# Patient Record
Sex: MALE | Race: WHITE | Employment: OTHER | URBAN - METROPOLITAN AREA
[De-identification: names, ages, dates, MRNs, and addresses within clinical notes are randomized per-mention and may not be internally consistent; named-entity substitution may affect disease eponyms.]

---

## 2018-03-27 ENCOUNTER — TELEPHONE (OUTPATIENT)
Dept: RHEUMATOLOGY | Age: 68
End: 2018-03-27

## 2018-04-18 ENCOUNTER — APPOINTMENT (OUTPATIENT)
Dept: GENERAL RADIOLOGY | Age: 68
DRG: 189 | End: 2018-04-18
Payer: MEDICARE

## 2018-04-18 ENCOUNTER — HOSPITAL ENCOUNTER (INPATIENT)
Age: 68
LOS: 4 days | Discharge: HOME | DRG: 189 | End: 2018-04-22
Attending: EMERGENCY MEDICINE | Admitting: INTERNAL MEDICINE
Payer: MEDICARE

## 2018-04-18 DIAGNOSIS — R09.02 HYPOXIA: Primary | ICD-10-CM

## 2018-04-18 DIAGNOSIS — J44.1 COPD EXACERBATION (HCC): ICD-10-CM

## 2018-04-18 LAB
ALBUMIN SERPL-MCNC: 4.3 G/DL (ref 3.5–5.2)
ALP BLD-CCNC: 166 U/L (ref 40–129)
ALT SERPL-CCNC: 32 U/L (ref 0–40)
ANION GAP SERPL CALCULATED.3IONS-SCNC: 12 MMOL/L (ref 7–16)
APTT: 31.4 SEC (ref 24.5–35.1)
AST SERPL-CCNC: 25 U/L (ref 0–39)
BASOPHILS ABSOLUTE: 0.08 E9/L (ref 0–0.2)
BASOPHILS RELATIVE PERCENT: 0.9 % (ref 0–2)
BILIRUB SERPL-MCNC: 1.1 MG/DL (ref 0–1.2)
BUN BLDV-MCNC: 9 MG/DL (ref 8–23)
CALCIUM SERPL-MCNC: 9.3 MG/DL (ref 8.6–10.2)
CHLORIDE BLD-SCNC: 98 MMOL/L (ref 98–107)
CO2: 28 MMOL/L (ref 22–29)
CREAT SERPL-MCNC: 1.3 MG/DL (ref 0.7–1.2)
EKG ATRIAL RATE: 64 BPM
EKG P AXIS: 59 DEGREES
EKG P-R INTERVAL: 136 MS
EKG Q-T INTERVAL: 426 MS
EKG QRS DURATION: 90 MS
EKG QTC CALCULATION (BAZETT): 439 MS
EKG R AXIS: -56 DEGREES
EKG T AXIS: 49 DEGREES
EKG VENTRICULAR RATE: 64 BPM
EOSINOPHILS ABSOLUTE: 0.66 E9/L (ref 0.05–0.5)
EOSINOPHILS RELATIVE PERCENT: 7.8 % (ref 0–6)
FILM ARRAY ADENOVIRUS: ABNORMAL
FILM ARRAY BORDETELLA PERTUSSIS: ABNORMAL
FILM ARRAY CHLAMYDOPHILIA PNEUMONIAE: ABNORMAL
FILM ARRAY CORONAVIRUS 229E: ABNORMAL
FILM ARRAY CORONAVIRUS HKU1: ABNORMAL
FILM ARRAY CORONAVIRUS NL63: ABNORMAL
FILM ARRAY CORONAVIRUS OC43: ABNORMAL
FILM ARRAY INFLUENZA A VIRUS 09H1: ABNORMAL
FILM ARRAY INFLUENZA A VIRUS H1: ABNORMAL
FILM ARRAY INFLUENZA A VIRUS H3: ABNORMAL
FILM ARRAY INFLUENZA A VIRUS: ABNORMAL
FILM ARRAY INFLUENZA B: ABNORMAL
FILM ARRAY METAPNEUMOVIRUS: ABNORMAL
FILM ARRAY MYCOPLASMA PNEUMONIAE: ABNORMAL
FILM ARRAY PARAINFLUENZA VIRUS 1: ABNORMAL
FILM ARRAY PARAINFLUENZA VIRUS 2: ABNORMAL
FILM ARRAY PARAINFLUENZA VIRUS 3: ABNORMAL
FILM ARRAY PARAINFLUENZA VIRUS 4: ABNORMAL
FILM ARRAY RESPIRATORY SYNCITIAL VIRUS: ABNORMAL
GFR AFRICAN AMERICAN: >60
GFR NON-AFRICAN AMERICAN: 55 ML/MIN/1.73
GLUCOSE BLD-MCNC: 133 MG/DL (ref 74–109)
HCT VFR BLD CALC: 49.2 % (ref 37–54)
HEMOGLOBIN: 16.1 G/DL (ref 12.5–16.5)
IMMATURE GRANULOCYTES #: 0.03 E9/L
IMMATURE GRANULOCYTES %: 0.4 % (ref 0–5)
INR BLD: 1.1
LACTIC ACID: 1.4 MMOL/L (ref 0.5–2.2)
LYMPHOCYTES ABSOLUTE: 0.68 E9/L (ref 1.5–4)
LYMPHOCYTES RELATIVE PERCENT: 8 % (ref 20–42)
MCH RBC QN AUTO: 30.2 PG (ref 26–35)
MCHC RBC AUTO-ENTMCNC: 32.7 % (ref 32–34.5)
MCV RBC AUTO: 92.3 FL (ref 80–99.9)
MONOCYTES ABSOLUTE: 0.59 E9/L (ref 0.1–0.95)
MONOCYTES RELATIVE PERCENT: 7 % (ref 2–12)
NEUTROPHILS ABSOLUTE: 6.43 E9/L (ref 1.8–7.3)
NEUTROPHILS RELATIVE PERCENT: 75.9 % (ref 43–80)
ORGANISM: ABNORMAL
PDW BLD-RTO: 12.7 FL (ref 11.5–15)
PLATELET # BLD: 122 E9/L (ref 130–450)
PMV BLD AUTO: 10.4 FL (ref 7–12)
POTASSIUM SERPL-SCNC: 3.9 MMOL/L (ref 3.5–5)
PROCALCITONIN: 0.07 NG/ML (ref 0–0.08)
PROTHROMBIN TIME: 12.6 SEC (ref 9.3–12.4)
RBC # BLD: 5.33 E12/L (ref 3.8–5.8)
SODIUM BLD-SCNC: 138 MMOL/L (ref 132–146)
TOTAL PROTEIN: 7.9 G/DL (ref 6.4–8.3)
TROPONIN: <0.01 NG/ML (ref 0–0.03)
WBC # BLD: 8.5 E9/L (ref 4.5–11.5)

## 2018-04-18 PROCEDURE — 87503 INFLUENZA DNA AMP PROB ADDL: CPT

## 2018-04-18 PROCEDURE — 36415 COLL VENOUS BLD VENIPUNCTURE: CPT

## 2018-04-18 PROCEDURE — 6370000000 HC RX 637 (ALT 250 FOR IP): Performed by: EMERGENCY MEDICINE

## 2018-04-18 PROCEDURE — 80053 COMPREHEN METABOLIC PANEL: CPT

## 2018-04-18 PROCEDURE — 96374 THER/PROPH/DIAG INJ IV PUSH: CPT

## 2018-04-18 PROCEDURE — 87501 INFLUENZA DNA AMP PROB 1+: CPT

## 2018-04-18 PROCEDURE — 2580000003 HC RX 258: Performed by: EMERGENCY MEDICINE

## 2018-04-18 PROCEDURE — 6360000002 HC RX W HCPCS: Performed by: EMERGENCY MEDICINE

## 2018-04-18 PROCEDURE — 85025 COMPLETE CBC W/AUTO DIFF WBC: CPT

## 2018-04-18 PROCEDURE — 87040 BLOOD CULTURE FOR BACTERIA: CPT

## 2018-04-18 PROCEDURE — 87486 CHLMYD PNEUM DNA AMP PROBE: CPT

## 2018-04-18 PROCEDURE — 85610 PROTHROMBIN TIME: CPT

## 2018-04-18 PROCEDURE — 71045 X-RAY EXAM CHEST 1 VIEW: CPT

## 2018-04-18 PROCEDURE — 87450 HC DIRECT STREP B ANTIGEN: CPT

## 2018-04-18 PROCEDURE — 2060000000 HC ICU INTERMEDIATE R&B

## 2018-04-18 PROCEDURE — 83605 ASSAY OF LACTIC ACID: CPT

## 2018-04-18 PROCEDURE — 99291 CRITICAL CARE FIRST HOUR: CPT

## 2018-04-18 PROCEDURE — 93005 ELECTROCARDIOGRAM TRACING: CPT | Performed by: EMERGENCY MEDICINE

## 2018-04-18 PROCEDURE — 84145 PROCALCITONIN (PCT): CPT

## 2018-04-18 PROCEDURE — 87798 DETECT AGENT NOS DNA AMP: CPT

## 2018-04-18 PROCEDURE — 87581 M.PNEUMON DNA AMP PROBE: CPT

## 2018-04-18 PROCEDURE — 84484 ASSAY OF TROPONIN QUANT: CPT

## 2018-04-18 PROCEDURE — 85730 THROMBOPLASTIN TIME PARTIAL: CPT

## 2018-04-18 RX ORDER — HYDRALAZINE HYDROCHLORIDE 25 MG/1
25 TABLET, FILM COATED ORAL 2 TIMES DAILY
Status: DISCONTINUED | OUTPATIENT
Start: 2018-04-18 | End: 2018-04-22 | Stop reason: HOSPADM

## 2018-04-18 RX ORDER — LORAZEPAM 1 MG/1
3 TABLET ORAL
Status: DISCONTINUED | OUTPATIENT
Start: 2018-04-18 | End: 2018-04-22 | Stop reason: HOSPADM

## 2018-04-18 RX ORDER — LORAZEPAM 2 MG/ML
2 INJECTION INTRAMUSCULAR
Status: DISCONTINUED | OUTPATIENT
Start: 2018-04-18 | End: 2018-04-22 | Stop reason: HOSPADM

## 2018-04-18 RX ORDER — BENZONATATE 100 MG/1
100 CAPSULE ORAL EVERY 8 HOURS
Status: DISCONTINUED | OUTPATIENT
Start: 2018-04-18 | End: 2018-04-22 | Stop reason: HOSPADM

## 2018-04-18 RX ORDER — ONDANSETRON 2 MG/ML
4 INJECTION INTRAMUSCULAR; INTRAVENOUS EVERY 6 HOURS PRN
Status: DISCONTINUED | OUTPATIENT
Start: 2018-04-18 | End: 2018-04-22 | Stop reason: HOSPADM

## 2018-04-18 RX ORDER — GABAPENTIN 600 MG/1
600 TABLET ORAL EVERY 8 HOURS
Status: DISCONTINUED | OUTPATIENT
Start: 2018-04-18 | End: 2018-04-22 | Stop reason: HOSPADM

## 2018-04-18 RX ORDER — LORAZEPAM 2 MG/ML
3 INJECTION INTRAMUSCULAR
Status: DISCONTINUED | OUTPATIENT
Start: 2018-04-18 | End: 2018-04-22 | Stop reason: HOSPADM

## 2018-04-18 RX ORDER — ACETAMINOPHEN 325 MG/1
650 TABLET ORAL EVERY 4 HOURS PRN
Status: DISCONTINUED | OUTPATIENT
Start: 2018-04-18 | End: 2018-04-18 | Stop reason: SDUPTHER

## 2018-04-18 RX ORDER — LORAZEPAM 1 MG/1
4 TABLET ORAL
Status: DISCONTINUED | OUTPATIENT
Start: 2018-04-18 | End: 2018-04-22 | Stop reason: HOSPADM

## 2018-04-18 RX ORDER — THIAMINE MONONITRATE (VIT B1) 100 MG
100 TABLET ORAL DAILY
Status: DISCONTINUED | OUTPATIENT
Start: 2018-04-18 | End: 2018-04-22 | Stop reason: HOSPADM

## 2018-04-18 RX ORDER — LORAZEPAM 1 MG/1
1 TABLET ORAL
Status: DISCONTINUED | OUTPATIENT
Start: 2018-04-18 | End: 2018-04-22 | Stop reason: HOSPADM

## 2018-04-18 RX ORDER — METHYLPREDNISOLONE SODIUM SUCCINATE 40 MG/ML
40 INJECTION, POWDER, LYOPHILIZED, FOR SOLUTION INTRAMUSCULAR; INTRAVENOUS EVERY 8 HOURS
Status: DISCONTINUED | OUTPATIENT
Start: 2018-04-18 | End: 2018-04-19

## 2018-04-18 RX ORDER — AMLODIPINE BESYLATE 10 MG/1
10 TABLET ORAL EVERY MORNING
Status: DISCONTINUED | OUTPATIENT
Start: 2018-04-19 | End: 2018-04-22 | Stop reason: HOSPADM

## 2018-04-18 RX ORDER — ATENOLOL 50 MG/1
50 TABLET ORAL 2 TIMES DAILY
Status: DISCONTINUED | OUTPATIENT
Start: 2018-04-18 | End: 2018-04-22 | Stop reason: HOSPADM

## 2018-04-18 RX ORDER — GUAIFENESIN/DEXTROMETHORPHAN 100-10MG/5
5 SYRUP ORAL EVERY 4 HOURS PRN
Status: DISCONTINUED | OUTPATIENT
Start: 2018-04-18 | End: 2018-04-22 | Stop reason: HOSPADM

## 2018-04-18 RX ORDER — ALBUTEROL SULFATE 2.5 MG/3ML
2.5 SOLUTION RESPIRATORY (INHALATION) EVERY 6 HOURS PRN
Status: DISCONTINUED | OUTPATIENT
Start: 2018-04-18 | End: 2018-04-22 | Stop reason: HOSPADM

## 2018-04-18 RX ORDER — ASPIRIN 81 MG/1
81 TABLET, CHEWABLE ORAL EVERY MORNING
Status: DISCONTINUED | OUTPATIENT
Start: 2018-04-19 | End: 2018-04-22 | Stop reason: HOSPADM

## 2018-04-18 RX ORDER — LORAZEPAM 2 MG/ML
1 INJECTION INTRAMUSCULAR
Status: DISCONTINUED | OUTPATIENT
Start: 2018-04-18 | End: 2018-04-22 | Stop reason: HOSPADM

## 2018-04-18 RX ORDER — ACETAMINOPHEN 325 MG/1
650 TABLET ORAL EVERY 4 HOURS PRN
Status: DISCONTINUED | OUTPATIENT
Start: 2018-04-18 | End: 2018-04-22 | Stop reason: HOSPADM

## 2018-04-18 RX ORDER — LORAZEPAM 1 MG/1
2 TABLET ORAL
Status: DISCONTINUED | OUTPATIENT
Start: 2018-04-18 | End: 2018-04-22 | Stop reason: HOSPADM

## 2018-04-18 RX ORDER — LEVOFLOXACIN 5 MG/ML
500 INJECTION, SOLUTION INTRAVENOUS ONCE
Status: COMPLETED | OUTPATIENT
Start: 2018-04-18 | End: 2018-04-18

## 2018-04-18 RX ORDER — GUAIFENESIN 400 MG/1
400 TABLET ORAL 4 TIMES DAILY PRN
COMMUNITY
End: 2018-09-06 | Stop reason: ALTCHOICE

## 2018-04-18 RX ORDER — METHYLPREDNISOLONE SODIUM SUCCINATE 125 MG/2ML
125 INJECTION, POWDER, LYOPHILIZED, FOR SOLUTION INTRAMUSCULAR; INTRAVENOUS ONCE
Status: COMPLETED | OUTPATIENT
Start: 2018-04-18 | End: 2018-04-18

## 2018-04-18 RX ORDER — IPRATROPIUM BROMIDE AND ALBUTEROL SULFATE 2.5; .5 MG/3ML; MG/3ML
1 SOLUTION RESPIRATORY (INHALATION)
Status: COMPLETED | OUTPATIENT
Start: 2018-04-18 | End: 2018-04-18

## 2018-04-18 RX ORDER — LORAZEPAM 2 MG/ML
4 INJECTION INTRAMUSCULAR
Status: DISCONTINUED | OUTPATIENT
Start: 2018-04-18 | End: 2018-04-22 | Stop reason: HOSPADM

## 2018-04-18 RX ORDER — SODIUM CHLORIDE 9 MG/ML
INJECTION, SOLUTION INTRAVENOUS CONTINUOUS
Status: DISCONTINUED | OUTPATIENT
Start: 2018-04-18 | End: 2018-04-19

## 2018-04-18 RX ORDER — SODIUM CHLORIDE 0.9 % (FLUSH) 0.9 %
10 SYRINGE (ML) INJECTION EVERY 12 HOURS SCHEDULED
Status: DISCONTINUED | OUTPATIENT
Start: 2018-04-18 | End: 2018-04-22 | Stop reason: HOSPADM

## 2018-04-18 RX ORDER — SODIUM CHLORIDE 0.9 % (FLUSH) 0.9 %
10 SYRINGE (ML) INJECTION PRN
Status: DISCONTINUED | OUTPATIENT
Start: 2018-04-18 | End: 2018-04-22 | Stop reason: HOSPADM

## 2018-04-18 RX ADMIN — IPRATROPIUM BROMIDE AND ALBUTEROL SULFATE 1 AMPULE: .5; 3 SOLUTION RESPIRATORY (INHALATION) at 13:10

## 2018-04-18 RX ADMIN — IPRATROPIUM BROMIDE AND ALBUTEROL SULFATE 1 AMPULE: .5; 3 SOLUTION RESPIRATORY (INHALATION) at 13:07

## 2018-04-18 RX ADMIN — GABAPENTIN 600 MG: 600 TABLET ORAL at 17:08

## 2018-04-18 RX ADMIN — LORAZEPAM 1 MG: 2 INJECTION INTRAMUSCULAR; INTRAVENOUS at 17:10

## 2018-04-18 RX ADMIN — IPRATROPIUM BROMIDE AND ALBUTEROL SULFATE 1 AMPULE: .5; 3 SOLUTION RESPIRATORY (INHALATION) at 13:08

## 2018-04-18 RX ADMIN — METHYLPREDNISOLONE SODIUM SUCCINATE 125 MG: 125 INJECTION, POWDER, FOR SOLUTION INTRAMUSCULAR; INTRAVENOUS at 13:12

## 2018-04-18 RX ADMIN — METHYLPREDNISOLONE SODIUM SUCCINATE 40 MG: 40 INJECTION, POWDER, FOR SOLUTION INTRAMUSCULAR; INTRAVENOUS at 20:37

## 2018-04-18 RX ADMIN — LEVOFLOXACIN 500 MG: 5 INJECTION, SOLUTION INTRAVENOUS at 13:58

## 2018-04-18 RX ADMIN — SODIUM CHLORIDE: 9 INJECTION, SOLUTION INTRAVENOUS at 17:08

## 2018-04-18 RX ADMIN — HYDRALAZINE HYDROCHLORIDE 25 MG: 25 TABLET, FILM COATED ORAL at 20:37

## 2018-04-18 RX ADMIN — ATENOLOL 50 MG: 50 TABLET ORAL at 20:36

## 2018-04-18 ASSESSMENT — PAIN SCALES - GENERAL
PAINLEVEL_OUTOF10: 3
PAINLEVEL_OUTOF10: 0
PAINLEVEL_OUTOF10: 0

## 2018-04-19 LAB
ANION GAP SERPL CALCULATED.3IONS-SCNC: 13 MMOL/L (ref 7–16)
BUN BLDV-MCNC: 15 MG/DL (ref 8–23)
CALCIUM SERPL-MCNC: 8.7 MG/DL (ref 8.6–10.2)
CHLORIDE BLD-SCNC: 96 MMOL/L (ref 98–107)
CO2: 24 MMOL/L (ref 22–29)
CREAT SERPL-MCNC: 1.4 MG/DL (ref 0.7–1.2)
GFR AFRICAN AMERICAN: >60
GFR NON-AFRICAN AMERICAN: 50 ML/MIN/1.73
GLUCOSE BLD-MCNC: 199 MG/DL (ref 74–109)
HCT VFR BLD CALC: 41.6 % (ref 37–54)
HEMOGLOBIN: 13.9 G/DL (ref 12.5–16.5)
L. PNEUMOPHILA SEROGP 1 UR AG: NORMAL
MCH RBC QN AUTO: 30.3 PG (ref 26–35)
MCHC RBC AUTO-ENTMCNC: 33.4 % (ref 32–34.5)
MCV RBC AUTO: 90.8 FL (ref 80–99.9)
PDW BLD-RTO: 12.6 FL (ref 11.5–15)
PLATELET # BLD: 102 E9/L (ref 130–450)
PMV BLD AUTO: 10.9 FL (ref 7–12)
POTASSIUM SERPL-SCNC: 3.9 MMOL/L (ref 3.5–5)
RBC # BLD: 4.58 E12/L (ref 3.8–5.8)
SODIUM BLD-SCNC: 133 MMOL/L (ref 132–146)
WBC # BLD: 6 E9/L (ref 4.5–11.5)

## 2018-04-19 PROCEDURE — 2700000000 HC OXYGEN THERAPY PER DAY

## 2018-04-19 PROCEDURE — 94640 AIRWAY INHALATION TREATMENT: CPT

## 2018-04-19 PROCEDURE — 6370000000 HC RX 637 (ALT 250 FOR IP): Performed by: EMERGENCY MEDICINE

## 2018-04-19 PROCEDURE — 85027 COMPLETE CBC AUTOMATED: CPT

## 2018-04-19 PROCEDURE — 6360000002 HC RX W HCPCS: Performed by: EMERGENCY MEDICINE

## 2018-04-19 PROCEDURE — 6370000000 HC RX 637 (ALT 250 FOR IP): Performed by: INTERNAL MEDICINE

## 2018-04-19 PROCEDURE — 2060000000 HC ICU INTERMEDIATE R&B

## 2018-04-19 PROCEDURE — 80048 BASIC METABOLIC PNL TOTAL CA: CPT

## 2018-04-19 PROCEDURE — 2580000003 HC RX 258: Performed by: EMERGENCY MEDICINE

## 2018-04-19 PROCEDURE — 6360000002 HC RX W HCPCS: Performed by: INTERNAL MEDICINE

## 2018-04-19 PROCEDURE — 36415 COLL VENOUS BLD VENIPUNCTURE: CPT

## 2018-04-19 RX ORDER — BUDESONIDE 0.5 MG/2ML
1000 INHALANT ORAL 2 TIMES DAILY
Status: DISCONTINUED | OUTPATIENT
Start: 2018-04-19 | End: 2018-04-22

## 2018-04-19 RX ORDER — IPRATROPIUM BROMIDE AND ALBUTEROL SULFATE 2.5; .5 MG/3ML; MG/3ML
1 SOLUTION RESPIRATORY (INHALATION) EVERY 4 HOURS
Status: DISCONTINUED | OUTPATIENT
Start: 2018-04-19 | End: 2018-04-22

## 2018-04-19 RX ORDER — METHYLPREDNISOLONE SODIUM SUCCINATE 40 MG/ML
40 INJECTION, POWDER, LYOPHILIZED, FOR SOLUTION INTRAMUSCULAR; INTRAVENOUS DAILY
Status: DISCONTINUED | OUTPATIENT
Start: 2018-04-20 | End: 2018-04-20

## 2018-04-19 RX ORDER — FORMOTEROL FUMARATE 20 UG/2ML
20 SOLUTION RESPIRATORY (INHALATION) 2 TIMES DAILY
Status: DISCONTINUED | OUTPATIENT
Start: 2018-04-19 | End: 2018-04-22

## 2018-04-19 RX ADMIN — Medication 10 ML: at 20:53

## 2018-04-19 RX ADMIN — IPRATROPIUM BROMIDE 0.5 MG: 0.5 SOLUTION RESPIRATORY (INHALATION) at 11:31

## 2018-04-19 RX ADMIN — ENOXAPARIN SODIUM 40 MG: 40 INJECTION, SOLUTION INTRAVENOUS; SUBCUTANEOUS at 17:50

## 2018-04-19 RX ADMIN — HYDRALAZINE HYDROCHLORIDE 25 MG: 25 TABLET, FILM COATED ORAL at 08:40

## 2018-04-19 RX ADMIN — HYDRALAZINE HYDROCHLORIDE 25 MG: 25 TABLET, FILM COATED ORAL at 20:52

## 2018-04-19 RX ADMIN — AMLODIPINE BESYLATE 10 MG: 10 TABLET ORAL at 10:26

## 2018-04-19 RX ADMIN — IPRATROPIUM BROMIDE AND ALBUTEROL SULFATE 1 AMPULE: .5; 3 SOLUTION RESPIRATORY (INHALATION) at 16:44

## 2018-04-19 RX ADMIN — FORMOTEROL FUMARATE DIHYDRATE 20 MCG: 20 SOLUTION RESPIRATORY (INHALATION) at 20:21

## 2018-04-19 RX ADMIN — IPRATROPIUM BROMIDE 0.5 MG: 0.5 SOLUTION RESPIRATORY (INHALATION) at 09:00

## 2018-04-19 RX ADMIN — BUDESONIDE 1000 MCG: 0.5 SUSPENSION RESPIRATORY (INHALATION) at 20:21

## 2018-04-19 RX ADMIN — ATENOLOL 50 MG: 50 TABLET ORAL at 08:39

## 2018-04-19 RX ADMIN — METHYLPREDNISOLONE SODIUM SUCCINATE 40 MG: 40 INJECTION, POWDER, FOR SOLUTION INTRAMUSCULAR; INTRAVENOUS at 05:14

## 2018-04-19 RX ADMIN — PAROXETINE HYDROCHLORIDE 30 MG: 10 TABLET, FILM COATED ORAL at 08:39

## 2018-04-19 RX ADMIN — LORAZEPAM 1 MG: 2 INJECTION INTRAMUSCULAR; INTRAVENOUS at 19:19

## 2018-04-19 RX ADMIN — ATENOLOL 50 MG: 50 TABLET ORAL at 20:52

## 2018-04-19 RX ADMIN — LORAZEPAM 1 MG: 1 TABLET ORAL at 10:26

## 2018-04-19 RX ADMIN — GABAPENTIN 600 MG: 600 TABLET ORAL at 08:40

## 2018-04-19 RX ADMIN — GABAPENTIN 600 MG: 600 TABLET ORAL at 17:50

## 2018-04-19 RX ADMIN — ASPIRIN 81 MG 81 MG: 81 TABLET ORAL at 08:39

## 2018-04-19 RX ADMIN — Medication 100 MG: at 08:40

## 2018-04-19 RX ADMIN — BENZONATATE 100 MG: 100 CAPSULE ORAL at 08:39

## 2018-04-19 RX ADMIN — GABAPENTIN 600 MG: 600 TABLET ORAL at 00:26

## 2018-04-19 RX ADMIN — BENZONATATE 100 MG: 100 CAPSULE ORAL at 00:26

## 2018-04-19 RX ADMIN — LORAZEPAM 3 MG: 2 INJECTION INTRAMUSCULAR; INTRAVENOUS at 20:52

## 2018-04-19 RX ADMIN — Medication 10 ML: at 08:41

## 2018-04-19 RX ADMIN — BENZONATATE 100 MG: 100 CAPSULE ORAL at 17:50

## 2018-04-19 ASSESSMENT — PAIN SCALES - GENERAL
PAINLEVEL_OUTOF10: 0

## 2018-04-20 LAB
ANION GAP SERPL CALCULATED.3IONS-SCNC: 11 MMOL/L (ref 7–16)
BUN BLDV-MCNC: 22 MG/DL (ref 8–23)
CALCIUM SERPL-MCNC: 8.8 MG/DL (ref 8.6–10.2)
CHLORIDE BLD-SCNC: 103 MMOL/L (ref 98–107)
CO2: 24 MMOL/L (ref 22–29)
CREAT SERPL-MCNC: 1.4 MG/DL (ref 0.7–1.2)
GFR AFRICAN AMERICAN: >60
GFR NON-AFRICAN AMERICAN: 50 ML/MIN/1.73
GLUCOSE BLD-MCNC: 165 MG/DL (ref 74–109)
HCT VFR BLD CALC: 42.5 % (ref 37–54)
HEMOGLOBIN: 13.9 G/DL (ref 12.5–16.5)
MCH RBC QN AUTO: 30.3 PG (ref 26–35)
MCHC RBC AUTO-ENTMCNC: 32.7 % (ref 32–34.5)
MCV RBC AUTO: 92.6 FL (ref 80–99.9)
PDW BLD-RTO: 12.9 FL (ref 11.5–15)
PLATELET # BLD: 119 E9/L (ref 130–450)
PMV BLD AUTO: 11.3 FL (ref 7–12)
POTASSIUM SERPL-SCNC: 4.1 MMOL/L (ref 3.5–5)
RBC # BLD: 4.59 E12/L (ref 3.8–5.8)
SODIUM BLD-SCNC: 138 MMOL/L (ref 132–146)
WBC # BLD: 10.5 E9/L (ref 4.5–11.5)

## 2018-04-20 PROCEDURE — 6370000000 HC RX 637 (ALT 250 FOR IP): Performed by: EMERGENCY MEDICINE

## 2018-04-20 PROCEDURE — 94640 AIRWAY INHALATION TREATMENT: CPT

## 2018-04-20 PROCEDURE — 2580000003 HC RX 258: Performed by: EMERGENCY MEDICINE

## 2018-04-20 PROCEDURE — 36415 COLL VENOUS BLD VENIPUNCTURE: CPT

## 2018-04-20 PROCEDURE — 85027 COMPLETE CBC AUTOMATED: CPT

## 2018-04-20 PROCEDURE — 80048 BASIC METABOLIC PNL TOTAL CA: CPT

## 2018-04-20 PROCEDURE — 2700000000 HC OXYGEN THERAPY PER DAY

## 2018-04-20 PROCEDURE — 2060000000 HC ICU INTERMEDIATE R&B

## 2018-04-20 PROCEDURE — 6360000002 HC RX W HCPCS: Performed by: INTERNAL MEDICINE

## 2018-04-20 PROCEDURE — 6360000002 HC RX W HCPCS: Performed by: EMERGENCY MEDICINE

## 2018-04-20 RX ORDER — METHYLPREDNISOLONE SODIUM SUCCINATE 40 MG/ML
40 INJECTION, POWDER, LYOPHILIZED, FOR SOLUTION INTRAMUSCULAR; INTRAVENOUS EVERY 6 HOURS
Status: DISCONTINUED | OUTPATIENT
Start: 2018-04-20 | End: 2018-04-21

## 2018-04-20 RX ADMIN — GABAPENTIN 600 MG: 600 TABLET ORAL at 09:35

## 2018-04-20 RX ADMIN — GABAPENTIN 600 MG: 600 TABLET ORAL at 15:35

## 2018-04-20 RX ADMIN — METHYLPREDNISOLONE SODIUM SUCCINATE 40 MG: 40 INJECTION, POWDER, FOR SOLUTION INTRAMUSCULAR; INTRAVENOUS at 09:35

## 2018-04-20 RX ADMIN — Medication 100 MG: at 09:35

## 2018-04-20 RX ADMIN — METHYLPREDNISOLONE SODIUM SUCCINATE 40 MG: 40 INJECTION, POWDER, LYOPHILIZED, FOR SOLUTION INTRAMUSCULAR; INTRAVENOUS at 15:35

## 2018-04-20 RX ADMIN — LORAZEPAM 3 MG: 1 TABLET ORAL at 09:40

## 2018-04-20 RX ADMIN — ASPIRIN 81 MG 81 MG: 81 TABLET ORAL at 09:35

## 2018-04-20 RX ADMIN — LORAZEPAM 4 MG: 1 TABLET ORAL at 14:16

## 2018-04-20 RX ADMIN — LORAZEPAM 4 MG: 1 TABLET ORAL at 11:30

## 2018-04-20 RX ADMIN — Medication 10 ML: at 01:41

## 2018-04-20 RX ADMIN — LORAZEPAM 2 MG: 2 INJECTION INTRAMUSCULAR; INTRAVENOUS at 01:41

## 2018-04-20 RX ADMIN — PAROXETINE HYDROCHLORIDE 30 MG: 10 TABLET, FILM COATED ORAL at 09:35

## 2018-04-20 RX ADMIN — LORAZEPAM 2 MG: 2 INJECTION INTRAMUSCULAR; INTRAVENOUS at 04:02

## 2018-04-20 RX ADMIN — BENZONATATE 100 MG: 100 CAPSULE ORAL at 17:03

## 2018-04-20 RX ADMIN — BENZONATATE 100 MG: 100 CAPSULE ORAL at 00:28

## 2018-04-20 RX ADMIN — BUDESONIDE 1000 MCG: 0.5 SUSPENSION RESPIRATORY (INHALATION) at 08:44

## 2018-04-20 RX ADMIN — BENZONATATE 100 MG: 100 CAPSULE ORAL at 09:36

## 2018-04-20 RX ADMIN — FORMOTEROL FUMARATE DIHYDRATE 20 MCG: 20 SOLUTION RESPIRATORY (INHALATION) at 08:44

## 2018-04-20 RX ADMIN — GABAPENTIN 600 MG: 600 TABLET ORAL at 00:28

## 2018-04-20 RX ADMIN — Medication 10 ML: at 09:35

## 2018-04-20 RX ADMIN — LORAZEPAM 3 MG: 2 INJECTION INTRAMUSCULAR; INTRAVENOUS at 19:25

## 2018-04-20 RX ADMIN — AMLODIPINE BESYLATE 10 MG: 10 TABLET ORAL at 09:36

## 2018-04-20 RX ADMIN — LORAZEPAM 4 MG: 1 TABLET ORAL at 17:04

## 2018-04-20 RX ADMIN — ENOXAPARIN SODIUM 40 MG: 40 INJECTION, SOLUTION INTRAVENOUS; SUBCUTANEOUS at 17:03

## 2018-04-20 RX ADMIN — HYDRALAZINE HYDROCHLORIDE 25 MG: 25 TABLET, FILM COATED ORAL at 09:35

## 2018-04-20 RX ADMIN — IPRATROPIUM BROMIDE AND ALBUTEROL SULFATE 1 AMPULE: .5; 3 SOLUTION RESPIRATORY (INHALATION) at 12:05

## 2018-04-20 RX ADMIN — Medication 10 ML: at 15:35

## 2018-04-20 RX ADMIN — ATENOLOL 50 MG: 50 TABLET ORAL at 09:36

## 2018-04-20 RX ADMIN — IPRATROPIUM BROMIDE AND ALBUTEROL SULFATE 1 AMPULE: .5; 3 SOLUTION RESPIRATORY (INHALATION) at 16:24

## 2018-04-20 RX ADMIN — Medication 10 ML: at 19:25

## 2018-04-20 ASSESSMENT — PAIN SCALES - GENERAL
PAINLEVEL_OUTOF10: 0

## 2018-04-21 LAB
ANION GAP SERPL CALCULATED.3IONS-SCNC: 11 MMOL/L (ref 7–16)
BUN BLDV-MCNC: 19 MG/DL (ref 8–23)
CALCIUM SERPL-MCNC: 8.9 MG/DL (ref 8.6–10.2)
CHLORIDE BLD-SCNC: 101 MMOL/L (ref 98–107)
CO2: 26 MMOL/L (ref 22–29)
CREAT SERPL-MCNC: 1.1 MG/DL (ref 0.7–1.2)
GFR AFRICAN AMERICAN: >60
GFR NON-AFRICAN AMERICAN: >60 ML/MIN/1.73
GLUCOSE BLD-MCNC: 178 MG/DL (ref 74–109)
POTASSIUM SERPL-SCNC: 4 MMOL/L (ref 3.5–5)
SODIUM BLD-SCNC: 138 MMOL/L (ref 132–146)

## 2018-04-21 PROCEDURE — 6360000002 HC RX W HCPCS: Performed by: EMERGENCY MEDICINE

## 2018-04-21 PROCEDURE — 2580000003 HC RX 258: Performed by: EMERGENCY MEDICINE

## 2018-04-21 PROCEDURE — 6360000002 HC RX W HCPCS: Performed by: INTERNAL MEDICINE

## 2018-04-21 PROCEDURE — 80048 BASIC METABOLIC PNL TOTAL CA: CPT

## 2018-04-21 PROCEDURE — 36415 COLL VENOUS BLD VENIPUNCTURE: CPT

## 2018-04-21 PROCEDURE — 2700000000 HC OXYGEN THERAPY PER DAY

## 2018-04-21 PROCEDURE — 6370000000 HC RX 637 (ALT 250 FOR IP): Performed by: EMERGENCY MEDICINE

## 2018-04-21 PROCEDURE — 94640 AIRWAY INHALATION TREATMENT: CPT

## 2018-04-21 PROCEDURE — 2060000000 HC ICU INTERMEDIATE R&B

## 2018-04-21 RX ORDER — METHYLPREDNISOLONE SODIUM SUCCINATE 40 MG/ML
40 INJECTION, POWDER, LYOPHILIZED, FOR SOLUTION INTRAMUSCULAR; INTRAVENOUS EVERY 8 HOURS
Status: DISCONTINUED | OUTPATIENT
Start: 2018-04-21 | End: 2018-04-22

## 2018-04-21 RX ADMIN — LORAZEPAM 2 MG: 1 TABLET ORAL at 09:29

## 2018-04-21 RX ADMIN — Medication 10 ML: at 00:29

## 2018-04-21 RX ADMIN — Medication 100 MG: at 09:29

## 2018-04-21 RX ADMIN — BUDESONIDE 1000 MCG: 0.5 SUSPENSION RESPIRATORY (INHALATION) at 20:35

## 2018-04-21 RX ADMIN — METHYLPREDNISOLONE SODIUM SUCCINATE 40 MG: 40 INJECTION, POWDER, LYOPHILIZED, FOR SOLUTION INTRAMUSCULAR; INTRAVENOUS at 00:26

## 2018-04-21 RX ADMIN — METHYLPREDNISOLONE SODIUM SUCCINATE 40 MG: 40 INJECTION, POWDER, LYOPHILIZED, FOR SOLUTION INTRAMUSCULAR; INTRAVENOUS at 11:44

## 2018-04-21 RX ADMIN — AMLODIPINE BESYLATE 10 MG: 10 TABLET ORAL at 09:29

## 2018-04-21 RX ADMIN — IPRATROPIUM BROMIDE AND ALBUTEROL SULFATE 1 AMPULE: .5; 3 SOLUTION RESPIRATORY (INHALATION) at 15:57

## 2018-04-21 RX ADMIN — LORAZEPAM 1 MG: 1 TABLET ORAL at 06:08

## 2018-04-21 RX ADMIN — Medication 4 MG: at 19:43

## 2018-04-21 RX ADMIN — BUDESONIDE 1000 MCG: 0.5 SUSPENSION RESPIRATORY (INHALATION) at 07:51

## 2018-04-21 RX ADMIN — IPRATROPIUM BROMIDE AND ALBUTEROL SULFATE 1 AMPULE: .5; 3 SOLUTION RESPIRATORY (INHALATION) at 20:35

## 2018-04-21 RX ADMIN — HYDRALAZINE HYDROCHLORIDE 25 MG: 25 TABLET, FILM COATED ORAL at 19:42

## 2018-04-21 RX ADMIN — Medication 40 MG: at 19:43

## 2018-04-21 RX ADMIN — BENZONATATE 100 MG: 100 CAPSULE ORAL at 17:11

## 2018-04-21 RX ADMIN — ATENOLOL 50 MG: 50 TABLET ORAL at 19:42

## 2018-04-21 RX ADMIN — FORMOTEROL FUMARATE DIHYDRATE 20 MCG: 20 SOLUTION RESPIRATORY (INHALATION) at 20:35

## 2018-04-21 RX ADMIN — Medication 10 ML: at 09:30

## 2018-04-21 RX ADMIN — LORAZEPAM 2 MG: 1 TABLET ORAL at 11:13

## 2018-04-21 RX ADMIN — Medication 10 ML: at 19:44

## 2018-04-21 RX ADMIN — IPRATROPIUM BROMIDE AND ALBUTEROL SULFATE 1 AMPULE: .5; 3 SOLUTION RESPIRATORY (INHALATION) at 11:22

## 2018-04-21 RX ADMIN — GABAPENTIN 600 MG: 600 TABLET ORAL at 09:29

## 2018-04-21 RX ADMIN — HYDRALAZINE HYDROCHLORIDE 25 MG: 25 TABLET, FILM COATED ORAL at 09:29

## 2018-04-21 RX ADMIN — Medication 4 MG: at 18:06

## 2018-04-21 RX ADMIN — ASPIRIN 81 MG 81 MG: 81 TABLET ORAL at 09:29

## 2018-04-21 RX ADMIN — GABAPENTIN 600 MG: 600 TABLET ORAL at 17:11

## 2018-04-21 RX ADMIN — ATENOLOL 50 MG: 50 TABLET ORAL at 09:29

## 2018-04-21 RX ADMIN — Medication 4 MG: at 17:00

## 2018-04-21 RX ADMIN — METHYLPREDNISOLONE SODIUM SUCCINATE 40 MG: 40 INJECTION, POWDER, LYOPHILIZED, FOR SOLUTION INTRAMUSCULAR; INTRAVENOUS at 06:07

## 2018-04-21 RX ADMIN — FORMOTEROL FUMARATE DIHYDRATE 20 MCG: 20 SOLUTION RESPIRATORY (INHALATION) at 07:51

## 2018-04-21 RX ADMIN — PAROXETINE HYDROCHLORIDE 30 MG: 10 TABLET, FILM COATED ORAL at 09:30

## 2018-04-21 RX ADMIN — BENZONATATE 100 MG: 100 CAPSULE ORAL at 09:29

## 2018-04-21 ASSESSMENT — PAIN SCALES - GENERAL
PAINLEVEL_OUTOF10: 0
PAINLEVEL_OUTOF10: 0

## 2018-04-22 VITALS
WEIGHT: 165.7 LBS | RESPIRATION RATE: 18 BRPM | OXYGEN SATURATION: 94 % | HEIGHT: 65 IN | SYSTOLIC BLOOD PRESSURE: 147 MMHG | TEMPERATURE: 96.1 F | HEART RATE: 71 BPM | BODY MASS INDEX: 27.61 KG/M2 | DIASTOLIC BLOOD PRESSURE: 79 MMHG

## 2018-04-22 LAB
ANION GAP SERPL CALCULATED.3IONS-SCNC: 10 MMOL/L (ref 7–16)
BUN BLDV-MCNC: 25 MG/DL (ref 8–23)
CALCIUM SERPL-MCNC: 8.8 MG/DL (ref 8.6–10.2)
CHLORIDE BLD-SCNC: 101 MMOL/L (ref 98–107)
CO2: 27 MMOL/L (ref 22–29)
CREAT SERPL-MCNC: 1.1 MG/DL (ref 0.7–1.2)
GFR AFRICAN AMERICAN: >60
GFR NON-AFRICAN AMERICAN: >60 ML/MIN/1.73
GLUCOSE BLD-MCNC: 199 MG/DL (ref 74–109)
POTASSIUM SERPL-SCNC: 4 MMOL/L (ref 3.5–5)
SODIUM BLD-SCNC: 138 MMOL/L (ref 132–146)

## 2018-04-22 PROCEDURE — 6360000002 HC RX W HCPCS: Performed by: EMERGENCY MEDICINE

## 2018-04-22 PROCEDURE — 6370000000 HC RX 637 (ALT 250 FOR IP): Performed by: EMERGENCY MEDICINE

## 2018-04-22 PROCEDURE — 80048 BASIC METABOLIC PNL TOTAL CA: CPT

## 2018-04-22 PROCEDURE — 2580000003 HC RX 258: Performed by: EMERGENCY MEDICINE

## 2018-04-22 PROCEDURE — 2700000000 HC OXYGEN THERAPY PER DAY

## 2018-04-22 PROCEDURE — 94640 AIRWAY INHALATION TREATMENT: CPT

## 2018-04-22 PROCEDURE — 6360000002 HC RX W HCPCS: Performed by: INTERNAL MEDICINE

## 2018-04-22 PROCEDURE — 36415 COLL VENOUS BLD VENIPUNCTURE: CPT

## 2018-04-22 RX ORDER — PREDNISONE 10 MG/1
40 TABLET ORAL DAILY
Qty: 20 TABLET | Refills: 0 | Status: SHIPPED | OUTPATIENT
Start: 2018-04-22 | End: 2018-04-27

## 2018-04-22 RX ORDER — PREDNISONE 10 MG/1
TABLET ORAL
Qty: 30 TABLET | Refills: 0 | Status: SHIPPED | OUTPATIENT
Start: 2018-04-23 | End: 2018-05-05

## 2018-04-22 RX ORDER — ALBUTEROL SULFATE 90 UG/1
2 AEROSOL, METERED RESPIRATORY (INHALATION) EVERY 6 HOURS PRN
Qty: 1 INHALER | Refills: 3 | Status: SHIPPED | OUTPATIENT
Start: 2018-04-22 | End: 2018-10-15 | Stop reason: SDUPTHER

## 2018-04-22 RX ADMIN — IPRATROPIUM BROMIDE AND ALBUTEROL SULFATE 1 AMPULE: .5; 3 SOLUTION RESPIRATORY (INHALATION) at 11:46

## 2018-04-22 RX ADMIN — Medication 4 MG: at 00:06

## 2018-04-22 RX ADMIN — PAROXETINE HYDROCHLORIDE 30 MG: 10 TABLET, FILM COATED ORAL at 08:26

## 2018-04-22 RX ADMIN — GABAPENTIN 600 MG: 600 TABLET ORAL at 00:07

## 2018-04-22 RX ADMIN — Medication 40 MG: at 11:19

## 2018-04-22 RX ADMIN — AMLODIPINE BESYLATE 10 MG: 10 TABLET ORAL at 08:26

## 2018-04-22 RX ADMIN — BENZONATATE 100 MG: 100 CAPSULE ORAL at 00:07

## 2018-04-22 RX ADMIN — Medication 100 MG: at 08:26

## 2018-04-22 RX ADMIN — BUDESONIDE 1000 MCG: 0.5 SUSPENSION RESPIRATORY (INHALATION) at 07:49

## 2018-04-22 RX ADMIN — ASPIRIN 81 MG 81 MG: 81 TABLET ORAL at 08:26

## 2018-04-22 RX ADMIN — ATENOLOL 50 MG: 50 TABLET ORAL at 08:26

## 2018-04-22 RX ADMIN — FORMOTEROL FUMARATE DIHYDRATE 20 MCG: 20 SOLUTION RESPIRATORY (INHALATION) at 07:49

## 2018-04-22 RX ADMIN — IPRATROPIUM BROMIDE AND ALBUTEROL SULFATE 1 AMPULE: .5; 3 SOLUTION RESPIRATORY (INHALATION) at 00:24

## 2018-04-22 RX ADMIN — Medication 10 ML: at 11:19

## 2018-04-22 RX ADMIN — Medication 40 MG: at 03:40

## 2018-04-22 RX ADMIN — BENZONATATE 100 MG: 100 CAPSULE ORAL at 08:26

## 2018-04-22 RX ADMIN — GABAPENTIN 600 MG: 600 TABLET ORAL at 08:26

## 2018-04-22 RX ADMIN — HYDRALAZINE HYDROCHLORIDE 25 MG: 25 TABLET, FILM COATED ORAL at 08:26

## 2018-04-22 RX ADMIN — Medication 10 ML: at 08:26

## 2018-04-22 RX ADMIN — LORAZEPAM 2 MG: 1 TABLET ORAL at 08:31

## 2018-04-22 ASSESSMENT — PAIN SCALES - GENERAL: PAINLEVEL_OUTOF10: 0

## 2018-04-23 ENCOUNTER — CARE COORDINATION (OUTPATIENT)
Dept: CASE MANAGEMENT | Age: 68
End: 2018-04-23

## 2018-04-23 DIAGNOSIS — R09.02 HYPOXIA: Primary | ICD-10-CM

## 2018-04-23 LAB
BLOOD CULTURE, ROUTINE: NORMAL
CULTURE, BLOOD 2: NORMAL

## 2018-04-23 PROCEDURE — 1111F DSCHRG MED/CURRENT MED MERGE: CPT | Performed by: STUDENT IN AN ORGANIZED HEALTH CARE EDUCATION/TRAINING PROGRAM

## 2018-04-27 ENCOUNTER — CARE COORDINATION (OUTPATIENT)
Dept: CARE COORDINATION | Age: 68
End: 2018-04-27

## 2018-04-30 ENCOUNTER — OFFICE VISIT (OUTPATIENT)
Dept: INTERNAL MEDICINE | Age: 68
End: 2018-04-30
Payer: MEDICARE

## 2018-04-30 VITALS
DIASTOLIC BLOOD PRESSURE: 72 MMHG | RESPIRATION RATE: 16 BRPM | OXYGEN SATURATION: 96 % | HEART RATE: 61 BPM | SYSTOLIC BLOOD PRESSURE: 119 MMHG | TEMPERATURE: 98.4 F | WEIGHT: 161.1 LBS | HEIGHT: 65 IN | BODY MASS INDEX: 26.84 KG/M2

## 2018-04-30 DIAGNOSIS — I10 ESSENTIAL HYPERTENSION: Primary | ICD-10-CM

## 2018-04-30 DIAGNOSIS — G62.9 NEUROPATHY: ICD-10-CM

## 2018-04-30 DIAGNOSIS — K21.9 GASTROESOPHAGEAL REFLUX DISEASE WITHOUT ESOPHAGITIS: ICD-10-CM

## 2018-04-30 DIAGNOSIS — E78.2 MIXED HYPERLIPIDEMIA: ICD-10-CM

## 2018-04-30 PROCEDURE — 99213 OFFICE O/P EST LOW 20 MIN: CPT | Performed by: STUDENT IN AN ORGANIZED HEALTH CARE EDUCATION/TRAINING PROGRAM

## 2018-04-30 PROCEDURE — 99212 OFFICE O/P EST SF 10 MIN: CPT | Performed by: STUDENT IN AN ORGANIZED HEALTH CARE EDUCATION/TRAINING PROGRAM

## 2018-04-30 ASSESSMENT — PATIENT HEALTH QUESTIONNAIRE - PHQ9
SUM OF ALL RESPONSES TO PHQ9 QUESTIONS 1 & 2: 0
1. LITTLE INTEREST OR PLEASURE IN DOING THINGS: 0
2. FEELING DOWN, DEPRESSED OR HOPELESS: 0
SUM OF ALL RESPONSES TO PHQ QUESTIONS 1-9: 0

## 2018-05-02 ENCOUNTER — CARE COORDINATION (OUTPATIENT)
Dept: CASE MANAGEMENT | Age: 68
End: 2018-05-02

## 2018-06-13 ENCOUNTER — HOSPITAL ENCOUNTER (OUTPATIENT)
Age: 68
Discharge: HOME | End: 2018-06-13
Payer: MEDICARE

## 2018-06-13 DIAGNOSIS — E78.2 MIXED HYPERLIPIDEMIA: ICD-10-CM

## 2018-06-13 LAB
CHOLESTEROL, TOTAL: 174 MG/DL (ref 0–199)
HDLC SERPL-MCNC: 87 MG/DL
LDL CHOLESTEROL CALCULATED: 62 MG/DL (ref 0–99)
TRIGL SERPL-MCNC: 125 MG/DL (ref 0–149)
VLDLC SERPL CALC-MCNC: 25 MG/DL

## 2018-06-13 PROCEDURE — 80061 LIPID PANEL: CPT

## 2018-06-13 PROCEDURE — 36415 COLL VENOUS BLD VENIPUNCTURE: CPT

## 2018-06-20 ENCOUNTER — OFFICE VISIT (OUTPATIENT)
Dept: INTERNAL MEDICINE | Age: 68
End: 2018-06-20
Payer: MEDICARE

## 2018-06-20 VITALS
TEMPERATURE: 98.3 F | DIASTOLIC BLOOD PRESSURE: 59 MMHG | WEIGHT: 159 LBS | SYSTOLIC BLOOD PRESSURE: 125 MMHG | BODY MASS INDEX: 26.49 KG/M2 | RESPIRATION RATE: 16 BRPM | HEIGHT: 65 IN | HEART RATE: 58 BPM

## 2018-06-20 DIAGNOSIS — M47.22 OSTEOARTHRITIS OF SPINE WITH RADICULOPATHY, CERVICAL REGION: ICD-10-CM

## 2018-06-20 DIAGNOSIS — F41.0 PANIC DISORDER: ICD-10-CM

## 2018-06-20 DIAGNOSIS — Z12.11 COLON CANCER SCREENING: ICD-10-CM

## 2018-06-20 DIAGNOSIS — I10 ESSENTIAL HYPERTENSION: Primary | ICD-10-CM

## 2018-06-20 DIAGNOSIS — R73.03 PRE-DIABETES: ICD-10-CM

## 2018-06-20 DIAGNOSIS — E78.2 MIXED HYPERLIPIDEMIA: ICD-10-CM

## 2018-06-20 DIAGNOSIS — M48.02 SPINAL STENOSIS IN CERVICAL REGION: ICD-10-CM

## 2018-06-20 PROCEDURE — 99213 OFFICE O/P EST LOW 20 MIN: CPT | Performed by: STUDENT IN AN ORGANIZED HEALTH CARE EDUCATION/TRAINING PROGRAM

## 2018-06-20 PROCEDURE — 1123F ACP DISCUSS/DSCN MKR DOCD: CPT | Performed by: STUDENT IN AN ORGANIZED HEALTH CARE EDUCATION/TRAINING PROGRAM

## 2018-06-20 PROCEDURE — 1036F TOBACCO NON-USER: CPT | Performed by: STUDENT IN AN ORGANIZED HEALTH CARE EDUCATION/TRAINING PROGRAM

## 2018-06-20 PROCEDURE — G8417 CALC BMI ABV UP PARAM F/U: HCPCS | Performed by: STUDENT IN AN ORGANIZED HEALTH CARE EDUCATION/TRAINING PROGRAM

## 2018-06-20 PROCEDURE — 4040F PNEUMOC VAC/ADMIN/RCVD: CPT | Performed by: STUDENT IN AN ORGANIZED HEALTH CARE EDUCATION/TRAINING PROGRAM

## 2018-06-20 PROCEDURE — G8598 ASA/ANTIPLAT THER USED: HCPCS | Performed by: STUDENT IN AN ORGANIZED HEALTH CARE EDUCATION/TRAINING PROGRAM

## 2018-06-20 PROCEDURE — 1101F PT FALLS ASSESS-DOCD LE1/YR: CPT | Performed by: STUDENT IN AN ORGANIZED HEALTH CARE EDUCATION/TRAINING PROGRAM

## 2018-06-20 PROCEDURE — G8427 DOCREV CUR MEDS BY ELIG CLIN: HCPCS | Performed by: STUDENT IN AN ORGANIZED HEALTH CARE EDUCATION/TRAINING PROGRAM

## 2018-06-20 PROCEDURE — 3017F COLORECTAL CA SCREEN DOC REV: CPT | Performed by: STUDENT IN AN ORGANIZED HEALTH CARE EDUCATION/TRAINING PROGRAM

## 2018-06-20 RX ORDER — FOLIC ACID 1 MG/1
1 TABLET ORAL DAILY
Qty: 30 TABLET | Refills: 3 | Status: SHIPPED | OUTPATIENT
Start: 2018-06-20 | End: 2018-09-06 | Stop reason: ALTCHOICE

## 2018-06-20 RX ORDER — THIAMINE MONONITRATE (VIT B1) 100 MG
100 TABLET ORAL DAILY
Qty: 30 TABLET | Refills: 3 | Status: SHIPPED | OUTPATIENT
Start: 2018-06-20 | End: 2018-09-06 | Stop reason: ALTCHOICE

## 2018-06-20 NOTE — PATIENT INSTRUCTIONS
Follow up with Dr. Zheng Buckner as planned. Follow up in clinic in 6 months.      Electronically signed by Emily Miranda MD on 6/20/2018 at 9:44 AM

## 2018-06-20 NOTE — PROGRESS NOTES
Pt was discharged per Dr. Davis Albarado. Pt referral to Neurosurgery. Pt instructed to  for AVS and to schedule next appt.

## 2018-07-16 RX ORDER — ATORVASTATIN CALCIUM 40 MG/1
40 TABLET, FILM COATED ORAL DAILY
Qty: 60 TABLET | Refills: 3 | Status: CANCELLED | OUTPATIENT
Start: 2018-07-16

## 2018-07-20 RX ORDER — ATORVASTATIN CALCIUM 40 MG/1
40 TABLET, FILM COATED ORAL DAILY
Qty: 90 TABLET | Refills: 1 | Status: SHIPPED | OUTPATIENT
Start: 2018-07-20 | End: 2018-12-20 | Stop reason: SDUPTHER

## 2018-07-27 ENCOUNTER — TELEPHONE (OUTPATIENT)
Dept: INTERNAL MEDICINE | Age: 68
End: 2018-07-27

## 2018-07-27 DIAGNOSIS — M48.02 SPINAL STENOSIS IN CERVICAL REGION: Primary | ICD-10-CM

## 2018-08-09 ENCOUNTER — HOSPITAL ENCOUNTER (OUTPATIENT)
Dept: MRI IMAGING | Age: 68
Discharge: HOME | End: 2018-08-11
Payer: MEDICARE

## 2018-08-09 DIAGNOSIS — M48.02 SPINAL STENOSIS IN CERVICAL REGION: ICD-10-CM

## 2018-08-09 PROCEDURE — 72141 MRI NECK SPINE W/O DYE: CPT

## 2018-08-27 ENCOUNTER — APPOINTMENT (OUTPATIENT)
Dept: CT IMAGING | Age: 68
End: 2018-08-27
Payer: MEDICARE

## 2018-08-27 ENCOUNTER — HOSPITAL ENCOUNTER (EMERGENCY)
Age: 68
Discharge: HOME | End: 2018-08-27
Attending: EMERGENCY MEDICINE
Payer: MEDICARE

## 2018-08-27 VITALS
HEIGHT: 65 IN | DIASTOLIC BLOOD PRESSURE: 61 MMHG | TEMPERATURE: 99.7 F | SYSTOLIC BLOOD PRESSURE: 115 MMHG | WEIGHT: 160 LBS | BODY MASS INDEX: 26.66 KG/M2 | OXYGEN SATURATION: 95 % | HEART RATE: 69 BPM | RESPIRATION RATE: 16 BRPM

## 2018-08-27 DIAGNOSIS — R07.89 CHEST WALL PAIN: Primary | ICD-10-CM

## 2018-08-27 LAB
ALBUMIN SERPL-MCNC: 3.9 G/DL (ref 3.5–5.2)
ALP BLD-CCNC: 151 U/L (ref 40–129)
ALT SERPL-CCNC: 18 U/L (ref 0–40)
ANION GAP SERPL CALCULATED.3IONS-SCNC: 12 MMOL/L (ref 7–16)
ANISOCYTOSIS: ABNORMAL
AST SERPL-CCNC: 27 U/L (ref 0–39)
BASOPHILS ABSOLUTE: 0.06 E9/L (ref 0–0.2)
BASOPHILS RELATIVE PERCENT: 0.5 % (ref 0–2)
BILIRUB SERPL-MCNC: 0.5 MG/DL (ref 0–1.2)
BUN BLDV-MCNC: 17 MG/DL (ref 8–23)
CALCIUM SERPL-MCNC: 9.1 MG/DL (ref 8.6–10.2)
CHLORIDE BLD-SCNC: 95 MMOL/L (ref 98–107)
CK MB: <1 NG/ML (ref 0–7.7)
CO2: 27 MMOL/L (ref 22–29)
CREAT SERPL-MCNC: 1.4 MG/DL (ref 0.7–1.2)
EKG ATRIAL RATE: 72 BPM
EKG P AXIS: 34 DEGREES
EKG P-R INTERVAL: 126 MS
EKG Q-T INTERVAL: 394 MS
EKG QRS DURATION: 96 MS
EKG QTC CALCULATION (BAZETT): 431 MS
EKG R AXIS: -43 DEGREES
EKG T AXIS: 10 DEGREES
EKG VENTRICULAR RATE: 72 BPM
EOSINOPHILS ABSOLUTE: 0.22 E9/L (ref 0.05–0.5)
EOSINOPHILS RELATIVE PERCENT: 1.7 % (ref 0–6)
GFR AFRICAN AMERICAN: >60
GFR NON-AFRICAN AMERICAN: 50 ML/MIN/1.73
GLUCOSE BLD-MCNC: 182 MG/DL (ref 74–109)
HCT VFR BLD CALC: 43.3 % (ref 37–54)
HEMOGLOBIN: 14.4 G/DL (ref 12.5–16.5)
IMMATURE GRANULOCYTES #: 0.06 E9/L
IMMATURE GRANULOCYTES %: 0.5 % (ref 0–5)
INR BLD: 1.1
LYMPHOCYTES ABSOLUTE: 0.42 E9/L (ref 1.5–4)
LYMPHOCYTES RELATIVE PERCENT: 3.2 % (ref 20–42)
MCH RBC QN AUTO: 31.1 PG (ref 26–35)
MCHC RBC AUTO-ENTMCNC: 33.3 % (ref 32–34.5)
MCV RBC AUTO: 93.5 FL (ref 80–99.9)
MONOCYTES ABSOLUTE: 0.76 E9/L (ref 0.1–0.95)
MONOCYTES RELATIVE PERCENT: 5.8 % (ref 2–12)
NEUTROPHILS ABSOLUTE: 11.52 E9/L (ref 1.8–7.3)
NEUTROPHILS RELATIVE PERCENT: 88.3 % (ref 43–80)
PDW BLD-RTO: 12.6 FL (ref 11.5–15)
PLATELET # BLD: 179 E9/L (ref 130–450)
PMV BLD AUTO: 10.2 FL (ref 7–12)
POTASSIUM SERPL-SCNC: 4.4 MMOL/L (ref 3.5–5)
PROTHROMBIN TIME: 12.7 SEC (ref 9.3–12.4)
RBC # BLD: 4.63 E12/L (ref 3.8–5.8)
SODIUM BLD-SCNC: 134 MMOL/L (ref 132–146)
TOTAL CK: 34 U/L (ref 20–200)
TOTAL PROTEIN: 7.2 G/DL (ref 6.4–8.3)
TROPONIN: <0.01 NG/ML (ref 0–0.03)
WBC # BLD: 13 E9/L (ref 4.5–11.5)

## 2018-08-27 PROCEDURE — 84484 ASSAY OF TROPONIN QUANT: CPT

## 2018-08-27 PROCEDURE — 85025 COMPLETE CBC W/AUTO DIFF WBC: CPT

## 2018-08-27 PROCEDURE — 71260 CT THORAX DX C+: CPT

## 2018-08-27 PROCEDURE — 6360000002 HC RX W HCPCS: Performed by: EMERGENCY MEDICINE

## 2018-08-27 PROCEDURE — 6360000002 HC RX W HCPCS

## 2018-08-27 PROCEDURE — 96375 TX/PRO/DX INJ NEW DRUG ADDON: CPT

## 2018-08-27 PROCEDURE — 99285 EMERGENCY DEPT VISIT HI MDM: CPT

## 2018-08-27 PROCEDURE — 85610 PROTHROMBIN TIME: CPT

## 2018-08-27 PROCEDURE — 82553 CREATINE MB FRACTION: CPT

## 2018-08-27 PROCEDURE — 6360000004 HC RX CONTRAST MEDICATION: Performed by: RADIOLOGY

## 2018-08-27 PROCEDURE — 80053 COMPREHEN METABOLIC PANEL: CPT

## 2018-08-27 PROCEDURE — 2580000003 HC RX 258: Performed by: EMERGENCY MEDICINE

## 2018-08-27 PROCEDURE — 82550 ASSAY OF CK (CPK): CPT

## 2018-08-27 PROCEDURE — 96374 THER/PROPH/DIAG INJ IV PUSH: CPT

## 2018-08-27 RX ORDER — ONDANSETRON 2 MG/ML
4 INJECTION INTRAMUSCULAR; INTRAVENOUS ONCE
Status: COMPLETED | OUTPATIENT
Start: 2018-08-27 | End: 2018-08-27

## 2018-08-27 RX ORDER — MORPHINE SULFATE 2 MG/ML
INJECTION, SOLUTION INTRAMUSCULAR; INTRAVENOUS
Status: COMPLETED
Start: 2018-08-27 | End: 2018-08-27

## 2018-08-27 RX ORDER — HYDROCODONE BITARTRATE AND ACETAMINOPHEN 5; 325 MG/1; MG/1
1 TABLET ORAL EVERY 6 HOURS PRN
Qty: 20 TABLET | Refills: 0 | Status: SHIPPED | OUTPATIENT
Start: 2018-08-27 | End: 2018-09-01

## 2018-08-27 RX ORDER — 0.9 % SODIUM CHLORIDE 0.9 %
1000 INTRAVENOUS SOLUTION INTRAVENOUS ONCE
Status: COMPLETED | OUTPATIENT
Start: 2018-08-27 | End: 2018-08-27

## 2018-08-27 RX ORDER — MORPHINE SULFATE 8 MG/ML
6 INJECTION, SOLUTION INTRAMUSCULAR; INTRAVENOUS ONCE
Status: DISCONTINUED | OUTPATIENT
Start: 2018-08-27 | End: 2018-08-27 | Stop reason: HOSPADM

## 2018-08-27 RX ORDER — KETOROLAC TROMETHAMINE 30 MG/ML
15 INJECTION, SOLUTION INTRAMUSCULAR; INTRAVENOUS ONCE
Status: COMPLETED | OUTPATIENT
Start: 2018-08-27 | End: 2018-08-27

## 2018-08-27 RX ADMIN — IOPAMIDOL 70 ML: 755 INJECTION, SOLUTION INTRAVENOUS at 02:44

## 2018-08-27 RX ADMIN — MORPHINE SULFATE 6 MG: 2 INJECTION, SOLUTION INTRAMUSCULAR; INTRAVENOUS at 01:29

## 2018-08-27 RX ADMIN — KETOROLAC TROMETHAMINE 15 MG: 30 INJECTION, SOLUTION INTRAMUSCULAR at 01:28

## 2018-08-27 RX ADMIN — SODIUM CHLORIDE 1000 ML: 9 INJECTION, SOLUTION INTRAVENOUS at 01:26

## 2018-08-27 RX ADMIN — ONDANSETRON 4 MG: 2 INJECTION INTRAMUSCULAR; INTRAVENOUS at 01:28

## 2018-08-27 ASSESSMENT — PAIN SCALES - GENERAL
PAINLEVEL_OUTOF10: 3
PAINLEVEL_OUTOF10: 7
PAINLEVEL_OUTOF10: 10

## 2018-08-27 ASSESSMENT — PAIN DESCRIPTION - LOCATION
LOCATION: CHEST
LOCATION: CHEST

## 2018-08-27 ASSESSMENT — PAIN DESCRIPTION - PAIN TYPE
TYPE: ACUTE PAIN
TYPE: ACUTE PAIN

## 2018-08-27 ASSESSMENT — PAIN DESCRIPTION - ORIENTATION
ORIENTATION: RIGHT
ORIENTATION: RIGHT

## 2018-08-27 ASSESSMENT — PAIN DESCRIPTION - DESCRIPTORS: DESCRIPTORS: DISCOMFORT

## 2018-08-27 NOTE — ED PROVIDER NOTES
and well perfused  Skin: warm and dry without rash  Neurologic: GCS 15,  Psych: Normal Affect  Chest wall: Patient has reproducible chest wall tenderness right inferior and just medial to scapula region    ------------------------------ ED COURSE/MEDICAL DECISION MAKING----------------------  Medications   morphine sulfate (PF) injection 6 mg (6 mg Intravenous Not Given 8/27/18 0130)   0.9 % sodium chloride bolus (0 mLs Intravenous Stopped 8/27/18 0228)   ondansetron (ZOFRAN) injection 4 mg (4 mg Intravenous Given 8/27/18 0128)   ketorolac (TORADOL) injection 15 mg (15 mg Intravenous Given 8/27/18 0128)   morphine (PF) 2 MG/ML injection (6 mg  Given 8/27/18 0129)   iopamidol (ISOVUE-370) 76 % injection 70 mL (70 mLs Intravenous Given 8/27/18 0244)         Medical Decision Making:    Differential diagnosis includes intercostal strain and/or pulmonary embolism or thoracic dissection or acute coronary syndrome therefore CTA of chest to be obtained along with appropriate labs/Patient feeling much better after pain medication and CT scan unremarkable therefore patient be discharged     EKG shows normal sinus rhythm with rate of 72/left axis deviation/no acute ischemic change and no ectopy  Counseling: The emergency provider has spoken with the patient and discussed todays results, in addition to providing specific details for the plan of care and counseling regarding the diagnosis and prognosis. Questions are answered at this time and they are agreeable with the plan.      --------------------------------- IMPRESSION AND DISPOSITION ---------------------------------    IMPRESSION  1.  Chest wall pain        DISPOSITION  Disposition: Discharge to home  Patient condition is stable                  James Pardo MD  08/27/18 9471

## 2018-08-30 ENCOUNTER — OFFICE VISIT (OUTPATIENT)
Dept: NEUROSURGERY | Age: 68
End: 2018-08-30
Payer: MEDICARE

## 2018-08-30 VITALS
HEART RATE: 77 BPM | DIASTOLIC BLOOD PRESSURE: 60 MMHG | BODY MASS INDEX: 26.52 KG/M2 | HEIGHT: 66 IN | SYSTOLIC BLOOD PRESSURE: 133 MMHG | WEIGHT: 165 LBS

## 2018-08-30 DIAGNOSIS — M54.2 NECK PAIN: Primary | ICD-10-CM

## 2018-08-30 PROCEDURE — 1036F TOBACCO NON-USER: CPT | Performed by: NEUROLOGICAL SURGERY

## 2018-08-30 PROCEDURE — G8598 ASA/ANTIPLAT THER USED: HCPCS | Performed by: NEUROLOGICAL SURGERY

## 2018-08-30 PROCEDURE — 99204 OFFICE O/P NEW MOD 45 MIN: CPT | Performed by: NEUROLOGICAL SURGERY

## 2018-08-30 PROCEDURE — 1101F PT FALLS ASSESS-DOCD LE1/YR: CPT | Performed by: NEUROLOGICAL SURGERY

## 2018-08-30 PROCEDURE — G8427 DOCREV CUR MEDS BY ELIG CLIN: HCPCS | Performed by: NEUROLOGICAL SURGERY

## 2018-08-30 PROCEDURE — G8417 CALC BMI ABV UP PARAM F/U: HCPCS | Performed by: NEUROLOGICAL SURGERY

## 2018-08-30 PROCEDURE — 4040F PNEUMOC VAC/ADMIN/RCVD: CPT | Performed by: NEUROLOGICAL SURGERY

## 2018-08-30 PROCEDURE — 1123F ACP DISCUSS/DSCN MKR DOCD: CPT | Performed by: NEUROLOGICAL SURGERY

## 2018-08-30 PROCEDURE — 3017F COLORECTAL CA SCREEN DOC REV: CPT | Performed by: NEUROLOGICAL SURGERY

## 2018-08-30 ASSESSMENT — ENCOUNTER SYMPTOMS
EYES NEGATIVE: 1
GASTROINTESTINAL NEGATIVE: 1
VISUAL CHANGE: 0
PHOTOPHOBIA: 0
ALLERGIC/IMMUNOLOGIC NEGATIVE: 1
TROUBLE SWALLOWING: 0
RESPIRATORY NEGATIVE: 1

## 2018-08-30 NOTE — LETTER
[facility] Neurosurgery  [facility] [address] [id number]  Phone: [phone]  Fax: [phone]    Shaylee Adams MD      August 30, 2018     Errol Birmingham MD  [address]    Patient: Jacklyn Guaman  MR Number: [id number]  YOB: 1950  Date of Visit: 8/30/2018    Dear Dr. Errol Birmingham:    Thank you for the request for consultation for Lindsey Beckham to me for the evaluation of neck pain. Below are the relevant portions of my assessment and plan of care. Assessment:     The patient is a 70year old male who presents with worsening neck pain. He had an anterior C3-C7 corpectomy almost 2 years ago. His MRI does not show any significant stenosis. Plan:     I will get a CT scan to assess the fusion. If you have questions, please do not hesitate to call me. I look forward to following Ginna Roth along with you.     Sincerely,        Shaylee Adams MD

## 2018-09-06 ENCOUNTER — APPOINTMENT (OUTPATIENT)
Dept: INTERVENTIONAL RADIOLOGY/VASCULAR | Age: 68
DRG: 186 | End: 2018-09-06
Payer: MEDICARE

## 2018-09-06 ENCOUNTER — HOSPITAL ENCOUNTER (INPATIENT)
Age: 68
LOS: 7 days | Discharge: OTHER INSTITUTION | DRG: 186 | End: 2018-09-13
Attending: EMERGENCY MEDICINE | Admitting: INTERNAL MEDICINE
Payer: MEDICARE

## 2018-09-06 ENCOUNTER — APPOINTMENT (OUTPATIENT)
Dept: GENERAL RADIOLOGY | Age: 68
DRG: 186 | End: 2018-09-06
Payer: MEDICARE

## 2018-09-06 ENCOUNTER — HOSPITAL ENCOUNTER (OUTPATIENT)
Dept: CT IMAGING | Age: 68
Discharge: HOME | DRG: 186 | End: 2018-09-06
Payer: MEDICARE

## 2018-09-06 DIAGNOSIS — J18.9 PNEUMONIA DUE TO ORGANISM: ICD-10-CM

## 2018-09-06 DIAGNOSIS — J90 PLEURAL EFFUSION: ICD-10-CM

## 2018-09-06 DIAGNOSIS — M54.2 NECK PAIN: ICD-10-CM

## 2018-09-06 DIAGNOSIS — J96.01 ACUTE RESPIRATORY FAILURE WITH HYPOXIA (HCC): Primary | ICD-10-CM

## 2018-09-06 PROBLEM — J96.90 RESPIRATORY FAILURE (HCC): Status: ACTIVE | Noted: 2018-09-06

## 2018-09-06 LAB
ALBUMIN SERPL-MCNC: 2.8 G/DL (ref 3.5–5.2)
ALP BLD-CCNC: 359 U/L (ref 40–129)
ALT SERPL-CCNC: 13 U/L (ref 0–40)
ANION GAP SERPL CALCULATED.3IONS-SCNC: 13 MMOL/L (ref 7–16)
APPEARANCE FLUID: NORMAL
APTT: 38.6 SEC (ref 24.5–35.1)
AST SERPL-CCNC: 15 U/L (ref 0–39)
BASOPHILS ABSOLUTE: 0 E9/L (ref 0–0.2)
BASOPHILS RELATIVE PERCENT: 0.5 % (ref 0–2)
BILIRUB SERPL-MCNC: 0.4 MG/DL (ref 0–1.2)
BUN BLDV-MCNC: 15 MG/DL (ref 8–23)
CALCIUM SERPL-MCNC: 8.6 MG/DL (ref 8.6–10.2)
CELL COUNT FLUID TYPE: NORMAL
CHLORIDE BLD-SCNC: 104 MMOL/L (ref 98–107)
CO2: 25 MMOL/L (ref 22–29)
COLOR FLUID: YELLOW
CREAT SERPL-MCNC: 1.3 MG/DL (ref 0.7–1.2)
EOSINOPHILS ABSOLUTE: 1.49 E9/L (ref 0.05–0.5)
EOSINOPHILS RELATIVE PERCENT: 8.7 % (ref 0–6)
FLUID TYPE: NORMAL
GFR AFRICAN AMERICAN: >60
GFR NON-AFRICAN AMERICAN: 55 ML/MIN/1.73
GLUCOSE BLD-MCNC: 148 MG/DL (ref 74–109)
GLUCOSE, FLUID: 110 MG/DL
HCT VFR BLD CALC: 41.9 % (ref 37–54)
HEMOGLOBIN: 13.5 G/DL (ref 12.5–16.5)
INR BLD: 1.2
LACTIC ACID: 1.5 MMOL/L (ref 0.5–2.2)
LD, FLUID: 510 U/L
LYMPHOCYTES ABSOLUTE: 0.34 E9/L (ref 1.5–4)
LYMPHOCYTES RELATIVE PERCENT: 1.7 % (ref 20–42)
MCH RBC QN AUTO: 30.7 PG (ref 26–35)
MCHC RBC AUTO-ENTMCNC: 32.2 % (ref 32–34.5)
MCV RBC AUTO: 95.2 FL (ref 80–99.9)
MONOCYTE, FLUID: 78 %
MONOCYTES ABSOLUTE: 0.68 E9/L (ref 0.1–0.95)
MONOCYTES RELATIVE PERCENT: 3.5 % (ref 2–12)
NEUTROPHIL, FLUID: 22 %
NEUTROPHILS ABSOLUTE: 14.71 E9/L (ref 1.8–7.3)
NEUTROPHILS RELATIVE PERCENT: 86.1 % (ref 43–80)
NUCLEATED CELLS FLUID: 188 /UL
PDW BLD-RTO: 12.6 FL (ref 11.5–15)
PH, BODY FLUID: 8
PLATELET # BLD: 274 E9/L (ref 130–450)
PMV BLD AUTO: 9.6 FL (ref 7–12)
POTASSIUM REFLEX MAGNESIUM: 3.8 MMOL/L (ref 3.5–5)
PRO-BNP: 4568 PG/ML (ref 0–125)
PROTEIN FLUID: 3.5 G/DL
PROTHROMBIN TIME: 13.7 SEC (ref 9.3–12.4)
RBC # BLD: 4.4 E12/L (ref 3.8–5.8)
RBC FLUID: 3000 /UL
SODIUM BLD-SCNC: 142 MMOL/L (ref 132–146)
TOTAL PROTEIN: 6.7 G/DL (ref 6.4–8.3)
TROPONIN: <0.01 NG/ML (ref 0–0.03)
WBC # BLD: 17.1 E9/L (ref 4.5–11.5)

## 2018-09-06 PROCEDURE — 87015 SPECIMEN INFECT AGNT CONCNTJ: CPT

## 2018-09-06 PROCEDURE — 2060000000 HC ICU INTERMEDIATE R&B

## 2018-09-06 PROCEDURE — 83605 ASSAY OF LACTIC ACID: CPT

## 2018-09-06 PROCEDURE — 94640 AIRWAY INHALATION TREATMENT: CPT

## 2018-09-06 PROCEDURE — 6370000000 HC RX 637 (ALT 250 FOR IP): Performed by: INTERNAL MEDICINE

## 2018-09-06 PROCEDURE — 87205 SMEAR GRAM STAIN: CPT

## 2018-09-06 PROCEDURE — 99285 EMERGENCY DEPT VISIT HI MDM: CPT

## 2018-09-06 PROCEDURE — 83880 ASSAY OF NATRIURETIC PEPTIDE: CPT

## 2018-09-06 PROCEDURE — G8988 SELF CARE GOAL STATUS: HCPCS

## 2018-09-06 PROCEDURE — 0W993ZZ DRAINAGE OF RIGHT PLEURAL CAVITY, PERCUTANEOUS APPROACH: ICD-10-PCS | Performed by: RADIOLOGY

## 2018-09-06 PROCEDURE — 87040 BLOOD CULTURE FOR BACTERIA: CPT

## 2018-09-06 PROCEDURE — 83615 LACTATE (LD) (LDH) ENZYME: CPT

## 2018-09-06 PROCEDURE — 85730 THROMBOPLASTIN TIME PARTIAL: CPT

## 2018-09-06 PROCEDURE — 87206 SMEAR FLUORESCENT/ACID STAI: CPT

## 2018-09-06 PROCEDURE — 72125 CT NECK SPINE W/O DYE: CPT

## 2018-09-06 PROCEDURE — 88305 TISSUE EXAM BY PATHOLOGIST: CPT

## 2018-09-06 PROCEDURE — 36415 COLL VENOUS BLD VENIPUNCTURE: CPT

## 2018-09-06 PROCEDURE — 93005 ELECTROCARDIOGRAM TRACING: CPT | Performed by: EMERGENCY MEDICINE

## 2018-09-06 PROCEDURE — 85610 PROTHROMBIN TIME: CPT

## 2018-09-06 PROCEDURE — C1729 CATH, DRAINAGE: HCPCS

## 2018-09-06 PROCEDURE — 87116 MYCOBACTERIA CULTURE: CPT

## 2018-09-06 PROCEDURE — 85025 COMPLETE CBC W/AUTO DIFF WBC: CPT

## 2018-09-06 PROCEDURE — 71045 X-RAY EXAM CHEST 1 VIEW: CPT

## 2018-09-06 PROCEDURE — 32555 ASPIRATE PLEURA W/ IMAGING: CPT | Performed by: RADIOLOGY

## 2018-09-06 PROCEDURE — 84484 ASSAY OF TROPONIN QUANT: CPT

## 2018-09-06 PROCEDURE — 82947 ASSAY GLUCOSE BLOOD QUANT: CPT

## 2018-09-06 PROCEDURE — 6360000002 HC RX W HCPCS: Performed by: EMERGENCY MEDICINE

## 2018-09-06 PROCEDURE — 89051 BODY FLUID CELL COUNT: CPT

## 2018-09-06 PROCEDURE — 94660 CPAP INITIATION&MGMT: CPT

## 2018-09-06 PROCEDURE — 87070 CULTURE OTHR SPECIMN AEROBIC: CPT

## 2018-09-06 PROCEDURE — 94761 N-INVAS EAR/PLS OXIMETRY MLT: CPT

## 2018-09-06 PROCEDURE — 80053 COMPREHEN METABOLIC PANEL: CPT

## 2018-09-06 PROCEDURE — 2580000003 HC RX 258: Performed by: EMERGENCY MEDICINE

## 2018-09-06 PROCEDURE — 6370000000 HC RX 637 (ALT 250 FOR IP): Performed by: FAMILY MEDICINE

## 2018-09-06 PROCEDURE — 84157 ASSAY OF PROTEIN OTHER: CPT

## 2018-09-06 PROCEDURE — 87102 FUNGUS ISOLATION CULTURE: CPT

## 2018-09-06 PROCEDURE — 97165 OT EVAL LOW COMPLEX 30 MIN: CPT

## 2018-09-06 PROCEDURE — 97535 SELF CARE MNGMENT TRAINING: CPT

## 2018-09-06 PROCEDURE — G8987 SELF CARE CURRENT STATUS: HCPCS

## 2018-09-06 PROCEDURE — 88112 CYTOPATH CELL ENHANCE TECH: CPT

## 2018-09-06 PROCEDURE — 83986 ASSAY PH BODY FLUID NOS: CPT

## 2018-09-06 PROCEDURE — 6360000002 HC RX W HCPCS: Performed by: FAMILY MEDICINE

## 2018-09-06 RX ORDER — VITAMIN B COMPLEX
1 CAPSULE ORAL EVERY MORNING
Status: DISCONTINUED | OUTPATIENT
Start: 2018-09-07 | End: 2018-09-06 | Stop reason: CLARIF

## 2018-09-06 RX ORDER — IPRATROPIUM BROMIDE AND ALBUTEROL SULFATE 2.5; .5 MG/3ML; MG/3ML
1 SOLUTION RESPIRATORY (INHALATION)
Status: DISCONTINUED | OUTPATIENT
Start: 2018-09-06 | End: 2018-09-09

## 2018-09-06 RX ORDER — LANOLIN ALCOHOL/MO/W.PET/CERES
3 CREAM (GRAM) TOPICAL NIGHTLY PRN
Status: DISCONTINUED | OUTPATIENT
Start: 2018-09-06 | End: 2018-09-13 | Stop reason: HOSPADM

## 2018-09-06 RX ORDER — ATORVASTATIN CALCIUM 40 MG/1
40 TABLET, FILM COATED ORAL DAILY
Status: DISCONTINUED | OUTPATIENT
Start: 2018-09-07 | End: 2018-09-13 | Stop reason: HOSPADM

## 2018-09-06 RX ORDER — ASPIRIN 81 MG/1
81 TABLET ORAL EVERY MORNING
Status: DISCONTINUED | OUTPATIENT
Start: 2018-09-07 | End: 2018-09-13 | Stop reason: HOSPADM

## 2018-09-06 RX ORDER — GUAIFENESIN 400 MG/1
400 TABLET ORAL 4 TIMES DAILY PRN
Status: CANCELLED | OUTPATIENT
Start: 2018-09-06

## 2018-09-06 RX ORDER — LORAZEPAM 2 MG/ML
1 INJECTION INTRAMUSCULAR EVERY 6 HOURS PRN
Status: DISCONTINUED | OUTPATIENT
Start: 2018-09-06 | End: 2018-09-07

## 2018-09-06 RX ORDER — ACETAMINOPHEN 325 MG/1
650 TABLET ORAL EVERY 6 HOURS PRN
Status: DISCONTINUED | OUTPATIENT
Start: 2018-09-06 | End: 2018-09-06 | Stop reason: SDUPTHER

## 2018-09-06 RX ORDER — ATENOLOL 50 MG/1
50 TABLET ORAL 2 TIMES DAILY
Status: DISCONTINUED | OUTPATIENT
Start: 2018-09-06 | End: 2018-09-13 | Stop reason: HOSPADM

## 2018-09-06 RX ORDER — HYDRALAZINE HYDROCHLORIDE 25 MG/1
25 TABLET, FILM COATED ORAL 2 TIMES DAILY
Status: DISCONTINUED | OUTPATIENT
Start: 2018-09-06 | End: 2018-09-07

## 2018-09-06 RX ORDER — FOLIC ACID 1 MG/1
1 TABLET ORAL DAILY
Status: CANCELLED | OUTPATIENT
Start: 2018-09-06

## 2018-09-06 RX ORDER — ACETAMINOPHEN 325 MG/1
650 TABLET ORAL EVERY 4 HOURS PRN
Status: DISCONTINUED | OUTPATIENT
Start: 2018-09-06 | End: 2018-09-13 | Stop reason: HOSPADM

## 2018-09-06 RX ORDER — PANTOPRAZOLE SODIUM 40 MG/1
40 TABLET, DELAYED RELEASE ORAL
Status: DISCONTINUED | OUTPATIENT
Start: 2018-09-07 | End: 2018-09-13 | Stop reason: HOSPADM

## 2018-09-06 RX ORDER — GABAPENTIN 600 MG/1
600 TABLET ORAL 3 TIMES DAILY
Status: DISCONTINUED | OUTPATIENT
Start: 2018-09-06 | End: 2018-09-06 | Stop reason: CLARIF

## 2018-09-06 RX ORDER — THIAMINE MONONITRATE (VIT B1) 100 MG
100 TABLET ORAL DAILY
Status: CANCELLED | OUTPATIENT
Start: 2018-09-06

## 2018-09-06 RX ORDER — PAROXETINE HYDROCHLORIDE 20 MG/1
30 TABLET, FILM COATED ORAL EVERY MORNING
Status: DISCONTINUED | OUTPATIENT
Start: 2018-09-07 | End: 2018-09-13 | Stop reason: HOSPADM

## 2018-09-06 RX ORDER — AMLODIPINE BESYLATE 10 MG/1
10 TABLET ORAL DAILY
Status: DISCONTINUED | OUTPATIENT
Start: 2018-09-06 | End: 2018-09-09

## 2018-09-06 RX ORDER — GABAPENTIN 300 MG/1
600 CAPSULE ORAL 3 TIMES DAILY
Status: DISCONTINUED | OUTPATIENT
Start: 2018-09-06 | End: 2018-09-13 | Stop reason: HOSPADM

## 2018-09-06 RX ADMIN — HYDRALAZINE HYDROCHLORIDE 25 MG: 25 TABLET, FILM COATED ORAL at 21:09

## 2018-09-06 RX ADMIN — LORAZEPAM 1 MG: 2 INJECTION INTRAMUSCULAR; INTRAVENOUS at 18:02

## 2018-09-06 RX ADMIN — WATER 1 G: 1 INJECTION INTRAMUSCULAR; INTRAVENOUS; SUBCUTANEOUS at 11:05

## 2018-09-06 RX ADMIN — IPRATROPIUM BROMIDE AND ALBUTEROL SULFATE 1 AMPULE: .5; 3 SOLUTION RESPIRATORY (INHALATION) at 18:25

## 2018-09-06 RX ADMIN — IPRATROPIUM BROMIDE AND ALBUTEROL SULFATE 1 AMPULE: .5; 3 SOLUTION RESPIRATORY (INHALATION) at 14:34

## 2018-09-06 RX ADMIN — ATENOLOL 50 MG: 50 TABLET ORAL at 21:09

## 2018-09-06 RX ADMIN — GABAPENTIN 600 MG: 300 CAPSULE ORAL at 21:09

## 2018-09-06 RX ADMIN — ACETAMINOPHEN 650 MG: 325 TABLET, FILM COATED ORAL at 21:09

## 2018-09-06 RX ADMIN — ENOXAPARIN SODIUM 40 MG: 40 INJECTION SUBCUTANEOUS at 21:09

## 2018-09-06 RX ADMIN — AZITHROMYCIN MONOHYDRATE 500 MG: 500 INJECTION, POWDER, LYOPHILIZED, FOR SOLUTION INTRAVENOUS at 11:10

## 2018-09-06 ASSESSMENT — PAIN SCALES - GENERAL
PAINLEVEL_OUTOF10: 0
PAINLEVEL_OUTOF10: 4
PAINLEVEL_OUTOF10: 2
PAINLEVEL_OUTOF10: 0
PAINLEVEL_OUTOF10: 0

## 2018-09-06 ASSESSMENT — PAIN DESCRIPTION - DESCRIPTORS
DESCRIPTORS: TIGHTNESS
DESCRIPTORS: TIGHTNESS

## 2018-09-06 ASSESSMENT — ENCOUNTER SYMPTOMS
EYE REDNESS: 0
VOMITING: 0
NAUSEA: 0
ABDOMINAL PAIN: 0
SHORTNESS OF BREATH: 1

## 2018-09-06 ASSESSMENT — PAIN DESCRIPTION - LOCATION
LOCATION: CHEST
LOCATION: CHEST

## 2018-09-06 ASSESSMENT — PAIN DESCRIPTION - PAIN TYPE: TYPE: ACUTE PAIN

## 2018-09-06 ASSESSMENT — PAIN DESCRIPTION - FREQUENCY: FREQUENCY: CONTINUOUS

## 2018-09-06 NOTE — Clinical Note
Patient Class: Inpatient [101]   REQUIRED: Diagnosis: Pleural effusion [220986]   Estimated Length of Stay: Estimated stay of more than 2 midnights   Future Attending Provider: Nanci Crocker [[id number]]   Telemetry Bed Required?: Yes

## 2018-09-06 NOTE — H&P
laminectomy    CAROTID ENDARTERECTOMY Right 2014    Delatore - bovine patch     CERVICAL FUSION  2016    C4-C5, C5-C6, C6-C7 ACF, C5-C6 Corpectomy    ENDOSCOPY, COLON, DIAGNOSTIC      EYE SURGERY      lasex    FRACTURE SURGERY Left     plate in wrist    HAND SURGERY      PARATHYROIDECTOMY  2005    TONSILLECTOMY      UPPER GASTROINTESTINAL ENDOSCOPY  2014       Social History:  Social History     Social History    Marital status: Single     Spouse name: N/A    Number of children: N/A    Years of education: N/A     Social History Main Topics    Smoking status: Former Smoker     Packs/day: 3.00     Years: 10.00     Types: Cigarettes     Quit date: 1983    Smokeless tobacco: Never Used    Alcohol use 6.0 oz/week     5 Cans of beer, 5 Standard drinks or equivalent per week      Comment: 5 mixed drinks daily    Drug use: No    Sexual activity: Not Asked     Other Topics Concern    None     Social History Narrative    None       Family History:   Family History   Problem Relation Age of Onset    Thyroid Cancer Sister     Cancer Sister     Stroke Mother     Diabetes Mother     Stroke Father     Other Brother          in surgery cause unknown    Heart Disease Maternal Grandmother     Arthritis Maternal Grandmother     Heart Disease Maternal Grandfather     Cancer Paternal Grandmother     Other Paternal Grandfather         unknown cause of death       Allergies: Allergies   Allergen Reactions    Flexeril [Cyclobenzaprine] Other (See Comments)     HALLUCINATIONS    Lisinopril Swelling     Facial and lip    Influenza Virus Vaccine     Metformin And Related      Lactic acidosis    Zocor [Simvastatin] Other (See Comments)     Elevated LFT's       Medications Prior to Admission:   Prior to Admission medications    Medication Sig Start Date End Date Taking?  Authorizing Provider   atorvastatin (LIPITOR) 40 MG tablet Take 1 tablet by mouth daily 18   Lucinda Guaman changes from prior anterior fusion and corpectomy. No osseous encroachment of the neuroforamina. C6-C7: Postsurgical changes from prior anterior fusion. No osseous encroachment of the neuroforamina. C7-T1: Postsurgical changes from prior anterior fusion. Mild bilateral facet hypertrophy. No osseous encroachment of the neuroforamen. T1-T2: Minimal disc osteophyte complex. No osseous encroachment of the central canal or neuroforamina. Visualized portion of the lung demonstrates a large right pleural effusion, increased in size compared to the previous CT from 2018. There is moderate left sphenoid sinus mucosal thickening. Mastoid air cells are well aerated. Visualized portion of the brain demonstrates no significant mass effect. 1. Postsurgical changes from prior anterior fusion with an anterior compression plate and screws traversing from C3 to T1 and a fibular strut graft traversing from C4 to T1. There is solid osseous incorporation of the proximal strut graft at C3-C4 and suggestion of partial osseous incorporation of the distal strut graft at the level of T1. 2. Moderate right and mild left foraminal narrowing at C3-C4. 3. Mild right foraminal narrowing at C4-C5. 4. Large right pleural effusion, increased in size compared to the previous CT from 2018. The patient was short of breath and instructed to go to the emergency department after the completion of this exam.     Mri Cervical Spine Wo Contrast    Result Date: 2018  Patient MRN:  [id number] : 1950 Age: 70 years Gender: Male Order Date:  2018 12:32 PM EXAM: MRI CERVICAL SPINE WO CONTRAST COMPARISON: Correlation made with CT the cervical spine from 2016 and MRI of the cervical spine from 2016 INDICATION: M48.02 Spinal stenosis in cervical region pain FINDINGS: There is metallic artifact from anterior fusion plate and screws at level of C3-T1 as well as evidence of corpectomy at these levels with bone grafting. superior extent of the spinal fixation hardware not included on this study. There is opacification of the inferior two thirds of the right hemithorax, new since the previous exam, with soft tissue density adjacent to the lateral aspect of the right upper lung. Left lung is clear, without pleural effusion or focal infiltrate. No pneumothorax is identified bilaterally. The cardiac silhouette is partially obscured by the right-sided density, but does not appear enlarged, as visualized. No midline shift of the trachea is seen. Opacification of inferior two thirds of right hemithorax which is most likely on the basis of pleural effusion and/or lung consolidation. Soft tissue density adjacent to the lateral aspect of the right upper lung suggests loculation of the effusion. Underlying mass cannot be excluded. Ct Chest Pulmonary Embolism W Contrast    Result Date: 8/27/2018  Initial report created on 8/27/2018 3:11:33 AM EDT EXAM:   CT Angiography Chest With Intravenous Contrast CLINICAL HISTORY:   70years old, male; Pain; Other: Sharp pain in upper back; Additional info: Chest pain, acute, pulmonary embolism suspected TECHNIQUE:   Axial computed tomographic angiography images of the chest with intravenous contrast using pulmonary embolism protocol. All CT scans at this facility use at least one of these dose optimization techniques: automated exposure control; mA and/or kV adjustment per patient size (includes targeted exams where dose is matched to clinical indication); or iterative reconstruction. MIP reconstructed images were created and reviewed. CONTRAST:   70 mL of yrf112 administered intravenously. COMPARISON:   No relevant prior studies available. FINDINGS:   Pulmonary arteries:  Unremarkable. No pulmonary embolism. Aorta:  No acute findings. No thoracic aortic aneurysm. Lungs:   There are strandy and patchy opacities superimposing over the right pleural effusion compatible with right basilar

## 2018-09-06 NOTE — ED PROVIDER NOTES
Chief complaint: Shortness of breath    HPI    The patient is a 59-year-old male presenting to the emergency department with a chief complaint shortness of breath. The patient does report increasing shortness of breath over the last 3 weeks. The patient was evaluated on a [facility] emergency department 2 weeks ago and diagnosed with a small right pleural effusion after a negative CTA. Patient discharged home. He has noted increasing shortness of breath. The patient is having an outpatient CT cervical spine performed and was noted to be very short of breath during the procedure and hypoxic at 81% on room air. The patient is not on any oxygen at home. The patient has a history of a pleural effusion he states in childhood but is not able to offer any further history. The patient's shortness of breath is worse with exertion. There are no alleviating factors. He has not tried any treatments prior to arrival. He does complain of a chest tightness. The patient has no history DVT or PE, is not on any hormone replacement therapy, denies any active malignancy, recent surgeries or long parasternal sitting in a car plane. The patient does have a history of hypertension, hyperlipidemia. He has no history of diabetes, coronary artery disease. He denies any fevers, chills, lightheadedness, nausea and vomiting, abdominal pain. Review of Systems   Constitutional: Negative for chills and fever. HENT: Negative for congestion. Eyes: Negative for redness. Respiratory: Positive for shortness of breath. Cardiovascular: Positive for chest pain. Gastrointestinal: Negative for abdominal pain, nausea and vomiting. Genitourinary: Negative for dysuria. Musculoskeletal: Negative for arthralgias. Skin: Negative for rash. Neurological: Negative for light-headedness. Psychiatric/Behavioral: Negative for confusion.        Physical Exam  Constitutional/General: Alert and oriented x3, well appearing, non toxic in NAD  Head: nursing notes within the ED encounter and vital signs as below have been reviewed. Patient Vitals for the past 24 hrs:   BP Temp Temp src Pulse Resp SpO2 Height Weight   09/06/18 1415 139/64 98.8 °F (37.1 °C) Oral 63 22 92 % - -   09/06/18 1345 121/61 98.9 °F (37.2 °C) Oral 65 24 94 % - -   09/06/18 1312 126/72 98.9 °F (37.2 °C) Oral 64 22 91 % - -   09/06/18 1044 128/63 98.4 °F (36.9 °C) Oral 62 24 96 % - -   09/06/18 0910 123/64 98 °F (36.7 °C) Oral 75 (!) 32 (!) 81 % 5' 5.5\" (1.664 m) 165 lb (74.8 kg)       Oxygen Saturation Interpretation: Normal    ------------------------------------------ PROGRESS NOTES ------------------------------------------  Re-evaluation(s):  See ED course    Counseling:  I have spoken with the patient and discussed todays results, in addition to providing specific details for the plan of care and counseling regarding the diagnosis and prognosis. Their questions are answered at this time and they are agreeable with the plan of admission.    --------------------------------- ADDITIONAL PROVIDER NOTES ---------------------------------  Consultations:   Spoke with Dr. Medellin Failing  Discussed case. They will admit the patient. This patient's ED course included: a personal history and physicial examination, re-evaluation prior to disposition, multiple bedside re-evaluations, IV medications, cardiac monitoring and continuous pulse oximetry    This patient has remained hemodynamically stable during their ED course. Diagnosis:  1. Acute respiratory failure with hypoxia ([address]    2. Pleural effusion    3. Pneumonia due to organism        Disposition:  Patient's disposition: Admit to [facility]  Patient's condition is stable.            Regine Lindquist DO  09/06/18 1607

## 2018-09-06 NOTE — PROGRESS NOTES
Occupational Therapy  Occupational Therapy Initial Assessment    Date:2018  Patient Name: Elver Medina  MRN: [id number]  : 1950  ROOM #: [id number]    Placement recommendation: HHOT vs subacute depending on family ability to  provide  supervision /assist    Equipment prescriptions needed:  TBD   AM-PAC Daily Activity Inpatient   How much help for putting on and taking off regular lower body clothing?: A Lot  How much help for Bathing?: A Lot  How much help for Toileting?: A Little  How much help for putting on and taking off regular upper body clothing?: A Little  How much help for taking care of personal grooming?: A Little  How much help for eating meals?: None  AM-PAC Inpatient Daily Activity Raw Score: 17  AM-PAC Inpatient ADL T-Scale Score : 37.26  ADL Inpatient CMS 0-100% Score: 50.11  ADL Inpatient CMS G-Code Modifier : CK      Diagnosis / Problem List: No diagnosis found. Patient Active Problem List   Diagnosis    HTN (hypertension)    Panic disorder    OA (osteoarthritis of spine)    Hyperlipidemia    Chronic kidney disease (CKD)    Mass of kidney    GERD (gastroesophageal reflux disease)    Neuropathy    Stenosis of right internal carotid artery with cerebral infarction ([address]    Osteophyte of vertebrae    Spinal stenosis in cervical region    Cervical spine instability    Hypoxia    Pleural effusion    Respiratory failure ([address]      Past Medical History:   Past Medical History:   Diagnosis Date    Anxiety     Depression     GERD (gastroesophageal reflux disease)     Heart palpitations     HTN (hypertension) 10/21/2010    Hyperlipidemia     Lightheadedness     OA (osteoarthritis)     Parathyroid adenoma     s/p surgery     Stenosis of right internal carotid artery with cerebral infarction [facility])          The admitting diagnosis and active problem list as listed above have been reviewed prior to the initiation of this evaluation.  Nursing cleared the patient for evaluation. Patient is agreeable to evaluation. Precautions: falls , h/o neck surgery 2016  Pain Scale: numeric  Rate: 8/10 Location: neck  Type: aching Nurse notified: Yes     Social history: with family ( wife ) and small dog  Named randy guerra      Drive: yes    Occupation:       Interest & Hobby: TV , used to like hunting and fishing    Home architecture: split level home 8 steps up and 8 steps down with rails , no step entry , for laundry    PLOF: independent with BADL and IADL including yardwork   Equipment owned: tub seat   Cognition: alert, oriented x 4 and follows 3 step directions    good  Problem solving skills    good  Memory     good  Sequencing    Communication: intact   Visual perceptual skills: impaired  Blurry from cataracts    Glasses: yes  Edema: no  Sensation: impaired in hands and feet  Sometimes in face   Hand Dominance:  Left X Right     Left Right Comment   Passive range of motion [facility]     Active range of motion [facility]     Muscle Grade 4/5 4/5    /pinch Strength Good  Good         Function Assessment    Status  Goal  Comment    Feeding:  Independent  Independent     Grooming:  Independent  Independent    UE dressing:  Independent  Independent    LE dressing: Moderate Assist  Minimal Assist    Bathing:   Moderate Assist  Minimal Assist    Bed Mobility: Minimal Assist progressing to supervision, supine to sit,   Independent for scooting,  Independent for sit to supine    Independent    Functional Transfers:    Independent for sit to stand transfers Independent Safety: good        Functional Mobility: 3 steps towards the [facility] and pt became SOB with 02 dropping to 86% Independent with good breathing technique     Balance:   Sitting: good    Standing: good    Endurance: poor   Patients Goal: home    Treatment:pt st EOB independently, bathed and dressed from seated position for safety energy conservation,  Discussed importance of footwear with neuropathy, pt states  He has decided not to have the procedure , nursing aware and pt anxious to discuss steroids vs thoracentesis , discussed importance of safety equipment such as simple grab bars, sitting in chair wife in to visit , answered wife's questions , understands to call for assist    Assessment Summary:   Performance Deficiencies include:  Decreased functional mobility:yes   Decreased endurance:  O2 drops with exertion  Decreased ADL status:  requires assistance dressing SOB with exertion   Decreased safety awareness:  requires verbal cues for safe performance  Decreased vision/visual deficit,poor visual perceptual motor ability:yes     Rehab Potential: good   Patient and/or family understands diagnosis, prognosis and plan of care: yes   Plan of care: Patient will be seen by OT 1-3 times per week  for therapeutic activity, bed mobility, functional transfers, functional mobility, safety, fall prevention, ADL re-training, balance and endurance activities,instruction and training in energy conservation principles, family/patient education until discharged. Time In: 1415   Time Code treatment minutes: 30  · Low Complexity  · History: Brief history including review of medical records relating to the problem  · Exam: 1-3 performance Deficits  · Assistance/Modification: No assistance or modifications required to perform tasks.  No comorbities affecting occupational performance    Electronically signed by Colby Pacheco OT on 9/6/2018 at 2:56 PM    Colby Pacheco, OTR/L#[id number]

## 2018-09-07 ENCOUNTER — APPOINTMENT (OUTPATIENT)
Dept: GENERAL RADIOLOGY | Age: 68
DRG: 186 | End: 2018-09-07
Payer: MEDICARE

## 2018-09-07 LAB
ALBUMIN SERPL-MCNC: 2.4 G/DL (ref 3.5–5.2)
ALP BLD-CCNC: 277 U/L (ref 40–129)
ALT SERPL-CCNC: 10 U/L (ref 0–40)
ANION GAP SERPL CALCULATED.3IONS-SCNC: 12 MMOL/L (ref 7–16)
AST SERPL-CCNC: 13 U/L (ref 0–39)
B.E.: 2.9 MMOL/L (ref -3–3)
BASOPHILS ABSOLUTE: 0 E9/L (ref 0–0.2)
BASOPHILS RELATIVE PERCENT: 0.3 % (ref 0–2)
BILIRUB SERPL-MCNC: 0.4 MG/DL (ref 0–1.2)
BUN BLDV-MCNC: 14 MG/DL (ref 8–23)
BURR CELLS: ABNORMAL
CALCIUM SERPL-MCNC: 8.1 MG/DL (ref 8.6–10.2)
CARDIOPULMONARY BYPASS: NO
CHLORIDE BLD-SCNC: 100 MMOL/L (ref 98–107)
CHOLESTEROL, TOTAL: 73 MG/DL (ref 0–199)
CO2: 25 MMOL/L (ref 22–29)
CREAT SERPL-MCNC: 1.3 MG/DL (ref 0.7–1.2)
DELIVERY SYSTEMS: ABNORMAL
DEVICE: ABNORMAL
EOSINOPHILS ABSOLUTE: 0.42 E9/L (ref 0.05–0.5)
EOSINOPHILS RELATIVE PERCENT: 3.5 % (ref 0–6)
FIO2 ARTERIAL: 12
GFR AFRICAN AMERICAN: >60
GFR NON-AFRICAN AMERICAN: 55 ML/MIN/1.73
GLUCOSE BLD-MCNC: 112 MG/DL (ref 74–109)
GRAM STAIN ORDERABLE: NORMAL
HBA1C MFR BLD: 5.8 % (ref 4–5.6)
HCO3 ARTERIAL: 26.2 MMOL/L (ref 22–26)
HCT VFR BLD CALC: 36.6 % (ref 37–54)
HDLC SERPL-MCNC: 26 MG/DL
HEMOGLOBIN: 12 G/DL (ref 12.5–16.5)
LDL CHOLESTEROL CALCULATED: 29 MG/DL (ref 0–99)
LV EF: 60 %
LVEF MODALITY: NORMAL
LYMPHOCYTES ABSOLUTE: 0.83 E9/L (ref 1.5–4)
LYMPHOCYTES RELATIVE PERCENT: 7 % (ref 20–42)
MCH RBC QN AUTO: 31.1 PG (ref 26–35)
MCHC RBC AUTO-ENTMCNC: 32.8 % (ref 32–34.5)
MCV RBC AUTO: 94.8 FL (ref 80–99.9)
MONOCYTES ABSOLUTE: 0.71 E9/L (ref 0.1–0.95)
MONOCYTES RELATIVE PERCENT: 6.1 % (ref 2–12)
NEUTROPHILS ABSOLUTE: 10 E9/L (ref 1.8–7.3)
NEUTROPHILS RELATIVE PERCENT: 83.5 % (ref 43–80)
O2 SATURATION: 96.3 % (ref 92–98.5)
OPERATOR ID: 9
PCO2 ARTERIAL: 35.8 MMHG (ref 35–45)
PDW BLD-RTO: 13 FL (ref 11.5–15)
PH BLOOD GAS: 7.47 (ref 7.35–7.45)
PLATELET # BLD: 266 E9/L (ref 130–450)
PMV BLD AUTO: 10.5 FL (ref 7–12)
PO2 ARTERIAL: 78.4 MMHG (ref 60–80)
POIKILOCYTES: ABNORMAL
POLYCHROMASIA: ABNORMAL
POTASSIUM SERPL-SCNC: 3.7 MMOL/L (ref 3.5–5)
PRO-BNP: 2545 PG/ML (ref 0–125)
PROCALCITONIN: 0.46 NG/ML (ref 0–0.08)
RBC # BLD: 3.86 E12/L (ref 3.8–5.8)
SODIUM BLD-SCNC: 137 MMOL/L (ref 132–146)
SOURCE, BLOOD GAS: ABNORMAL
TOTAL PROTEIN: 5.9 G/DL (ref 6.4–8.3)
TRIGL SERPL-MCNC: 91 MG/DL (ref 0–149)
TROPONIN: <0.01 NG/ML (ref 0–0.03)
VLDLC SERPL CALC-MCNC: 18 MG/DL
WBC # BLD: 11.9 E9/L (ref 4.5–11.5)

## 2018-09-07 PROCEDURE — 6370000000 HC RX 637 (ALT 250 FOR IP): Performed by: INTERNAL MEDICINE

## 2018-09-07 PROCEDURE — 85025 COMPLETE CBC W/AUTO DIFF WBC: CPT

## 2018-09-07 PROCEDURE — 6360000002 HC RX W HCPCS: Performed by: INTERNAL MEDICINE

## 2018-09-07 PROCEDURE — 94660 CPAP INITIATION&MGMT: CPT

## 2018-09-07 PROCEDURE — 80061 LIPID PANEL: CPT

## 2018-09-07 PROCEDURE — G8978 MOBILITY CURRENT STATUS: HCPCS | Performed by: PHYSICAL THERAPIST

## 2018-09-07 PROCEDURE — 93306 TTE W/DOPPLER COMPLETE: CPT

## 2018-09-07 PROCEDURE — 84145 PROCALCITONIN (PCT): CPT

## 2018-09-07 PROCEDURE — 83036 HEMOGLOBIN GLYCOSYLATED A1C: CPT

## 2018-09-07 PROCEDURE — 84484 ASSAY OF TROPONIN QUANT: CPT

## 2018-09-07 PROCEDURE — 6370000000 HC RX 637 (ALT 250 FOR IP): Performed by: FAMILY MEDICINE

## 2018-09-07 PROCEDURE — 83880 ASSAY OF NATRIURETIC PEPTIDE: CPT

## 2018-09-07 PROCEDURE — 2700000000 HC OXYGEN THERAPY PER DAY

## 2018-09-07 PROCEDURE — 87070 CULTURE OTHR SPECIMN AEROBIC: CPT

## 2018-09-07 PROCEDURE — 2580000003 HC RX 258: Performed by: INTERNAL MEDICINE

## 2018-09-07 PROCEDURE — 2060000000 HC ICU INTERMEDIATE R&B

## 2018-09-07 PROCEDURE — 6360000002 HC RX W HCPCS: Performed by: FAMILY MEDICINE

## 2018-09-07 PROCEDURE — 94640 AIRWAY INHALATION TREATMENT: CPT

## 2018-09-07 PROCEDURE — 82803 BLOOD GASES ANY COMBINATION: CPT

## 2018-09-07 PROCEDURE — 97530 THERAPEUTIC ACTIVITIES: CPT | Performed by: PHYSICAL THERAPIST

## 2018-09-07 PROCEDURE — 80053 COMPREHEN METABOLIC PANEL: CPT

## 2018-09-07 PROCEDURE — 93005 ELECTROCARDIOGRAM TRACING: CPT | Performed by: FAMILY MEDICINE

## 2018-09-07 PROCEDURE — 36415 COLL VENOUS BLD VENIPUNCTURE: CPT

## 2018-09-07 PROCEDURE — 87205 SMEAR GRAM STAIN: CPT

## 2018-09-07 PROCEDURE — G8979 MOBILITY GOAL STATUS: HCPCS | Performed by: PHYSICAL THERAPIST

## 2018-09-07 PROCEDURE — 71045 X-RAY EXAM CHEST 1 VIEW: CPT

## 2018-09-07 PROCEDURE — 97161 PT EVAL LOW COMPLEX 20 MIN: CPT | Performed by: PHYSICAL THERAPIST

## 2018-09-07 RX ORDER — FUROSEMIDE 10 MG/ML
40 INJECTION INTRAMUSCULAR; INTRAVENOUS 2 TIMES DAILY
Status: DISCONTINUED | OUTPATIENT
Start: 2018-09-07 | End: 2018-09-08

## 2018-09-07 RX ORDER — LORAZEPAM 2 MG/ML
0.5 INJECTION INTRAMUSCULAR EVERY 4 HOURS PRN
Status: DISCONTINUED | OUTPATIENT
Start: 2018-09-07 | End: 2018-09-07

## 2018-09-07 RX ORDER — THIAMINE MONONITRATE (VIT B1) 100 MG
100 TABLET ORAL DAILY
Status: DISCONTINUED | OUTPATIENT
Start: 2018-09-07 | End: 2018-09-13 | Stop reason: HOSPADM

## 2018-09-07 RX ORDER — HYDRALAZINE HYDROCHLORIDE 10 MG/1
10 TABLET, FILM COATED ORAL 2 TIMES DAILY
Status: DISCONTINUED | OUTPATIENT
Start: 2018-09-07 | End: 2018-09-09

## 2018-09-07 RX ORDER — LORAZEPAM 2 MG/ML
0.5 INJECTION INTRAMUSCULAR ONCE
Status: COMPLETED | OUTPATIENT
Start: 2018-09-07 | End: 2018-09-07

## 2018-09-07 RX ORDER — LORAZEPAM 2 MG/ML
1 INJECTION INTRAMUSCULAR ONCE
Status: COMPLETED | OUTPATIENT
Start: 2018-09-07 | End: 2018-09-07

## 2018-09-07 RX ORDER — LORAZEPAM 2 MG/ML
1 INJECTION INTRAMUSCULAR EVERY 4 HOURS PRN
Status: DISCONTINUED | OUTPATIENT
Start: 2018-09-07 | End: 2018-09-07

## 2018-09-07 RX ORDER — BUDESONIDE 0.5 MG/2ML
500 INHALANT ORAL 2 TIMES DAILY
Status: DISCONTINUED | OUTPATIENT
Start: 2018-09-07 | End: 2018-09-13 | Stop reason: HOSPADM

## 2018-09-07 RX ORDER — LORAZEPAM 2 MG/ML
0.5 INJECTION INTRAMUSCULAR
Status: DISCONTINUED | OUTPATIENT
Start: 2018-09-07 | End: 2018-09-12

## 2018-09-07 RX ADMIN — ATORVASTATIN CALCIUM 40 MG: 40 TABLET, FILM COATED ORAL at 09:43

## 2018-09-07 RX ADMIN — ENOXAPARIN SODIUM 40 MG: 40 INJECTION SUBCUTANEOUS at 09:42

## 2018-09-07 RX ADMIN — LORAZEPAM 1 MG: 2 INJECTION INTRAMUSCULAR; INTRAVENOUS at 01:33

## 2018-09-07 RX ADMIN — LORAZEPAM 1 MG: 2 INJECTION INTRAMUSCULAR; INTRAVENOUS at 21:23

## 2018-09-07 RX ADMIN — PAROXETINE HYDROCHLORIDE 30 MG: 20 TABLET, FILM COATED ORAL at 09:43

## 2018-09-07 RX ADMIN — Medication 1 G: at 12:26

## 2018-09-07 RX ADMIN — LORAZEPAM 0.5 MG: 2 INJECTION INTRAMUSCULAR; INTRAVENOUS at 17:56

## 2018-09-07 RX ADMIN — HYDRALAZINE HYDROCHLORIDE 25 MG: 25 TABLET, FILM COATED ORAL at 09:42

## 2018-09-07 RX ADMIN — ATENOLOL 50 MG: 50 TABLET ORAL at 09:43

## 2018-09-07 RX ADMIN — PANTOPRAZOLE SODIUM 40 MG: 40 TABLET, DELAYED RELEASE ORAL at 06:26

## 2018-09-07 RX ADMIN — GABAPENTIN 600 MG: 300 CAPSULE ORAL at 09:43

## 2018-09-07 RX ADMIN — Medication 100 MG: at 12:25

## 2018-09-07 RX ADMIN — GABAPENTIN 600 MG: 300 CAPSULE ORAL at 14:45

## 2018-09-07 RX ADMIN — LORAZEPAM 0.5 MG: 2 INJECTION INTRAMUSCULAR; INTRAVENOUS at 19:46

## 2018-09-07 RX ADMIN — FUROSEMIDE 40 MG: 10 INJECTION, SOLUTION INTRAMUSCULAR; INTRAVENOUS at 09:54

## 2018-09-07 RX ADMIN — IPRATROPIUM BROMIDE AND ALBUTEROL SULFATE 1 AMPULE: .5; 3 SOLUTION RESPIRATORY (INHALATION) at 09:30

## 2018-09-07 RX ADMIN — IPRATROPIUM BROMIDE AND ALBUTEROL SULFATE 1 AMPULE: .5; 3 SOLUTION RESPIRATORY (INHALATION) at 06:27

## 2018-09-07 RX ADMIN — FUROSEMIDE 40 MG: 10 INJECTION, SOLUTION INTRAMUSCULAR; INTRAVENOUS at 19:45

## 2018-09-07 RX ADMIN — AMLODIPINE BESYLATE 10 MG: 10 TABLET ORAL at 09:43

## 2018-09-07 RX ADMIN — ASPIRIN 81 MG: 81 TABLET, COATED ORAL at 09:42

## 2018-09-07 RX ADMIN — VANCOMYCIN HYDROCHLORIDE 1000 MG: 1 INJECTION, POWDER, LYOPHILIZED, FOR SOLUTION INTRAVENOUS at 12:29

## 2018-09-07 NOTE — CARE COORDINATION
[address] Transitions Interview     2018    Patient: Patricia Ortega Patient : 1950   MRN: [id number]  Reason for Admission: There are no discharge diagnoses documented for the most recent discharge. RARS: Readmission Risk Score: 19 CMRS: 12       Spoke with: Catarino Rodgers (patient)      Readmission Risk  Patient Active Problem List   Diagnosis    HTN (hypertension)    Panic disorder    OA (osteoarthritis of spine)    Hyperlipidemia    Chronic kidney disease (CKD)    Mass of kidney    GERD (gastroesophageal reflux disease)    Neuropathy    Stenosis of right internal carotid artery with cerebral infarction ([facility]    Osteophyte of vertebrae    Spinal stenosis in cervical region    Cervical spine instability    Hypoxia    Pleural effusion    Respiratory failure [facility])       Inpatient Assessment  Care Transitions Summary    Care Transitions Inpatient Review  Medication Review  Do you have all of your prescriptions and are they filled?:  No   Are you able to afford your medications?:  Yes  How often do you have difficulty taking your medications?:  I always take them as prescribed. Housing Review  Who do you live with?:  Partner/Spouse/SO  Are you an active caregiver in your home?:  No  Social Support  Do you have a ?:  No  Do you have a [address] [place] [address]?:  No  Durable Medical Equipment  Functional Review  Ability to seek help/take action for Emergent/Urgent situations i.e. fire, crime, inclement weather or health crisis. :  Independent  Ability handle personal hygiene needs (bathing/dressing/grooming): Independent  Ability to manage medications: Independent  Ability to prepare food:  Independent  Ability to maintain home (clean home, laundry): Independent  Ability to drive and/or has transportation:  Independent  Ability to do shopping:  Independent  Ability to manage finances:   Independent  Is patient able to live independently?:  Yes  Hearing and Vision  Visual Impairment:  Reading glasses  Hearing Impairment:  Hard of hearing  Care Transitions Interventions  No Identified Needs       Met with patient today 9/7/18 at bedside with no family present. Explained role and reason for visit. Patient is receptive for CTC to follow for Care Transition post hospital discharge. Confirmed with patient he is followed by Dr. Victor M Addison for primary care at [address] [place] [address], Dr. Chandler Li for cardiology, Dr. Andry Damico for vascular surgery, Dr. Augustus Crockett for neurosurgery, and has followed with nephrology and urology in past but unable to remember name of doctors. PMHx includes: Hypertension, Hyperlipidemia, Carotid Stenosis, GERD, Depression, Anxiety, Osteoarthritis, and Parathyroid Adenoma. States he has been having increased shortness of breath over the past month. CXr on admission noted to show the following below:     Opacification of inferior two thirds of right hemithorax   which is most likely on the basis of pleural effusion and/or lung   consolidation. Soft tissue density adjacent to the lateral aspect of   the right upper lung suggests loculation of the effusion. Underlying   mass cannot be excluded. Patient subsequently underwent a thoracentesis yesterday while in hospital.  Denies any chest pain, chest discomfort, abdominal pain, nausea, vomiting, chills or fever. Denies being a current smoker as he admits quitting in 1983. States he smoked for two years. Denies any history of Asthma, Bronchitis, COPD or being on home oxygen. States he lives with wife Abbi De Souza) in multi level home. States having 7 inside steps up to main floor and 7 steps down to basement. Denies any DME. States he has family support from wife and his sister if needed. States he was independent in ADL's and IADL's PTA including driving. Denies any HHC or SNF in past. States plan is to return home after discharge. Denies any needs or concerns at this time.  Provided business card with CTC contact information and advised to call for any questions or concerns which he verbalizes understanding. CTC will remain available for any further assistance if needed. Follow Up  Future Appointments  Date Time Provider Neto Belkis   12/20/2018 9:00 AM Sarah Chaudhary MD [facility]   3/19/2019 1:00 PM [facility] VAS US RM 1 SEYZ CARDIO None   3/19/2019 1:00 PM SCHEDULE, MHYX VASC MED/SURG ASC [facility]   3/19/2019 1:30 PM Alejandra Tavarez MD [facility]       Health Maintenance  There are no preventive care reminders to display for this patient.     Elliott Block, APRN

## 2018-09-07 NOTE — CARE COORDINATION
Shows patient as a , called and left  @ [facility] asking about service connection and travel benefits, await response.

## 2018-09-07 NOTE — PROGRESS NOTES
functional mobility. GOALS to be met in 3 days. Bed mobility-  Independent        Transfers-Sit to stand-Independent     Gait:  Patient to ambulate 2 x 50 feet using no device with Independent     Steps: Patient to go up and down 10  step(s) using 1 rail(s) with  Supervision      Increase strength in affected mm groups by 1/3 grade  Increase balance to allow for improvement towards functional goals. Increase endurance to allow for improvement towards functional goals.         Jodi Sanchez, PT

## 2018-09-08 LAB
ALBUMIN SERPL-MCNC: 2.9 G/DL (ref 3.5–5.2)
ALP BLD-CCNC: 291 U/L (ref 40–129)
ALT SERPL-CCNC: 11 U/L (ref 0–40)
AMMONIA: 25 UMOL/L (ref 16–60)
ANION GAP SERPL CALCULATED.3IONS-SCNC: 12 MMOL/L (ref 7–16)
AST SERPL-CCNC: 13 U/L (ref 0–39)
BASOPHILS ABSOLUTE: 0.04 E9/L (ref 0–0.2)
BASOPHILS RELATIVE PERCENT: 0.3 % (ref 0–2)
BILIRUB SERPL-MCNC: 0.4 MG/DL (ref 0–1.2)
BUN BLDV-MCNC: 14 MG/DL (ref 8–23)
CALCIUM SERPL-MCNC: 8.3 MG/DL (ref 8.6–10.2)
CHLORIDE BLD-SCNC: 99 MMOL/L (ref 98–107)
CO2: 27 MMOL/L (ref 22–29)
CREAT SERPL-MCNC: 1.4 MG/DL (ref 0.7–1.2)
EKG ATRIAL RATE: 65 BPM
EKG P AXIS: 53 DEGREES
EKG P-R INTERVAL: 106 MS
EKG Q-T INTERVAL: 444 MS
EKG QRS DURATION: 92 MS
EKG QTC CALCULATION (BAZETT): 461 MS
EKG R AXIS: -27 DEGREES
EKG T AXIS: -22 DEGREES
EKG VENTRICULAR RATE: 65 BPM
EOSINOPHILS ABSOLUTE: 0.32 E9/L (ref 0.05–0.5)
EOSINOPHILS RELATIVE PERCENT: 2.6 % (ref 0–6)
GFR AFRICAN AMERICAN: >60
GFR NON-AFRICAN AMERICAN: 50 ML/MIN/1.73
GLUCOSE BLD-MCNC: 116 MG/DL (ref 74–109)
HBA1C MFR BLD: 6.1 % (ref 4–5.6)
HCT VFR BLD CALC: 39.3 % (ref 37–54)
HEMOGLOBIN: 12.5 G/DL (ref 12.5–16.5)
IMMATURE GRANULOCYTES #: 0.05 E9/L
IMMATURE GRANULOCYTES %: 0.4 % (ref 0–5)
LYMPHOCYTES ABSOLUTE: 0.86 E9/L (ref 1.5–4)
LYMPHOCYTES RELATIVE PERCENT: 6.9 % (ref 20–42)
MAGNESIUM: 1.9 MG/DL (ref 1.6–2.6)
MCH RBC QN AUTO: 30.1 PG (ref 26–35)
MCHC RBC AUTO-ENTMCNC: 31.8 % (ref 32–34.5)
MCV RBC AUTO: 94.7 FL (ref 80–99.9)
MONOCYTES ABSOLUTE: 0.92 E9/L (ref 0.1–0.95)
MONOCYTES RELATIVE PERCENT: 7.4 % (ref 2–12)
NEUTROPHILS ABSOLUTE: 10.2 E9/L (ref 1.8–7.3)
NEUTROPHILS RELATIVE PERCENT: 82.4 % (ref 43–80)
PDW BLD-RTO: 12.8 FL (ref 11.5–15)
PHOSPHORUS: 5.2 MG/DL (ref 2.5–4.5)
PLATELET # BLD: 322 E9/L (ref 130–450)
PMV BLD AUTO: 9.8 FL (ref 7–12)
POTASSIUM SERPL-SCNC: 3.4 MMOL/L (ref 3.5–5)
PROCALCITONIN: 0.51 NG/ML (ref 0–0.08)
RBC # BLD: 4.15 E12/L (ref 3.8–5.8)
SODIUM BLD-SCNC: 138 MMOL/L (ref 132–146)
T4 FREE: 1.25 NG/DL (ref 0.93–1.7)
TOTAL PROTEIN: 6.8 G/DL (ref 6.4–8.3)
TSH SERPL DL<=0.05 MIU/L-ACNC: 2.3 UIU/ML (ref 0.27–4.2)
WBC # BLD: 12.4 E9/L (ref 4.5–11.5)

## 2018-09-08 PROCEDURE — 94761 N-INVAS EAR/PLS OXIMETRY MLT: CPT

## 2018-09-08 PROCEDURE — 85025 COMPLETE CBC W/AUTO DIFF WBC: CPT

## 2018-09-08 PROCEDURE — 84100 ASSAY OF PHOSPHORUS: CPT

## 2018-09-08 PROCEDURE — 80053 COMPREHEN METABOLIC PANEL: CPT

## 2018-09-08 PROCEDURE — 2060000000 HC ICU INTERMEDIATE R&B

## 2018-09-08 PROCEDURE — 6360000002 HC RX W HCPCS: Performed by: INTERNAL MEDICINE

## 2018-09-08 PROCEDURE — 6370000000 HC RX 637 (ALT 250 FOR IP): Performed by: INTERNAL MEDICINE

## 2018-09-08 PROCEDURE — 2580000003 HC RX 258: Performed by: FAMILY MEDICINE

## 2018-09-08 PROCEDURE — 93005 ELECTROCARDIOGRAM TRACING: CPT | Performed by: INTERNAL MEDICINE

## 2018-09-08 PROCEDURE — 82140 ASSAY OF AMMONIA: CPT

## 2018-09-08 PROCEDURE — 83036 HEMOGLOBIN GLYCOSYLATED A1C: CPT

## 2018-09-08 PROCEDURE — 36415 COLL VENOUS BLD VENIPUNCTURE: CPT

## 2018-09-08 PROCEDURE — 84443 ASSAY THYROID STIM HORMONE: CPT

## 2018-09-08 PROCEDURE — 94660 CPAP INITIATION&MGMT: CPT

## 2018-09-08 PROCEDURE — 84439 ASSAY OF FREE THYROXINE: CPT

## 2018-09-08 PROCEDURE — 2580000003 HC RX 258: Performed by: INTERNAL MEDICINE

## 2018-09-08 PROCEDURE — 6360000002 HC RX W HCPCS: Performed by: FAMILY MEDICINE

## 2018-09-08 PROCEDURE — 84145 PROCALCITONIN (PCT): CPT

## 2018-09-08 PROCEDURE — 94640 AIRWAY INHALATION TREATMENT: CPT

## 2018-09-08 PROCEDURE — 6370000000 HC RX 637 (ALT 250 FOR IP): Performed by: FAMILY MEDICINE

## 2018-09-08 PROCEDURE — 2700000000 HC OXYGEN THERAPY PER DAY

## 2018-09-08 PROCEDURE — 83735 ASSAY OF MAGNESIUM: CPT

## 2018-09-08 RX ORDER — POTASSIUM CHLORIDE 20 MEQ/1
20 TABLET, EXTENDED RELEASE ORAL
Status: COMPLETED | OUTPATIENT
Start: 2018-09-08 | End: 2018-09-09

## 2018-09-08 RX ADMIN — Medication 1 G: at 00:16

## 2018-09-08 RX ADMIN — IPRATROPIUM BROMIDE AND ALBUTEROL SULFATE 1 AMPULE: .5; 3 SOLUTION RESPIRATORY (INHALATION) at 18:56

## 2018-09-08 RX ADMIN — MELATONIN TAB 3 MG 3 MG: 3 TAB at 21:08

## 2018-09-08 RX ADMIN — Medication 100 MG: at 11:45

## 2018-09-08 RX ADMIN — GABAPENTIN 600 MG: 300 CAPSULE ORAL at 00:19

## 2018-09-08 RX ADMIN — IPRATROPIUM BROMIDE AND ALBUTEROL SULFATE 1 AMPULE: .5; 3 SOLUTION RESPIRATORY (INHALATION) at 09:54

## 2018-09-08 RX ADMIN — BUDESONIDE 500 MCG: 0.5 INHALANT RESPIRATORY (INHALATION) at 06:13

## 2018-09-08 RX ADMIN — HYDRALAZINE HYDROCHLORIDE 10 MG: 10 TABLET, FILM COATED ORAL at 00:16

## 2018-09-08 RX ADMIN — ASPIRIN 81 MG: 81 TABLET, COATED ORAL at 09:35

## 2018-09-08 RX ADMIN — LORAZEPAM 0.5 MG: 2 INJECTION INTRAMUSCULAR; INTRAVENOUS at 06:21

## 2018-09-08 RX ADMIN — PAROXETINE HYDROCHLORIDE 30 MG: 20 TABLET, FILM COATED ORAL at 09:35

## 2018-09-08 RX ADMIN — IPRATROPIUM BROMIDE AND ALBUTEROL SULFATE 1 AMPULE: .5; 3 SOLUTION RESPIRATORY (INHALATION) at 06:13

## 2018-09-08 RX ADMIN — HYDRALAZINE HYDROCHLORIDE 10 MG: 10 TABLET, FILM COATED ORAL at 21:08

## 2018-09-08 RX ADMIN — POTASSIUM CHLORIDE 20 MEQ: 1500 TABLET, EXTENDED RELEASE ORAL at 14:10

## 2018-09-08 RX ADMIN — ATENOLOL 50 MG: 50 TABLET ORAL at 09:34

## 2018-09-08 RX ADMIN — BUDESONIDE 500 MCG: 0.5 INHALANT RESPIRATORY (INHALATION) at 18:55

## 2018-09-08 RX ADMIN — FUROSEMIDE 40 MG: 10 INJECTION, SOLUTION INTRAMUSCULAR; INTRAVENOUS at 09:39

## 2018-09-08 RX ADMIN — Medication 1 G: at 13:05

## 2018-09-08 RX ADMIN — GABAPENTIN 600 MG: 300 CAPSULE ORAL at 14:10

## 2018-09-08 RX ADMIN — ATENOLOL 50 MG: 50 TABLET ORAL at 00:16

## 2018-09-08 RX ADMIN — ENOXAPARIN SODIUM 40 MG: 40 INJECTION SUBCUTANEOUS at 09:34

## 2018-09-08 RX ADMIN — LORAZEPAM 0.5 MG: 2 INJECTION INTRAMUSCULAR; INTRAVENOUS at 00:16

## 2018-09-08 RX ADMIN — AMLODIPINE BESYLATE 10 MG: 10 TABLET ORAL at 09:35

## 2018-09-08 RX ADMIN — IPRATROPIUM BROMIDE AND ALBUTEROL SULFATE 1 AMPULE: .5; 3 SOLUTION RESPIRATORY (INHALATION) at 03:27

## 2018-09-08 RX ADMIN — GABAPENTIN 600 MG: 300 CAPSULE ORAL at 09:37

## 2018-09-08 RX ADMIN — VANCOMYCIN HYDROCHLORIDE 1000 MG: 1 INJECTION, POWDER, LYOPHILIZED, FOR SOLUTION INTRAVENOUS at 11:52

## 2018-09-08 RX ADMIN — ATORVASTATIN CALCIUM 40 MG: 40 TABLET, FILM COATED ORAL at 09:34

## 2018-09-08 RX ADMIN — ACETAMINOPHEN 650 MG: 325 TABLET, FILM COATED ORAL at 09:38

## 2018-09-08 RX ADMIN — IPRATROPIUM BROMIDE AND ALBUTEROL SULFATE 1 AMPULE: .5; 3 SOLUTION RESPIRATORY (INHALATION) at 13:55

## 2018-09-08 RX ADMIN — PANTOPRAZOLE SODIUM 40 MG: 40 TABLET, DELAYED RELEASE ORAL at 06:21

## 2018-09-08 RX ADMIN — HYDRALAZINE HYDROCHLORIDE 10 MG: 10 TABLET, FILM COATED ORAL at 09:37

## 2018-09-08 RX ADMIN — ATENOLOL 50 MG: 50 TABLET ORAL at 21:07

## 2018-09-08 RX ADMIN — GABAPENTIN 600 MG: 300 CAPSULE ORAL at 21:08

## 2018-09-08 ASSESSMENT — PAIN SCALES - GENERAL
PAINLEVEL_OUTOF10: 0
PAINLEVEL_OUTOF10: 4

## 2018-09-08 ASSESSMENT — PAIN DESCRIPTION - LOCATION: LOCATION: GENERALIZED

## 2018-09-08 ASSESSMENT — PAIN DESCRIPTION - DESCRIPTORS: DESCRIPTORS: SORE

## 2018-09-08 ASSESSMENT — PAIN DESCRIPTION - ONSET: ONSET: GRADUAL

## 2018-09-08 ASSESSMENT — PAIN DESCRIPTION - FREQUENCY: FREQUENCY: CONTINUOUS

## 2018-09-08 ASSESSMENT — PAIN DESCRIPTION - PAIN TYPE: TYPE: ACUTE PAIN

## 2018-09-08 NOTE — PROGRESS NOTES
ABGs drawn from left radial artery. No complications. Positive Allens test performed. Palpated for pulses proximal and distal to puncture site after procedure. Pressure held until bleeding stopped. Bandage applied to puncture site.

## 2018-09-08 NOTE — PROGRESS NOTES
0944   INR  1.2       Assessment/Plan:   68yo male with acute change in peural effusion and acute on chronic resp failure  Right lung pleural effusion-exudative and probable parapneumonic  EtOH abuse with acute alcohol withdrawals  Elevated BNP  Leukocytosis  HLD  HTN  Depression, Anxiety  Moderate mitral regurge  Hx parathyroidectomy  Hx carotid endarterectomy      Underwent thoracentesis 9/6/18  Tolerated bipap over night  Ativan IV push when necessary  Keep O2 sat>90%  See orders        Angel Galaviz D.O.  1:30 PM  9/8/2018

## 2018-09-09 LAB
ANION GAP SERPL CALCULATED.3IONS-SCNC: 9 MMOL/L (ref 7–16)
BODY FLUID CULTURE, STERILE: NORMAL
BUN BLDV-MCNC: 15 MG/DL (ref 8–23)
CALCIUM SERPL-MCNC: 8.4 MG/DL (ref 8.6–10.2)
CHLORIDE BLD-SCNC: 100 MMOL/L (ref 98–107)
CO2: 28 MMOL/L (ref 22–29)
CREAT SERPL-MCNC: 1.6 MG/DL (ref 0.7–1.2)
CULTURE, RESPIRATORY: NORMAL
GFR AFRICAN AMERICAN: 52
GFR NON-AFRICAN AMERICAN: 43 ML/MIN/1.73
GLUCOSE BLD-MCNC: 124 MG/DL (ref 74–109)
GRAM STAIN RESULT: NORMAL
POTASSIUM SERPL-SCNC: 4 MMOL/L (ref 3.5–5)
SMEAR, RESPIRATORY: NORMAL
SODIUM BLD-SCNC: 137 MMOL/L (ref 132–146)
VANCOMYCIN TROUGH: 8.6 MCG/ML (ref 5–16)

## 2018-09-09 PROCEDURE — 2580000003 HC RX 258: Performed by: FAMILY MEDICINE

## 2018-09-09 PROCEDURE — 2700000000 HC OXYGEN THERAPY PER DAY

## 2018-09-09 PROCEDURE — 6370000000 HC RX 637 (ALT 250 FOR IP): Performed by: INTERNAL MEDICINE

## 2018-09-09 PROCEDURE — 80048 BASIC METABOLIC PNL TOTAL CA: CPT

## 2018-09-09 PROCEDURE — 2580000003 HC RX 258: Performed by: INTERNAL MEDICINE

## 2018-09-09 PROCEDURE — 80202 ASSAY OF VANCOMYCIN: CPT

## 2018-09-09 PROCEDURE — 6360000002 HC RX W HCPCS: Performed by: FAMILY MEDICINE

## 2018-09-09 PROCEDURE — 36415 COLL VENOUS BLD VENIPUNCTURE: CPT

## 2018-09-09 PROCEDURE — 6370000000 HC RX 637 (ALT 250 FOR IP): Performed by: FAMILY MEDICINE

## 2018-09-09 PROCEDURE — 2060000000 HC ICU INTERMEDIATE R&B

## 2018-09-09 PROCEDURE — 6360000002 HC RX W HCPCS: Performed by: INTERNAL MEDICINE

## 2018-09-09 PROCEDURE — 94660 CPAP INITIATION&MGMT: CPT

## 2018-09-09 PROCEDURE — 94640 AIRWAY INHALATION TREATMENT: CPT

## 2018-09-09 RX ORDER — SODIUM CHLORIDE 9 MG/ML
INJECTION, SOLUTION INTRAVENOUS CONTINUOUS
Status: DISCONTINUED | OUTPATIENT
Start: 2018-09-09 | End: 2018-09-12

## 2018-09-09 RX ORDER — IPRATROPIUM BROMIDE AND ALBUTEROL SULFATE 2.5; .5 MG/3ML; MG/3ML
1 SOLUTION RESPIRATORY (INHALATION) EVERY 4 HOURS
Status: DISCONTINUED | OUTPATIENT
Start: 2018-09-09 | End: 2018-09-12

## 2018-09-09 RX ORDER — 0.9 % SODIUM CHLORIDE 0.9 %
500 INTRAVENOUS SOLUTION INTRAVENOUS ONCE
Status: COMPLETED | OUTPATIENT
Start: 2018-09-09 | End: 2018-09-09

## 2018-09-09 RX ADMIN — Medication 100 MG: at 09:17

## 2018-09-09 RX ADMIN — IPRATROPIUM BROMIDE AND ALBUTEROL SULFATE 1 AMPULE: .5; 3 SOLUTION RESPIRATORY (INHALATION) at 06:05

## 2018-09-09 RX ADMIN — VANCOMYCIN HYDROCHLORIDE 1000 MG: 1 INJECTION, POWDER, LYOPHILIZED, FOR SOLUTION INTRAVENOUS at 11:32

## 2018-09-09 RX ADMIN — SODIUM CHLORIDE 500 ML: 9 INJECTION, SOLUTION INTRAVENOUS at 06:32

## 2018-09-09 RX ADMIN — IPRATROPIUM BROMIDE AND ALBUTEROL SULFATE 1 AMPULE: .5; 3 SOLUTION RESPIRATORY (INHALATION) at 14:11

## 2018-09-09 RX ADMIN — GABAPENTIN 600 MG: 300 CAPSULE ORAL at 20:56

## 2018-09-09 RX ADMIN — BUDESONIDE 500 MCG: 0.5 INHALANT RESPIRATORY (INHALATION) at 17:56

## 2018-09-09 RX ADMIN — GABAPENTIN 600 MG: 300 CAPSULE ORAL at 09:17

## 2018-09-09 RX ADMIN — ACETAMINOPHEN 650 MG: 325 TABLET, FILM COATED ORAL at 15:32

## 2018-09-09 RX ADMIN — ENOXAPARIN SODIUM 40 MG: 40 INJECTION SUBCUTANEOUS at 09:17

## 2018-09-09 RX ADMIN — ATORVASTATIN CALCIUM 40 MG: 40 TABLET, FILM COATED ORAL at 09:18

## 2018-09-09 RX ADMIN — GABAPENTIN 600 MG: 300 CAPSULE ORAL at 15:28

## 2018-09-09 RX ADMIN — ACETAMINOPHEN 650 MG: 325 TABLET, FILM COATED ORAL at 00:08

## 2018-09-09 RX ADMIN — IPRATROPIUM BROMIDE AND ALBUTEROL SULFATE 1 AMPULE: .5; 3 SOLUTION RESPIRATORY (INHALATION) at 10:10

## 2018-09-09 RX ADMIN — ASPIRIN 81 MG: 81 TABLET, COATED ORAL at 09:17

## 2018-09-09 RX ADMIN — POTASSIUM CHLORIDE 20 MEQ: 1500 TABLET, EXTENDED RELEASE ORAL at 09:17

## 2018-09-09 RX ADMIN — Medication 1 G: at 00:11

## 2018-09-09 RX ADMIN — POTASSIUM CHLORIDE 20 MEQ: 1500 TABLET, EXTENDED RELEASE ORAL at 11:45

## 2018-09-09 RX ADMIN — Medication 1 G: at 22:54

## 2018-09-09 RX ADMIN — PAROXETINE HYDROCHLORIDE 30 MG: 20 TABLET, FILM COATED ORAL at 09:18

## 2018-09-09 RX ADMIN — IPRATROPIUM BROMIDE AND ALBUTEROL SULFATE 1 AMPULE: .5; 3 SOLUTION RESPIRATORY (INHALATION) at 21:43

## 2018-09-09 RX ADMIN — Medication 1 G: at 11:32

## 2018-09-09 RX ADMIN — SODIUM CHLORIDE: 9 INJECTION, SOLUTION INTRAVENOUS at 06:49

## 2018-09-09 RX ADMIN — IPRATROPIUM BROMIDE AND ALBUTEROL SULFATE 1 AMPULE: .5; 3 SOLUTION RESPIRATORY (INHALATION) at 17:56

## 2018-09-09 RX ADMIN — PANTOPRAZOLE SODIUM 40 MG: 40 TABLET, DELAYED RELEASE ORAL at 06:32

## 2018-09-09 RX ADMIN — ATENOLOL 50 MG: 50 TABLET ORAL at 20:56

## 2018-09-09 RX ADMIN — ACETAMINOPHEN 650 MG: 325 TABLET, FILM COATED ORAL at 20:56

## 2018-09-09 RX ADMIN — BUDESONIDE 500 MCG: 0.5 INHALANT RESPIRATORY (INHALATION) at 06:05

## 2018-09-09 ASSESSMENT — PAIN DESCRIPTION - LOCATION
LOCATION: JAW
LOCATION: JAW

## 2018-09-09 ASSESSMENT — PAIN DESCRIPTION - DESCRIPTORS
DESCRIPTORS: ACHING;DISCOMFORT
DESCRIPTORS: NUMBNESS

## 2018-09-09 ASSESSMENT — PAIN DESCRIPTION - ORIENTATION
ORIENTATION: RIGHT
ORIENTATION: RIGHT

## 2018-09-09 ASSESSMENT — PAIN SCALES - GENERAL
PAINLEVEL_OUTOF10: 6
PAINLEVEL_OUTOF10: 0
PAINLEVEL_OUTOF10: 8
PAINLEVEL_OUTOF10: 0

## 2018-09-09 ASSESSMENT — PAIN DESCRIPTION - FREQUENCY: FREQUENCY: CONTINUOUS

## 2018-09-09 ASSESSMENT — PAIN DESCRIPTION - ONSET: ONSET: ON-GOING

## 2018-09-09 ASSESSMENT — PAIN DESCRIPTION - PAIN TYPE
TYPE: ACUTE PAIN
TYPE: ACUTE PAIN

## 2018-09-09 NOTE — PROGRESS NOTES
Internal Medicine Progress Note  9/9/2018     HPI:   He tolerated the BiPAP overnight. He states that he does feel better after the thoracentesis. Patient went into new onset atrial fib yesterday. The patient's heart rate is controlled at this time. He has had 2 episodes of 2 2nd positives Patient has been more alert and oriented the last 24 hours. The patient is back up to 11 liters high flow nasal cannula but he denies unusual shortness of breath. Case was discussed with the patient's wife at the bedside again today. ROS: pertinent positives discussed in HPI.   Negative cough, + SOB  Negative chest pain, palpitations  Negative n/v, abd pain  Negative fever, chills, HA  Negative dysuria, hematuria  Negative rash, lesions  Negative confusion, agitation  Negative back pain, gait problem  Negative eye pain, redness  Negative congestion, rhinorrhea    Physical Exam:  BP (!) 99/54   Pulse 70   Temp 98.1 °F (36.7 °C) (Axillary)   Resp 14   Ht 5' 5.5\" (1.664 m)   Wt 165 lb (74.8 kg)   SpO2 97%   BMI 27.04 kg/m²   General appearance: alert and oriented, no acute distress  Head: PERRL, nonicteric, atraumatic  Chest: no chest wall tenderness  Lungs: non-labored, equal chest rise, bipap, CTAB, diminished R>L - but better  Heart: RRR  Abdomen: S NT ND no guarding  Extremities: no swelling or edema, no calf tenderness  Neuro: CNs grossly intact, no slurred speech, A&O X 1-2,+ mildly confused  Skin: no lesions, bleeding  Musculo: normal ROM, no deformities        Intake/Output Summary (Last 24 hours) at 09/09/18 1130  Last data filed at 09/09/18 0610   Gross per 24 hour   Intake                0 ml   Output             1300 ml   Net            -1300 ml       CBC:   Recent Labs      09/07/18   0518  09/08/18   0624   WBC  11.9*  12.4*   HGB  12.0*  12.5   PLT  266  322     BMP:   Recent Labs      09/07/18   0518  09/08/18   0625  09/09/18   0535   NA  137  138  137   K  3.7  3.4*  4.0   CL  100  99  100   CO2  25

## 2018-09-10 ENCOUNTER — APPOINTMENT (OUTPATIENT)
Dept: GENERAL RADIOLOGY | Age: 68
DRG: 186 | End: 2018-09-10
Payer: MEDICARE

## 2018-09-10 ENCOUNTER — APPOINTMENT (OUTPATIENT)
Dept: CT IMAGING | Age: 68
DRG: 186 | End: 2018-09-10
Payer: MEDICARE

## 2018-09-10 LAB
ALBUMIN SERPL-MCNC: 2.4 G/DL (ref 3.5–5.2)
ALP BLD-CCNC: 244 U/L (ref 40–129)
ALT SERPL-CCNC: 14 U/L (ref 0–40)
ANION GAP SERPL CALCULATED.3IONS-SCNC: 10 MMOL/L (ref 7–16)
AST SERPL-CCNC: 16 U/L (ref 0–39)
BASOPHILS ABSOLUTE: 0 E9/L (ref 0–0.2)
BASOPHILS RELATIVE PERCENT: 0.5 % (ref 0–2)
BILIRUB SERPL-MCNC: 0.5 MG/DL (ref 0–1.2)
BUN BLDV-MCNC: 11 MG/DL (ref 8–23)
CALCIUM SERPL-MCNC: 8.1 MG/DL (ref 8.6–10.2)
CHLORIDE BLD-SCNC: 104 MMOL/L (ref 98–107)
CO2: 23 MMOL/L (ref 22–29)
CREAT SERPL-MCNC: 1.3 MG/DL (ref 0.7–1.2)
EKG ATRIAL RATE: 357 BPM
EKG ATRIAL RATE: 70 BPM
EKG P AXIS: 16 DEGREES
EKG P-R INTERVAL: 142 MS
EKG Q-T INTERVAL: 420 MS
EKG Q-T INTERVAL: 450 MS
EKG QRS DURATION: 92 MS
EKG QRS DURATION: 98 MS
EKG QTC CALCULATION (BAZETT): 469 MS
EKG QTC CALCULATION (BAZETT): 486 MS
EKG R AXIS: -34 DEGREES
EKG R AXIS: -40 DEGREES
EKG T AXIS: -11 DEGREES
EKG T AXIS: -13 DEGREES
EKG VENTRICULAR RATE: 70 BPM
EKG VENTRICULAR RATE: 75 BPM
EOSINOPHILS ABSOLUTE: 0.57 E9/L (ref 0.05–0.5)
EOSINOPHILS RELATIVE PERCENT: 6.1 % (ref 0–6)
GFR AFRICAN AMERICAN: >60
GFR NON-AFRICAN AMERICAN: 55 ML/MIN/1.73
GLUCOSE BLD-MCNC: 99 MG/DL (ref 74–109)
HCT VFR BLD CALC: 35.4 % (ref 37–54)
HEMOGLOBIN: 11.5 G/DL (ref 12.5–16.5)
LYMPHOCYTES ABSOLUTE: 0.37 E9/L (ref 1.5–4)
LYMPHOCYTES RELATIVE PERCENT: 3.5 % (ref 20–42)
MCH RBC QN AUTO: 30.5 PG (ref 26–35)
MCHC RBC AUTO-ENTMCNC: 32.5 % (ref 32–34.5)
MCV RBC AUTO: 93.9 FL (ref 80–99.9)
MONOCYTES ABSOLUTE: 0.37 E9/L (ref 0.1–0.95)
MONOCYTES RELATIVE PERCENT: 3.5 % (ref 2–12)
NEUTROPHILS ABSOLUTE: 8.09 E9/L (ref 1.8–7.3)
NEUTROPHILS RELATIVE PERCENT: 87 % (ref 43–80)
PDW BLD-RTO: 13.1 FL (ref 11.5–15)
PLATELET # BLD: 326 E9/L (ref 130–450)
PMV BLD AUTO: 10.3 FL (ref 7–12)
POLYCHROMASIA: ABNORMAL
POTASSIUM SERPL-SCNC: 4 MMOL/L (ref 3.5–5)
RBC # BLD: 3.77 E12/L (ref 3.8–5.8)
SODIUM BLD-SCNC: 137 MMOL/L (ref 132–146)
TOTAL PROTEIN: 6.3 G/DL (ref 6.4–8.3)
WBC # BLD: 9.3 E9/L (ref 4.5–11.5)

## 2018-09-10 PROCEDURE — 6370000000 HC RX 637 (ALT 250 FOR IP): Performed by: FAMILY MEDICINE

## 2018-09-10 PROCEDURE — 2700000000 HC OXYGEN THERAPY PER DAY

## 2018-09-10 PROCEDURE — 6360000002 HC RX W HCPCS: Performed by: INTERNAL MEDICINE

## 2018-09-10 PROCEDURE — 97110 THERAPEUTIC EXERCISES: CPT

## 2018-09-10 PROCEDURE — 97530 THERAPEUTIC ACTIVITIES: CPT

## 2018-09-10 PROCEDURE — 6360000002 HC RX W HCPCS: Performed by: FAMILY MEDICINE

## 2018-09-10 PROCEDURE — P9046 ALBUMIN (HUMAN), 25%, 20 ML: HCPCS | Performed by: FAMILY MEDICINE

## 2018-09-10 PROCEDURE — 2580000003 HC RX 258: Performed by: INTERNAL MEDICINE

## 2018-09-10 PROCEDURE — 6370000000 HC RX 637 (ALT 250 FOR IP): Performed by: INTERNAL MEDICINE

## 2018-09-10 PROCEDURE — 2060000000 HC ICU INTERMEDIATE R&B

## 2018-09-10 PROCEDURE — 94640 AIRWAY INHALATION TREATMENT: CPT

## 2018-09-10 PROCEDURE — 36415 COLL VENOUS BLD VENIPUNCTURE: CPT

## 2018-09-10 PROCEDURE — 97535 SELF CARE MNGMENT TRAINING: CPT

## 2018-09-10 PROCEDURE — 71250 CT THORAX DX C-: CPT

## 2018-09-10 PROCEDURE — 85025 COMPLETE CBC W/AUTO DIFF WBC: CPT

## 2018-09-10 PROCEDURE — 71045 X-RAY EXAM CHEST 1 VIEW: CPT

## 2018-09-10 PROCEDURE — 80053 COMPREHEN METABOLIC PANEL: CPT

## 2018-09-10 RX ORDER — ALBUMIN (HUMAN) 12.5 G/50ML
50 SOLUTION INTRAVENOUS ONCE
Status: COMPLETED | OUTPATIENT
Start: 2018-09-10 | End: 2018-09-10

## 2018-09-10 RX ADMIN — BUDESONIDE 500 MCG: 0.5 INHALANT RESPIRATORY (INHALATION) at 06:36

## 2018-09-10 RX ADMIN — ACETAMINOPHEN 650 MG: 325 TABLET, FILM COATED ORAL at 21:09

## 2018-09-10 RX ADMIN — IPRATROPIUM BROMIDE AND ALBUTEROL SULFATE 1 AMPULE: .5; 3 SOLUTION RESPIRATORY (INHALATION) at 10:15

## 2018-09-10 RX ADMIN — PANTOPRAZOLE SODIUM 40 MG: 40 TABLET, DELAYED RELEASE ORAL at 06:18

## 2018-09-10 RX ADMIN — ALBUMIN (HUMAN) 50 G: 0.25 INJECTION, SOLUTION INTRAVENOUS at 10:53

## 2018-09-10 RX ADMIN — IPRATROPIUM BROMIDE AND ALBUTEROL SULFATE 1 AMPULE: .5; 3 SOLUTION RESPIRATORY (INHALATION) at 23:01

## 2018-09-10 RX ADMIN — GABAPENTIN 600 MG: 300 CAPSULE ORAL at 08:07

## 2018-09-10 RX ADMIN — ATENOLOL 50 MG: 50 TABLET ORAL at 21:09

## 2018-09-10 RX ADMIN — GABAPENTIN 600 MG: 300 CAPSULE ORAL at 21:09

## 2018-09-10 RX ADMIN — IPRATROPIUM BROMIDE AND ALBUTEROL SULFATE 1 AMPULE: .5; 3 SOLUTION RESPIRATORY (INHALATION) at 13:17

## 2018-09-10 RX ADMIN — ASPIRIN 81 MG: 81 TABLET, COATED ORAL at 08:08

## 2018-09-10 RX ADMIN — IPRATROPIUM BROMIDE AND ALBUTEROL SULFATE 1 AMPULE: .5; 3 SOLUTION RESPIRATORY (INHALATION) at 01:53

## 2018-09-10 RX ADMIN — ATENOLOL 50 MG: 50 TABLET ORAL at 08:07

## 2018-09-10 RX ADMIN — ATORVASTATIN CALCIUM 40 MG: 40 TABLET, FILM COATED ORAL at 08:07

## 2018-09-10 RX ADMIN — GABAPENTIN 600 MG: 300 CAPSULE ORAL at 13:49

## 2018-09-10 RX ADMIN — SODIUM CHLORIDE: 9 INJECTION, SOLUTION INTRAVENOUS at 04:21

## 2018-09-10 RX ADMIN — BUDESONIDE 500 MCG: 0.5 INHALANT RESPIRATORY (INHALATION) at 16:34

## 2018-09-10 RX ADMIN — ENOXAPARIN SODIUM 40 MG: 40 INJECTION SUBCUTANEOUS at 08:07

## 2018-09-10 RX ADMIN — IPRATROPIUM BROMIDE AND ALBUTEROL SULFATE 1 AMPULE: .5; 3 SOLUTION RESPIRATORY (INHALATION) at 06:36

## 2018-09-10 RX ADMIN — SODIUM CHLORIDE: 9 INJECTION, SOLUTION INTRAVENOUS at 17:58

## 2018-09-10 RX ADMIN — IPRATROPIUM BROMIDE AND ALBUTEROL SULFATE 1 AMPULE: .5; 3 SOLUTION RESPIRATORY (INHALATION) at 16:34

## 2018-09-10 RX ADMIN — Medication 1 G: at 12:12

## 2018-09-10 RX ADMIN — Medication 100 MG: at 08:07

## 2018-09-10 RX ADMIN — VANCOMYCIN HYDROCHLORIDE 1500 MG: 10 INJECTION, POWDER, LYOPHILIZED, FOR SOLUTION INTRAVENOUS at 12:16

## 2018-09-10 RX ADMIN — PAROXETINE HYDROCHLORIDE 30 MG: 20 TABLET, FILM COATED ORAL at 08:07

## 2018-09-10 ASSESSMENT — PAIN DESCRIPTION - PAIN TYPE: TYPE: ACUTE PAIN

## 2018-09-10 ASSESSMENT — PAIN SCALES - GENERAL
PAINLEVEL_OUTOF10: 3
PAINLEVEL_OUTOF10: 0

## 2018-09-10 ASSESSMENT — PAIN DESCRIPTION - DESCRIPTORS: DESCRIPTORS: ACHING;DISCOMFORT;HEADACHE

## 2018-09-10 ASSESSMENT — PAIN DESCRIPTION - FREQUENCY: FREQUENCY: INTERMITTENT

## 2018-09-10 ASSESSMENT — PAIN DESCRIPTION - LOCATION: LOCATION: HEAD

## 2018-09-10 ASSESSMENT — PAIN DESCRIPTION - ONSET: ONSET: GRADUAL

## 2018-09-10 NOTE — PLAN OF CARE
Problem: Falls - Risk of:  Goal: Will remain free from falls  Will remain free from falls   Outcome: Met This Shift      Problem: Breathing Pattern - Ineffective:  Goal: Ability to achieve and maintain a regular respiratory rate will improve  Ability to achieve and maintain a regular respiratory rate will improve   Outcome: Met This Shift      Problem: Pain:  Goal: Pain level will decrease  Pain level will decrease   Outcome: Met This Shift

## 2018-09-10 NOTE — PROGRESS NOTES
lung pleural effusion-exudative and probable parapneumonic  EtOH abuse with acute alcohol withdrawals  New onset atrial fibrillation with rapid ventricular response with patient having occasional positives  Acute kidney injury with creatinine 1.6  Elevated BNP  Leukocytosis  HLD  HTN  Depression, Anxiety  Moderate mitral regurge  Hx parathyroidectomy  Hx carotid endarterectomy      Pulm consulted  Recheck CXR, EKG  Warm compress to jaw  Refuses bipap due to TMJ sore  Encourage ambulation, check O2 sat  Vanco, Cefepime  Renal function improving  See orders  Will discuss with attending    Shiv Waite  7:43 AM     The chart was reviewed and the patient was seen and examined with the resident. The case was discussed in detail with the resident and agree with current impressions and plan.     William Webb D.O.  11:37 AM  9/10/2018

## 2018-09-10 NOTE — PROGRESS NOTES
Occupational Therapy  O.T. Bedside Treatment Note    Date:9/10/2018  Patient Name: Elfreda Felty  MRN: [id number]  : 1950  ROOM #: [id number]    Problem list / Diagnosis:   Patient Active Problem List   Diagnosis    HTN (hypertension)    Panic disorder    OA (osteoarthritis of spine)    Hyperlipidemia    Chronic kidney disease (CKD)    Mass of kidney    GERD (gastroesophageal reflux disease)    Neuropathy    Stenosis of right internal carotid artery with cerebral infarction ([facility] [address]    Osteophyte of vertebrae    Spinal stenosis in cervical region    Cervical spine instability    Hypoxia    Pleural effusion    Respiratory failure ([address]     Past Medical History:   Past Medical History:   Diagnosis Date    Anxiety     Depression     GERD (gastroesophageal reflux disease)     Heart palpitations     HTN (hypertension) 10/21/2010    Hyperlipidemia     Lightheadedness     OA (osteoarthritis)     Parathyroid adenoma     s/p surgery     Stenosis of right internal carotid artery with cerebral infarction [facility])      Onset/Medical history: medical history reviewed  Past medical history: reviewed    The admitting diagnosis and active problem list as listed above have been reviewed prior to treatment. Nursing cleared the patient for treatment. Patient is agreeable to treatment.     Placement recommendation: HHOT vs subacute depending on family ability to  provide 24/ supervision /assist    Equipment prescriptions needed:  TBD   AM-PAC Daily Activity Inpatient   How much help for putting on and taking off regular lower body clothing?: A Lot  How much help for Bathing?: A Lot  How much help for Toileting?: A Little  How much help for putting on and taking off regular upper body clothing?: A Little  How much help for taking care of personal grooming?: A Little  How much help for eating meals?: None  AM-PAC Inpatient Daily Activity Raw Score: 17  AM-PAC Inpatient ADL T-Scale Score : 37.26  ADL Inpatient CMS 0-100% Score: 50.11  ADL Inpatient CMS G-Code Modifier : CK        Diagnosis / Problem List: No diagnosis found. Patient Active Problem List   Diagnosis    HTN (hypertension)    Panic disorder    OA (osteoarthritis of spine)    Hyperlipidemia    Chronic kidney disease (CKD)    Mass of kidney    GERD (gastroesophageal reflux disease)    Neuropathy    Stenosis of right internal carotid artery with cerebral infarction ([facility] [address]    Osteophyte of vertebrae    Spinal stenosis in cervical region    Cervical spine instability    Hypoxia    Pleural effusion    Respiratory failure ([facility] [address]         Precautions: Fall risk, h/o neck surgery 2016    S:  - Pt cleared for treatment through nursing.  - Pain: pt has c/o pain in R side jaw and neck no quantified   - No family present. O:    Function Assessment     Status  Goal  Comment    Feeding:  Independent  Independent      Grooming:  Independent  Independent     UE dressing:  Independent  Independent     LE dressing:  SBA Minimal Assist Pt to wil/doff socks long sitting in bed    Bathing:   Moderate Assist  Minimal Assist  n/t educated pt with regards to bathroom safety, ECT's, and AE    Bed Mobility: Minimal Assist progressing to supervision, supine to sit,   Independent for scooting,  Independent for sit to supine     Independent  n/t    Functional Transfers:    Independent for sit to stand transfers Independent N/t    Functional Mobility: 3 steps towards the [facility] and pt became SOB with 02 dropping to 86% Independent with good breathing technique  n/t         A:  -Balance: Sitting: n/t       Standing: n/t  -Endurance: fair- (2* to decreased endurance, strength  pain)  -Edema: none noted   -Safety:fair  (VC's for pt educated with regards to bathroom safety)  - Pt/family education/training:  LE dressing,  bathroom safety, DME, bathing AE   -Pt would benefit from additional ADLs, safety education, balance activities, transfer training, strength

## 2018-09-10 NOTE — CONSULTS
hypertension, diabetes mellitus, and  daily alcohol abuse or use. Also reviewed notes from his attending  physician with an internal medicine resident at the clinic and this date  06/20/2018 with diagnosis of hypertension, COPD, diabetes mellitus, HIV,  panic attacks, depression, GERD, cervical stenosis with instability and  radiculopathy, neuropathy, EtOH abuse, MVP and moderate MR, had seen  Cardiology in the past.  History of smoking, remote. I also have reviewed  notes from his pulmonary physician, Dr. Cari Luu with his diagnoses of acute  exacerbation of COPD, acute alcohol withdrawal.  He had a CT scan of the  chest done as of 08/27/2018. There was no evidence of pulmonary emboli. There was evidence of patchy opacities superimposed, right pleural  effusion, atelectasis, infiltrate at that time. Chest x-ray, noted to have  multiple septations and compartment, therefore prohibiting complete  aspiration of right pleural effusion. He has not had more recent chest CT  since the last one. MEDICATIONS:  His medications have been reviewed to include vancomycin,  DuoNeb, Maxipime, Pulmicort, thiamine, Paxil, Lipitor, Protonix, Tenormin,  Neurontin, and Lovenox. He has an echocardiogram with an EF of 60%, RVSP of 34 and this was from  09/07/2018. He has a hemoglobin of 11, hematocrit of 35, WBCs 9300, platelets 414,731. BUN of 11, creatinine 1.3. His oximetries currently at 93% on 2 L. Blood  pressure 117/59, heart rate of 72, respiratory rate of 20, temperature of  99. Also, regarding his smoking habits, he did smoke, he says only for  about 10-15 years and quit in 1983. He was a heavy smoker at that time. He says he quit because he was coughing too much. Also in his work  history, he is retired. 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  There is no smokers at home. He says he is not  but _____  that he lives with common law wife for 35 years, no children.     REVIEW OF SYSTEMS:  Negative otherwise. PHYSICAL EXAMINATION:  GENERAL:  We have a gentleman that appears to be somewhat younger than his  stated age. Long white hair, mustache and beard. HEENT:  Negative otherwise. HEART:  Irregular and regular. LUNGS:  Diminished. ABDOMEN:  Soft. EXTREMITIES:  No clubbing. There is no cyanosis, no edema. He also had new onset of atrial fibrillation. He had been on heparin for  this. CLINICAL IMPRESSION:  1. COPD. 2.  Hypoxemia, acute on chronic ? 3. Pleural effusion that may be complicated with septations, this might be  empyema ? or at least enough inflammatories changes to produce septations  and loculated fluid. I will order a chest CT noncontrast in view of the  renal insufficiency and with intentions to further clarify the right lung  pleural effusion. This patient may need a VATS and chest tube, etc.  I  will follow closely and the patient will probably need oxygen continuously  at least for the time being. He prior had underlying COPD, not exacerbated  with the restrictive process produced by loculated fluids, etc.    Thank you kindly for opportunity of consulting your patient.         Jarrett Lundberg MD    D: 09/10/2018 14:52:01       T: 09/10/2018 14:55:31     JUAN [email or web address]  Job#: [id number]     Doc#: [id number]    CC:

## 2018-09-11 LAB
ALBUMIN SERPL-MCNC: 2.6 G/DL (ref 3.5–5.2)
ALP BLD-CCNC: 192 U/L (ref 40–129)
ALT SERPL-CCNC: 9 U/L (ref 0–40)
ANION GAP SERPL CALCULATED.3IONS-SCNC: 9 MMOL/L (ref 7–16)
AST SERPL-CCNC: 10 U/L (ref 0–39)
BILIRUB SERPL-MCNC: 0.4 MG/DL (ref 0–1.2)
BLOOD CULTURE, ROUTINE: NORMAL
BUN BLDV-MCNC: 9 MG/DL (ref 8–23)
CALCIUM SERPL-MCNC: 8.2 MG/DL (ref 8.6–10.2)
CHLORIDE BLD-SCNC: 107 MMOL/L (ref 98–107)
CO2: 24 MMOL/L (ref 22–29)
CREAT SERPL-MCNC: 1.3 MG/DL (ref 0.7–1.2)
CULTURE, BLOOD 2: NORMAL
GFR AFRICAN AMERICAN: >60
GFR NON-AFRICAN AMERICAN: 55 ML/MIN/1.73
GLUCOSE BLD-MCNC: 97 MG/DL (ref 74–109)
POTASSIUM SERPL-SCNC: 3.9 MMOL/L (ref 3.5–5)
SODIUM BLD-SCNC: 140 MMOL/L (ref 132–146)
TOTAL PROTEIN: 6.1 G/DL (ref 6.4–8.3)

## 2018-09-11 PROCEDURE — 36415 COLL VENOUS BLD VENIPUNCTURE: CPT

## 2018-09-11 PROCEDURE — 80053 COMPREHEN METABOLIC PANEL: CPT

## 2018-09-11 PROCEDURE — 2700000000 HC OXYGEN THERAPY PER DAY

## 2018-09-11 PROCEDURE — 6370000000 HC RX 637 (ALT 250 FOR IP): Performed by: INTERNAL MEDICINE

## 2018-09-11 PROCEDURE — 6370000000 HC RX 637 (ALT 250 FOR IP): Performed by: FAMILY MEDICINE

## 2018-09-11 PROCEDURE — 97110 THERAPEUTIC EXERCISES: CPT

## 2018-09-11 PROCEDURE — 6360000002 HC RX W HCPCS: Performed by: INTERNAL MEDICINE

## 2018-09-11 PROCEDURE — P9046 ALBUMIN (HUMAN), 25%, 20 ML: HCPCS | Performed by: FAMILY MEDICINE

## 2018-09-11 PROCEDURE — 2060000000 HC ICU INTERMEDIATE R&B

## 2018-09-11 PROCEDURE — 97535 SELF CARE MNGMENT TRAINING: CPT

## 2018-09-11 PROCEDURE — 97116 GAIT TRAINING THERAPY: CPT

## 2018-09-11 PROCEDURE — 97530 THERAPEUTIC ACTIVITIES: CPT

## 2018-09-11 PROCEDURE — 2580000003 HC RX 258: Performed by: INTERNAL MEDICINE

## 2018-09-11 PROCEDURE — 6360000002 HC RX W HCPCS: Performed by: FAMILY MEDICINE

## 2018-09-11 PROCEDURE — 94762 N-INVAS EAR/PLS OXIMTRY CONT: CPT

## 2018-09-11 PROCEDURE — 94660 CPAP INITIATION&MGMT: CPT

## 2018-09-11 PROCEDURE — 94640 AIRWAY INHALATION TREATMENT: CPT

## 2018-09-11 RX ORDER — ALBUMIN (HUMAN) 12.5 G/50ML
50 SOLUTION INTRAVENOUS EVERY 8 HOURS
Status: COMPLETED | OUTPATIENT
Start: 2018-09-11 | End: 2018-09-11

## 2018-09-11 RX ADMIN — ATORVASTATIN CALCIUM 40 MG: 40 TABLET, FILM COATED ORAL at 08:48

## 2018-09-11 RX ADMIN — GABAPENTIN 600 MG: 300 CAPSULE ORAL at 14:42

## 2018-09-11 RX ADMIN — ATENOLOL 50 MG: 50 TABLET ORAL at 21:29

## 2018-09-11 RX ADMIN — ASPIRIN 81 MG: 81 TABLET, COATED ORAL at 08:47

## 2018-09-11 RX ADMIN — ALBUMIN (HUMAN) 50 G: 0.25 INJECTION, SOLUTION INTRAVENOUS at 08:53

## 2018-09-11 RX ADMIN — PAROXETINE HYDROCHLORIDE 30 MG: 20 TABLET, FILM COATED ORAL at 08:48

## 2018-09-11 RX ADMIN — GABAPENTIN 600 MG: 300 CAPSULE ORAL at 08:49

## 2018-09-11 RX ADMIN — Medication 1 G: at 23:56

## 2018-09-11 RX ADMIN — IPRATROPIUM BROMIDE AND ALBUTEROL SULFATE 1 AMPULE: .5; 3 SOLUTION RESPIRATORY (INHALATION) at 14:08

## 2018-09-11 RX ADMIN — ALBUMIN (HUMAN) 50 G: 0.25 INJECTION, SOLUTION INTRAVENOUS at 17:28

## 2018-09-11 RX ADMIN — IPRATROPIUM BROMIDE AND ALBUTEROL SULFATE 1 AMPULE: .5; 3 SOLUTION RESPIRATORY (INHALATION) at 09:52

## 2018-09-11 RX ADMIN — SODIUM CHLORIDE: 9 INJECTION, SOLUTION INTRAVENOUS at 06:00

## 2018-09-11 RX ADMIN — Medication 1 G: at 00:34

## 2018-09-11 RX ADMIN — IPRATROPIUM BROMIDE AND ALBUTEROL SULFATE 1 AMPULE: .5; 3 SOLUTION RESPIRATORY (INHALATION) at 02:27

## 2018-09-11 RX ADMIN — BUDESONIDE 500 MCG: 0.5 INHALANT RESPIRATORY (INHALATION) at 18:00

## 2018-09-11 RX ADMIN — VANCOMYCIN HYDROCHLORIDE 1500 MG: 10 INJECTION, POWDER, LYOPHILIZED, FOR SOLUTION INTRAVENOUS at 12:46

## 2018-09-11 RX ADMIN — IPRATROPIUM BROMIDE AND ALBUTEROL SULFATE 1 AMPULE: .5; 3 SOLUTION RESPIRATORY (INHALATION) at 22:12

## 2018-09-11 RX ADMIN — ATENOLOL 50 MG: 50 TABLET ORAL at 08:48

## 2018-09-11 RX ADMIN — ENOXAPARIN SODIUM 40 MG: 40 INJECTION SUBCUTANEOUS at 08:50

## 2018-09-11 RX ADMIN — ALBUMIN (HUMAN) 50 G: 0.25 INJECTION, SOLUTION INTRAVENOUS at 23:57

## 2018-09-11 RX ADMIN — MELATONIN TAB 3 MG 3 MG: 3 TAB at 21:36

## 2018-09-11 RX ADMIN — Medication 1 G: at 11:53

## 2018-09-11 RX ADMIN — IPRATROPIUM BROMIDE AND ALBUTEROL SULFATE 1 AMPULE: .5; 3 SOLUTION RESPIRATORY (INHALATION) at 18:00

## 2018-09-11 RX ADMIN — GABAPENTIN 600 MG: 300 CAPSULE ORAL at 21:29

## 2018-09-11 RX ADMIN — IPRATROPIUM BROMIDE AND ALBUTEROL SULFATE 1 AMPULE: .5; 3 SOLUTION RESPIRATORY (INHALATION) at 05:56

## 2018-09-11 RX ADMIN — PANTOPRAZOLE SODIUM 40 MG: 40 TABLET, DELAYED RELEASE ORAL at 06:00

## 2018-09-11 RX ADMIN — Medication 100 MG: at 08:49

## 2018-09-11 RX ADMIN — BUDESONIDE 500 MCG: 0.5 INHALANT RESPIRATORY (INHALATION) at 05:56

## 2018-09-11 RX ADMIN — ACETAMINOPHEN 650 MG: 325 TABLET, FILM COATED ORAL at 21:36

## 2018-09-11 ASSESSMENT — PAIN DESCRIPTION - FREQUENCY: FREQUENCY: INTERMITTENT

## 2018-09-11 ASSESSMENT — PAIN DESCRIPTION - PAIN TYPE: TYPE: ACUTE PAIN

## 2018-09-11 ASSESSMENT — PAIN DESCRIPTION - LOCATION: LOCATION: HEAD

## 2018-09-11 ASSESSMENT — PAIN DESCRIPTION - ONSET: ONSET: GRADUAL

## 2018-09-11 ASSESSMENT — PAIN DESCRIPTION - ORIENTATION: ORIENTATION: MID

## 2018-09-11 ASSESSMENT — PAIN SCALES - GENERAL
PAINLEVEL_OUTOF10: 0
PAINLEVEL_OUTOF10: 3

## 2018-09-11 ASSESSMENT — PAIN DESCRIPTION - DESCRIPTORS: DESCRIPTORS: ACHING;DISCOMFORT

## 2018-09-11 NOTE — CONSULTS
Nahun Davis is a 70 y.o. male with the following history:    Past Medical History:   Diagnosis Date    Anxiety     Depression     GERD (gastroesophageal reflux disease)     Heart palpitations     HTN (hypertension) 10/21/2010    Hyperlipidemia     Lightheadedness     OA (osteoarthritis)     Parathyroid adenoma     s/p surgery     Stenosis of right internal carotid artery with cerebral infarction [facility])    consult requested for rt pleural effusion    Past Surgical History:   Procedure Laterality Date    BACK SURGERY  2016    revision cervical laminectomy    CAROTID ENDARTERECTOMY Right 2014    Delatore - bovine patch     CERVICAL FUSION  2016    C4-C5, C5-C6, C6-C7 ACF, C5-C6 Corpectomy    ENDOSCOPY, COLON, DIAGNOSTIC      EYE SURGERY      lasex    FRACTURE SURGERY Left     plate in wrist    HAND SURGERY      PARATHYROIDECTOMY  2005    TONSILLECTOMY      UPPER GASTROINTESTINAL ENDOSCOPY  2014       Family History   Problem Relation Age of Onset    Thyroid Cancer Sister     Cancer Sister     Stroke Mother     Diabetes Mother     Stroke Father     Other Brother          in surgery cause unknown    Heart Disease Maternal Grandmother     Arthritis Maternal Grandmother     Heart Disease Maternal Grandfather     Cancer Paternal Grandmother     Other Paternal Grandfather         unknown cause of death       Prior to Admission medications    Medication Sig Start Date End Date Taking?  Authorizing Provider   atorvastatin (LIPITOR) 40 MG tablet Take 1 tablet by mouth daily 18  Yes Jean Stoner DO   umeclidinium-vilanterol (ANORO ELLIPTA) 62.5-25 MCG/INH AEPB inhaler Inhale 1 puff into the lungs daily 18  Yes Florencio Matos MD   albuterol sulfate HFA (VENTOLIN HFA) 108 (90 Base) MCG/ACT inhaler Inhale 2 puffs into the lungs every 6 hours as needed for Wheezing 18  Yes Florencio Matos MD   b complex vitamins capsule Take 1 capsule by mouth every morning    Yes Historical Provider, MD   PARoxetine (PAXIL) 30 MG tablet TAKE ONE TABLET BY MOUTH EVERY DAY IN THE MORNING  Patient taking differently: Take 30 mg by mouth every morning TAKE ONE TABLET BY MOUTH EVERY DAY IN THE MORNING 10/25/17  Yes Esther Wesley MD   gabapentin (NEURONTIN) 600 MG tablet Take 1 tablet by mouth 3 times daily 10/25/17  Yes Yeni Lopez MD   atenolol (TENORMIN) 50 MG tablet Take 1 tablet by mouth 2 times daily 10/25/17 10/25/18 Yes Yeni Lopez MD   amLODIPine (NORVASC) 10 MG tablet Take 1 tablet by mouth daily 10/25/17  Yes Yeni Lopez MD   hydrALAZINE (APRESOLINE) 25 MG tablet Take 1 tablet by mouth 2 times daily 10/25/17  Yes Yeni Lopez MD   aspirin 81 MG tablet Take 81 mg by mouth every morning    Yes Historical Provider, MD   acetaminophen (TYLENOL) 325 MG tablet Take 650 mg by mouth every 6 hours as needed for Pain   Yes Historical Provider, MD         Flexeril [cyclobenzaprine]; Lisinopril; Influenza virus vaccine; Metformin and related; and Zocor [simvastatin]    Social History   Substance Use Topics    Smoking status: Former Smoker     Packs/day: 3.00     Years: 10.00     Types: Cigarettes     Quit date: 9/17/1983    Smokeless tobacco: Never Used    Alcohol use 6.0 oz/week     5 Cans of beer, 5 Standard drinks or equivalent per week      Comment: 5 mixed drinks daily            Physical Exam:  Vitals:    09/11/18 1408   BP:    Pulse:    Resp: 20   Temp:    SpO2: 90%      Skin:  Warm and dry. No rash or bruises  HEENT:  PERRLA, EOMI  Neck:  No JVD, No thyromegaly, No carotid bruit  Cardiac:  RRR, No gallop or murmur  Lungs: decreased rt bs  Abdomen: Normal bowel sounds, no HSM, non-tender.   No pulsatile masses  Extremities:  No clubbing, edema or cyanosis,  Pulses 1+ bilaterally  Neurological:  Moves all extremities, normal DTR    Reading location:  200       CLINICAL STATEMENT: Shortness of breath.       TECHNIQUE:       Axial CT images through the thorax were obtained without the use of   contrast. Coronal and sagittal reconstructed images were generated   from the dataset acquired in axial imaging. Automated dose exposure   control was utilized for this examination.       COMPARISON: August 27, 2018 CTA.       FINDINGS:       On today's examination, there has been a significant increase in the   right pleural effusion. The pleural effusion is relatively large at   this time. There is no significant left pleural effusion.       There is underlying compression atelectasis of the right lower lobe   probably caused by the large volume of pleural fluid. Air bronchograms   are seen in the right lower lobe.       There is no pericardial effusion.       In the mediastinum, no contrast was given making evaluation of the   mediastinum somewhat difficult but no new adenopathy is appreciated   when compared with the study of August 27, 2018.       The central airways appear patent.       Lung windows reveal there is minimal atelectatic change in the left   base and bands of infiltrate or atelectasis in the right upper lobe. Additionally, high-grade collapse of the right lower lobe is seen.           Impression   1. Since the prior CT study of August 27, 2018, there has been a   marked interval increase in the right pleural effusion. 2. There is now near total collapse of the right lower lobe. 3. A dense infiltrate is seen in the right upper lobe. 4. Only minimal atelectatic changes are seen in the left lung.    5. I see no abnormality in the central airways.           Assessment/Plan:  Rt pleural effusion s/p thoracentesis  Awaiting cytology  Will follow

## 2018-09-11 NOTE — PROGRESS NOTES
Pulmonary (Dr. Siri Winston)    9/11/2018 8:24 AM  Subjective:   Admit Date: 9/6/2018  PCP: Jacob Farr MD  Interval History: data reviewed . No new complaints. Medications and notes reviewed. Did not use BiPAP last night, caused pain in neck, jaw, etc.       Data:   Scheduled Meds:   albumin human  50 g Intravenous Q8H    vancomycin  1,500 mg Intravenous Q24H    ipratropium-albuterol  1 ampule Inhalation Q4H    cefepime  1 g Intravenous Q12H    budesonide  500 mcg Nebulization BID    vitamin B-1  100 mg Oral Daily    aspirin  81 mg Oral QAM    PARoxetine  30 mg Oral QAM    atenolol  50 mg Oral BID    atorvastatin  40 mg Oral Daily    enoxaparin  40 mg Subcutaneous Daily    pantoprazole  40 mg Oral QAM AC    gabapentin  600 mg Oral TID     Continuous Infusions:   sodium chloride 75 mL/hr at 09/11/18 0600     PRN Meds:LORazepam, acetaminophen, prochlorperazine, melatonin    I/O this shift:  In: 360 [P.O.:360]  Out: 200 [Urine:200]    Intake/Output Summary (Last 24 hours) at 09/11/18 0824  Last data filed at 09/11/18 0753   Gross per 24 hour   Intake           3042.5 ml   Output             3550 ml   Net           -507.5 ml     -----------------------------------------------------------------  RAD: Ct Cervical Spine Wo Contrast    Result Date: 9/6/2018  Location: 200 Indication: Neck pain, evaluate for pseudoarthrosis. Comparison: MRI cervical spine from 8/27/2018; CT cervical spine from 12/20/2016; CTA chest from 8/27/2018 Technique: Multidetector CT imaging of the cervical spine was performed without administration of intravenous contrast. Coronal and sagittal reformatted images were obtained. Automated dose exposure control was used for this exam. FINDINGS: Redemonstrated are postsurgical changes from prior anterior fusion from C3 to T1 with an anterior compression plate and screws within the C3 and T1 vertebral bodies.  There is a fibular strut graft traversing from C4 to T1 with corpectomies at C5 and C6. The hardware is intact. There is solid osseous incorporation of the proximal strut graft at C3-C4. There is suggestion of partial osseous incorporation of the distal strut graft at the level of T1. A portion of the C7-T1 disc space remains evident. There is no evidence of acute fracture. There is no prevertebral soft tissue swelling. C2-C3: Mild disc osteophyte complex. Bilateral facet hypertrophy, right greater than left. No osseous encroachment of the central canal or neuroforamina. C3-C4: Postsurgical changes from prior anterior fusion. Bilateral uncovertebral and facet hypertrophy. Moderate right and mild left foraminal narrowing. C4-C5: Postsurgical changes from prior anterior fusion. Bilateral uncovertebral and facet hypertrophy. Mild right foraminal narrowing. No left foraminal narrowing. C5-C6: Postsurgical changes from prior anterior fusion and corpectomy. No osseous encroachment of the neuroforamina. C6-C7: Postsurgical changes from prior anterior fusion. No osseous encroachment of the neuroforamina. C7-T1: Postsurgical changes from prior anterior fusion. Mild bilateral facet hypertrophy. No osseous encroachment of the neuroforamen. T1-T2: Minimal disc osteophyte complex. No osseous encroachment of the central canal or neuroforamina. Visualized portion of the lung demonstrates a large right pleural effusion, increased in size compared to the previous CT from 8/27/2018. There is moderate left sphenoid sinus mucosal thickening. Mastoid air cells are well aerated. Visualized portion of the brain demonstrates no significant mass effect. 1. Postsurgical changes from prior anterior fusion with an anterior compression plate and screws traversing from C3 to T1 and a fibular strut graft traversing from C4 to T1.  There is solid osseous incorporation of the proximal strut graft at C3-C4 and suggestion of partial osseous incorporation of the distal strut graft at the level of T1. 2. Moderate right and mild left foraminal narrowing at C3-C4. 3. Mild right foraminal narrowing at C4-C5. 4. Large right pleural effusion, increased in size compared to the previous CT from 8/27/2018. The patient was short of breath and instructed to go to the emergency department after the completion of this exam.     Xr Chest Portable    Result Date: 9/7/2018  Reading location: 200 Indication: Shortness of breath, dyspnea. Comparison: Chest radiograph from 9/6/2018 at 1706 hours. Technique: Portable AP upright chest radiograph was obtained. Findings: The cardiomediastinal silhouette is stable in size and contours. There is a persistent moderate-sized right pleural effusion with right basilar airspace opacity. The left lung and costophrenic angle are clear. There is no evidence of pneumothorax. Persistent moderate size right pleural effusion with right basilar airspace disease. Xr Chest Portable    Result Date: 9/6/2018  Location:100 Exam: XR CHEST PORTABLE Indications: Shortness of breath, former smoker. No history of lung or heart surgery Findings: A portable AP erect view of the chest was obtained and compared to the previous exam of 4/18/2018. There is again evidence of previous surgery of the lower cervical spine and the upper thoracic spine, with the superior extent of the spinal fixation hardware not included on this study. There is opacification of the inferior two thirds of the right hemithorax, new since the previous exam, with soft tissue density adjacent to the lateral aspect of the right upper lung. Left lung is clear, without pleural effusion or focal infiltrate. No pneumothorax is identified bilaterally. The cardiac silhouette is partially obscured by the right-sided density, but does not appear enlarged, as visualized. No midline shift of the trachea is seen.      Opacification of inferior two thirds of right hemithorax which is most likely on the basis of pleural effusion and/or lung masses,  No organomegaly  Extremities: extremities normal, atraumatic, no cyanosis or edema      Assessment:   1. COPD. 2.  Hypoxemia, acute on chronic ? 3. Pleural effusion that may be complicated with septations, this might be empyema ? Lung cancer? 4. S/p thoracentesis of right pl effusion, exudate, 600 mL, cytology pending. Plan:   1.  Effusion has worsened significantly since last CT of the chest, will need to have CT insertion, he is at great risk for lung cancer      Barbie Abdullahi MD

## 2018-09-11 NOTE — PROGRESS NOTES
Occupational Therapy  O.T. Bedside Treatment Note    Date:2018  Patient Name: Guero Lackey  MRN: [id number]  : 1950  ROOM #: [id number]    Problem list / Diagnosis:   Patient Active Problem List   Diagnosis    HTN (hypertension)    Panic disorder    OA (osteoarthritis of spine)    Hyperlipidemia    Chronic kidney disease (CKD)    Mass of kidney    GERD (gastroesophageal reflux disease)    Neuropathy    Stenosis of right internal carotid artery with cerebral infarction ([facility] [address]    Osteophyte of vertebrae    Spinal stenosis in cervical region    Cervical spine instability    Hypoxia    Pleural effusion    Respiratory failure ([address]     Past Medical History:   Past Medical History:   Diagnosis Date    Anxiety     Depression     GERD (gastroesophageal reflux disease)     Heart palpitations     HTN (hypertension) 10/21/2010    Hyperlipidemia     Lightheadedness     OA (osteoarthritis)     Parathyroid adenoma     s/p surgery     Stenosis of right internal carotid artery with cerebral infarction [facility])      Onset/Medical history: medical history reviewed  Past medical history: reviewed    The admitting diagnosis and active problem list as listed above have been reviewed prior to treatment. Nursing cleared the patient for treatment. Patient is agreeable to treatment.     Placement recommendation: HHOT vs subacute depending on family ability to  provide  supervision /assist    Equipment prescriptions needed:  TBD   AM-PAC Daily Activity Inpatient   How much help for putting on and taking off regular lower body clothing?: A Lot  How much help for Bathing?: A Lot  How much help for Toileting?: A Little  How much help for putting on and taking off regular upper body clothing?: A Little  How much help for taking care of personal grooming?: A Little  How much help for eating meals?: None  AM-PAC Inpatient Daily Activity Raw Score:   AM-PAC Inpatient ADL T-Scale Score : 37.26  ADL

## 2018-09-11 NOTE — PROGRESS NOTES
Patient refused BiPap tonight due to pain jaw pain he had sustained. He stated his jaw was feeling better, but he did not want to wear it and reagrivate it.

## 2018-09-11 NOTE — PROGRESS NOTES
.   Steps:  na  Treatment: Pt. amb in garsia as above. Pt performed exercises seated at eob: ankle pumps, long arc quad, calf raises x 10 reps. Comment: Cont. with with OT. Comment: Call light left by patient. A: Pt motivated to participate, increased amb distance. P: Continue with physical therapy   Kaden Walsh PTA      GOALS to be met in 3 days. Bed mobility-  Independent                                         Transfers-Sit to stand-Independent                Gait:  Patient to ambulate 2 x 50 feet using no device with Independent                Steps: Patient to go up and down 10  step(s) using 1 rail(s) with  Supervision       Increase strength in affected mm groups by 1/3 grade  Increase balance to allow for improvement towards functional goals. Increase endurance to allow for improvement towards functional goals.

## 2018-09-11 NOTE — PLAN OF CARE
Problem: Falls - Risk of:  Goal: Will remain free from falls  Will remain free from falls   Outcome: Ongoing    Goal: Absence of physical injury  Absence of physical injury   Outcome: Ongoing      Problem: Breathing Pattern - Ineffective:  Goal: Ability to achieve and maintain a regular respiratory rate will improve  Ability to achieve and maintain a regular respiratory rate will improve   Outcome: Ongoing      Problem: Pain:  Goal: Pain level will decrease  Pain level will decrease   Outcome: Ongoing    Goal: Control of acute pain  Control of acute pain   Outcome: Ongoing    Goal: Control of chronic pain  Control of chronic pain   Outcome: Ongoing

## 2018-09-12 ENCOUNTER — APPOINTMENT (OUTPATIENT)
Dept: GENERAL RADIOLOGY | Age: 68
DRG: 186 | End: 2018-09-12
Payer: MEDICARE

## 2018-09-12 LAB
ANION GAP SERPL CALCULATED.3IONS-SCNC: 9 MMOL/L (ref 7–16)
B.E.: 0.4 MMOL/L (ref -3–3)
B.E.: 0.4 MMOL/L (ref -3–3)
BASOPHILS ABSOLUTE: 0.06 E9/L (ref 0–0.2)
BASOPHILS ABSOLUTE: 0.06 E9/L (ref 0–0.2)
BASOPHILS RELATIVE PERCENT: 0.5 % (ref 0–2)
BASOPHILS RELATIVE PERCENT: 0.8 % (ref 0–2)
BUN BLDV-MCNC: 6 MG/DL (ref 8–23)
CALCIUM SERPL-MCNC: 8.6 MG/DL (ref 8.6–10.2)
CHLORIDE BLD-SCNC: 102 MMOL/L (ref 98–107)
CO2: 28 MMOL/L (ref 22–29)
COHB: 0.6 % (ref 0–1.5)
COHB: 0.6 % (ref 0–1.5)
COMMENT: ABNORMAL
COMMENT: ABNORMAL
CREAT SERPL-MCNC: 1.2 MG/DL (ref 0.7–1.2)
CRITICAL: ABNORMAL
CRITICAL: ABNORMAL
DATE ANALYZED: ABNORMAL
DATE ANALYZED: ABNORMAL
DATE OF COLLECTION: ABNORMAL
DATE OF COLLECTION: ABNORMAL
EOSINOPHILS ABSOLUTE: 0.49 E9/L (ref 0.05–0.5)
EOSINOPHILS ABSOLUTE: 0.5 E9/L (ref 0.05–0.5)
EOSINOPHILS RELATIVE PERCENT: 4.4 % (ref 0–6)
EOSINOPHILS RELATIVE PERCENT: 6.4 % (ref 0–6)
GFR AFRICAN AMERICAN: >60
GFR NON-AFRICAN AMERICAN: 60 ML/MIN/1.73
GLUCOSE BLD-MCNC: 167 MG/DL (ref 74–109)
HCO3: 24.7 MMOL/L (ref 22–26)
HCO3: 24.7 MMOL/L (ref 22–26)
HCT VFR BLD CALC: 30.4 % (ref 37–54)
HCT VFR BLD CALC: 33.3 % (ref 37–54)
HEMOGLOBIN: 10.8 G/DL (ref 12.5–16.5)
HEMOGLOBIN: 9.7 G/DL (ref 12.5–16.5)
HHB: 7.7 % (ref 0–5)
HHB: 7.7 % (ref 0–5)
IMMATURE GRANULOCYTES #: 0.02 E9/L
IMMATURE GRANULOCYTES #: 0.06 E9/L
IMMATURE GRANULOCYTES %: 0.3 % (ref 0–5)
IMMATURE GRANULOCYTES %: 0.5 % (ref 0–5)
LAB: ABNORMAL
LAB: ABNORMAL
LYMPHOCYTES ABSOLUTE: 0.61 E9/L (ref 1.5–4)
LYMPHOCYTES ABSOLUTE: 0.84 E9/L (ref 1.5–4)
LYMPHOCYTES RELATIVE PERCENT: 7.4 % (ref 20–42)
LYMPHOCYTES RELATIVE PERCENT: 8 % (ref 20–42)
Lab: ABNORMAL
Lab: ABNORMAL
MAGNESIUM: 2 MG/DL (ref 1.6–2.6)
MCH RBC QN AUTO: 30.5 PG (ref 26–35)
MCH RBC QN AUTO: 30.5 PG (ref 26–35)
MCHC RBC AUTO-ENTMCNC: 31.9 % (ref 32–34.5)
MCHC RBC AUTO-ENTMCNC: 32.4 % (ref 32–34.5)
MCV RBC AUTO: 94.1 FL (ref 80–99.9)
MCV RBC AUTO: 95.6 FL (ref 80–99.9)
METHB: 0.1 % (ref 0–1.5)
METHB: 0.1 % (ref 0–1.5)
MODE: ABNORMAL
MONOCYTES ABSOLUTE: 0.63 E9/L (ref 0.1–0.95)
MONOCYTES ABSOLUTE: 0.83 E9/L (ref 0.1–0.95)
MONOCYTES RELATIVE PERCENT: 7.3 % (ref 2–12)
MONOCYTES RELATIVE PERCENT: 8.2 % (ref 2–12)
NEUTROPHILS ABSOLUTE: 5.83 E9/L (ref 1.8–7.3)
NEUTROPHILS ABSOLUTE: 9.02 E9/L (ref 1.8–7.3)
NEUTROPHILS RELATIVE PERCENT: 76.3 % (ref 43–80)
NEUTROPHILS RELATIVE PERCENT: 79.9 % (ref 43–80)
O2 CONTENT: 15 ML/DL
O2 CONTENT: 15 ML/DL
O2 SATURATION: 92.2 % (ref 92–98.5)
O2 SATURATION: 92.2 % (ref 92–98.5)
O2HB: 91.6 % (ref 94–97)
O2HB: 91.6 % (ref 94–97)
OPERATOR ID: 377
OPERATOR ID: 377
PATIENT TEMP: 37 C
PATIENT TEMP: 37 C
PCO2: 38.9 MMHG (ref 35–45)
PCO2: 38.9 MMHG (ref 35–45)
PDW BLD-RTO: 12.9 FL (ref 11.5–15)
PDW BLD-RTO: 12.9 FL (ref 11.5–15)
PH BLOOD GAS: 7.42 (ref 7.35–7.45)
PH BLOOD GAS: 7.42 (ref 7.35–7.45)
PHOSPHORUS: 2.4 MG/DL (ref 2.5–4.5)
PLATELET # BLD: 266 E9/L (ref 130–450)
PLATELET # BLD: 310 E9/L (ref 130–450)
PMV BLD AUTO: 10.4 FL (ref 7–12)
PMV BLD AUTO: 9.9 FL (ref 7–12)
PO2: 64.5 MMHG (ref 60–100)
PO2: 64.5 MMHG (ref 60–100)
POTASSIUM SERPL-SCNC: 3.7 MMOL/L (ref 3.5–5)
RBC # BLD: 3.18 E12/L (ref 3.8–5.8)
RBC # BLD: 3.54 E12/L (ref 3.8–5.8)
SODIUM BLD-SCNC: 139 MMOL/L (ref 132–146)
SOURCE, BLOOD GAS: ABNORMAL
SOURCE, BLOOD GAS: ABNORMAL
THB: 11.6 G/DL (ref 11.5–16.5)
THB: 11.6 G/DL (ref 11.5–16.5)
TIME ANALYZED: 2057
TIME ANALYZED: 2057
VANCOMYCIN TROUGH: 14.2 MCG/ML (ref 5–16)
WBC # BLD: 11.3 E9/L (ref 4.5–11.5)
WBC # BLD: 7.6 E9/L (ref 4.5–11.5)

## 2018-09-12 PROCEDURE — 2580000003 HC RX 258: Performed by: INTERNAL MEDICINE

## 2018-09-12 PROCEDURE — 2060000000 HC ICU INTERMEDIATE R&B

## 2018-09-12 PROCEDURE — 82805 BLOOD GASES W/O2 SATURATION: CPT

## 2018-09-12 PROCEDURE — 6360000002 HC RX W HCPCS: Performed by: INTERNAL MEDICINE

## 2018-09-12 PROCEDURE — 71045 X-RAY EXAM CHEST 1 VIEW: CPT

## 2018-09-12 PROCEDURE — 6370000000 HC RX 637 (ALT 250 FOR IP): Performed by: FAMILY MEDICINE

## 2018-09-12 PROCEDURE — 6370000000 HC RX 637 (ALT 250 FOR IP): Performed by: INTERNAL MEDICINE

## 2018-09-12 PROCEDURE — 94640 AIRWAY INHALATION TREATMENT: CPT

## 2018-09-12 PROCEDURE — 80048 BASIC METABOLIC PNL TOTAL CA: CPT

## 2018-09-12 PROCEDURE — 36415 COLL VENOUS BLD VENIPUNCTURE: CPT

## 2018-09-12 PROCEDURE — 80202 ASSAY OF VANCOMYCIN: CPT

## 2018-09-12 PROCEDURE — 94660 CPAP INITIATION&MGMT: CPT

## 2018-09-12 PROCEDURE — 85025 COMPLETE CBC W/AUTO DIFF WBC: CPT

## 2018-09-12 PROCEDURE — 84100 ASSAY OF PHOSPHORUS: CPT

## 2018-09-12 PROCEDURE — 6360000002 HC RX W HCPCS: Performed by: FAMILY MEDICINE

## 2018-09-12 PROCEDURE — 83735 ASSAY OF MAGNESIUM: CPT

## 2018-09-12 PROCEDURE — 2700000000 HC OXYGEN THERAPY PER DAY

## 2018-09-12 PROCEDURE — 36600 WITHDRAWAL OF ARTERIAL BLOOD: CPT

## 2018-09-12 RX ORDER — GUAIFENESIN 600 MG/1
600 TABLET, EXTENDED RELEASE ORAL 2 TIMES DAILY
Status: DISCONTINUED | OUTPATIENT
Start: 2018-09-12 | End: 2018-09-13 | Stop reason: HOSPADM

## 2018-09-12 RX ORDER — LORAZEPAM 2 MG/ML
0.5 INJECTION INTRAMUSCULAR
Status: DISCONTINUED | OUTPATIENT
Start: 2018-09-12 | End: 2018-09-13 | Stop reason: HOSPADM

## 2018-09-12 RX ORDER — IPRATROPIUM BROMIDE AND ALBUTEROL SULFATE 2.5; .5 MG/3ML; MG/3ML
1 SOLUTION RESPIRATORY (INHALATION) EVERY 4 HOURS
Status: DISCONTINUED | OUTPATIENT
Start: 2018-09-13 | End: 2018-09-13 | Stop reason: HOSPADM

## 2018-09-12 RX ORDER — IPRATROPIUM BROMIDE AND ALBUTEROL SULFATE 2.5; .5 MG/3ML; MG/3ML
1 SOLUTION RESPIRATORY (INHALATION)
Status: DISCONTINUED | OUTPATIENT
Start: 2018-09-12 | End: 2018-09-12

## 2018-09-12 RX ADMIN — IPRATROPIUM BROMIDE AND ALBUTEROL SULFATE 1 AMPULE: .5; 3 SOLUTION RESPIRATORY (INHALATION) at 13:26

## 2018-09-12 RX ADMIN — GABAPENTIN 600 MG: 300 CAPSULE ORAL at 21:27

## 2018-09-12 RX ADMIN — MELATONIN TAB 3 MG 3 MG: 3 TAB at 21:27

## 2018-09-12 RX ADMIN — IPRATROPIUM BROMIDE AND ALBUTEROL SULFATE 1 AMPULE: .5; 3 SOLUTION RESPIRATORY (INHALATION) at 17:04

## 2018-09-12 RX ADMIN — VANCOMYCIN HYDROCHLORIDE 1500 MG: 10 INJECTION, POWDER, LYOPHILIZED, FOR SOLUTION INTRAVENOUS at 15:57

## 2018-09-12 RX ADMIN — ENOXAPARIN SODIUM 40 MG: 40 INJECTION SUBCUTANEOUS at 12:04

## 2018-09-12 RX ADMIN — GUAIFENESIN 600 MG: 600 TABLET, EXTENDED RELEASE ORAL at 21:27

## 2018-09-12 RX ADMIN — GABAPENTIN 600 MG: 300 CAPSULE ORAL at 14:10

## 2018-09-12 RX ADMIN — IPRATROPIUM BROMIDE AND ALBUTEROL SULFATE 1 AMPULE: .5; 3 SOLUTION RESPIRATORY (INHALATION) at 09:27

## 2018-09-12 RX ADMIN — LORAZEPAM 0.5 MG: 2 INJECTION INTRAMUSCULAR; INTRAVENOUS at 12:04

## 2018-09-12 RX ADMIN — IPRATROPIUM BROMIDE AND ALBUTEROL SULFATE 1 AMPULE: .5; 3 SOLUTION RESPIRATORY (INHALATION) at 05:05

## 2018-09-12 RX ADMIN — PANTOPRAZOLE SODIUM 40 MG: 40 TABLET, DELAYED RELEASE ORAL at 06:31

## 2018-09-12 RX ADMIN — BUDESONIDE 500 MCG: 0.5 INHALANT RESPIRATORY (INHALATION) at 05:05

## 2018-09-12 RX ADMIN — ACETAMINOPHEN 650 MG: 325 TABLET, FILM COATED ORAL at 21:27

## 2018-09-12 RX ADMIN — GUAIFENESIN 600 MG: 600 TABLET, EXTENDED RELEASE ORAL at 14:10

## 2018-09-12 RX ADMIN — BUDESONIDE 500 MCG: 0.5 INHALANT RESPIRATORY (INHALATION) at 17:04

## 2018-09-12 RX ADMIN — IPRATROPIUM BROMIDE AND ALBUTEROL SULFATE 1 AMPULE: .5; 3 SOLUTION RESPIRATORY (INHALATION) at 02:13

## 2018-09-12 RX ADMIN — ACETAMINOPHEN 650 MG: 325 TABLET, FILM COATED ORAL at 14:20

## 2018-09-12 RX ADMIN — ATENOLOL 50 MG: 50 TABLET ORAL at 21:27

## 2018-09-12 RX ADMIN — ACETAMINOPHEN 650 MG: 325 TABLET, FILM COATED ORAL at 02:27

## 2018-09-12 RX ADMIN — Medication 1 G: at 13:05

## 2018-09-12 RX ADMIN — IPRATROPIUM BROMIDE AND ALBUTEROL SULFATE 1 AMPULE: .5; 3 SOLUTION RESPIRATORY (INHALATION) at 20:51

## 2018-09-12 ASSESSMENT — PAIN SCALES - GENERAL
PAINLEVEL_OUTOF10: 0
PAINLEVEL_OUTOF10: 0
PAINLEVEL_OUTOF10: 3
PAINLEVEL_OUTOF10: 0
PAINLEVEL_OUTOF10: 0

## 2018-09-12 ASSESSMENT — PAIN DESCRIPTION - ONSET: ONSET: GRADUAL

## 2018-09-12 ASSESSMENT — PAIN DESCRIPTION - DESCRIPTORS
DESCRIPTORS: ACHING;DISCOMFORT
DESCRIPTORS: ACHING;DISCOMFORT

## 2018-09-12 ASSESSMENT — PAIN DESCRIPTION - PAIN TYPE
TYPE: ACUTE PAIN
TYPE: ACUTE PAIN

## 2018-09-12 ASSESSMENT — PAIN DESCRIPTION - FREQUENCY: FREQUENCY: INTERMITTENT

## 2018-09-12 ASSESSMENT — PAIN DESCRIPTION - ORIENTATION: ORIENTATION: MID

## 2018-09-12 ASSESSMENT — PAIN DESCRIPTION - LOCATION: LOCATION: HEAD

## 2018-09-12 NOTE — PROGRESS NOTES
Pulmonary (Dr. Idalmis Ingram)    9/12/2018 8:43 AM  Subjective:   Admit Date: 9/6/2018  PCP: Jakob Joshi MD  Interval History: data reviewed . No new complaints. Medications and notes reviewed. Did use BiPAP last night. Discussed with Dr, Jami Pallas, resident for Dr. Yogesh Pratt. Data:   Scheduled Meds:   vancomycin  1,500 mg Intravenous Q24H    ipratropium-albuterol  1 ampule Inhalation Q4H    cefepime  1 g Intravenous Q12H    budesonide  500 mcg Nebulization BID    vitamin B-1  100 mg Oral Daily    aspirin  81 mg Oral QAM    PARoxetine  30 mg Oral QAM    atenolol  50 mg Oral BID    atorvastatin  40 mg Oral Daily    enoxaparin  40 mg Subcutaneous Daily    pantoprazole  40 mg Oral QAM AC    gabapentin  600 mg Oral TID     Continuous Infusions:    PRN Meds:LORazepam, acetaminophen, prochlorperazine, melatonin    No intake/output data recorded. Intake/Output Summary (Last 24 hours) at 09/12/18 0843  Last data filed at 09/12/18 0648   Gross per 24 hour   Intake          1866.25 ml   Output             2375 ml   Net          -508.75 ml     -----------------------------------------------------------------  RAD: Ct Cervical Spine Wo Contrast    Result Date: 9/6/2018  Location: 200 Indication: Neck pain, evaluate for pseudoarthrosis. Comparison: MRI cervical spine from 8/27/2018; CT cervical spine from 12/20/2016; CTA chest from 8/27/2018 Technique: Multidetector CT imaging of the cervical spine was performed without administration of intravenous contrast. Coronal and sagittal reformatted images were obtained. Automated dose exposure control was used for this exam. FINDINGS: Redemonstrated are postsurgical changes from prior anterior fusion from C3 to T1 with an anterior compression plate and screws within the C3 and T1 vertebral bodies. There is a fibular strut graft traversing from C4 to T1 with corpectomies at C5 and C6. The hardware is intact.  There is solid osseous incorporation of the proximal strut graft at C3-C4. There is suggestion of partial osseous incorporation of the distal strut graft at the level of T1. A portion of the C7-T1 disc space remains evident. There is no evidence of acute fracture. There is no prevertebral soft tissue swelling. C2-C3: Mild disc osteophyte complex. Bilateral facet hypertrophy, right greater than left. No osseous encroachment of the central canal or neuroforamina. C3-C4: Postsurgical changes from prior anterior fusion. Bilateral uncovertebral and facet hypertrophy. Moderate right and mild left foraminal narrowing. C4-C5: Postsurgical changes from prior anterior fusion. Bilateral uncovertebral and facet hypertrophy. Mild right foraminal narrowing. No left foraminal narrowing. C5-C6: Postsurgical changes from prior anterior fusion and corpectomy. No osseous encroachment of the neuroforamina. C6-C7: Postsurgical changes from prior anterior fusion. No osseous encroachment of the neuroforamina. C7-T1: Postsurgical changes from prior anterior fusion. Mild bilateral facet hypertrophy. No osseous encroachment of the neuroforamen. T1-T2: Minimal disc osteophyte complex. No osseous encroachment of the central canal or neuroforamina. Visualized portion of the lung demonstrates a large right pleural effusion, increased in size compared to the previous CT from 8/27/2018. There is moderate left sphenoid sinus mucosal thickening. Mastoid air cells are well aerated. Visualized portion of the brain demonstrates no significant mass effect. 1. Postsurgical changes from prior anterior fusion with an anterior compression plate and screws traversing from C3 to T1 and a fibular strut graft traversing from C4 to T1. There is solid osseous incorporation of the proximal strut graft at C3-C4 and suggestion of partial osseous incorporation of the distal strut graft at the level of T1. 2. Moderate right and mild left foraminal narrowing at C3-C4. 3. Mild right foraminal narrowing at C4-C5.  4. COPD.  2.  Hypoxemia, acute on chronic ? 3. Pleural effusion that may be complicated with septations, this might be empyema ? Lung cancer? 4. S/p thoracentesis of right pl effusion, exudate, 600 mL, cytology pending. Plan:   1. Thoracic surgery eval on going  2.  Pleural fluid is neg for malignancy, although limited by scant cellularity      Aditi Llamas MD

## 2018-09-12 NOTE — PROGRESS NOTES
Less sob  Wife at bedside  Ct chest reviewed again  loculated rt pleural effusion noted on thoracentesis by IR  Therefore chest tube will not drain the effusion completely  Needs rt VATS but pt is high risk  Recommend transfer to [facility]

## 2018-09-12 NOTE — PROGRESS NOTES
Spoke with patient and RT.  Patient will allow staff to attempt to place nasal cannula and remove bipap after breathing treatment

## 2018-09-12 NOTE — PROGRESS NOTES
Physical Therapy    0622/0622-02    Patient unavailable for physical therapy treatment due to no treatment recommended at this time due to breathing difficulty.

## 2018-09-13 ENCOUNTER — HOSPITAL ENCOUNTER (OUTPATIENT)
Age: 68
Discharge: HOME | End: 2018-09-13
Payer: MEDICARE

## 2018-09-13 ENCOUNTER — HOSPITAL ENCOUNTER (INPATIENT)
Age: 68
LOS: 6 days | Discharge: HOME HEALTH | DRG: 164 | End: 2018-09-19
Attending: INTERNAL MEDICINE | Admitting: INTERNAL MEDICINE
Payer: MEDICARE

## 2018-09-13 ENCOUNTER — APPOINTMENT (OUTPATIENT)
Dept: GENERAL RADIOLOGY | Age: 68
DRG: 186 | End: 2018-09-13
Payer: MEDICARE

## 2018-09-13 VITALS
HEART RATE: 66 BPM | RESPIRATION RATE: 20 BRPM | HEIGHT: 66 IN | OXYGEN SATURATION: 98 % | DIASTOLIC BLOOD PRESSURE: 64 MMHG | WEIGHT: 169 LBS | BODY MASS INDEX: 27.16 KG/M2 | SYSTOLIC BLOOD PRESSURE: 138 MMHG | TEMPERATURE: 98.4 F

## 2018-09-13 PROBLEM — J90 RECURRENT LEFT PLEURAL EFFUSION: Status: ACTIVE | Noted: 2018-09-13

## 2018-09-13 LAB
AADO2: 271.7 MMHG
ALBUMIN SERPL-MCNC: 3.9 G/DL (ref 3.5–5.2)
ALP BLD-CCNC: 186 U/L (ref 40–129)
ALT SERPL-CCNC: 8 U/L (ref 0–40)
ANION GAP SERPL CALCULATED.3IONS-SCNC: 9 MMOL/L (ref 7–16)
AST SERPL-CCNC: 10 U/L (ref 0–39)
B.E.: 0.3 MMOL/L (ref -3–3)
BILIRUB SERPL-MCNC: 0.6 MG/DL (ref 0–1.2)
BUN BLDV-MCNC: 5 MG/DL (ref 8–23)
CALCIUM SERPL-MCNC: 8.7 MG/DL (ref 8.6–10.2)
CHLORIDE BLD-SCNC: 104 MMOL/L (ref 98–107)
CO2: 29 MMOL/L (ref 22–29)
COHB: 0.6 % (ref 0–1.5)
CREAT SERPL-MCNC: 1.2 MG/DL (ref 0.7–1.2)
CRITICAL: ABNORMAL
DATE ANALYZED: ABNORMAL
DATE OF COLLECTION: ABNORMAL
EKG ATRIAL RATE: 63 BPM
EKG P AXIS: 51 DEGREES
EKG P-R INTERVAL: 154 MS
EKG Q-T INTERVAL: 460 MS
EKG QRS DURATION: 96 MS
EKG QTC CALCULATION (BAZETT): 470 MS
EKG R AXIS: -15 DEGREES
EKG T AXIS: 28 DEGREES
EKG VENTRICULAR RATE: 63 BPM
FIO2: 60 %
GFR AFRICAN AMERICAN: >60
GFR NON-AFRICAN AMERICAN: 60 ML/MIN/1.73
GLUCOSE BLD-MCNC: 99 MG/DL (ref 74–109)
HCO3: 27.2 MMOL/L (ref 22–26)
HCT VFR BLD CALC: 33.9 % (ref 37–54)
HEMOGLOBIN: 10.8 G/DL (ref 12.5–16.5)
HHB: 5 % (ref 0–5)
LAB: ABNORMAL
Lab: ABNORMAL
METER GLUCOSE: 101 MG/DL (ref 70–110)
METHB: 0.1 % (ref 0–1.5)
MODE: ABNORMAL
O2 CONTENT: 16.4 ML/DL
O2 SATURATION: 95 % (ref 92–98.5)
O2HB: 94.3 % (ref 94–97)
OPERATOR ID: 557
PATIENT TEMP: 37 C
PCO2: 54.1 MMHG (ref 35–45)
PEEP/CPAP: 6 CMH2O
PFO2: 1.36 MMHG/%
PH BLOOD GAS: 7.32 (ref 7.35–7.45)
PHOSPHORUS: 3.2 MG/DL (ref 2.5–4.5)
PO2: 81.6 MMHG (ref 60–100)
POTASSIUM SERPL-SCNC: 3.9 MMOL/L (ref 3.5–5)
PS: 12 CMH20
RI(T): 3.33
SODIUM BLD-SCNC: 142 MMOL/L (ref 132–146)
SOURCE, BLOOD GAS: ABNORMAL
THB: 12.3 G/DL (ref 11.5–16.5)
TIME ANALYZED: 624
TOTAL PROTEIN: 6.8 G/DL (ref 6.4–8.3)

## 2018-09-13 PROCEDURE — 6360000002 HC RX W HCPCS: Performed by: INTERNAL MEDICINE

## 2018-09-13 PROCEDURE — 97530 THERAPEUTIC ACTIVITIES: CPT

## 2018-09-13 PROCEDURE — 94640 AIRWAY INHALATION TREATMENT: CPT

## 2018-09-13 PROCEDURE — 2000000000 HC ICU R&B

## 2018-09-13 PROCEDURE — 80053 COMPREHEN METABOLIC PANEL: CPT

## 2018-09-13 PROCEDURE — 87205 SMEAR GRAM STAIN: CPT

## 2018-09-13 PROCEDURE — 87798 DETECT AGENT NOS DNA AMP: CPT

## 2018-09-13 PROCEDURE — 84100 ASSAY OF PHOSPHORUS: CPT

## 2018-09-13 PROCEDURE — 82805 BLOOD GASES W/O2 SATURATION: CPT

## 2018-09-13 PROCEDURE — 6370000000 HC RX 637 (ALT 250 FOR IP): Performed by: INTERNAL MEDICINE

## 2018-09-13 PROCEDURE — 85014 HEMATOCRIT: CPT

## 2018-09-13 PROCEDURE — 87503 INFLUENZA DNA AMP PROB ADDL: CPT

## 2018-09-13 PROCEDURE — 36415 COLL VENOUS BLD VENIPUNCTURE: CPT

## 2018-09-13 PROCEDURE — 87633 RESP VIRUS 12-25 TARGETS: CPT

## 2018-09-13 PROCEDURE — 2580000003 HC RX 258: Performed by: INTERNAL MEDICINE

## 2018-09-13 PROCEDURE — 71045 X-RAY EXAM CHEST 1 VIEW: CPT

## 2018-09-13 PROCEDURE — 94660 CPAP INITIATION&MGMT: CPT

## 2018-09-13 PROCEDURE — 82962 GLUCOSE BLOOD TEST: CPT

## 2018-09-13 PROCEDURE — A0426 ALS 1: HCPCS

## 2018-09-13 PROCEDURE — 93005 ELECTROCARDIOGRAM TRACING: CPT | Performed by: INTERNAL MEDICINE

## 2018-09-13 PROCEDURE — 94664 DEMO&/EVAL PT USE INHALER: CPT

## 2018-09-13 PROCEDURE — 85018 HEMOGLOBIN: CPT

## 2018-09-13 PROCEDURE — 6370000000 HC RX 637 (ALT 250 FOR IP): Performed by: FAMILY MEDICINE

## 2018-09-13 PROCEDURE — 97535 SELF CARE MNGMENT TRAINING: CPT

## 2018-09-13 PROCEDURE — 6360000002 HC RX W HCPCS: Performed by: FAMILY MEDICINE

## 2018-09-13 PROCEDURE — 2700000000 HC OXYGEN THERAPY PER DAY

## 2018-09-13 PROCEDURE — A0425 GROUND MILEAGE: HCPCS

## 2018-09-13 PROCEDURE — 99223 1ST HOSP IP/OBS HIGH 75: CPT | Performed by: INTERNAL MEDICINE

## 2018-09-13 PROCEDURE — 87486 CHLMYD PNEUM DNA AMP PROBE: CPT

## 2018-09-13 PROCEDURE — 87040 BLOOD CULTURE FOR BACTERIA: CPT

## 2018-09-13 PROCEDURE — 87581 M.PNEUMON DNA AMP PROBE: CPT

## 2018-09-13 PROCEDURE — 87070 CULTURE OTHR SPECIMN AEROBIC: CPT

## 2018-09-13 PROCEDURE — 87502 INFLUENZA DNA AMP PROBE: CPT

## 2018-09-13 PROCEDURE — 93010 ELECTROCARDIOGRAM REPORT: CPT | Performed by: INTERNAL MEDICINE

## 2018-09-13 RX ORDER — MAGNESIUM SULFATE 1 G/100ML
1 INJECTION INTRAVENOUS ONCE
Status: COMPLETED | OUTPATIENT
Start: 2018-09-13 | End: 2018-09-13

## 2018-09-13 RX ORDER — METHYLPREDNISOLONE SODIUM SUCCINATE 40 MG/ML
40 INJECTION, POWDER, LYOPHILIZED, FOR SOLUTION INTRAMUSCULAR; INTRAVENOUS EVERY 12 HOURS
Status: DISCONTINUED | OUTPATIENT
Start: 2018-09-13 | End: 2018-09-15

## 2018-09-13 RX ORDER — IPRATROPIUM BROMIDE AND ALBUTEROL SULFATE 2.5; .5 MG/3ML; MG/3ML
1 SOLUTION RESPIRATORY (INHALATION)
Status: COMPLETED | OUTPATIENT
Start: 2018-09-13 | End: 2018-09-13

## 2018-09-13 RX ORDER — GABAPENTIN 600 MG/1
600 TABLET ORAL 3 TIMES DAILY
Status: DISCONTINUED | OUTPATIENT
Start: 2018-09-13 | End: 2018-09-19 | Stop reason: HOSPADM

## 2018-09-13 RX ORDER — ASPIRIN 81 MG/1
81 TABLET ORAL EVERY MORNING
Status: DISCONTINUED | OUTPATIENT
Start: 2018-09-14 | End: 2018-09-19 | Stop reason: HOSPADM

## 2018-09-13 RX ORDER — HYDRALAZINE HYDROCHLORIDE 25 MG/1
25 TABLET, FILM COATED ORAL 2 TIMES DAILY
Status: DISCONTINUED | OUTPATIENT
Start: 2018-09-13 | End: 2018-09-19 | Stop reason: HOSPADM

## 2018-09-13 RX ORDER — FENTANYL CITRATE 50 UG/ML
100 INJECTION, SOLUTION INTRAMUSCULAR; INTRAVENOUS ONCE
Status: DISCONTINUED | OUTPATIENT
Start: 2018-09-13 | End: 2018-09-13

## 2018-09-13 RX ORDER — ATORVASTATIN CALCIUM 40 MG/1
40 TABLET, FILM COATED ORAL DAILY
Status: DISCONTINUED | OUTPATIENT
Start: 2018-09-13 | End: 2018-09-19 | Stop reason: HOSPADM

## 2018-09-13 RX ORDER — SODIUM CHLORIDE 0.9 % (FLUSH) 0.9 %
10 SYRINGE (ML) INJECTION EVERY 12 HOURS SCHEDULED
Status: DISCONTINUED | OUTPATIENT
Start: 2018-09-13 | End: 2018-09-19 | Stop reason: HOSPADM

## 2018-09-13 RX ORDER — AMLODIPINE BESYLATE 10 MG/1
10 TABLET ORAL DAILY
Status: DISCONTINUED | OUTPATIENT
Start: 2018-09-13 | End: 2018-09-19 | Stop reason: HOSPADM

## 2018-09-13 RX ORDER — ATENOLOL 50 MG/1
50 TABLET ORAL 2 TIMES DAILY
Status: DISCONTINUED | OUTPATIENT
Start: 2018-09-13 | End: 2018-09-17

## 2018-09-13 RX ORDER — MIDAZOLAM HYDROCHLORIDE 1 MG/ML
5 INJECTION INTRAMUSCULAR; INTRAVENOUS ONCE
Status: DISCONTINUED | OUTPATIENT
Start: 2018-09-13 | End: 2018-09-13

## 2018-09-13 RX ORDER — LORAZEPAM 2 MG/ML
0.5 INJECTION INTRAMUSCULAR EVERY 6 HOURS PRN
Status: DISCONTINUED | OUTPATIENT
Start: 2018-09-13 | End: 2018-09-19 | Stop reason: HOSPADM

## 2018-09-13 RX ORDER — SODIUM CHLORIDE 0.9 % (FLUSH) 0.9 %
10 SYRINGE (ML) INJECTION PRN
Status: DISCONTINUED | OUTPATIENT
Start: 2018-09-13 | End: 2018-09-19 | Stop reason: HOSPADM

## 2018-09-13 RX ORDER — ONDANSETRON 2 MG/ML
4 INJECTION INTRAMUSCULAR; INTRAVENOUS EVERY 6 HOURS PRN
Status: DISCONTINUED | OUTPATIENT
Start: 2018-09-13 | End: 2018-09-19 | Stop reason: HOSPADM

## 2018-09-13 RX ORDER — IPRATROPIUM BROMIDE AND ALBUTEROL SULFATE 2.5; .5 MG/3ML; MG/3ML
1 SOLUTION RESPIRATORY (INHALATION) EVERY 4 HOURS
Status: DISCONTINUED | OUTPATIENT
Start: 2018-09-13 | End: 2018-09-13

## 2018-09-13 RX ORDER — BUDESONIDE 0.5 MG/2ML
1 INHALANT ORAL 2 TIMES DAILY
Status: DISCONTINUED | OUTPATIENT
Start: 2018-09-13 | End: 2018-09-13

## 2018-09-13 RX ORDER — FORMOTEROL FUMARATE 20 UG/2ML
20 SOLUTION RESPIRATORY (INHALATION) EVERY 12 HOURS
Status: DISCONTINUED | OUTPATIENT
Start: 2018-09-13 | End: 2018-09-13

## 2018-09-13 RX ORDER — ACETAMINOPHEN 325 MG/1
650 TABLET ORAL EVERY 6 HOURS PRN
Status: DISCONTINUED | OUTPATIENT
Start: 2018-09-13 | End: 2018-09-19 | Stop reason: HOSPADM

## 2018-09-13 RX ORDER — FORMOTEROL FUMARATE 20 UG/2ML
20 SOLUTION RESPIRATORY (INHALATION) EVERY 12 HOURS
Status: DISCONTINUED | OUTPATIENT
Start: 2018-09-13 | End: 2018-09-15

## 2018-09-13 RX ORDER — METHYLPREDNISOLONE SODIUM SUCCINATE 40 MG/ML
40 INJECTION, POWDER, LYOPHILIZED, FOR SOLUTION INTRAMUSCULAR; INTRAVENOUS EVERY 12 HOURS
Status: DISCONTINUED | OUTPATIENT
Start: 2018-09-13 | End: 2018-09-13

## 2018-09-13 RX ORDER — IPRATROPIUM BROMIDE AND ALBUTEROL SULFATE 2.5; .5 MG/3ML; MG/3ML
1 SOLUTION RESPIRATORY (INHALATION) EVERY 4 HOURS
Status: DISCONTINUED | OUTPATIENT
Start: 2018-09-13 | End: 2018-09-15

## 2018-09-13 RX ORDER — BUDESONIDE 0.5 MG/2ML
1 INHALANT ORAL 2 TIMES DAILY
Status: DISCONTINUED | OUTPATIENT
Start: 2018-09-13 | End: 2018-09-15

## 2018-09-13 RX ORDER — PAROXETINE 10 MG/1
30 TABLET, FILM COATED ORAL EVERY MORNING
Status: DISCONTINUED | OUTPATIENT
Start: 2018-09-14 | End: 2018-09-19 | Stop reason: HOSPADM

## 2018-09-13 RX ADMIN — PAROXETINE HYDROCHLORIDE 30 MG: 20 TABLET, FILM COATED ORAL at 08:56

## 2018-09-13 RX ADMIN — METHYLPREDNISOLONE SODIUM SUCCINATE 40 MG: 40 INJECTION, POWDER, FOR SOLUTION INTRAMUSCULAR; INTRAVENOUS at 20:48

## 2018-09-13 RX ADMIN — ENOXAPARIN SODIUM 40 MG: 40 INJECTION SUBCUTANEOUS at 18:46

## 2018-09-13 RX ADMIN — LORAZEPAM 0.5 MG: 2 INJECTION INTRAMUSCULAR; INTRAVENOUS at 18:47

## 2018-09-13 RX ADMIN — PANTOPRAZOLE SODIUM 40 MG: 40 TABLET, DELAYED RELEASE ORAL at 09:04

## 2018-09-13 RX ADMIN — GABAPENTIN 600 MG: 600 TABLET, FILM COATED ORAL at 20:47

## 2018-09-13 RX ADMIN — ATENOLOL 50 MG: 50 TABLET ORAL at 08:56

## 2018-09-13 RX ADMIN — AZITHROMYCIN MONOHYDRATE 500 MG: 500 INJECTION, POWDER, LYOPHILIZED, FOR SOLUTION INTRAVENOUS at 22:04

## 2018-09-13 RX ADMIN — ATORVASTATIN CALCIUM 40 MG: 40 TABLET, FILM COATED ORAL at 08:56

## 2018-09-13 RX ADMIN — FORMOTEROL FUMARATE DIHYDRATE 20 MCG: 20 SOLUTION RESPIRATORY (INHALATION) at 18:10

## 2018-09-13 RX ADMIN — BUDESONIDE 500 MCG: 0.5 INHALANT RESPIRATORY (INHALATION) at 06:20

## 2018-09-13 RX ADMIN — HYDRALAZINE HYDROCHLORIDE 25 MG: 25 TABLET, FILM COATED ORAL at 20:47

## 2018-09-13 RX ADMIN — WATER 1 G: 1 INJECTION INTRAMUSCULAR; INTRAVENOUS; SUBCUTANEOUS at 18:46

## 2018-09-13 RX ADMIN — Medication 100 MG: at 08:56

## 2018-09-13 RX ADMIN — IPRATROPIUM BROMIDE AND ALBUTEROL SULFATE 1 AMPULE: .5; 3 SOLUTION RESPIRATORY (INHALATION) at 01:47

## 2018-09-13 RX ADMIN — MAGNESIUM SULFATE IN DEXTROSE 1 G: 10 INJECTION, SOLUTION INTRAVENOUS at 18:46

## 2018-09-13 RX ADMIN — IPRATROPIUM BROMIDE AND ALBUTEROL SULFATE 1 AMPULE: .5; 3 SOLUTION RESPIRATORY (INHALATION) at 13:45

## 2018-09-13 RX ADMIN — GUAIFENESIN 600 MG: 600 TABLET, EXTENDED RELEASE ORAL at 08:56

## 2018-09-13 RX ADMIN — IPRATROPIUM BROMIDE AND ALBUTEROL SULFATE 1 AMPULE: .5; 3 SOLUTION RESPIRATORY (INHALATION) at 18:10

## 2018-09-13 RX ADMIN — IPRATROPIUM BROMIDE AND ALBUTEROL SULFATE 1 AMPULE: .5; 3 SOLUTION RESPIRATORY (INHALATION) at 19:10

## 2018-09-13 RX ADMIN — ASPIRIN 81 MG: 81 TABLET, COATED ORAL at 08:56

## 2018-09-13 RX ADMIN — Medication 1 G: at 10:17

## 2018-09-13 RX ADMIN — IPRATROPIUM BROMIDE AND ALBUTEROL SULFATE 1 AMPULE: .5; 3 SOLUTION RESPIRATORY (INHALATION) at 19:14

## 2018-09-13 RX ADMIN — ATORVASTATIN CALCIUM 40 MG: 40 TABLET, FILM COATED ORAL at 20:47

## 2018-09-13 RX ADMIN — Medication 1 G: at 00:08

## 2018-09-13 RX ADMIN — ATENOLOL 50 MG: 50 TABLET ORAL at 20:47

## 2018-09-13 RX ADMIN — VANCOMYCIN HYDROCHLORIDE 1500 MG: 10 INJECTION, POWDER, LYOPHILIZED, FOR SOLUTION INTRAVENOUS at 11:42

## 2018-09-13 RX ADMIN — GABAPENTIN 600 MG: 300 CAPSULE ORAL at 08:56

## 2018-09-13 RX ADMIN — IPRATROPIUM BROMIDE AND ALBUTEROL SULFATE 1 AMPULE: .5; 3 SOLUTION RESPIRATORY (INHALATION) at 10:41

## 2018-09-13 RX ADMIN — IPRATROPIUM BROMIDE AND ALBUTEROL SULFATE 1 AMPULE: .5; 3 SOLUTION RESPIRATORY (INHALATION) at 06:15

## 2018-09-13 RX ADMIN — LORAZEPAM 0.5 MG: 2 INJECTION INTRAMUSCULAR; INTRAVENOUS at 01:58

## 2018-09-13 RX ADMIN — IPRATROPIUM BROMIDE AND ALBUTEROL SULFATE 1 AMPULE: 2.5; .5 SOLUTION RESPIRATORY (INHALATION) at 23:46

## 2018-09-13 RX ADMIN — METHYLPREDNISOLONE SODIUM SUCCINATE 40 MG: 40 INJECTION, POWDER, FOR SOLUTION INTRAMUSCULAR; INTRAVENOUS at 18:47

## 2018-09-13 RX ADMIN — ENOXAPARIN SODIUM 40 MG: 40 INJECTION SUBCUTANEOUS at 08:56

## 2018-09-13 RX ADMIN — AMLODIPINE BESYLATE 10 MG: 10 TABLET ORAL at 18:47

## 2018-09-13 RX ADMIN — BUDESONIDE 1000 MCG: 0.5 SUSPENSION RESPIRATORY (INHALATION) at 18:10

## 2018-09-13 RX ADMIN — IPRATROPIUM BROMIDE AND ALBUTEROL SULFATE 1 AMPULE: .5; 3 SOLUTION RESPIRATORY (INHALATION) at 19:16

## 2018-09-13 RX ADMIN — Medication 10 ML: at 20:47

## 2018-09-13 ASSESSMENT — PAIN SCALES - GENERAL
PAINLEVEL_OUTOF10: 0

## 2018-09-13 NOTE — PROGRESS NOTES
1. 2   GLUCOSE  97  167*  99     INR: No results for input(s): INR in the last 72 hours. CXR 9/13/18   No interval change. Assessment/Plan:   68yo male with acute change in peural effusion and acute on chronic resp failure  Right lung pleural effusion-exudative and probable parapneumonic  EtOH abuse with acute alcohol withdrawals  New onset atrial fibrillation 9/8 that lasted ~ 2 hrs- SR since  Acute kidney injury with creatinine 1.6  Elevated BNP  Leukocytosis  HLD  HTN  Depression, Anxiety  Moderate mitral regurge  Hx parathyroidectomy  Hx carotid endarterectomy      Continue with bipap  Pt would like to be transferred per Vascular recommendations  See orders  Will discuss with attending    Naveed Valdovinos  8:40 AM     The chart was reviewed and the patient was seen and examined with the resident. The case was discussed in detail with the resident and agree with current impressions and plan. Will start transfer to [facility] today.     Idalia Hobson D.O.  11:54 AM  9/13/2018

## 2018-09-13 NOTE — SIGNIFICANT EVENT
IMM Hospitalist RRT/Code Blue Note    Subjective: Pt has begun to have severe respiratory distress. Admission for in creasing pleural effusion and according to nursing is being transferred to [facility] for VATS for collapse of right lung. Nursing reports he has been refusing to wear his bipap but they convinced him tonight to wear starting 0000. He was doing okay if the breathing treatments were consistent but he took of the bipap to use the urinal and decompensated. Called to bedside 0615       guaiFENesin  600 mg Oral BID    ipratropium-albuterol  1 ampule Inhalation Q4H    vancomycin  1,500 mg Intravenous Q24H    cefepime  1 g Intravenous Q12H    budesonide  500 mcg Nebulization BID    vitamin B-1  100 mg Oral Daily    aspirin  81 mg Oral QAM    PARoxetine  30 mg Oral QAM    atenolol  50 mg Oral BID    atorvastatin  40 mg Oral Daily    enoxaparin  40 mg Subcutaneous Daily    pantoprazole  40 mg Oral QAM AC    gabapentin  600 mg Oral TID       LORazepam 0.5 mg Q2H PRN   acetaminophen 650 mg Q4H PRN   prochlorperazine 10 mg Q6H PRN   melatonin 3 mg Nightly PRN        Objective:    BP (!) 151/79   Pulse 64   Temp 98.1 °F (36.7 °C) (Axillary)   Resp 14   Ht 5' 5.5\" (1.664 m)   Wt 169 lb (76.7 kg)   SpO2 100%   BMI 27.70 kg/m²   General Appearance: alert and oriented to person, place and time, On bipap well-developed and well nourished and in severe distress  Skin: warm and dry, no rash or erythema  Head: normocephalic and atraumatic  Eyes: pupils equal, round, and reactive to light, extraocular eye movements intact, conjunctivae normal  Pulmonary/Chest: wheezing present- diffuse, no breaths sounds right lateral lower. TACHYPNEA, ABDOMINAL BREATHING, SUPRACLAVICULAR RETRACTIONS.  OXYGEN ON BIPAP 98%  Cardiovascular: normal S1 and S2, no gallops, intact distal pulses, no carotid bruits and normal rate   Extremities: no cyanosis, no clubbing and no edema  Neurologic: gait and coordination normal and speech normal      Recent Labs      09/11/18   0515  09/12/18 2045 09/13/18   0537   NA  140  139  142   K  3.9  3.7  3.9   CL  107  102  104   CO2  24  28  29   BUN  9  6*  5*   CREATININE  1.3*  1.2  1.2   GLUCOSE  97  167*  99   CALCIUM  8.2*  8.6  8.7       Recent Labs      09/12/18   0523  09/12/18 2045  09/13/18   0537   WBC  7.6  11.3   --    RBC  3.18*  3.54*   --    HGB  9.7*  10.8*  10.8*   HCT  30.4*  33.3*  33.9*   MCV  95.6  94.1   --    MCH  30.5  30.5   --    MCHC  31.9*  32.4   --    RDW  12.9  12.9   --    PLT  266  310   --    MPV  10.4  9.9   --        ABG:    Lab Results   Component Value Date    PH 7.319 09/13/2018    PCO2 54.1 09/13/2018    PO2 81.6 09/13/2018    HCO3 27.2 09/13/2018    BE 0.3 09/13/2018    O2SAT 95.0 09/13/2018        Lab Results   Component Value Date    PH 7.319 09/13/2018    PCO2 54.1 09/13/2018    PO2 81.6 09/13/2018    HCO3 27.2 09/13/2018    BE 0.3 09/13/2018    O2SAT 95.0 09/13/2018        No results for input(s): LACTA in the last 72 hours. I/O last 3 completed shifts: In: 5 [P.O.:420]  Out: 1200 [Urine:1200]  I/O this shift:  In: -   Out: 1100 [Urine:1100]        Assessment:    Active Problems:    Pleural effusion    Respiratory failure ([address]  Resolved Problems:    * No resolved hospital problems. *      Plan:  1. Acute respiratory decompensation- pt was return to bipap after a break to use bathroom. He is stablizing on bipap but still may need intubation if he can not get rest from the bipap. Diffuse wheezing, respiratory is giving breathing treatment now. Pt continued to improve after breathing treatment and bipap. He is resting quietly now, wheezing nearly gone. Critical care time 30 minutes not including procedures.     Electronically signed by Swati Woodward PA-C on 9/13/2018 at 6:51 AM

## 2018-09-13 NOTE — DISCHARGE SUMMARY
Name:  Daryl Rossi  :  1950  MRN:  [id number]  Room:  [place] [id number]  DOS:  2018    [facility]  Internal Medicine  -Resident Discharge Summary-    PCP:  Melissa Fisher MD  Admitting Physician:  Melissa Costa DO  Consultant(s):   IP CONSULT TO PULMONOLOGY  IP CONSULT TO VASCULAR SURGERY      Admission Date:  2018   Discharge Date:  2018      Admission Diagnoses     Pleural effusion [J90]  Respiratory failure ([address] [J96.90]     Discharge Diagnoses  Acute change in peural effusion and acute on chronic resp failure  Right lung pleural effusion-exudative and probable parapneumonic  EtOH abuse with acute alcohol withdrawals  New onset atrial fibrillation  that lasted ~ 2 hrs- SR since  Acute kidney injury with creatinine 1.6  Elevated BNP  Leukocytosis  HLD  HTN  Depression, Anxiety  Moderate mitral regurge  Hx parathyroidectomy  Hx carotid endarterectomy       Brief History of Present Illness and Hospital Course  Daryl Rossi is a 70 y.o. male patient of Melissa Fisher MD who  has a past medical history of Anxiety; Depression; GERD (gastroesophageal reflux disease); Heart palpitations; HTN (hypertension); Hyperlipidemia; Lightheadedness; OA (osteoarthritis); Parathyroid adenoma; and Stenosis of right internal carotid artery with cerebral infarction ([address] who originally had concerns including Shortness of Breath (sent from radiology after a CCT and they are concerned about a pleural effusion. the pt complains of some chest tightness). at presentation on 2018, and was found to have Pleural effusion [J90]  Respiratory failure ([address] [J96.90] after workup. Patient presented with increased shortness of breath that has been present over the last month. He was seen in the emergency department on  and CTA of the chest showed a small right pleural effusion. He was discharged home after receiving appropriate pain medication.  Return to the emergency department on September 6 or shortness of breath and was admitted to our service. Successfully done in emergency department showed opacification of inferior two thirds of the right hemithorax is most likely a pleural effusion or consolidation with loculation. He was placed on 15 L nonrebreather mask in the emergency department. The patient does admit to drinking 5-16 and tonics daily. A low dose Ativan was placed for any withdrawal symptoms. Radiology was consulted for thoracentesis of his right lung. 600 mL was drained and the patient tolerated the procedure well. During his admission he was on and off his BiPAP. He was placed on continuous pulse ox and would rapidly decompensate with minimal movement, including using the urinal. He was able to spend time off the mask to be able to eat without difficulty. High flow nasal cannula was used and ranged from 4 L to 7 L in use. OptiFlow was considered, but the hospital only has one device and it was being used by another patient at the time. Repeat imaging showed increase in his right pleural effusion. Pulmonology and vascular surgery were consulted to consider further procedural options, including bronchoscopy versus chest tube versus VATs. Vascular surgery recommended the patient be transferred downtown for a VATs procedure. This was discussed with the patient and his wife. They both agreed that this is what they wanted to pursue. The transfer line was contacted and Dr. Jd Rogers accepted the patient for transfer to [address] [place] [address]. The patient did have an acute event during admission, and RRT was on the early morning prior to transfer 9/13/18. It appears that his O2 monitor slipped off his finger while he was using the urinal and nursing called an RRT. The patient was placed back in bed and back on his BiPAP without difficulty. Respiratory therapy was asked to give him a breathing treatment.      In regards to his alcohol withdrawal, he did have some 09/13/2018    CO2 29 09/13/2018    GLUCOSE 99 09/13/2018    ALT 8 09/13/2018    AST 10 09/13/2018    INR 1.2 09/06/2018     Lab Results   Component Value Date    INR 1.2 09/06/2018    INR 1.1 08/27/2018    INR 1.1 04/18/2018    PROTIME 13.7 (H) 09/06/2018    PROTIME 12.7 (H) 08/27/2018    PROTIME 12.6 (H) 04/18/2018      Lab Results   Component Value Date    TSH 2.300 09/08/2018     Lab Results   Component Value Date    TRIG 91 09/07/2018    TRIG 125 06/13/2018    TRIG 233 (H) 10/23/2017     Lab Results   Component Value Date    HDL 26 09/07/2018    HDL 87 06/13/2018    HDL 73 10/23/2017     Lab Results   Component Value Date    LDLCALC 29 09/07/2018    LDLCALC 62 06/13/2018    LDLCALC 68 10/23/2017     Lab Results   Component Value Date    LABA1C 6.1 (H) 09/08/2018       Pertinent Imaging  Ct Chest Wo Contrast    Result Date: 9/10/2018  Reading location:  [facility] [place] CLINICAL STATEMENT: Shortness of breath. TECHNIQUE: Axial CT images through the thorax were obtained without the use of contrast. Coronal and sagittal reconstructed images were generated from the dataset acquired in axial imaging. Automated dose exposure control was utilized for this examination. COMPARISON: August 27, 2018 CTA. FINDINGS: On today's examination, there has been a significant increase in the right pleural effusion. The pleural effusion is relatively large at this time. There is no significant left pleural effusion. There is underlying compression atelectasis of the right lower lobe probably caused by the large volume of pleural fluid. Air bronchograms are seen in the right lower lobe. There is no pericardial effusion. In the mediastinum, no contrast was given making evaluation of the mediastinum somewhat difficult but no new adenopathy is appreciated when compared with the study of August 27, 2018. The central airways appear patent.  Lung windows reveal there is minimal atelectatic change in the left base and bands of infiltrate or atelectasis in chest compared 9/10/2018. Extensive pleural-parenchymal opacity in the right hemithorax is similar to prior. Left lung remains relatively clear. Stable heart and mediastinum. No change right pleural effusion/thickening with associated lung consolidation    Xr Chest Portable    Result Date: 9/10/2018  Reading location: 200 INDICATION: Dyspnea FINDINGS: Portable AP upright view the chest compared with 9/7/2018. Pleural-parenchymal opacities in the right hemithorax are unchanged. Left lung remains clear. Stable heart and mediastinum. No interval change. Xr Chest Portable    Result Date: 9/7/2018  Reading location: 200 Indication: Shortness of breath, dyspnea. Comparison: Chest radiograph from 9/6/2018 at 1706 hours. Technique: Portable AP upright chest radiograph was obtained. Findings: The cardiomediastinal silhouette is stable in size and contours. There is a persistent moderate-sized right pleural effusion with right basilar airspace opacity. The left lung and costophrenic angle are clear. There is no evidence of pneumothorax. Persistent moderate size right pleural effusion with right basilar airspace disease. Xr Chest Portable    Result Date: 9/6/2018  Location:100 Exam: XR CHEST PORTABLE Indications: Shortness of breath, former smoker. No history of lung or heart surgery Findings: A portable AP erect view of the chest was obtained and compared to the previous exam of 4/18/2018. There is again evidence of previous surgery of the lower cervical spine and the upper thoracic spine, with the superior extent of the spinal fixation hardware not included on this study. There is opacification of the inferior two thirds of the right hemithorax, new since the previous exam, with soft tissue density adjacent to the lateral aspect of the right upper lung. Left lung is clear, without pleural effusion or focal infiltrate. No pneumothorax is identified bilaterally.  The cardiac silhouette is partially obscured by the right-sided density, but does not appear enlarged, as visualized. No midline shift of the trachea is seen. Opacification of inferior two thirds of right hemithorax which is most likely on the basis of pleural effusion and/or lung consolidation. Soft tissue density adjacent to the lateral aspect of the right upper lung suggests loculation of the effusion. Underlying mass cannot be excluded. Xr Chest 1 Vw    Result Date: 9/6/2018  Reading location: 200 INDICATION: Right thoracentesis FINDINGS: AP upright view the chest compared earlier exam same date. Current study 1709 hours. Right pleural fluid is decreased. No evidence of postprocedure pneumothorax. No evidence of postprocedure pneumothorax. Ct Chest Pulmonary Embolism W Contrast    Result Date: 8/27/2018  Initial report created on 8/27/2018 3:11:33 AM EDT EXAM:   CT Angiography Chest With Intravenous Contrast CLINICAL HISTORY:   70years old, male; Pain; Other: Sharp pain in upper back; Additional info: Chest pain, acute, pulmonary embolism suspected TECHNIQUE:   Axial computed tomographic angiography images of the chest with intravenous contrast using pulmonary embolism protocol. All CT scans at this facility use at least one of these dose optimization techniques: automated exposure control; mA and/or kV adjustment per patient size (includes targeted exams where dose is matched to clinical indication); or iterative reconstruction. MIP reconstructed images were created and reviewed. CONTRAST:   70 mL of [id number] administered intravenously. COMPARISON:   No relevant prior studies available. FINDINGS:   Pulmonary arteries:  Unremarkable. No pulmonary embolism. Aorta:  No acute findings. No thoracic aortic aneurysm. Lungs: There are strandy and patchy opacities superimposing over the right pleural effusion compatible with right basilar atelectasis and or infiltrate. Pleural space:  A there is a small right pleural effusion.   No discharge with outpatient follow-up. I have spoken with the patient and discussed the results of the current hospitalization, in addition to providing specific details for the plan of care and counseling regarding the diagnosis and prognosis. The plan has been discussed in detail and they are aware of the specific conditions for emergent return, as well as the importance of follow-up. Their questions are answered at this time and they are agreeable with the plan for discharge to [address] [place] [address].     Discharge Medications     Medication List      CHANGE how you take these medications    PARoxetine 30 MG tablet  Commonly known as:  PAXIL  TAKE ONE TABLET BY MOUTH EVERY DAY IN THE MORNING  What changed:  · how much to take  · how to take this  · when to take this  · additional instructions        CONTINUE taking these medications    acetaminophen 325 MG tablet  Commonly known as:  TYLENOL     albuterol sulfate  (90 Base) MCG/ACT inhaler  Commonly known as:  VENTOLIN HFA  Inhale 2 puffs into the lungs every 6 hours as needed for Wheezing     amLODIPine 10 MG tablet  Commonly known as:  NORVASC  Take 1 tablet by mouth daily     aspirin 81 MG tablet     atenolol 50 MG tablet  Commonly known as:  TENORMIN  Take 1 tablet by mouth 2 times daily     atorvastatin 40 MG tablet  Commonly known as:  LIPITOR  Take 1 tablet by mouth daily     b complex vitamins capsule     gabapentin 600 MG tablet  Commonly known as:  NEURONTIN  Take 1 tablet by mouth 3 times daily     hydrALAZINE 25 MG tablet  Commonly known as:  APRESOLINE  Take 1 tablet by mouth 2 times daily     umeclidinium-vilanterol 62.5-25 MCG/INH Aepb inhaler  Commonly known as:  ANORO ELLIPTA  Inhale 1 puff into the lungs daily        STOP taking these medications    folic acid 1 MG tablet  Commonly known as:  FOLVITE     guaiFENesin 400 MG tablet     vitamin B-1 100 MG tablet  Commonly known as:  THIAMINE     vitamin D3 1000 units Tabs            Follow-up /

## 2018-09-13 NOTE — PLAN OF CARE
Problem: Falls - Risk of:  Goal: Absence of physical injury  Absence of physical injury   Outcome: Met This Shift      Problem: Breathing Pattern - Ineffective:  Goal: Ability to achieve and maintain a regular respiratory rate will improve  Ability to achieve and maintain a regular respiratory rate will improve   Outcome: Ongoing      Problem: Pain:  Goal: Pain level will decrease  Pain level will decrease   Outcome: Met This Shift

## 2018-09-13 NOTE — H&P
[facility]  Internal Medicine Residency Program  History and Physical    Patient:  Robles Pablo 70 y.o. male MRN: [id number]     Date of Service: 9/13/2018    Hospital Day: 1      Chief complaint: SOB  History of Present Illness     The patient is a 70 y.o. male with PMH of HTN, HLD, hx of TIA ,right carotid endarterectomy, parathyriod adenoma, cervical fusion(C3-C7) transfer from [facility] for further pulmonary and vascular surgery evaluation and management. Patiet initially presented to [facility] on aug 27, ED for chest pain and neck pain. But was readmitted on 9/6/2018 for increased SOB and CT chest showed right pleural effusion/consolidation/loculation. Patient c/o SOB 2-3 month, gradual onset which progressively worse with exerction, associated with dry cough, denies fever, weight loss,. During his hospital admission, patient undergone thoracocentesis of right lungs with removed 600 ml, associated with slight improvement in SOB and  high flow oxygen, initial cytology neg for malignancy. Patient was also managed for alcohol withdrawal and had an episode of Afib with spontaneous resolve to NSR. Further patient repeat imaging showed worsening effusion so patient transfer for possible chest tube and ? VATS  Patient is smoker, with 20 pack year history , alcoholic 5-6 drinks  Daily for over 20 years.     Past Medical History:      Diagnosis Date    Anxiety     Depression     GERD (gastroesophageal reflux disease)     Heart palpitations     HTN (hypertension) 10/21/2010    Hyperlipidemia     Lightheadedness     OA (osteoarthritis)     Parathyroid adenoma     s/p surgery 2005    Stenosis of right internal carotid artery with cerebral infarction [facility])        Past Surgical History:        Procedure Laterality Date    BACK SURGERY  12/20/2016    revision cervical laminectomy    CAROTID ENDARTERECTOMY Right 8/25/2014    Delatore - bovine patch     CERVICAL FUSION  12/12/2016 C4-C5, C5-C6, C6-C7 ACF, C5-C6 Corpectomy    ENDOSCOPY, COLON, DIAGNOSTIC      EYE SURGERY  2001    lasex    FRACTURE SURGERY Left     plate in wrist    HAND SURGERY      PARATHYROIDECTOMY  11/7/2005    TONSILLECTOMY      UPPER GASTROINTESTINAL ENDOSCOPY  07/07/2014       Medications Prior to Admission:    Prior to Admission medications    Medication Sig Start Date End Date Taking? Authorizing Provider   atorvastatin (LIPITOR) 40 MG tablet Take 1 tablet by mouth daily 7/20/18  Yes Jean Bella,    b complex vitamins capsule Take 1 capsule by mouth every morning    Yes Historical Provider, MD   PARoxetine (PAXIL) 30 MG tablet TAKE ONE TABLET BY MOUTH EVERY DAY IN THE MORNING  Patient taking differently: Take 30 mg by mouth every morning TAKE ONE TABLET BY MOUTH EVERY DAY IN THE MORNING 10/25/17  Yes Esther Guerrero MD   gabapentin (NEURONTIN) 600 MG tablet Take 1 tablet by mouth 3 times daily 10/25/17  Yes Boston Sanchez MD   atenolol (TENORMIN) 50 MG tablet Take 1 tablet by mouth 2 times daily 10/25/17 10/25/18 Yes Boston Sanchez MD   amLODIPine (NORVASC) 10 MG tablet Take 1 tablet by mouth daily 10/25/17  Yes Boston Sanchez MD   hydrALAZINE (APRESOLINE) 25 MG tablet Take 1 tablet by mouth 2 times daily 10/25/17  Yes Boston Sanchez MD   acetaminophen (TYLENOL) 325 MG tablet Take 650 mg by mouth every 6 hours as needed for Pain   Yes Historical Provider, MD   umeclidinium-vilanterol (ANORO ELLIPTA) 62.5-25 MCG/INH AEPB inhaler Inhale 1 puff into the lungs daily 4/22/18   Waleska Martinez MD   albuterol sulfate HFA (VENTOLIN HFA) 108 (90 Base) MCG/ACT inhaler Inhale 2 puffs into the lungs every 6 hours as needed for Wheezing 4/22/18   Waleska Martinez MD   aspirin 81 MG tablet Take 81 mg by mouth every morning     Historical Provider, MD       Allergies:  Flexeril [cyclobenzaprine]; Lisinopril;  Influenza virus vaccine; Metformin and related; and Zocor [simvastatin]    Social History:   TOBACCO:   reports that he quit smoking about 35 years ago. His smoking use included Cigarettes. He has a 30.00 pack-year smoking history. He has never used smokeless tobacco.  ETOH:   reports that he drinks about 6.0 oz of alcohol per week . Family History:   Family History   Problem Relation Age of Onset    Thyroid Cancer Sister     Cancer Sister     Stroke Mother     Diabetes Mother     Stroke Father     Other Brother          in surgery cause unknown    Heart Disease Maternal Grandmother     Arthritis Maternal Grandmother     Heart Disease Maternal Grandfather     Cancer Paternal Grandmother     Other Paternal Grandfather         unknown cause of death       REVIEW OF SYSTEMS:    · Constitutional: No fever, no chills, no change in weight; good appetite  · HEENT: No blurred vision, no ear problems, no sore throat, no rhinorrhea. · Respiratory: No cough, no sputum production, no pleuritic chest pain, no shortness of breath  · Cardiology: No angina, no dyspnea on exertion, no paroxysmal nocturnal dyspnea, no orthopnea, no palpitation, no leg swelling. · Gastroenterology: No dysphagia, no reflux; no abdominal pain, no nausea or vomiting; no constipation or diarrhea.  No hematochezia   · Genitourinary: No dysuria, no frequency, hesitancy; no hematuria  · Musculoskeletal: no joint pain, no myalgia, no change in range of movement  · Neurology: no focal weakness in extremities, no slurred speech, no double vision, no tingling or numbness sensation  · Endocrinology: no temperature intolerance, no polyphagia, polydipsia or polyuria  · Hematology: no increased bleeding, no bruising, no lymphadenopathy  · Skin: no skin changes noticed by patient  · Psychology: no depressed mood, no suicidal ideation    Physical Exam   · Vitals: BP (!) 159/70   Pulse 61   Temp 97 °F (36.1 °C) (Temporal)   Resp 27   Ht 5' 5\" (1.651 m)   Wt 161 lb (73 kg)   SpO2 97% BMI 26.79 kg/m²     · General Appearance: alert and oriented to person, place and time  · Head: normocephalic and atraumatic  · Eyes: pupils equal, round, and reactive to light  · ENT: tympanic membrane, external ear and ear canal normal bilaterally, oropharynx clear and moist with normal mucous membranes  · Neck: neck supple and non tender without mass   · Pulmonary/Chest: b/l decrease air entry, right >> left with wheeze  · Cardiovascular: normal rate, normal S1 and S2, no gallops and intact distal pulses  · Abdomen: soft, non-tender, non-distended, normal bowel sounds, no masses or organomegaly  · Extremities: no cyanosis and no clubbing  · Musculoskeletal: normal range of motion, no joint swelling, deformity or tenderness  · Neurologic: reflexes normal and symmetric, no cranial nerve deficit, gait and coordination normal and speech normal   Labs and Imaging Studies   Basic Labs  Recent Labs      09/11/18   0515  09/12/18 2045 09/13/18   0537   NA  140  139  142   K  3.9  3.7  3.9   CL  107  102  104   CO2  24  28  29   BUN  9  6*  5*   CREATININE  1.3*  1.2  1.2   GLUCOSE  97  167*  99   CALCIUM  8.2*  8.6  8.7       Recent Labs      09/12/18   0523  09/12/18 2045 09/13/18   0537   WBC  7.6  11.3   --    RBC  3.18*  3.54*   --    HGB  9.7*  10.8*  10.8*   HCT  30.4*  33.3*  33.9*   MCV  95.6  94.1   --    MCH  30.5  30.5   --    MCHC  31.9*  32.4   --    RDW  12.9  12.9   --    PLT  266  310   --    MPV  10.4  9.9   --        CBC:   Lab Results   Component Value Date    WBC 11.3 09/12/2018    RBC 3.54 09/12/2018    HGB 10.8 09/13/2018    HCT 33.9 09/13/2018    MCV 94.1 09/12/2018    RDW 12.9 09/12/2018     09/12/2018     BMP:    Lab Results   Component Value Date     09/13/2018    K 3.9 09/13/2018    K 3.8 09/06/2018     09/13/2018    CO2 29 09/13/2018    BUN 5 09/13/2018     U/A:  No components found for: Beauty Fend, USPGRDEBBY, UPH, UPROTEIN, UGYONI, MILLIE, ARTURO, UBSEDRICK, CT imaging of the cervical spine was performed without administration of intravenous contrast. Coronal and sagittal reformatted images were obtained. Automated dose exposure control was used for this exam. FINDINGS: Redemonstrated are postsurgical changes from prior anterior fusion from C3 to T1 with an anterior compression plate and screws within the C3 and T1 vertebral bodies. There is a fibular strut graft traversing from C4 to T1 with corpectomies at C5 and C6. The hardware is intact. There is solid osseous incorporation of the proximal strut graft at C3-C4. There is suggestion of partial osseous incorporation of the distal strut graft at the level of T1. A portion of the C7-T1 disc space remains evident. There is no evidence of acute fracture. There is no prevertebral soft tissue swelling. C2-C3: Mild disc osteophyte complex. Bilateral facet hypertrophy, right greater than left. No osseous encroachment of the central canal or neuroforamina. C3-C4: Postsurgical changes from prior anterior fusion. Bilateral uncovertebral and facet hypertrophy. Moderate right and mild left foraminal narrowing. C4-C5: Postsurgical changes from prior anterior fusion. Bilateral uncovertebral and facet hypertrophy. Mild right foraminal narrowing. No left foraminal narrowing. C5-C6: Postsurgical changes from prior anterior fusion and corpectomy. No osseous encroachment of the neuroforamina. C6-C7: Postsurgical changes from prior anterior fusion. No osseous encroachment of the neuroforamina. C7-T1: Postsurgical changes from prior anterior fusion. Mild bilateral facet hypertrophy. No osseous encroachment of the neuroforamen. T1-T2: Minimal disc osteophyte complex. No osseous encroachment of the central canal or neuroforamina. Visualized portion of the lung demonstrates a large right pleural effusion, increased in size compared to the previous CT from 8/27/2018. There is moderate left sphenoid sinus mucosal thickening.  Mastoid air cells are well aerated. Visualized portion of the brain demonstrates no significant mass effect. 1. Postsurgical changes from prior anterior fusion with an anterior compression plate and screws traversing from C3 to T1 and a fibular strut graft traversing from C4 to T1. There is solid osseous incorporation of the proximal strut graft at C3-C4 and suggestion of partial osseous incorporation of the distal strut graft at the level of T1. 2. Moderate right and mild left foraminal narrowing at C3-C4. 3. Mild right foraminal narrowing at C4-C5. 4. Large right pleural effusion, increased in size compared to the previous CT from 8/27/2018. The patient was short of breath and instructed to go to the emergency department after the completion of this exam.     Xr Chest Portable    Result Date: 9/13/2018  Reading location: 200 INDICATION: Pleural effusion, follow-up FINDINGS: Portable AP upright view the chest compared 9/12/2018. Extensive pleural-parenchymal opacity in the right hemithorax is again noted. Left lung remains clear. Stable heart and mediastinum. Normal caliber pulmonary vessels. No interval change. Xr Chest Portable    Result Date: 9/12/2018  Reading location: 200 INDICATION: Dyspnea FINDINGS: Portable AP upright view the chest compared 9/10/2018. Extensive pleural-parenchymal opacity in the right hemithorax is similar to prior. Left lung remains relatively clear. Stable heart and mediastinum. No change right pleural effusion/thickening with associated lung consolidation    Xr Chest Portable    Result Date: 9/10/2018  Reading location: 200 INDICATION: Dyspnea FINDINGS: Portable AP upright view the chest compared with 9/7/2018. Pleural-parenchymal opacities in the right hemithorax are unchanged. Left lung remains clear. Stable heart and mediastinum. No interval change. Xr Chest Portable    Result Date: 9/7/2018  Reading location: 200 Indication: Shortness of breath, dyspnea.  Comparison: Chest plan and orders as documented by the intern. I have also discussed the plan with the attending on call, Dr. Diane Fails. Remainder of medical problems as per intern note above.     Anastacia Adames MD., PGY 2    Attending physician: Dr. Diane Fails

## 2018-09-13 NOTE — PROGRESS NOTES
(1520) patient left via mobil intensive care staff with no complications.       Electronically signed by Manolo Michael RN on 9/13/2018 at 3:21 PM

## 2018-09-13 NOTE — PLAN OF CARE
Problem: Breathing Pattern - Ineffective  Goal: Able to breathe comfortably  Able to breathe comfortably    Outcome: Ongoing

## 2018-09-13 NOTE — PROGRESS NOTES
screws within the C3 and T1 vertebral bodies. There is a fibular strut graft traversing from C4 to T1 with corpectomies at C5 and C6. The hardware is intact. There is solid osseous incorporation of the proximal strut graft at C3-C4. There is suggestion of partial osseous incorporation of the distal strut graft at the level of T1. A portion of the C7-T1 disc space remains evident. There is no evidence of acute fracture. There is no prevertebral soft tissue swelling. C2-C3: Mild disc osteophyte complex. Bilateral facet hypertrophy, right greater than left. No osseous encroachment of the central canal or neuroforamina. C3-C4: Postsurgical changes from prior anterior fusion. Bilateral uncovertebral and facet hypertrophy. Moderate right and mild left foraminal narrowing. C4-C5: Postsurgical changes from prior anterior fusion. Bilateral uncovertebral and facet hypertrophy. Mild right foraminal narrowing. No left foraminal narrowing. C5-C6: Postsurgical changes from prior anterior fusion and corpectomy. No osseous encroachment of the neuroforamina. C6-C7: Postsurgical changes from prior anterior fusion. No osseous encroachment of the neuroforamina. C7-T1: Postsurgical changes from prior anterior fusion. Mild bilateral facet hypertrophy. No osseous encroachment of the neuroforamen. T1-T2: Minimal disc osteophyte complex. No osseous encroachment of the central canal or neuroforamina. Visualized portion of the lung demonstrates a large right pleural effusion, increased in size compared to the previous CT from 8/27/2018. There is moderate left sphenoid sinus mucosal thickening. Mastoid air cells are well aerated. Visualized portion of the brain demonstrates no significant mass effect. 1. Postsurgical changes from prior anterior fusion with an anterior compression plate and screws traversing from C3 to T1 and a fibular strut graft traversing from C4 to T1.  There is solid osseous incorporation of the proximal strut graft at thirds of right hemithorax which is most likely on the basis of pleural effusion and/or lung consolidation. Soft tissue density adjacent to the lateral aspect of the right upper lung suggests loculation of the effusion. Underlying mass cannot be excluded. Xr Chest 1 Vw    Result Date: 9/6/2018  Reading location: 200 INDICATION: Right thoracentesis FINDINGS: AP upright view the chest compared earlier exam same date. Current study 1709 hours. Right pleural fluid is decreased. No evidence of postprocedure pneumothorax. No evidence of postprocedure pneumothorax. Ir Guided Thoracentesis Pleural    Result Date: 9/7/2018  Location:200 Exam: IR GUIDED THORACENTESIS PLEURAL HISTORY:  Right pleural effusion. PROCEDURE:  Ultrasound Guided Thoracentesis. Informed consent was obtained. The skin was prepped and draped in a sterile fashion and anesthetized with Lidocaine. Maximal sterile barrier technique was utilized. Under ultrasound guidance, a #5 [employer] needle and catheter were inserted into the fluid collection on the right. 600 cc of pleural fluid were drained. FINDINGS: Ultrasound images demonstrate a  large right pleural effusion. Following thoracentesis, there is residual pleural effusion. 1. Successful ultrasound guided therapeutic thoracentesis. 2. Please note, the pleural effusion on the right contains multiple septations and compartments, therefore prohibiting complete aspiration of the right pleural effusion. These findings will be discussed with Dr. Nena Troy. When the patient requires a follow-up thoracentesis, a large bore chest tube should be placed.       Objective:   Vitals: BP (!) 151/79   Pulse 64   Temp 98.1 °F (36.7 °C) (Axillary)   Resp 14   Ht 5' 5.5\" (1.664 m)   Wt 169 lb (76.7 kg)   SpO2 100%   BMI 27.70 kg/m²   O2 Flow Rate (L/min): 15 L/min  Neck: no adenopathy and no jugular venous distention  Lungs: diminished breath sounds right more than left  Heart: regular rate and

## 2018-09-13 NOTE — PROGRESS NOTES
Physical Therapy  Physical Therapy  Daily Treatment Note  9/13/2018  [id number]                      Kamari Morgan   [id number]                              1950    Patient Active Problem List   Diagnosis    HTN (hypertension)    Panic disorder    OA (osteoarthritis of spine)    Hyperlipidemia    Chronic kidney disease (CKD)    Mass of kidney    GERD (gastroesophageal reflux disease)    Neuropathy    Stenosis of right internal carotid artery with cerebral infarction ([facility] [address]    Osteophyte of vertebrae    Spinal stenosis in cervical region    Cervical spine instability    Hypoxia    Pleural effusion    Respiratory failure (HCC)       Recommendation for discharge: HHPT if needed  Equipment prescriptions needed:to be determined    AM-[facility] Mobility Inpatient   How much difficulty turning over in bed?: A Little  How much difficulty sitting down on / standing up from a chair with arms?: A Little  How much difficulty moving from lying on back to sitting on side of bed?: A Little  How much help from another person moving to and from a bed to a chair?: A Little  How much help from another person needed to walk in hospital room?: A Little  How much help from another person for climbing 3-5 steps with a railing?: A Lot  AM-PAC Inpatient Mobility Raw Score : 17  AM-PAC Inpatient T-Scale Score : 42.13  Mobility Inpatient CMS 0-100% Score: 50.57  Mobility Inpatient CMS G-Code Modifier : CK      Precautions: falls, alarm, bipap, hi flow O2    S: Patient cleared by nursing for treatment. Patient is agreeable to treatment. Pt reported difficulty breathing and was currently wearing bipap. Pt also reported he may be transported to [address] [place] [address] today d/t collapsed lung. Pain status: (measured on a visual analog scale with 0=no pain and 10=excruciating pain) 0/10.    O: Pt was instructed in and performed the following:   Bed Mobility- Supine to sit- Supervision     Scooting- Supervision     Sit to supine- Supervision   Transfers-sit to stand- Supervision     Gait: Patient ambulated 3 feet using no device with Supervision. Comments: V/C for safety, breathing technique, and obstacle negotiation. Steps: NA  Treatment: Pt was transferred to side of bed, stood, and took steps to chair. Pt declined further activity d/t SOB and fatigue. Comment: Call light left by patient. Pt was sitting in chair at end of treatment, OT Hunter Espinal present. A: Pt had limited activity as above but was motivated to participate as able. Pt became easily fatigued with drop in O2 levels with activity. P: Continue with physical therapy   Staci Moreira, PTA      GOALS to be met in 3 days. Bed mobility- Independent                                         Transfers-Sit to stand- Independent                Gait: Patient to ambulate 2 x 50 feet using no device with Independent                Steps: Patient to go up and down 10 step(s) using 1 rail(s) with Supervision       Increase strength in affected mm groups by 1/3 grade  Increase balance to allow for improvement towards functional goals. Increase endurance to allow for improvement towards functional goals.

## 2018-09-14 ENCOUNTER — APPOINTMENT (OUTPATIENT)
Dept: GENERAL RADIOLOGY | Age: 68
DRG: 164 | End: 2018-09-14
Attending: INTERNAL MEDICINE
Payer: MEDICARE

## 2018-09-14 ENCOUNTER — APPOINTMENT (OUTPATIENT)
Dept: ULTRASOUND IMAGING | Age: 68
DRG: 164 | End: 2018-09-14
Attending: INTERNAL MEDICINE
Payer: MEDICARE

## 2018-09-14 LAB
ANION GAP SERPL CALCULATED.3IONS-SCNC: 12 MMOL/L (ref 7–16)
APPEARANCE FLUID: NORMAL
BUN BLDV-MCNC: 8 MG/DL (ref 8–23)
CALCIUM SERPL-MCNC: 9.2 MG/DL (ref 8.6–10.2)
CELL COUNT FLUID TYPE: NORMAL
CHLORIDE BLD-SCNC: 98 MMOL/L (ref 98–107)
CO2: 28 MMOL/L (ref 22–29)
COLOR FLUID: YELLOW
CREAT SERPL-MCNC: 1 MG/DL (ref 0.7–1.2)
FILM ARRAY ADENOVIRUS: NORMAL
FILM ARRAY BORDETELLA PERTUSSIS: NORMAL
FILM ARRAY CHLAMYDOPHILIA PNEUMONIAE: NORMAL
FILM ARRAY CORONAVIRUS 229E: NORMAL
FILM ARRAY CORONAVIRUS HKU1: NORMAL
FILM ARRAY CORONAVIRUS NL63: NORMAL
FILM ARRAY CORONAVIRUS OC43: NORMAL
FILM ARRAY INFLUENZA A VIRUS 09H1: NORMAL
FILM ARRAY INFLUENZA A VIRUS H1: NORMAL
FILM ARRAY INFLUENZA A VIRUS H3: NORMAL
FILM ARRAY INFLUENZA A VIRUS: NORMAL
FILM ARRAY INFLUENZA B: NORMAL
FILM ARRAY METAPNEUMOVIRUS: NORMAL
FILM ARRAY MYCOPLASMA PNEUMONIAE: NORMAL
FILM ARRAY PARAINFLUENZA VIRUS 1: NORMAL
FILM ARRAY PARAINFLUENZA VIRUS 2: NORMAL
FILM ARRAY PARAINFLUENZA VIRUS 3: NORMAL
FILM ARRAY PARAINFLUENZA VIRUS 4: NORMAL
FILM ARRAY RESPIRATORY SYNCITIAL VIRUS: NORMAL
FILM ARRAY RHINOVIRUS/ENTEROVIRUS: NORMAL
FLUID TYPE: NORMAL
GFR AFRICAN AMERICAN: >60
GFR NON-AFRICAN AMERICAN: >60 ML/MIN/1.73
GLUCOSE BLD-MCNC: 180 MG/DL (ref 74–109)
GLUCOSE, FLUID: 126 MG/DL
HCT VFR BLD CALC: 37.3 % (ref 37–54)
HEMOGLOBIN: 12 G/DL (ref 12.5–16.5)
LD, FLUID: 195 U/L
MAGNESIUM: 2.1 MG/DL (ref 1.6–2.6)
MCH RBC QN AUTO: 30.2 PG (ref 26–35)
MCHC RBC AUTO-ENTMCNC: 32.2 % (ref 32–34.5)
MCV RBC AUTO: 93.7 FL (ref 80–99.9)
MONOCYTE, FLUID: 86 %
NEUTROPHIL, FLUID: 15 %
NUCLEATED CELLS FLUID: 62 /UL
PDW BLD-RTO: 12.7 FL (ref 11.5–15)
PHOSPHORUS: 3.3 MG/DL (ref 2.5–4.5)
PLATELET # BLD: 325 E9/L (ref 130–450)
PMV BLD AUTO: 10.5 FL (ref 7–12)
POTASSIUM REFLEX MAGNESIUM: 4 MMOL/L (ref 3.5–5)
PROCALCITONIN: 0.21 NG/ML (ref 0–0.08)
PROTEIN FLUID: 3.8 G/DL
RBC # BLD: 3.98 E12/L (ref 3.8–5.8)
RBC FLUID: <2000 /UL
SODIUM BLD-SCNC: 138 MMOL/L (ref 132–146)
WBC # BLD: 8 E9/L (ref 4.5–11.5)

## 2018-09-14 PROCEDURE — 84157 ASSAY OF PROTEIN OTHER: CPT

## 2018-09-14 PROCEDURE — 0W993ZZ DRAINAGE OF RIGHT PLEURAL CAVITY, PERCUTANEOUS APPROACH: ICD-10-PCS | Performed by: RADIOLOGY

## 2018-09-14 PROCEDURE — 2700000000 HC OXYGEN THERAPY PER DAY

## 2018-09-14 PROCEDURE — 87070 CULTURE OTHR SPECIMN AEROBIC: CPT

## 2018-09-14 PROCEDURE — 6370000000 HC RX 637 (ALT 250 FOR IP): Performed by: INTERNAL MEDICINE

## 2018-09-14 PROCEDURE — 87206 SMEAR FLUORESCENT/ACID STAI: CPT

## 2018-09-14 PROCEDURE — 99291 CRITICAL CARE FIRST HOUR: CPT | Performed by: INTERNAL MEDICINE

## 2018-09-14 PROCEDURE — 88305 TISSUE EXAM BY PATHOLOGIST: CPT

## 2018-09-14 PROCEDURE — 87015 SPECIMEN INFECT AGNT CONCNTJ: CPT

## 2018-09-14 PROCEDURE — 83615 LACTATE (LD) (LDH) ENZYME: CPT

## 2018-09-14 PROCEDURE — 99231 SBSQ HOSP IP/OBS SF/LOW 25: CPT | Performed by: INTERNAL MEDICINE

## 2018-09-14 PROCEDURE — 87116 MYCOBACTERIA CULTURE: CPT

## 2018-09-14 PROCEDURE — 94660 CPAP INITIATION&MGMT: CPT

## 2018-09-14 PROCEDURE — 6360000002 HC RX W HCPCS: Performed by: INTERNAL MEDICINE

## 2018-09-14 PROCEDURE — 87205 SMEAR GRAM STAIN: CPT

## 2018-09-14 PROCEDURE — 71045 X-RAY EXAM CHEST 1 VIEW: CPT

## 2018-09-14 PROCEDURE — 87102 FUNGUS ISOLATION CULTURE: CPT

## 2018-09-14 PROCEDURE — 84100 ASSAY OF PHOSPHORUS: CPT

## 2018-09-14 PROCEDURE — 32561 LYSE CHEST FIBRIN INIT DAY: CPT | Performed by: INTERNAL MEDICINE

## 2018-09-14 PROCEDURE — 2000000000 HC ICU R&B

## 2018-09-14 PROCEDURE — 94640 AIRWAY INHALATION TREATMENT: CPT

## 2018-09-14 PROCEDURE — 84145 PROCALCITONIN (PCT): CPT

## 2018-09-14 PROCEDURE — 36415 COLL VENOUS BLD VENIPUNCTURE: CPT

## 2018-09-14 PROCEDURE — 88112 CYTOPATH CELL ENHANCE TECH: CPT

## 2018-09-14 PROCEDURE — 2580000003 HC RX 258: Performed by: INTERNAL MEDICINE

## 2018-09-14 PROCEDURE — 80048 BASIC METABOLIC PNL TOTAL CA: CPT

## 2018-09-14 PROCEDURE — 85027 COMPLETE CBC AUTOMATED: CPT

## 2018-09-14 PROCEDURE — 32557 INSERT CATH PLEURA W/ IMAGE: CPT | Performed by: INTERNAL MEDICINE

## 2018-09-14 PROCEDURE — 89051 BODY FLUID CELL COUNT: CPT

## 2018-09-14 PROCEDURE — 83735 ASSAY OF MAGNESIUM: CPT

## 2018-09-14 PROCEDURE — 82947 ASSAY GLUCOSE BLOOD QUANT: CPT

## 2018-09-14 PROCEDURE — 32555 ASPIRATE PLEURA W/ IMAGING: CPT

## 2018-09-14 PROCEDURE — 0B9N30Z DRAINAGE OF RIGHT PLEURA WITH DRAINAGE DEVICE, PERCUTANEOUS APPROACH: ICD-10-PCS | Performed by: INTERNAL MEDICINE

## 2018-09-14 RX ORDER — PANTOPRAZOLE SODIUM 40 MG/1
40 TABLET, DELAYED RELEASE ORAL
Status: DISCONTINUED | OUTPATIENT
Start: 2018-09-15 | End: 2018-09-19 | Stop reason: HOSPADM

## 2018-09-14 RX ADMIN — AMLODIPINE BESYLATE 10 MG: 10 TABLET ORAL at 08:55

## 2018-09-14 RX ADMIN — GABAPENTIN 600 MG: 600 TABLET, FILM COATED ORAL at 14:54

## 2018-09-14 RX ADMIN — GABAPENTIN 600 MG: 600 TABLET, FILM COATED ORAL at 08:55

## 2018-09-14 RX ADMIN — WATER 1 G: 1 INJECTION INTRAMUSCULAR; INTRAVENOUS; SUBCUTANEOUS at 18:11

## 2018-09-14 RX ADMIN — Medication 10 ML: at 08:56

## 2018-09-14 RX ADMIN — BUDESONIDE 1000 MCG: 0.5 SUSPENSION RESPIRATORY (INHALATION) at 08:55

## 2018-09-14 RX ADMIN — METHYLPREDNISOLONE SODIUM SUCCINATE 40 MG: 40 INJECTION, POWDER, FOR SOLUTION INTRAMUSCULAR; INTRAVENOUS at 20:45

## 2018-09-14 RX ADMIN — ALTEPLASE 10 MG: 2.2 INJECTION, POWDER, LYOPHILIZED, FOR SOLUTION INTRAVENOUS at 17:30

## 2018-09-14 RX ADMIN — HYDRALAZINE HYDROCHLORIDE 25 MG: 25 TABLET, FILM COATED ORAL at 20:46

## 2018-09-14 RX ADMIN — ASPIRIN 81 MG: 81 TABLET ORAL at 08:55

## 2018-09-14 RX ADMIN — METHYLPREDNISOLONE SODIUM SUCCINATE 40 MG: 40 INJECTION, POWDER, FOR SOLUTION INTRAMUSCULAR; INTRAVENOUS at 08:55

## 2018-09-14 RX ADMIN — Medication 10 ML: at 18:11

## 2018-09-14 RX ADMIN — GABAPENTIN 600 MG: 600 TABLET, FILM COATED ORAL at 20:46

## 2018-09-14 RX ADMIN — IPRATROPIUM BROMIDE AND ALBUTEROL SULFATE 1 AMPULE: 2.5; .5 SOLUTION RESPIRATORY (INHALATION) at 08:55

## 2018-09-14 RX ADMIN — BUDESONIDE 1000 MCG: 0.5 SUSPENSION RESPIRATORY (INHALATION) at 20:10

## 2018-09-14 RX ADMIN — ENOXAPARIN SODIUM 40 MG: 40 INJECTION SUBCUTANEOUS at 08:56

## 2018-09-14 RX ADMIN — DORNASE ALFA 5 MG: 1 SOLUTION RESPIRATORY (INHALATION) at 17:30

## 2018-09-14 RX ADMIN — AZITHROMYCIN MONOHYDRATE 500 MG: 500 INJECTION, POWDER, LYOPHILIZED, FOR SOLUTION INTRAVENOUS at 20:45

## 2018-09-14 RX ADMIN — ATENOLOL 50 MG: 50 TABLET ORAL at 08:55

## 2018-09-14 RX ADMIN — ATORVASTATIN CALCIUM 40 MG: 40 TABLET, FILM COATED ORAL at 08:55

## 2018-09-14 RX ADMIN — Medication 10 ML: at 20:46

## 2018-09-14 RX ADMIN — ATENOLOL 50 MG: 50 TABLET ORAL at 20:46

## 2018-09-14 RX ADMIN — FORMOTEROL FUMARATE DIHYDRATE 20 MCG: 20 SOLUTION RESPIRATORY (INHALATION) at 08:54

## 2018-09-14 RX ADMIN — FORMOTEROL FUMARATE DIHYDRATE 20 MCG: 20 SOLUTION RESPIRATORY (INHALATION) at 20:10

## 2018-09-14 RX ADMIN — HYDRALAZINE HYDROCHLORIDE 25 MG: 25 TABLET, FILM COATED ORAL at 08:55

## 2018-09-14 RX ADMIN — IPRATROPIUM BROMIDE AND ALBUTEROL SULFATE 1 AMPULE: 2.5; .5 SOLUTION RESPIRATORY (INHALATION) at 12:07

## 2018-09-14 RX ADMIN — PAROXETINE HYDROCHLORIDE HEMIHYDRATE 30 MG: 10 TABLET, FILM COATED ORAL at 08:55

## 2018-09-14 RX ADMIN — IPRATROPIUM BROMIDE AND ALBUTEROL SULFATE 1 AMPULE: 2.5; .5 SOLUTION RESPIRATORY (INHALATION) at 04:25

## 2018-09-14 ASSESSMENT — PAIN SCALES - GENERAL
PAINLEVEL_OUTOF10: 0

## 2018-09-14 NOTE — PROGRESS NOTES
[facility]  Internal Medicine Residency Program  MICU progress note    Patient:  Mike Johnson 70 y.o. male MRN: [id number]     Date of Service: 9/14/2018    Hospital Day: 2      Chief complaint: SOB  subjective   Patient was seen and examined this morning at bedside, he was wearing BiPAP. He reported feeling well, no chest pain.    ·     Physical Exam   · Vitals: /61   Pulse 58   Temp 98.4 °F (36.9 °C) (Temporal)   Resp 17   Ht 5' 5\" (1.651 m)   Wt 161 lb (73 kg)   SpO2 95%   BMI 26.79 kg/m²     · General Appearance: alert and oriented to person, place and time  · Head: normocephalic and atraumatic  · Eyes: pupils equal, round, and reactive to light  · ENT: tympanic membrane, external ear and ear canal normal bilaterally, oropharynx clear and moist with normal mucous membranes  · Neck: neck supple and non tender without mass   · Pulmonary/Chest: b/l decrease air entry, right >> left with wheeze  · Cardiovascular: normal rate, normal S1 and S2, no gallops and intact distal pulses  · Abdomen: soft, non-tender, non-distended, normal bowel sounds, no masses or organomegaly  · Extremities: no cyanosis and no clubbing  · Musculoskeletal: normal range of motion, no joint swelling, deformity or tenderness  · Neurologic: reflexes normal and symmetric, no cranial nerve deficit, gait and coordination normal and speech normal   Labs and Imaging Studies   Basic Labs  Recent Labs      09/12/18 2045 09/13/18 0537  09/14/18   0435   NA  139  142  138   K  3.7  3.9  4.0   CL  102  104  98   CO2  28  29  28   BUN  6*  5*  8   CREATININE  1.2  1.2  1.0   GLUCOSE  167*  99  180*   CALCIUM  8.6  8.7  9.2       Recent Labs      09/12/18 0523 09/12/18 2045 09/13/18 0537  09/14/18   0435   WBC  7.6  11.3   --   8.0   RBC  3.18*  3.54*   --   3.98   HGB  9.7*  10.8*  10.8*  12.0*   HCT  30.4*  33.3*  33.9*  37.3   MCV  95.6  94.1   --   93.7   MCH  30.5  30.5   --   30.2   MCHC  31.9*  32.4 --   32.2   RDW  12.9  12.9   --   12.7   PLT  266  310   --   325   MPV  10.4  9.9   --   10.5       CBC:   Lab Results   Component Value Date    WBC 8.0 09/14/2018    RBC 3.98 09/14/2018    HGB 12.0 09/14/2018    HCT 37.3 09/14/2018    MCV 93.7 09/14/2018    RDW 12.7 09/14/2018     09/14/2018     BMP:    Lab Results   Component Value Date     09/14/2018    K 4.0 09/14/2018    CL 98 09/14/2018    CO2 28 09/14/2018    BUN 8 09/14/2018       Imaging Studies:     Ct Chest Wo Contrast    Result Date: 9/10/2018  Reading location:  200 CLINICAL STATEMENT: Shortness of breath. TECHNIQUE: Axial CT images through the thorax were obtained without the use of contrast. Coronal and sagittal reconstructed images were generated from the dataset acquired in axial imaging. Automated dose exposure control was utilized for this examination. COMPARISON: August 27, 2018 CTA. FINDINGS: On today's examination, there has been a significant increase in the right pleural effusion. The pleural effusion is relatively large at this time. There is no significant left pleural effusion. There is underlying compression atelectasis of the right lower lobe probably caused by the large volume of pleural fluid. Air bronchograms are seen in the right lower lobe. There is no pericardial effusion. In the mediastinum, no contrast was given making evaluation of the mediastinum somewhat difficult but no new adenopathy is appreciated when compared with the study of August 27, 2018. The central airways appear patent. Lung windows reveal there is minimal atelectatic change in the left base and bands of infiltrate or atelectasis in the right upper lobe. Additionally, high-grade collapse of the right lower lobe is seen. 1. Since the prior CT study of August 27, 2018, there has been a marked interval increase in the right pleural effusion. 2. There is now near total collapse of the right lower lobe.  3. A dense infiltrate is seen in the right upper lobe. 4. Only minimal atelectatic changes are seen in the left lung. 5. I see no abnormality in the central airways. Ct Cervical Spine Wo Contrast    Result Date: 9/6/2018  Location: 200 Indication: Neck pain, evaluate for pseudoarthrosis. Comparison: MRI cervical spine from 8/27/2018; CT cervical spine from 12/20/2016; CTA chest from 8/27/2018 Technique: Multidetector CT imaging of the cervical spine was performed without administration of intravenous contrast. Coronal and sagittal reformatted images were obtained. Automated dose exposure control was used for this exam. FINDINGS: Redemonstrated are postsurgical changes from prior anterior fusion from C3 to T1 with an anterior compression plate and screws within the C3 and T1 vertebral bodies. There is a fibular strut graft traversing from C4 to T1 with corpectomies at C5 and C6. The hardware is intact. There is solid osseous incorporation of the proximal strut graft at C3-C4. There is suggestion of partial osseous incorporation of the distal strut graft at the level of T1. A portion of the C7-T1 disc space remains evident. There is no evidence of acute fracture. There is no prevertebral soft tissue swelling. C2-C3: Mild disc osteophyte complex. Bilateral facet hypertrophy, right greater than left. No osseous encroachment of the central canal or neuroforamina. C3-C4: Postsurgical changes from prior anterior fusion. Bilateral uncovertebral and facet hypertrophy. Moderate right and mild left foraminal narrowing. C4-C5: Postsurgical changes from prior anterior fusion. Bilateral uncovertebral and facet hypertrophy. Mild right foraminal narrowing. No left foraminal narrowing. C5-C6: Postsurgical changes from prior anterior fusion and corpectomy. No osseous encroachment of the neuroforamina. C6-C7: Postsurgical changes from prior anterior fusion. No osseous encroachment of the neuroforamina. C7-T1: Postsurgical changes from prior anterior fusion.  Mild bilateral disease. 2.  Small right pleural effusion   3. Patchy opacities superimposed over the pleural effusion compatible with right basilar atelectasis versus infiltrate This report has been electronically signed by Nancy Hernandez MD.    Ir Guided Thoracentesis Pleural    Result Date: 9/7/2018  Location:200 Exam: IR GUIDED THORACENTESIS PLEURAL HISTORY:  Right pleural effusion. PROCEDURE:  Ultrasound Guided Thoracentesis. Informed consent was obtained. The skin was prepped and draped in a sterile fashion and anesthetized with Lidocaine. Maximal sterile barrier technique was utilized. Under ultrasound guidance, a #5 [place] Jocelyn Yueh needle and catheter were inserted into the fluid collection on the right. 600 cc of pleural fluid were drained. FINDINGS: Ultrasound images demonstrate a  large right pleural effusion. Following thoracentesis, there is residual pleural effusion. 1. Successful ultrasound guided therapeutic thoracentesis. 2. Please note, the pleural effusion on the right contains multiple septations and compartments, therefore prohibiting complete aspiration of the right pleural effusion. These findings will be discussed with Dr. Sathya Ortega. When the patient requires a follow-up thoracentesis, a large bore chest tube should be placed. EKG: normal sinus rhythm, unchanged from previous tracings. Resident's Assessment and Plan     Daryl Rossi is a 70 y.o. male    1. Right pleural effusion  - Parapneumonia vs transudate vs malignant  -CT chest : right pleural effusion  With mediastinal LN  - pleural fluid: protein 3.5, LD: 510, PH 8, transudative  -Hx of chronic smoker  - on bipap  - chest tube was placed today, follow up pleural fluid analysis  - continue breathing treatment and iv steroid  -on azithromycin and rocephin  -Monitor respiratory status     2. EtOH abuse  - on ativan 0.5mg q6 hr    3. HTN  -on amlodipine,atenolol and hydralazine    4. HLD  -on atorvastatin    5.  Hx TIA with s/p right carotid endartectomy  - on aspirin    6. Tobacco abuse  -patient  on cessation         DVT prophylaxis/ GI prophylaxis: Lovenox and protonix    Chest tube was placed in today, will follow pleural fluid analysis. Chely Harper MD, PGY-2  Internal Medicine resident  Attending physician: Dr. Merissa Buenrostro personally saw, examined and provided care for the patient. Radiographs, labs and medication list were reviewed by me independently. I spoke with bedside nursing, therapists and consultants. Critical care services and times documented are independent of procedures and multidisciplinary rounds with Residents. Additionally comprehensive, multidisciplinary rounds were conducted with the MICU team. The case was discussed in detail and plans for care were established. Review of Residents documentation was conducted and revisions were made as appropriate. I agree with the above documented exam, problem list and plan of care. Fluid seem parpneumonis more than malignant   Waiting repeat cytology   Fluid looks loculated,and he drain 200 and then stop   Trial of intrapleural tPA and Dornase per MISIT study  If no improvement or no drain ,then VATS next weeks   Discuss with family  Discuss with dr Dave Macdonald reviewed with nursing staff, medical and surgical specialty care, primary care and the patient's family as available. `    Chart review/lab review/X-ray viewing/documentation and had long Conversation with patient/family re: prognosis, care options and any end of life issues:      Critical care time spent reviewing labs/films, examining patient, collaborating with other physicians more than 32 Minutes  excluding procedures . Kaleigh Forrest M.D.   9/14/2018  9:24 PM

## 2018-09-14 NOTE — CONSULTS
C4-C5, C5-C6, C6-C7 ACF, C5-C6 Corpectomy    ENDOSCOPY, COLON, DIAGNOSTIC      EYE SURGERY  2001    [facility]    FRACTURE SURGERY Left     plate in wrist    HAND SURGERY      PARATHYROIDECTOMY  11/7/2005    TONSILLECTOMY      UPPER GASTROINTESTINAL ENDOSCOPY  07/07/2014       Medications Prior to Admission:    Prior to Admission medications    Medication Sig Start Date End Date Taking? Authorizing Provider   atorvastatin (LIPITOR) 40 MG tablet Take 1 tablet by mouth daily 7/20/18  Yes Jean Bella,    b complex vitamins capsule Take 1 capsule by mouth every morning    Yes Historical Provider, MD   PARoxetine (PAXIL) 30 MG tablet TAKE ONE TABLET BY MOUTH EVERY DAY IN THE MORNING  Patient taking differently: Take 30 mg by mouth every morning TAKE ONE TABLET BY MOUTH EVERY DAY IN THE MORNING 10/25/17  Yes Esther Smith MD   gabapentin (NEURONTIN) 600 MG tablet Take 1 tablet by mouth 3 times daily 10/25/17  Yes Michelle Troncoso MD   atenolol (TENORMIN) 50 MG tablet Take 1 tablet by mouth 2 times daily 10/25/17 10/25/18 Yes Michelle Troncoso MD   amLODIPine (NORVASC) 10 MG tablet Take 1 tablet by mouth daily 10/25/17  Yes Michelle Troncoso MD   hydrALAZINE (APRESOLINE) 25 MG tablet Take 1 tablet by mouth 2 times daily 10/25/17  Yes Michelle Troncoso MD   acetaminophen (TYLENOL) 325 MG tablet Take 650 mg by mouth every 6 hours as needed for Pain   Yes Historical Provider, MD   umeclidinium-vilanterol (ANORO ELLIPTA) 62.5-25 MCG/INH AEPB inhaler Inhale 1 puff into the lungs daily 4/22/18   Joseph Morales MD   albuterol sulfate HFA (VENTOLIN HFA) 108 (90 Base) MCG/ACT inhaler Inhale 2 puffs into the lungs every 6 hours as needed for Wheezing 4/22/18   Joseph Morales MD   aspirin 81 MG tablet Take 81 mg by mouth every morning     Historical Provider, MD       Allergies:  Flexeril [cyclobenzaprine]; Lisinopril;  Influenza virus vaccine; Metformin and related; BMI 26.79 kg/m²     · General Appearance: alert and oriented to person, place and time  · Head: normocephalic and atraumatic  · Eyes: pupils equal, round, and reactive to light  · ENT: tympanic membrane, external ear and ear canal normal bilaterally, oropharynx clear and moist with normal mucous membranes  · Neck: neck supple and non tender without mass   · Pulmonary/Chest: b/l decrease air entry, right >> left with wheeze  · Cardiovascular: normal rate, normal S1 and S2, no gallops and intact distal pulses  · Abdomen: soft, non-tender, non-distended, normal bowel sounds, no masses or organomegaly  · Extremities: no cyanosis and no clubbing  · Musculoskeletal: normal range of motion, no joint swelling, deformity or tenderness  · Neurologic: reflexes normal and symmetric, no cranial nerve deficit, gait and coordination normal and speech normal   Labs and Imaging Studies   Basic Labs  Recent Labs      09/11/18   0515  09/12/18 2045 09/13/18   0537   NA  140  139  142   K  3.9  3.7  3.9   CL  107  102  104   CO2  24  28  29   BUN  9  6*  5*   CREATININE  1.3*  1.2  1.2   GLUCOSE  97  167*  99   CALCIUM  8.2*  8.6  8.7       Recent Labs      09/12/18   0523  09/12/18 2045 09/13/18   0537   WBC  7.6  11.3   --    RBC  3.18*  3.54*   --    HGB  9.7*  10.8*  10.8*   HCT  30.4*  33.3*  33.9*   MCV  95.6  94.1   --    MCH  30.5  30.5   --    MCHC  31.9*  32.4   --    RDW  12.9  12.9   --    PLT  266  310   --    MPV  10.4  9.9   --        CBC:   Lab Results   Component Value Date    WBC 11.3 09/12/2018    RBC 3.54 09/12/2018    HGB 10.8 09/13/2018    HCT 33.9 09/13/2018    MCV 94.1 09/12/2018    RDW 12.9 09/12/2018     09/12/2018     BMP:    Lab Results   Component Value Date     09/13/2018    K 3.9 09/13/2018    K 3.8 09/06/2018     09/13/2018    CO2 29 09/13/2018    BUN 5 09/13/2018     U/A:  No components found for: Janae Bella, USPGRAV, UPH, UPROTEIN, UGLUCOSE, MILLIE, ARTURO, EPHRAIM, [place], UUROBIL, [place], USQEPI, [place], [facility] [place], [facility], [place]    Imaging Studies:     Ct Chest Wo Contrast    Result Date: 9/10/2018  Reading location:  200 CLINICAL STATEMENT: Shortness of breath. TECHNIQUE: Axial CT images through the thorax were obtained without the use of contrast. Coronal and sagittal reconstructed images were generated from the dataset acquired in axial imaging. Automated dose exposure control was utilized for this examination. COMPARISON: August 27, 2018 CTA. FINDINGS: On today's examination, there has been a significant increase in the right pleural effusion. The pleural effusion is relatively large at this time. There is no significant left pleural effusion. There is underlying compression atelectasis of the right lower lobe probably caused by the large volume of pleural fluid. Air bronchograms are seen in the right lower lobe. There is no pericardial effusion. In the mediastinum, no contrast was given making evaluation of the mediastinum somewhat difficult but no new adenopathy is appreciated when compared with the study of August 27, 2018. The central airways appear patent. Lung windows reveal there is minimal atelectatic change in the left base and bands of infiltrate or atelectasis in the right upper lobe. Additionally, high-grade collapse of the right lower lobe is seen. 1. Since the prior CT study of August 27, 2018, there has been a marked interval increase in the right pleural effusion. 2. There is now near total collapse of the right lower lobe. 3. A dense infiltrate is seen in the right upper lobe. 4. Only minimal atelectatic changes are seen in the left lung. 5. I see no abnormality in the central airways. Ct Cervical Spine Wo Contrast    Result Date: 9/6/2018  Location: 200 Indication: Neck pain, evaluate for pseudoarthrosis.  Comparison: MRI cervical spine from 8/27/2018; CT cervical spine from 12/20/2016; CTA chest from 8/27/2018 Technique: Multidetector are well aerated. Visualized portion of the brain demonstrates no significant mass effect. 1. Postsurgical changes from prior anterior fusion with an anterior compression plate and screws traversing from C3 to T1 and a fibular strut graft traversing from C4 to T1. There is solid osseous incorporation of the proximal strut graft at C3-C4 and suggestion of partial osseous incorporation of the distal strut graft at the level of T1. 2. Moderate right and mild left foraminal narrowing at C3-C4. 3. Mild right foraminal narrowing at C4-C5. 4. Large right pleural effusion, increased in size compared to the previous CT from 8/27/2018. The patient was short of breath and instructed to go to the emergency department after the completion of this exam.     Xr Chest Portable    Result Date: 9/13/2018  Reading location: 200 INDICATION: Pleural effusion, follow-up FINDINGS: Portable AP upright view the chest compared 9/12/2018. Extensive pleural-parenchymal opacity in the right hemithorax is again noted. Left lung remains clear. Stable heart and mediastinum. Normal caliber pulmonary vessels. No interval change. Xr Chest Portable    Result Date: 9/12/2018  Reading location: 200 INDICATION: Dyspnea FINDINGS: Portable AP upright view the chest compared 9/10/2018. Extensive pleural-parenchymal opacity in the right hemithorax is similar to prior. Left lung remains relatively clear. Stable heart and mediastinum. No change right pleural effusion/thickening with associated lung consolidation    Xr Chest Portable    Result Date: 9/10/2018  Reading location: 200 INDICATION: Dyspnea FINDINGS: Portable AP upright view the chest compared with 9/7/2018. Pleural-parenchymal opacities in the right hemithorax are unchanged. Left lung remains clear. Stable heart and mediastinum. No interval change. Xr Chest Portable    Result Date: 9/7/2018  Reading location: 200 Indication: Shortness of breath, dyspnea.  Comparison: Chest the patient. Radiographs, labs and medication list were reviewed by me independently. I spoke with bedside nursing, therapists and consultants. Critical care services and times documented are independent of procedures and multidisciplinary rounds with Residents. Additionally comprehensive, multidisciplinary rounds were conducted with the MICU team. The case was discussed in detail and plans for care were established. Review of Residents documentation was conducted and revisions were made as appropriate. I agree with the above documented exam, problem list and plan of care. I personally saw, examined and provided care for the patient. Radiographs, labs and medication list were reviewed by me independently. I spoke with bedside nursing, therapists and consultants. Critical care services and times documented are independent of procedures and multidisciplinary rounds with Residents. Additionally comprehensive, multidisciplinary rounds were conducted with the MICU team. The case was discussed in detail and plans for care were established. Review of Residents documentation was conducted and revisions were made as appropriate. I agree with the above documented exam, problem list and plan of care.   Patient with Pleural effusion ,likely malignant vs para pneumonic though first cytology negative  For pigtail cathter tomorrow  Cytology ,if negative EBUS and if negative then he will need VATS   BIPAP for respiratory failure   Very anxious start Precedex   May require intubation   Will follow  closely   Cover with abx   Edward Welsh

## 2018-09-14 NOTE — CARE COORDINATION
9/14  Transition of care notes  Transfer from [facility] for worsening effusion  Had presented there for sob  Transfer to main campus for chest tube vs vats  pmh   Depression etoh tia cerv fusion carotid surgery  Met with patient and his partner Ata is present  He stated that she along with his sister Chinyere are his poa and Ata will bring in paperwork  They live at [address]  The [place] po box is where he gets his mail  Home is one story, there are 7 steps to enter home  [facility] and would use again if needed  No history of terrell  He sees pcp here at our ambulatory clinic  Has mail service through [employer] for meds and also uses [facility]  We discussed his past medical history  Admits to drinking 5 drinks a day has done \" all my life\"  Has never sought help nor does he want help.  Offered referral to our peer recovery and he declined  He understands that the plan is to insert a pig tail cath today  Carlo Kilpatrick RN Case Manager

## 2018-09-14 NOTE — PROGRESS NOTES
[address] [facility]  Internal Medicine Residency / [address]    Attending Physician Statement  I have discussed the case, including pertinent history and exam findings with the resident and the team.  I have seen and examined the patient and the key elements of the encounter have been performed by me. I agree with the assessment, plan and orders as documented by the resident. Several months of progressive SOB and pleural effusion  Etiology of effusion is unknown  To have pleural  catheter or VATS  And Bronchoscopy may be planned  H&L decreased BS on right  VS stable  Plan:Proceed with Pulmonary           R/O CA , para pneumonic , other     Remainder of medical problems as per resident note.       [facility]  Internal Medicine Residency Faculty

## 2018-09-14 NOTE — PROGRESS NOTES
Per Dr. Benavides Peeling possible bronchoscopy. Will keep patient NPO until MICU teams rounds and plan of are is discussed.

## 2018-09-15 ENCOUNTER — APPOINTMENT (OUTPATIENT)
Dept: GENERAL RADIOLOGY | Age: 68
DRG: 164 | End: 2018-09-15
Attending: INTERNAL MEDICINE
Payer: MEDICARE

## 2018-09-15 LAB
ANION GAP SERPL CALCULATED.3IONS-SCNC: 18 MMOL/L (ref 7–16)
BUN BLDV-MCNC: 18 MG/DL (ref 8–23)
CALCIUM SERPL-MCNC: 8.6 MG/DL (ref 8.6–10.2)
CHLORIDE BLD-SCNC: 94 MMOL/L (ref 98–107)
CO2: 24 MMOL/L (ref 22–29)
CREAT SERPL-MCNC: 1 MG/DL (ref 0.7–1.2)
GFR AFRICAN AMERICAN: >60
GFR NON-AFRICAN AMERICAN: >60 ML/MIN/1.73
GLUCOSE BLD-MCNC: 167 MG/DL (ref 74–109)
GRAM STAIN ORDERABLE: NORMAL
HCT VFR BLD CALC: 35.1 % (ref 37–54)
HEMOGLOBIN: 11.7 G/DL (ref 12.5–16.5)
LACTIC ACID: 1.4 MMOL/L (ref 0.5–2.2)
MAGNESIUM: 2 MG/DL (ref 1.6–2.6)
MCH RBC QN AUTO: 30.7 PG (ref 26–35)
MCHC RBC AUTO-ENTMCNC: 33.3 % (ref 32–34.5)
MCV RBC AUTO: 92.1 FL (ref 80–99.9)
PDW BLD-RTO: 12.6 FL (ref 11.5–15)
PHOSPHORUS: 3.1 MG/DL (ref 2.5–4.5)
PLATELET # BLD: 315 E9/L (ref 130–450)
PMV BLD AUTO: 11.6 FL (ref 7–12)
POTASSIUM REFLEX MAGNESIUM: 3.8 MMOL/L (ref 3.5–5)
RBC # BLD: 3.81 E12/L (ref 3.8–5.8)
SODIUM BLD-SCNC: 136 MMOL/L (ref 132–146)
WBC # BLD: 10.2 E9/L (ref 4.5–11.5)

## 2018-09-15 PROCEDURE — 6370000000 HC RX 637 (ALT 250 FOR IP): Performed by: INTERNAL MEDICINE

## 2018-09-15 PROCEDURE — 2580000003 HC RX 258: Performed by: INTERNAL MEDICINE

## 2018-09-15 PROCEDURE — 99233 SBSQ HOSP IP/OBS HIGH 50: CPT | Performed by: INTERNAL MEDICINE

## 2018-09-15 PROCEDURE — 80048 BASIC METABOLIC PNL TOTAL CA: CPT

## 2018-09-15 PROCEDURE — 83605 ASSAY OF LACTIC ACID: CPT

## 2018-09-15 PROCEDURE — 36415 COLL VENOUS BLD VENIPUNCTURE: CPT

## 2018-09-15 PROCEDURE — 6360000002 HC RX W HCPCS: Performed by: INTERNAL MEDICINE

## 2018-09-15 PROCEDURE — 83735 ASSAY OF MAGNESIUM: CPT

## 2018-09-15 PROCEDURE — 71045 X-RAY EXAM CHEST 1 VIEW: CPT

## 2018-09-15 PROCEDURE — 2060000000 HC ICU INTERMEDIATE R&B

## 2018-09-15 PROCEDURE — 94640 AIRWAY INHALATION TREATMENT: CPT

## 2018-09-15 PROCEDURE — 99232 SBSQ HOSP IP/OBS MODERATE 35: CPT | Performed by: INTERNAL MEDICINE

## 2018-09-15 PROCEDURE — 85027 COMPLETE CBC AUTOMATED: CPT

## 2018-09-15 PROCEDURE — 84100 ASSAY OF PHOSPHORUS: CPT

## 2018-09-15 PROCEDURE — 94660 CPAP INITIATION&MGMT: CPT

## 2018-09-15 RX ORDER — IPRATROPIUM BROMIDE AND ALBUTEROL SULFATE 2.5; .5 MG/3ML; MG/3ML
1 SOLUTION RESPIRATORY (INHALATION)
Status: DISCONTINUED | OUTPATIENT
Start: 2018-09-15 | End: 2018-09-19 | Stop reason: HOSPADM

## 2018-09-15 RX ORDER — METHYLPREDNISOLONE SODIUM SUCCINATE 40 MG/ML
40 INJECTION, POWDER, LYOPHILIZED, FOR SOLUTION INTRAMUSCULAR; INTRAVENOUS DAILY
Status: DISCONTINUED | OUTPATIENT
Start: 2018-09-16 | End: 2018-09-18

## 2018-09-15 RX ORDER — BUDESONIDE 1 MG/2ML
1 INHALANT ORAL 2 TIMES DAILY
Status: DISCONTINUED | OUTPATIENT
Start: 2018-09-15 | End: 2018-09-16 | Stop reason: CLARIF

## 2018-09-15 RX ORDER — FORMOTEROL FUMARATE 20 UG/2ML
20 SOLUTION RESPIRATORY (INHALATION) EVERY 12 HOURS
Status: DISCONTINUED | OUTPATIENT
Start: 2018-09-15 | End: 2018-09-19 | Stop reason: HOSPADM

## 2018-09-15 RX ADMIN — GABAPENTIN 600 MG: 600 TABLET, FILM COATED ORAL at 20:13

## 2018-09-15 RX ADMIN — IPRATROPIUM BROMIDE AND ALBUTEROL SULFATE 1 AMPULE: 2.5; .5 SOLUTION RESPIRATORY (INHALATION) at 09:16

## 2018-09-15 RX ADMIN — PAROXETINE HYDROCHLORIDE HEMIHYDRATE 30 MG: 10 TABLET, FILM COATED ORAL at 08:52

## 2018-09-15 RX ADMIN — ALTEPLASE 10 MG: 2.2 INJECTION, POWDER, LYOPHILIZED, FOR SOLUTION INTRAVENOUS at 12:30

## 2018-09-15 RX ADMIN — BUDESONIDE 1 MG: 1 SUSPENSION RESPIRATORY (INHALATION) at 19:48

## 2018-09-15 RX ADMIN — HYDRALAZINE HYDROCHLORIDE 25 MG: 25 TABLET, FILM COATED ORAL at 20:13

## 2018-09-15 RX ADMIN — Medication 10 ML: at 20:08

## 2018-09-15 RX ADMIN — GABAPENTIN 600 MG: 600 TABLET, FILM COATED ORAL at 14:04

## 2018-09-15 RX ADMIN — IPRATROPIUM BROMIDE AND ALBUTEROL SULFATE 1 AMPULE: 2.5; .5 SOLUTION RESPIRATORY (INHALATION) at 01:15

## 2018-09-15 RX ADMIN — GABAPENTIN 600 MG: 600 TABLET, FILM COATED ORAL at 08:53

## 2018-09-15 RX ADMIN — FORMOTEROL FUMARATE DIHYDRATE 20 MCG: 20 SOLUTION RESPIRATORY (INHALATION) at 19:48

## 2018-09-15 RX ADMIN — ATORVASTATIN CALCIUM 40 MG: 40 TABLET, FILM COATED ORAL at 08:52

## 2018-09-15 RX ADMIN — BUDESONIDE 1000 MCG: 0.5 SUSPENSION RESPIRATORY (INHALATION) at 09:17

## 2018-09-15 RX ADMIN — METHYLPREDNISOLONE SODIUM SUCCINATE 40 MG: 40 INJECTION, POWDER, FOR SOLUTION INTRAMUSCULAR; INTRAVENOUS at 08:53

## 2018-09-15 RX ADMIN — DORNASE ALFA 5 MG: 1 SOLUTION RESPIRATORY (INHALATION) at 12:30

## 2018-09-15 RX ADMIN — IPRATROPIUM BROMIDE AND ALBUTEROL SULFATE 1 AMPULE: 2.5; .5 SOLUTION RESPIRATORY (INHALATION) at 04:30

## 2018-09-15 RX ADMIN — PANTOPRAZOLE SODIUM 40 MG: 40 TABLET, DELAYED RELEASE ORAL at 06:43

## 2018-09-15 RX ADMIN — ATENOLOL 50 MG: 50 TABLET ORAL at 20:13

## 2018-09-15 RX ADMIN — ATENOLOL 50 MG: 50 TABLET ORAL at 08:51

## 2018-09-15 RX ADMIN — ASPIRIN 81 MG: 81 TABLET ORAL at 08:52

## 2018-09-15 RX ADMIN — AMLODIPINE BESYLATE 10 MG: 10 TABLET ORAL at 08:51

## 2018-09-15 RX ADMIN — Medication 10 ML: at 08:52

## 2018-09-15 RX ADMIN — FORMOTEROL FUMARATE DIHYDRATE 20 MCG: 20 SOLUTION RESPIRATORY (INHALATION) at 09:17

## 2018-09-15 RX ADMIN — WATER 1 G: 1 INJECTION INTRAMUSCULAR; INTRAVENOUS; SUBCUTANEOUS at 20:08

## 2018-09-15 RX ADMIN — HYDRALAZINE HYDROCHLORIDE 25 MG: 25 TABLET, FILM COATED ORAL at 08:52

## 2018-09-15 RX ADMIN — AZITHROMYCIN MONOHYDRATE 500 MG: 500 INJECTION, POWDER, LYOPHILIZED, FOR SOLUTION INTRAVENOUS at 21:21

## 2018-09-15 RX ADMIN — ENOXAPARIN SODIUM 40 MG: 40 INJECTION SUBCUTANEOUS at 08:53

## 2018-09-15 RX ADMIN — IPRATROPIUM BROMIDE AND ALBUTEROL SULFATE 1 AMPULE: .5; 3 SOLUTION RESPIRATORY (INHALATION) at 19:48

## 2018-09-15 ASSESSMENT — PAIN SCALES - GENERAL
PAINLEVEL_OUTOF10: 0

## 2018-09-15 NOTE — PROGRESS NOTES
Nurse to nurse report called. Patient to go to [facility]. Transport requested. Attempted to call and notify family. Unable to get a hold of anyone.

## 2018-09-15 NOTE — PLAN OF CARE
Problem: Falls - Risk of:  Goal: Will remain free from falls  Will remain free from falls   Outcome: Met This Shift    Goal: Absence of physical injury  Absence of physical injury   Outcome: Met This Shift      Problem: Breathing Pattern - Ineffective  Goal: Able to breathe comfortably  Able to breathe comfortably     Outcome: Met This Shift      Problem: Risk for Impaired Skin Integrity  Goal: Tissue integrity - skin and mucous membranes  Structural intactness and normal physiological function of skin and  mucous membranes.    Outcome: Met This Shift

## 2018-09-15 NOTE — PROCEDURES
CHEST TUBE/PIGTAIL CATHTER  INSERTION      Today's Date:    9/14 /2018     Patient Name:          Liu Bethea      Patient's age:             Indications:  [x]   Drainage right  large pleural effusion   n with effusion     Pre-Op Diagnosis: effusion     Post-Op Diagnosis: Same    Performed by: Esmer Pisano MD      Asistant:      Consent:  Verbal consent obtained. Written consent obtained. After informed consent & appropriate time out protocol was noted & obtained. Risks/Benefits/Alternatives of the procedure were discussed including: infection, bleeding, pain, & damage to underlying organs.     Procedure Details:  Patient understanding: patient states understanding of the procedure being performed. Time out: Immediately prior to procedure a \"time out\" was called to verify the correct. Patient was made up in sitting position and ultrasound was done and site of maximum fluid collection was marked.      Preparation: Patient was prepped and draped in the usual sterile fashion.     Local anesthetic: Lidocaine without epinephrine, amount varied for local control.      Technique:  The patient was positioned appropriately for chest tube placement. The patients Right chest was prepped and draped in sterile fashion. Lidocaine was used to anesthetize the surrounding skin area. A small skin incision was made in the laterally in the midaxillary line. at the inframammarycrease. using cathter over needle adjacent to the superior rib. .6 .3 f R PIGTAIL cathter was inserted over needle   Then later we drain about 160 cc of yellow fluid   Complications:   None; patient tolerated the procedure well. .                                                 None     Estimated blood loss: Minimal     Condition: stable     Plan:   1. Check a post procedure film. 2. Review testing when available.          Monitor output and air leak    Later on patient stop drain   I have to give tPA and Dornzase intrapleural   Edward Welsh

## 2018-09-15 NOTE — PROGRESS NOTES
[facility] Quality Flow/Interdisciplinary Rounds Progress Note        Quality Flow Rounds held on September 15, 2018    Disciplines Attending:  Bedside Nurse, Nursing Unit Leadership and ICU team     Joel Travis was admitted on 9/13/2018  4:12 PM    Anticipated Discharge Date:  Expected Discharge Date: 09/20/18    Disposition:    Florian Score:  Florian Scale Score: 19    Readmission Risk              Risk of Unplanned Readmission:        19             Discussed patient goal for the day, patient clinical progression, and barriers to discharge. The following Goal(s) of the Day/Commitment(s) have been identified:  Transfer after TPA treatment instilled in chest tube.        Stew Orlando  September 15, 2018

## 2018-09-15 NOTE — PLAN OF CARE
Problem: Breathing Pattern - Ineffective  Goal: Able to breathe comfortably  Able to breathe comfortably     Outcome: Met This Shift      Problem: Risk for Impaired Skin Integrity  Goal: Tissue integrity - skin and mucous membranes  Structural intactness and normal physiological function of skin and  mucous membranes.    Outcome: Met This Shift

## 2018-09-15 NOTE — PROGRESS NOTES
Spoke with Dr. Missy Millan regarding cathflo for pt pigtail catheter. She stated it will be done on day shift tomorrow.

## 2018-09-15 NOTE — PROGRESS NOTES
[facility]  Internal Medicine Residency Program  MICU progress note    Patient:  Ayden Love 70 y.o. male MRN: [id number]     Date of Service: 9/15/2018    Hospital Day: 3      Chief complaint: SOB  subjective   Patient was seen and examined this morning at bedside. He was on high flow NC. Awake, alert and oriented.  Denies any SOB, chest pain, abd pain, n/v.     24 hr Interval Hx:  -Chest tube inserted yesterday (09/14) remain draining 120 cc  -Had 1 dose of alteplase +dnase given, monitor drainage  -Transfer out of ICU      Physical Exam   · Vitals: BP (!) 129/59   Pulse 81   Temp 97.5 °F (36.4 °C)   Resp 14   Ht 5' 5\" (1.651 m)   Wt 158 lb 8 oz (71.9 kg)   SpO2 92%   BMI 26.38 kg/m²     · General Appearance: alert and oriented to person, place and time  · Head: normocephalic and atraumatic  · Eyes: pupils equal, round, and reactive to light  · ENT: tympanic membrane, external ear and ear canal normal bilaterally, oropharynx clear and moist with normal mucous membranes  · Neck: neck supple and non tender without mass   · Pulmonary/Chest: b/l decrease air entry, right >> left with wheeze  · Cardiovascular: normal rate, normal S1 and S2, no gallops and intact distal pulses  · Abdomen: soft, non-tender, non-distended, normal bowel sounds, no masses or organomegaly  · Extremities: no cyanosis and no clubbing  · Musculoskeletal: normal range of motion, no joint swelling, deformity or tenderness  · Neurologic: reflexes normal and symmetric, no cranial nerve deficit, gait and coordination normal and speech normal   Labs and Imaging Studies   Basic Labs  Recent Labs      09/13/18   0537  09/14/18   0435  09/15/18   0440   NA  142  138  136   K  3.9  4.0  3.8   CL  104  98  94*   CO2  29  28  24   BUN  5*  8  18   CREATININE  1.2  1.0  1.0   GLUCOSE  99  180*  167*   CALCIUM  8.7  9.2  8.6       Recent Labs      09/12/18   2045  09/13/18   0537  09/14/18   0435  09/15/18   0440   WBC  11.3 --   8.0  10.2   RBC  3.54*   --   3.98  3.81   HGB  10.8*  10.8*  12.0*  11.7*   HCT  33.3*  33.9*  37.3  35.1*   MCV  94.1   --   93.7  92.1   MCH  30.5   --   30.2  30.7   MCHC  32.4   --   32.2  33.3   RDW  12.9   --   12.7  12.6   PLT  310   --   325  315   MPV  9.9   --   10.5  11.6       CBC:   Lab Results   Component Value Date    WBC 10.2 09/15/2018    RBC 3.81 09/15/2018    HGB 11.7 09/15/2018    HCT 35.1 09/15/2018    MCV 92.1 09/15/2018    RDW 12.6 09/15/2018     09/15/2018     BMP:    Lab Results   Component Value Date     09/15/2018    K 3.8 09/15/2018    CL 94 09/15/2018    CO2 24 09/15/2018    BUN 18 09/15/2018       Imaging Studies:     Ct Chest Wo Contrast    Result Date: 9/10/2018  Reading location:  [facility] CLINICAL STATEMENT: Shortness of breath. TECHNIQUE: Axial CT images through the thorax were obtained without the use of contrast. Coronal and sagittal reconstructed images were generated from the dataset acquired in axial imaging. Automated dose exposure control was utilized for this examination. COMPARISON: August 27, 2018 CTA. FINDINGS: On today's examination, there has been a significant increase in the right pleural effusion. The pleural effusion is relatively large at this time. There is no significant left pleural effusion. There is underlying compression atelectasis of the right lower lobe probably caused by the large volume of pleural fluid. Air bronchograms are seen in the right lower lobe. There is no pericardial effusion. In the mediastinum, no contrast was given making evaluation of the mediastinum somewhat difficult but no new adenopathy is appreciated when compared with the study of August 27, 2018. The central airways appear patent. Lung windows reveal there is minimal atelectatic change in the left base and bands of infiltrate or atelectasis in the right upper lobe. Additionally, high-grade collapse of the right lower lobe is seen.      1. Since the prior CT study of remains relatively clear. Stable heart and mediastinum. No change right pleural effusion/thickening with associated lung consolidation    Xr Chest Portable    Result Date: 9/10/2018  Reading location: 200 INDICATION: Dyspnea FINDINGS: Portable AP upright view the chest compared with 9/7/2018. Pleural-parenchymal opacities in the right hemithorax are unchanged. Left lung remains clear. Stable heart and mediastinum. No interval change. Xr Chest Portable    Result Date: 9/7/2018  Reading location: 200 Indication: Shortness of breath, dyspnea. Comparison: Chest radiograph from 9/6/2018 at 1706 hours. Technique: Portable AP upright chest radiograph was obtained. Findings: The cardiomediastinal silhouette is stable in size and contours. There is a persistent moderate-sized right pleural effusion with right basilar airspace opacity. The left lung and costophrenic angle are clear. There is no evidence of pneumothorax. Persistent moderate size right pleural effusion with right basilar airspace disease. Xr Chest Portable    Result Date: 9/6/2018  Location:100 Exam: XR CHEST PORTABLE Indications: Shortness of breath, former smoker. No history of lung or heart surgery Findings: A portable AP erect view of the chest was obtained and compared to the previous exam of 4/18/2018. There is again evidence of previous surgery of the lower cervical spine and the upper thoracic spine, with the superior extent of the spinal fixation hardware not included on this study. There is opacification of the inferior two thirds of the right hemithorax, new since the previous exam, with soft tissue density adjacent to the lateral aspect of the right upper lung. Left lung is clear, without pleural effusion or focal infiltrate. No pneumothorax is identified bilaterally. The cardiac silhouette is partially obscured by the right-sided density, but does not appear enlarged, as visualized. No midline shift of the trachea is seen. Opacification of inferior two thirds of right hemithorax which is most likely on the basis of pleural effusion and/or lung consolidation. Soft tissue density adjacent to the lateral aspect of the right upper lung suggests loculation of the effusion. Underlying mass cannot be excluded. Xr Chest 1 Vw    Result Date: 9/6/2018  Reading location: 200 INDICATION: Right thoracentesis FINDINGS: AP upright view the chest compared earlier exam same date. Current study 1709 hours. Right pleural fluid is decreased. No evidence of postprocedure pneumothorax. No evidence of postprocedure pneumothorax. Ct Chest Pulmonary Embolism W Contrast    Result Date: 8/27/2018  Initial report created on 8/27/2018 3:11:33 AM EDT EXAM:   CT Angiography Chest With Intravenous Contrast CLINICAL HISTORY:   70years old, male; Pain; Other: Sharp pain in upper back; Additional info: Chest pain, acute, pulmonary embolism suspected TECHNIQUE:   Axial computed tomographic angiography images of the chest with intravenous contrast using pulmonary embolism protocol. All CT scans at this facility use at least one of these dose optimization techniques: automated exposure control; mA and/or kV adjustment per patient size (includes targeted exams where dose is matched to clinical indication); or iterative reconstruction. MIP reconstructed images were created and reviewed. CONTRAST:   70 mL of [id number] administered intravenously. COMPARISON:   No relevant prior studies available. FINDINGS:   Pulmonary arteries:  Unremarkable. No pulmonary embolism. Aorta:  No acute findings. No thoracic aortic aneurysm. Lungs: There are strandy and patchy opacities superimposing over the right pleural effusion compatible with right basilar atelectasis and or infiltrate. Pleural space:  A there is a small right pleural effusion. No pneumothorax. Heart:  Unremarkable. No cardiomegaly. No significant pericardial effusion.   No evidence of RV dysfunction. Bones/joints:  No acute fracture. No dislocation. Soft tissues:  Unremarkable. Lymph nodes:  Unremarkable. No enlarged lymph nodes. 1.  There is no evidence for pulmonary embolic disease. 2.  Small right pleural effusion   3. Patchy opacities superimposed over the pleural effusion compatible with right basilar atelectasis versus infiltrate This report has been electronically signed by Elvis Patino MD.    Ir Guided Thoracentesis Pleural    Result Date: 9/7/2018  Location:200 Exam: IR GUIDED THORACENTESIS PLEURAL HISTORY:  Right pleural effusion. PROCEDURE:  Ultrasound Guided Thoracentesis. Informed consent was obtained. The skin was prepped and draped in a sterile fashion and anesthetized with Lidocaine. Maximal sterile barrier technique was utilized. Under ultrasound guidance, a #5 [employer] needle and catheter were inserted into the fluid collection on the right. 600 cc of pleural fluid were drained. FINDINGS: Ultrasound images demonstrate a  large right pleural effusion. Following thoracentesis, there is residual pleural effusion. 1. Successful ultrasound guided therapeutic thoracentesis. 2. Please note, the pleural effusion on the right contains multiple septations and compartments, therefore prohibiting complete aspiration of the right pleural effusion. These findings will be discussed with Dr. Mandy Diop. When the patient requires a follow-up thoracentesis, a large bore chest tube should be placed. EKG: normal sinus rhythm, unchanged from previous tracings. Resident's Assessment and Plan     Kamari Morgan is a 70 y.o. male    1. Right pleural effusion  - Parapneumonia vs transudate vs malignant  -CT chest : right pleural effusion  With mediastinal LN  - pleural fluid: protein 3.5, LD: 510, PH 8, transudative  -Hx of chronic smoker  - on bipap  - chest tube was placed today, follow up pleural fluid analysis  - continue breathing treatment and iv steroid  -on azithromycin and rocephin  -Monitor respiratory status     2. EtOH abuse  - on ativan 0.5mg q6 hr    3. HTN  -on amlodipine,atenolol and hydralazine    4. HLD  -on atorvastatin    5. Hx TIA with s/p right carotid endartectomy  - on aspirin    6. Tobacco abuse  -patient  on cessation       DVT prophylaxis/ GI prophylaxis: Lovenox and protonix    Chest tube was placed in today, will follow pleural fluid analysis.      Alpa Wright MD, PGY-2  Internal Medicine resident  Attending physician: Dr. Rodney Castaneda

## 2018-09-16 ENCOUNTER — APPOINTMENT (OUTPATIENT)
Dept: GENERAL RADIOLOGY | Age: 68
DRG: 164 | End: 2018-09-16
Attending: INTERNAL MEDICINE
Payer: MEDICARE

## 2018-09-16 ENCOUNTER — APPOINTMENT (OUTPATIENT)
Dept: CT IMAGING | Age: 68
DRG: 164 | End: 2018-09-16
Attending: INTERNAL MEDICINE
Payer: MEDICARE

## 2018-09-16 LAB
ABO/RH: NORMAL
ANION GAP SERPL CALCULATED.3IONS-SCNC: 14 MMOL/L (ref 7–16)
ANTIBODY SCREEN: NORMAL
APTT: 27.9 SEC (ref 24.5–35.1)
BUN BLDV-MCNC: 31 MG/DL (ref 8–23)
CALCIUM SERPL-MCNC: 8.7 MG/DL (ref 8.6–10.2)
CHLORIDE BLD-SCNC: 99 MMOL/L (ref 98–107)
CO2: 26 MMOL/L (ref 22–29)
CREAT SERPL-MCNC: 1.2 MG/DL (ref 0.7–1.2)
CULTURE, RESPIRATORY: NORMAL
GFR AFRICAN AMERICAN: >60
GFR NON-AFRICAN AMERICAN: 60 ML/MIN/1.73
GLUCOSE BLD-MCNC: 162 MG/DL (ref 74–109)
HCT VFR BLD CALC: 31.7 % (ref 37–54)
HEMOGLOBIN: 10.5 G/DL (ref 12.5–16.5)
INR BLD: 1.3
MAGNESIUM: 2.2 MG/DL (ref 1.6–2.6)
MCH RBC QN AUTO: 30.1 PG (ref 26–35)
MCHC RBC AUTO-ENTMCNC: 33.1 % (ref 32–34.5)
MCV RBC AUTO: 90.8 FL (ref 80–99.9)
PDW BLD-RTO: 12.6 FL (ref 11.5–15)
PHOSPHORUS: 3.5 MG/DL (ref 2.5–4.5)
PLATELET # BLD: 294 E9/L (ref 130–450)
PMV BLD AUTO: 10.1 FL (ref 7–12)
POTASSIUM REFLEX MAGNESIUM: 4.3 MMOL/L (ref 3.5–5)
PROTHROMBIN TIME: 14.5 SEC (ref 9.3–12.4)
RBC # BLD: 3.49 E12/L (ref 3.8–5.8)
SMEAR, RESPIRATORY: NORMAL
SODIUM BLD-SCNC: 139 MMOL/L (ref 132–146)
WBC # BLD: 11.1 E9/L (ref 4.5–11.5)

## 2018-09-16 PROCEDURE — 2060000000 HC ICU INTERMEDIATE R&B

## 2018-09-16 PROCEDURE — 2580000003 HC RX 258: Performed by: INTERNAL MEDICINE

## 2018-09-16 PROCEDURE — 83735 ASSAY OF MAGNESIUM: CPT

## 2018-09-16 PROCEDURE — 94640 AIRWAY INHALATION TREATMENT: CPT

## 2018-09-16 PROCEDURE — 85610 PROTHROMBIN TIME: CPT

## 2018-09-16 PROCEDURE — 36415 COLL VENOUS BLD VENIPUNCTURE: CPT

## 2018-09-16 PROCEDURE — 6360000002 HC RX W HCPCS: Performed by: INTERNAL MEDICINE

## 2018-09-16 PROCEDURE — 71045 X-RAY EXAM CHEST 1 VIEW: CPT

## 2018-09-16 PROCEDURE — 86901 BLOOD TYPING SEROLOGIC RH(D): CPT

## 2018-09-16 PROCEDURE — 80048 BASIC METABOLIC PNL TOTAL CA: CPT

## 2018-09-16 PROCEDURE — 85027 COMPLETE CBC AUTOMATED: CPT

## 2018-09-16 PROCEDURE — 85730 THROMBOPLASTIN TIME PARTIAL: CPT

## 2018-09-16 PROCEDURE — 2700000000 HC OXYGEN THERAPY PER DAY

## 2018-09-16 PROCEDURE — 6370000000 HC RX 637 (ALT 250 FOR IP): Performed by: INTERNAL MEDICINE

## 2018-09-16 PROCEDURE — 99222 1ST HOSP IP/OBS MODERATE 55: CPT | Performed by: THORACIC SURGERY (CARDIOTHORACIC VASCULAR SURGERY)

## 2018-09-16 PROCEDURE — 86900 BLOOD TYPING SEROLOGIC ABO: CPT

## 2018-09-16 PROCEDURE — 94660 CPAP INITIATION&MGMT: CPT

## 2018-09-16 PROCEDURE — 94760 N-INVAS EAR/PLS OXIMETRY 1: CPT

## 2018-09-16 PROCEDURE — 86850 RBC ANTIBODY SCREEN: CPT

## 2018-09-16 PROCEDURE — 71250 CT THORAX DX C-: CPT

## 2018-09-16 PROCEDURE — 99231 SBSQ HOSP IP/OBS SF/LOW 25: CPT | Performed by: INTERNAL MEDICINE

## 2018-09-16 PROCEDURE — 84100 ASSAY OF PHOSPHORUS: CPT

## 2018-09-16 RX ORDER — BUDESONIDE 0.5 MG/2ML
1 INHALANT ORAL 2 TIMES DAILY
Status: DISCONTINUED | OUTPATIENT
Start: 2018-09-16 | End: 2018-09-19 | Stop reason: HOSPADM

## 2018-09-16 RX ADMIN — BUDESONIDE 1000 MCG: 0.5 SUSPENSION RESPIRATORY (INHALATION) at 22:17

## 2018-09-16 RX ADMIN — ATORVASTATIN CALCIUM 40 MG: 40 TABLET, FILM COATED ORAL at 08:48

## 2018-09-16 RX ADMIN — ATENOLOL 50 MG: 50 TABLET ORAL at 21:21

## 2018-09-16 RX ADMIN — FORMOTEROL FUMARATE DIHYDRATE 20 MCG: 20 SOLUTION RESPIRATORY (INHALATION) at 22:18

## 2018-09-16 RX ADMIN — HYDRALAZINE HYDROCHLORIDE 25 MG: 25 TABLET, FILM COATED ORAL at 21:21

## 2018-09-16 RX ADMIN — BUDESONIDE 1000 MCG: 0.5 SUSPENSION RESPIRATORY (INHALATION) at 09:02

## 2018-09-16 RX ADMIN — PAROXETINE HYDROCHLORIDE HEMIHYDRATE 30 MG: 10 TABLET, FILM COATED ORAL at 08:48

## 2018-09-16 RX ADMIN — GABAPENTIN 600 MG: 600 TABLET, FILM COATED ORAL at 13:48

## 2018-09-16 RX ADMIN — Medication 10 ML: at 08:48

## 2018-09-16 RX ADMIN — ATENOLOL 50 MG: 50 TABLET ORAL at 08:48

## 2018-09-16 RX ADMIN — ASPIRIN 81 MG: 81 TABLET ORAL at 08:48

## 2018-09-16 RX ADMIN — PANTOPRAZOLE SODIUM 40 MG: 40 TABLET, DELAYED RELEASE ORAL at 06:23

## 2018-09-16 RX ADMIN — IPRATROPIUM BROMIDE AND ALBUTEROL SULFATE 1 AMPULE: .5; 3 SOLUTION RESPIRATORY (INHALATION) at 14:32

## 2018-09-16 RX ADMIN — AZITHROMYCIN MONOHYDRATE 500 MG: 500 INJECTION, POWDER, LYOPHILIZED, FOR SOLUTION INTRAVENOUS at 18:33

## 2018-09-16 RX ADMIN — Medication 10 ML: at 21:22

## 2018-09-16 RX ADMIN — WATER 1 G: 1 INJECTION INTRAMUSCULAR; INTRAVENOUS; SUBCUTANEOUS at 18:14

## 2018-09-16 RX ADMIN — GABAPENTIN 600 MG: 600 TABLET, FILM COATED ORAL at 08:48

## 2018-09-16 RX ADMIN — HYDRALAZINE HYDROCHLORIDE 25 MG: 25 TABLET, FILM COATED ORAL at 08:49

## 2018-09-16 RX ADMIN — FORMOTEROL FUMARATE DIHYDRATE 20 MCG: 20 SOLUTION RESPIRATORY (INHALATION) at 09:02

## 2018-09-16 RX ADMIN — ENOXAPARIN SODIUM 40 MG: 40 INJECTION SUBCUTANEOUS at 08:49

## 2018-09-16 RX ADMIN — AMLODIPINE BESYLATE 10 MG: 10 TABLET ORAL at 08:47

## 2018-09-16 RX ADMIN — Medication 10 ML: at 21:21

## 2018-09-16 RX ADMIN — GABAPENTIN 600 MG: 600 TABLET, FILM COATED ORAL at 21:21

## 2018-09-16 RX ADMIN — METHYLPREDNISOLONE SODIUM SUCCINATE 40 MG: 40 INJECTION, POWDER, FOR SOLUTION INTRAMUSCULAR; INTRAVENOUS at 08:47

## 2018-09-16 ASSESSMENT — PAIN SCALES - GENERAL
PAINLEVEL_OUTOF10: 0

## 2018-09-16 NOTE — PLAN OF CARE
Problem: Falls - Risk of:  Goal: Will remain free from falls  Will remain free from falls   Outcome: Met This Shift      Problem: Breathing Pattern - Ineffective  Goal: Able to breathe comfortably  Able to breathe comfortably     Outcome: Met This Shift

## 2018-09-16 NOTE — CONSULTS
hematuria. Psychiatric: Patient denies anxiety or depression. Neurologic: Patient denies weakness of the extremities, dizziness, or headaches. All other ROS checked and found to be negative. Objective:  Vitals /62   Pulse 54   Temp 97.9 °F (36.6 °C) (Oral)   Resp 15   Ht 5' 5\" (1.651 m)   Wt 158 lb 1.6 oz (71.7 kg)   SpO2 98%   BMI 26.31 kg/m²   General Appearance: Pleasant 70y.o. year old male who appears stated age. Communicates well, no acute distress. HEENT: Head is normocephalic, atraumatic. EOMs intact, PERRL. Trachea midline. Lungs: Normal respiratory rate and normal effort. He is not in respiratory distress. Breath sounds clear to auscultation. No wheezes. Decreased on right  Heart: Normal rate. Regular rhythm. S1 normal and S2 normal. Positive for murmur. Chest: Symmetric chest wall expansion. Extremities: Normal range of motion. Neurological: Patient is alert and oriented to person, place and time. Patient has normal reflexes. Skin: Warm and dry. Abdomen: Abdomen is soft and non-distended. Bowel sounds are normal. There is no abdominal tenderness tenderness. There is no guarding. There is no mass. Pulses: Distal pulses are intact. Skin: Warm and dry without lesions. Assessment: Right effusion        Plan: Right VATS. I think his lung is likely trapped and will need talc to the apex and a pleur-x at the base. He is already on 12L O2 so some vent post op might be possible. Check CT chest today with pigtail in. Will stop TPA.       Electronically signed by Bridgette Hui MD on 9/16/2018 at 8:34 AM

## 2018-09-16 NOTE — PLAN OF CARE
Problem: Falls - Risk of:  Goal: Will remain free from falls  Will remain free from falls   Outcome: Met This Shift      Problem: Breathing Pattern - Ineffective  Goal: Able to breathe comfortably  Able to breathe comfortably     Outcome: Met This Shift      Problem: Risk for Impaired Skin Integrity  Goal: Tissue integrity - skin and mucous membranes  Structural intactness and normal physiological function of skin and  mucous membranes.    Outcome: Met This Shift

## 2018-09-17 ENCOUNTER — APPOINTMENT (OUTPATIENT)
Dept: GENERAL RADIOLOGY | Age: 68
DRG: 164 | End: 2018-09-17
Attending: INTERNAL MEDICINE
Payer: MEDICARE

## 2018-09-17 ENCOUNTER — ANESTHESIA (OUTPATIENT)
Dept: OPERATING ROOM | Age: 68
DRG: 164 | End: 2018-09-17
Payer: MEDICARE

## 2018-09-17 ENCOUNTER — ANESTHESIA EVENT (OUTPATIENT)
Dept: OPERATING ROOM | Age: 68
DRG: 164 | End: 2018-09-17
Payer: MEDICARE

## 2018-09-17 VITALS — TEMPERATURE: 96.1 F | OXYGEN SATURATION: 93 %

## 2018-09-17 LAB
ANION GAP SERPL CALCULATED.3IONS-SCNC: 10 MMOL/L (ref 7–16)
ANION GAP SERPL CALCULATED.3IONS-SCNC: 13 MMOL/L (ref 7–16)
BODY FLUID CULTURE, STERILE: NORMAL
BUN BLDV-MCNC: 33 MG/DL (ref 8–23)
BUN BLDV-MCNC: 33 MG/DL (ref 8–23)
CALCIUM SERPL-MCNC: 8.4 MG/DL (ref 8.6–10.2)
CALCIUM SERPL-MCNC: 8.5 MG/DL (ref 8.6–10.2)
CHLORIDE BLD-SCNC: 101 MMOL/L (ref 98–107)
CHLORIDE BLD-SCNC: 99 MMOL/L (ref 98–107)
CO2: 27 MMOL/L (ref 22–29)
CO2: 27 MMOL/L (ref 22–29)
CREAT SERPL-MCNC: 1.2 MG/DL (ref 0.7–1.2)
CREAT SERPL-MCNC: 1.2 MG/DL (ref 0.7–1.2)
GFR AFRICAN AMERICAN: >60
GFR AFRICAN AMERICAN: >60
GFR NON-AFRICAN AMERICAN: 60 ML/MIN/1.73
GFR NON-AFRICAN AMERICAN: 60 ML/MIN/1.73
GLUCOSE BLD-MCNC: 156 MG/DL (ref 74–109)
GLUCOSE BLD-MCNC: 206 MG/DL (ref 74–109)
GRAM STAIN RESULT: NORMAL
HCT VFR BLD CALC: 27.5 % (ref 37–54)
HCT VFR BLD CALC: 30 % (ref 37–54)
HEMOGLOBIN: 9.1 G/DL (ref 12.5–16.5)
HEMOGLOBIN: 9.7 G/DL (ref 12.5–16.5)
MAGNESIUM: 2 MG/DL (ref 1.6–2.6)
MAGNESIUM: 2.1 MG/DL (ref 1.6–2.6)
MCH RBC QN AUTO: 29.8 PG (ref 26–35)
MCH RBC QN AUTO: 30.3 PG (ref 26–35)
MCHC RBC AUTO-ENTMCNC: 32.3 % (ref 32–34.5)
MCHC RBC AUTO-ENTMCNC: 33.1 % (ref 32–34.5)
MCV RBC AUTO: 91.7 FL (ref 80–99.9)
MCV RBC AUTO: 92 FL (ref 80–99.9)
PDW BLD-RTO: 12.6 FL (ref 11.5–15)
PDW BLD-RTO: 12.7 FL (ref 11.5–15)
PHOSPHORUS: 3.1 MG/DL (ref 2.5–4.5)
PLATELET # BLD: 252 E9/L (ref 130–450)
PLATELET # BLD: 311 E9/L (ref 130–450)
PMV BLD AUTO: 10.2 FL (ref 7–12)
PMV BLD AUTO: 10.4 FL (ref 7–12)
POTASSIUM REFLEX MAGNESIUM: 4.2 MMOL/L (ref 3.5–5)
POTASSIUM SERPL-SCNC: 4.5 MMOL/L (ref 3.5–5)
RBC # BLD: 3 E12/L (ref 3.8–5.8)
RBC # BLD: 3.26 E12/L (ref 3.8–5.8)
SODIUM BLD-SCNC: 138 MMOL/L (ref 132–146)
SODIUM BLD-SCNC: 139 MMOL/L (ref 132–146)
WBC # BLD: 10.2 E9/L (ref 4.5–11.5)
WBC # BLD: 8.5 E9/L (ref 4.5–11.5)

## 2018-09-17 PROCEDURE — 31622 DX BRONCHOSCOPE/WASH: CPT | Performed by: THORACIC SURGERY (CARDIOTHORACIC VASCULAR SURGERY)

## 2018-09-17 PROCEDURE — 0BNK4ZZ RELEASE RIGHT LUNG, PERCUTANEOUS ENDOSCOPIC APPROACH: ICD-10-PCS | Performed by: THORACIC SURGERY (CARDIOTHORACIC VASCULAR SURGERY)

## 2018-09-17 PROCEDURE — 0WP9X0Z REMOVAL OF DRAINAGE DEVICE FROM RIGHT PLEURAL CAVITY, EXTERNAL APPROACH: ICD-10-PCS | Performed by: THORACIC SURGERY (CARDIOTHORACIC VASCULAR SURGERY)

## 2018-09-17 PROCEDURE — 71045 X-RAY EXAM CHEST 1 VIEW: CPT

## 2018-09-17 PROCEDURE — 2700000000 HC OXYGEN THERAPY PER DAY

## 2018-09-17 PROCEDURE — 7100000000 HC PACU RECOVERY - FIRST 15 MIN: Performed by: THORACIC SURGERY (CARDIOTHORACIC VASCULAR SURGERY)

## 2018-09-17 PROCEDURE — 87075 CULTR BACTERIA EXCEPT BLOOD: CPT

## 2018-09-17 PROCEDURE — 6370000000 HC RX 637 (ALT 250 FOR IP): Performed by: INTERNAL MEDICINE

## 2018-09-17 PROCEDURE — 32652 THORACOSCOPY REM TOTL CORTEX: CPT | Performed by: THORACIC SURGERY (CARDIOTHORACIC VASCULAR SURGERY)

## 2018-09-17 PROCEDURE — 84100 ASSAY OF PHOSPHORUS: CPT

## 2018-09-17 PROCEDURE — 87102 FUNGUS ISOLATION CULTURE: CPT

## 2018-09-17 PROCEDURE — 6360000002 HC RX W HCPCS: Performed by: THORACIC SURGERY (CARDIOTHORACIC VASCULAR SURGERY)

## 2018-09-17 PROCEDURE — 0W9940Z DRAINAGE OF RIGHT PLEURAL CAVITY WITH DRAINAGE DEVICE, PERCUTANEOUS ENDOSCOPIC APPROACH: ICD-10-PCS | Performed by: THORACIC SURGERY (CARDIOTHORACIC VASCULAR SURGERY)

## 2018-09-17 PROCEDURE — 3700000001 HC ADD 15 MINUTES (ANESTHESIA): Performed by: THORACIC SURGERY (CARDIOTHORACIC VASCULAR SURGERY)

## 2018-09-17 PROCEDURE — 80048 BASIC METABOLIC PNL TOTAL CA: CPT

## 2018-09-17 PROCEDURE — 2580000003 HC RX 258: Performed by: THORACIC SURGERY (CARDIOTHORACIC VASCULAR SURGERY)

## 2018-09-17 PROCEDURE — 2140000000 HC CCU INTERMEDIATE R&B

## 2018-09-17 PROCEDURE — 6360000002 HC RX W HCPCS: Performed by: INTERNAL MEDICINE

## 2018-09-17 PROCEDURE — 2500000003 HC RX 250 WO HCPCS: Performed by: NURSE ANESTHETIST, CERTIFIED REGISTERED

## 2018-09-17 PROCEDURE — 87205 SMEAR GRAM STAIN: CPT

## 2018-09-17 PROCEDURE — 2580000003 HC RX 258: Performed by: INTERNAL MEDICINE

## 2018-09-17 PROCEDURE — 94640 AIRWAY INHALATION TREATMENT: CPT

## 2018-09-17 PROCEDURE — 88112 CYTOPATH CELL ENHANCE TECH: CPT

## 2018-09-17 PROCEDURE — 6360000002 HC RX W HCPCS: Performed by: NURSE ANESTHETIST, CERTIFIED REGISTERED

## 2018-09-17 PROCEDURE — 87070 CULTURE OTHR SPECIMN AEROBIC: CPT

## 2018-09-17 PROCEDURE — 3700000000 HC ANESTHESIA ATTENDED CARE: Performed by: THORACIC SURGERY (CARDIOTHORACIC VASCULAR SURGERY)

## 2018-09-17 PROCEDURE — 32650 THORACOSCOPY W/PLEURODESIS: CPT | Performed by: THORACIC SURGERY (CARDIOTHORACIC VASCULAR SURGERY)

## 2018-09-17 PROCEDURE — 99232 SBSQ HOSP IP/OBS MODERATE 35: CPT | Performed by: INTERNAL MEDICINE

## 2018-09-17 PROCEDURE — 6370000000 HC RX 637 (ALT 250 FOR IP): Performed by: THORACIC SURGERY (CARDIOTHORACIC VASCULAR SURGERY)

## 2018-09-17 PROCEDURE — 2580000003 HC RX 258: Performed by: NURSE ANESTHETIST, CERTIFIED REGISTERED

## 2018-09-17 PROCEDURE — 2500000003 HC RX 250 WO HCPCS: Performed by: THORACIC SURGERY (CARDIOTHORACIC VASCULAR SURGERY)

## 2018-09-17 PROCEDURE — 88305 TISSUE EXAM BY PATHOLOGIST: CPT

## 2018-09-17 PROCEDURE — 7100000001 HC PACU RECOVERY - ADDTL 15 MIN: Performed by: THORACIC SURGERY (CARDIOTHORACIC VASCULAR SURGERY)

## 2018-09-17 PROCEDURE — 85027 COMPLETE CBC AUTOMATED: CPT

## 2018-09-17 PROCEDURE — 6360000002 HC RX W HCPCS: Performed by: ANESTHESIOLOGY

## 2018-09-17 PROCEDURE — 32550 INSERT PLEURAL CATH: CPT | Performed by: THORACIC SURGERY (CARDIOTHORACIC VASCULAR SURGERY)

## 2018-09-17 PROCEDURE — 2709999900 HC NON-CHARGEABLE SUPPLY: Performed by: THORACIC SURGERY (CARDIOTHORACIC VASCULAR SURGERY)

## 2018-09-17 PROCEDURE — 94660 CPAP INITIATION&MGMT: CPT

## 2018-09-17 PROCEDURE — 3600000014 HC SURGERY LEVEL 4 ADDTL 15MIN: Performed by: THORACIC SURGERY (CARDIOTHORACIC VASCULAR SURGERY)

## 2018-09-17 PROCEDURE — 36415 COLL VENOUS BLD VENIPUNCTURE: CPT

## 2018-09-17 PROCEDURE — 3E0L4GC INTRODUCTION OF OTHER THERAPEUTIC SUBSTANCE INTO PLEURAL CAVITY, PERCUTANEOUS ENDOSCOPIC APPROACH: ICD-10-PCS | Performed by: THORACIC SURGERY (CARDIOTHORACIC VASCULAR SURGERY)

## 2018-09-17 PROCEDURE — 83735 ASSAY OF MAGNESIUM: CPT

## 2018-09-17 PROCEDURE — 3600000004 HC SURGERY LEVEL 4 BASE: Performed by: THORACIC SURGERY (CARDIOTHORACIC VASCULAR SURGERY)

## 2018-09-17 RX ORDER — ATENOLOL 50 MG/1
50 TABLET ORAL 2 TIMES DAILY
Status: DISCONTINUED | OUTPATIENT
Start: 2018-09-18 | End: 2018-09-19 | Stop reason: HOSPADM

## 2018-09-17 RX ORDER — SODIUM CHLORIDE 0.9 % (FLUSH) 0.9 %
10 SYRINGE (ML) INJECTION EVERY 12 HOURS SCHEDULED
Status: DISCONTINUED | OUTPATIENT
Start: 2018-09-17 | End: 2018-09-19 | Stop reason: HOSPADM

## 2018-09-17 RX ORDER — MORPHINE SULFATE 2 MG/ML
1 INJECTION, SOLUTION INTRAMUSCULAR; INTRAVENOUS EVERY 5 MIN PRN
Status: DISCONTINUED | OUTPATIENT
Start: 2018-09-17 | End: 2018-09-17 | Stop reason: HOSPADM

## 2018-09-17 RX ORDER — LIDOCAINE HYDROCHLORIDE 20 MG/ML
INJECTION, SOLUTION EPIDURAL; INFILTRATION; INTRACAUDAL; PERINEURAL PRN
Status: DISCONTINUED | OUTPATIENT
Start: 2018-09-17 | End: 2018-09-17 | Stop reason: SDUPTHER

## 2018-09-17 RX ORDER — BUPIVACAINE HYDROCHLORIDE AND EPINEPHRINE 5; 5 MG/ML; UG/ML
INJECTION, SOLUTION EPIDURAL; INTRACAUDAL; PERINEURAL PRN
Status: DISCONTINUED | OUTPATIENT
Start: 2018-09-17 | End: 2018-09-17 | Stop reason: HOSPADM

## 2018-09-17 RX ORDER — FENTANYL CITRATE 50 UG/ML
INJECTION, SOLUTION INTRAMUSCULAR; INTRAVENOUS PRN
Status: DISCONTINUED | OUTPATIENT
Start: 2018-09-17 | End: 2018-09-17 | Stop reason: SDUPTHER

## 2018-09-17 RX ORDER — GLYCOPYRROLATE 1 MG/5 ML
SYRINGE (ML) INTRAVENOUS PRN
Status: DISCONTINUED | OUTPATIENT
Start: 2018-09-17 | End: 2018-09-17 | Stop reason: SDUPTHER

## 2018-09-17 RX ORDER — MORPHINE SULFATE 2 MG/ML
2 INJECTION, SOLUTION INTRAMUSCULAR; INTRAVENOUS EVERY 5 MIN PRN
Status: DISCONTINUED | OUTPATIENT
Start: 2018-09-17 | End: 2018-09-17 | Stop reason: HOSPADM

## 2018-09-17 RX ORDER — ROCURONIUM BROMIDE 10 MG/ML
INJECTION, SOLUTION INTRAVENOUS PRN
Status: DISCONTINUED | OUTPATIENT
Start: 2018-09-17 | End: 2018-09-17 | Stop reason: SDUPTHER

## 2018-09-17 RX ORDER — OXYCODONE HYDROCHLORIDE 5 MG/1
5 TABLET ORAL EVERY 4 HOURS PRN
Status: DISCONTINUED | OUTPATIENT
Start: 2018-09-17 | End: 2018-09-19 | Stop reason: HOSPADM

## 2018-09-17 RX ORDER — DOCUSATE SODIUM 100 MG/1
100 CAPSULE, LIQUID FILLED ORAL 2 TIMES DAILY PRN
Status: DISCONTINUED | OUTPATIENT
Start: 2018-09-17 | End: 2018-09-19 | Stop reason: HOSPADM

## 2018-09-17 RX ORDER — OXYCODONE HYDROCHLORIDE AND ACETAMINOPHEN 5; 325 MG/1; MG/1
1 TABLET ORAL PRN
Status: DISCONTINUED | OUTPATIENT
Start: 2018-09-17 | End: 2018-09-17 | Stop reason: HOSPADM

## 2018-09-17 RX ORDER — ONDANSETRON 2 MG/ML
INJECTION INTRAMUSCULAR; INTRAVENOUS PRN
Status: DISCONTINUED | OUTPATIENT
Start: 2018-09-17 | End: 2018-09-17 | Stop reason: SDUPTHER

## 2018-09-17 RX ORDER — NEOSTIGMINE METHYLSULFATE 0.5 MG/ML
INJECTION, SOLUTION INTRAVENOUS PRN
Status: DISCONTINUED | OUTPATIENT
Start: 2018-09-17 | End: 2018-09-17 | Stop reason: SDUPTHER

## 2018-09-17 RX ORDER — MIDAZOLAM HYDROCHLORIDE 1 MG/ML
INJECTION INTRAMUSCULAR; INTRAVENOUS PRN
Status: DISCONTINUED | OUTPATIENT
Start: 2018-09-17 | End: 2018-09-17 | Stop reason: SDUPTHER

## 2018-09-17 RX ORDER — CEFAZOLIN SODIUM 1 G/3ML
INJECTION, POWDER, FOR SOLUTION INTRAMUSCULAR; INTRAVENOUS PRN
Status: DISCONTINUED | OUTPATIENT
Start: 2018-09-17 | End: 2018-09-17 | Stop reason: SDUPTHER

## 2018-09-17 RX ORDER — TALC 100 %
POWDER (GRAM) MISCELLANEOUS PRN
Status: DISCONTINUED | OUTPATIENT
Start: 2018-09-17 | End: 2018-09-17 | Stop reason: HOSPADM

## 2018-09-17 RX ORDER — OXYCODONE HYDROCHLORIDE AND ACETAMINOPHEN 5; 325 MG/1; MG/1
2 TABLET ORAL PRN
Status: DISCONTINUED | OUTPATIENT
Start: 2018-09-17 | End: 2018-09-17 | Stop reason: HOSPADM

## 2018-09-17 RX ORDER — MEPERIDINE HYDROCHLORIDE 50 MG/ML
12.5 INJECTION INTRAMUSCULAR; INTRAVENOUS; SUBCUTANEOUS
Status: DISCONTINUED | OUTPATIENT
Start: 2018-09-17 | End: 2018-09-17 | Stop reason: HOSPADM

## 2018-09-17 RX ORDER — SODIUM CHLORIDE 0.9 % (FLUSH) 0.9 %
10 SYRINGE (ML) INJECTION PRN
Status: DISCONTINUED | OUTPATIENT
Start: 2018-09-17 | End: 2018-09-19 | Stop reason: HOSPADM

## 2018-09-17 RX ORDER — DEXAMETHASONE SODIUM PHOSPHATE 10 MG/ML
INJECTION, SOLUTION INTRAMUSCULAR; INTRAVENOUS PRN
Status: DISCONTINUED | OUTPATIENT
Start: 2018-09-17 | End: 2018-09-17 | Stop reason: SDUPTHER

## 2018-09-17 RX ORDER — ACETAMINOPHEN 325 MG/1
650 TABLET ORAL EVERY 6 HOURS
Status: COMPLETED | OUTPATIENT
Start: 2018-09-17 | End: 2018-09-19

## 2018-09-17 RX ORDER — ONDANSETRON 2 MG/ML
4 INJECTION INTRAMUSCULAR; INTRAVENOUS
Status: DISCONTINUED | OUTPATIENT
Start: 2018-09-17 | End: 2018-09-17 | Stop reason: HOSPADM

## 2018-09-17 RX ORDER — SODIUM CHLORIDE 9 MG/ML
INJECTION, SOLUTION INTRAVENOUS CONTINUOUS PRN
Status: DISCONTINUED | OUTPATIENT
Start: 2018-09-17 | End: 2018-09-17 | Stop reason: SDUPTHER

## 2018-09-17 RX ORDER — PROPOFOL 10 MG/ML
INJECTION, EMULSION INTRAVENOUS PRN
Status: DISCONTINUED | OUTPATIENT
Start: 2018-09-17 | End: 2018-09-17 | Stop reason: SDUPTHER

## 2018-09-17 RX ADMIN — Medication 10 ML: at 08:43

## 2018-09-17 RX ADMIN — CEFAZOLIN 2000 MG: 1 INJECTION, POWDER, FOR SOLUTION INTRAVENOUS at 10:33

## 2018-09-17 RX ADMIN — LIDOCAINE HYDROCHLORIDE 100 MG: 20 INJECTION, SOLUTION EPIDURAL; INFILTRATION; INTRACAUDAL; PERINEURAL at 10:27

## 2018-09-17 RX ADMIN — BUDESONIDE 1000 MCG: 0.5 SUSPENSION RESPIRATORY (INHALATION) at 12:45

## 2018-09-17 RX ADMIN — FORMOTEROL FUMARATE DIHYDRATE 20 MCG: 20 SOLUTION RESPIRATORY (INHALATION) at 12:45

## 2018-09-17 RX ADMIN — HYDROMORPHONE HYDROCHLORIDE 0.5 MG: 1 INJECTION, SOLUTION INTRAMUSCULAR; INTRAVENOUS; SUBCUTANEOUS at 12:47

## 2018-09-17 RX ADMIN — MORPHINE SULFATE 1 MG: 2 INJECTION, SOLUTION INTRAMUSCULAR; INTRAVENOUS at 12:35

## 2018-09-17 RX ADMIN — FORMOTEROL FUMARATE DIHYDRATE 20 MCG: 20 SOLUTION RESPIRATORY (INHALATION) at 21:42

## 2018-09-17 RX ADMIN — ONDANSETRON HYDROCHLORIDE 4 MG: 2 INJECTION, SOLUTION INTRAMUSCULAR; INTRAVENOUS at 11:27

## 2018-09-17 RX ADMIN — FENTANYL CITRATE 50 MCG: 50 INJECTION, SOLUTION INTRAMUSCULAR; INTRAVENOUS at 10:27

## 2018-09-17 RX ADMIN — AZITHROMYCIN MONOHYDRATE 500 MG: 500 INJECTION, POWDER, LYOPHILIZED, FOR SOLUTION INTRAVENOUS at 18:37

## 2018-09-17 RX ADMIN — IPRATROPIUM BROMIDE AND ALBUTEROL SULFATE 1 AMPULE: .5; 3 SOLUTION RESPIRATORY (INHALATION) at 18:41

## 2018-09-17 RX ADMIN — GABAPENTIN 600 MG: 600 TABLET, FILM COATED ORAL at 20:39

## 2018-09-17 RX ADMIN — BUDESONIDE 1000 MCG: 0.5 SUSPENSION RESPIRATORY (INHALATION) at 21:41

## 2018-09-17 RX ADMIN — FENTANYL CITRATE 50 MCG: 50 INJECTION, SOLUTION INTRAMUSCULAR; INTRAVENOUS at 10:45

## 2018-09-17 RX ADMIN — WATER 1 G: 1 INJECTION INTRAMUSCULAR; INTRAVENOUS; SUBCUTANEOUS at 17:35

## 2018-09-17 RX ADMIN — NEOSTIGMINE METHYLSULFATE 3 MG: 0.5 INJECTION, SOLUTION INTRAVENOUS at 11:50

## 2018-09-17 RX ADMIN — MIDAZOLAM HYDROCHLORIDE 1 MG: 1 INJECTION, SOLUTION INTRAMUSCULAR; INTRAVENOUS at 10:27

## 2018-09-17 RX ADMIN — METHYLPREDNISOLONE SODIUM SUCCINATE 40 MG: 40 INJECTION, POWDER, FOR SOLUTION INTRAMUSCULAR; INTRAVENOUS at 08:42

## 2018-09-17 RX ADMIN — HYDRALAZINE HYDROCHLORIDE 25 MG: 25 TABLET, FILM COATED ORAL at 22:15

## 2018-09-17 RX ADMIN — SODIUM CHLORIDE: 9 INJECTION, SOLUTION INTRAVENOUS at 10:22

## 2018-09-17 RX ADMIN — ACETAMINOPHEN 650 MG: 325 TABLET, FILM COATED ORAL at 22:16

## 2018-09-17 RX ADMIN — Medication 0.6 MG: at 11:50

## 2018-09-17 RX ADMIN — DEXAMETHASONE SODIUM PHOSPHATE 10 MG: 10 INJECTION, SOLUTION INTRAMUSCULAR; INTRAVENOUS at 10:41

## 2018-09-17 RX ADMIN — MORPHINE SULFATE 1 MG: 2 INJECTION, SOLUTION INTRAMUSCULAR; INTRAVENOUS at 12:25

## 2018-09-17 RX ADMIN — PROPOFOL 100 MG: 10 INJECTION, EMULSION INTRAVENOUS at 10:27

## 2018-09-17 RX ADMIN — CEFAZOLIN SODIUM 2 G: 10 POWDER, FOR SOLUTION INTRAVENOUS at 17:35

## 2018-09-17 RX ADMIN — ACETAMINOPHEN 650 MG: 325 TABLET, FILM COATED ORAL at 17:35

## 2018-09-17 RX ADMIN — ROCURONIUM BROMIDE 30 MG: 10 INJECTION INTRAVENOUS at 10:27

## 2018-09-17 ASSESSMENT — PAIN SCALES - GENERAL
PAINLEVEL_OUTOF10: 0
PAINLEVEL_OUTOF10: 6
PAINLEVEL_OUTOF10: 3
PAINLEVEL_OUTOF10: 5
PAINLEVEL_OUTOF10: 0
PAINLEVEL_OUTOF10: 3
PAINLEVEL_OUTOF10: 8
PAINLEVEL_OUTOF10: 0
PAINLEVEL_OUTOF10: 2
PAINLEVEL_OUTOF10: 0

## 2018-09-17 ASSESSMENT — PAIN DESCRIPTION - PROGRESSION
CLINICAL_PROGRESSION: NOT CHANGED
CLINICAL_PROGRESSION: GRADUALLY WORSENING
CLINICAL_PROGRESSION: NOT CHANGED

## 2018-09-17 ASSESSMENT — PULMONARY FUNCTION TESTS
PIF_VALUE: 2
PIF_VALUE: 26
PIF_VALUE: 28
PIF_VALUE: 23
PIF_VALUE: 26
PIF_VALUE: 5
PIF_VALUE: 26
PIF_VALUE: 27
PIF_VALUE: 1
PIF_VALUE: 24
PIF_VALUE: 15
PIF_VALUE: 26
PIF_VALUE: 9
PIF_VALUE: 17
PIF_VALUE: 25
PIF_VALUE: 26
PIF_VALUE: 22
PIF_VALUE: 27
PIF_VALUE: 24
PIF_VALUE: 26
PIF_VALUE: 26
PIF_VALUE: 1
PIF_VALUE: 1
PIF_VALUE: 25
PIF_VALUE: 24
PIF_VALUE: 4
PIF_VALUE: 27
PIF_VALUE: 3
PIF_VALUE: 22
PIF_VALUE: 24
PIF_VALUE: 26
PIF_VALUE: 17
PIF_VALUE: 25
PIF_VALUE: 26
PIF_VALUE: 26
PIF_VALUE: 27
PIF_VALUE: 27
PIF_VALUE: 25
PIF_VALUE: 26
PIF_VALUE: 24
PIF_VALUE: 24
PIF_VALUE: 3
PIF_VALUE: 26
PIF_VALUE: 2
PIF_VALUE: 3
PIF_VALUE: 2
PIF_VALUE: 21
PIF_VALUE: 29
PIF_VALUE: 24
PIF_VALUE: 3
PIF_VALUE: 3
PIF_VALUE: 17
PIF_VALUE: 26
PIF_VALUE: 26
PIF_VALUE: 19
PIF_VALUE: 20
PIF_VALUE: 21
PIF_VALUE: 1
PIF_VALUE: 26
PIF_VALUE: 26
PIF_VALUE: 17
PIF_VALUE: 27
PIF_VALUE: 22
PIF_VALUE: 26
PIF_VALUE: 17
PIF_VALUE: 26
PIF_VALUE: 19
PIF_VALUE: 1
PIF_VALUE: 21
PIF_VALUE: 25
PIF_VALUE: 26
PIF_VALUE: 22
PIF_VALUE: 16
PIF_VALUE: 2
PIF_VALUE: 26
PIF_VALUE: 2
PIF_VALUE: 22
PIF_VALUE: 28
PIF_VALUE: 2
PIF_VALUE: 32
PIF_VALUE: 18
PIF_VALUE: 27
PIF_VALUE: 26
PIF_VALUE: 26
PIF_VALUE: 27
PIF_VALUE: 25
PIF_VALUE: 21
PIF_VALUE: 18
PIF_VALUE: 16
PIF_VALUE: 26
PIF_VALUE: 26
PIF_VALUE: 24
PIF_VALUE: 33
PIF_VALUE: 2
PIF_VALUE: 25
PIF_VALUE: 25

## 2018-09-17 ASSESSMENT — PAIN DESCRIPTION - LOCATION
LOCATION: RIB CAGE

## 2018-09-17 ASSESSMENT — PAIN DESCRIPTION - ORIENTATION
ORIENTATION: RIGHT

## 2018-09-17 ASSESSMENT — PAIN DESCRIPTION - FREQUENCY
FREQUENCY: CONTINUOUS
FREQUENCY: INTERMITTENT
FREQUENCY: CONTINUOUS

## 2018-09-17 ASSESSMENT — PAIN DESCRIPTION - PAIN TYPE
TYPE: ACUTE PAIN;SURGICAL PAIN

## 2018-09-17 ASSESSMENT — PAIN DESCRIPTION - ONSET: ONSET: ON-GOING

## 2018-09-17 ASSESSMENT — PAIN DESCRIPTION - DESCRIPTORS
DESCRIPTORS: CONSTANT;DISCOMFORT
DESCRIPTORS: ACHING;CRUSHING;DISCOMFORT
DESCRIPTORS: CONSTANT;DISCOMFORT

## 2018-09-17 NOTE — PROGRESS NOTES
[facility]  Internal Medicine Residency Program  MICU progress note    Patient:  Tyshawn Methodist 70 y.o. male MRN: [id number]     Date of Service: 9/17/2018    Hospital Day: 5      Chief complaint: SOB  subjective   She is doing with that, he had some respiratory issues morning but he is now down 8 L  Denies  any fever or chills, his chest tube is not draining at all, not sure if it's under skin or    Physical Exam   · Vitals: /62   Pulse 62   Temp 97.9 °F (36.6 °C) (Temporal)   Resp 16   Ht 5' 5\" (1.651 m)   Wt 158 lb 1.6 oz (71.7 kg)   SpO2 94%   BMI 26.31 kg/m²     · General Appearance: alert and oriented to person, place and time  · Head: normocephalic and atraumatic  · Eyes: pupils equal, round, and reactive to light  · ENT: tympanic membrane, external ear and ear canal normal bilaterally, oropharynx clear and moist with normal mucous membranes  · Neck: neck supple and non tender without mass   · Pulmonary/Chest: b/l decrease air entry, right >> left with wheeze  · Cardiovascular: normal rate, normal S1 and S2, no gallops and intact distal pulses  · Abdomen: soft, non-tender, non-distended, normal bowel sounds, no masses or organomegaly  · Extremities: no cyanosis and no clubbing  · Musculoskeletal: normal range of motion, no joint swelling, deformity or tenderness  · Neurologic: reflexes normal and symmetric, no cranial nerve deficit, gait and coordination normal and speech normal   Labs and Imaging Studies   Basic Labs  Recent Labs      09/14/18   0435  09/15/18   0440  09/16/18   0545   NA  138  136  139   K  4.0  3.8  4.3   CL  98  94*  99   CO2  28  24  26   BUN  8  18  31*   CREATININE  1.0  1.0  1.2   GLUCOSE  180*  167*  162*   CALCIUM  9.2  8.6  8.7       Recent Labs      09/14/18   0435  09/15/18   0440  09/16/18   0545   WBC  8.0  10.2  11.1   RBC  3.98  3.81  3.49*   HGB  12.0*  11.7*  10.5*   HCT  37.3  35.1*  31.7*   MCV  93.7  92.1  90.8   MCH  30.2  30.7  30.1 MCHC  32.2  33.3  33.1   RDW  12.7  12.6  12.6   PLT  325  315  294   MPV  10.5  11.6  10.1       CBC:   Lab Results   Component Value Date    WBC 11.1 09/16/2018    RBC 3.49 09/16/2018    HGB 10.5 09/16/2018    HCT 31.7 09/16/2018    MCV 90.8 09/16/2018    RDW 12.6 09/16/2018     09/16/2018     BMP:    Lab Results   Component Value Date     09/16/2018    K 4.3 09/16/2018    CL 99 09/16/2018    CO2 26 09/16/2018    BUN 31 09/16/2018       Imaging Studies:    EKG: normal sinus rhythm, unchanged from previous tracings. Assessment and Plan     Kamari Morgan is a 70 y.o. male    1. Right pleural effusion  - Parapneumonia vs transudate vs malignant  Not sure if the pigtail catheter inside the fluid or migrated outside, Dr. Cristel Hernandez order CAT scan of the chest we will follow    He is for VATS tomorrow with possible respiratory failure development after VATS    We will follow very carefully around surgery    2. EtOH abuse  - on ativan 0.5mg q6 hr    3. HTN  -on amlodipine,atenolol and hydralazine    4. HLD  -on atorvastatin    5. Hx TIA with s/p right carotid endartectomy  - on aspirin    6. Tobacco abuse  -patient  on cessation         DVT prophylaxis/ GI prophylaxis: Lovenox and protonix    Chest tube , will follow pleural fluid analysis.  And cytology     Otis Tenorio MD,

## 2018-09-17 NOTE — ANESTHESIA POSTPROCEDURE EVALUATION
Department of Anesthesiology  Postprocedure Note    Patient: Guero Lackey  MRN: [id number]  YOB: 1950  Date of evaluation: 9/17/2018  Time:  12:22 PM     Procedure Summary     Date:  09/17/18 [place]  [facility] [place] [facility]    Anesthesia Start:  8590 Anesthesia Stop:  1449    Procedure:  THORACOSCOPY (Right ) Diagnosis:  (pleural effusion)    Surgeon:  Alecia Warren MD Responsible Provider:  Ada Davis DO    Anesthesia Type:  general ASA Status:  3          Anesthesia Type: general    Magui Phase I:      Magui Phase II:      Last vitals: Reviewed and per EMR flowsheets.        Anesthesia Post Evaluation    Patient location during evaluation: PACU  Patient participation: complete - patient participated  Level of consciousness: awake and alert  Airway patency: patent  Nausea & Vomiting: no nausea and no vomiting  Complications: no  Cardiovascular status: blood pressure returned to baseline  Respiratory status: acceptable  Hydration status: euvolemic

## 2018-09-17 NOTE — PROGRESS NOTES
27 09/17/2018    BUN 33 09/17/2018    LABALBU 3.9 09/13/2018    LABALBU 4.8 08/08/2011    CREATININE 1.2 09/17/2018    CALCIUM 8.4 09/17/2018    GFRAA >60 09/17/2018    LABGLOM 60 09/17/2018    GLUCOSE 206 09/17/2018    GLUCOSE 141 03/22/2012       Resident's Assessment and Plan     Kamari Morgan is a 70 y.o. male PMH of HTN, HLD, hx of TIA ,right carotid endarterectomy, parathyriod adenoma, cervical fusion(C3-C7) transfer from [facility] for further pulmonary and vascular surgery evaluation and management    1. Right pleural effusion,s/p chest tube placement with Pigtail  - Parapneumonia  vs malignant  -CT chest : right pleural effusion  with mediastinal LN  -repeat pleural fluid: protein 3.8, LD:195 likely exudative (LDH>2/3 of upper limit)  -Hx of chronic smoker  -on high flow nasal cannula at 12L  - continue breathing treatment and iv steroid  -on azithromycin and rocephin  - CTS consulted: suspectsTrapped lung s/pright VATS/total lungs decortication/Vats talc pleurodesis with pleur-x catheter insertion and intercostal nerve block. 2.EtOH abuse  -completed folic acid and thiamin for 5days  - on ativan 0.5mg PRN     3. HTN  -on amlodipine,atenolol and hydralazine     4. HLD  -on atorvastatin     5. Hx TIA with s/p right carotid endartectomy  - on aspirin     6. Tobacco abuse  -patient  on cessation    PT/OT evaluation:  DVT prophylaxis/ GI prophylaxis:lovenox /protonix   Disposition: home +/- home health / Satnam Mace / [employer] [facility] / Ginna Bourne MD, PGY-1  Internal medicine resident    Attending Physician: Dr. Janneth Shelby

## 2018-09-17 NOTE — ANESTHESIA PRE PROCEDURE
Department of Anesthesiology  Preprocedure Note       Name:  Leonard Mares   Age:  70 y.o.  :  1950                                          MRN:  [id number]         Date:  2018      Surgeon: Nita Dave):  Burt Nova MD    Procedure: Procedure(s):  THORACOSCOPY    Medications prior to admission:   Prior to Admission medications    Medication Sig Start Date End Date Taking?  Authorizing Provider   atorvastatin (LIPITOR) 40 MG tablet Take 1 tablet by mouth daily 18  Yes Jean Bella DO   b complex vitamins capsule Take 1 capsule by mouth every morning    Yes Historical Provider, MD   PARoxetine (PAXIL) 30 MG tablet TAKE ONE TABLET BY MOUTH EVERY DAY IN THE MORNING  Patient taking differently: Take 30 mg by mouth every morning TAKE ONE TABLET BY MOUTH EVERY DAY IN THE MORNING 10/25/17  Yes Esther Mi MD   gabapentin (NEURONTIN) 600 MG tablet Take 1 tablet by mouth 3 times daily 10/25/17  Yes Lizeth Downing MD   atenolol (TENORMIN) 50 MG tablet Take 1 tablet by mouth 2 times daily 10/25/17 10/25/18 Yes Lizeth Downing MD   amLODIPine (NORVASC) 10 MG tablet Take 1 tablet by mouth daily 10/25/17  Yes Lizeth Downing MD   hydrALAZINE (APRESOLINE) 25 MG tablet Take 1 tablet by mouth 2 times daily 10/25/17  Yes Lizeth Downing MD   acetaminophen (TYLENOL) 325 MG tablet Take 650 mg by mouth every 6 hours as needed for Pain   Yes Historical Provider, MD   umeclidinium-vilanterol (ANORO ELLIPTA) 62.5-25 MCG/INH AEPB inhaler Inhale 1 puff into the lungs daily 18   Brittney Narayanan MD   albuterol sulfate HFA (VENTOLIN HFA) 108 (90 Base) MCG/ACT inhaler Inhale 2 puffs into the lungs every 6 hours as needed for Wheezing 18   Brittney Narayanan MD   aspirin 81 MG tablet Take 81 mg by mouth every morning     Historical Provider, MD       Current medications:    Current Facility-Administered Medications   Medication Dose Route Frequency at 09/17/18 0852    acetaminophen (TYLENOL) tablet 650 mg  650 mg Oral Q6H PRN Camilla Calderon MD        LORazepam (ATIVAN) injection 0.5 mg  0.5 mg Intravenous Q6H PRN Camilla Calderon MD   0.5 mg at 09/13/18 1847    cefTRIAXone (ROCEPHIN) 1 g in sterile water 10 mL IV syringe  1 g Intravenous Q24H Camilla Calderon MD   1 g at 09/16/18 1814    azithromycin (ZITHROMAX) 500 mg in D5W 250ml Vial Mate  500 mg Intravenous Q24H Camilla Calderon MD   Stopped at 09/16/18 2030       Allergies:     Allergies   Allergen Reactions    Flexeril [Cyclobenzaprine] Other (See Comments)     HALLUCINATIONS    Lisinopril Swelling     Facial and lip    Influenza Virus Vaccine Other (See Comments)     \"Got the Flu\"    Metformin And Related Other (See Comments)     Lactic acidosis    Zocor [Simvastatin] Other (See Comments)     Elevated LFT's       Problem List:    Patient Active Problem List   Diagnosis Code    HTN (hypertension) I10    Panic disorder F41.0    OA (osteoarthritis of spine) M47.9    Hyperlipidemia E78.5    Chronic kidney disease (CKD) N18.9    Mass of kidney N28.89    GERD (gastroesophageal reflux disease) K21.9    Neuropathy G62.9    Stenosis of right internal carotid artery with cerebral infarction ([address] I63.231    Osteophyte of vertebrae M25.78    Spinal stenosis in cervical region M48.02    Cervical spine instability M53.2X2    Hypoxia R09.02    Pleural effusion J90    Respiratory failure ([address] J96.90    Recurrent left pleural effusion J90       Past Medical History:        Diagnosis Date    Anxiety     Depression     GERD (gastroesophageal reflux disease)     Heart palpitations     HTN (hypertension) 10/21/2010    Hyperlipidemia     Lightheadedness     OA (osteoarthritis)     Parathyroid adenoma     s/p surgery 2005    Stenosis of right internal carotid artery with cerebral infarction [facility])        Past Surgical History:        Procedure Laterality Date    BACK SURGERY  12/20/2016    revision cervical laminectomy    CAROTID ENDARTERECTOMY Right 8/25/2014    Delatore - bovine patch     CERVICAL FUSION  12/12/2016    C4-C5, C5-C6, C6-C7 ACF, C5-C6 Corpectomy    ENDOSCOPY, COLON, DIAGNOSTIC      EYE SURGERY  2001    lasex    FRACTURE SURGERY Left     plate in wrist    HAND SURGERY      PARATHYROIDECTOMY  11/7/2005    TONSILLECTOMY      UPPER GASTROINTESTINAL ENDOSCOPY  07/07/2014       Social History:    Social History   Substance Use Topics    Smoking status: Former Smoker     Packs/day: 3.00     Years: 10.00     Types: Cigarettes     Quit date: 9/17/1983    Smokeless tobacco: Never Used    Alcohol use 6.0 oz/week     5 Cans of beer, 5 Standard drinks or equivalent per week      Comment: 5 mixed drinks daily                                Counseling given: Not Answered      Vital Signs (Current):   Vitals:    09/17/18 0526 09/17/18 0750 09/17/18 0945 09/17/18 1000   BP:  128/68 (!) 144/79 (!) 151/65   Pulse:  68 56 55   Resp:  18 16 16   Temp:  98 °F (36.7 °C)     TempSrc:       SpO2:  96% 96% 95%   Weight: 159 lb 14.4 oz (72.5 kg)      Height:                                                  BP Readings from Last 3 Encounters:   09/17/18 (!) 151/65   09/13/18 138/64   08/30/18 133/60       NPO Status:  > 8hrs                                                                               BMI:   Wt Readings from Last 3 Encounters:   09/17/18 159 lb 14.4 oz (72.5 kg)   09/12/18 169 lb (76.7 kg)   08/30/18 165 lb (74.8 kg)     Body mass index is 26.61 kg/m².     CBC:   Lab Results   Component Value Date    WBC 8.5 09/17/2018    RBC 3.00 09/17/2018    HGB 9.1 09/17/2018    HCT 27.5 09/17/2018    MCV 91.7 09/17/2018    RDW 12.6 09/17/2018     09/17/2018       CMP:   Lab Results   Component Value Date     09/17/2018    K 4.2 09/17/2018    CL 99 09/17/2018    CO2 27 09/17/2018    BUN 33 09/17/2018    CREATININE 1.2 09/17/2018    GFRAA >60 09/17/2018    LABGLOM 60 09/17/2018    GLUCOSE 156 09/17/2018 GLUCOSE 141 03/22/2012    PROT 6.8 09/13/2018    CALCIUM 8.5 09/17/2018    BILITOT 0.6 09/13/2018    ALKPHOS 186 09/13/2018    AST 10 09/13/2018    ALT 8 09/13/2018       POC Tests: No results for input(s): POCGLU, POCNA, POCK, POCCL, POCBUN, POCHEMO, POCHCT in the last 72 hours. Coags:   Lab Results   Component Value Date    PROTIME 14.5 09/16/2018    INR 1.3 09/16/2018    APTT 27.9 09/16/2018       HCG (If Applicable): No results found for: PREGTESTUR, PREGSERUM, HCG, HCGQUANT     ABGs:   Lab Results   Component Value Date    PO2ART 78.4 09/07/2018    JEZ9CAO 35.8 09/07/2018    XAM9SAG 26.2 09/07/2018        Type & Screen (If Applicable):  No results found for: LABABO, [address]    Anesthesia Evaluation  Patient summary reviewed and Nursing notes reviewed no history of anesthetic complications:   Airway: Mallampati: II  TM distance: >3 FB   Neck ROM: full  Mouth opening: > = 3 FB Dental:          Pulmonary:   (+) decreased breath sounds,                            ROS comment: 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 arterial line  MIPS: Postoperative opioids intended, Prophylactic antiemetics administered, Postoperative trial extubation and One-lung ventilation. Anesthetic plan and risks discussed with patient. Plan discussed with CRNA.                   Lasha Joiner, DO   9/17/2018

## 2018-09-18 ENCOUNTER — APPOINTMENT (OUTPATIENT)
Dept: GENERAL RADIOLOGY | Age: 68
DRG: 164 | End: 2018-09-18
Attending: INTERNAL MEDICINE
Payer: MEDICARE

## 2018-09-18 LAB
ANION GAP SERPL CALCULATED.3IONS-SCNC: 13 MMOL/L (ref 7–16)
BLOOD CULTURE, ROUTINE: NORMAL
BUN BLDV-MCNC: 28 MG/DL (ref 8–23)
CALCIUM SERPL-MCNC: 8.6 MG/DL (ref 8.6–10.2)
CHLORIDE BLD-SCNC: 95 MMOL/L (ref 98–107)
CO2: 28 MMOL/L (ref 22–29)
CREAT SERPL-MCNC: 1.1 MG/DL (ref 0.7–1.2)
CULTURE, BLOOD 2: NORMAL
GFR AFRICAN AMERICAN: >60
GFR NON-AFRICAN AMERICAN: >60 ML/MIN/1.73
GLUCOSE BLD-MCNC: 154 MG/DL (ref 74–109)
GRAM STAIN ORDERABLE: NORMAL
GRAM STAIN ORDERABLE: NORMAL
HCT VFR BLD CALC: 31.5 % (ref 37–54)
HEMOGLOBIN: 10 G/DL (ref 12.5–16.5)
MCH RBC QN AUTO: 29.6 PG (ref 26–35)
MCHC RBC AUTO-ENTMCNC: 31.7 % (ref 32–34.5)
MCV RBC AUTO: 93.2 FL (ref 80–99.9)
PDW BLD-RTO: 12.8 FL (ref 11.5–15)
PLATELET # BLD: 275 E9/L (ref 130–450)
PMV BLD AUTO: 10.2 FL (ref 7–12)
POTASSIUM SERPL-SCNC: 4 MMOL/L (ref 3.5–5)
RBC # BLD: 3.38 E12/L (ref 3.8–5.8)
SODIUM BLD-SCNC: 136 MMOL/L (ref 132–146)
WBC # BLD: 12.3 E9/L (ref 4.5–11.5)

## 2018-09-18 PROCEDURE — 6360000002 HC RX W HCPCS: Performed by: INTERNAL MEDICINE

## 2018-09-18 PROCEDURE — 2580000003 HC RX 258: Performed by: INTERNAL MEDICINE

## 2018-09-18 PROCEDURE — 97161 PT EVAL LOW COMPLEX 20 MIN: CPT

## 2018-09-18 PROCEDURE — 6370000000 HC RX 637 (ALT 250 FOR IP): Performed by: INTERNAL MEDICINE

## 2018-09-18 PROCEDURE — 94660 CPAP INITIATION&MGMT: CPT

## 2018-09-18 PROCEDURE — 2140000000 HC CCU INTERMEDIATE R&B

## 2018-09-18 PROCEDURE — 36415 COLL VENOUS BLD VENIPUNCTURE: CPT

## 2018-09-18 PROCEDURE — 99231 SBSQ HOSP IP/OBS SF/LOW 25: CPT | Performed by: INTERNAL MEDICINE

## 2018-09-18 PROCEDURE — 6370000000 HC RX 637 (ALT 250 FOR IP): Performed by: THORACIC SURGERY (CARDIOTHORACIC VASCULAR SURGERY)

## 2018-09-18 PROCEDURE — 97530 THERAPEUTIC ACTIVITIES: CPT

## 2018-09-18 PROCEDURE — 94640 AIRWAY INHALATION TREATMENT: CPT

## 2018-09-18 PROCEDURE — 80048 BASIC METABOLIC PNL TOTAL CA: CPT

## 2018-09-18 PROCEDURE — 71045 X-RAY EXAM CHEST 1 VIEW: CPT

## 2018-09-18 PROCEDURE — 6360000002 HC RX W HCPCS: Performed by: THORACIC SURGERY (CARDIOTHORACIC VASCULAR SURGERY)

## 2018-09-18 PROCEDURE — G8979 MOBILITY GOAL STATUS: HCPCS

## 2018-09-18 PROCEDURE — G8978 MOBILITY CURRENT STATUS: HCPCS

## 2018-09-18 PROCEDURE — 2700000000 HC OXYGEN THERAPY PER DAY

## 2018-09-18 PROCEDURE — 99232 SBSQ HOSP IP/OBS MODERATE 35: CPT | Performed by: INTERNAL MEDICINE

## 2018-09-18 PROCEDURE — 85027 COMPLETE CBC AUTOMATED: CPT

## 2018-09-18 PROCEDURE — 2580000003 HC RX 258: Performed by: THORACIC SURGERY (CARDIOTHORACIC VASCULAR SURGERY)

## 2018-09-18 PROCEDURE — 94761 N-INVAS EAR/PLS OXIMETRY MLT: CPT

## 2018-09-18 RX ORDER — PREDNISONE 20 MG/1
40 TABLET ORAL DAILY
Status: DISCONTINUED | OUTPATIENT
Start: 2018-09-18 | End: 2018-09-19 | Stop reason: HOSPADM

## 2018-09-18 RX ADMIN — FORMOTEROL FUMARATE DIHYDRATE 20 MCG: 20 SOLUTION RESPIRATORY (INHALATION) at 21:30

## 2018-09-18 RX ADMIN — ASPIRIN 81 MG: 81 TABLET ORAL at 08:56

## 2018-09-18 RX ADMIN — Medication 10 ML: at 19:47

## 2018-09-18 RX ADMIN — GABAPENTIN 600 MG: 600 TABLET, FILM COATED ORAL at 19:46

## 2018-09-18 RX ADMIN — AMLODIPINE BESYLATE 10 MG: 10 TABLET ORAL at 10:03

## 2018-09-18 RX ADMIN — ATENOLOL 50 MG: 50 TABLET ORAL at 19:46

## 2018-09-18 RX ADMIN — AZITHROMYCIN MONOHYDRATE 500 MG: 500 INJECTION, POWDER, LYOPHILIZED, FOR SOLUTION INTRAVENOUS at 18:42

## 2018-09-18 RX ADMIN — GABAPENTIN 600 MG: 600 TABLET, FILM COATED ORAL at 08:55

## 2018-09-18 RX ADMIN — PANTOPRAZOLE SODIUM 40 MG: 40 TABLET, DELAYED RELEASE ORAL at 06:00

## 2018-09-18 RX ADMIN — BUDESONIDE 1000 MCG: 0.5 SUSPENSION RESPIRATORY (INHALATION) at 08:57

## 2018-09-18 RX ADMIN — HYDRALAZINE HYDROCHLORIDE 25 MG: 25 TABLET, FILM COATED ORAL at 19:46

## 2018-09-18 RX ADMIN — BUDESONIDE 1000 MCG: 0.5 SUSPENSION RESPIRATORY (INHALATION) at 21:35

## 2018-09-18 RX ADMIN — ENOXAPARIN SODIUM 40 MG: 40 INJECTION SUBCUTANEOUS at 08:56

## 2018-09-18 RX ADMIN — OXYCODONE HYDROCHLORIDE 5 MG: 5 TABLET ORAL at 02:14

## 2018-09-18 RX ADMIN — CEFAZOLIN SODIUM 2 G: 10 POWDER, FOR SOLUTION INTRAVENOUS at 02:14

## 2018-09-18 RX ADMIN — WATER 1 G: 1 INJECTION INTRAMUSCULAR; INTRAVENOUS; SUBCUTANEOUS at 18:10

## 2018-09-18 RX ADMIN — PAROXETINE HYDROCHLORIDE HEMIHYDRATE 30 MG: 10 TABLET, FILM COATED ORAL at 08:55

## 2018-09-18 RX ADMIN — HYDRALAZINE HYDROCHLORIDE 25 MG: 25 TABLET, FILM COATED ORAL at 08:55

## 2018-09-18 RX ADMIN — ACETAMINOPHEN 650 MG: 325 TABLET, FILM COATED ORAL at 22:44

## 2018-09-18 RX ADMIN — ACETAMINOPHEN 650 MG: 325 TABLET, FILM COATED ORAL at 18:10

## 2018-09-18 RX ADMIN — GABAPENTIN 600 MG: 600 TABLET, FILM COATED ORAL at 13:32

## 2018-09-18 RX ADMIN — ACETAMINOPHEN 650 MG: 325 TABLET, FILM COATED ORAL at 06:01

## 2018-09-18 RX ADMIN — Medication 10 ML: at 08:57

## 2018-09-18 RX ADMIN — Medication 10 ML: at 18:11

## 2018-09-18 RX ADMIN — ATENOLOL 50 MG: 50 TABLET ORAL at 08:54

## 2018-09-18 RX ADMIN — PREDNISONE 40 MG: 20 TABLET ORAL at 19:47

## 2018-09-18 RX ADMIN — METHYLPREDNISOLONE SODIUM SUCCINATE 40 MG: 40 INJECTION, POWDER, FOR SOLUTION INTRAMUSCULAR; INTRAVENOUS at 08:57

## 2018-09-18 RX ADMIN — FORMOTEROL FUMARATE DIHYDRATE 20 MCG: 20 SOLUTION RESPIRATORY (INHALATION) at 08:57

## 2018-09-18 RX ADMIN — ACETAMINOPHEN 650 MG: 325 TABLET, FILM COATED ORAL at 11:49

## 2018-09-18 RX ADMIN — ATORVASTATIN CALCIUM 40 MG: 40 TABLET, FILM COATED ORAL at 08:56

## 2018-09-18 RX ADMIN — IPRATROPIUM BROMIDE AND ALBUTEROL SULFATE 1 AMPULE: .5; 3 SOLUTION RESPIRATORY (INHALATION) at 12:53

## 2018-09-18 ASSESSMENT — PAIN SCALES - GENERAL
PAINLEVEL_OUTOF10: 0
PAINLEVEL_OUTOF10: 3
PAINLEVEL_OUTOF10: 2
PAINLEVEL_OUTOF10: 0
PAINLEVEL_OUTOF10: 4
PAINLEVEL_OUTOF10: 0
PAINLEVEL_OUTOF10: 0
PAINLEVEL_OUTOF10: 4
PAINLEVEL_OUTOF10: 3

## 2018-09-18 NOTE — PROGRESS NOTES
[address]  Pulmonary Health and 61 [facility]  Pulmonary Progress Note     Patient:  Javid Bartholomew 70 y.o. male MRN: [id number]     Date of Service: 9/17/2018    Hospital Day: 5      Chief complaint: SOB,Pleural effusion       subjective   S/P VATS   Denies any SOB ,no fever or chills ,has some chest pain ,chest tube drainage and had Pleurax cathter     Physical Exam   · Vitals: BP (!) 153/67   Pulse 55   Temp 98.3 °F (36.8 °C) (Oral)   Resp 18   Ht 5' 5\" (1.651 m)   Wt 159 lb 14.4 oz (72.5 kg)   SpO2 97%   BMI 26.61 kg/m²     · General Appearance: alert and oriented to person, place and time  · Head: normocephalic and atraumatic  · Eyes: pupils equal, round, and reactive to light  · ENT: tympanic membrane, external ear and ear canal normal bilaterally, oropharynx clear and moist with normal mucous membranes  · Neck: neck supple and non tender without mass   · Pulmonary/Chest: b/l decrease air entry, right >> left with wheeze  · Cardiovascular: normal rate, normal S1 and S2, no gallops and intact distal pulses  · Abdomen: soft, non-tender, non-distended, normal bowel sounds, no masses or organomegaly  · Extremities: no cyanosis and no clubbing  · Musculoskeletal: normal range of motion, no joint swelling, deformity or tenderness  · Neurologic: reflexes normal and symmetric, no cranial nerve deficit, gait and coordination normal and speech normal   ·   Labs and Imaging Studies   Basic Labs  Recent Labs      09/16/18   0545  09/17/18   0601  09/17/18   1305   NA  139  139  138   K  4.3  4.2  4.5   CL  99  99  101   CO2  26  27  27   BUN  31*  33*  33*   CREATININE  1.2  1.2  1.2   GLUCOSE  162*  156*  206*   CALCIUM  8.7  8.5*  8.4*       Recent Labs      09/16/18   0545  09/17/18   0601  09/17/18   1305   WBC  11.1  8.5  10.2   RBC  3.49*  3.00*  3.26*   HGB  10.5*  9.1*  9.7*   HCT  31.7*  27.5*  30.0*   MCV  90.8  91.7  92.0   MCH  30.1  30.3  29.8   MCHC  33.1  33.1  32.3   RDW

## 2018-09-18 NOTE — OP NOTE
[address]                   [address] [facility], [address] [place] [address]                                 OPERATIVE REPORT    PATIENT NAME: Radha Park                     :        1950  MED REC NO:   [id number]                            ROOM:       6506  ACCOUNT NO:   [de-identified]                           ADMIT DATE: 2018  PROVIDER:     Tavares Li MD    DATE OF PROCEDURE:  2018    PREOPERATIVE DIAGNOSIS:  Pleural effusion throughout the lung. POSTOPERATIVE DIAGNOSIS:   Pleural effusion throughout the lung. INDICATIONS:   Pleural effusion throughout the lung. SURGEON:  Tavares Li MD    ASSISTANT:  SUJATHA Cardona.    COMPLICATIONS:  None, tolerated well. ESTIMATED BLOOD LOSS:  Approximately 50 mL. ANESTHESIA:  General endotracheal.    ANESTHESIA ATTENDING:  Eliazar Morelos DO    FINDINGS:  There was trapped right lower lobe with a chronic trapping with  adhesions within the chest as well as fibrinous exudate. OPERATIONS:  1. Bronchoscopy. 2.  Removal of right chest tube. 3.  Right VATS. 4. VATS total lung decortication. 5. VATS talc pleurodesis. 6.  Insertion of PleurX catheter. 7. Intercostal nerve block. HISTORY:  This is a 18-year-old man who was admitted through an outside  hospital with shortness of breath. He had two to three months' history of  increasing shortness of breath and dyspnea on exertion. He was not on  oxygen at home, but was on 12 liters when I met him. He had a chest tube  that was in, but was not draining. He did have a thoracentesis. CAT scan  showed likely trapping of the right lung and he was transferred into our  institution for VATS.   The patient was described the procedure in full  including the risks and complications including but not limited to  bleeding, infection, need for reoperation, hemothorax, pneumothorax,  stroke, myocardial infarction, and death, and the patient agreed to  proceed. DESCRIPTION OF PROCEDURE:  After adequate informed consent was obtained and  adequate preoperative antibiotics were given, the patient was brought to  the operating room in stable condition. He was laid in supine position and  induced general endotracheal anesthesia by Anesthesia staff. This was via  double-lumen tube. I performed bronchoscopy, which revealed the tube to be  in good position. The patient was turned to left lateral decubitus  position with the right side up. All pressure points were padded. The  right chest tube was removed. The right chest was prepped and draped in  the usual sterile fashion. A 1-inch transverse incision was made in the  inframammary crease and anterior axillary line. Dissection was carried  down through the intercostal muscle; however, I could not achieve a plane  in the chest.  Therefore, a second 1-inch incision was made in the fourth  interspace posterior mid axillary line. Dissection was carried down to the  chest and we immediately encountered pleural fluid. A second 5-mm port was  placed in ninth intercostal space mid axillary line. The camera was placed  through this port. Extensive dissection then ensued to free up the entire  lung. Total lung decortication was necessary and this was done in usual  fashion. Specimen was sent for culture as well as pathology. There were  adhesions especially anteriorly, these were taken down. We inflated the  lung and noted still the vast majority of the chest cavity where there was  still chronic trapping of the right lower lobe. Therefore, 3 gm of talc  were sprayed to the apex and a 20-Bangl[place]hi chest tube was placed at the apex  as well as the right angle one at the base. A PleurX catheter was tunneled  from an anterior incision to mid axillary incision and dumped to the chest  posteriorly. This was secured appropriately. Intercostal nerve block was  done around the incisions.   The multiple incisions were closed with  multiple layers of absorbable stitch. Dry sterile dressings were applied. The patient tolerated the procedure well. The patient was extubated on the  table and transferred to recovery room in stable condition. All sponge,  instrument, and needle counts were correct at the end of the case.         Elpidio Lopez MD    D: 09/17/2018 11:40:36       T: 09/17/2018 13:40:10     [id number]  Job#: [id number]     Doc#: [id number]    CC:  MD Preeti Remy DO Rossie Porta, MD Cassie Paula, MD Charlett Diones, MD

## 2018-09-18 NOTE — CARE COORDINATION
SOCIAL WORK AND DISCHARGE PLANNING: Met with pt in room today. Pt lives with wife in a one story home with 8 steps to enter. Pt and wife do not work. Pt reports total independence PTA. Pt has a shower chair. No HHC PTA. Pt is a , does not use the [facility], reports using the ambulatory clinic, pcp is there. HHC was discussed in discharge discussion. [facility] provided to pt. Pt chose [facility].  Radha Soriano 9/18/2018

## 2018-09-18 NOTE — PROGRESS NOTES
Physical Therapy    [facility]  [facility]    NAME: Elfreda Felty  : 1950  MRN: [id number]    Date of Service: 2018    Evaluating Therapist: Steve Hansen PT, DPT    Room #: 1373/6444-O  DIAGNOSIS: Recurrent R pleural effusion  PRECAUTIONS: Falls, 9L O2 on wall, Chest tube x 2  PROCEDURES:  R VATS    Social:  Pt lives with wife in a 1 floor plan with 8 step(s) and 1 rail(s) to enter. Prior to admission pt walked with no device and was Independent. Initial Evaluation  Date: 18 Treatment  Date:     Short Term/ Long Term   Goals   AM-PAC 6 Clicks 45     Was pt agreeable to Eval/treatment? Yes     Does pt have pain? 2/10 R chest tube site pain     Bed Mobility  Rolling: NT  Supine to sit: SBA with HOB elevated  Sit to supine: NT  Scooting: SBA  Mod Independent   Transfers Sit to stand: John  Stand to sit: John  Stand pivot: John no device  Independent   Ambulation   125 feet x 3 reps with John with no device  >400 feet Independently   Stair negotiation: ascended and descended 4 steps with 1 rail with SBA  >10 steps with 1 rail with Mod Independent   BLE ROM WNL     BLE strength Grossly 4/5  Increase by 1/3 MMT grade   Balance Sitting: Independent  Standing: John no device  Sitting: NA  Standing: Independent     Pt is alert and oriented x 3  Sensation: WNL  Edema: WNL    ASSESSMENT  Pt displays functional ability as noted in the objective portion of this evaluation. Comments/Treatment:  Pt supine in bed upon arrival, agreeable to initial evaluation. COLLIN Ospina cleared chest tubes to be disconnected from suction for session. Pt donned pants independently but required assistance for standing balance. Pt ambulated with mild unsteadiness with occasional LOBs that required assistance to correct. SpO2 monitored throughout session and remained >94% on 8L O2 on portable tank. Pt demonstrated inconsistent foot placement and step/stride length.   Pt completed

## 2018-09-18 NOTE — CARE COORDINATION
[address] Transitions Interview     2018    Patient: Vanessa Toribio Patient : 1950   MRN: [id number]  Reason for Admission: There are no discharge diagnoses documented for the most recent discharge. RARS: Readmission Risk Score: 21       Spoke with: Janna Nurse      Readmission Risk  Patient Active Problem List   Diagnosis    HTN (hypertension)    Panic disorder    OA (osteoarthritis of spine)    Hyperlipidemia    Chronic kidney disease (CKD)    Mass of kidney    GERD (gastroesophageal reflux disease)    Neuropathy    Stenosis of right internal carotid artery with cerebral infarction ([facility]    Osteophyte of vertebrae    Spinal stenosis in cervical region    Cervical spine instability    Hypoxia    Pleural effusion    Respiratory failure (HCC)    Recurrent left pleural effusion       Inpatient Assessment  Care Transitions Summary    Care Transitions Inpatient Review  Medication Review  Do you have all of your prescriptions and are they filled?:  No   Are you able to afford your medications?:  Yes  How often do you have difficulty taking your medications?:  I always take them as prescribed. Housing Review  Who do you live with?:  Partner/Spouse/SO  Are you an active caregiver in your home?:  No  Social Support  Durable Medical Equipment  Functional Review  Ability to seek help/take action for Emergent/Urgent situations i.e. fire, crime, inclement weather or health crisis. :  Independent  Ability handle personal hygiene needs (bathing/dressing/grooming): Independent  Ability to manage medications: Independent  Ability to prepare food:  Independent  Ability to maintain home (clean home, laundry): Independent  Ability to drive and/or has transportation:  Independent  Ability to do shopping:  Independent  Ability to manage finances:   Independent  Is patient able to live independently?:  Yes  Hearing and Vision  Visual Impairment:  Reading glasses  Hearing Impairment:  Hard of

## 2018-09-19 ENCOUNTER — APPOINTMENT (OUTPATIENT)
Dept: GENERAL RADIOLOGY | Age: 68
DRG: 164 | End: 2018-09-19
Attending: INTERNAL MEDICINE
Payer: MEDICARE

## 2018-09-19 VITALS
BODY MASS INDEX: 26.91 KG/M2 | SYSTOLIC BLOOD PRESSURE: 139 MMHG | WEIGHT: 161.5 LBS | HEART RATE: 69 BPM | HEIGHT: 65 IN | RESPIRATION RATE: 18 BRPM | OXYGEN SATURATION: 92 % | DIASTOLIC BLOOD PRESSURE: 61 MMHG | TEMPERATURE: 97.7 F

## 2018-09-19 LAB
CULTURE SURGICAL: NORMAL
GRAM STAIN RESULT: NORMAL

## 2018-09-19 PROCEDURE — 99231 SBSQ HOSP IP/OBS SF/LOW 25: CPT | Performed by: INTERNAL MEDICINE

## 2018-09-19 PROCEDURE — 94660 CPAP INITIATION&MGMT: CPT

## 2018-09-19 PROCEDURE — G8988 SELF CARE GOAL STATUS: HCPCS

## 2018-09-19 PROCEDURE — 6360000002 HC RX W HCPCS: Performed by: INTERNAL MEDICINE

## 2018-09-19 PROCEDURE — 2700000000 HC OXYGEN THERAPY PER DAY

## 2018-09-19 PROCEDURE — 94640 AIRWAY INHALATION TREATMENT: CPT

## 2018-09-19 PROCEDURE — 6370000000 HC RX 637 (ALT 250 FOR IP): Performed by: INTERNAL MEDICINE

## 2018-09-19 PROCEDURE — 2580000003 HC RX 258: Performed by: THORACIC SURGERY (CARDIOTHORACIC VASCULAR SURGERY)

## 2018-09-19 PROCEDURE — 97166 OT EVAL MOD COMPLEX 45 MIN: CPT

## 2018-09-19 PROCEDURE — 97535 SELF CARE MNGMENT TRAINING: CPT

## 2018-09-19 PROCEDURE — 6370000000 HC RX 637 (ALT 250 FOR IP): Performed by: THORACIC SURGERY (CARDIOTHORACIC VASCULAR SURGERY)

## 2018-09-19 PROCEDURE — 71045 X-RAY EXAM CHEST 1 VIEW: CPT

## 2018-09-19 PROCEDURE — G8987 SELF CARE CURRENT STATUS: HCPCS

## 2018-09-19 RX ORDER — LEVOFLOXACIN 500 MG/1
500 TABLET, FILM COATED ORAL DAILY
Qty: 7 TABLET | Refills: 0 | Status: SHIPPED | OUTPATIENT
Start: 2018-09-19 | End: 2018-09-19

## 2018-09-19 RX ORDER — PREDNISONE 10 MG/1
TABLET ORAL
Qty: 20 TABLET | Refills: 0 | Status: SHIPPED | OUTPATIENT
Start: 2018-09-19 | End: 2018-12-20 | Stop reason: ALTCHOICE

## 2018-09-19 RX ORDER — LEVOFLOXACIN 500 MG/1
500 TABLET, FILM COATED ORAL DAILY
Qty: 7 TABLET | Refills: 0 | Status: SHIPPED | OUTPATIENT
Start: 2018-09-19 | End: 2018-09-26

## 2018-09-19 RX ADMIN — Medication 10 ML: at 08:52

## 2018-09-19 RX ADMIN — OXYCODONE HYDROCHLORIDE 5 MG: 5 TABLET ORAL at 08:56

## 2018-09-19 RX ADMIN — PAROXETINE HYDROCHLORIDE HEMIHYDRATE 30 MG: 10 TABLET, FILM COATED ORAL at 08:52

## 2018-09-19 RX ADMIN — ASPIRIN 81 MG: 81 TABLET ORAL at 08:52

## 2018-09-19 RX ADMIN — ACETAMINOPHEN 650 MG: 325 TABLET, FILM COATED ORAL at 06:02

## 2018-09-19 RX ADMIN — ACETAMINOPHEN 650 MG: 325 TABLET, FILM COATED ORAL at 13:13

## 2018-09-19 RX ADMIN — GABAPENTIN 600 MG: 600 TABLET, FILM COATED ORAL at 13:13

## 2018-09-19 RX ADMIN — ATORVASTATIN CALCIUM 40 MG: 40 TABLET, FILM COATED ORAL at 08:52

## 2018-09-19 RX ADMIN — BUDESONIDE 1000 MCG: 0.5 SUSPENSION RESPIRATORY (INHALATION) at 10:32

## 2018-09-19 RX ADMIN — IPRATROPIUM BROMIDE AND ALBUTEROL SULFATE 1 AMPULE: .5; 3 SOLUTION RESPIRATORY (INHALATION) at 13:51

## 2018-09-19 RX ADMIN — PREDNISONE 40 MG: 20 TABLET ORAL at 08:52

## 2018-09-19 RX ADMIN — HYDRALAZINE HYDROCHLORIDE 25 MG: 25 TABLET, FILM COATED ORAL at 08:52

## 2018-09-19 RX ADMIN — IPRATROPIUM BROMIDE AND ALBUTEROL SULFATE 1 AMPULE: .5; 3 SOLUTION RESPIRATORY (INHALATION) at 10:33

## 2018-09-19 RX ADMIN — ENOXAPARIN SODIUM 40 MG: 40 INJECTION SUBCUTANEOUS at 08:53

## 2018-09-19 RX ADMIN — ATENOLOL 50 MG: 50 TABLET ORAL at 08:52

## 2018-09-19 RX ADMIN — AMLODIPINE BESYLATE 10 MG: 10 TABLET ORAL at 08:52

## 2018-09-19 RX ADMIN — PANTOPRAZOLE SODIUM 40 MG: 40 TABLET, DELAYED RELEASE ORAL at 06:02

## 2018-09-19 RX ADMIN — FORMOTEROL FUMARATE DIHYDRATE 20 MCG: 20 SOLUTION RESPIRATORY (INHALATION) at 10:32

## 2018-09-19 RX ADMIN — GABAPENTIN 600 MG: 600 TABLET, FILM COATED ORAL at 08:52

## 2018-09-19 ASSESSMENT — PAIN SCALES - GENERAL
PAINLEVEL_OUTOF10: 0
PAINLEVEL_OUTOF10: 2
PAINLEVEL_OUTOF10: 0
PAINLEVEL_OUTOF10: 2

## 2018-09-19 ASSESSMENT — PAIN DESCRIPTION - DESCRIPTORS
DESCRIPTORS: ACHING;DISCOMFORT;SORE
DESCRIPTORS: ACHING;DISCOMFORT;SORE

## 2018-09-19 ASSESSMENT — PAIN DESCRIPTION - ORIENTATION
ORIENTATION: RIGHT
ORIENTATION: RIGHT

## 2018-09-19 ASSESSMENT — PAIN DESCRIPTION - PAIN TYPE
TYPE: SURGICAL PAIN
TYPE: SURGICAL PAIN

## 2018-09-19 ASSESSMENT — PAIN DESCRIPTION - LOCATION
LOCATION: CHEST
LOCATION: RIB CAGE

## 2018-09-19 NOTE — PROGRESS NOTES
[address]  Pulmonary Health and 61 [facility]  Pulmonary Progress Note     Patient:  Daryl Rossi 70 y.o. male MRN: [id number]     Date of Service: 9/19/2018    Hospital Day: 7      Chief complaint: SOB,Pleural effusion       subjective   Doing much better  S/p VATSChest tube was removed he is to be discharge  Denies any fever or chills     Physical Exam   · Vitals: /61   Pulse 69   Temp 97.7 °F (36.5 °C) (Oral)   Resp 18   Ht 5' 5\" (1.651 m)   Wt 161 lb 8 oz (73.3 kg)   SpO2 92%   BMI 26.88 kg/m²     · General Appearance: alert and oriented to person, place and time  · Head: normocephalic and atraumatic  · Eyes: pupils equal, round, and reactive to light  · ENT: tympanic membrane, external ear and ear canal normal bilaterally, oropharynx clear and moist with normal mucous membranes  · Neck: neck supple and non tender without mass   · Pulmonary/Chest: b/l decrease air entry, right >> left with wheeze  · Cardiovascular: normal rate, normal S1 and S2, no gallops and intact distal pulses  · Abdomen: soft, non-tender, non-distended, normal bowel sounds, no masses or organomegaly  · Extremities: no cyanosis and no clubbing  · Musculoskeletal: normal range of motion, no joint swelling, deformity or tenderness  · Neurologic: reflexes normal and symmetric, no cranial nerve deficit, gait and coordination normal and speech normal   ·   Labs and Imaging Studies   Basic Labs  Recent Labs      09/17/18   0601  09/17/18   1305  09/18/18   0617   NA  139  138  136   K  4.2  4.5  4.0   CL  99  101  95*   CO2  27  27  28   BUN  33*  33*  28*   CREATININE  1.2  1.2  1.1   GLUCOSE  156*  206*  154*   CALCIUM  8.5*  8.4*  8.6       Recent Labs      09/17/18   0601  09/17/18   1305  09/18/18   0617   WBC  8.5  10.2  12.3*   RBC  3.00*  3.26*  3.38*   HGB  9.1*  9.7*  10.0*   HCT  27.5*  30.0*  31.5*   MCV  91.7  92.0  93.2   MCH  30.3  29.8  29.6   MCHC  33.1  32.3  31.7*   RDW  12.6  12.7  12.8 PLT  252  311  275   MPV  10.2  10.4  10.2       CBC:   Lab Results   Component Value Date    WBC 12.3 09/18/2018    RBC 3.38 09/18/2018    HGB 10.0 09/18/2018    HCT 31.5 09/18/2018    MCV 93.2 09/18/2018    RDW 12.8 09/18/2018     09/18/2018     BMP:    Lab Results   Component Value Date     09/18/2018    K 4.0 09/18/2018    K 4.2 09/17/2018    CL 95 09/18/2018    CO2 28 09/18/2018    BUN 28 09/18/2018       Imaging Studies:    EKG: normal sinus rhythm, unchanged from previous tracings. Assessment and Plan     Marcial Zuñiga is a 70 y.o. male    1. Right pleural effusion  Parapneumonic   S/P VATS   Repeat CT scan in 4-6 weeks ,if lymph nodes persist or get larger ,will biopsy   Can go from Pulm stand point on Levaquin and steroids  On 1-2 L o2 ,to go home   Cytology negative twice for malignancy   pleurax cathter as per CTS     2. EtOH abuse  - on ativan 0.5mg q6 hr      3- COPD  Will adjust inhalers in office   Provided with my contact      Jayden Pugh MD,

## 2018-09-19 NOTE — PROGRESS NOTES
Occupational Therapy  OCCUPATIONAL THERAPY INITIAL EVALUATION      Date:2018  Patient Name: Mike Johnson  MRN: [id number]  : 1950  Room: [address] [place], [place] [address]A     Evaluating OT:  TWIN Perkins, OTR/L  # [id number]    AM-PAC Raw Score:   on Eval = G Code CK    Recommended Adaptive Equipment: Reacher,        Reason for Admission:  Pt was admitted to [facility] on 18 w/ SOB, Hypoxia at 81% on room air. RRT 18 d/t collapsed lung, transferred to [facility] for VATS. Chest tube w/ Pigtail catheter inserted 18. Hx of Orthostatic hypotension      Diagnosis: No diagnosis found. Past Medical History:   Past Medical History:   Diagnosis Date    Anxiety     Depression     GERD (gastroesophageal reflux disease)     Heart palpitations     HTN (hypertension) 10/21/2010    Hyperlipidemia     Lightheadedness     OA (osteoarthritis)     Parathyroid adenoma     s/p surgery     Stenosis of right internal carotid artery with cerebral infarction [facility])        Past Surgical History:    Past Surgical History:   Procedure Laterality Date    BACK SURGERY  2016    revision cervical laminectomy    CAROTID ENDARTERECTOMY Right 2014    Delatore - bovine patch     CERVICAL FUSION  2016    C4-C5, C5-C6, C6-C7 ACF, C5-C6 Corpectomy    ENDOSCOPY, COLON, DIAGNOSTIC      EYE SURGERY      lasex    FRACTURE SURGERY Left     plate in wrist    HAND SURGERY      PARATHYROIDECTOMY  2005    UT THORSC DX LUNGS/PERICAR/MED/PLEURAL SPACE W/O BX Right 2018    THORACOSCOPY performed by Bridgette Hui MD at [facility]  2014       Precautions:  Fall Risk  NC O2 1 LPM - Check O2 Sats  Right Chest Tube  General Diet     Home Living: Pt lives with his wife in a 1-story house with 8 IMELDA and 1 HR/s.   Bed/bath on the main floor  Bathroom setup: Tub-Shower, standard commode  Equipment owned: Shower Chair    Available Family Assist:  Wife can provide 24 hour assist    Prior Level of Function:  Pt was IND with all Ax w/o the use of any DME/AD. Driving: Yes  Occupation/Interests: [place], Retired owner of a [employer]    Vitals/Lab Values:   O2 Sats at rest prior to ax w/ NC O2 1 LPM = 93%     O2 Sats dropped to 85% after transferring supine to sit - Room Air     O2 Sats remained ~ 88% seated EOB for > 10 mins - Room Air     O2 Sats dropped to ~ 86% w/ ~ 5 mins of standing ax and ambulating - Room Air     O2 Sats returned to 90% w/ NC O2 1 LPM after 30 second seated rest break, returned to 93% after 60 seconds    Pain Level: Denies pain this session   Additional Complaints:  Some dizziness w/ upright ax    Comments:  Upon arrival, pt was found semi-supine in bed. He was agreeable to participate in assessment ax. His wife was present during assessment. Received permission from RN prior to engaging pt in assessment ax.        Cognition:  A & O x 4  Ability to Follow Multi-Step Commands:  Good   Problem solving skills:  Good  Memory:  Good  Additional Comments:  Pt was pleasant, cooperative    Visual-Perceptual:   Hearing: WNL  Hearing Aids:  No  Vision:  WFL  Glasses:  Yes     UE Assessment:  Hand Dominance:  Right     Strength ROM Additional Info:    RUE   WNL - nt d/t chest tube WNL Good    Good FMC/dexterity noted during ADL tasks   LUE WNL WNL Good    Good FMC/dexterity noted during ADL tasks     Sensation:  Denies numbness and tingling Justin UEs  Tone: WNL Bilateral UEs    Edema:  Mild Edema Justin LEs     Functional Assessment:   Initial Status 9-18-18 Comments   Feeding  IND Seated in chair   Grooming  SUP/Set up  Standing at sink ~ 2 mins   Upper Body Dressing SUP/Set up  Seated EOB   Lower Body Dressing SUP/Set up Seated/standing EOB   Bathing NT Pt declined   Toileting  NT Pt declined   Bed Mobility  SUP Rolling/Repositioning   Functional Transfers Close SUP for safety  Supine to sit  Sit<>stand from EOB and chair x multiple trials Deficits identified  Clinical Decision Making- Moderate    Plan of Care:  ADL retraining X   Equipment needs: DME/Adaptive Equipment X   Neuromuscular re-education X Energy Conservation Techniques X  Functional Transfer training X  Patient and/or Family Education X  Functional Mobility training X  Environmental Modifications X  Cognitive re-training X   Compensatory techniques for ADLs X  Therapeutic Exercise X  Positioning to improve overall function X  Therapeutic Activity X   Other: X    Rehab Potential: Good (-)    Recommended Treatment Frequency: 3-5 days/week    Patient / Family Goal: Go Home w/ family assist    Pt/Family participated in the establishment of the following goals:      Short term goals  Time Frame: 1 week    GOAL (1)  Pt will complete Hygiene/Grooming with Remote SUP while standing at the sink. GOAL (2)  Pt will complete Dressing/Bathing/Toileting with Remote SUP/set up w/ use of DME/AD/Adaptive equip/techs PRN. GOAL (3)  Pt will complete Functional Transfers and Functional Mobility with Remote SUP w/ use of DME/AD PRN. GOAL (4)  Pt will tolerate standing > 10 minutes with Remote SUP for completion of Functional Ax. GOAL (5)  Pt will INDly Demo Good Safety Awareness/Energy Conservation/Work Simplification/Pursed-Lip Breathing w/ completion of all Functional Ax. Patient and/or family understands diagnosis, prognosis and plan of care: yes    Yes []  No [x]  Malnutrition indicators have been identified and nursing has been notified to ensure a dietitian consult is ordered. Time in: 1111  Time out: 1150  Eval Completed:   Moderate - 40- Minutes  Timed Treatment:  Bere [address] [place] [address], OTR/L  # [id number]

## 2018-09-19 NOTE — PROGRESS NOTES
[facility]  Internal Medicine Residency Program  Progress Note - House Team 1    Patient:  Shavonne Hartley 70 y.o. male MRN: [id number]     Date of Service: 9/19/2018     CC: follow up on pleural effusion      Subjective     Pt seen and examined at bedside. Patient lying on bed comfortably with NC at 2L of oxygen . No new c/o, breathing improved. Removal of second CT today morning and  Plan discharge with pleur-x catheter to home with [facility]    Objective     Physical Exam:  · Vitals: /63   Pulse 67   Temp 97.8 °F (36.6 °C) (Oral)   Resp 18   Ht 5' 5\" (1.651 m)   Wt 161 lb 8 oz (73.3 kg)   SpO2 93%   BMI 26.88 kg/m²     I & O - 24hr: I/O this shift:  In: 180 [P.O.:180]  · Out: 425 [Urine:425]   · General Appearance: alert, appears stated age and cooperative  · HEENT:  Head: Normal, normocephalic, atraumatic. · Neck: no adenopathy, no carotid bruit, no JVD and supple, symmetrical, trachea midline  · Lung: rales on right wi. 2 chest tube in place  · Heart: regular rate and rhythm, S1, S2 normal, no murmur, click, rub or gallop  · Abdomen: soft, non-tender; bowel sounds normal; no masses,  no organomegaly  · Extremities:  extremities normal, atraumatic, no cyanosis or edema  · Musculokeletal: No joint swelling, no muscle tenderness. ROM normal in all joints of extremities.    · Neurologic: Mental status: Alert, oriented, thought content appropriate  Subject  Pertinent Labs & Imaging Studies   larry  CBC:   Lab Results   Component Value Date    WBC 12.3 09/18/2018    RBC 3.38 09/18/2018    HGB 10.0 09/18/2018    HCT 31.5 09/18/2018    MCV 93.2 09/18/2018    MCH 29.6 09/18/2018    MCHC 31.7 09/18/2018    RDW 12.8 09/18/2018     09/18/2018    MPV 10.2 09/18/2018     BMP:    Lab Results   Component Value Date     09/18/2018    K 4.0 09/18/2018    K 4.2 09/17/2018    CL 95 09/18/2018    CO2 28 09/18/2018    BUN 28 09/18/2018    LABALBU 3.9 09/13/2018    LABALBU 4.8 08/08/2011 CREATININE 1.1 09/18/2018    CALCIUM 8.6 09/18/2018    GFRAA >60 09/18/2018    LABGLOM >60 09/18/2018    GLUCOSE 154 09/18/2018    GLUCOSE 141 03/22/2012       Resident's Assessment and Plan     Tyshawn Blair is a 70 y.o. male PMH of HTN, HLD, hx of TIA ,right carotid endarterectomy, parathyriod adenoma, cervical fusion(C3-C7) transfer from [facility] for further pulmonary and vascular surgery evaluation and management    1. Right pleural effusion,s/p chest tube placement with pleur-x catheter   - likely Parapneumonia    -CT chest : right pleural effusion  with mediastinal LN  -repeat pleural fluid: protein 3.8, LD:195 likely exudative (LDH>2/3 of upper limit)  -Hx of chronic smoker  -on nasal cannula at 2L  - continue breathing treatment and  taper steroid over 7days  - start levofloxacin 500 mg for 7days  - follow Pleural cytology report  - CTS consulted: suspectsTrapped lung s/pright VATS/total lungs decortication/Vats talc pleurodesis with pleur-x catheter   - continue incentive spirometry  - per pulmonary: Plan to repeat Ct scan in 4-6 week , to evaluate and biopsy if LN enlarge    2. EtOH abuse  -completed folic acid and thiamin for 5 days  - on ativan 0.5mg PRN     3. HTN  - continue amlodipine,atenolol and hydralazine     4. HLD  -on atorvastatin     5. Hx TIA with s/p right carotid endartectomy  - on aspirin     6. Tobacco abuse  -patient  on cessation    PT/OT evaluation  DVT prophylaxis/ GI prophylaxis:lovenox /protonix   Disposition: home with [facility]  Pt REQUIRES HOME O2 AT 1L DX: RESPIRATORY FAILURE, PLEURAL EFFUSION   Rc Moreno MD, PGY-1  Internal medicine resident    Attending Physician: Dr. Camilla Agosto

## 2018-09-19 NOTE — PROGRESS NOTES
POD#2 Awake, alert. No complaints. Denies CP, palpitations, SOB at rest, dizziness/lightheadedness. Vitals:    09/19/18 0400 09/19/18 0644 09/19/18 0759 09/19/18 1015   BP: (!) 152/70  (!) 170/72    Pulse: 64  61    Resp: 18  18    Temp: 97.9 °F (36.6 °C)  98.7 °F (37.1 °C)    TempSrc: Oral  Oral    SpO2: 98%  95% 95%   Weight:  161 lb 8 oz (73.3 kg)     Height:         O2: 3L/NC      Intake/Output Summary (Last 24 hours) at 09/19/18 1033  Last data filed at 09/19/18 0933   Gross per 24 hour   Intake             1200 ml   Output             2598 ml   Net            -1398 ml       UO: 1050mL/8hr   CT output: Pleurx: 0mL/8hr (43mL/24hrs)     Pleural: 20mL/8hr (130mL/24hrs)      Recent Labs      09/17/18   0601  09/17/18   1305  09/18/18   0617   WBC  8.5  10.2  12.3*   HGB  9.1*  9.7*  10.0*   HCT  27.5*  30.0*  31.5*   PLT  252  311  275      Recent Labs      09/17/18   0601  09/17/18   1305  09/18/18   0617   BUN  33*  33*  28*   CREATININE  1.2  1.2  1.1       CXR (9/19/18): Findings: The cardiomediastinal silhouette is unchanged. There is an   unchanged right-sided pleural effusion with associated right lower   lobe airspace opacity. There is no gross pneumothorax. The visualized   osseous structures demonstrate degenerative changes. Orthopedic   hardware is identified in the cervical spine.       Lines and Tubes: A right-sided chest tube is identified via an   intercostal chest wall approach, the distal aspect of which is located   in the right upper lung field and oriented cranially. An additional   right-sided chest tube is identified via an intercostal chest wall   approach, the distal tip which is located in and oriented towards the   right lung apex. Telemetry: NSR/SB      PE  Cardiac: RRR, intermittent bradycardia  Lungs: decreased bases  Chest incisions C/D/I, approximated, no erythema. Chest tube and pleurx catheter intact.   Abd: Soft, nontender, +BS  Ext: RAMIREZ      A/P: Stable s/p RVATS, decort, pleurodesis, insertion of pleurx, POD#2  Chest tube without airleak. Chest tube removed without difficulty. Patient tolerated well  Cap pleurx. Drain Pleur-X catheter daily and as needed depending on symptoms. Do not drain over 1000cc at a time. Nursing to teach patient how to self-drain Pleur-X catheter. [facility] for pleurx catheter draining, education and supplies  Wean oxygen to keep SpO2 greater than or equal to 92%--down to 3L O2 today  CTS to sign off.  Please call with any questions or concerns   Increase activity as tolerated   Encourage incentive spirometry  This patient's case and care plan was discussed with the attending surgeon

## 2018-09-20 ENCOUNTER — CARE COORDINATION (OUTPATIENT)
Dept: CASE MANAGEMENT | Age: 68
End: 2018-09-20

## 2018-09-20 LAB
BODY FLUID CULTURE, STERILE: NORMAL
GRAM STAIN RESULT: NORMAL

## 2018-09-20 NOTE — CARE COORDINATION
[facility] Transitions Initial Follow Up Call    Call within 2 business days of discharge: Yes    Patient: Melania Tan Patient : 1950   MRN: [id number]  Reason for Admission: There are no discharge diagnoses documented for the most recent discharge. Discharge Date: 18 RARS: Readmission Risk Score: 24 CMRS: 6.0     Attempted to contact patient today 18 for initial CTM/hospital discharge follow up for recurrent left sided pleural effusion and s/p thoracentesis of right lung and chest tube. Left message on home/mobile number listed on record requesting a return call back to [facility] and provided contact information. [facility] will continue with patient outreach for Care Transition.      Follow Up  Future Appointments  Date Time Provider Neto Tamayo   2018 3:00 PM Audrey Adler MD [facility]   10/2/2018 9:45 AM Medardo Chacon MD CARDIO SURG [facility]   10/15/2018 11:15 AM [facility] Estrada [facility]   2018 9:00 AM Boston Sanchez MD [facility]   3/19/2019 1:00 PM [facility] VAS US RM 1 SEYZ CARDIO None   3/19/2019 1:00 PM SCHEDULE, SWATHI VASC MED/SURG [facility]/MED [facility]   3/19/2019 1:30 PM Carloz Bellamy MD VASC/[facility]       Marium Hamm, APRN

## 2018-09-21 ENCOUNTER — TELEPHONE (OUTPATIENT)
Dept: NEUROSURGERY | Age: 68
End: 2018-09-21

## 2018-09-21 NOTE — DISCHARGE SUMMARY
Internal Medicine Department   Discharge summary    Patient ID:  Robles Pablo  70 y.o.  1950    Admission Date: 9/13/2018     Discharge Date: 9/19/2018        House team: 1     Admission Dx:  Right pleural effusion  EtOH abuse  Tobacco abuse  COPD  HTN  Hx TIA with s/p right carotid endartectomy    Discharge Dx:  Right pleural effusion, para pneumonic effusion  EtOH abuse  Tobacco abuse  COPD  HTN  Hx TIA with s/p right carotid endartectomy        Consults: 1. Pulmonary  2. Thoracic surgery    Procedures: 1. Chest tube/pigtail cathter insertion  2. Bronch/Removal of right chest tube/Right VATS/VATS total lung decortication/VATS talc pleurodesis/Insertion of pleur-x catheter/Intercostal nerve block.       Brief summary of the patient course: The patient is a 70 y.o. male with PMH of HTN, HLD, hx of TIA ,right carotid endarterectomy, parathyriod adenoma, cervical fusion(C3-C7) transfer from [facility] for further pulmonary and vascular surgery evaluation and management. Patiet initially presented to [facility] on aug 27, ED for chest pain and neck pain. But was readmitted on 9/6/2018 for increased SOB and CT chest showed right pleural effusion/consolidation/loculation. Patient c/o SOB 2-3 month, gradual onset which progressively worse with exerction, associated with dry cough, denies fever, weight loss,. During his hospital admission, patient undergone thoracocentesis of right lungs with removed 600 ml, associated with slight improvement in SOB and  high flow oxygen, initial cytology neg for malignancy. Patient was also managed for alcohol withdrawal and had an episode of Afib with spontaneous resolve to NSR. Further patient repeat imaging showed worsening effusion so patient transfer for possible chest tube and VATS. Patient is smoker, with 20 pack year history , alcoholic 5-6 drinks  Daily for over 20 years. Patient underwent chest tube with pigtail catheter placement and repeat pleural fluid analysis and cytology following pulmonary consultation which  negative cytology for malignancy. Patient had minimal drainage even with tPA and  subsequently consulted with thoracic surgery and underwent VATs,decortication,pleurodesis with 3 chest tube with pleurX catheter placement. Post op patient had adequate drainage with improvement in respiratory status with decrease oxygen requirement. On post op day 2, patient discharged with removal of  chest tube and with  Cap pleur-X inplace. Pleur-X catheter draining education provided prior to discharge along with home O2 ,levaquin, HHC and  to follow up with Pulmonary in OP clinic and repeat CT scan chest in 4-6 weeks to reassess,if lymph node enlargement persist plan to do biopsy per pulmonary.        Discharge medication:   Sondra Cee   Home Medication Instructions OQA:[id number]    Printed on:09/21/18 1372   Medication Information                      acetaminophen (TYLENOL) 325 MG tablet  Take 650 mg by mouth every 6 hours as needed for Pain             albuterol sulfate HFA (VENTOLIN HFA) 108 (90 Base) MCG/ACT inhaler  Inhale 2 puffs into the lungs every 6 hours as needed for Wheezing             amLODIPine (NORVASC) 10 MG tablet  Take 1 tablet by mouth daily             aspirin 81 MG tablet  Take 81 mg by mouth every morning              atenolol (TENORMIN) 50 MG tablet  Take 1 tablet by mouth 2 times daily             atorvastatin (LIPITOR) 40 MG tablet  Take 1 tablet by mouth daily             b complex vitamins capsule  Take 1 capsule by mouth every morning              gabapentin (NEURONTIN) 600 MG tablet  Take 1 tablet by mouth 3 times daily             hydrALAZINE (APRESOLINE) 25 MG tablet  Take 1 tablet by mouth 2 times daily             levofloxacin (LEVAQUIN) 500 MG tablet  Take 1 tablet by mouth daily for 7 days             PARoxetine (PAXIL) 30 MG tablet  TAKE ONE TABLET BY MOUTH EVERY DAY IN THE MORNING             predniSONE (DELTASONE) 10 MG

## 2018-09-21 NOTE — CARE COORDINATION
[facility] Transitions Initial Follow Up Call    Call within 2 business days of discharge: Yes    Patient: Vanessa Toribio Patient : 1950   MRN: [id number]  Reason for Admission: There are no discharge diagnoses documented for the most recent discharge. Discharge Date: 18 RARS: Readmission Risk Score: 24     Spoke with: wife Trey Costello gave his permission    [place] [facility]    Non-face-to-face services provided:  Obtained and reviewed discharge summary and/or continuity of care documents  Education of patient/family/caregiver/guardian to support self-management-review of discharge instructions  Assessment and support for treatment adherence and medication management-review of medications and cg support    Care Transitions 24 Hour Call    Do you have support at home?:  Partner/Spouse/SO  Are you an active caregiver in your home?:  No  Care Transitions Interventions         Follow Up:  Initial CTC phone visit as Janna Scherer is now @ home. I had spoken to Janna Scherer and his wife @ bedside and they are familiar with me and my role. Janna Scherer gives Josette Worthy permission to be his spokesperson, she is his main cg @ home. Josette Worthy states he is doing well @ home; they are monitoring BPs and he was 115/50 this morning. Slight SOB with ambulation, only. No swallowing difficulty thus far. I reiterated to chew food well and swallow with fluids, and they voice agreement. Clive Lerma is to follow @ home and will be out later, she states. Not using devices and ambulating safely. Josette Worthy and I did medication reconciliation and she manifests knowledge of all Catarino's meds and how and when to use. All are in home. Janna Scherer has his hospital follow up scheduled for , with his PCP. Janna Scherer and Josette Worthy are agreeable for CTC to continue follow.     Future Appointments  Date Time Provider Neto Tamayo   2018 3:00 PM Lucio Garnica MD [address] [facility]   10/2/2018 9:45 AM Alda Curling, MD CARDIO SURG [facility]   10/15/2018 11:15 AM Toño Oliva MD ACC Pulm [facility]   12/20/2018 9:00 AM Lizeth Downing MD ACC IM [facility]   3/19/2019 1:00 PM [facility] VAS US RM 1 SEYZ CARDIO None   3/19/2019 1:00 PM SCHEDULE, MHYX VASC MED/SURG [facility]   3/19/2019 1:30 PM Constantino Reece MD [facility]       Maria Elena Hairston RN

## 2018-09-22 LAB
ANAEROBIC CULTURE: NORMAL
ANAEROBIC CULTURE: NORMAL

## 2018-09-24 ENCOUNTER — OFFICE VISIT (OUTPATIENT)
Dept: INTERNAL MEDICINE | Age: 68
End: 2018-09-24
Payer: MEDICARE

## 2018-09-24 VITALS
BODY MASS INDEX: 23.95 KG/M2 | RESPIRATION RATE: 18 BRPM | HEART RATE: 62 BPM | HEIGHT: 66 IN | OXYGEN SATURATION: 90 % | DIASTOLIC BLOOD PRESSURE: 62 MMHG | SYSTOLIC BLOOD PRESSURE: 114 MMHG | WEIGHT: 149 LBS

## 2018-09-24 DIAGNOSIS — J90 RECURRENT RIGHT PLEURAL EFFUSION: Primary | ICD-10-CM

## 2018-09-24 PROCEDURE — 99212 OFFICE O/P EST SF 10 MIN: CPT | Performed by: INTERNAL MEDICINE

## 2018-09-24 PROCEDURE — 99496 TRANSJ CARE MGMT HIGH F2F 7D: CPT | Performed by: INTERNAL MEDICINE

## 2018-09-24 PROCEDURE — 1111F DSCHRG MED/CURRENT MED MERGE: CPT | Performed by: INTERNAL MEDICINE

## 2018-09-24 NOTE — PROGRESS NOTES
tablet  Take 1 tablet by mouth daily             aspirin 81 MG tablet  Take 81 mg by mouth every morning              atenolol (TENORMIN) 50 MG tablet  Take 1 tablet by mouth 2 times daily             atorvastatin (LIPITOR) 40 MG tablet  Take 1 tablet by mouth daily             b complex vitamins capsule  Take 1 capsule by mouth every morning              gabapentin (NEURONTIN) 600 MG tablet  Take 1 tablet by mouth 3 times daily             hydrALAZINE (APRESOLINE) 25 MG tablet  Take 1 tablet by mouth 2 times daily             levofloxacin (LEVAQUIN) 500 MG tablet  Take 1 tablet by mouth daily for 7 days             PARoxetine (PAXIL) 30 MG tablet  TAKE ONE TABLET BY MOUTH EVERY DAY IN THE MORNING             predniSONE (DELTASONE) 10 MG tablet  Please take prednisone 40 mg for two days, follow by 30 mg for 2 days and 20 mg for 2 day and 10 mg for 2 day and stop taking. umeclidinium-vilanterol (ANORO ELLIPTA) 62.5-25 MCG/INH AEPB inhaler  Inhale 1 puff into the lungs daily                   Medications marked \"taking\" at this time  No outpatient prescriptions have been marked as taking for the 9/24/18 encounter (Office Visit) with Dee Crane MD.      Medications patient taking as of now reconciled against medications ordered at time of hospital discharge: Yes    Chief Complaint   Patient presents with    Follow-Up from Hospital    Dizziness       History of Present illness - Follow up of Hospital diagnosis(es):   He says his SOB is better, Still feels dizzy which was the original complain he went to hospital for. He had a C3-C4 cervical fusion surgery few years ago and he has been dizzy since then. Inpatient course: The patient is a 70 y. o. male with PMH of HTN, HLD, hx of TIA ,right carotid endarterectomy, parathyriod adenoma, was admitted for management of recurrent pleural effusion. Pleural effusion was treated as likely parapneumonic.  He received CAP coverage

## 2018-09-27 ENCOUNTER — CARE COORDINATION (OUTPATIENT)
Dept: CASE MANAGEMENT | Age: 68
End: 2018-09-27

## 2018-10-02 ENCOUNTER — OFFICE VISIT (OUTPATIENT)
Dept: CARDIOTHORACIC SURGERY | Age: 68
End: 2018-10-02

## 2018-10-02 VITALS
DIASTOLIC BLOOD PRESSURE: 58 MMHG | HEIGHT: 65 IN | SYSTOLIC BLOOD PRESSURE: 123 MMHG | BODY MASS INDEX: 24.83 KG/M2 | OXYGEN SATURATION: 94 % | WEIGHT: 149 LBS | HEART RATE: 93 BPM

## 2018-10-02 DIAGNOSIS — J90 PLEURAL EFFUSION ON RIGHT: Primary | ICD-10-CM

## 2018-10-02 PROCEDURE — 99024 POSTOP FOLLOW-UP VISIT: CPT | Performed by: NURSE PRACTITIONER

## 2018-10-02 ASSESSMENT — ENCOUNTER SYMPTOMS
SHORTNESS OF BREATH: 0
COUGH: 0
CONSTIPATION: 0
ABDOMINAL PAIN: 0
CHOKING: 0

## 2018-10-02 NOTE — LETTER
Pleural effusion s/p VATS pleurodesis with PleurX insertion      Plan:      Cont to drain PleurX as needed. Advised to call to schedule removal once drainage 50cc or less x 3 consecutive drains over 1 week. If you have questions, please do not hesitate to call me. I look forward to following Amanda Ferrari along with you.     Sincerely,        Marybel Pfeiffer MD

## 2018-10-08 LAB
FUNGUS (MYCOLOGY) CULTURE: NORMAL
FUNGUS STAIN: NORMAL

## 2018-10-09 ENCOUNTER — PREP FOR PROCEDURE (OUTPATIENT)
Dept: CARDIOTHORACIC SURGERY | Age: 68
End: 2018-10-09

## 2018-10-09 ENCOUNTER — TELEPHONE (OUTPATIENT)
Dept: CARDIOTHORACIC SURGERY | Age: 68
End: 2018-10-09

## 2018-10-09 RX ORDER — SODIUM CHLORIDE 9 MG/ML
INJECTION, SOLUTION INTRAVENOUS CONTINUOUS
Status: CANCELLED | OUTPATIENT
Start: 2018-10-09 | End: 2019-10-09

## 2018-10-10 ENCOUNTER — ANESTHESIA (OUTPATIENT)
Dept: SURGERY | Age: 68
End: 2018-10-10

## 2018-10-10 ENCOUNTER — HOSPITAL ENCOUNTER (OUTPATIENT)
Dept: SURGERY | Age: 68
Setting detail: OUTPATIENT SURGERY
Discharge: HOME | End: 2018-10-10
Payer: MEDICARE

## 2018-10-10 ENCOUNTER — ANESTHESIA EVENT (OUTPATIENT)
Dept: SURGERY | Age: 68
End: 2018-10-10

## 2018-10-10 VITALS — OXYGEN SATURATION: 99 % | TEMPERATURE: 96.8 F | SYSTOLIC BLOOD PRESSURE: 126 MMHG | DIASTOLIC BLOOD PRESSURE: 58 MMHG

## 2018-10-10 VITALS
HEART RATE: 56 BPM | SYSTOLIC BLOOD PRESSURE: 143 MMHG | RESPIRATION RATE: 18 BRPM | TEMPERATURE: 97.5 F | DIASTOLIC BLOOD PRESSURE: 65 MMHG | OXYGEN SATURATION: 96 %

## 2018-10-10 PROCEDURE — 32552 REMOVE LUNG CATHETER: CPT

## 2018-10-10 PROCEDURE — 7100000010 HC PHASE II RECOVERY - FIRST 15 MIN

## 2018-10-10 PROCEDURE — 2580000003 HC RX 258: Performed by: PHYSICIAN ASSISTANT

## 2018-10-10 PROCEDURE — 3700000000 HC ANESTHESIA ATTENDED CARE

## 2018-10-10 PROCEDURE — 32552 REMOVE LUNG CATHETER: CPT | Performed by: THORACIC SURGERY (CARDIOTHORACIC VASCULAR SURGERY)

## 2018-10-10 PROCEDURE — 7100000011 HC PHASE II RECOVERY - ADDTL 15 MIN

## 2018-10-10 PROCEDURE — 6360000002 HC RX W HCPCS

## 2018-10-10 RX ORDER — SODIUM CHLORIDE 9 MG/ML
INJECTION, SOLUTION INTRAVENOUS CONTINUOUS
Status: DISCONTINUED | OUTPATIENT
Start: 2018-10-10 | End: 2018-10-11 | Stop reason: HOSPADM

## 2018-10-10 RX ORDER — PROPOFOL 10 MG/ML
INJECTION, EMULSION INTRAVENOUS PRN
Status: DISCONTINUED | OUTPATIENT
Start: 2018-10-10 | End: 2018-10-10 | Stop reason: SDUPTHER

## 2018-10-10 RX ADMIN — SODIUM CHLORIDE: 9 INJECTION, SOLUTION INTRAVENOUS at 08:56

## 2018-10-10 RX ADMIN — PROPOFOL 50 MG: 10 INJECTION, EMULSION INTRAVENOUS at 09:00

## 2018-10-10 ASSESSMENT — PAIN - FUNCTIONAL ASSESSMENT: PAIN_FUNCTIONAL_ASSESSMENT: 0-10

## 2018-10-10 ASSESSMENT — PAIN SCALES - GENERAL
PAINLEVEL_OUTOF10: 0
PAINLEVEL_OUTOF10: 0

## 2018-10-10 NOTE — OP NOTE
DATE OF PROCEDURE:  10/10/2018     PREOPERATIVE DIAGNOSIS:  History of PleurX catheter, no longer  draining.     POSTOPERATIVE DIAGNOSIS:  History of PleurX catheter, no longer  draining.     INDICATIONS:  History of PleurX catheter, no longer draining.     SURGEON:  Isabel Real M.D.     ASSISTANT:  None.     COMPLICATION:  None, tolerated well.     ESTIMATED BLOOD LOSS:  Less than 50 mL.     ANESTHESIA:  Local with MAC anesthesia.     ANESTHESIA ATTENDING:  Dr Chasity Espinoza     SPECIMENS OBTAINED:  None.     FINDINGS:  Catheter removed entirely.     HISTORY:  This is a 69-year-old man who underwent a VATS about 2  weeks ago for trapped lung. PleurX catheter was left behind. According to the patient, it is not draining at all. Therefore, he  presents for removal of PleurX catheter. The patient was described  the procedure in full including the risks and complications including,  but not limited to, bleeding, infection, need for reoperation,  hemothorax, pneumothorax, stroke, myocardial infarction, and death and  the patient agreed to proceed.     DESCRIPTION OF OPERATION:  After adequate informed consent was  obtained and adequate preoperative antibiotics were given, the patient  was brought to the line room in stable condition. He was laid in  supine position and induced under MAC anesthesia by Anesthesia staff. The left chest was prepped and draped in the usual sterile fashion. Local medication was injected. Blunt dissection at the exit site was  performed and the catheter was removed entirely. Dry sterile dressing  was applied. The patient tolerated the procedure well.   The patient  will be discharged today.  Judy Spaulding MD

## 2018-10-15 ENCOUNTER — OFFICE VISIT (OUTPATIENT)
Dept: PULMONOLOGY | Age: 68
End: 2018-10-15
Payer: MEDICARE

## 2018-10-15 VITALS
OXYGEN SATURATION: 97 % | SYSTOLIC BLOOD PRESSURE: 101 MMHG | HEIGHT: 66 IN | HEART RATE: 47 BPM | DIASTOLIC BLOOD PRESSURE: 60 MMHG | WEIGHT: 149.9 LBS | TEMPERATURE: 97.9 F | BODY MASS INDEX: 24.09 KG/M2 | RESPIRATION RATE: 16 BRPM

## 2018-10-15 DIAGNOSIS — J90 PLEURAL EFFUSION: ICD-10-CM

## 2018-10-15 DIAGNOSIS — R09.02 HYPOXIA: ICD-10-CM

## 2018-10-15 PROCEDURE — 1101F PT FALLS ASSESS-DOCD LE1/YR: CPT | Performed by: INTERNAL MEDICINE

## 2018-10-15 PROCEDURE — 4040F PNEUMOC VAC/ADMIN/RCVD: CPT | Performed by: INTERNAL MEDICINE

## 2018-10-15 PROCEDURE — 3017F COLORECTAL CA SCREEN DOC REV: CPT | Performed by: INTERNAL MEDICINE

## 2018-10-15 PROCEDURE — G8420 CALC BMI NORM PARAMETERS: HCPCS | Performed by: INTERNAL MEDICINE

## 2018-10-15 PROCEDURE — 99214 OFFICE O/P EST MOD 30 MIN: CPT | Performed by: INTERNAL MEDICINE

## 2018-10-15 PROCEDURE — 1036F TOBACCO NON-USER: CPT | Performed by: INTERNAL MEDICINE

## 2018-10-15 PROCEDURE — G8427 DOCREV CUR MEDS BY ELIG CLIN: HCPCS | Performed by: INTERNAL MEDICINE

## 2018-10-15 PROCEDURE — 99203 OFFICE O/P NEW LOW 30 MIN: CPT | Performed by: INTERNAL MEDICINE

## 2018-10-15 PROCEDURE — 1123F ACP DISCUSS/DSCN MKR DOCD: CPT | Performed by: INTERNAL MEDICINE

## 2018-10-15 PROCEDURE — G8598 ASA/ANTIPLAT THER USED: HCPCS | Performed by: INTERNAL MEDICINE

## 2018-10-15 PROCEDURE — G8484 FLU IMMUNIZE NO ADMIN: HCPCS | Performed by: INTERNAL MEDICINE

## 2018-10-15 PROCEDURE — 1111F DSCHRG MED/CURRENT MED MERGE: CPT | Performed by: INTERNAL MEDICINE

## 2018-10-15 NOTE — PROGRESS NOTES
Department of Internal Medicine  Division of Pulmonary, Critical Care & Sleep Medicine  Pulmonary [address] [facility]                                             Pulmonary Clinic Consult     I had the pleasure of seeing  Hazel Farris in the [address] [facility] regarding their Pleural effusion s/p VATS       Chief Complaint   Patient presents with    Follow-Up from [facility]:      The patient is a 70 y.o. male with PMH of HTN, HLD, hx of TIA ,right carotid endarterectomy, parathyriod adenoma, Who was admitted to our ICU in September 13 with recurrent pleural effusion that required multiple tap with negative cytology  Patiet initially presented to [facility] on aug 27, ED for chest pain and neck pain. But was readmitted on 9/6/2018 for increased SOB and CT chest showed right pleural effusion/consolidation/loculation. Patient c/o SOB 2-3 month, gradual onset which progressively worse with exerction, associated with dry cough, denies fever, weight loss,. During his hospital admission, patient undergone thoracocentesis of right lungs with removed 600 ml, associated with slight improvement in SOB and  high flow oxygen, initial cytology neg for malignancy. Patient was also managed for alcohol withdrawal and had an episode of Afib with spontaneous resolve to NSR. Further patient repeat imaging showed worsening effusion so patient transfer for possible and we place the chest tube for him that time the drain exudative fluid about 200 and stop draining so he went for VATS by CTS and since then he has been doing well  Patient is smoker, with 20 pack year history , alcoholic 5-6 drinks  Daily for over 20 years.       ALLERGIES:    Allergies   Allergen Reactions    Flexeril [Cyclobenzaprine] Other (See Comments)     HALLUCINATIONS    Lisinopril Swelling     Facial and lip    Influenza Virus Vaccine Other (See Comments)     \"Got the Flu\"    Metformin And

## 2018-10-22 ENCOUNTER — TELEPHONE (OUTPATIENT)
Dept: PULMONOLOGY | Age: 68
End: 2018-10-22

## 2018-10-22 DIAGNOSIS — J90 RECURRENT LEFT PLEURAL EFFUSION: Primary | ICD-10-CM

## 2018-10-22 LAB
FUNGUS (MYCOLOGY) CULTURE: NORMAL
FUNGUS STAIN: NORMAL

## 2018-10-23 LAB
AFB CULTURE (MYCOBACTERIA): NORMAL
AFB SMEAR: NORMAL

## 2018-11-06 PROBLEM — J90 PLEURAL EFFUSION: Status: RESOLVED | Noted: 2018-09-06 | Resolved: 2018-11-06

## 2018-11-06 PROBLEM — J96.90 RESPIRATORY FAILURE (HCC): Status: RESOLVED | Noted: 2018-09-06 | Resolved: 2018-11-06

## 2018-11-06 PROBLEM — R09.02 HYPOXIA: Status: RESOLVED | Noted: 2018-04-18 | Resolved: 2018-11-06

## 2018-11-06 LAB
AFB CULTURE (MYCOBACTERIA): NORMAL
AFB SMEAR: NORMAL

## 2018-11-28 ENCOUNTER — TELEPHONE (OUTPATIENT)
Dept: PULMONOLOGY | Age: 68
End: 2018-11-28

## 2018-12-20 ENCOUNTER — OFFICE VISIT (OUTPATIENT)
Dept: INTERNAL MEDICINE | Age: 68
End: 2018-12-20
Payer: MEDICARE

## 2018-12-20 ENCOUNTER — HOSPITAL ENCOUNTER (OUTPATIENT)
Age: 68
Discharge: HOME | End: 2018-12-20
Payer: MEDICARE

## 2018-12-20 VITALS
RESPIRATION RATE: 16 BRPM | BODY MASS INDEX: 24.59 KG/M2 | HEART RATE: 60 BPM | HEIGHT: 66 IN | WEIGHT: 153 LBS | SYSTOLIC BLOOD PRESSURE: 130 MMHG | TEMPERATURE: 98.2 F | DIASTOLIC BLOOD PRESSURE: 60 MMHG

## 2018-12-20 DIAGNOSIS — Z12.11 COLON CANCER SCREENING: ICD-10-CM

## 2018-12-20 DIAGNOSIS — R73.03 PREDIABETES: ICD-10-CM

## 2018-12-20 DIAGNOSIS — I10 ESSENTIAL HYPERTENSION: ICD-10-CM

## 2018-12-20 DIAGNOSIS — R79.89 ELEVATED LFTS: Primary | ICD-10-CM

## 2018-12-20 DIAGNOSIS — M47.22 OSTEOARTHRITIS OF SPINE WITH RADICULOPATHY, CERVICAL REGION: ICD-10-CM

## 2018-12-20 DIAGNOSIS — F10.10 ALCOHOL ABUSE: Primary | ICD-10-CM

## 2018-12-20 DIAGNOSIS — G45.9 TIA (TRANSIENT ISCHEMIC ATTACK): ICD-10-CM

## 2018-12-20 DIAGNOSIS — J90 PLEURAL EFFUSION: ICD-10-CM

## 2018-12-20 DIAGNOSIS — E78.2 MIXED HYPERLIPIDEMIA: ICD-10-CM

## 2018-12-20 DIAGNOSIS — F10.10 ALCOHOL ABUSE: ICD-10-CM

## 2018-12-20 LAB
ALBUMIN SERPL-MCNC: 4.3 G/DL (ref 3.5–5.2)
ALP BLD-CCNC: 362 U/L (ref 40–129)
ALT SERPL-CCNC: 137 U/L (ref 0–40)
ANION GAP SERPL CALCULATED.3IONS-SCNC: 15 MMOL/L (ref 7–16)
AST SERPL-CCNC: 116 U/L (ref 0–39)
BILIRUB SERPL-MCNC: 0.6 MG/DL (ref 0–1.2)
BUN BLDV-MCNC: 25 MG/DL (ref 8–23)
CALCIUM SERPL-MCNC: 9.3 MG/DL (ref 8.6–10.2)
CHLORIDE BLD-SCNC: 102 MMOL/L (ref 98–107)
CO2: 27 MMOL/L (ref 22–29)
CREAT SERPL-MCNC: 1.1 MG/DL (ref 0.7–1.2)
FOLATE: >20 NG/ML (ref 4.8–24.2)
GFR AFRICAN AMERICAN: >60
GFR NON-AFRICAN AMERICAN: >60 ML/MIN/1.73
GLUCOSE BLD-MCNC: 121 MG/DL (ref 74–99)
HBA1C MFR BLD: 5.4 % (ref 4–5.6)
POTASSIUM SERPL-SCNC: 4.5 MMOL/L (ref 3.5–5)
SODIUM BLD-SCNC: 144 MMOL/L (ref 132–146)
TOTAL PROTEIN: 6.9 G/DL (ref 6.4–8.3)
VITAMIN B-12: 808 PG/ML (ref 211–946)

## 2018-12-20 PROCEDURE — G8484 FLU IMMUNIZE NO ADMIN: HCPCS | Performed by: STUDENT IN AN ORGANIZED HEALTH CARE EDUCATION/TRAINING PROGRAM

## 2018-12-20 PROCEDURE — 99213 OFFICE O/P EST LOW 20 MIN: CPT | Performed by: STUDENT IN AN ORGANIZED HEALTH CARE EDUCATION/TRAINING PROGRAM

## 2018-12-20 PROCEDURE — 3017F COLORECTAL CA SCREEN DOC REV: CPT | Performed by: STUDENT IN AN ORGANIZED HEALTH CARE EDUCATION/TRAINING PROGRAM

## 2018-12-20 PROCEDURE — 1123F ACP DISCUSS/DSCN MKR DOCD: CPT | Performed by: STUDENT IN AN ORGANIZED HEALTH CARE EDUCATION/TRAINING PROGRAM

## 2018-12-20 PROCEDURE — 82607 VITAMIN B-12: CPT

## 2018-12-20 PROCEDURE — 84425 ASSAY OF VITAMIN B-1: CPT

## 2018-12-20 PROCEDURE — 80053 COMPREHEN METABOLIC PANEL: CPT

## 2018-12-20 PROCEDURE — G8598 ASA/ANTIPLAT THER USED: HCPCS | Performed by: STUDENT IN AN ORGANIZED HEALTH CARE EDUCATION/TRAINING PROGRAM

## 2018-12-20 PROCEDURE — 83036 HEMOGLOBIN GLYCOSYLATED A1C: CPT

## 2018-12-20 PROCEDURE — 4040F PNEUMOC VAC/ADMIN/RCVD: CPT | Performed by: STUDENT IN AN ORGANIZED HEALTH CARE EDUCATION/TRAINING PROGRAM

## 2018-12-20 PROCEDURE — 1101F PT FALLS ASSESS-DOCD LE1/YR: CPT | Performed by: STUDENT IN AN ORGANIZED HEALTH CARE EDUCATION/TRAINING PROGRAM

## 2018-12-20 PROCEDURE — 82746 ASSAY OF FOLIC ACID SERUM: CPT

## 2018-12-20 PROCEDURE — G8417 CALC BMI ABV UP PARAM F/U: HCPCS | Performed by: STUDENT IN AN ORGANIZED HEALTH CARE EDUCATION/TRAINING PROGRAM

## 2018-12-20 PROCEDURE — 1036F TOBACCO NON-USER: CPT | Performed by: STUDENT IN AN ORGANIZED HEALTH CARE EDUCATION/TRAINING PROGRAM

## 2018-12-20 PROCEDURE — 36415 COLL VENOUS BLD VENIPUNCTURE: CPT

## 2018-12-20 PROCEDURE — 99212 OFFICE O/P EST SF 10 MIN: CPT | Performed by: STUDENT IN AN ORGANIZED HEALTH CARE EDUCATION/TRAINING PROGRAM

## 2018-12-20 PROCEDURE — G8427 DOCREV CUR MEDS BY ELIG CLIN: HCPCS | Performed by: STUDENT IN AN ORGANIZED HEALTH CARE EDUCATION/TRAINING PROGRAM

## 2018-12-20 RX ORDER — HYDRALAZINE HYDROCHLORIDE 25 MG/1
25 TABLET, FILM COATED ORAL 2 TIMES DAILY
Qty: 180 TABLET | Refills: 5 | Status: SHIPPED | OUTPATIENT
Start: 2018-12-20 | End: 2020-02-04 | Stop reason: SDUPTHER

## 2018-12-20 RX ORDER — PAROXETINE 30 MG/1
TABLET, FILM COATED ORAL
Qty: 90 TABLET | Refills: 5 | Status: SHIPPED | OUTPATIENT
Start: 2018-12-20 | End: 2020-01-20 | Stop reason: SDUPTHER

## 2018-12-20 RX ORDER — ATORVASTATIN CALCIUM 40 MG/1
40 TABLET, FILM COATED ORAL DAILY
Qty: 90 TABLET | Refills: 5 | Status: SHIPPED | OUTPATIENT
Start: 2018-12-20 | End: 2019-12-31 | Stop reason: SDUPTHER

## 2018-12-20 RX ORDER — ATENOLOL 50 MG/1
50 TABLET ORAL 2 TIMES DAILY
Qty: 180 TABLET | Refills: 5 | Status: SHIPPED | OUTPATIENT
Start: 2018-12-20 | End: 2020-02-04 | Stop reason: SDUPTHER

## 2018-12-20 RX ORDER — AMLODIPINE BESYLATE 10 MG/1
10 TABLET ORAL DAILY
Qty: 90 TABLET | Refills: 5 | Status: SHIPPED | OUTPATIENT
Start: 2018-12-20 | End: 2019-07-10

## 2018-12-20 RX ORDER — GABAPENTIN 600 MG/1
600 TABLET ORAL 3 TIMES DAILY
Qty: 90 TABLET | Refills: 5 | Status: SHIPPED
Start: 2018-12-20 | End: 2020-05-11 | Stop reason: SDUPTHER

## 2018-12-20 NOTE — PATIENT INSTRUCTIONS
you can lose your balance and fall. · Talk to your doctor if you have numbness in your feet. Preventing falls at home  · Remove raised doorway thresholds, throw rugs, and clutter. Repair loose carpet or raised areas in the floor. · Move furniture and electrical cords to keep them out of walking paths. · Use nonskid floor wax, and wipe up spills right away, especially on ceramic tile floors. · If you use a walker or cane, put rubber tips on it. If you use crutches, clean the bottoms of them regularly with an abrasive pad, such as steel wool. · Keep your house well lit, especially Christelle Garry, and outside walkways. Use night-lights in areas such as hallways and bathrooms. Add extra light switches or use remote switches (such as switches that go on or off when you clap your hands) to make it easier to turn lights on if you have to get up during the night. · Install sturdy handrails on stairways. · Move items in your cabinets so that the things you use a lot are on the lower shelves (about waist level). · Keep a cordless phone and a flashlight with new batteries by your bed. If possible, put a phone in each of the main rooms of your house, or carry a cell phone in case you fall and cannot reach a phone. Or, you can wear a device around your neck or wrist. You push a button that sends a signal for help. · Wear low-heeled shoes that fit well and give your feet good support. Use footwear with nonskid soles. Check the heels and soles of your shoes for wear. Repair or replace worn heels or soles. · Do not wear socks without shoes on wood floors. · Walk on the grass when the sidewalks are slippery. If you live in an area that gets snow and ice in the winter, sprinkle salt on slippery steps and sidewalks. Preventing falls in the bath  · Install grab bars and nonskid mats inside and outside your shower or tub and near the toilet and sinks. · Use shower chairs and bath benches.   · Use a hand-held shower head

## 2018-12-25 LAB — VITAMIN B1 WHOLE BLOOD: 96 NMOL/L (ref 70–180)

## 2018-12-26 ENCOUNTER — HOSPITAL ENCOUNTER (OUTPATIENT)
Age: 68
Discharge: HOME | End: 2018-12-26
Payer: MEDICARE

## 2018-12-26 DIAGNOSIS — R79.89 ELEVATED LFTS: ICD-10-CM

## 2018-12-26 PROCEDURE — 86703 HIV-1/HIV-2 1 RESULT ANTBDY: CPT

## 2018-12-26 PROCEDURE — 80074 ACUTE HEPATITIS PANEL: CPT

## 2018-12-26 PROCEDURE — 36415 COLL VENOUS BLD VENIPUNCTURE: CPT

## 2018-12-27 LAB
HAV IGM SER IA-ACNC: NORMAL
HEPATITIS B CORE IGM ANTIBODY: NORMAL
HEPATITIS B SURFACE ANTIGEN INTERPRETATION: NORMAL
HEPATITIS C ANTIBODY INTERPRETATION: NORMAL
HIV-1 AND HIV-2 ANTIBODIES: NORMAL

## 2019-01-07 ENCOUNTER — TELEPHONE (OUTPATIENT)
Dept: INTERNAL MEDICINE | Age: 69
End: 2019-01-07

## 2019-01-07 ENCOUNTER — HOSPITAL ENCOUNTER (OUTPATIENT)
Age: 69
Setting detail: SPECIMEN
Discharge: HOME | End: 2019-01-07
Payer: MEDICARE

## 2019-01-07 DIAGNOSIS — Z12.11 COLON CANCER SCREENING: ICD-10-CM

## 2019-01-07 LAB
CONTROL: NORMAL
HEMOCCULT STL QL: NEGATIVE

## 2019-01-07 PROCEDURE — 82274 ASSAY TEST FOR BLOOD FECAL: CPT

## 2019-02-08 ENCOUNTER — HOSPITAL ENCOUNTER (OUTPATIENT)
Dept: CT IMAGING | Age: 69
Discharge: HOME | End: 2019-02-10
Payer: MEDICARE

## 2019-02-08 DIAGNOSIS — J90 PLEURAL EFFUSION: ICD-10-CM

## 2019-02-08 PROCEDURE — 71250 CT THORAX DX C-: CPT

## 2019-02-15 ENCOUNTER — OFFICE VISIT (OUTPATIENT)
Dept: PULMONOLOGY | Age: 69
End: 2019-02-15
Payer: MEDICARE

## 2019-02-15 ENCOUNTER — TELEPHONE (OUTPATIENT)
Dept: PULMONOLOGY | Age: 69
End: 2019-02-15

## 2019-02-15 VITALS
HEIGHT: 65 IN | OXYGEN SATURATION: 95 % | RESPIRATION RATE: 14 BRPM | BODY MASS INDEX: 26.16 KG/M2 | DIASTOLIC BLOOD PRESSURE: 63 MMHG | TEMPERATURE: 98.3 F | WEIGHT: 157 LBS | HEART RATE: 65 BPM | SYSTOLIC BLOOD PRESSURE: 138 MMHG

## 2019-02-15 DIAGNOSIS — J90 RECURRENT LEFT PLEURAL EFFUSION: Primary | ICD-10-CM

## 2019-02-15 PROCEDURE — 99214 OFFICE O/P EST MOD 30 MIN: CPT | Performed by: INTERNAL MEDICINE

## 2019-02-15 PROCEDURE — G8417 CALC BMI ABV UP PARAM F/U: HCPCS | Performed by: INTERNAL MEDICINE

## 2019-02-15 PROCEDURE — G8598 ASA/ANTIPLAT THER USED: HCPCS | Performed by: INTERNAL MEDICINE

## 2019-02-15 PROCEDURE — G8484 FLU IMMUNIZE NO ADMIN: HCPCS | Performed by: INTERNAL MEDICINE

## 2019-02-15 PROCEDURE — 3017F COLORECTAL CA SCREEN DOC REV: CPT | Performed by: INTERNAL MEDICINE

## 2019-02-15 PROCEDURE — 99213 OFFICE O/P EST LOW 20 MIN: CPT | Performed by: INTERNAL MEDICINE

## 2019-02-15 PROCEDURE — 4040F PNEUMOC VAC/ADMIN/RCVD: CPT | Performed by: INTERNAL MEDICINE

## 2019-02-15 PROCEDURE — 1101F PT FALLS ASSESS-DOCD LE1/YR: CPT | Performed by: INTERNAL MEDICINE

## 2019-02-15 PROCEDURE — G8427 DOCREV CUR MEDS BY ELIG CLIN: HCPCS | Performed by: INTERNAL MEDICINE

## 2019-02-15 PROCEDURE — 1123F ACP DISCUSS/DSCN MKR DOCD: CPT | Performed by: INTERNAL MEDICINE

## 2019-02-15 PROCEDURE — 1036F TOBACCO NON-USER: CPT | Performed by: INTERNAL MEDICINE

## 2019-02-15 RX ORDER — AZITHROMYCIN 250 MG/1
250 TABLET, FILM COATED ORAL DAILY
Qty: 7 TABLET | Refills: 0 | Status: SHIPPED | OUTPATIENT
Start: 2019-02-15 | End: 2019-02-22

## 2019-02-15 RX ORDER — PREDNISONE 10 MG/1
TABLET ORAL
Qty: 15 TABLET | Refills: 0 | Status: SHIPPED | OUTPATIENT
Start: 2019-02-15 | End: 2019-02-15 | Stop reason: SDUPTHER

## 2019-02-15 RX ORDER — PREDNISONE 10 MG/1
TABLET ORAL
Qty: 15 TABLET | Refills: 0 | Status: SHIPPED | OUTPATIENT
Start: 2019-02-15 | End: 2019-02-15 | Stop reason: CLARIF

## 2019-02-15 RX ORDER — PREDNISONE 10 MG/1
TABLET ORAL
Qty: 15 TABLET | Refills: 0 | Status: SHIPPED | OUTPATIENT
Start: 2019-02-15 | End: 2019-03-19 | Stop reason: ALTCHOICE

## 2019-02-15 RX ORDER — AZITHROMYCIN 250 MG/1
250 TABLET, FILM COATED ORAL DAILY
Qty: 7 TABLET | Refills: 0 | Status: SHIPPED | OUTPATIENT
Start: 2019-02-15 | End: 2019-02-15 | Stop reason: SDUPTHER

## 2019-02-15 RX ORDER — AZITHROMYCIN 250 MG/1
250 TABLET, FILM COATED ORAL DAILY
Qty: 7 TABLET | Refills: 0 | Status: SHIPPED | OUTPATIENT
Start: 2019-02-15 | End: 2019-02-15 | Stop reason: CLARIF

## 2019-03-19 ENCOUNTER — OFFICE VISIT (OUTPATIENT)
Dept: VASCULAR SURGERY | Age: 69
End: 2019-03-19
Payer: MEDICARE

## 2019-03-19 ENCOUNTER — HOSPITAL ENCOUNTER (OUTPATIENT)
Dept: CARDIOLOGY | Age: 69
Discharge: HOME | End: 2019-03-19
Admitting: SURGERY
Payer: MEDICARE

## 2019-03-19 DIAGNOSIS — I65.23 BILATERAL CAROTID ARTERY STENOSIS: ICD-10-CM

## 2019-03-19 DIAGNOSIS — I65.23 BILATERAL CAROTID ARTERY STENOSIS: Primary | ICD-10-CM

## 2019-03-19 PROCEDURE — 1123F ACP DISCUSS/DSCN MKR DOCD: CPT | Performed by: SURGERY

## 2019-03-19 PROCEDURE — 3017F COLORECTAL CA SCREEN DOC REV: CPT | Performed by: SURGERY

## 2019-03-19 PROCEDURE — G8598 ASA/ANTIPLAT THER USED: HCPCS | Performed by: SURGERY

## 2019-03-19 PROCEDURE — 99213 OFFICE O/P EST LOW 20 MIN: CPT | Performed by: SURGERY

## 2019-03-19 PROCEDURE — G8417 CALC BMI ABV UP PARAM F/U: HCPCS | Performed by: SURGERY

## 2019-03-19 PROCEDURE — 4040F PNEUMOC VAC/ADMIN/RCVD: CPT | Performed by: SURGERY

## 2019-03-19 PROCEDURE — 1101F PT FALLS ASSESS-DOCD LE1/YR: CPT | Performed by: SURGERY

## 2019-03-19 PROCEDURE — G8427 DOCREV CUR MEDS BY ELIG CLIN: HCPCS | Performed by: SURGERY

## 2019-03-19 PROCEDURE — 93880 EXTRACRANIAL BILAT STUDY: CPT

## 2019-03-19 PROCEDURE — G8484 FLU IMMUNIZE NO ADMIN: HCPCS | Performed by: SURGERY

## 2019-03-19 PROCEDURE — 1036F TOBACCO NON-USER: CPT | Performed by: SURGERY

## 2019-04-30 ENCOUNTER — TELEPHONE (OUTPATIENT)
Dept: ADMINISTRATIVE | Age: 69
End: 2019-04-30

## 2019-04-30 DIAGNOSIS — I34.0 NONRHEUMATIC MITRAL VALVE REGURGITATION: Primary | ICD-10-CM

## 2019-04-30 NOTE — TELEPHONE ENCOUNTER
Patient's wife notified of Dr. Majo Garcia recommendation. She will call tomorrow to schedule echo on 7/8/19.

## 2019-04-30 NOTE — TELEPHONE ENCOUNTER
Pt calling to schedule his yearly OV with .  Asking if he should have an echo this same day? Scheduled for [facility] on: 7/8/19 2:30.

## 2019-05-17 ENCOUNTER — OFFICE VISIT (OUTPATIENT)
Dept: INTERNAL MEDICINE | Age: 69
End: 2019-05-17
Payer: MEDICARE

## 2019-05-17 VITALS
HEART RATE: 57 BPM | TEMPERATURE: 98.3 F | HEIGHT: 66 IN | RESPIRATION RATE: 20 BRPM | SYSTOLIC BLOOD PRESSURE: 146 MMHG | DIASTOLIC BLOOD PRESSURE: 67 MMHG | WEIGHT: 158.3 LBS | BODY MASS INDEX: 25.44 KG/M2

## 2019-05-17 DIAGNOSIS — Z00.00 ROUTINE GENERAL MEDICAL EXAMINATION AT A HEALTH CARE FACILITY: ICD-10-CM

## 2019-05-17 DIAGNOSIS — L85.3 DRY SKIN DERMATITIS: ICD-10-CM

## 2019-05-17 DIAGNOSIS — Z00.00 HEALTH CARE MAINTENANCE: ICD-10-CM

## 2019-05-17 DIAGNOSIS — Z78.9 ALCOHOL USE: ICD-10-CM

## 2019-05-17 DIAGNOSIS — R74.01 TRANSAMINITIS: Primary | ICD-10-CM

## 2019-05-17 DIAGNOSIS — R74.8 ELEVATED ALKALINE PHOSPHATASE LEVEL: ICD-10-CM

## 2019-05-17 PROCEDURE — G8598 ASA/ANTIPLAT THER USED: HCPCS | Performed by: HOSPITALIST

## 2019-05-17 PROCEDURE — G0438 PPPS, INITIAL VISIT: HCPCS | Performed by: HOSPITALIST

## 2019-05-17 PROCEDURE — 4040F PNEUMOC VAC/ADMIN/RCVD: CPT | Performed by: HOSPITALIST

## 2019-05-17 PROCEDURE — 3017F COLORECTAL CA SCREEN DOC REV: CPT | Performed by: HOSPITALIST

## 2019-05-17 PROCEDURE — 1123F ACP DISCUSS/DSCN MKR DOCD: CPT | Performed by: HOSPITALIST

## 2019-05-17 PROCEDURE — 99212 OFFICE O/P EST SF 10 MIN: CPT | Performed by: HOSPITALIST

## 2019-05-17 RX ORDER — PETROLATUM 42 G/100G
OINTMENT TOPICAL
Qty: 228 G | Refills: 0 | Status: SHIPPED | OUTPATIENT
Start: 2019-05-17 | End: 2020-02-04

## 2019-05-17 ASSESSMENT — LIFESTYLE VARIABLES
HOW MANY STANDARD DRINKS CONTAINING ALCOHOL DO YOU HAVE ON A TYPICAL DAY: 2
HAVE YOU OR SOMEONE ELSE BEEN INJURED AS A RESULT OF YOUR DRINKING: 0
HOW OFTEN DURING THE LAST YEAR HAVE YOU HAD A FEELING OF GUILT OR REMORSE AFTER DRINKING: 0
HOW OFTEN DURING THE LAST YEAR HAVE YOU FOUND THAT YOU WERE NOT ABLE TO STOP DRINKING ONCE YOU HAD STARTED: 0
HOW OFTEN DURING THE LAST YEAR HAVE YOU FAILED TO DO WHAT WAS NORMALLY EXPECTED FROM YOU BECAUSE OF DRINKING: 0
HOW OFTEN DURING THE LAST YEAR HAVE YOU BEEN UNABLE TO REMEMBER WHAT HAPPENED THE NIGHT BEFORE BECAUSE YOU HAD BEEN DRINKING: 0
HOW OFTEN DURING THE LAST YEAR HAVE YOU NEEDED AN ALCOHOLIC DRINK FIRST THING IN THE MORNING TO GET YOURSELF GOING AFTER A NIGHT OF HEAVY DRINKING: 0
HOW OFTEN DO YOU HAVE A DRINK CONTAINING ALCOHOL: 4
HOW OFTEN DO YOU HAVE SIX OR MORE DRINKS ON ONE OCCASION: 3
HAS A RELATIVE, FRIEND, DOCTOR, OR ANOTHER HEALTH PROFESSIONAL EXPRESSED CONCERN ABOUT YOUR DRINKING OR SUGGESTED YOU CUT DOWN: 0
AUDIT TOTAL SCORE: 9
AUDIT-C TOTAL SCORE: 9

## 2019-05-17 ASSESSMENT — PATIENT HEALTH QUESTIONNAIRE - PHQ9
SUM OF ALL RESPONSES TO PHQ QUESTIONS 1-9: 0
SUM OF ALL RESPONSES TO PHQ QUESTIONS 1-9: 0

## 2019-05-17 ASSESSMENT — ANXIETY QUESTIONNAIRES: GAD7 TOTAL SCORE: 0

## 2019-05-17 NOTE — PATIENT INSTRUCTIONS
Please follow up in 1-2 months in the clinic with lab results. Apply moisturizing cream to the skin    Electronically signed by Kezia Jennings MD on 5/17/2019 at 11:05 AM      Personalized Preventive Plan for Ai [facility] - 5/17/2019  Medicare offers a range of preventive health benefits. Some of the tests and screenings are paid in full while other may be subject to a deductible, co-insurance, and/or copay. Some of these benefits include a comprehensive review of your medical history including lifestyle, illnesses that may run in your family, and various assessments and screenings as appropriate. After reviewing your medical record and screening and assessments performed today your provider may have ordered immunizations, labs, imaging, and/or referrals for you. A list of these orders (if applicable) as well as your Preventive Care list are included within your After Visit Summary for your review. Other Preventive Recommendations:    · A preventive eye exam performed by an eye specialist is recommended every 1-2 years to screen for glaucoma; cataracts, macular degeneration, and other eye disorders. · A preventive dental visit is recommended every 6 months. · Try to get at least 150 minutes of exercise per week or 10,000 steps per day on a pedometer . · Order or download the FREE \"Exercise & Physical Activity: Your Everyday Guide\" from [employer]. Call 5-137.135.6711 or search [employer] online. · You need 6864-0031 mg of calcium and 4694-6955 IU of vitamin D per day. It is possible to meet your calcium requirement with diet alone, but a vitamin D supplement is usually necessary to meet this goal.  · When exposed to the sun, use a sunscreen that protects against both UVA and UVB radiation with an SPF of 30 or greater. Reapply every 2 to 3 hours or after sweating, drying off with a towel, or swimming.   · Always wear a seat belt when traveling in a

## 2019-05-17 NOTE — PROGRESS NOTES
[facility]  Internal Medicine Clinic    Attending Physician Statement:  Zeina Hidalgo. Lani Ferraro M.D., F.A.C.P. I have discussed the case, including pertinent history and exam findings with the resident. I agree with the assessment, plan and orders as documented by the resident. Patient here for Annual Wellness Exam    Multiple co-morbidities noted and abnormal labs   No acute complaints today    Reviewed assessment with counseling completed due to continued significant alcohol abuse    Further work-up needed and counseling continued to reinforce the potential harm or worsening morbidity of continued alcohol use     Annual Well Visit    Alcohol use counseling continued     Transaminitis and elevated Alk Phos    Check GGT   Check Liver U/S    AFP AND INR to be completed if abnormal findings noted as currently not covered under medicare without abnormal findings. -   Alcohol abstinence counseling    Possible further work-up including GI assessment likely     Anemia normocytic in nature     Noted while inpatient with pleural effusion    Repeat Labs    Prior B12/folate stable    Consider Yolande smear pending results     Remainder of medical problems as per resident note.

## 2019-05-17 NOTE — PROGRESS NOTES
(TYLENOL) 325 MG tablet Take 650 mg by mouth every 6 hours as needed for Pain  Historical Provider, MD       Past Medical History:   Diagnosis Date    Anxiety     Depression     GERD (gastroesophageal reflux disease)     Heart palpitations     HTN (hypertension) 10/21/2010    Hyperlipidemia     Lightheadedness     OA (osteoarthritis)     Parathyroid adenoma     s/p surgery     Pre-diabetes     Stenosis of right internal carotid artery with cerebral infarction [facility])      Past Surgical History:   Procedure Laterality Date    BACK SURGERY  2016    revision cervical laminectomy    CAROTID ENDARTERECTOMY Right 2014    Delatore - bovine patch     CERVICAL FUSION  2016    C4-C5, C5-C6, C6-C7 ACF, C5-C6 Corpectomy    ENDOSCOPY, COLON, DIAGNOSTIC      EYE SURGERY      lasex    FRACTURE SURGERY Left     plate in wrist    HAND SURGERY      PARATHYROIDECTOMY  2005    CA THORSC DX LUNGS/PERICAR/MED/PLEURAL SPACE W/O BX Right 2018    THORACOSCOPY performed by Heri Mauricio MD at [address]  2014       Family History   Problem Relation Age of Onset    Thyroid Cancer Sister     Cancer Sister     Stroke Mother     Diabetes Mother     Stroke Father     Other Brother          in surgery cause unknown    Heart Disease Maternal Grandmother     Arthritis Maternal Grandmother     Heart Disease Maternal Grandfather     Cancer Paternal Grandmother     Other Paternal Grandfather         unknown cause of death       CareTeam (Including outside providers/suppliers regularly involved in providing care):   Patient Care Team:  Diego Shell DO as PCP - [facility] Attributed Provider  Scott Jolley MD as Consulting Physician (Neurosurgery)  Guy Zimmerman DO as Consulting Physician (Internal Medicine)  Andrew Mulligan MD as Surgeon (Vascular Surgery)  Mando García MD as Consulting Physician (Cardiology)    [employer] Readings from Last 3 Encounters:   05/17/19 158 lb 4.8 oz (71.8 kg)   02/15/19 157 lb (71.2 kg)   12/20/18 153 lb (69.4 kg)     Vitals:    05/17/19 0957   BP: (!) 146/67   Site: Left Upper Arm   Position: Sitting   Cuff Size: Medium Adult   Pulse: 57   Resp: 20   Temp: 98.3 °F (36.8 °C)   TempSrc: Oral   Weight: 158 lb 4.8 oz (71.8 kg)   Height: 5' 5.5\" (1.664 m)     Body mass index is 25.94 kg/m². Based upon direct observation of the patient, evaluation of cognition reveals recent and remote memory intact. General Appearance: alert and oriented to person, place and time, well-developed and well-nourished, in no acute distress  Skin: warm and dry and seborrheic dermatitis face  Pulmonary/Chest: clear to auscultation bilaterally- no wheezes, rales or rhonchi, normal air movement, no respiratory distress  Cardiovascular: normal rate, normal S1 and S2, no gallops, intact distal pulses and no carotid bruits  Abdomen: soft, non-tender, non-distended, normal bowel sounds, no masses or organomegaly  Extremities: no cyanosis and no clubbing  Musculoskeletal: normal range of motion, no joint swelling, deformity or tenderness  Neurologic: gait and coordination normal and speech normal    Patient's complete Health Risk Assessment and screening values have been reviewed and are found in Flowsheets. The following problems were reviewed today and where indicated follow up appointments were made and/or referrals ordered.     Positive Risk Factor Screenings with Interventions:     Substance Abuse:  Social History     Tobacco History     Smoking Status  Former Smoker Smoking Start Date  1/1/1969 Quit date  9/17/1983 Smoking Frequency  3 packs/day for 14 years (43 pk yrs)    Smoking Tobacco Type  Cigarettes    Smokeless Tobacco Use  Never Used          Alcohol History     Alcohol Use Status  Yes Drinks/Week  5 Standard drinks or equivalent, 0 Cans of beer per week Amount  3.0 oz alcohol/wk Comment  5 mixed drinks daily

## 2019-07-08 ENCOUNTER — HOSPITAL ENCOUNTER (OUTPATIENT)
Age: 69
Discharge: HOME | End: 2019-07-08
Payer: MEDICARE

## 2019-07-08 ENCOUNTER — TELEPHONE (OUTPATIENT)
Dept: PULMONOLOGY | Age: 69
End: 2019-07-08

## 2019-07-08 ENCOUNTER — HOSPITAL ENCOUNTER (OUTPATIENT)
Dept: CARDIOLOGY | Age: 69
Discharge: HOME | End: 2019-07-08
Payer: MEDICARE

## 2019-07-08 DIAGNOSIS — J90 RECURRENT LEFT PLEURAL EFFUSION: ICD-10-CM

## 2019-07-08 DIAGNOSIS — I34.0 NONRHEUMATIC MITRAL VALVE REGURGITATION: ICD-10-CM

## 2019-07-08 DIAGNOSIS — Z00.00 HEALTH CARE MAINTENANCE: ICD-10-CM

## 2019-07-08 LAB
ALBUMIN SERPL-MCNC: 4.4 G/DL (ref 3.5–5.2)
ALP BLD-CCNC: 121 U/L (ref 40–129)
ALT SERPL-CCNC: 22 U/L (ref 0–40)
ANION GAP SERPL CALCULATED.3IONS-SCNC: 17 MMOL/L (ref 7–16)
AST SERPL-CCNC: 22 U/L (ref 0–39)
BASOPHILS ABSOLUTE: 0.08 E9/L (ref 0–0.2)
BASOPHILS RELATIVE PERCENT: 1.1 % (ref 0–2)
BILIRUB SERPL-MCNC: 0.7 MG/DL (ref 0–1.2)
BUN BLDV-MCNC: 12 MG/DL (ref 8–23)
CALCIUM SERPL-MCNC: 10.3 MG/DL (ref 8.6–10.2)
CHLORIDE BLD-SCNC: 102 MMOL/L (ref 98–107)
CO2: 26 MMOL/L (ref 22–29)
CREAT SERPL-MCNC: 1.3 MG/DL (ref 0.7–1.2)
CREATININE URINE: 146 MG/DL (ref 40–278)
EOSINOPHILS ABSOLUTE: 0.91 E9/L (ref 0.05–0.5)
EOSINOPHILS RELATIVE PERCENT: 12.8 % (ref 0–6)
GFR AFRICAN AMERICAN: >60
GFR NON-AFRICAN AMERICAN: 55 ML/MIN/1.73
GLUCOSE BLD-MCNC: 144 MG/DL (ref 74–99)
HCT VFR BLD CALC: 44.7 % (ref 37–54)
HEMOGLOBIN: 14.5 G/DL (ref 12.5–16.5)
IMMATURE GRANULOCYTES #: 0.03 E9/L
IMMATURE GRANULOCYTES %: 0.4 % (ref 0–5)
LV EF: 65 %
LVEF MODALITY: NORMAL
LYMPHOCYTES ABSOLUTE: 0.88 E9/L (ref 1.5–4)
LYMPHOCYTES RELATIVE PERCENT: 12.4 % (ref 20–42)
MCH RBC QN AUTO: 32.3 PG (ref 26–35)
MCHC RBC AUTO-ENTMCNC: 32.4 % (ref 32–34.5)
MCV RBC AUTO: 99.6 FL (ref 80–99.9)
MICROALBUMIN UR-MCNC: 25.8 MG/L
MICROALBUMIN/CREAT UR-RTO: 17.7 (ref 0–30)
MONOCYTES ABSOLUTE: 0.64 E9/L (ref 0.1–0.95)
MONOCYTES RELATIVE PERCENT: 9 % (ref 2–12)
NEUTROPHILS ABSOLUTE: 4.55 E9/L (ref 1.8–7.3)
NEUTROPHILS RELATIVE PERCENT: 64.3 % (ref 43–80)
PDW BLD-RTO: 13.2 FL (ref 11.5–15)
PLATELET # BLD: 127 E9/L (ref 130–450)
PMV BLD AUTO: 11 FL (ref 7–12)
POTASSIUM SERPL-SCNC: 3.8 MMOL/L (ref 3.5–5)
RBC # BLD: 4.49 E12/L (ref 3.8–5.8)
SODIUM BLD-SCNC: 145 MMOL/L (ref 132–146)
TOTAL PROTEIN: 7.1 G/DL (ref 6.4–8.3)
WBC # BLD: 7.1 E9/L (ref 4.5–11.5)

## 2019-07-08 PROCEDURE — 85025 COMPLETE CBC W/AUTO DIFF WBC: CPT

## 2019-07-08 PROCEDURE — 82570 ASSAY OF URINE CREATININE: CPT

## 2019-07-08 PROCEDURE — 82044 UR ALBUMIN SEMIQUANTITATIVE: CPT

## 2019-07-08 PROCEDURE — 93306 TTE W/DOPPLER COMPLETE: CPT

## 2019-07-08 PROCEDURE — 80053 COMPREHEN METABOLIC PANEL: CPT

## 2019-07-08 PROCEDURE — 36415 COLL VENOUS BLD VENIPUNCTURE: CPT

## 2019-07-08 RX ORDER — PREDNISONE 10 MG/1
TABLET ORAL
Qty: 15 TABLET | Refills: 0 | Status: SHIPPED | OUTPATIENT
Start: 2019-07-08 | End: 2020-02-04

## 2019-07-10 ENCOUNTER — OFFICE VISIT (OUTPATIENT)
Dept: INTERNAL MEDICINE | Age: 69
End: 2019-07-10
Payer: MEDICARE

## 2019-07-10 ENCOUNTER — TELEPHONE (OUTPATIENT)
Dept: CARDIOLOGY CLINIC | Age: 69
End: 2019-07-10

## 2019-07-10 VITALS
HEART RATE: 53 BPM | TEMPERATURE: 98.1 F | HEIGHT: 66 IN | RESPIRATION RATE: 18 BRPM | BODY MASS INDEX: 24.93 KG/M2 | DIASTOLIC BLOOD PRESSURE: 65 MMHG | SYSTOLIC BLOOD PRESSURE: 117 MMHG | WEIGHT: 155.1 LBS

## 2019-07-10 DIAGNOSIS — R53.83 FATIGUE, UNSPECIFIED TYPE: ICD-10-CM

## 2019-07-10 DIAGNOSIS — E11.21 TYPE II DIABETES MELLITUS WITH NEPHROPATHY (HCC): ICD-10-CM

## 2019-07-10 DIAGNOSIS — I95.1 ORTHOSTATIC HYPOTENSION: ICD-10-CM

## 2019-07-10 DIAGNOSIS — R80.9 TYPE 2 DIABETES MELLITUS WITH MICROALBUMINURIA, UNSPECIFIED WHETHER LONG TERM INSULIN USE (HCC): Primary | ICD-10-CM

## 2019-07-10 DIAGNOSIS — I10 ESSENTIAL HYPERTENSION: ICD-10-CM

## 2019-07-10 DIAGNOSIS — E11.29 TYPE 2 DIABETES MELLITUS WITH MICROALBUMINURIA, UNSPECIFIED WHETHER LONG TERM INSULIN USE (HCC): Primary | ICD-10-CM

## 2019-07-10 DIAGNOSIS — G62.9 NEUROPATHY: ICD-10-CM

## 2019-07-10 PROCEDURE — 99214 OFFICE O/P EST MOD 30 MIN: CPT | Performed by: INTERNAL MEDICINE

## 2019-07-10 PROCEDURE — 99212 OFFICE O/P EST SF 10 MIN: CPT | Performed by: INTERNAL MEDICINE

## 2019-07-10 RX ORDER — AMLODIPINE BESYLATE 5 MG/1
5 TABLET ORAL DAILY
Qty: 30 TABLET | Refills: 2 | Status: SHIPPED | OUTPATIENT
Start: 2019-07-10 | End: 2020-02-04 | Stop reason: SDUPTHER

## 2019-07-10 SDOH — HEALTH STABILITY: MENTAL HEALTH: HOW MANY STANDARD DRINKS CONTAINING ALCOHOL DO YOU HAVE ON A TYPICAL DAY?: 5 OR 6

## 2019-07-10 SDOH — HEALTH STABILITY: MENTAL HEALTH: HOW OFTEN DO YOU HAVE A DRINK CONTAINING ALCOHOL?: 4 OR MORE TIMES A WEEK

## 2019-07-10 ASSESSMENT — ENCOUNTER SYMPTOMS
BACK PAIN: 1
GASTROINTESTINAL NEGATIVE: 1
SHORTNESS OF BREATH: 1
SINUS PRESSURE: 0
SORE THROAT: 1
WHEEZING: 1
COUGH: 1
SINUS PAIN: 0
EYES NEGATIVE: 1
EYE PAIN: 0

## 2019-07-10 NOTE — PROGRESS NOTES
Discharge instructions reviewed with patient. Patient verbalizes understanding. Patient given lab scripts. Script for compression stockings and AVS. Patient instructed to drink 2 liters of fluid of water daily.

## 2019-07-10 NOTE — PROGRESS NOTES
[facility]  InternalMedicine Residency Program  ACC Note      SUBJECTIVE:  CC: had concerns including Established New Doctor and Tinnitus (bilateral ears has been going on for years. ). HPI:Catarino Baez presented to the [address] [facility] for a routine visit. PMHx of HTN, stenosis of Rt Int. Carotid Artery w cerebral infarction. Recurrent left pleural effusion, GERD, Auditory neuropathy, OA spine, OA vertebrae, CKD, panic disorder, HLD, Kidney mass, Pre-diabetes    Pt presented for a regular check up. Reports:  -Ringing in his ears. Tried flvanoid OTC, no response.  -he uses OTC vitamins and so does not use prescription B-complex.  -had an echo test done and results unremarkable.  -Dr. Maurice Peterson gave a 5 day prednisone for persistent cold. Wants his lungs checkup  -results of blood work unremarkable Gl144, Cr 1.3 up from 1.1 12/2019    Review of Systems   Constitutional: Negative. HENT: Positive for sore throat. Negative for ear discharge, ear pain, sinus pressure and sinus pain. Since April. On prednisone day 2/5. Eyes: Negative. Negative for pain. Respiratory: Positive for cough, shortness of breath and wheezing. Hx of emphysema  BLAKE   Cardiovascular: Negative. Gastrointestinal: Negative. Genitourinary: Negative for dysuria, flank pain and hematuria. Musculoskeletal: Positive for arthralgias, back pain and myalgias. Neurological: Positive for dizziness and numbness. Neuropathy   Psychiatric/Behavioral: Negative. Current Outpatient Medications on File Prior to Visit   Medication Sig Dispense Refill    Multiple Vitamins-Minerals (COMPLETE MULTIVITAMIN/MINERAL PO) Take 1 tablet by mouth daily      predniSONE (DELTASONE) 10 MG tablet Take 30mg (3 tablets) by mouth daily for 5 days. 15 tablet 0    mineral oil-hydrophilic petrolatum (HYDROPHOR) ointment Apply topically as needed.  228 g 0    atorvastatin (LIPITOR) 40 MG tablet Take 1 tablet by mouth daily 90

## 2019-07-10 NOTE — TELEPHONE ENCOUNTER
----- Message from Nelly Martin MD sent at 7/9/2019  4:48 PM EDT -----  Please let patient know that echo showed similar findings to prior echo (mitral valve prolapse, moderate MR, normal LV function)

## 2019-07-10 NOTE — PATIENT INSTRUCTIONS
Patient Education     Decrease Norvasc to 5mg daily. Continue other medicines as ordered. Have blood work done over the next couple of weeks. You do not need to fast. Dr. [facility] compression stockings please take script to medical supply store to be fitted. Please go early in the a.m. Follow up as scheduled. Please call with any questions or concerns. Yadiel Tinoco RN    Preventing Falls: Care Instructions  Your Care Instructions    Getting around your home safely can be a challenge if you have injuries or health problems that make it easy for you to fall. Loose rugs and furniture in walkways are among the dangers for many older people who have problems walking or who have poor eyesight. People who have conditions such as arthritis, osteoporosis, or dementia also have to be careful not to fall. You can make your home safer with a few simple measures. Follow-up care is a key part of your treatment and safety. Be sure to make and go to all appointments, and call your doctor if you are having problems. It's also a good idea to know your test results and keep a list of the medicines you take. How can you care for yourself at home? Taking care of yourself  · You may get dizzy if you do not drink enough water. To prevent dehydration, drink plenty of fluids, enough so that your urine is light yellow or clear like water. Choose water and other caffeine-free clear liquids. If you have kidney, heart, or liver disease and have to limit fluids, talk with your doctor before you increase the amount of fluids you drink. · Exercise regularly to improve your strength, muscle tone, and balance. Walk if you can. Swimming may be a good choice if you cannot walk easily. · Have your vision and hearing checked each year or any time you notice a change. If you have trouble seeing and hearing, you might not be able to avoid objects and could lose your balance. · Know the side effects of the medicines you take.  Ask your doctor or

## 2019-07-18 ENCOUNTER — OFFICE VISIT (OUTPATIENT)
Dept: CARDIOLOGY CLINIC | Age: 69
End: 2019-07-18
Payer: MEDICARE

## 2019-07-18 VITALS
SYSTOLIC BLOOD PRESSURE: 138 MMHG | DIASTOLIC BLOOD PRESSURE: 60 MMHG | RESPIRATION RATE: 16 BRPM | HEART RATE: 63 BPM | HEIGHT: 65 IN | BODY MASS INDEX: 26.46 KG/M2 | WEIGHT: 158.8 LBS

## 2019-07-18 DIAGNOSIS — I34.0 NONRHEUMATIC MITRAL VALVE REGURGITATION: Primary | ICD-10-CM

## 2019-07-18 DIAGNOSIS — I34.1 MVP (MITRAL VALVE PROLAPSE): ICD-10-CM

## 2019-07-18 DIAGNOSIS — R55 SYNCOPE AND COLLAPSE: ICD-10-CM

## 2019-07-18 DIAGNOSIS — I10 ESSENTIAL HYPERTENSION: ICD-10-CM

## 2019-07-18 PROCEDURE — G8598 ASA/ANTIPLAT THER USED: HCPCS | Performed by: INTERNAL MEDICINE

## 2019-07-18 PROCEDURE — 93000 ELECTROCARDIOGRAM COMPLETE: CPT | Performed by: INTERNAL MEDICINE

## 2019-07-18 PROCEDURE — 3017F COLORECTAL CA SCREEN DOC REV: CPT | Performed by: INTERNAL MEDICINE

## 2019-07-18 PROCEDURE — 4040F PNEUMOC VAC/ADMIN/RCVD: CPT | Performed by: INTERNAL MEDICINE

## 2019-07-18 PROCEDURE — G8427 DOCREV CUR MEDS BY ELIG CLIN: HCPCS | Performed by: INTERNAL MEDICINE

## 2019-07-18 PROCEDURE — 99214 OFFICE O/P EST MOD 30 MIN: CPT | Performed by: INTERNAL MEDICINE

## 2019-07-18 PROCEDURE — G8417 CALC BMI ABV UP PARAM F/U: HCPCS | Performed by: INTERNAL MEDICINE

## 2019-07-18 PROCEDURE — 1036F TOBACCO NON-USER: CPT | Performed by: INTERNAL MEDICINE

## 2019-07-18 PROCEDURE — 1123F ACP DISCUSS/DSCN MKR DOCD: CPT | Performed by: INTERNAL MEDICINE

## 2019-07-18 RX ORDER — GABAPENTIN 600 MG/1
TABLET ORAL
COMMUNITY
Start: 2019-07-13 | End: 2020-02-04 | Stop reason: SDUPTHER

## 2019-07-18 NOTE — PROGRESS NOTES
Not on file     Forced sexual activity: Not on file   Other Topics Concern    Not on file   Social History Narrative    Not on file       Allergies: Allergies   Allergen Reactions    Flexeril [Cyclobenzaprine] Other (See Comments)     HALLUCINATIONS    Lisinopril Swelling     Facial and lip    Influenza Virus Vaccine Other (See Comments)     \"Got the Flu\"    Metformin And Related Other (See Comments)     Lactic acidosis    Zocor [Simvastatin] Other (See Comments)     Elevated LFT's       Current Medications:  Current Outpatient Medications   Medication Sig Dispense Refill    gabapentin (NEURONTIN) 600 MG tablet       Multiple Vitamins-Minerals (COMPLETE MULTIVITAMIN/MINERAL PO) Take 1 tablet by mouth daily      amLODIPine (NORVASC) 5 MG tablet Take 1 tablet by mouth daily 30 tablet 2    mineral oil-hydrophilic petrolatum (HYDROPHOR) ointment Apply topically as needed. 228 g 0    atorvastatin (LIPITOR) 40 MG tablet Take 1 tablet by mouth daily 90 tablet 5    PARoxetine (PAXIL) 30 MG tablet TAKE ONE TABLET BY MOUTH EVERY DAY IN THE MORNING 90 tablet 5    atenolol (TENORMIN) 50 MG tablet Take 1 tablet by mouth 2 times daily 180 tablet 5    hydrALAZINE (APRESOLINE) 25 MG tablet Take 1 tablet by mouth 2 times daily 180 tablet 5    b complex vitamins capsule Take 1 capsule by mouth every morning       aspirin 81 MG tablet Take 81 mg by mouth every morning       acetaminophen (TYLENOL) 325 MG tablet Take 650 mg by mouth every 6 hours as needed for Pain      predniSONE (DELTASONE) 10 MG tablet Take 30mg (3 tablets) by mouth daily for 5 days. (Patient not taking: Reported on 7/18/2019) 15 tablet 0    gabapentin (NEURONTIN) 600 MG tablet Take 1 tablet by mouth 3 times daily for 90 days. . 90 tablet 5     No current facility-administered medications for this visit.         Physical Exam:  /60   Pulse 63   Resp 16   Ht 5' 5\" (1.651 m)   Wt 158 lb 12.8 oz (72 kg)   BMI 26.43 kg/m²   Wt Readings from Last 3 Encounters:   07/18/19 158 lb 12.8 oz (72 kg)   07/10/19 155 lb 1.6 oz (70.4 kg)   05/17/19 158 lb 4.8 oz (71.8 kg)     Appearance: Awake, alert, no acute respiratory distress  Skin: Intact, no rash  Head: Normocephalic, atraumatic  Eyes: EOMI, no conjunctival erythema  ENMT: No pharyngeal erythema, MMM, no rhinorrhea  Neck: Supple, no elevated JVP, no carotid bruits  Lungs: Clear to auscultation bilaterally. No wheezes, rales, or rhonchi.   Cardiac: Regular rate and rhythm, +U3Q1, 1/6 systolic murmur  Abdomen: Soft, nontender, +bowel sounds  Extremities: Moves all extremities x 4, no lower extremity edema  Neurologic: No focal motor deficits apparent, normal mood and affect    Laboratory Tests:  Lab Results   Component Value Date    CREATININE 1.3 (H) 07/08/2019    BUN 12 07/08/2019     07/08/2019    K 3.8 07/08/2019     07/08/2019    CO2 26 07/08/2019     Lab Results   Component Value Date    WBC 7.1 07/08/2019    RBC 4.49 07/08/2019    HGB 14.5 07/08/2019    HCT 44.7 07/08/2019    MCV 99.6 07/08/2019    MCH 32.3 07/08/2019    MCHC 32.4 07/08/2019    RDW 13.2 07/08/2019     07/08/2019    MPV 11.0 07/08/2019     Lab Results   Component Value Date    MG 2.0 09/17/2018     Lab Results   Component Value Date    PROTIME 14.5 09/16/2018    INR 1.3 09/16/2018     Lab Results   Component Value Date    TSH 2.300 09/08/2018     Lab Results   Component Value Date    TRIG 91 09/07/2018    TRIG 125 06/13/2018    TRIG 233 (H) 10/23/2017     Lab Results   Component Value Date    HDL 26 09/07/2018    HDL 87 06/13/2018    HDL 73 10/23/2017     Lab Results   Component Value Date    LDLCALC 29 09/07/2018    [address] 62 06/13/2018    [address] 68 10/23/2017       Cardiac Tests:  ECG: SR, LAFB    7/10/14 Echocardiogram:  Normal LV size  Normal LV function (EF 60-65%)  Normal RV size and function  Mitral valve prolapse (posterior leaflet) with moderate mitral regurgitation (anterior directed eccentric

## 2019-07-30 ENCOUNTER — HOSPITAL ENCOUNTER (OUTPATIENT)
Age: 69
Discharge: HOME | End: 2019-07-30
Payer: MEDICARE

## 2019-07-30 DIAGNOSIS — R53.83 FATIGUE, UNSPECIFIED TYPE: ICD-10-CM

## 2019-07-30 DIAGNOSIS — G62.9 NEUROPATHY: ICD-10-CM

## 2019-07-30 DIAGNOSIS — R80.9 TYPE 2 DIABETES MELLITUS WITH MICROALBUMINURIA, UNSPECIFIED WHETHER LONG TERM INSULIN USE (HCC): ICD-10-CM

## 2019-07-30 DIAGNOSIS — E11.29 TYPE 2 DIABETES MELLITUS WITH MICROALBUMINURIA, UNSPECIFIED WHETHER LONG TERM INSULIN USE (HCC): ICD-10-CM

## 2019-07-30 LAB
HBA1C MFR BLD: 5.5 % (ref 4–5.6)
TSH SERPL DL<=0.05 MIU/L-ACNC: 0.68 UIU/ML (ref 0.27–4.2)
VITAMIN B-12: 394 PG/ML (ref 211–946)

## 2019-07-30 PROCEDURE — 84443 ASSAY THYROID STIM HORMONE: CPT

## 2019-07-30 PROCEDURE — 36415 COLL VENOUS BLD VENIPUNCTURE: CPT

## 2019-07-30 PROCEDURE — 82607 VITAMIN B-12: CPT

## 2019-07-30 PROCEDURE — 83036 HEMOGLOBIN GLYCOSYLATED A1C: CPT

## 2019-10-13 ENCOUNTER — APPOINTMENT (OUTPATIENT)
Dept: GENERAL RADIOLOGY | Age: 69
End: 2019-10-13
Payer: MEDICARE

## 2019-10-13 ENCOUNTER — HOSPITAL ENCOUNTER (EMERGENCY)
Age: 69
Discharge: HOME | End: 2019-10-13
Attending: EMERGENCY MEDICINE
Payer: MEDICARE

## 2019-10-13 VITALS
DIASTOLIC BLOOD PRESSURE: 68 MMHG | HEART RATE: 52 BPM | RESPIRATION RATE: 16 BRPM | TEMPERATURE: 97.5 F | SYSTOLIC BLOOD PRESSURE: 143 MMHG | OXYGEN SATURATION: 96 %

## 2019-10-13 DIAGNOSIS — R42 ORTHOSTATIC DIZZINESS: Primary | ICD-10-CM

## 2019-10-13 DIAGNOSIS — E87.6 HYPOKALEMIA: ICD-10-CM

## 2019-10-13 DIAGNOSIS — N18.9 ACUTE RENAL FAILURE SUPERIMPOSED ON CHRONIC KIDNEY DISEASE, UNSPECIFIED CKD STAGE, UNSPECIFIED ACUTE RENAL FAILURE TYPE (HCC): ICD-10-CM

## 2019-10-13 DIAGNOSIS — F10.10 ETOH ABUSE: ICD-10-CM

## 2019-10-13 DIAGNOSIS — N17.9 ACUTE RENAL FAILURE SUPERIMPOSED ON CHRONIC KIDNEY DISEASE, UNSPECIFIED CKD STAGE, UNSPECIFIED ACUTE RENAL FAILURE TYPE (HCC): ICD-10-CM

## 2019-10-13 LAB
ACETAMINOPHEN LEVEL: <5 MCG/ML (ref 10–30)
ALBUMIN SERPL-MCNC: 4.4 G/DL (ref 3.5–5.2)
ALP BLD-CCNC: 226 U/L (ref 40–129)
ALT SERPL-CCNC: 92 U/L (ref 0–40)
ANION GAP SERPL CALCULATED.3IONS-SCNC: 18 MMOL/L (ref 7–16)
AST SERPL-CCNC: 69 U/L (ref 0–39)
BASOPHILS ABSOLUTE: 0.11 E9/L (ref 0–0.2)
BASOPHILS RELATIVE PERCENT: 1.1 % (ref 0–2)
BILIRUB SERPL-MCNC: 0.7 MG/DL (ref 0–1.2)
BUN BLDV-MCNC: 15 MG/DL (ref 8–23)
CALCIUM SERPL-MCNC: 8.8 MG/DL (ref 8.6–10.2)
CHLORIDE BLD-SCNC: 97 MMOL/L (ref 98–107)
CO2: 22 MMOL/L (ref 22–29)
CREAT SERPL-MCNC: 2.2 MG/DL (ref 0.7–1.2)
EKG ATRIAL RATE: 50 BPM
EKG P AXIS: 52 DEGREES
EKG P-R INTERVAL: 158 MS
EKG Q-T INTERVAL: 506 MS
EKG QRS DURATION: 100 MS
EKG QTC CALCULATION (BAZETT): 461 MS
EKG R AXIS: -38 DEGREES
EKG T AXIS: 15 DEGREES
EKG VENTRICULAR RATE: 50 BPM
EOSINOPHILS ABSOLUTE: 0.53 E9/L (ref 0.05–0.5)
EOSINOPHILS RELATIVE PERCENT: 5.3 % (ref 0–6)
ETHANOL: 101 MG/DL (ref 0–0.08)
GFR AFRICAN AMERICAN: 36
GFR NON-AFRICAN AMERICAN: 30 ML/MIN/1.73
GLUCOSE BLD-MCNC: 99 MG/DL (ref 74–99)
HCT VFR BLD CALC: 42.7 % (ref 37–54)
HEMOGLOBIN: 13.9 G/DL (ref 12.5–16.5)
IMMATURE GRANULOCYTES #: 0.05 E9/L
IMMATURE GRANULOCYTES %: 0.5 % (ref 0–5)
LYMPHOCYTES ABSOLUTE: 1.19 E9/L (ref 1.5–4)
LYMPHOCYTES RELATIVE PERCENT: 11.9 % (ref 20–42)
MCH RBC QN AUTO: 32.3 PG (ref 26–35)
MCHC RBC AUTO-ENTMCNC: 32.6 % (ref 32–34.5)
MCV RBC AUTO: 99.3 FL (ref 80–99.9)
METER GLUCOSE: 106 MG/DL (ref 74–99)
MONOCYTES ABSOLUTE: 0.83 E9/L (ref 0.1–0.95)
MONOCYTES RELATIVE PERCENT: 8.3 % (ref 2–12)
NEUTROPHILS ABSOLUTE: 7.32 E9/L (ref 1.8–7.3)
NEUTROPHILS RELATIVE PERCENT: 72.9 % (ref 43–80)
PDW BLD-RTO: 12.8 FL (ref 11.5–15)
PLATELET # BLD: 122 E9/L (ref 130–450)
PMV BLD AUTO: 10.8 FL (ref 7–12)
POTASSIUM SERPL-SCNC: 3.1 MMOL/L (ref 3.5–5)
RBC # BLD: 4.3 E12/L (ref 3.8–5.8)
SALICYLATE, SERUM: <0.3 MG/DL (ref 0–30)
SODIUM BLD-SCNC: 137 MMOL/L (ref 132–146)
TOTAL PROTEIN: 7.2 G/DL (ref 6.4–8.3)
TRICYCLIC ANTIDEPRESSANTS SCREEN SERUM: NEGATIVE NG/ML
TROPONIN: <0.01 NG/ML (ref 0–0.03)
WBC # BLD: 10 E9/L (ref 4.5–11.5)

## 2019-10-13 PROCEDURE — 99284 EMERGENCY DEPT VISIT MOD MDM: CPT

## 2019-10-13 PROCEDURE — 96374 THER/PROPH/DIAG INJ IV PUSH: CPT

## 2019-10-13 PROCEDURE — 71045 X-RAY EXAM CHEST 1 VIEW: CPT

## 2019-10-13 PROCEDURE — 36415 COLL VENOUS BLD VENIPUNCTURE: CPT

## 2019-10-13 PROCEDURE — 6360000002 HC RX W HCPCS: Performed by: EMERGENCY MEDICINE

## 2019-10-13 PROCEDURE — 84484 ASSAY OF TROPONIN QUANT: CPT

## 2019-10-13 PROCEDURE — 6370000000 HC RX 637 (ALT 250 FOR IP): Performed by: EMERGENCY MEDICINE

## 2019-10-13 PROCEDURE — 80307 DRUG TEST PRSMV CHEM ANLYZR: CPT

## 2019-10-13 PROCEDURE — 2580000003 HC RX 258: Performed by: EMERGENCY MEDICINE

## 2019-10-13 PROCEDURE — 96361 HYDRATE IV INFUSION ADD-ON: CPT

## 2019-10-13 PROCEDURE — 93005 ELECTROCARDIOGRAM TRACING: CPT | Performed by: EMERGENCY MEDICINE

## 2019-10-13 PROCEDURE — 80053 COMPREHEN METABOLIC PANEL: CPT

## 2019-10-13 PROCEDURE — 85025 COMPLETE CBC W/AUTO DIFF WBC: CPT

## 2019-10-13 PROCEDURE — 93010 ELECTROCARDIOGRAM REPORT: CPT | Performed by: INTERNAL MEDICINE

## 2019-10-13 PROCEDURE — 82962 GLUCOSE BLOOD TEST: CPT

## 2019-10-13 PROCEDURE — G0480 DRUG TEST DEF 1-7 CLASSES: HCPCS

## 2019-10-13 PROCEDURE — 2500000003 HC RX 250 WO HCPCS: Performed by: EMERGENCY MEDICINE

## 2019-10-13 RX ORDER — FOLIC ACID 5 MG/ML
1 INJECTION, SOLUTION INTRAMUSCULAR; INTRAVENOUS; SUBCUTANEOUS ONCE
Status: COMPLETED | OUTPATIENT
Start: 2019-10-13 | End: 2019-10-13

## 2019-10-13 RX ORDER — 0.9 % SODIUM CHLORIDE 0.9 %
1000 INTRAVENOUS SOLUTION INTRAVENOUS ONCE
Status: COMPLETED | OUTPATIENT
Start: 2019-10-13 | End: 2019-10-13

## 2019-10-13 RX ORDER — POTASSIUM CHLORIDE 20 MEQ/1
40 TABLET, EXTENDED RELEASE ORAL ONCE
Status: COMPLETED | OUTPATIENT
Start: 2019-10-13 | End: 2019-10-13

## 2019-10-13 RX ORDER — THIAMINE HYDROCHLORIDE 100 MG/ML
100 INJECTION, SOLUTION INTRAMUSCULAR; INTRAVENOUS DAILY
Status: DISCONTINUED | OUTPATIENT
Start: 2019-10-13 | End: 2019-10-13 | Stop reason: HOSPADM

## 2019-10-13 RX ADMIN — FOLIC ACID 1 MG: 5 INJECTION, SOLUTION INTRAMUSCULAR; INTRAVENOUS; SUBCUTANEOUS at 18:57

## 2019-10-13 RX ADMIN — POTASSIUM CHLORIDE 40 MEQ: 20 TABLET, EXTENDED RELEASE ORAL at 19:08

## 2019-10-13 RX ADMIN — THIAMINE HYDROCHLORIDE 100 MG: 100 INJECTION, SOLUTION INTRAMUSCULAR; INTRAVENOUS at 18:57

## 2019-10-13 RX ADMIN — SODIUM CHLORIDE 1000 ML: 9 INJECTION, SOLUTION INTRAVENOUS at 18:28

## 2019-10-13 ASSESSMENT — ENCOUNTER SYMPTOMS
EYE REDNESS: 0
COUGH: 0
EYE PAIN: 0
DIARRHEA: 0
WHEEZING: 0
ABDOMINAL PAIN: 0
NAUSEA: 0
VOMITING: 0
SHORTNESS OF BREATH: 0
SINUS PRESSURE: 0
BACK PAIN: 0
EYE DISCHARGE: 0
SORE THROAT: 0

## 2019-10-15 ENCOUNTER — HOSPITAL ENCOUNTER (OUTPATIENT)
Age: 69
Discharge: HOME | End: 2019-10-15
Payer: MEDICARE

## 2019-10-15 ENCOUNTER — OFFICE VISIT (OUTPATIENT)
Dept: INTERNAL MEDICINE | Age: 69
End: 2019-10-15
Payer: MEDICARE

## 2019-10-15 VITALS
HEIGHT: 66 IN | HEART RATE: 61 BPM | BODY MASS INDEX: 24.99 KG/M2 | SYSTOLIC BLOOD PRESSURE: 159 MMHG | WEIGHT: 155.5 LBS | TEMPERATURE: 98.6 F | DIASTOLIC BLOOD PRESSURE: 61 MMHG | RESPIRATION RATE: 16 BRPM

## 2019-10-15 DIAGNOSIS — N17.9 AKI (ACUTE KIDNEY INJURY) (HCC): ICD-10-CM

## 2019-10-15 DIAGNOSIS — N17.9 AKI (ACUTE KIDNEY INJURY) (HCC): Primary | ICD-10-CM

## 2019-10-15 DIAGNOSIS — R74.01 TRANSAMINITIS: ICD-10-CM

## 2019-10-15 DIAGNOSIS — R74.8 ELEVATED ALKALINE PHOSPHATASE LEVEL: ICD-10-CM

## 2019-10-15 LAB
ALBUMIN SERPL-MCNC: 4 G/DL (ref 3.5–5.2)
ALP BLD-CCNC: 240 U/L (ref 40–129)
ALT SERPL-CCNC: 70 U/L (ref 0–40)
ANION GAP SERPL CALCULATED.3IONS-SCNC: 11 MMOL/L (ref 7–16)
AST SERPL-CCNC: 45 U/L (ref 0–39)
BILIRUB SERPL-MCNC: 0.7 MG/DL (ref 0–1.2)
BUN BLDV-MCNC: 11 MG/DL (ref 8–23)
CALCIUM SERPL-MCNC: 9.3 MG/DL (ref 8.6–10.2)
CHLORIDE BLD-SCNC: 103 MMOL/L (ref 98–107)
CHLORIDE URINE RANDOM: 78 MMOL/L
CO2: 27 MMOL/L (ref 22–29)
CREAT SERPL-MCNC: 1.2 MG/DL (ref 0.7–1.2)
CREATININE URINE: 440 MG/DL (ref 40–278)
GAMMA GLUTAMYL TRANSFERASE: 1513 U/L (ref 10–71)
GFR AFRICAN AMERICAN: >60
GFR NON-AFRICAN AMERICAN: 60 ML/MIN/1.73
GLUCOSE BLD-MCNC: 127 MG/DL (ref 74–99)
MAGNESIUM: 1.7 MG/DL (ref 1.6–2.6)
MICROALBUMIN UR-MCNC: 78.9 MG/L
MICROALBUMIN/CREAT UR-RTO: 17.9 (ref 0–30)
PHOSPHORUS: 2.8 MG/DL (ref 2.5–4.5)
POTASSIUM SERPL-SCNC: 4 MMOL/L (ref 3.5–5)
POTASSIUM, UR: 64.2 MMOL/L
PROTEIN PROTEIN: 52 MG/DL (ref 0–12)
PROTEIN/CREAT RATIO: 0.1
PROTEIN/CREAT RATIO: 0.1 (ref 0–0.2)
SODIUM BLD-SCNC: 141 MMOL/L (ref 132–146)
SODIUM URINE: 55 MMOL/L
TOTAL PROTEIN: 7.3 G/DL (ref 6.4–8.3)
UREA NITROGEN, UR: 631 MG/DL (ref 800–1666)

## 2019-10-15 PROCEDURE — 4040F PNEUMOC VAC/ADMIN/RCVD: CPT | Performed by: INTERNAL MEDICINE

## 2019-10-15 PROCEDURE — 82570 ASSAY OF URINE CREATININE: CPT

## 2019-10-15 PROCEDURE — 1036F TOBACCO NON-USER: CPT | Performed by: INTERNAL MEDICINE

## 2019-10-15 PROCEDURE — 82044 UR ALBUMIN SEMIQUANTITATIVE: CPT

## 2019-10-15 PROCEDURE — 84540 ASSAY OF URINE/UREA-N: CPT

## 2019-10-15 PROCEDURE — G8484 FLU IMMUNIZE NO ADMIN: HCPCS | Performed by: INTERNAL MEDICINE

## 2019-10-15 PROCEDURE — 80053 COMPREHEN METABOLIC PANEL: CPT

## 2019-10-15 PROCEDURE — 84133 ASSAY OF URINE POTASSIUM: CPT

## 2019-10-15 PROCEDURE — G8417 CALC BMI ABV UP PARAM F/U: HCPCS | Performed by: INTERNAL MEDICINE

## 2019-10-15 PROCEDURE — 99213 OFFICE O/P EST LOW 20 MIN: CPT | Performed by: INTERNAL MEDICINE

## 2019-10-15 PROCEDURE — 82436 ASSAY OF URINE CHLORIDE: CPT

## 2019-10-15 PROCEDURE — 1123F ACP DISCUSS/DSCN MKR DOCD: CPT | Performed by: INTERNAL MEDICINE

## 2019-10-15 PROCEDURE — 82977 ASSAY OF GGT: CPT

## 2019-10-15 PROCEDURE — 36415 COLL VENOUS BLD VENIPUNCTURE: CPT

## 2019-10-15 PROCEDURE — 3017F COLORECTAL CA SCREEN DOC REV: CPT | Performed by: INTERNAL MEDICINE

## 2019-10-15 PROCEDURE — 83735 ASSAY OF MAGNESIUM: CPT

## 2019-10-15 PROCEDURE — 84100 ASSAY OF PHOSPHORUS: CPT

## 2019-10-15 PROCEDURE — 84156 ASSAY OF PROTEIN URINE: CPT

## 2019-10-15 PROCEDURE — G8427 DOCREV CUR MEDS BY ELIG CLIN: HCPCS | Performed by: INTERNAL MEDICINE

## 2019-10-15 PROCEDURE — 84300 ASSAY OF URINE SODIUM: CPT

## 2019-10-15 PROCEDURE — G8598 ASA/ANTIPLAT THER USED: HCPCS | Performed by: INTERNAL MEDICINE

## 2019-10-15 ASSESSMENT — ENCOUNTER SYMPTOMS
CONSTIPATION: 0
NAUSEA: 0
COUGH: 0
DIARRHEA: 0
SHORTNESS OF BREATH: 0
WHEEZING: 0
ABDOMINAL PAIN: 0
VOMITING: 0
SINUS PAIN: 0

## 2019-10-17 ENCOUNTER — TELEPHONE (OUTPATIENT)
Dept: INTERNAL MEDICINE | Age: 69
End: 2019-10-17

## 2019-12-31 RX ORDER — ATORVASTATIN CALCIUM 40 MG/1
40 TABLET, FILM COATED ORAL DAILY
Qty: 30 TABLET | Refills: 0 | Status: SHIPPED | OUTPATIENT
Start: 2019-12-31 | End: 2020-02-04 | Stop reason: SDUPTHER

## 2020-01-03 ENCOUNTER — HOSPITAL ENCOUNTER (OUTPATIENT)
Age: 70
Discharge: HOME | End: 2020-01-03
Payer: MEDICARE

## 2020-01-03 ENCOUNTER — HOSPITAL ENCOUNTER (OUTPATIENT)
Dept: ULTRASOUND IMAGING | Age: 70
Discharge: HOME | End: 2020-01-05
Payer: MEDICARE

## 2020-01-03 LAB
ALBUMIN SERPL-MCNC: 4.8 G/DL (ref 3.5–5.2)
ALP BLD-CCNC: 141 U/L (ref 40–129)
ALT SERPL-CCNC: 58 U/L (ref 0–40)
ANION GAP SERPL CALCULATED.3IONS-SCNC: 13 MMOL/L (ref 7–16)
AST SERPL-CCNC: 43 U/L (ref 0–39)
BACTERIA: NORMAL /HPF
BILIRUB SERPL-MCNC: 1 MG/DL (ref 0–1.2)
BILIRUBIN URINE: NEGATIVE
BLOOD, URINE: NEGATIVE
BUN BLDV-MCNC: 14 MG/DL (ref 8–23)
CALCIUM SERPL-MCNC: 9.8 MG/DL (ref 8.6–10.2)
CHLORIDE BLD-SCNC: 95 MMOL/L (ref 98–107)
CHOLESTEROL, TOTAL: 186 MG/DL (ref 0–199)
CLARITY: CLEAR
CO2: 28 MMOL/L (ref 22–29)
COLOR: YELLOW
CREAT SERPL-MCNC: 1.4 MG/DL (ref 0.7–1.2)
CREATININE URINE: 50 MG/DL (ref 40–278)
GFR AFRICAN AMERICAN: >60
GFR NON-AFRICAN AMERICAN: 50 ML/MIN/1.73
GLUCOSE BLD-MCNC: 133 MG/DL (ref 74–99)
GLUCOSE URINE: NEGATIVE MG/DL
HBA1C MFR BLD: 5.5 % (ref 4–5.6)
HCT VFR BLD CALC: 47.8 % (ref 37–54)
HDLC SERPL-MCNC: 103 MG/DL
HEMOGLOBIN: 15.6 G/DL (ref 12.5–16.5)
KETONES, URINE: NEGATIVE MG/DL
LDL CHOLESTEROL CALCULATED: 48 MG/DL (ref 0–99)
LEUKOCYTE ESTERASE, URINE: ABNORMAL
MCH RBC QN AUTO: 32.4 PG (ref 26–35)
MCHC RBC AUTO-ENTMCNC: 32.6 % (ref 32–34.5)
MCV RBC AUTO: 99.2 FL (ref 80–99.9)
MICROALBUMIN UR-MCNC: 18.2 MG/L
MICROALBUMIN/CREAT UR-RTO: 36.4 (ref 0–30)
NITRITE, URINE: NEGATIVE
PARATHYROID HORMONE INTACT: 48 PG/ML (ref 15–65)
PDW BLD-RTO: 12.2 FL (ref 11.5–15)
PH UA: 6.5 (ref 5–9)
PHOSPHORUS: 2.8 MG/DL (ref 2.5–4.5)
PLATELET # BLD: 113 E9/L (ref 130–450)
PMV BLD AUTO: 11.2 FL (ref 7–12)
POTASSIUM SERPL-SCNC: 3.6 MMOL/L (ref 3.5–5)
PROSTATE SPECIFIC ANTIGEN: 5.52 NG/ML (ref 0–4)
PROTEIN UA: NEGATIVE MG/DL
RBC # BLD: 4.82 E12/L (ref 3.8–5.8)
RBC UA: NORMAL /HPF (ref 0–2)
SEDIMENTATION RATE, ERYTHROCYTE: 5 MM/HR (ref 0–15)
SODIUM BLD-SCNC: 136 MMOL/L (ref 132–146)
SPECIFIC GRAVITY UA: <=1.005 (ref 1–1.03)
TOTAL PROTEIN: 7.7 G/DL (ref 6.4–8.3)
TRIGL SERPL-MCNC: 177 MG/DL (ref 0–149)
TSH SERPL DL<=0.05 MIU/L-ACNC: 1.13 UIU/ML (ref 0.27–4.2)
URIC ACID, SERUM: 5.1 MG/DL (ref 3.4–7)
UROBILINOGEN, URINE: 0.2 E.U./DL
VITAMIN D 25-HYDROXY: 69 NG/ML (ref 30–100)
VLDLC SERPL CALC-MCNC: 35 MG/DL
WBC # BLD: 6.7 E9/L (ref 4.5–11.5)
WBC UA: NORMAL /HPF (ref 0–5)

## 2020-01-03 PROCEDURE — 85027 COMPLETE CBC AUTOMATED: CPT

## 2020-01-03 PROCEDURE — 84443 ASSAY THYROID STIM HORMONE: CPT

## 2020-01-03 PROCEDURE — 76705 ECHO EXAM OF ABDOMEN: CPT

## 2020-01-03 PROCEDURE — 36415 COLL VENOUS BLD VENIPUNCTURE: CPT

## 2020-01-03 PROCEDURE — 81001 URINALYSIS AUTO W/SCOPE: CPT

## 2020-01-03 PROCEDURE — 84100 ASSAY OF PHOSPHORUS: CPT

## 2020-01-03 PROCEDURE — 76770 US EXAM ABDO BACK WALL COMP: CPT

## 2020-01-03 PROCEDURE — 80053 COMPREHEN METABOLIC PANEL: CPT

## 2020-01-03 PROCEDURE — 82306 VITAMIN D 25 HYDROXY: CPT

## 2020-01-03 PROCEDURE — 83970 ASSAY OF PARATHORMONE: CPT

## 2020-01-03 PROCEDURE — 82044 UR ALBUMIN SEMIQUANTITATIVE: CPT

## 2020-01-03 PROCEDURE — 82570 ASSAY OF URINE CREATININE: CPT

## 2020-01-03 PROCEDURE — 83036 HEMOGLOBIN GLYCOSYLATED A1C: CPT

## 2020-01-03 PROCEDURE — 80061 LIPID PANEL: CPT

## 2020-01-03 PROCEDURE — G0103 PSA SCREENING: HCPCS

## 2020-01-03 PROCEDURE — 84550 ASSAY OF BLOOD/URIC ACID: CPT

## 2020-01-03 PROCEDURE — 76775 US EXAM ABDO BACK WALL LIM: CPT

## 2020-01-03 PROCEDURE — 85651 RBC SED RATE NONAUTOMATED: CPT

## 2020-01-07 ENCOUNTER — TELEPHONE (OUTPATIENT)
Dept: INTERNAL MEDICINE | Age: 70
End: 2020-01-07

## 2020-01-20 ENCOUNTER — HOSPITAL ENCOUNTER (OUTPATIENT)
Dept: GENERAL RADIOLOGY | Age: 70
Discharge: HOME | End: 2020-01-22
Payer: MEDICARE

## 2020-01-20 ENCOUNTER — HOSPITAL ENCOUNTER (OUTPATIENT)
Age: 70
Discharge: HOME | End: 2020-01-22
Payer: MEDICARE

## 2020-01-20 ENCOUNTER — OFFICE VISIT (OUTPATIENT)
Dept: PULMONOLOGY | Age: 70
End: 2020-01-20
Payer: MEDICARE

## 2020-01-20 VITALS — DIASTOLIC BLOOD PRESSURE: 69 MMHG | OXYGEN SATURATION: 95 % | SYSTOLIC BLOOD PRESSURE: 165 MMHG

## 2020-01-20 PROCEDURE — G8417 CALC BMI ABV UP PARAM F/U: HCPCS | Performed by: INTERNAL MEDICINE

## 2020-01-20 PROCEDURE — 4040F PNEUMOC VAC/ADMIN/RCVD: CPT | Performed by: INTERNAL MEDICINE

## 2020-01-20 PROCEDURE — G8484 FLU IMMUNIZE NO ADMIN: HCPCS | Performed by: INTERNAL MEDICINE

## 2020-01-20 PROCEDURE — 99213 OFFICE O/P EST LOW 20 MIN: CPT | Performed by: INTERNAL MEDICINE

## 2020-01-20 PROCEDURE — G8427 DOCREV CUR MEDS BY ELIG CLIN: HCPCS | Performed by: INTERNAL MEDICINE

## 2020-01-20 PROCEDURE — 1036F TOBACCO NON-USER: CPT | Performed by: INTERNAL MEDICINE

## 2020-01-20 PROCEDURE — 71046 X-RAY EXAM CHEST 2 VIEWS: CPT

## 2020-01-20 PROCEDURE — 3017F COLORECTAL CA SCREEN DOC REV: CPT | Performed by: INTERNAL MEDICINE

## 2020-01-20 PROCEDURE — 1123F ACP DISCUSS/DSCN MKR DOCD: CPT | Performed by: INTERNAL MEDICINE

## 2020-01-20 RX ORDER — PAROXETINE 30 MG/1
TABLET, FILM COATED ORAL
Qty: 15 TABLET | Refills: 0 | Status: SHIPPED | OUTPATIENT
Start: 2020-01-20 | End: 2020-02-04 | Stop reason: SDUPTHER

## 2020-01-20 NOTE — PROGRESS NOTES
Department of Internal Medicine  Division of Pulmonary, Critical Care & Sleep Medicine  Pulmonary [address]                                             Pulmonary Clinic Consult     I had the pleasure of seeing  Dennys Rosas in the [facility] regarding their Pleural effusion s/p VATS       Chief Complaint   Patient presents with    Follow-up     1 yr. no complaints       HISTORY OF PRESENT ILLNESS:      The patient is a 70 y.o. male with PMH of HTN, HLD, hx of TIA ,right carotid endarterectomy, parathyriod adenoma, Who was admitted to our ICU in September 13 with recurrent pleural effusion that required multiple tap with negative cytology  Patiet initially presented to [facility] on aug 27, ED for chest pain and neck pain. But was readmitted on 9/6/2018 for increased SOB and CT chest showed right pleural effusion/consolidation/loculation. Patient c/o SOB 2-3 month, gradual onset which progressively worse with exerction, associated with dry cough, denies fever, weight loss,. During his hospital admission, patient undergone thoracocentesis of right lungs with removed 600 ml, associated with slight improvement in SOB and  high flow oxygen, initial cytology neg for malignancy. Patient was also managed for alcohol withdrawal and had an episode of Afib with spontaneous resolve to NSR. Further patient repeat imaging showed worsening effusion so patient transfer for possible and we place the chest tube for him that time the drain exudative fluid about 200 and stop draining so he went for VATS by CTS and since then he has been doing well  Patient is smoker, with 20 pack year history quit [id number]  , alcoholic 5-6 drinks  Daily for over 20 years.       Today Visit     He is breathing much better  There is no fever or chills   Repeat CT shows significant improvement and resolution of his pleural effusion   He does not want CT chest and does not want anything done if it is mass He is ok with CXR    ALLERGIES:    Allergies   Allergen Reactions    Flexeril [Cyclobenzaprine] Other (See Comments)     HALLUCINATIONS    Lisinopril Swelling     Facial and lip    Influenza Virus Vaccine Other (See Comments)     \"Got the Flu\"    Metformin And Related Other (See Comments)     Lactic acidosis    Zocor [Simvastatin] Other (See Comments)     Elevated LFT's       PAST MEDICAL HISTORY:       Diagnosis Date    Anxiety     Depression     GERD (gastroesophageal reflux disease)     Heart palpitations     HTN (hypertension) 10/21/2010    Hyperlipidemia     Lightheadedness     OA (osteoarthritis)     Parathyroid adenoma     s/p surgery 2005    Pre-diabetes     Stenosis of right internal carotid artery with cerebral infarction [facility])        MEDICATIONS:   Current Outpatient Medications   Medication Sig Dispense Refill    atorvastatin (LIPITOR) 40 MG tablet Take 1 tablet by mouth daily 30 tablet 0    gabapentin (NEURONTIN) 600 MG tablet       Multiple Vitamins-Minerals (COMPLETE MULTIVITAMIN/MINERAL PO) Take 1 tablet by mouth daily      amLODIPine (NORVASC) 5 MG tablet Take 1 tablet by mouth daily 30 tablet 2    hydrALAZINE (APRESOLINE) 25 MG tablet Take 1 tablet by mouth 2 times daily 180 tablet 5    aspirin 81 MG tablet Take 81 mg by mouth every morning       acetaminophen (TYLENOL) 325 MG tablet Take 650 mg by mouth every 6 hours as needed for Pain      predniSONE (DELTASONE) 10 MG tablet Take 30mg (3 tablets) by mouth daily for 5 days. (Patient not taking: Reported on 10/15/2019) 15 tablet 0    mineral oil-hydrophilic petrolatum (HYDROPHOR) ointment Apply topically as needed. (Patient not taking: Reported on 1/20/2020) 228 g 0    PARoxetine (PAXIL) 30 MG tablet TAKE ONE TABLET BY MOUTH EVERY DAY IN THE MORNING (Patient not taking: Reported on 1/20/2020) 90 tablet 5    gabapentin (NEURONTIN) 600 MG tablet Take 1 tablet by mouth 3 times daily for 90 days. . 90 tablet 5    atenolol decline inhalers and PFT         Edward Byrnes MD  Pulmonary & Critical Care Medicine     NOTE: This report was transcribed using voice recognition software. Every effort was made to ensure accuracy; however, inadvertent computerized transcription errors may be present.

## 2020-01-20 NOTE — PROGRESS NOTES
1 year follow up in office today; no complaints. Pt is not taking his  antidepressant/antianxiety med(need refill/process of seeing new doctor). Pt to have CXR and Dr. Jojo Yeboah to review results. Declines CT Chest testing. Follow up in office in 1 year; appt given.

## 2020-01-27 ENCOUNTER — TELEPHONE (OUTPATIENT)
Dept: INTERNAL MEDICINE | Age: 70
End: 2020-01-27

## 2020-01-27 NOTE — TELEPHONE ENCOUNTER
Spoke with patient via phone. Pt notified that Dr. Luis Ronquillo called in a 2 week supply to his pharmacy. Further refills to be addressed at his appt on 2/5/2020 with Dr. Melodie Bernal. Pt ok with same.

## 2020-02-04 ENCOUNTER — OFFICE VISIT (OUTPATIENT)
Dept: INTERNAL MEDICINE | Age: 70
End: 2020-02-04
Payer: MEDICARE

## 2020-02-04 VITALS
OXYGEN SATURATION: 95 % | RESPIRATION RATE: 16 BRPM | BODY MASS INDEX: 26.82 KG/M2 | TEMPERATURE: 98.3 F | HEIGHT: 65 IN | SYSTOLIC BLOOD PRESSURE: 138 MMHG | DIASTOLIC BLOOD PRESSURE: 64 MMHG | HEART RATE: 60 BPM | WEIGHT: 161 LBS

## 2020-02-04 PROCEDURE — G8427 DOCREV CUR MEDS BY ELIG CLIN: HCPCS | Performed by: INTERNAL MEDICINE

## 2020-02-04 PROCEDURE — G8484 FLU IMMUNIZE NO ADMIN: HCPCS | Performed by: INTERNAL MEDICINE

## 2020-02-04 PROCEDURE — 99213 OFFICE O/P EST LOW 20 MIN: CPT | Performed by: INTERNAL MEDICINE

## 2020-02-04 PROCEDURE — G8417 CALC BMI ABV UP PARAM F/U: HCPCS | Performed by: INTERNAL MEDICINE

## 2020-02-04 PROCEDURE — 1123F ACP DISCUSS/DSCN MKR DOCD: CPT | Performed by: INTERNAL MEDICINE

## 2020-02-04 PROCEDURE — 1036F TOBACCO NON-USER: CPT | Performed by: INTERNAL MEDICINE

## 2020-02-04 PROCEDURE — 99212 OFFICE O/P EST SF 10 MIN: CPT | Performed by: INTERNAL MEDICINE

## 2020-02-04 PROCEDURE — 4040F PNEUMOC VAC/ADMIN/RCVD: CPT | Performed by: INTERNAL MEDICINE

## 2020-02-04 PROCEDURE — 3017F COLORECTAL CA SCREEN DOC REV: CPT | Performed by: INTERNAL MEDICINE

## 2020-02-04 RX ORDER — GABAPENTIN 600 MG/1
600 TABLET ORAL 3 TIMES DAILY
Qty: 270 TABLET | Refills: 0 | Status: SHIPPED
Start: 2020-02-04 | End: 2020-05-18

## 2020-02-04 RX ORDER — PAROXETINE 30 MG/1
TABLET, FILM COATED ORAL
Qty: 80 TABLET | Refills: 0 | Status: SHIPPED
Start: 2020-02-04 | End: 2020-04-06

## 2020-02-04 RX ORDER — ATENOLOL 50 MG/1
50 TABLET ORAL 2 TIMES DAILY
Qty: 180 TABLET | Refills: 0 | Status: SHIPPED
Start: 2020-02-04 | End: 2020-05-07 | Stop reason: SDUPTHER

## 2020-02-04 RX ORDER — AMLODIPINE BESYLATE 5 MG/1
5 TABLET ORAL DAILY
Qty: 90 TABLET | Refills: 0 | Status: SHIPPED
Start: 2020-02-04 | End: 2020-05-07 | Stop reason: SDUPTHER

## 2020-02-04 RX ORDER — PAROXETINE 30 MG/1
30 TABLET, FILM COATED ORAL DAILY
Qty: 10 TABLET | Refills: 0 | Status: SHIPPED | OUTPATIENT
Start: 2020-02-04 | End: 2020-03-12

## 2020-02-04 RX ORDER — HYDRALAZINE HYDROCHLORIDE 25 MG/1
25 TABLET, FILM COATED ORAL 2 TIMES DAILY
Qty: 180 TABLET | Refills: 0 | Status: SHIPPED
Start: 2020-02-04 | End: 2020-05-11 | Stop reason: SDUPTHER

## 2020-02-04 RX ORDER — ATORVASTATIN CALCIUM 40 MG/1
40 TABLET, FILM COATED ORAL DAILY
Qty: 90 TABLET | Refills: 0 | Status: SHIPPED
Start: 2020-02-04 | End: 2020-05-11 | Stop reason: SDUPTHER

## 2020-02-04 ASSESSMENT — ENCOUNTER SYMPTOMS
ABDOMINAL PAIN: 0
DIARRHEA: 0
NAUSEA: 0
SHORTNESS OF BREATH: 0
SORE THROAT: 0
VOMITING: 0
COUGH: 0

## 2020-02-04 ASSESSMENT — PATIENT HEALTH QUESTIONNAIRE - PHQ9
SUM OF ALL RESPONSES TO PHQ QUESTIONS 1-9: 13
6. FEELING BAD ABOUT YOURSELF - OR THAT YOU ARE A FAILURE OR HAVE LET YOURSELF OR YOUR FAMILY DOWN: 0
10. IF YOU CHECKED OFF ANY PROBLEMS, HOW DIFFICULT HAVE THESE PROBLEMS MADE IT FOR YOU TO DO YOUR WORK, TAKE CARE OF THINGS AT HOME, OR GET ALONG WITH OTHER PEOPLE: 0
3. TROUBLE FALLING OR STAYING ASLEEP: 3
1. LITTLE INTEREST OR PLEASURE IN DOING THINGS: 3
SUM OF ALL RESPONSES TO PHQ9 QUESTIONS 1 & 2: 6
2. FEELING DOWN, DEPRESSED OR HOPELESS: 3
5. POOR APPETITE OR OVEREATING: 0
SUM OF ALL RESPONSES TO PHQ QUESTIONS 1-9: 13
7. TROUBLE CONCENTRATING ON THINGS, SUCH AS READING THE NEWSPAPER OR WATCHING TELEVISION: 3
9. THOUGHTS THAT YOU WOULD BE BETTER OFF DEAD, OR OF HURTING YOURSELF: 0
8. MOVING OR SPEAKING SO SLOWLY THAT OTHER PEOPLE COULD HAVE NOTICED. OR THE OPPOSITE, BEING SO FIGETY OR RESTLESS THAT YOU HAVE BEEN MOVING AROUND A LOT MORE THAN USUAL: 0
4. FEELING TIRED OR HAVING LITTLE ENERGY: 1

## 2020-02-04 NOTE — PROGRESS NOTES
[facility]    Attending Physician Statement  I have discussed the case, including pertinent history and exam findings with the resident. I agree with the assessment, plan and orders as documented by the resident. I have reviewed all pertinent PMHx, PSHx, FamHx, SocialHx, medications, and allergies and updated history as appropriate. Patient here for routine follow up of medical problems. 1. VHD: symptomatically stable, follows with Cardiology (Dr. America Lazaro) and last 2D echo 8 months ago  2. Hypertension: controlled with amlodipine /HCTZ /atenolol  3. Pre-DM: A1c 5.5% -- continued lifestyle modifications  4. Panic/SANDRA: symptoms relatively controlled without recent episode, continue Paxil 30 mg  5. Bilateral foot neuropathy: stable on gabapentin   6. CKD: follows with nephrology (Dr. Delfina Virk)    Screening: refused vaccinations / agreeable to Cologuard    Remainder of medical problems as per resident note.   Roddy Purvis D.O.  2/4/2020 3:40 PM

## 2020-02-04 NOTE — PROGRESS NOTES
[facility]  Internal[facility] Residency Program  ACC Note      SUBJECTIVE:  CC: had concerns including Medication Refill. HPI:Catarino Raya  69 y.o. male with PMH of HTN, HLD, VHD (MVP and mod MR), TIA s/p right carotid endarterectomy 2014, parathyriod adenoma, CKD, presents to the [facility] for routine visit and meds refill. He has been doing well. No new complaints other than b/l LE neuropathy    Patient's past medical history and medication list reviewed. Lab results reviewed. All questions answered. Review of Systems   Constitutional: Negative for chills and fever. HENT: Negative for sore throat. Respiratory: Negative for cough and shortness of breath. Cardiovascular: Negative for chest pain and leg swelling. Gastrointestinal: Negative for abdominal pain, diarrhea, nausea and vomiting. Genitourinary: Negative for dysuria and frequency. Neurological: Positive for numbness (tinglinng  b/l foot). Negative for headaches. Current Outpatient Medications on File Prior to Visit   Medication Sig Dispense Refill    PARoxetine (PAXIL) 30 MG tablet TAKE ONE TABLET BY MOUTH EVERY DAY IN THE MORNING 15 tablet 0    atorvastatin (LIPITOR) 40 MG tablet Take 1 tablet by mouth daily 30 tablet 0    gabapentin (NEURONTIN) 600 MG tablet       Multiple Vitamins-Minerals (COMPLETE MULTIVITAMIN/MINERAL PO) Take 1 tablet by mouth daily      amLODIPine (NORVASC) 5 MG tablet Take 1 tablet by mouth daily 30 tablet 2    hydrALAZINE (APRESOLINE) 25 MG tablet Take 1 tablet by mouth 2 times daily 180 tablet 5    aspirin 81 MG tablet Take 81 mg by mouth every morning       acetaminophen (TYLENOL) 325 MG tablet Take 650 mg by mouth every 6 hours as needed for Pain      predniSONE (DELTASONE) 10 MG tablet Take 30mg (3 tablets) by mouth daily for 5 days. (Patient not taking: Reported on 10/15/2019) 15 tablet 0    mineral oil-hydrophilic petrolatum (HYDROPHOR) ointment Apply topically as needed. (Patient not taking: Reported on 1/20/2020) 228 g 0    gabapentin (NEURONTIN) 600 MG tablet Take 1 tablet by mouth 3 times daily for 90 days. . 90 tablet 5    atenolol (TENORMIN) 50 MG tablet Take 1 tablet by mouth 2 times daily 180 tablet 5    b complex vitamins capsule Take 1 capsule by mouth every morning        No current facility-administered medications on file prior to visit. OBJECTIVE:    VS: /64   Pulse 60   Temp 98.3 °F (36.8 °C) (Oral)   Resp 16   Ht 5' 5\" (1.651 m)   Wt 161 lb (73 kg)   SpO2 95%   BMI 26.79 kg/m²   Physical Exam  HENT:      Head: Normocephalic. Neck:      Musculoskeletal: Normal range of motion and neck supple. Cardiovascular:      Rate and Rhythm: Normal rate and regular rhythm. Heart sounds: Murmur (2/6) present. Pulmonary:      Breath sounds: Normal breath sounds. No wheezing or rales. Abdominal:      General: Bowel sounds are normal. There is no distension. Palpations: Abdomen is soft. Tenderness: There is no abdominal tenderness. Lymphadenopathy:      Cervical: No cervical adenopathy. Skin:     General: Skin is warm and dry. Neurological:      Mental Status: He is alert. ASSESSMENT/PLAN:  Early Kathy was seen today for medication refill. Diagnoses and all orders for this visit:    HTN- controlled  - cont amlodipine 5 mg, hydralazine 25 mg bID, atenolol 50 mg   - ASA 81 mg     HLD  - LDL 48,  1/3/2020  - Cont Lipitor 40 mg    [facility] and mod MR)  - Last echo 7/8/19  - Follows with Dr. Shell Smart  - HbA1c 5.5 1/3/2020    CKD-stable  - Cr 1.4  - US renal normal  - Follows with Dr. Andres Dejesus     B/l foot neuropathy  - On gabapentin 600 mg TID  - Vit B 12 nml    Panic disorder  - On paroxetine 30 mg     Health maintainance  - Pt refuses to have any vaccination   - Order cologuard     RTC:  in 3 months. Call in if having any questions.     I have reviewed my findings and recommendations with Susana Kumar and Dr Walker Zayas MD PGY-3  2/4/2020 2:59 PM

## 2020-02-24 ENCOUNTER — TELEPHONE (OUTPATIENT)
Dept: ADMINISTRATIVE | Age: 70
End: 2020-02-24

## 2020-02-25 ENCOUNTER — TELEPHONE (OUTPATIENT)
Dept: INTERNAL MEDICINE | Age: 70
End: 2020-02-25

## 2020-02-25 NOTE — TELEPHONE ENCOUNTER
Spoke with  Cristino Maribel to notify cologuard positive and reviewed need for colonoscopy. Pt agreeable, will refer to surgery for evaluation.

## 2020-02-26 ENCOUNTER — TELEPHONE (OUTPATIENT)
Dept: SURGERY | Age: 70
End: 2020-02-26

## 2020-02-26 NOTE — TELEPHONE ENCOUNTER
Patient was scheduled on 3/19/20 @ 2:00 pm with Dr. Scott Pearson at the [facility] office for colonoscopy consult. Patient instructed that appointment letter will be put in the mail today. Patient instructed that a co-pay is due at the time of his service, and if patient is not insured there will be a $40.00 fee. Patient instructed to bring a photo ID, insurance card (if applicable), and list of current medications to first appointment. Patient instructed to arrive 15 minutes prior for registration. Patient verbalized understanding of the above instructions.     Electronically signed by Sanju Montoya on 2/26/20 at 1:37 PM

## 2020-03-12 ENCOUNTER — OFFICE VISIT (OUTPATIENT)
Dept: FAMILY MEDICINE CLINIC | Age: 70
End: 2020-03-12
Payer: MEDICARE

## 2020-03-12 VITALS
OXYGEN SATURATION: 94 % | SYSTOLIC BLOOD PRESSURE: 133 MMHG | HEIGHT: 65 IN | HEART RATE: 62 BPM | WEIGHT: 161 LBS | BODY MASS INDEX: 26.82 KG/M2 | DIASTOLIC BLOOD PRESSURE: 62 MMHG | TEMPERATURE: 97 F

## 2020-03-12 PROCEDURE — 2022F DILAT RTA XM EVC RTNOPTHY: CPT | Performed by: FAMILY MEDICINE

## 2020-03-12 PROCEDURE — G8427 DOCREV CUR MEDS BY ELIG CLIN: HCPCS | Performed by: FAMILY MEDICINE

## 2020-03-12 PROCEDURE — 3044F HG A1C LEVEL LT 7.0%: CPT | Performed by: FAMILY MEDICINE

## 2020-03-12 PROCEDURE — 4040F PNEUMOC VAC/ADMIN/RCVD: CPT | Performed by: FAMILY MEDICINE

## 2020-03-12 PROCEDURE — 3017F COLORECTAL CA SCREEN DOC REV: CPT | Performed by: FAMILY MEDICINE

## 2020-03-12 PROCEDURE — G8484 FLU IMMUNIZE NO ADMIN: HCPCS | Performed by: FAMILY MEDICINE

## 2020-03-12 PROCEDURE — G8417 CALC BMI ABV UP PARAM F/U: HCPCS | Performed by: FAMILY MEDICINE

## 2020-03-12 PROCEDURE — 99214 OFFICE O/P EST MOD 30 MIN: CPT | Performed by: FAMILY MEDICINE

## 2020-03-12 PROCEDURE — 1123F ACP DISCUSS/DSCN MKR DOCD: CPT | Performed by: FAMILY MEDICINE

## 2020-03-12 PROCEDURE — 1036F TOBACCO NON-USER: CPT | Performed by: FAMILY MEDICINE

## 2020-03-12 NOTE — PATIENT INSTRUCTIONS
Patient Education        Pneumococcal Polysaccharide Vaccine: What You Need to Know  Why get vaccinated? Vaccination can protect older adults (and some children and younger adults) from pneumococcal disease. Pneumococcal disease is caused by bacteria that can spread from person to person through close contact. It can cause ear infections, and it can also lead to more serious infections of the:  · Lungs (pneumonia),  · Blood (bacteremia), and  · Covering of the brain and spinal cord (meningitis). Meningitis can cause deafness and brain damage, and it can be fatal.  Anyone can get pneumococcal disease, but children under 3years of age, people with certain medical conditions, adults over 72years of age, and cigarette smokers are at the highest risk. About 18,000 older adults die each year from pneumococcal disease in the [place]. Treatment of pneumococcal infections with penicillin and other drugs used to be more effective. But some strains of the disease have become resistant to these drugs. This makes prevention of the disease, through vaccination, even more important. Pneumococcal polysaccharide vaccine (PPSV23)  Pneumococcal polysaccharide vaccine (PPSV23) protects against 23 types of pneumococcal bacteria. It will not prevent all pneumococcal disease. PPSV23 is recommended for:  · All adults 72years of age and older,  · Anyone 2 through 59years of age with certain long-term health problems,  · Anyone 2 through 59years of age with a weakened immune system,  · Adults 23 through 59years of age who smoke cigarettes or have asthma. Most people need only one dose of PPSV. A second dose is recommended for certain high-risk groups. People 72 and older should get a dose even if they have gotten one or more doses of the vaccine before they turned 65. Your healthcare provider can give you more information about these recommendations.   Most healthy adults develop protection within 2 to 3 weeks of getting the shot. Some people should not get this vaccine  · Anyone who has had a life-threatening allergic reaction to PPSV should not get another dose. · Anyone who has a severe allergy to any component of PPSV should not receive it. Tell your provider if you have any severe allergies. · Anyone who is moderately or severely ill when the shot is scheduled may be asked to wait until they recover before getting the vaccine. Someone with a mild illness can usually be vaccinated. · Children less than 3years of age should not receive this vaccine. · There is no evidence that PPSV is harmful to either a pregnant woman or to her fetus. However, as a precaution, women who need the vaccine should be vaccinated before becoming pregnant, if possible. Risks of a vaccine reaction  With any medicine, including vaccines, there is a chance of side effects. These are usually mild and go away on their own, but serious reactions are also possible. About half of people who get PPSV have mild side effects, such as redness or pain where the shot is given, which go away within about two days. Less than 1 out of 100 people develop a fever, muscle aches, or more severe local reactions. Problems that could happen after any vaccine:  · People sometimes faint after a medical procedure, including vaccination. Sitting or lying down for about 15 minutes can help prevent fainting, and injuries caused by a fall. Tell your doctor if you feel dizzy, or have vision changes or ringing in the ears. · Some people get severe pain in the shoulder and have difficulty moving the arm where a shot was given. This happens very rarely. · Any medication can cause a severe allergic reaction. Such reactions from a vaccine are very rare, estimated at about 1 in a million doses, and would happen within a few minutes to a few hours after the vaccination. As with any medicine, there is a very remote chance of a vaccine causing a serious injury or death.   The information.

## 2020-03-12 NOTE — PROGRESS NOTES
3/12/2020    Chief Complaint   Patient presents with   [facility] ambulatory care. Colleen Oliva (:  1950) is a 69 y.o. male, here for establishing care. They report a PMH of:  Past Medical History:   Diagnosis Date    Anxiety     Depression     GERD (gastroesophageal reflux disease)     Heart palpitations     HTN (hypertension) 10/21/2010    Hyperlipidemia     Lightheadedness     OA (osteoarthritis)     Parathyroid adenoma     s/p surgery     Pre-diabetes     Stenosis of right internal carotid artery with cerebral infarction [facility])      Social and family histories reviewed and updated as appropriate. Here with SO Ramos Anand  He was previously a patient at [address] [place] [address]  He also follows with Cardiology (Dr. Curtis Gabriel), Vascular (Dr. Rea Bowman), Pulm (Dr. Olga Mackenzie), and Nephrology (Dr. 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 He is currently on atenolol 50 mg twice daily, hydralazine 25 mg twice daily, and amlodipine 5 mg daily. He does report a history of TIA years prior. He also has a history of mitral valve disease for which he is following with cardiology. HLD -on statin therapy. Per EMR review, he is status post carotid endarterectomy in . Diet-controlled diabetes/neuropathy -she reports a prior history of diabetes for which she was previously on medication. He has not been on medication for years. Per EMR review, last A1c was 5.5.   He is currently on gabapentin 600 mg 3 times daily with control of his symptoms     Anxiety/SANDRA -Paxil, currently stable    GERD -nexium prn    The ASCVD Risk score ([place] Loss DAY IN THE MORNING  Dayne Sanchez MD   gabapentin (NEURONTIN) 600 MG tablet Take 1 tablet by mouth 3 times daily for 90 days. Thu Verma MD        Allergies   Allergen Reactions    Flexeril [Cyclobenzaprine] Other (See Comments)     HALLUCINATIONS    Lisinopril Swelling     Facial and lip    Influenza Virus Vaccine Other (See Comments)     \"Got the Flu\"    Metformin And Related Other (See Comments)     Lactic acidosis    Zocor [Simvastatin] Other (See Comments)     Elevated LFT's       Past Surgical History:   Procedure Laterality Date    BACK SURGERY  2016    revision cervical laminectomy    CAROTID ENDARTERECTOMY Right 2014    Delatore - bovine patch     CERVICAL FUSION  2016    C4-C5, C5-C6, C6-C7 ACF, C5-C6 Corpectomy    ENDOSCOPY, COLON, DIAGNOSTIC      EYE SURGERY      lasex    FRACTURE SURGERY Left     plate in wrist    HAND SURGERY      PARATHYROIDECTOMY  2005    DC THORSC DX LUNGS/PERICAR/MED/PLEURAL SPACE W/O BX Right 2018    THORACOSCOPY performed by Savanna Pineda MD at [address]  2014       Social History     Socioeconomic History    Marital status: Single     Spouse name: Not on file    Number of children: Not on file    Years of education: Not on file    Highest education level: Not on file   Occupational History    Not on file   Social Needs    Financial resource strain: Not on file    Food insecurity     Worry: Not on file     Inability: Not on file    Transportation needs     Medical: Not on file     Non-medical: Not on file   Tobacco Use    Smoking status: Former Smoker     Packs/day: 3.00     Years: 14.00     Pack years: 42.00     Types: Cigarettes     Start date:      Last attempt to quit: 1983     Years since quittin.5    Smokeless tobacco: Never Used   Substance and Sexual Activity    Alcohol use:  Yes     Alcohol/week: 5.0 standard drinks     Types: 5 Standard drinks or equivalent per week     Frequency: 4 or more times a week     Drinks per session: 5 or 6     Comment: 5 mixed drinks daily    Drug use: No    Sexual activity: Not on file   Lifestyle    Physical activity     Days per week: Not on file     Minutes per session: Not on file    Stress: Not on file   Relationships    Social connections     Talks on phone: Not on file     Gets together: Not on file     Attends Cheondoism service: Not on file     Active member of club or organization: Not on file     Attends meetings of clubs or organizations: Not on file     Relationship status: Not on file    Intimate partner violence     Fear of current or ex partner: Not on file     Emotionally abused: Not on file     Physically abused: Not on file     Forced sexual activity: Not on file   Other Topics Concern    Not on file   Social History Narrative    Not on file        Family History   Problem Relation Age of Onset    Thyroid Cancer Sister     Cancer Sister     Stroke Mother     Diabetes Mother     Stroke Father     Other Brother          in surgery cause unknown    Heart Disease Maternal Grandmother     Arthritis Maternal Grandmother     Heart Disease Maternal Grandfather     Cancer Paternal Grandmother     Other Paternal Grandfather         unknown cause of death       Vitals:    20 1124   BP: 133/62   Pulse: 62   Temp: 97 °F (36.1 °C)   SpO2: 94%   Weight: 161 lb (73 kg)   Height: 5' 5\" (1.651 m)     Estimated body mass index is 26.79 kg/m² as calculated from the following:    Height as of this encounter: 5' 5\" (1.651 m). Weight as of this encounter: 161 lb (73 kg). Physical Exam  Constitutional:       General: He is not in acute distress. Appearance: He is well-developed. HENT:      Head: Normocephalic and atraumatic. Right Ear: External ear normal.      Left Ear: External ear normal.      Nose: Nose normal. No congestion or rhinorrhea.    Eyes:      Extraocular comfortable with and wishes to proceed with above treatment plan. Advised patient to call with any new medication issues, and read all Rx info from pharmacy to assure aware of all possible risks and side effects of medication before taking. Reviewed age and gender appropriate health screening exams and vaccinations. Advised patient regarding importance of keeping up with recommended health maintenance and to schedule as soon as possible if overdue, as this is important in assessing for undiagnosed pathology,especially cancer, as well as protecting against potentially harmful/life threatening disease. Patient and/or guardian verbalizes understanding and agrees with above counseling, assessment and plan. All questions answered.       Future Appointments   Date Time Provider Neto Tamayo   3/19/2020  2:00 PM Vinny Mccarthy MD [facility] Surgical [facility]   3/24/2020  1:00 PM [facility] VAS US RM 1 MARIA D Yashira   3/24/2020  1:30 PM Chinmay Arroyo MD [place] [facility]/MED [facility]   5/18/2020  1:15 PM Liliana Doss DO [facility]   1/12/2021  1:15 PM Edward Mckinney MD ACC Pulm [facility]         --Liliana Doss DO on 3/12/2020 at 11:34 AM

## 2020-03-13 PROBLEM — I34.0 NONRHEUMATIC MITRAL VALVE REGURGITATION: Status: ACTIVE | Noted: 2020-03-13

## 2020-03-13 PROBLEM — E11.9 DIET-CONTROLLED DIABETES MELLITUS (HCC): Status: ACTIVE | Noted: 2020-03-13

## 2020-03-13 PROBLEM — J90 RECURRENT LEFT PLEURAL EFFUSION: Status: RESOLVED | Noted: 2018-09-13 | Resolved: 2020-03-13

## 2020-03-13 PROBLEM — F41.1 GAD (GENERALIZED ANXIETY DISORDER): Status: ACTIVE | Noted: 2020-03-13

## 2020-03-13 ASSESSMENT — ENCOUNTER SYMPTOMS
SINUS PAIN: 0
VOMITING: 0
CONSTIPATION: 0
ABDOMINAL PAIN: 0
RHINORRHEA: 1
SORE THROAT: 0
DIARRHEA: 0
SHORTNESS OF BREATH: 1
NAUSEA: 0
BACK PAIN: 1
COUGH: 0

## 2020-04-06 RX ORDER — PAROXETINE 30 MG/1
TABLET, FILM COATED ORAL
Qty: 80 TABLET | Refills: 0 | Status: SHIPPED
Start: 2020-04-06 | End: 2020-07-27 | Stop reason: SDUPTHER

## 2020-04-06 NOTE — TELEPHONE ENCOUNTER
Last Appointment:  2/4/2020  Future Appointments   Date Time Provider Neto Tamayo   5/18/2020  1:15 PM [facility]   6/12/2020 10:00 AM [facility] VAS  RM 1 SEYZ CARDIO [facility]   6/16/2020  1:00 PM Olga Burch MD [facility]   1/12/2021  1:15 PM Edward Miguel MD ACC Pulm [facility]

## 2020-04-10 RX ORDER — AMLODIPINE BESYLATE 5 MG/1
TABLET ORAL
Qty: 90 TABLET | Refills: 0 | OUTPATIENT
Start: 2020-04-10

## 2020-04-10 RX ORDER — ATENOLOL 50 MG/1
TABLET ORAL
Qty: 180 TABLET | Refills: 0 | OUTPATIENT
Start: 2020-04-10

## 2020-04-10 RX ORDER — ATORVASTATIN CALCIUM 40 MG/1
TABLET, FILM COATED ORAL
Qty: 90 TABLET | Refills: 0 | OUTPATIENT
Start: 2020-04-10

## 2020-04-10 RX ORDER — HYDRALAZINE HYDROCHLORIDE 25 MG/1
TABLET, FILM COATED ORAL
Qty: 180 TABLET | Refills: 0 | OUTPATIENT
Start: 2020-04-10

## 2020-05-07 RX ORDER — ATENOLOL 50 MG/1
50 TABLET ORAL 2 TIMES DAILY
Qty: 180 TABLET | Refills: 1 | Status: SHIPPED
Start: 2020-05-07 | End: 2020-05-11 | Stop reason: SDUPTHER

## 2020-05-07 RX ORDER — ATORVASTATIN CALCIUM 40 MG/1
TABLET, FILM COATED ORAL
Qty: 90 TABLET | Refills: 0 | OUTPATIENT
Start: 2020-05-07

## 2020-05-07 RX ORDER — ATENOLOL 50 MG/1
TABLET ORAL
Qty: 180 TABLET | Refills: 0 | OUTPATIENT
Start: 2020-05-07

## 2020-05-07 RX ORDER — AMLODIPINE BESYLATE 5 MG/1
TABLET ORAL
Qty: 90 TABLET | Refills: 0 | OUTPATIENT
Start: 2020-05-07

## 2020-05-07 RX ORDER — AMLODIPINE BESYLATE 5 MG/1
5 TABLET ORAL DAILY
Qty: 90 TABLET | Refills: 1 | Status: SHIPPED
Start: 2020-05-07 | End: 2020-05-11 | Stop reason: SDUPTHER

## 2020-05-07 RX ORDER — HYDRALAZINE HYDROCHLORIDE 25 MG/1
TABLET, FILM COATED ORAL
Qty: 180 TABLET | Refills: 0 | OUTPATIENT
Start: 2020-05-07

## 2020-05-11 RX ORDER — ATENOLOL 50 MG/1
50 TABLET ORAL 2 TIMES DAILY
Qty: 180 TABLET | Refills: 1 | Status: SHIPPED
Start: 2020-05-11 | End: 2020-11-30

## 2020-05-11 RX ORDER — AMLODIPINE BESYLATE 5 MG/1
5 TABLET ORAL DAILY
Qty: 90 TABLET | Refills: 1 | Status: SHIPPED
Start: 2020-05-11 | End: 2020-11-30

## 2020-05-11 RX ORDER — GABAPENTIN 600 MG/1
600 TABLET ORAL 3 TIMES DAILY
Qty: 270 TABLET | Refills: 1 | Status: SHIPPED
Start: 2020-05-11 | End: 2021-01-27 | Stop reason: SDUPTHER

## 2020-05-11 RX ORDER — HYDRALAZINE HYDROCHLORIDE 25 MG/1
25 TABLET, FILM COATED ORAL 2 TIMES DAILY
Qty: 180 TABLET | Refills: 0 | Status: SHIPPED
Start: 2020-05-11 | End: 2020-07-07

## 2020-05-11 RX ORDER — ATORVASTATIN CALCIUM 40 MG/1
40 TABLET, FILM COATED ORAL DAILY
Qty: 90 TABLET | Refills: 1 | Status: SHIPPED
Start: 2020-05-11 | End: 2020-07-28 | Stop reason: SDUPTHER

## 2020-05-18 ENCOUNTER — OFFICE VISIT (OUTPATIENT)
Dept: FAMILY MEDICINE CLINIC | Age: 70
End: 2020-05-18
Payer: MEDICARE

## 2020-05-18 VITALS
OXYGEN SATURATION: 95 % | HEART RATE: 55 BPM | WEIGHT: 160 LBS | TEMPERATURE: 97.5 F | BODY MASS INDEX: 26.66 KG/M2 | RESPIRATION RATE: 20 BRPM | SYSTOLIC BLOOD PRESSURE: 137 MMHG | HEIGHT: 65 IN | DIASTOLIC BLOOD PRESSURE: 64 MMHG

## 2020-05-18 PROCEDURE — 3044F HG A1C LEVEL LT 7.0%: CPT | Performed by: FAMILY MEDICINE

## 2020-05-18 PROCEDURE — G8417 CALC BMI ABV UP PARAM F/U: HCPCS | Performed by: FAMILY MEDICINE

## 2020-05-18 PROCEDURE — 99214 OFFICE O/P EST MOD 30 MIN: CPT | Performed by: FAMILY MEDICINE

## 2020-05-18 PROCEDURE — 4040F PNEUMOC VAC/ADMIN/RCVD: CPT | Performed by: FAMILY MEDICINE

## 2020-05-18 PROCEDURE — 3017F COLORECTAL CA SCREEN DOC REV: CPT | Performed by: FAMILY MEDICINE

## 2020-05-18 PROCEDURE — 2022F DILAT RTA XM EVC RTNOPTHY: CPT | Performed by: FAMILY MEDICINE

## 2020-05-18 PROCEDURE — 1123F ACP DISCUSS/DSCN MKR DOCD: CPT | Performed by: FAMILY MEDICINE

## 2020-05-18 PROCEDURE — G8427 DOCREV CUR MEDS BY ELIG CLIN: HCPCS | Performed by: FAMILY MEDICINE

## 2020-05-18 PROCEDURE — 1036F TOBACCO NON-USER: CPT | Performed by: FAMILY MEDICINE

## 2020-05-18 RX ORDER — CLOTRIMAZOLE AND BETAMETHASONE DIPROPIONATE 10; .64 MG/G; MG/G
CREAM TOPICAL
Qty: 45 G | Refills: 1 | Status: SHIPPED
Start: 2020-05-18 | End: 2021-06-22 | Stop reason: ALTCHOICE

## 2020-05-18 ASSESSMENT — ENCOUNTER SYMPTOMS
COUGH: 0
ABDOMINAL PAIN: 0
CONSTIPATION: 0
DIARRHEA: 0
NAUSEA: 0
SHORTNESS OF BREATH: 0
VOMITING: 0

## 2020-05-18 ASSESSMENT — PATIENT HEALTH QUESTIONNAIRE - PHQ9
1. LITTLE INTEREST OR PLEASURE IN DOING THINGS: 0
SUM OF ALL RESPONSES TO PHQ9 QUESTIONS 1 & 2: 0
SUM OF ALL RESPONSES TO PHQ QUESTIONS 1-9: 0
SUM OF ALL RESPONSES TO PHQ QUESTIONS 1-9: 0
2. FEELING DOWN, DEPRESSED OR HOPELESS: 0

## 2020-05-18 NOTE — PROGRESS NOTES
2020     Kofi Du (:  1950) is a 69 y.o. male, with a:  Past Medical History:   Diagnosis Date    Anxiety     Depression     GERD (gastroesophageal reflux disease)     Heart palpitations     HTN (hypertension) 10/21/2010    Hyperlipidemia     Lightheadedness     OA (osteoarthritis)     Parathyroid adenoma     s/p surgery     Pre-diabetes     Recurrent left pleural effusion 2018    Stenosis of right internal carotid artery with cerebral infarction [facility])        Here for evaluation of the following medical concerns:  Chief Complaint   Patient presents with    Follow-up     10 week follow     Rash     On the R ankle that has been present for months      He was previously a patient at [address] [place] [address]  He also follows with Cardiology (Dr. Romie Castillo), Vascular (Dr. Kassie Orantes), Pulm (Dr. Trevon Castro), and Nephrology (Dr. Tess Reyes)    F/U of chronic problem(s) and recent complaint of rash  Chronic problems reviewed today include:  HTN, HLD, Diet controlled DM, Neuropathy and SANDRA  Current status of this/these condition(s):  stable  Tolerating meds: Yes     HTN -pressure controlled today in office. He is currently on atenolol 50 mg twice daily, hydralazine 25 mg twice daily, and amlodipine 5 mg daily. He does report a history of TIA years prior. He also has a history of mitral valve disease for which he is following with cardiology. BP controlled today     HLD -on statin therapy. Per EMR review, he is status post carotid endarterectomy in .     Diet-controlled diabetes/neuropathy -he reports a prior history of diabetes for which he was previously on medication. He has not been on medication for years. Last A1c was 5.5.   He is currently on gabapentin 600 mg 3 times daily with control of his symptoms      Anxiety/SANDRA -Paxil, currently stable    He did have a positive cologuard test earlier this year but scheduling a colonoscopy has been an issue due to the Procedure Laterality Date    BACK SURGERY  12/20/2016    revision cervical laminectomy    CAROTID ENDARTERECTOMY Right 8/25/2014    Delatore - bovine patch     CERVICAL FUSION  12/12/2016    C4-C5, C5-C6, C6-C7 ACF, C5-C6 Corpectomy    ENDOSCOPY, COLON, DIAGNOSTIC      EYE SURGERY  2001    lasex    FRACTURE SURGERY Left     plate in wrist    HAND SURGERY      PARATHYROIDECTOMY  11/7/2005    [place] THORSC DX LUNGS/PERICAR/MED/PLEURAL SPACE W/O BX Right 9/17/2018    THORACOSCOPY performed by Lisbeth Morales MD at [address]  07/07/2014       Vitals:    05/18/20 1327 05/18/20 1332   BP: (!) 150/67 137/64   Pulse: 55 55   Resp: 20    Temp: 97.5 °F (36.4 °C)    TempSrc: Temporal    SpO2: 95%    Weight: 160 lb (72.6 kg)    Height: 5' 5\" (1.651 m)      Estimated body mass index is 26.63 kg/m² as calculated from the following:    Height as of this encounter: 5' 5\" (1.651 m). Weight as of this encounter: 160 lb (72.6 kg). Physical Exam  Constitutional:       General: He is not in acute distress. Appearance: He is well-developed. HENT:      Head: Normocephalic and atraumatic. Right Ear: External ear normal.      Left Ear: External ear normal.      Nose: Nose normal. No congestion or rhinorrhea. Eyes:      Extraocular Movements: Extraocular movements intact. Pupils: Pupils are equal, round, and reactive to light. Neck:      Musculoskeletal: Normal range of motion. Thyroid: No thyromegaly. Cardiovascular:      Rate and Rhythm: Normal rate and regular rhythm. Pulmonary:      Effort: Pulmonary effort is normal. No respiratory distress. Breath sounds: Normal breath sounds. No wheezing or rales. Abdominal:      General: There is no distension. Palpations: Abdomen is soft. Tenderness: There is no abdominal tenderness. Musculoskeletal:         General: No swelling or deformity. Skin:     General: Skin is warm. Findings: Rash present. Neurological:      General: No focal deficit present. Mental Status: He is alert. Mental status is at baseline. Psychiatric:         Mood and Affect: Mood normal.         Behavior: Behavior normal.         ASSESSMENT/PLAN:  Charlee Olivarez was seen today for follow-up and rash. Diagnoses and all orders for this visit:    Essential hypertension    Mixed hyperlipidemia    Diet-controlled diabetes mellitus (HCC)    Neuropathy    SANDRA (generalized anxiety disorder)    Rash  -     clotrimazole-betamethasone (LOTRISONE) 1-0.05 % cream; Apply topically 2 times daily. Positive colorectal cancer screening using Cologuard test  -     Nayeli Fabian MD, General Surgery, [facility]       Additional plan and future considerations:   As above. Trial Lotrisone.   Return office in 6 months for annual wellness visit or sooner if needed    Future Appointments   Date Time Provider Neto Tamayo   6/12/2020 10:00 AM [facility]   6/16/2020  1:00 PM Reynaldo Lu MD [facility]   6/18/2020  1:30 PM Nia Carrera MD [facility]   11/19/2020  2:00 PM Nimisha Dietrich DO [facility] AND WOMEN'S [facility]   1/12/2021  1:15 PM [facility]         --Nimisha Dietrich DO on 5/18/2020 at 1:33 PM

## 2020-05-19 ENCOUNTER — TELEPHONE (OUTPATIENT)
Dept: SURGERY | Age: 70
End: 2020-05-19

## 2020-05-19 NOTE — TELEPHONE ENCOUNTER
Patient was referred by [facility] for colonoscopy consult, + cologuard test. Patient was scheduled on 06/18/20 in [place] office @ 1:30pm with Dr. Jayden Torres. Patient was instructed to bring a photo ID, insurance card (if applicable), and list of any current medications. Patient instructed to arrive 15 minutes prior for registration. Patient verbalized understanding of appointment instructions.            Electronically signed by Sherlyn Lackey MA on 5/19/20 at 10:02 AM EDT

## 2020-06-12 ENCOUNTER — HOSPITAL ENCOUNTER (OUTPATIENT)
Dept: CARDIOLOGY | Age: 70
Discharge: HOME | End: 2020-06-12
Payer: MEDICARE

## 2020-06-12 PROCEDURE — 93880 EXTRACRANIAL BILAT STUDY: CPT

## 2020-06-12 NOTE — PROGRESS NOTES
[facility] Vascular Lab - [facility]    This is a pre read worksheet - prior to official physician interpretation    Theresa Kovacs  1950  Date of study: 6/12/20    Indication for study:  Carotid artery stenosis  Study : Bilateral Carotid Artery Duplex Examination    Duplex examination of the RIGHT carotid artery system identifies atherosclerotic plaque. The peak systolic velocity in internal carotid artery was 95 centimeters / second. The maximum end diastolic velocity was 24 centimeters / second. The ICA/CCA ratio is 1.1. The right vertebral artery has antegrade flow. Duplex examination of the LEFT carotid artery system identifies atherosclerotic plaque. The peak systolic velocity in internal carotid artery was 210 centimeters / second. The maximum end diastolic velocity was 52 centimeters / second. The ICA/CCA ratio is 2.1. The left vertebral artery has antegrade flow.     LEFT DISTAL ICA very tortuous        LAST STUDY  3/19/2019  Rt 0-40  Lt 60-79

## 2020-06-16 ENCOUNTER — OFFICE VISIT (OUTPATIENT)
Dept: VASCULAR SURGERY | Age: 70
End: 2020-06-16
Payer: MEDICARE

## 2020-06-16 VITALS — RESPIRATION RATE: 16 BRPM | BODY MASS INDEX: 26.66 KG/M2 | WEIGHT: 160 LBS | HEIGHT: 65 IN

## 2020-06-16 PROCEDURE — 99213 OFFICE O/P EST LOW 20 MIN: CPT | Performed by: SURGERY

## 2020-06-16 PROCEDURE — 1123F ACP DISCUSS/DSCN MKR DOCD: CPT | Performed by: SURGERY

## 2020-06-16 PROCEDURE — 3017F COLORECTAL CA SCREEN DOC REV: CPT | Performed by: SURGERY

## 2020-06-16 PROCEDURE — 4040F PNEUMOC VAC/ADMIN/RCVD: CPT | Performed by: SURGERY

## 2020-06-16 PROCEDURE — 1036F TOBACCO NON-USER: CPT | Performed by: SURGERY

## 2020-06-16 PROCEDURE — G8417 CALC BMI ABV UP PARAM F/U: HCPCS | Performed by: SURGERY

## 2020-06-16 PROCEDURE — G8427 DOCREV CUR MEDS BY ELIG CLIN: HCPCS | Performed by: SURGERY

## 2020-06-16 NOTE — PROGRESS NOTES
activity     Days per week: Not on file     Minutes per session: Not on file    Stress: Not on file   Relationships    Social connections     Talks on phone: Not on file     Gets together: Not on file     Attends Sabianist service: Not on file     Active member of club or organization: Not on file     Attends meetings of clubs or organizations: Not on file     Relationship status: Not on file    Intimate partner violence     Fear of current or ex partner: Not on file     Emotionally abused: Not on file     Physically abused: Not on file     Forced sexual activity: Not on file   Other Topics Concern    Not on file   Social History Narrative    Not on file        Family History   Problem Relation Age of Onset    Thyroid Cancer Sister     Cancer Sister     Stroke Mother     Diabetes Mother     Stroke Father     Other Brother          in surgery cause unknown    Heart Disease Maternal Grandmother     Arthritis Maternal Grandmother     Heart Disease Maternal Grandfather     Cancer Paternal Grandmother     Other Paternal Grandfather         unknown cause of death       REVIEW OF SYSTEMS (New symptoms):    Eyes:      Blurred vision:  No [x]/Yes []               Diplopia:   No [x]/Yes []               Vision loss:       No [x]/Yes []   Ears, nose, throat:             Hearing loss:    No [x]/Yes []      Vertigo:   No [x]/Yes []                       Swallowing problem:  No [x]/Yes []               Nose bleeds:   No [x]/Yes []      Voice hoarseness:  No [x]/Yes []  Respiratory:             Cough:   No [x]/Yes []      Pleuritic chest pain:  No [x]/Yes []                        Dyspnea:   No [x]/Yes []      Wheezing:   No [x]/Yes []  Cardiovascular:             Angina:   No [x]/Yes []      Palpitations:   No [x]/Yes []          Claudication:    No [x]/Yes []      Leg swelling:   No [x]/Yes []  Gastrointestinal:             Nausea or vomiting:  No [x]/Yes []               Abdominal pain:  No [x]/Yes [] that from my standpoint he can continue with all normal activities. I will plan to see him again in one year but asked him to call sooner with any new problems. I reviewed the natural history and treatment options of carotid artery disease. I reviewed the signs and symptoms of stroke, and instructions if symptoms occur. Return in about 1 year (around 6/16/2021) for testing.

## 2020-06-18 ENCOUNTER — OFFICE VISIT (OUTPATIENT)
Dept: SURGERY | Age: 70
End: 2020-06-18
Payer: MEDICARE

## 2020-06-18 VITALS
DIASTOLIC BLOOD PRESSURE: 67 MMHG | OXYGEN SATURATION: 97 % | BODY MASS INDEX: 26.66 KG/M2 | RESPIRATION RATE: 16 BRPM | SYSTOLIC BLOOD PRESSURE: 149 MMHG | TEMPERATURE: 97.6 F | WEIGHT: 160 LBS | HEIGHT: 65 IN | HEART RATE: 60 BPM

## 2020-06-18 PROCEDURE — 99204 OFFICE O/P NEW MOD 45 MIN: CPT | Performed by: SURGERY

## 2020-06-18 PROCEDURE — 1036F TOBACCO NON-USER: CPT | Performed by: SURGERY

## 2020-06-18 PROCEDURE — 99202 OFFICE O/P NEW SF 15 MIN: CPT | Performed by: SURGERY

## 2020-06-18 PROCEDURE — 4040F PNEUMOC VAC/ADMIN/RCVD: CPT | Performed by: SURGERY

## 2020-06-18 PROCEDURE — G8427 DOCREV CUR MEDS BY ELIG CLIN: HCPCS | Performed by: SURGERY

## 2020-06-18 PROCEDURE — 1123F ACP DISCUSS/DSCN MKR DOCD: CPT | Performed by: SURGERY

## 2020-06-18 PROCEDURE — 3017F COLORECTAL CA SCREEN DOC REV: CPT | Performed by: SURGERY

## 2020-06-18 PROCEDURE — G8417 CALC BMI ABV UP PARAM F/U: HCPCS | Performed by: SURGERY

## 2020-06-18 RX ORDER — SODIUM, POTASSIUM,MAG SULFATES 17.5-3.13G
1 SOLUTION, RECONSTITUTED, ORAL ORAL 2 TIMES DAILY
Qty: 2 BOTTLE | Refills: 0 | Status: SHIPPED
Start: 2020-06-18 | End: 2020-06-18

## 2020-06-18 NOTE — PATIENT INSTRUCTIONS
COVID TESTING-Monday 06/22/2020   Testing Sites: 630am - 1pm  o [address] [facility] location between [place]. Entrance and [address] [facility] - Entrance B closest to [facility]  o [facility] for [facility]  Definition  A clear liquid diet consists of clear liquids, such as water, broth and plain gelatin, that are easily digested and leave no undigested residue in your intestinal tract. Your doctor may prescribe a clear liquid diet before certain medical procedures or if you have certain digestive problems. Because a clear liquid diet can't provide you with adequate calories and nutrients, it shouldn't be continued for more than a few days. Purpose  A clear liquid diet is often used before tests, procedures or surgeries that require no food in your stomach or intestines, such as before colonoscopy. It may also be recommended as a short-term diet if you have certain digestive problems, such as nausea, vomiting or diarrhea, or after certain types of surgery. Diet details  A clear liquid diet helps maintain adequate hydration, provides some important electrolytes, such as sodium and potassium, and gives some energy at a time when a full diet isn't possible or recommended. The following foods are allowed in a clear liquid diet:    Plain water         Fruit juices without pulp, such as apple juice   Strained lemonade    Clear, fat-free broth (bouillon or consomme)   Clear sodas (NO COLA)   Plain gelatin (NO RED OR PURPLE)  Honey   Ice pops without bits of fruit or fruit pulp   Tea or coffee WITHOUT milk or cream  **NOTHING Red or Purple   **If you CAN see through it you can have it    Any foods not on the above list should be avoided. Also, for certain tests, such as colon exams, your doctor may ask you to avoid liquids or gelatin with red coloring.      A typical menu on the clear liquid diet may look like this:     Breakfast:  1 glass fruit juice  1 cup coffee or tea (without dairy products)  1 cup broth  1 bowl gelatin     Snack:  1 glass fruit juice  1 bowl gelatin     Lunch:  1 glass fruit juice  1 glass water  1 cup broth  1 bowl gelatin     Snack:  1 ice pop (without fruit pulp)  1 cup coffee or tea (without dairy products) or a soft drink     Dinner:  1 cup juice or water  1 cup broth  1 bowl gelatin  1 cup coffee or tea     Results  Although the clear liquid diet may not be very exciting, it does fulfill its purpose. It's designed to keep your stomach and intestines clear, limit strain to your digestive system, but keep your body hydrated as you prepare for or recover from a medical procedure. Risks  Because a clear liquid diet can't provide you with adequate calories and nutrients, it shouldn't be used for more than a few days. Only use the clear liquid diet as directed by your doctor. If your doctor prescribes a clear liquid diet before a medical test, be sure to follow the diet instructions exactly. If you don't follow the diet exactly, you risk an inaccurate test and may have to reschedule the procedure for another time.

## 2020-06-18 NOTE — PROGRESS NOTES
MA contacted surgery scheduling and spoke with Guerline. MA Scheduled pt for EGD/Colonoscopy on 06/26/2020 at 10:00am. Pt needs to arrive at [address] [place] [address] at 9:00am. Rinku sample and directions given upon discharge from appointment.   Electronically signed by Jose F Wooten on 6/18/20 at 2:47 PM EDT

## 2020-06-18 NOTE — PROGRESS NOTES
[facility]  HISTORY & PHYSICAL      CC:  Positive cologuard test    HPI:     Preeti Soliz is here for an initial office visit (6/18/2020). The patient was sent here to discuss colorectal cancer screening. He had a recent positive colo-guard test in February 2020. The patient denies any weight loss, rectal bleeding, abdominal pain, or change in bowel habits. There is no family history of IBD or colorectal cancer. The patient has never had a colonoscopy. Pt has a history of peptic ulcer disease. He takes nexium 20 mg daily. He notes upper abdominal pain worse after eating on the left side. This pain has been going on for several years. In the past, he was taking zantac. Pain is much better than with zantac. Spicy foods do not bother him. Cheese causing diarrhea.      Past Medical History:   Diagnosis Date    Anxiety     Depression     GERD (gastroesophageal reflux disease)     Heart palpitations     HTN (hypertension) 10/21/2010    Hyperlipidemia     Lightheadedness     OA (osteoarthritis)     Parathyroid adenoma     s/p surgery 2005    Pre-diabetes     Recurrent left pleural effusion 9/13/2018    Stenosis of right internal carotid artery with cerebral infarction [facility])      Past Surgical History:   Procedure Laterality Date    BACK SURGERY  12/20/2016    revision cervical laminectomy    CAROTID ENDARTERECTOMY Right 8/25/2014    Delatore - bovine patch     CERVICAL FUSION  12/12/2016    C4-C5, C5-C6, C6-C7 ACF, C5-C6 Corpectomy    ENDOSCOPY, COLON, DIAGNOSTIC      EYE SURGERY  2001    lasex    FRACTURE SURGERY Left     plate in wrist    HAND SURGERY      PARATHYROIDECTOMY  11/7/2005    PA Domenic Nichols 405 DX LUNGS/PERICAR/MED/PLEURAL SPACE W/O BX Right 9/17/2018    THORACOSCOPY performed by Sedrick Go MD at [address]  07/07/2014     Social History     Socioeconomic History    Marital status: Single     Spouse name: Not on LFT's        I have reviewed and confirmed the past medical history, surgical history, social history, allergies in the chart. Medications: I have reviewed the medication list in the chart. Review of Systems:  Review of Systems - History obtained from the patient  General ROS: negative  Psychological ROS: negative  Ophthalmic ROS: negative for - blurry vision, double vision or loss of vision  ENT ROS: negative for - epistaxis, sore throat, vocal changes or malocclusion  Respiratory ROS: negative for - pleuritic pain, shortness of breath or tachypnea  Cardiovascular ROS: negative for - chest pain or palpitations  Gastrointestinal ROS: negative for - abdominal pain or gas/bloating  Genito-Urinary ROS: negative for - hematuria or pelvic pain  Endocrine ROS: negative   Heme ROS: negative   Musculoskeletal ROS: negative  Neurological ROS: negative for - confusion, dizziness, headaches, numbness/tingling, seizures or weakness    Physical Exam   BP (!) 149/67   Pulse 60   Temp 97.6 °F (36.4 °C) (Infrared)   Resp 16   Ht 5' 5\" (1.651 m)   Wt 160 lb (72.6 kg)   SpO2 97%   BMI 26.63 kg/m²   Vitals:    06/18/20 1322   BP: (!) 149/67   Pulse: 60   Resp: 16   Temp: 97.6 °F (36.4 °C)   SpO2: 97%       PSYCH: mood and affect normal, alert and oriented x 3  CONSTITUTIONAL: No apparent distress, comfortable  EYES: Sclera white, pupils equal round and reactive to light  ENMT:  Hearing normal, trachea midline, ears externally intact  LYMPH: no lympadenopathy in neck. No lympadenopathy in groins  RESP: Breath sounds were clear and equal with no rales, wheezes, or rhonchi. Respiratory effort was normal with no retractions or use of accessory muscles. CV: Heart sounds were normal with a regular rate and rhythm. No pedal edema  GI/ Abdomen: The abdomen was soft and non distended. There was no tenderness, guarding, rebound, or rigidity.   There was no                     masses,

## 2020-06-22 ENCOUNTER — HOSPITAL ENCOUNTER (OUTPATIENT)
Age: 70
Discharge: HOME | End: 2020-06-24
Payer: MEDICARE

## 2020-06-22 PROCEDURE — U0003 INFECTIOUS AGENT DETECTION BY NUCLEIC ACID (DNA OR RNA); SEVERE ACUTE RESPIRATORY SYNDROME CORONAVIRUS 2 (SARS-COV-2) (CORONAVIRUS DISEASE [COVID-19]), AMPLIFIED PROBE TECHNIQUE, MAKING USE OF HIGH THROUGHPUT TECHNOLOGIES AS DESCRIBED BY CMS-2020-01-R: HCPCS

## 2020-06-24 LAB
SARS-COV-2: NOT DETECTED
SOURCE: NORMAL

## 2020-06-26 ENCOUNTER — ANESTHESIA (OUTPATIENT)
Dept: ENDOSCOPY | Age: 70
End: 2020-06-26
Payer: MEDICARE

## 2020-06-26 ENCOUNTER — HOSPITAL ENCOUNTER (OUTPATIENT)
Age: 70
Setting detail: OUTPATIENT SURGERY
Discharge: HOME | End: 2020-06-26
Attending: SURGERY | Admitting: SURGERY
Payer: MEDICARE

## 2020-06-26 ENCOUNTER — ANESTHESIA EVENT (OUTPATIENT)
Dept: ENDOSCOPY | Age: 70
End: 2020-06-26
Payer: MEDICARE

## 2020-06-26 VITALS
SYSTOLIC BLOOD PRESSURE: 113 MMHG | RESPIRATION RATE: 11 BRPM | OXYGEN SATURATION: 97 % | DIASTOLIC BLOOD PRESSURE: 58 MMHG

## 2020-06-26 VITALS
DIASTOLIC BLOOD PRESSURE: 81 MMHG | HEIGHT: 65 IN | RESPIRATION RATE: 20 BRPM | HEART RATE: 55 BPM | SYSTOLIC BLOOD PRESSURE: 177 MMHG | BODY MASS INDEX: 26.66 KG/M2 | OXYGEN SATURATION: 98 % | TEMPERATURE: 98 F | WEIGHT: 160 LBS

## 2020-06-26 LAB — METER GLUCOSE: 140 MG/DL (ref 74–99)

## 2020-06-26 PROCEDURE — 3700000000 HC ANESTHESIA ATTENDED CARE: Performed by: SURGERY

## 2020-06-26 PROCEDURE — 88305 TISSUE EXAM BY PATHOLOGIST: CPT

## 2020-06-26 PROCEDURE — 7100000011 HC PHASE II RECOVERY - ADDTL 15 MIN: Performed by: SURGERY

## 2020-06-26 PROCEDURE — 2709999900 HC NON-CHARGEABLE SUPPLY: Performed by: SURGERY

## 2020-06-26 PROCEDURE — 3609010600 HC COLONOSCOPY POLYPECTOMY SNARE/COLD BIOPSY: Performed by: SURGERY

## 2020-06-26 PROCEDURE — 82962 GLUCOSE BLOOD TEST: CPT

## 2020-06-26 PROCEDURE — 43239 EGD BIOPSY SINGLE/MULTIPLE: CPT | Performed by: SURGERY

## 2020-06-26 PROCEDURE — 6370000000 HC RX 637 (ALT 250 FOR IP): Performed by: ANESTHESIOLOGY

## 2020-06-26 PROCEDURE — 45380 COLONOSCOPY AND BIOPSY: CPT | Performed by: SURGERY

## 2020-06-26 PROCEDURE — 3700000001 HC ADD 15 MINUTES (ANESTHESIA): Performed by: SURGERY

## 2020-06-26 PROCEDURE — 7100000010 HC PHASE II RECOVERY - FIRST 15 MIN: Performed by: SURGERY

## 2020-06-26 PROCEDURE — 6360000002 HC RX W HCPCS

## 2020-06-26 PROCEDURE — 45385 COLONOSCOPY W/LESION REMOVAL: CPT | Performed by: SURGERY

## 2020-06-26 PROCEDURE — 3609012400 HC EGD TRANSORAL BIOPSY SINGLE/MULTIPLE: Performed by: SURGERY

## 2020-06-26 PROCEDURE — 2580000003 HC RX 258: Performed by: SURGERY

## 2020-06-26 RX ORDER — ATENOLOL 25 MG/1
50 TABLET ORAL ONCE
Status: COMPLETED | OUTPATIENT
Start: 2020-06-26 | End: 2020-06-26

## 2020-06-26 RX ORDER — PROPOFOL 10 MG/ML
INJECTION, EMULSION INTRAVENOUS CONTINUOUS PRN
Status: DISCONTINUED | OUTPATIENT
Start: 2020-06-26 | End: 2020-06-26 | Stop reason: SDUPTHER

## 2020-06-26 RX ORDER — SODIUM CHLORIDE 0.9 % (FLUSH) 0.9 %
10 SYRINGE (ML) INJECTION EVERY 12 HOURS SCHEDULED
Status: DISCONTINUED | OUTPATIENT
Start: 2020-06-26 | End: 2020-06-26 | Stop reason: HOSPADM

## 2020-06-26 RX ORDER — HYDRALAZINE HYDROCHLORIDE 25 MG/1
25 TABLET, FILM COATED ORAL ONCE
Status: COMPLETED | OUTPATIENT
Start: 2020-06-26 | End: 2020-06-26

## 2020-06-26 RX ORDER — AMLODIPINE BESYLATE 5 MG/1
5 TABLET ORAL ONCE
Status: COMPLETED | OUTPATIENT
Start: 2020-06-26 | End: 2020-06-26

## 2020-06-26 RX ORDER — SODIUM CHLORIDE 0.9 % (FLUSH) 0.9 %
10 SYRINGE (ML) INJECTION PRN
Status: DISCONTINUED | OUTPATIENT
Start: 2020-06-26 | End: 2020-06-26 | Stop reason: HOSPADM

## 2020-06-26 RX ORDER — SODIUM CHLORIDE 9 MG/ML
INJECTION, SOLUTION INTRAVENOUS CONTINUOUS
Status: DISCONTINUED | OUTPATIENT
Start: 2020-06-26 | End: 2020-06-26 | Stop reason: HOSPADM

## 2020-06-26 RX ADMIN — HYDRALAZINE HYDROCHLORIDE 25 MG: 25 TABLET, FILM COATED ORAL at 09:48

## 2020-06-26 RX ADMIN — AMLODIPINE BESYLATE 5 MG: 5 TABLET ORAL at 09:47

## 2020-06-26 RX ADMIN — PROPOFOL 100 MCG/KG/MIN: 10 INJECTION, EMULSION INTRAVENOUS at 10:19

## 2020-06-26 RX ADMIN — ATENOLOL 50 MG: 25 TABLET ORAL at 09:47

## 2020-06-26 RX ADMIN — SODIUM CHLORIDE: 9 INJECTION, SOLUTION INTRAVENOUS at 09:33

## 2020-06-26 ASSESSMENT — PAIN SCALES - GENERAL
PAINLEVEL_OUTOF10: 0

## 2020-06-26 ASSESSMENT — PAIN - FUNCTIONAL ASSESSMENT: PAIN_FUNCTIONAL_ASSESSMENT: 0-10

## 2020-06-26 NOTE — H&P
file    Number of children: Not on file    Years of education: Not on file    Highest education level: Not on file   Occupational History    Not on file   Social Needs    Financial resource strain: Not on file    Food insecurity     Worry: Not on file     Inability: Not on file    Transportation needs     Medical: Not on file     Non-medical: Not on file   Tobacco Use    Smoking status: Former Smoker     Packs/day: 3.00     Years: 14.00     Pack years: 42.00     Types: Cigarettes     Start date:      Last attempt to quit: 1983     Years since quittin.8    Smokeless tobacco: Never Used   Substance and Sexual Activity    Alcohol use:  Yes     Alcohol/week: 5.0 standard drinks     Types: 5 Standard drinks or equivalent per week     Frequency: 4 or more times a week     Drinks per session: 5 or 6     Comment: 5 mixed drinks daily- GIN/TONIC    Drug use: No    Sexual activity: Not on file   Lifestyle    Physical activity     Days per week: Not on file     Minutes per session: Not on file    Stress: Not on file   Relationships    Social connections     Talks on phone: Not on file     Gets together: Not on file     Attends Gnosticism service: Not on file     Active member of club or organization: Not on file     Attends meetings of clubs or organizations: Not on file     Relationship status: Not on file    Intimate partner violence     Fear of current or ex partner: Not on file     Emotionally abused: Not on file     Physically abused: Not on file     Forced sexual activity: Not on file   Other Topics Concern    Not on file   Social History Narrative    Not on file     Allergies   Allergen Reactions    Flexeril [Cyclobenzaprine] Other (See Comments)     HALLUCINATIONS    Lisinopril Swelling     Facial and lip    Influenza Virus Vaccine Other (See Comments)     \"Got the Flu\"    Metformin And Related Other (See Comments)     Lactic acidosis    Zocor [Simvastatin] Other (See Comments) Elevated LFT's        I have reviewed and confirmed the past medical history, surgical history, social history, allergies in the chart. Medications: I have reviewed the medication list in the chart. Review of Systems:  Review of Systems - History obtained from the patient  General ROS: negative  Psychological ROS: negative  Ophthalmic ROS: negative for - blurry vision, double vision or loss of vision  ENT ROS: negative for - epistaxis, sore throat, vocal changes or malocclusion  Respiratory ROS: negative for - pleuritic pain, shortness of breath or tachypnea  Cardiovascular ROS: negative for - chest pain or palpitations  Gastrointestinal ROS: negative for - abdominal pain or gas/bloating  Genito-Urinary ROS: negative for - hematuria or pelvic pain  Endocrine ROS: negative   Heme ROS: negative   Musculoskeletal ROS: negative  Neurological ROS: negative for - confusion, dizziness, headaches, numbness/tingling, seizures or weakness    Physical Exam   BP (!) 177/81   Pulse 59   Temp 97.9 °F (36.6 °C) (Temporal)   Resp 17   Ht 5' 5\" (1.651 m)   Wt 160 lb (72.6 kg)   SpO2 96%   BMI 26.63 kg/m²   Vitals:    06/26/20 0917   BP: (!) 177/81   Pulse: 59   Resp: 17   Temp: 97.9 °F (36.6 °C)   SpO2: 96%       PSYCH: mood and affect normal, alert and oriented x 3  CONSTITUTIONAL: No apparent distress, comfortable  EYES: Sclera white, pupils equal round and reactive to light  ENMT:  Hearing normal, trachea midline, ears externally intact  LYMPH: no lympadenopathy in neck. No lympadenopathy in groins  RESP: Breath sounds were clear and equal with no rales, wheezes, or rhonchi. Respiratory effort was normal with no retractions or use of accessory muscles. CV: Heart sounds were normal with a regular rate and rhythm. No pedal edema  GI/ Abdomen: The abdomen was soft and non distended. There was no tenderness, guarding, rebound, or rigidity.   There was no                     masses,

## 2020-06-26 NOTE — ANESTHESIA POSTPROCEDURE EVALUATION
Department of Anesthesiology  Postprocedure Note    Patient: Ravi Hastings  MRN: [id number]  YOB: 1950  Date of evaluation: 6/26/2020  Time:  11:35 AM     Procedure Summary     Date:  06/26/20 Room / Location:  [address] [place] [address] / [facility]    Anesthesia Start:  4420 Anesthesia Stop:  1053    Procedures:       COLONOSCOPY POLYPECTOMY SNARE/COLD BIOPSY (N/A )      EGD BIOPSY (N/A ) Diagnosis:  (POSITIVE COLO GAURD,OF PEPTIC ULCER)    Surgeon:  Stevan Mtz MD Responsible Provider:  Lora Armijo MD    Anesthesia Type:  MAC ASA Status:  3          Anesthesia Type: MAC    Magui Phase I: Magui Score: 10    Magui Phase II:      Last vitals: Reviewed and per EMR flowsheets.        Anesthesia Post Evaluation    Patient location during evaluation: PACU  Patient participation: complete - patient participated  Level of consciousness: awake and alert  Airway patency: patent  Nausea & Vomiting: no nausea and no vomiting  Complications: no  Cardiovascular status: hemodynamically stable  Respiratory status: acceptable  Hydration status: euvolemic

## 2020-06-26 NOTE — ANESTHESIA PRE PROCEDURE
Department of Anesthesiology  Preprocedure Note       Name:  Etienne Slade   Age:  69 y.o.  :  1950                                          MRN:  [id number]         Date:  2020      Surgeon: Nyla Ragsdale):  Nia Carrera MD    Procedure: * No procedures listed *    Medications prior to admission:   Prior to Admission medications    Medication Sig Start Date End Date Taking? Authorizing Provider   Esomeprazole Magnesium (NEXIUM 24HR PO) Take by mouth as needed    Historical Provider, MD   clotrimazole-betamethasone (LOTRISONE) 1-0.05 % cream Apply topically 2 times daily. 20   Colt Goldman DO   hydrALAZINE (APRESOLINE) 25 MG tablet Take 1 tablet by mouth 2 times daily 20   Colt Goldman DO   gabapentin (NEURONTIN) 600 MG tablet Take 1 tablet by mouth 3 times daily for 90 days. 20  Colt Goldman DO   atorvastatin (LIPITOR) 40 MG tablet Take 1 tablet by mouth daily 20   Nimisha Dietrich,    atenolol (TENORMIN) 50 MG tablet Take 1 tablet by mouth 2 times daily 20  Colt Goldman DO   amLODIPine (NORVASC) 5 MG tablet Take 1 tablet by mouth daily 20   Colt Goldman DO   PARoxetine (PAXIL) 30 MG tablet TAKE ONE TABLET BY MOUTH EVERY DAY IN THE MORNING 20   Pillo Shahid, DO   Multiple Vitamins-Minerals (COMPLETE MULTIVITAMIN/MINERAL PO) Take 1 tablet by mouth daily    Historical Provider, MD   aspirin 81 MG tablet Take 81 mg by mouth every morning     Historical Provider, MD   acetaminophen (TYLENOL) 325 MG tablet Take 650 mg by mouth every 6 hours as needed for Pain    Historical Provider, MD       Current medications:    No current facility-administered medications for this visit. No current outpatient medications on file.      Facility-Administered Medications Ordered in Other Visits   Medication Dose Route Frequency Provider Last Rate Last Dose    0.9 % sodium chloride infusion   Intravenous Continuous Nia Carrera  mL/hr at Laterality Date    BACK SURGERY  2016    revision cervical laminectomy    CAROTID ENDARTERECTOMY Right 2014    Delatore - bovine patch     CERVICAL FUSION  2016    C4-C5, C5-C6, C6-C7 ACF, C5-C6 Corpectomy    ENDOSCOPY, COLON, DIAGNOSTIC      EYE SURGERY      lasex    FRACTURE SURGERY Left     plate in wrist    HAND SURGERY      PARATHYROIDECTOMY  2005    SD THORSC DX LUNGS/PERICAR/MED/PLEURAL SPACE W/O BX Right 2018    THORACOSCOPY performed by Christen Mcardle, MD at [address]  2014       Social History:    Social History     Tobacco Use    Smoking status: Former Smoker     Packs/day: 3.00     Years: 14.00     Pack years: 42.00     Types: Cigarettes     Start date:      Last attempt to quit: 1983     Years since quittin.8    Smokeless tobacco: Never Used   Substance Use Topics    Alcohol use: Yes     Alcohol/week: 5.0 standard drinks     Types: 5 Standard drinks or equivalent per week     Frequency: 4 or more times a week     Drinks per session: 5 or 6     Comment: 5 mixed drinks daily- GIN/TONIC                                Counseling given: Not Answered      Vital Signs (Current): There were no vitals filed for this visit.                                            BP Readings from Last 3 Encounters:   20 (!) 177/81   20 (!) 149/67   20 137/64       NPO Status:                                                                                 BMI:   Wt Readings from Last 3 Encounters:   20 160 lb (72.6 kg)   20 160 lb (72.6 kg)   20 160 lb (72.6 kg)     There is no height or weight on file to calculate BMI.    CBC:   Lab Results   Component Value Date    WBC 6.7 2020    RBC 4.82 2020    HGB 15.6 2020    HCT 47.8 2020    MCV 99.2 2020    RDW 12.2 2020     2020       CMP:   Lab Results   Component Value Date     [facility]), . Anesthesia Plan      MAC     ASA 3       Induction: intravenous. Anesthetic plan and risks discussed with patient. Plan discussed with CRNA.                   Marian Villarreal MD   6/26/2020

## 2020-06-26 NOTE — OP NOTE
Operative Note      Patient: Ayanna Her  YOB: 1950  MRN: [id number]    Date of Procedure: 6/26/2020    Pre-Op Diagnosis: POSITIVE COLO GUARD, HxOF PEPTIC ULCER    Post-Op Diagnosis: Same       Procedure(s):  1. COLONOSCOPY POLYPECTOMY SNARE x 2 AND COLD BIOPSY  2. EGD with BIOPSY    Surgeon(s):  Sujata Potts MD    Assistant: None     Anesthesia: Monitor Anesthesia Care    Estimated Blood Loss (mL): 5 ml    Complications: None    Specimens:   ID Type Source Tests Collected by Time Destination   A : STOMACH ANTRUM BIOPSY Tissue Stomach SURGICAL PATHOLOGY Sujata Potts MD 6/26/2020 1039    B : STOMACH GE JUNCTION BIOPSY Tissue Stomach SURGICAL PATHOLOGY Sujata Potts MD 6/26/2020 1041    C : COLON POLYP AT 40 CM FROM ANUS Tissue Colon SURGICAL PATHOLOGY Sujata Potts MD 6/26/2020 1042    D : COLON POLYP AT 79 CM FROM ANUS Tissue Colon SURGICAL PATHOLOGY Sujata Potts MD 6/26/2020 1046    E : COLON POLYP AT 30 CM POLYP Tissue Colon SURGICAL PATHOLOGY Sujata Potts MD 6/26/2020 1053          Findings: moderate gastritis, reflux esophagitis on EGD  3 colon polyps, mild diverticulosis, moderate internal/ external hemorrhoids    HISTORY: The patient is a 69y.o. year old male with history of above preop diagnosis. I recommended esophagogastroduodenoscopy and colonoscopy with possible biopsy and I explained the risk, benefits, expected outcome, and alternatives to the procedure. Risks included but are not limited to bleeding, infection, respiratory distress, hypotension, and perforation . Patient understands and is in agreement. EGD PROCEDURE: The patient was connected to the monitors and given supplemental oxygen by nasal cannula. The patient was sedated. The gastroscope was inserted orally and advanced under direct vision through the esophagus, through the stomach, through the pylorus, and into the descending duodenum.       Findings:  Duodenum:     Descending: normal    Bulb:

## 2020-07-27 RX ORDER — PAROXETINE 30 MG/1
30 TABLET, FILM COATED ORAL EVERY MORNING
Qty: 90 TABLET | Refills: 1 | Status: SHIPPED
Start: 2020-07-27 | End: 2020-07-28 | Stop reason: SDUPTHER

## 2020-07-27 NOTE — TELEPHONE ENCOUNTER
Patient called for refill.         Last seen 5/18/2020  Next appt 11/19/2020  [facility]- Mail

## 2020-07-28 RX ORDER — PAROXETINE 30 MG/1
30 TABLET, FILM COATED ORAL EVERY MORNING
Qty: 10 TABLET | Refills: 0 | OUTPATIENT
Start: 2020-07-28

## 2020-07-28 RX ORDER — ATORVASTATIN CALCIUM 40 MG/1
40 TABLET, FILM COATED ORAL DAILY
Qty: 90 TABLET | Refills: 1 | Status: SHIPPED | OUTPATIENT
Start: 2020-07-28 | End: 2020-09-22

## 2020-07-28 RX ORDER — ATORVASTATIN CALCIUM 40 MG/1
40 TABLET, FILM COATED ORAL DAILY
Qty: 90 TABLET | Refills: 1 | Status: CANCELLED | OUTPATIENT
Start: 2020-07-28

## 2020-07-28 RX ORDER — PAROXETINE 30 MG/1
30 TABLET, FILM COATED ORAL EVERY MORNING
Qty: 10 TABLET | Refills: 0 | Status: SHIPPED
Start: 2020-07-28 | End: 2020-08-03 | Stop reason: SDUPTHER

## 2020-07-28 NOTE — TELEPHONE ENCOUNTER
Cassie Varner from [employer] called to advise patient needs short term fill of Paroxetine since he only has a couple days worth. Cassie Varner placed 3 way call and I confirmed with patient he needs 10 days worth til mail order arrives.           Last seen 5/18/2020  Next appt 11/19/2020  [facility]

## 2020-08-03 RX ORDER — PAROXETINE 30 MG/1
30 TABLET, FILM COATED ORAL EVERY MORNING
Qty: 30 TABLET | Refills: 3 | Status: SHIPPED
Start: 2020-08-03 | End: 2020-10-16 | Stop reason: SDUPTHER

## 2020-08-03 NOTE — TELEPHONE ENCOUNTER
Pt's wife called and stated that pt received the 10 days of Paroxetine but the script for the  90 day supply was never received by [facility]. Pt only has 1 pill left. Pt would like to know if med can just be called into [facility] [employer] at [phone] and he will pay for it since there has been so many problems with getting it through mail order. Please contact Mone Thomas (pt's wife) at [phone].

## 2020-09-22 RX ORDER — ATORVASTATIN CALCIUM 40 MG/1
TABLET, FILM COATED ORAL
Qty: 90 TABLET | Refills: 0 | Status: SHIPPED
Start: 2020-09-22 | End: 2020-12-03

## 2020-10-19 RX ORDER — PAROXETINE 30 MG/1
30 TABLET, FILM COATED ORAL EVERY MORNING
Qty: 90 TABLET | Refills: 1 | Status: SHIPPED
Start: 2020-10-19 | End: 2021-01-06

## 2021-01-27 ENCOUNTER — OFFICE VISIT (OUTPATIENT)
Dept: FAMILY MEDICINE CLINIC | Age: 71
End: 2021-01-27
Payer: MEDICARE

## 2021-01-27 VITALS
DIASTOLIC BLOOD PRESSURE: 80 MMHG | SYSTOLIC BLOOD PRESSURE: 130 MMHG | BODY MASS INDEX: 27.49 KG/M2 | HEIGHT: 65 IN | WEIGHT: 165 LBS | TEMPERATURE: 97.2 F | OXYGEN SATURATION: 95 % | RESPIRATION RATE: 18 BRPM | HEART RATE: 61 BPM

## 2021-01-27 DIAGNOSIS — I10 ESSENTIAL HYPERTENSION: ICD-10-CM

## 2021-01-27 DIAGNOSIS — E78.2 MIXED HYPERLIPIDEMIA: ICD-10-CM

## 2021-01-27 DIAGNOSIS — Z12.5 SCREENING PSA (PROSTATE SPECIFIC ANTIGEN): ICD-10-CM

## 2021-01-27 DIAGNOSIS — E11.9 DIET-CONTROLLED DIABETES MELLITUS (HCC): ICD-10-CM

## 2021-01-27 DIAGNOSIS — F41.0 PANIC DISORDER: ICD-10-CM

## 2021-01-27 DIAGNOSIS — I95.1 ORTHOSTATIC HYPOTENSION: ICD-10-CM

## 2021-01-27 DIAGNOSIS — Z00.00 ROUTINE GENERAL MEDICAL EXAMINATION AT A HEALTH CARE FACILITY: Primary | ICD-10-CM

## 2021-01-27 LAB — HBA1C MFR BLD: 5.3 %

## 2021-01-27 PROCEDURE — 1123F ACP DISCUSS/DSCN MKR DOCD: CPT | Performed by: FAMILY MEDICINE

## 2021-01-27 PROCEDURE — G0439 PPPS, SUBSEQ VISIT: HCPCS | Performed by: FAMILY MEDICINE

## 2021-01-27 PROCEDURE — 83036 HEMOGLOBIN GLYCOSYLATED A1C: CPT | Performed by: FAMILY MEDICINE

## 2021-01-27 PROCEDURE — G8484 FLU IMMUNIZE NO ADMIN: HCPCS | Performed by: FAMILY MEDICINE

## 2021-01-27 PROCEDURE — 4040F PNEUMOC VAC/ADMIN/RCVD: CPT | Performed by: FAMILY MEDICINE

## 2021-01-27 PROCEDURE — 3017F COLORECTAL CA SCREEN DOC REV: CPT | Performed by: FAMILY MEDICINE

## 2021-01-27 PROCEDURE — 3044F HG A1C LEVEL LT 7.0%: CPT | Performed by: FAMILY MEDICINE

## 2021-01-27 RX ORDER — AMLODIPINE BESYLATE 5 MG/1
5 TABLET ORAL DAILY
Qty: 90 TABLET | Refills: 1 | Status: SHIPPED
Start: 2021-01-27 | End: 2021-06-22 | Stop reason: SDUPTHER

## 2021-01-27 RX ORDER — PAROXETINE 30 MG/1
TABLET, FILM COATED ORAL
Qty: 90 TABLET | Refills: 1 | Status: SHIPPED
Start: 2021-01-27 | End: 2021-06-22 | Stop reason: SDUPTHER

## 2021-01-27 RX ORDER — HYDRALAZINE HYDROCHLORIDE 25 MG/1
25 TABLET, FILM COATED ORAL 2 TIMES DAILY
Qty: 180 TABLET | Refills: 1 | Status: SHIPPED
Start: 2021-01-27 | End: 2021-06-22 | Stop reason: SDUPTHER

## 2021-01-27 RX ORDER — GABAPENTIN 600 MG/1
600 TABLET ORAL 3 TIMES DAILY
Qty: 270 TABLET | Refills: 1 | Status: SHIPPED
Start: 2021-01-27 | End: 2021-06-22 | Stop reason: SDUPTHER

## 2021-01-27 RX ORDER — ATENOLOL 50 MG/1
50 TABLET ORAL 2 TIMES DAILY
Qty: 180 TABLET | Refills: 1 | Status: SHIPPED
Start: 2021-01-27 | End: 2021-06-22 | Stop reason: SDUPTHER

## 2021-01-27 RX ORDER — ATORVASTATIN CALCIUM 40 MG/1
40 TABLET, FILM COATED ORAL DAILY
Qty: 90 TABLET | Refills: 1 | Status: SHIPPED
Start: 2021-01-27 | End: 2021-06-22 | Stop reason: SDUPTHER

## 2021-01-27 ASSESSMENT — LIFESTYLE VARIABLES
HAS A RELATIVE, FRIEND, DOCTOR, OR ANOTHER HEALTH PROFESSIONAL EXPRESSED CONCERN ABOUT YOUR DRINKING OR SUGGESTED YOU CUT DOWN: 0
HOW OFTEN DO YOU HAVE A DRINK CONTAINING ALCOHOL: 4
HOW OFTEN DO YOU HAVE SIX OR MORE DRINKS ON ONE OCCASION: DAILY OR ALMOST DAILY
AUDIT TOTAL SCORE: 11
HOW OFTEN DO YOU HAVE A DRINK CONTAINING ALCOHOL: FOUR OR MORE TIMES A WEEK
HAVE YOU OR SOMEONE ELSE BEEN INJURED AS A RESULT OF YOUR DRINKING: NO
HOW OFTEN DURING THE LAST YEAR HAVE YOU BEEN UNABLE TO REMEMBER WHAT HAPPENED THE NIGHT BEFORE BECAUSE YOU HAD BEEN DRINKING: NEVER
HOW OFTEN DURING THE LAST YEAR HAVE YOU HAD A FEELING OF GUILT OR REMORSE AFTER DRINKING: NEVER
HOW OFTEN DURING THE LAST YEAR HAVE YOU NEEDED AN ALCOHOLIC DRINK FIRST THING IN THE MORNING TO GET YOURSELF GOING AFTER A NIGHT OF HEAVY DRINKING: 0
HAS A RELATIVE, FRIEND, DOCTOR, OR ANOTHER HEALTH PROFESSIONAL EXPRESSED CONCERN ABOUT YOUR DRINKING OR SUGGESTED YOU CUT DOWN: NO
HOW MANY STANDARD DRINKS CONTAINING ALCOHOL DO YOU HAVE ON A TYPICAL DAY: FIVE OR SIX
AUDIT-C TOTAL SCORE: 0
AUDIT TOTAL SCORE: 0
HOW OFTEN DURING THE LAST YEAR HAVE YOU HAD A FEELING OF GUILT OR REMORSE AFTER DRINKING: 0
HAVE YOU OR SOMEONE ELSE BEEN INJURED AS A RESULT OF YOUR DRINKING: 0
HOW MANY STANDARD DRINKS CONTAINING ALCOHOL DO YOU HAVE ON A TYPICAL DAY: 2
HOW OFTEN DURING THE LAST YEAR HAVE YOU BEEN UNABLE TO REMEMBER WHAT HAPPENED THE NIGHT BEFORE BECAUSE YOU HAD BEEN DRINKING: 0
HOW OFTEN DURING THE LAST YEAR HAVE YOU NEEDED AN ALCOHOLIC DRINK FIRST THING IN THE MORNING TO GET YOURSELF GOING AFTER A NIGHT OF HEAVY DRINKING: NEVER
HOW OFTEN DURING THE LAST YEAR HAVE YOU FOUND THAT YOU WERE NOT ABLE TO STOP DRINKING ONCE YOU HAD STARTED: LESS THAN MONTHLY
HOW OFTEN DURING THE LAST YEAR HAVE YOU FAILED TO DO WHAT WAS NORMALLY EXPECTED FROM YOU BECAUSE OF DRINKING: NEVER

## 2021-01-27 ASSESSMENT — PATIENT HEALTH QUESTIONNAIRE - PHQ9
1. LITTLE INTEREST OR PLEASURE IN DOING THINGS: 1
2. FEELING DOWN, DEPRESSED OR HOPELESS: 2
9. THOUGHTS THAT YOU WOULD BE BETTER OFF DEAD, OR OF HURTING YOURSELF: 0
5. POOR APPETITE OR OVEREATING: 0
SUM OF ALL RESPONSES TO PHQ QUESTIONS 1-9: 7
6. FEELING BAD ABOUT YOURSELF - OR THAT YOU ARE A FAILURE OR HAVE LET YOURSELF OR YOUR FAMILY DOWN: 0
7. TROUBLE CONCENTRATING ON THINGS, SUCH AS READING THE NEWSPAPER OR WATCHING TELEVISION: 0

## 2021-01-27 NOTE — PATIENT INSTRUCTIONS
[facility] ([facility]) COVID-19 vaccine scheduling information:  [facility] ([facility]) will be following the [place] Department of Health age-based guidelines for release of vaccine. Patients are to call [phone] to schedule their appointment. Vaccines are only available through appointments. Personalized Preventive Plan for Annika Borrero - 1/27/2021  Medicare offers a range of preventive health benefits. Some of the tests and screenings are paid in full while other may be subject to a deductible, co-insurance, and/or copay. Some of these benefits include a comprehensive review of your medical history including lifestyle, illnesses that may run in your family, and various assessments and screenings as appropriate. After reviewing your medical record and screening and assessments performed today your provider may have ordered immunizations, labs, imaging, and/or referrals for you. A list of these orders (if applicable) as well as your Preventive Care list are included within your After Visit Summary for your review. Other Preventive Recommendations:    · A preventive eye exam performed by an eye specialist is recommended every 1-2 years to screen for glaucoma; cataracts, macular degeneration, and other eye disorders. · A preventive dental visit is recommended every 6 months. · Try to get at least 150 minutes of exercise per week or 10,000 steps per day on a pedometer . · Order or download the FREE \"Exercise & Physical Activity: Your Everyday Guide\" from The [employer]. Call 2-244.706.5905 or search The [employer] online. · You need 3338-2967 mg of calcium and 8076-1881 IU of vitamin D per day.  It is possible to meet your calcium requirement with diet alone, but a vitamin D supplement is usually necessary to meet this goal.

## 2021-01-27 NOTE — PROGRESS NOTES
Medicare Annual Wellness Visit  Name: Aurelio Doyle Date: 2021   MRN: [id number] Sex: Male   Age: 66 y.o. Ethnicity: Non-/Non    : 1950 Race: Dayana Donovan is here for Medicare AWV, Diabetes, and Health Maintenance (declines flu vaccine)    Screenings for behavioral, psychosocial and functional/safety risks, and cognitive dysfunction are all negative except as indicated below. These results, as well as other patient data from the [address] form, are documented in Flowsheets linked to this Encounter.     He also follows with Cardiology (Dr. Souza), Vascular (Dr. Bridges), Pulm (Dr. Mario), and Nephrology (Dr. Jaylin Starkey)  He has previously seen ENT    Hypertension  Current treatment: atenolol 50 mg twice daily, hydralazine 25 mg twice daily, and amlodipine 5 mg daily  Recent medication changes: none  BP Readings from Last 3 Encounters:   21 130/80   20 (!) 177/81   20 (!) 113/58                                          Sodium (mmol/L)   Date Value   2020 136    BUN (mg/dL)   Date Value   2020 14    Glucose (mg/dL)   Date Value   2020 133 (H)   2012 141 (H)      Potassium (mmol/L)   Date Value   2020 3.6     Potassium reflex Magnesium (mmol/L)   Date Value   2018 4.2    CREATININE (mg/dL)   Date Value   2020 1.4 (H)         Hyperlipidemia  Current treatment: Atorvastatin 40 mg daily  Recent medication changes: None    Lab Results   Component Value Date    CHOL 186 2020    TRIG 177 (H) 2020     2020    LDLCALC 48 2020     Lab Results   Component Value Date    ALT 58 (H) 2020    AST 43 (H) 2020        Diabetic neuropathy  Current treatment: Gabapentin 600 mg 3 times daily  Recent medication changes: None    SANDRA  Current treatment: Paroxetine 30 mg daily  Recent medication changes: None    Allergies   Allergen Reactions    Flexeril [Cyclobenzaprine] Other BACK SURGERY  2016    revision cervical laminectomy    CAROTID ENDARTERECTOMY Right 2014    Delatore - bovine patch     CERVICAL FUSION  2016    C4-C5, C5-C6, C6-C7 ACF, C5-C6 Corpectomy    COLONOSCOPY N/A 2020    COLONOSCOPY POLYPECTOMY SNARE/COLD BIOPSY performed by Gonsalo yDe MD at [address], COLON, DIAGNOSTIC      EYE SURGERY      lasex    FRACTURE SURGERY Left     plate in wrist    HAND SURGERY      PARATHYROIDECTOMY  2005    [place] [address] DX LUNGS/PERICAR/MED/PLEURAL SPACE W/O BX Right 2018    THORACOSCOPY performed by Brendia Denver, MD at [address]  2014    UPPER GASTROINTESTINAL ENDOSCOPY N/A 2020    EGD BIOPSY performed by Gonsalo Dye MD at [facility] History   Problem Relation Age of Onset    Thyroid Cancer Sister     Cancer Sister     Stroke Mother     Diabetes Mother     Stroke Father     Other Brother          in surgery cause unknown    Heart Disease Maternal Grandmother     Arthritis Maternal Grandmother     Heart Disease Maternal Grandfather     Cancer Paternal Grandmother     Other Paternal Grandfather         unknown cause of death       CareTeam (Including outside providers/suppliers regularly involved in providing care):   Patient Care Team:  Cynthia Colindres DO as PCP - General (Family Medicine)  Cynthia Colindres DO as PCP - [facility][1] Provider  Yamilka Valle MD as Consulting Physician (Neurosurgery)  Akbar Gamble DO as Consulting Physician (Internal Medicine)  Estiven Starks MD as Surgeon (Vascular Surgery)  Benjamin Morillo MD as Consulting Physician (Cardiology)    Wt Readings from Last 3 Encounters:   21 165 lb (74.8 kg)   20 160 lb (72.6 kg)   20 160 lb (72.6 kg)     Vitals:    21 1332   BP: 130/80   Pulse: 61   Resp: 18   Temp: 97.2 °F (36.2 °C)   TempSrc: Temporal   SpO2: 95%   Weight: 165 lb (74.8 kg)   Height: 5' 5\" (1.651 m)     Body mass index is 27.46 kg/m². Based upon direct observation of the patient, evaluation of cognition reveals recent and remote memory intact. Patient's complete Health Risk Assessment and screening values have been reviewed and are found in Flowsheets. The following problems were reviewed today and where indicated follow up appointments were made and/or referrals ordered. Positive Risk Factor Screenings with Interventions:     Fall Risk:  2 or more falls in past year?: no  Fall with injury in past year?: (!) yes  Fall Risk Interventions:    · Home safety tips provided     Depression:  PHQ-2 Score: 3  PHQ-9 Total Score: 7    Severity:1-4 = minimal depression, 5-9 = mild depression, 10-14 = moderate depression, 15-19 = moderately severe depression, 20-27 = severe depression  Depression Interventions:  · Medication prescribed- paxil     Substance History:  Social History     Tobacco History     Smoking Status  Former Smoker Smoking Start Date  1/1/1969 Quit date  9/17/1983 Smoking Frequency  3 packs/day for 14 years (42 pk yrs)    Smoking Tobacco Type  Cigarettes    Smokeless Tobacco Use  Never Used          Alcohol History     Alcohol Use Status  Yes Drinks/Week  0 Cans of beer, 5 Standard drinks or equivalent per week Amount  5.0 standard drinks of alcohol/wk Comment  5 mixed drinks daily- GIN/TONIC          Drug Use     Drug Use Status  No          Sexual Activity     Sexually Active  Not Asked               Alcohol Screening: Audit-C Score: 10  Total Score: 11    A score of 8 or more is associated with harmful or hazardous drinking. A score of 13 or more in women, and 15 or more in men, is likely to indicate alcohol dependence.   Substance Abuse Interventions:  · Alcohol misuse/dependence:  educational materials provided    General Health and ACP:  General  In general, how would you say your health is?: Good  In the past 7 days, have you experienced any of the following?  New or Increased Pain, New or Increased Fatigue, Loneliness, Social Isolation, Stress or Anger?: (!) Social Isolation, Stress  Do you get the social and emotional support that you need?: (!) No  Do you have a Living Will?: Yes  Advance Directives     Power of  Living Will ACP-Advance Directive ACP-Power of     Not on File Filed on 09/20/18 Filed Not on File      General Health Risk Interventions:  · Social isolation: patient's comments regarding inadequate social support: pandemic    Health Habits/Nutrition:  Health Habits/Nutrition  Do you exercise for at least 20 minutes 2-3 times per week?: (!) No  Have you lost any weight without trying in the past 3 months?: No  Do you eat fewer than 2 meals per day?: No  Have you seen a dentist within the past year?: (!) No  Body mass index: (!) 27.45  Health Habits/Nutrition Interventions:  · Dental exam overdue:  patient encouraged to make appointment with his/her dentist    Hearing/Vision:  No exam data present  Hearing/Vision  Do you or your family notice any trouble with your hearing?: (!) Yes  Do you have difficulty driving, watching TV, or doing any of your daily activities because of your eyesight?: (!) Yes  Have you had an eye exam within the past year?: Yes  Hearing/Vision Interventions:  · Hearing concerns:  patient declines any further evaluation/treatment for hearing issues - previously saw ENT (he is unsure who)    Safety:  Safety  Do you have working smoke detectors?: Yes  Have all throw rugs been removed or fastened?: Yes  Do you have non-slip mats or surfaces in all bathtubs/showers?: Yes  Do all of your stairways have a railing or banister?: Yes  Are your doorways, halls and stairs free of clutter?: Yes  Do you always fasten your seatbelt when you are in a car?: (!) No  Safety Interventions:  · Home safety tips provided    ADL:  ADLs  In the past 7 days, did you need help from others to perform any of the following everyday activities? Eating, dressing, grooming, bathing, toileting, or walking/balance?: None  In the past 7 days, did you need help from others to take care of any of the following? Laundry, housekeeping, banking/finances, shopping, telephone use, food preparation, transportation, or taking medications?: [employer], Housekeeping, Shopping, Food Preparation  ADL Interventions:  · Patient declines any further evaluation/treatment for this issue - wife handles these issues    Personalized Preventive Plan   Current Health Maintenance Status  There is no immunization history for the selected administration types on file for this patient. Health Maintenance   Topic Date Due    DTaP/Tdap/Td vaccine (1 - Tdap) 01/24/1968    Shingles Vaccine (1 of 2) 01/24/1999    Pneumococcal 65+ years Vaccine (1 of 1 - PPSV23) 01/24/2014    Diabetic retinal exam  06/18/2016    Diabetic foot exam  10/25/2018    Annual Wellness Visit (AWV)  06/19/2019    Flu vaccine (1) 09/01/2020    Diabetic microalbuminuria test  01/03/2021    Potassium monitoring  01/03/2021    Creatinine monitoring  01/03/2021    Lipid screen  01/03/2021    PSA counseling  01/03/2021    A1C test (Diabetic or Prediabetic)  01/27/2022    Colon cancer screen colonoscopy  06/26/2023    AAA screen  Completed    Hepatitis C screen  Completed    Hepatitis A vaccine  Aged Out    Hib vaccine  Aged Out    Meningococcal (ACWY) vaccine  Aged Out     Recommendations for Yesmail Due: see orders and patient instructions/AVS.  . Recommended screening schedule for the next 5-10 years is provided to the patient in written form: see Patient Joshua Almendarez was seen today for medicare awv, diabetes and health maintenance. Diagnoses and all orders for this visit:    Routine general medical examination at a health care facility    Essential hypertension  -     amLODIPine (NORVASC) 5 MG tablet;  Take 1 tablet by mouth daily  -     hydrALAZINE (APRESOLINE) 25 MG tablet; Take 1 tablet by mouth 2 times daily  -     atenolol (TENORMIN) 50 MG tablet; Take 1 tablet by mouth 2 times daily  -     Comprehensive Metabolic Panel; Future  -     CBC Auto Differential; Future  -     TSH; Future    Mixed hyperlipidemia  -     atorvastatin (LIPITOR) 40 MG tablet; Take 1 tablet by mouth daily  -     Comprehensive Metabolic Panel; Future  -     CBC Auto Differential; Future  -     TSH; Future  -     LIPID PANEL; Future    Diet-controlled diabetes mellitus (HCC)  -     POCT glycosylated hemoglobin (Hb A1C)  -     HEMOGLOBIN A1C; Future  -     Microalbumin / Creatinine Urine Ratio; Future    Orthostatic hypotension  -     amLODIPine (NORVASC) 5 MG tablet; Take 1 tablet by mouth daily    Panic disorder  -     PARoxetine (PAXIL) 30 MG tablet; TAKE 1 TABLET EVERY MORNING    Screening PSA (prostate specific antigen)  -     PSA SCREENING; Future    Other orders  -     gabapentin (NEURONTIN) 600 MG tablet; Take 1 tablet by mouth 3 times daily for 90 days.

## 2021-06-21 ENCOUNTER — TELEPHONE (OUTPATIENT)
Dept: VASCULAR SURGERY | Age: 71
End: 2021-06-21

## 2021-06-21 DIAGNOSIS — E78.2 MIXED HYPERLIPIDEMIA: ICD-10-CM

## 2021-06-21 DIAGNOSIS — F41.0 PANIC DISORDER: ICD-10-CM

## 2021-06-21 DIAGNOSIS — I95.1 ORTHOSTATIC HYPOTENSION: ICD-10-CM

## 2021-06-21 DIAGNOSIS — I10 ESSENTIAL HYPERTENSION: ICD-10-CM

## 2021-06-21 NOTE — TELEPHONE ENCOUNTER
Patient is completely out of these medications and would like a weeks supply to last until the mail order comes in. Last Appointment   1/27/2021  Next Appointment  7/28/2021

## 2021-06-22 ENCOUNTER — OFFICE VISIT (OUTPATIENT)
Dept: VASCULAR SURGERY | Age: 71
End: 2021-06-22
Payer: MEDICARE

## 2021-06-22 ENCOUNTER — HOSPITAL ENCOUNTER (OUTPATIENT)
Dept: CARDIOLOGY | Age: 71
Discharge: HOME | End: 2021-06-22
Payer: MEDICARE

## 2021-06-22 DIAGNOSIS — I65.23 CAROTID ARTERY STENOSIS, ASYMPTOMATIC, BILATERAL: Primary | ICD-10-CM

## 2021-06-22 DIAGNOSIS — I65.23 BILATERAL CAROTID ARTERY STENOSIS: ICD-10-CM

## 2021-06-22 PROCEDURE — 99212 OFFICE O/P EST SF 10 MIN: CPT | Performed by: NURSE PRACTITIONER

## 2021-06-22 PROCEDURE — 1123F ACP DISCUSS/DSCN MKR DOCD: CPT | Performed by: NURSE PRACTITIONER

## 2021-06-22 PROCEDURE — 93880 EXTRACRANIAL BILAT STUDY: CPT

## 2021-06-22 PROCEDURE — G8417 CALC BMI ABV UP PARAM F/U: HCPCS | Performed by: NURSE PRACTITIONER

## 2021-06-22 PROCEDURE — 4040F PNEUMOC VAC/ADMIN/RCVD: CPT | Performed by: NURSE PRACTITIONER

## 2021-06-22 PROCEDURE — 3017F COLORECTAL CA SCREEN DOC REV: CPT | Performed by: NURSE PRACTITIONER

## 2021-06-22 PROCEDURE — 1036F TOBACCO NON-USER: CPT | Performed by: NURSE PRACTITIONER

## 2021-06-22 PROCEDURE — G8427 DOCREV CUR MEDS BY ELIG CLIN: HCPCS | Performed by: NURSE PRACTITIONER

## 2021-06-22 RX ORDER — HYDRALAZINE HYDROCHLORIDE 25 MG/1
25 TABLET, FILM COATED ORAL 2 TIMES DAILY
Qty: 180 TABLET | Refills: 1 | Status: SHIPPED
Start: 2021-06-22 | End: 2021-12-03

## 2021-06-22 RX ORDER — ATORVASTATIN CALCIUM 40 MG/1
40 TABLET, FILM COATED ORAL DAILY
Qty: 90 TABLET | Refills: 1 | Status: SHIPPED
Start: 2021-06-22 | End: 2021-10-21

## 2021-06-22 RX ORDER — ATORVASTATIN CALCIUM 40 MG/1
40 TABLET, FILM COATED ORAL DAILY
Qty: 10 TABLET | Refills: 0 | Status: SHIPPED
Start: 2021-06-22 | End: 2021-06-22 | Stop reason: SDUPTHER

## 2021-06-22 RX ORDER — AMLODIPINE BESYLATE 5 MG/1
5 TABLET ORAL DAILY
Qty: 10 TABLET | Refills: 0 | Status: SHIPPED
Start: 2021-06-22 | End: 2021-06-22 | Stop reason: SDUPTHER

## 2021-06-22 RX ORDER — AMLODIPINE BESYLATE 5 MG/1
5 TABLET ORAL DAILY
Qty: 90 TABLET | Refills: 1 | Status: SHIPPED
Start: 2021-06-22 | End: 2021-10-21

## 2021-06-22 RX ORDER — GABAPENTIN 600 MG/1
600 TABLET ORAL 3 TIMES DAILY
Qty: 270 TABLET | Refills: 1 | Status: SHIPPED
Start: 2021-06-22 | End: 2021-10-21

## 2021-06-22 RX ORDER — ATENOLOL 50 MG/1
50 TABLET ORAL 2 TIMES DAILY
Qty: 180 TABLET | Refills: 1 | Status: SHIPPED
Start: 2021-06-22 | End: 2021-10-21

## 2021-06-22 RX ORDER — PAROXETINE 30 MG/1
TABLET, FILM COATED ORAL
Qty: 90 TABLET | Refills: 1 | Status: SHIPPED
Start: 2021-06-22 | End: 2022-01-25

## 2021-06-22 NOTE — PROGRESS NOTES
[facility] - [facility]    This is a pre read worksheet - prior to official physician interpretation    Debbie Ozuna  1950  Date of study: 6/22/21    Indication for study:  Carotid artery stenosis  Study : Bilateral Carotid Artery Duplex Examination    Duplex examination of the RIGHT carotid artery system identifies atherosclerotic plaque. The peak systolic velocity in internal carotid artery was 98 centimeters / second. The maximum end diastolic velocity was 21 centimeters / second. The ICA/CCA ratio is 0.9. The right vertebral artery has antegrade flow. Duplex examination of the LEFT carotid artery system identifies atherosclerotic plaque. The peak systolic velocity in internal carotid artery was 190 centimeters / second. The maximum end diastolic velocity was 50 centimeters / second. The ICA/CCA ratio is 1.9. The left vertebral artery has antegrade flow.     LEFT DISTAL ICA very tortuous      LAST STUDY  6/12/2020  Rt wnl  Lt 50-69

## 2021-06-22 NOTE — PROGRESS NOTES
Vascular Surgery Outpatient Progress Note      Chief Complaint   Patient presents with    Circulatory Problem     Follow up carotid stenosis. HISTORY OF PRESENT ILLNESS:                The patient is a 66 y.o. male who returns for follow-up evaluation of carotid artery disease and R CEA. He denies any symptoms of stroke, ministroke, or amaurosis fugax. He denies any recent hospital admission, major illness, or surgery since the last office visit. Past Medical History:        Diagnosis Date    Anxiety     Depression     GERD (gastroesophageal reflux disease)     Heart palpitations     HTN (hypertension) 10/21/2010    Hyperlipidemia     Lightheadedness     OA (osteoarthritis)     Parathyroid adenoma     s/p surgery 2005    Pre-diabetes     Recurrent left pleural effusion 9/13/2018    Stenosis of right internal carotid artery with cerebral infarction [facility])      Past Surgical History:        Procedure Laterality Date    BACK SURGERY  12/20/2016    revision cervical laminectomy    CAROTID ENDARTERECTOMY Right 8/25/2014    Delatore - bovine patch     CERVICAL FUSION  12/12/2016    C4-C5, C5-C6, C6-C7 ACF, C5-C6 Corpectomy    COLONOSCOPY N/A 6/26/2020    COLONOSCOPY POLYPECTOMY SNARE/COLD BIOPSY performed by Sandra Lwoery MD at [address], DIAGNOSTIC      EYE SURGERY  2001    lasex    FRACTURE SURGERY Left     plate in wrist    HAND SURGERY      PARATHYROIDECTOMY  11/7/2005    MA Domenic Nichols 405 DX LUNGS/PERICAR/MED/PLEURAL SPACE W/O BX Right 9/17/2018    THORACOSCOPY performed by Leona Rivera MD at [address]  07/07/2014    UPPER GASTROINTESTINAL ENDOSCOPY N/A 6/26/2020    EGD BIOPSY performed by Sandra Lowery MD at [address] [facility]     Current Medications:   Prior to Admission medications    Medication Sig Start Date End Date Taking?  Authorizing Provider   amLODIPine (NORVASC) 5 MG tablet Take 1 tablet by mouth daily 21 Yes Colt Goldman DO   gabapentin (NEURONTIN) 600 MG tablet Take 1 tablet by mouth 3 times daily for 90 days. 21 Yes Colt Goldman DO   atenolol (TENORMIN) 50 MG tablet Take 1 tablet by mouth 2 times daily 21 Yes Colt Goldman DO   atorvastatin (LIPITOR) 40 MG tablet Take 1 tablet by mouth daily 21  Yes Manjula Velazquez, DO   hydrALAZINE (APRESOLINE) 25 MG tablet Take 1 tablet by mouth 2 times daily 21 Yes Manjula Lemming, DO   PARoxetine (PAXIL) 30 MG tablet TAKE 1 TABLET EVERY MORNING 21  Yes Colt Goldman DO   Esomeprazole Magnesium (NEXIUM 24HR PO) Take by mouth as needed   Yes Historical Provider, MD   Multiple Vitamins-Minerals (COMPLETE MULTIVITAMIN/MINERAL PO) Take 1 tablet by mouth daily   Yes Historical Provider, MD   aspirin 81 MG tablet Take 81 mg by mouth every morning    Yes Historical Provider, MD   acetaminophen (TYLENOL) 325 MG tablet Take 650 mg by mouth every 6 hours as needed for Pain   Yes Historical Provider, MD     Allergies:  Flexeril [cyclobenzaprine], Lisinopril, Influenza virus vaccine, Metformin and related, and Zocor [simvastatin]    Social History     Socioeconomic History    Marital status: Single     Spouse name: Not on file    Number of children: Not on file    Years of education: Not on file    Highest education level: Not on file   Occupational History    Not on file   Tobacco Use    Smoking status: Former Smoker     Packs/day: 3.00     Years: 14.00     Pack years: 42.00     Types: Cigarettes     Start date: 55     Quit date: 1983     Years since quittin.7    Smokeless tobacco: Never Used   Vaping Use    Vaping Use: Never used   Substance and Sexual Activity    Alcohol use:  Yes     Alcohol/week: 5.0 standard drinks     Types: 5 Standard drinks or equivalent per week     Comment: 5 mixed drinks daily- GIN/TONIC    Drug use: No    Sexual activity: Not on file   Other Topics Concern    Not on file   Social History Narrative    Not on file     Social Determinants of Health     Financial Resource Strain:     Difficulty of Paying Living Expenses:    Food Insecurity:     Worried About Running Out of Food in the Last Year:     [address] in the Last Year:    Transportation Needs:     Lack of Transportation (Medical):      Lack of Transportation (Non-Medical):    Physical Activity:     Days of Exercise per Week:     Minutes of Exercise per Session:    Stress:     Feeling of Stress :    Social Connections:     Frequency of Communication with Friends and Family:     Frequency of Social Gatherings with Friends and Family:     Attends Sabian[employer] Services:     Active Member of Clubs or Organizations:     Attends Club or Organization Meetings:     Marital Status:    Intimate Partner Violence:     Fear of Current or Ex-Partner:     Emotionally Abused:     Physically Abused:     Sexually Abused:         Family History   Problem Relation Age of Onset    Thyroid Cancer Sister    Jeanne Colander Sister     Stroke Mother     Diabetes Mother     Stroke Father     Other Brother          in surgery cause unknown    Heart Disease Maternal Grandmother     Arthritis Maternal Grandmother     Heart Disease Maternal Grandfather     Cancer Paternal Grandmother     Other Paternal Grandfather         unknown cause of death       REVIEW OF SYSTEMS (New symptoms):    Eyes:      Blurred vision:  No [x]/Yes []               Diplopia:   No [x]/Yes []               Vision loss:       No [x]/Yes []   Ears, nose, throat:             Hearing loss:    No [x]/Yes []      Vertigo:   No [x]/Yes []                       Swallowing problem:  No [x]/Yes []               Nose bleeds:   No [x]/Yes []      Voice hoarseness:  No [x]/Yes []  Respiratory:             Cough:   No [x]/Yes []      Pleuritic chest pain:  No [x]/Yes []                        Dyspnea:   No [x]/Yes []      Wheezing:   No [x]/Yes

## 2021-07-20 ENCOUNTER — HOSPITAL ENCOUNTER (OUTPATIENT)
Age: 71
Discharge: HOME | End: 2021-07-20
Payer: MEDICARE

## 2021-07-20 DIAGNOSIS — E11.9 DIET-CONTROLLED DIABETES MELLITUS (HCC): ICD-10-CM

## 2021-07-20 DIAGNOSIS — Z12.5 SCREENING PSA (PROSTATE SPECIFIC ANTIGEN): ICD-10-CM

## 2021-07-20 DIAGNOSIS — I10 ESSENTIAL HYPERTENSION: ICD-10-CM

## 2021-07-20 DIAGNOSIS — E78.2 MIXED HYPERLIPIDEMIA: ICD-10-CM

## 2021-07-20 LAB
ALBUMIN SERPL-MCNC: 4.1 G/DL (ref 3.5–5.2)
ALP BLD-CCNC: 101 U/L (ref 40–129)
ALT SERPL-CCNC: 23 U/L (ref 0–40)
ANION GAP SERPL CALCULATED.3IONS-SCNC: 13 MMOL/L (ref 7–16)
AST SERPL-CCNC: 23 U/L (ref 0–39)
BASOPHILS ABSOLUTE: 0.08 E9/L (ref 0–0.2)
BASOPHILS RELATIVE PERCENT: 1.1 % (ref 0–2)
BILIRUB SERPL-MCNC: 0.8 MG/DL (ref 0–1.2)
BUN BLDV-MCNC: 14 MG/DL (ref 6–23)
CALCIUM SERPL-MCNC: 9.6 MG/DL (ref 8.6–10.2)
CHLORIDE BLD-SCNC: 100 MMOL/L (ref 98–107)
CHOLESTEROL, TOTAL: 167 MG/DL (ref 0–199)
CO2: 28 MMOL/L (ref 22–29)
CREAT SERPL-MCNC: 1.3 MG/DL (ref 0.7–1.2)
CREATININE URINE: 283 MG/DL (ref 40–278)
EOSINOPHILS ABSOLUTE: 0.43 E9/L (ref 0.05–0.5)
EOSINOPHILS RELATIVE PERCENT: 5.9 % (ref 0–6)
GFR AFRICAN AMERICAN: >60
GFR NON-AFRICAN AMERICAN: 54 ML/MIN/1.73
GLUCOSE BLD-MCNC: 115 MG/DL (ref 74–99)
HBA1C MFR BLD: 5.3 % (ref 4–5.6)
HCT VFR BLD CALC: 44 % (ref 37–54)
HDLC SERPL-MCNC: 88 MG/DL
HEMOGLOBIN: 14.1 G/DL (ref 12.5–16.5)
IMMATURE GRANULOCYTES #: 0.03 E9/L
IMMATURE GRANULOCYTES %: 0.4 % (ref 0–5)
LDL CHOLESTEROL CALCULATED: 51 MG/DL (ref 0–99)
LYMPHOCYTES ABSOLUTE: 0.93 E9/L (ref 1.5–4)
LYMPHOCYTES RELATIVE PERCENT: 12.7 % (ref 20–42)
MCH RBC QN AUTO: 31.5 PG (ref 26–35)
MCHC RBC AUTO-ENTMCNC: 32 % (ref 32–34.5)
MCV RBC AUTO: 98.2 FL (ref 80–99.9)
MICROALBUMIN UR-MCNC: 50.2 MG/L
MICROALBUMIN/CREAT UR-RTO: 17.7 (ref 0–30)
MONOCYTES ABSOLUTE: 0.58 E9/L (ref 0.1–0.95)
MONOCYTES RELATIVE PERCENT: 7.9 % (ref 2–12)
NEUTROPHILS ABSOLUTE: 5.3 E9/L (ref 1.8–7.3)
NEUTROPHILS RELATIVE PERCENT: 72 % (ref 43–80)
PDW BLD-RTO: 12.6 FL (ref 11.5–15)
PLATELET # BLD: 99 E9/L (ref 130–450)
PLATELET CONFIRMATION: NORMAL
PMV BLD AUTO: 10.7 FL (ref 7–12)
POTASSIUM SERPL-SCNC: 3.7 MMOL/L (ref 3.5–5)
PROSTATE SPECIFIC ANTIGEN: 4.99 NG/ML (ref 0–4)
RBC # BLD: 4.48 E12/L (ref 3.8–5.8)
SODIUM BLD-SCNC: 141 MMOL/L (ref 132–146)
TOTAL PROTEIN: 6.9 G/DL (ref 6.4–8.3)
TRIGL SERPL-MCNC: 139 MG/DL (ref 0–149)
TSH SERPL DL<=0.05 MIU/L-ACNC: 0.76 UIU/ML (ref 0.27–4.2)
VLDLC SERPL CALC-MCNC: 28 MG/DL
WBC # BLD: 7.4 E9/L (ref 4.5–11.5)

## 2021-07-20 PROCEDURE — G0103 PSA SCREENING: HCPCS

## 2021-07-20 PROCEDURE — 82044 UR ALBUMIN SEMIQUANTITATIVE: CPT

## 2021-07-20 PROCEDURE — 80061 LIPID PANEL: CPT

## 2021-07-20 PROCEDURE — 85025 COMPLETE CBC W/AUTO DIFF WBC: CPT

## 2021-07-20 PROCEDURE — 36415 COLL VENOUS BLD VENIPUNCTURE: CPT

## 2021-07-20 PROCEDURE — 82570 ASSAY OF URINE CREATININE: CPT

## 2021-07-20 PROCEDURE — 83036 HEMOGLOBIN GLYCOSYLATED A1C: CPT

## 2021-07-20 PROCEDURE — 84443 ASSAY THYROID STIM HORMONE: CPT

## 2021-07-20 PROCEDURE — 80053 COMPREHEN METABOLIC PANEL: CPT

## 2021-07-28 ENCOUNTER — OFFICE VISIT (OUTPATIENT)
Dept: FAMILY MEDICINE CLINIC | Age: 71
End: 2021-07-28
Payer: MEDICARE

## 2021-07-28 VITALS
SYSTOLIC BLOOD PRESSURE: 150 MMHG | OXYGEN SATURATION: 98 % | HEIGHT: 65 IN | TEMPERATURE: 97.9 F | WEIGHT: 158 LBS | HEART RATE: 59 BPM | BODY MASS INDEX: 26.33 KG/M2 | RESPIRATION RATE: 16 BRPM | DIASTOLIC BLOOD PRESSURE: 80 MMHG

## 2021-07-28 DIAGNOSIS — D69.6 DECREASED PLATELET COUNT (HCC): ICD-10-CM

## 2021-07-28 DIAGNOSIS — G62.9 NEUROPATHY: ICD-10-CM

## 2021-07-28 DIAGNOSIS — F41.1 GAD (GENERALIZED ANXIETY DISORDER): ICD-10-CM

## 2021-07-28 DIAGNOSIS — R97.20 ELEVATED PSA: ICD-10-CM

## 2021-07-28 DIAGNOSIS — E78.5 DYSLIPIDEMIA: ICD-10-CM

## 2021-07-28 DIAGNOSIS — I10 ESSENTIAL HYPERTENSION: Primary | ICD-10-CM

## 2021-07-28 DIAGNOSIS — N18.31 STAGE 3A CHRONIC KIDNEY DISEASE (HCC): ICD-10-CM

## 2021-07-28 PROCEDURE — 4040F PNEUMOC VAC/ADMIN/RCVD: CPT | Performed by: FAMILY MEDICINE

## 2021-07-28 PROCEDURE — 3017F COLORECTAL CA SCREEN DOC REV: CPT | Performed by: FAMILY MEDICINE

## 2021-07-28 PROCEDURE — 99214 OFFICE O/P EST MOD 30 MIN: CPT | Performed by: FAMILY MEDICINE

## 2021-07-28 PROCEDURE — 1123F ACP DISCUSS/DSCN MKR DOCD: CPT | Performed by: FAMILY MEDICINE

## 2021-07-28 PROCEDURE — 1036F TOBACCO NON-USER: CPT | Performed by: FAMILY MEDICINE

## 2021-07-28 PROCEDURE — G8428 CUR MEDS NOT DOCUMENT: HCPCS | Performed by: FAMILY MEDICINE

## 2021-07-28 PROCEDURE — G8417 CALC BMI ABV UP PARAM F/U: HCPCS | Performed by: FAMILY MEDICINE

## 2021-07-28 ASSESSMENT — ENCOUNTER SYMPTOMS
VOMITING: 0
COUGH: 0
SHORTNESS OF BREATH: 0
CONSTIPATION: 0
ABDOMINAL PAIN: 0
NAUSEA: 0
DIARRHEA: 0

## 2021-07-28 NOTE — PROGRESS NOTES
2021     Sherrian Paget (:  1950) is a 66 y.o. male, with a:  Past Medical History:   Diagnosis Date    Anxiety     Depression     GERD (gastroesophageal reflux disease)     Heart palpitations     HTN (hypertension) 10/21/2010    Hyperlipidemia     Lightheadedness     OA (osteoarthritis)     Parathyroid adenoma     s/p surgery     Pre-diabetes     Recurrent left pleural effusion 2018    Stenosis of right internal carotid artery with cerebral infarction [facility])        Here for evaluation of the following medical concerns:  Chief Complaint   Patient presents with    6 Month Follow-Up    Discuss Labs     completed 2021.  Patient is concerned his platelets are low      He also follows with Cardiology (Dr. Souza), Vascular (Dr. Bridges), Pulm (Dr. Mario), and Nephrology (Dr. Selam Tirado)    He has previously seen ENT, Urology    F/U of chronic problem(s) and new or recent complaint of decreased platelets   Chronic problems reviewed today include:  HTN, HLD, Neuropathy, CKD, SANDRA, elevated PSA  Current status of this/these condition(s):  stable  Tolerating meds: Yes    Hypertension  Current treatment: atenolol 50 mg twice daily, hydralazine 25 mg twice daily, and amlodipine 5 mg daily  Recent medication changes: none  Did not take BP meds last evening    BP Readings from Last 3 Encounters:   21 (!) 150/80   21 130/80   20 (!) 177/81                                          Sodium (mmol/L)   Date Value   2021 141    BUN (mg/dL)   Date Value   2021 14    Glucose (mg/dL)   Date Value   2021 115 (H)   2012 141 (H)      Potassium (mmol/L)   Date Value   2021 3.7     Potassium reflex Magnesium (mmol/L)   Date Value   2018 4.2    CREATININE (mg/dL)   Date Value   2021 1.3 (H)         Hyperlipidemia  Current treatment: Atorvastatin 40 mg daily  Recent medication changes: None    Lab Results   Component Value Date    CHOL 167 07/20/2021    TRIG 139 07/20/2021    HDL 88 07/20/2021    LDLCALC 51 07/20/2021     Lab Results   Component Value Date    ALT 23 07/20/2021    AST 23 07/20/2021        Diabetic neuropathy  Current treatment: Gabapentin 600 mg 3 times daily  Recent medication changes: None     SANDRA  Current treatment: Paroxetine 30 mg daily  Recent medication changes: None    Elevated PSA  Recent PSA 4.99, slightly decreased from prior  Denies LUTS symptoms  Per EMR review, did see Urology years ago but did not follow up as directed      Review of Systems   Constitutional: Negative for chills and fever. Respiratory: Negative for cough and shortness of breath. Cardiovascular: Negative for chest pain and leg swelling. Gastrointestinal: Negative for abdominal pain, constipation, diarrhea, nausea and vomiting. Genitourinary: Negative for dysuria. Hematological: Bruises/bleeds easily. Psychiatric/Behavioral: The patient is nervous/anxious. Prior to Visit Medications    Medication Sig Taking? Authorizing Provider   amLODIPine (NORVASC) 5 MG tablet Take 1 tablet by mouth daily Yes Colt Goldman DO   gabapentin (NEURONTIN) 600 MG tablet Take 1 tablet by mouth 3 times daily for 90 days.  Yes Colt Goldman DO   atenolol (TENORMIN) 50 MG tablet Take 1 tablet by mouth 2 times daily Yes Colt Goldman DO   atorvastatin (LIPITOR) 40 MG tablet Take 1 tablet by mouth daily Yes Colt Goldman DO   hydrALAZINE (APRESOLINE) 25 MG tablet Take 1 tablet by mouth 2 times daily Yes Colt Goldman DO   PARoxetine (PAXIL) 30 MG tablet TAKE 1 TABLET EVERY MORNING Yes Colt Goldman DO   Esomeprazole Magnesium (NEXIUM 24HR PO) Take by mouth as needed Yes Historical Provider, MD   Multiple Vitamins-Minerals (COMPLETE MULTIVITAMIN/MINERAL PO) Take 1 tablet by mouth daily Yes Historical Provider, MD   aspirin 81 MG tablet Take 81 mg by mouth every morning  Yes Historical Provider, MD   acetaminophen (TYLENOL) 325 MG tablet Take 650 mg by mouth every 6 hours as needed for Pain Yes Historical Provider, MD        Social History     Tobacco Use    Smoking status: Former Smoker     Packs/day: 3.00     Years: 14.00     Pack years: 42.00     Types: Cigarettes     Start date:      Quit date: 1983     Years since quittin.8    Smokeless tobacco: Never Used   Substance Use Topics    Alcohol use: Yes     Alcohol/week: 5.0 standard drinks     Types: 5 Standard drinks or equivalent per week     Comment: 5 mixed drinks daily- GIN/TONIC        Past Surgical History:   Procedure Laterality Date    BACK SURGERY  2016    revision cervical laminectomy    CAROTID ENDARTERECTOMY Right 2014    Delatore - bovine patch     CERVICAL FUSION  2016    C4-C5, C5-C6, C6-C7 ACF, C5-C6 Corpectomy    COLONOSCOPY N/A 2020    COLONOSCOPY POLYPECTOMY SNARE/COLD BIOPSY performed by Jessica Greenfield MD at [facility] [place], DIAGNOSTIC      EYE SURGERY      lasex    FRACTURE SURGERY Left     plate in wrist    HAND SURGERY      PARATHYROIDECTOMY  2005    NC Domenic Nichols [address] DX LUNGS/PERICAR/MED/PLEURAL SPACE W/O BX Right 2018    THORACOSCOPY performed by Kuldip Mitchell MD at [address]  2014    UPPER GASTROINTESTINAL ENDOSCOPY N/A 2020    EGD BIOPSY performed by Jessica Greenfield MD at [address] [place] [address] Street:    21 1255 21 1301   BP: (!) 160/80 (!) 150/80   Pulse: 59    Resp: 16    Temp: 97.9 °F (36.6 °C)    TempSrc: Temporal    SpO2: 98%    Weight: 158 lb (71.7 kg)    Height: 5' 5\" (1.651 m)      Estimated body mass index is 26.29 kg/m² as calculated from the following:    Height as of this encounter: 5' 5\" (1.651 m). Weight as of this encounter: 158 lb (71.7 kg). Physical Exam  HENT:      Head: Normocephalic and atraumatic. Eyes:      Extraocular Movements: Extraocular movements intact.       Conjunctiva/sclera: Conjunctivae normal. Cardiovascular:      Rate and Rhythm: Normal rate and regular rhythm. Pulmonary:      Effort: Pulmonary effort is normal. No respiratory distress. Breath sounds: No wheezing. Genitourinary:     Prostate: Enlarged. Not tender and no nodules present. Musculoskeletal:      Right lower leg: No edema. Left lower leg: No edema. Neurological:      Mental Status: He is alert. Mental status is at baseline. ASSESSMENT/PLAN:  Yoko Rockwell was seen today for 6 month follow-up and discuss labs. Diagnoses and all orders for this visit:    Essential hypertension  -     Comprehensive Metabolic Panel; Future  -     CBC Auto Differential; Future    Dyslipidemia  -     Comprehensive Metabolic Panel; Future  -     CBC Auto Differential; Future    Neuropathy  -     Comprehensive Metabolic Panel; Future  -     CBC Auto Differential; Future    Stage 3a chronic kidney disease (HCC)    SANDRA (generalized anxiety disorder)    Decreased platelet count ([facility] [address]  -     Syd Sheppard MD, Medical Oncology, [facility]    Elevated PSA  -     PSA, DIAGNOSTIC; Future    Additional plan and future considerations:   As above. Recent labs reviewed in office. Refer to heme-onc and decrease alcohol consumption.   RTO in 6 months with labs drawn 1 week prior for AWV, elevated PSA follow-up or sooner if needed    Future Appointments   Date Time Provider Neto Tamayo   2/1/2022  1:00 PM DO Skinny [facility]   6/20/2023  2:00 PM [facility]AS  RM 1 MARIA D Ac   6/20/2023  2:30 PM Damaso Buck MD [facility]         --DO Skinny on 7/28/2021 at 1:10 PM

## 2021-08-17 ENCOUNTER — OFFICE VISIT (OUTPATIENT)
Dept: ONCOLOGY | Age: 71
End: 2021-08-17
Payer: MEDICARE

## 2021-08-17 ENCOUNTER — HOSPITAL ENCOUNTER (OUTPATIENT)
Dept: INFUSION THERAPY | Age: 71
Discharge: HOME | End: 2021-08-17
Payer: MEDICARE

## 2021-08-17 VITALS
WEIGHT: 160.3 LBS | BODY MASS INDEX: 26.71 KG/M2 | HEART RATE: 55 BPM | TEMPERATURE: 98.7 F | HEIGHT: 65 IN | OXYGEN SATURATION: 95 % | RESPIRATION RATE: 18 BRPM | DIASTOLIC BLOOD PRESSURE: 66 MMHG | SYSTOLIC BLOOD PRESSURE: 150 MMHG

## 2021-08-17 DIAGNOSIS — D69.6 THROMBOCYTOPENIA (HCC): ICD-10-CM

## 2021-08-17 DIAGNOSIS — D69.6 THROMBOCYTOPENIA (HCC): Primary | ICD-10-CM

## 2021-08-17 LAB
ALBUMIN SERPL-MCNC: 4.4 G/DL (ref 3.5–5.2)
ALP BLD-CCNC: 94 U/L (ref 40–129)
ALT SERPL-CCNC: 19 U/L (ref 0–40)
ANION GAP SERPL CALCULATED.3IONS-SCNC: 10 MMOL/L (ref 7–16)
AST SERPL-CCNC: 23 U/L (ref 0–39)
BASOPHILS ABSOLUTE: 0.08 E9/L (ref 0–0.2)
BASOPHILS RELATIVE PERCENT: 1.1 % (ref 0–2)
BILIRUB SERPL-MCNC: 0.5 MG/DL (ref 0–1.2)
BUN BLDV-MCNC: 15 MG/DL (ref 6–23)
CALCIUM SERPL-MCNC: 9.9 MG/DL (ref 8.6–10.2)
CHLORIDE BLD-SCNC: 104 MMOL/L (ref 98–107)
CO2: 30 MMOL/L (ref 22–29)
CREAT SERPL-MCNC: 1.3 MG/DL (ref 0.7–1.2)
EOSINOPHILS ABSOLUTE: 0.38 E9/L (ref 0.05–0.5)
EOSINOPHILS RELATIVE PERCENT: 5.4 % (ref 0–6)
FOLATE: >20 NG/ML (ref 4.8–24.2)
GFR AFRICAN AMERICAN: >60
GFR NON-AFRICAN AMERICAN: 54 ML/MIN/1.73
GLUCOSE BLD-MCNC: 111 MG/DL (ref 74–99)
HCT VFR BLD CALC: 47.9 % (ref 37–54)
HEMOGLOBIN: 15.6 G/DL (ref 12.5–16.5)
IMMATURE GRANULOCYTES #: 0.02 E9/L
IMMATURE GRANULOCYTES %: 0.3 % (ref 0–5)
INR BLD: 1.1
LYMPHOCYTES ABSOLUTE: 0.75 E9/L (ref 1.5–4)
LYMPHOCYTES RELATIVE PERCENT: 10.7 % (ref 20–42)
MCH RBC QN AUTO: 32.8 PG (ref 26–35)
MCHC RBC AUTO-ENTMCNC: 32.6 % (ref 32–34.5)
MCV RBC AUTO: 100.6 FL (ref 80–99.9)
MONOCYTES ABSOLUTE: 0.54 E9/L (ref 0.1–0.95)
MONOCYTES RELATIVE PERCENT: 7.7 % (ref 2–12)
NEUTROPHILS ABSOLUTE: 5.24 E9/L (ref 1.8–7.3)
NEUTROPHILS RELATIVE PERCENT: 74.8 % (ref 43–80)
PATHOLOGIST REVIEW: NORMAL
PDW BLD-RTO: 13.2 FL (ref 11.5–15)
PLATELET # BLD: 105 E9/L (ref 130–450)
PMV BLD AUTO: 10.2 FL (ref 7–12)
POTASSIUM SERPL-SCNC: 4.3 MMOL/L (ref 3.5–5)
PROTHROMBIN TIME: 12.4 SEC (ref 9.3–12.4)
RBC # BLD: 4.76 E12/L (ref 3.8–5.8)
RHEUMATOID FACTOR: <10 IU/ML (ref 0–13)
SODIUM BLD-SCNC: 144 MMOL/L (ref 132–146)
TOTAL PROTEIN: 7.5 G/DL (ref 6.4–8.3)
TSH SERPL DL<=0.05 MIU/L-ACNC: 0.84 UIU/ML (ref 0.27–4.2)
VITAMIN B-12: 364 PG/ML (ref 211–946)
WBC # BLD: 7 E9/L (ref 4.5–11.5)

## 2021-08-17 PROCEDURE — 82746 ASSAY OF FOLIC ACID SERUM: CPT

## 2021-08-17 PROCEDURE — G8417 CALC BMI ABV UP PARAM F/U: HCPCS | Performed by: INTERNAL MEDICINE

## 2021-08-17 PROCEDURE — 84630 ASSAY OF ZINC: CPT

## 2021-08-17 PROCEDURE — 88185 FLOWCYTOMETRY/TC ADD-ON: CPT

## 2021-08-17 PROCEDURE — 84443 ASSAY THYROID STIM HORMONE: CPT

## 2021-08-17 PROCEDURE — 82525 ASSAY OF COPPER: CPT

## 2021-08-17 PROCEDURE — 88237 TISSUE CULTURE BONE MARROW: CPT

## 2021-08-17 PROCEDURE — 36415 COLL VENOUS BLD VENIPUNCTURE: CPT

## 2021-08-17 PROCEDURE — 86038 ANTINUCLEAR ANTIBODIES: CPT

## 2021-08-17 PROCEDURE — 99205 OFFICE O/P NEW HI 60 MIN: CPT | Performed by: INTERNAL MEDICINE

## 2021-08-17 PROCEDURE — 88271 CYTOGENETICS DNA PROBE: CPT

## 2021-08-17 PROCEDURE — G8428 CUR MEDS NOT DOCUMENT: HCPCS | Performed by: INTERNAL MEDICINE

## 2021-08-17 PROCEDURE — 86431 RHEUMATOID FACTOR QUANT: CPT

## 2021-08-17 PROCEDURE — 82607 VITAMIN B-12: CPT

## 2021-08-17 PROCEDURE — 88275 CYTOGENETICS 100-300: CPT

## 2021-08-17 PROCEDURE — 80074 ACUTE HEPATITIS PANEL: CPT

## 2021-08-17 PROCEDURE — 99214 OFFICE O/P EST MOD 30 MIN: CPT

## 2021-08-17 PROCEDURE — 88184 FLOWCYTOMETRY/ TC 1 MARKER: CPT

## 2021-08-17 PROCEDURE — 81270 JAK2 GENE: CPT

## 2021-08-17 PROCEDURE — 86703 HIV-1/HIV-2 1 RESULT ANTBDY: CPT

## 2021-08-17 PROCEDURE — 85025 COMPLETE CBC W/AUTO DIFF WBC: CPT

## 2021-08-17 PROCEDURE — 85610 PROTHROMBIN TIME: CPT

## 2021-08-17 PROCEDURE — 80053 COMPREHEN METABOLIC PANEL: CPT

## 2021-08-17 NOTE — PROGRESS NOTES
Warner Jo  1950 66 y.o. Referring Physician:     PCP: Lyssa Burnham,     Vitals:    21 0935   BP: (!) 150/66   Pulse: 55   Resp: 18   Temp: 98.7 °F (37.1 °C)   SpO2: 95%        Wt Readings from Last 3 Encounters:   21 160 lb 4.8 oz (72.7 kg)   21 158 lb (71.7 kg)   21 165 lb (74.8 kg)        Body mass index is 26.68 kg/m². Chief Complaint:   Chief Complaint   Patient presents with    New Patient     decreased platelet count         Cancer Staging  No matching staging information was found for the patient. Prior Radiation Therapy? NO    Concurrent Chemo/radiation? NO    Prior Chemotherapy? NO    Prior Hormonal Therapy? NO    Head and Neck Cancer? No, patient does NOT have HN cancer. LMP: na    Age at first Menses: na    : na    Para: na          Current Outpatient Medications:     gabapentin (NEURONTIN) 600 MG tablet, Take 1 tablet by mouth 3 times daily for 90 days. , Disp: 270 tablet, Rfl: 1    atorvastatin (LIPITOR) 40 MG tablet, Take 1 tablet by mouth daily, Disp: 90 tablet, Rfl: 1    PARoxetine (PAXIL) 30 MG tablet, TAKE 1 TABLET EVERY MORNING, Disp: 90 tablet, Rfl: 1    Esomeprazole Magnesium (NEXIUM 24HR PO), Take by mouth as needed, Disp: , Rfl:     Multiple Vitamins-Minerals (COMPLETE MULTIVITAMIN/MINERAL PO), Take 1 tablet by mouth daily, Disp: , Rfl:     aspirin 81 MG tablet, Take 81 mg by mouth every morning , Disp: , Rfl:     acetaminophen (TYLENOL) 325 MG tablet, Take 650 mg by mouth every 6 hours as needed for Pain, Disp: , Rfl:     amLODIPine (NORVASC) 5 MG tablet, Take 1 tablet by mouth daily, Disp: 90 tablet, Rfl: 1    atenolol (TENORMIN) 50 MG tablet, Take 1 tablet by mouth 2 times daily, Disp: 180 tablet, Rfl: 1    hydrALAZINE (APRESOLINE) 25 MG tablet, Take 1 tablet by mouth 2 times daily, Disp: 180 tablet, Rfl: 1       Past Medical History:   Diagnosis Date    Anxiety     Depression     GERD (gastroesophageal reflux disease)     Heart palpitations     HTN (hypertension) 10/21/2010    Hyperlipidemia     Lightheadedness     OA (osteoarthritis)     Parathyroid adenoma     s/p surgery     Pre-diabetes     Recurrent left pleural effusion 2018    Stenosis of right internal carotid artery with cerebral infarction [facility])        Past Surgical History:   Procedure Laterality Date    BACK SURGERY  2016    revision cervical laminectomy    CAROTID ENDARTERECTOMY Right 2014    Delatore - bovine patch     CERVICAL FUSION  2016    C4-C5, C5-C6, C6-C7 ACF, C5-C6 Corpectomy    COLONOSCOPY N/A 2020    COLONOSCOPY POLYPECTOMY SNARE/COLD BIOPSY performed [facility] at [facility], DIAGNOSTIC      EYE SURGERY      lasex    FRACTURE SURGERY Left     plate in wrist    HAND SURGERY      PARATHYROIDECTOMY  2005    [place] THORSC DX LUNGS/PERICAR/MED/PLEURAL SPACE W/O BX Right 2018    THORACOSCOPY [facility]av [facility]rea [facility] at [facility]  2014    UPPER GASTROINTESTINAL ENDOSCOPY N/A 2020    EGD BIOPSY performed by Gil Alberto MD at [place] [address] History   Problem Relation Age of Onset    Thyroid Cancer Sister     Cancer Sister     Stroke Mother     Diabetes Mother     Stroke Father     Other Brother          in surgery cause unknown    Heart Disease Maternal Grandmother     Arthritis Maternal Grandmother     Heart Disease Maternal Grandfather     Cancer Paternal Grandmother     Other Paternal Grandfather         unknown cause of death       Social History     Socioeconomic History    Marital status:      Spouse name: Not on file    Number of children: Not on file    Years of education: Not on file    Highest education level: Not on file   Occupational History    Not on file   Tobacco Use    Smoking status: Former Smoker     Packs/day: 3.00 Years: 14.00     Pack years: 42.00     Types: Cigarettes     Start date: 55     Quit date: 1983     Years since quittin.9    Smokeless tobacco: Never Used   Vaping Use    Vaping Use: Never used   Substance and Sexual Activity    Alcohol use: Yes     Alcohol/week: 5.0 standard drinks     Types: 5 Standard drinks or equivalent per week     Comment: 5 mixed drinks daily- GIN/TONIC    Drug use: No    Sexual activity: Not on file   Other Topics Concern    Not on file   Social History Narrative    Not on file     Social Determinants of Health     Financial Resource Strain:     Difficulty of Paying Living Expenses:    Food Insecurity:     Worried About Running Out of Food in the Last Year:     Ran Out of Food in the Last Year:    Transportation Needs:     Lack of Transportation (Medical):  Lack of Transportation (Non-Medical):    Physical Activity:     Days of Exercise per Week:     Minutes of Exercise per Session:    Stress:     Feeling of Stress :    Social Connections:     Frequency of Communication with Friends and Family:     Frequency of Social Gatherings with Friends and Family:     Attends [employer] Services:     Active Member of Clubs or Organizations:     Attends Club or Organization Meetings:     Marital Status:    Intimate Partner Violence:     Fear of Current or Ex-Partner:     Emotionally Abused:     Physically Abused:     Sexually Abused:            Occupation: retired  Retired:  YES: Patient is retired from self employed. REVIEW OF SYSTEMS:     Pacemaker/Defibulator/ICD:  No    Mediport: No           FALLS RISK SCREENING ASSESSMENT    Instructions:  Assess the patient and [place] the appropriate indicators that are present for fall risk identification. Total the numbers circled and assign a fall risk score from Table 2.  Reassess patient at a minimum every 12 weeks or with status change. Assessment   Date  2021     1.   Mental Ability: confusion/cognitively impaired No - 0       2. Elimination Issues: incontinence, frequency No - 0       3. Ambulatory: use of assistive devices (walker, cane, off-loading devices), attached to equipment (IV pole, oxygen) No - 0     4. Sensory Limitations: dizziness, vertigo, impaired vision Yes - 3       5. Age 72 years or greater - 1       10. Medication: diuretics, strong analgesics, hypnotics, sedatives, antihypertensive agents   Yes - 3   7. Falls:  recent history of falls within the last 3 months (not to include slipping or tripping)   No - 0   TOTAL 7    If score of 4 or greater was education given? Yes       TABLE 2   Risk Score Risk Level Plan of Care   0-3 Little or  No Risk 1. Provide assistance as indicated for ambulation activities  2. Reorient confused/cognitively impaired patient  3. Call-light/bell within patient's reach  4. Chair/bed in low position, stretcher/bed with siderails up except when performing patient care activities  5. Educate patient/family/caregiver on falls prevention  6.  Reassess in 12 weeks or with any noted change in patient condition which places them at a risk for a fall   4-6 Moderate Risk 1. Provide assistance as indicated for ambulation activities  2. Reorient confused/cognitively impaired patient  3. Call-light/bell within patient's reach  4. Chair/bed in low position, stretcher/bed with siderails up except when performing patient care activities  5. Educate patient/family/caregiver on falls prevention  6. Falls risk precaution (Yellow sticker Level II) placed on patient chart   7 or   Higher High Risk 1. Place patient in easily observable treatment room  2. Patient attended at all times by family member or staff  3. Provide assistance as indicated for ambulation activities  4. Reorient confused/cognitively impaired patient  5. Call-light/bell within patient's reach  6.   Chair/bed in low position, stretcher/bed with siderails up except when performing patient care activities  7. Educate patient/family/caregiver on falls prevention  8. Falls risk precaution (Yellow sticker Level III) placed on patient chart           MALNUTRITION RISK SCREENING ASSESSMENT    Instructions:  Assess the patient and enter the appropriate indicators that are present for nutrition risk identification. Total the numbers entered and assign a risk score. Follow the appropriate action for total score listed below. Assessment   Date  8/17/2021     1. Have you lost weight without trying? 0- No     2. Have you been eating poorly because of a decreased appetite? 0- No   3. Do you have a diagnosis of head and neck cancer?       0- No                                                                                    TOTAL 0        Score of 0-1: No action  Score 2 or greater:  · For Non-Diabetic Patient: Recommend adding Ensure Enlive 2 x daily and provide patient with Ensure wellness bag with coupons  · For Diabetic Patient: Recommend adding Glucerna Shake 2 x daily and provide patient with Glucerna Wellness bag with coupons  · Route to the dietitian via Christy Torres RN

## 2021-08-17 NOTE — PROGRESS NOTES
[place] [address]    Attending Clinic Note    Reason for Visit: Consultation on a patient with Thrombocytopenia    Referring Physician:  DO Skinny    PCP:  DO Skinny    History of Present Illness:  65 y/o male with hx of anxiety depression GERD hypertension hyperlipidemia prediabetes osteoarthritis who was noted to have thrombocytopenia on routine CBC  On 07/20/2021: Hb 14.1  Hct 44.0  MCV 98.2  WBC 7.4  PLT 99  BUN 14  Creat 1.3  LFTs wnl  TSH 0.763  He drinks alcohol 5 drinks every day for the last 34 years. No history of bleeding. No systemic symptoms. Referred to our clinic for further evaluation    Review of Systems;  CONSTITUTIONAL: No fever, chills. Fair appetite and energy level. ENMT: Eyes: No diplopia; Nose: No epistaxis. Mouth: No sore throat. RESPIRATORY: No hemoptysis, shortness of breath, cough. CARDIOVASCULAR: No chest pain, palpitations. GASTROINTESTINAL: No nausea/vomiting, abdominal pain  GENITOURINARY: No dysuria, urinary frequency, hematuria. NEURO: No syncope, presyncope, headache.   Remainder:  ROS NEGATIVE    Past Medical History:      Diagnosis Date    Anxiety     Depression     GERD (gastroesophageal reflux disease)     Heart palpitations     HTN (hypertension) 10/21/2010    Hyperlipidemia     Lightheadedness     OA (osteoarthritis)     Parathyroid adenoma     s/p surgery 2005    Pre-diabetes     Recurrent left pleural effusion 9/13/2018    Stenosis of right internal carotid artery with cerebral infarction [facility])      Past Surgical History:      Procedure Laterality Date    BACK SURGERY  12/20/2016    revision cervical laminectomy    CAROTID ENDARTERECTOMY Right 8/25/2014    Delatore - bovine patch     CERVICAL FUSION  12/12/2016    C4-C5, C5-C6, C6-C7 ACF, C5-C6 Corpectomy    COLONOSCOPY N/A 6/26/2020    COLONOSCOPY POLYPECTOMY SNARE/COLD BIOPSY performed by Mariah Malave MD at [address] [place] [address], DIAGNOSTIC      EYE SURGERY  2001    lasex    FRACTURE SURGERY Left     plate in wrist    HAND SURGERY      PARATHYROIDECTOMY  2005    [place] THORSC DX LUNGS/PERICAR/MED/PLEURAL SPACE W/O BX Right 2018    THORACOSCOPY performed by Zeus Chandler MD at [address]  2014    UPPER GASTROINTESTINAL ENDOSCOPY N/A 2020    EGD BIOPSY performed by Kimberly Garay MD at [facility] ENDOSCOPY     Family History:  Family History   Problem Relation Age of Onset    Thyroid Cancer Sister     Cancer Sister     Stroke Mother     Diabetes Mother     Stroke Father     Other Brother          in surgery cause unknown    Heart Disease Maternal Grandmother     Arthritis Maternal Grandmother     Heart Disease Maternal Grandfather     Cancer Paternal Grandmother     Other Paternal Grandfather         unknown cause of death     Medications:  Reviewed and reconciled. Social History:  Social History     Socioeconomic History    Marital status:      Spouse name: Not on file    Number of children: Not on file    Years of education: Not on file    Highest education level: Not on file   Occupational History    Not on file   Tobacco Use    Smoking status: Former Smoker     Packs/day: 3.00     Years: 14.00     Pack years: 42.00     Types: Cigarettes     Start date:      Quit date: 1983     Years since quittin.9    Smokeless tobacco: Never Used   Vaping Use    Vaping Use: Never used   Substance and Sexual Activity    Alcohol use:  Yes     Alcohol/week: 5.0 standard drinks     Types: 5 Standard drinks or equivalent per week     Comment: 5 mixed drinks daily- GIN/TONIC    Drug use: No    Sexual activity: Not on file   Other Topics Concern    Not on file   Social History Narrative    Not on file     Social Determinants of Health     Financial Resource Strain:     Difficulty of Paying Living Expenses:    Food Insecurity:     Worried wnl  TSH 0.763  He drinks alcohol 5 drinks every day for the last 34 years. No history of bleeding. No systemic symptoms. Referred to our clinic for further evaluation  Extensive blood work ordered to evaluate his thrombocytopenia  Abdomen U/S ordered to check for liver cirrhosis and splenomegaly    RTC 2 weeks to review test results. Alcohol cessation recommended.     Thank you for allowing us to participate in the care of . Adelita Lizarraga MD   8/17/2021

## 2021-08-19 LAB
ANTI-NUCLEAR ANTIBODY (ANA): NEGATIVE
Lab: NORMAL
REPORT: NORMAL
THIS TEST SENT TO: NORMAL

## 2021-08-21 LAB
COPPER: 112.5 UG/DL (ref 70–140)
ZINC: 118.9 UG/DL (ref 60–120)

## 2021-08-26 LAB — JAK2 GENE MUTATION QUAL: NORMAL

## 2021-08-28 LAB
Lab: NORMAL
REPORT: NORMAL
THIS TEST SENT TO: NORMAL

## 2021-08-29 ENCOUNTER — HOSPITAL ENCOUNTER (OUTPATIENT)
Dept: ULTRASOUND IMAGING | Age: 71
Discharge: HOME | End: 2021-08-31
Payer: MEDICARE

## 2021-08-29 DIAGNOSIS — D69.6 THROMBOCYTOPENIA (HCC): ICD-10-CM

## 2021-08-29 PROCEDURE — 76700 US EXAM ABDOM COMPLETE: CPT

## 2021-08-31 ENCOUNTER — OFFICE VISIT (OUTPATIENT)
Dept: ONCOLOGY | Age: 71
End: 2021-08-31
Payer: MEDICARE

## 2021-08-31 ENCOUNTER — TELEPHONE (OUTPATIENT)
Dept: ADMINISTRATIVE | Age: 71
End: 2021-08-31

## 2021-08-31 VITALS
BODY MASS INDEX: 26.33 KG/M2 | RESPIRATION RATE: 18 BRPM | WEIGHT: 158 LBS | HEART RATE: 94 BPM | DIASTOLIC BLOOD PRESSURE: 80 MMHG | TEMPERATURE: 97.8 F | OXYGEN SATURATION: 94 % | HEIGHT: 65 IN | SYSTOLIC BLOOD PRESSURE: 174 MMHG

## 2021-08-31 DIAGNOSIS — D69.6 THROMBOCYTOPENIA (HCC): Primary | ICD-10-CM

## 2021-08-31 PROCEDURE — G8417 CALC BMI ABV UP PARAM F/U: HCPCS | Performed by: INTERNAL MEDICINE

## 2021-08-31 PROCEDURE — 1036F TOBACCO NON-USER: CPT | Performed by: INTERNAL MEDICINE

## 2021-08-31 PROCEDURE — G8427 DOCREV CUR MEDS BY ELIG CLIN: HCPCS | Performed by: INTERNAL MEDICINE

## 2021-08-31 PROCEDURE — 99213 OFFICE O/P EST LOW 20 MIN: CPT

## 2021-08-31 PROCEDURE — 3017F COLORECTAL CA SCREEN DOC REV: CPT | Performed by: INTERNAL MEDICINE

## 2021-08-31 PROCEDURE — 1123F ACP DISCUSS/DSCN MKR DOCD: CPT | Performed by: INTERNAL MEDICINE

## 2021-08-31 PROCEDURE — 99214 OFFICE O/P EST MOD 30 MIN: CPT | Performed by: INTERNAL MEDICINE

## 2021-08-31 PROCEDURE — 4040F PNEUMOC VAC/ADMIN/RCVD: CPT | Performed by: INTERNAL MEDICINE

## 2021-08-31 NOTE — PROGRESS NOTES
[place] [id number]       Attending Clinic Note    Reason for Visit: Follow-up on a patient with Thrombocytopenia    PCP:  Suresh Crook DO    History of Present Illness:  65 y/o male with hx of anxiety depression GERD hypertension hyperlipidemia prediabetes osteoarthritis who was noted to have thrombocytopenia on routine CBC  On 07/20/2021: Hb 14.1  Hct 44.0  MCV 98.2  WBC 7.4  PLT 99  BUN 14  Creat 1.3  LFTs wnl  TSH 0.763  He drinks alcohol 5 drinks every day for the last 34 years. No history of bleeding. No systemic symptoms. Referred to our clinic for further evaluation    On 08/17/2021: Hb 15.6  Hct 47.9   .6  WBC 7.0  ANC 5.24  ALC 0.75      Peripheral blood smear: Mild macrocytosis without anemia, normal red blood cell counts and   essentially normal red blood cell morphology. Normal total white blood cell count with moderate absolute lymphopenia, normal numbers of neutrophils without toxic granulation or left shift of the granulocytic series, normal numbers of monocytes, eosinophils and basophils, normal white blood cell lineage morphology without   hypersegmented or dysmorphic neutrophils. Mild thrombocytopenia, no platelet clumping, essentially normal platelet morphology. PT 12.4   INR 1.1    BUN 15  Creat 1.3  LFTs wnl    Folate, VitB12, TSH, Zinc, Copper wnl    RF <10  RAMAN Negative    Acute hepatitis panel Non-Reactive  HIV Non-Reactive    Peripheral blood flow cytometry noted no immunophenotypic evidence of acute leukemia or a T-cell or B-cell neoplasm. Blasts account for approximately 0.1% of total events. Peripheral blood MDS FISH: Negative study  JAK2 Mutation Not detected    Abdomen U/S 08/30/2021 noted normal liver echogenicity and No Splenomegaly. Review of Systems;  CONSTITUTIONAL: No fever, chills. Fair appetite and energy level. ENMT: Eyes: No diplopia; Nose: No epistaxis. Mouth: No sore throat.   RESPIRATORY: No hemoptysis, shortness of breath, cough. CARDIOVASCULAR: No chest pain, palpitations. GASTROINTESTINAL: No nausea/vomiting, abdominal pain  GENITOURINARY: No dysuria, urinary frequency, hematuria. NEURO: No syncope, presyncope, headache. Remainder:  ROS NEGATIVE    Past Medical History:      Diagnosis Date    Anxiety     Depression     GERD (gastroesophageal reflux disease)     Heart palpitations     HTN (hypertension) 10/21/2010    Hyperlipidemia     Lightheadedness     OA (osteoarthritis)     Parathyroid adenoma     s/p surgery 2005    Pre-diabetes     Recurrent left pleural effusion 9/13/2018    Stenosis of right internal carotid artery with cerebral infarction [facility])      Medications:  Reviewed and reconciled. Physical Exam:  BP (!) 174/80   Pulse 94   Temp 97.8 °F (36.6 °C)   Resp 18   Ht 5' 5\" (1.651 m)   Wt 158 lb (71.7 kg)   SpO2 94%   BMI 26.29 kg/m²   GENERAL: Alert, oriented x 3, not in acute distress. EXTREMITIES: Without clubbing, cyanosis, or edema. Impression/Plan:  67 y/o male with hx of anxiety depression GERD hypertension hyperlipidemia prediabetes osteoarthritis who was noted to have thrombocytopenia on routine CBC  On 07/20/2021: Hb 14.1  Hct 44.0  MCV 98.2  WBC 7.4  PLT 99  BUN 14  Creat 1.3  LFTs wnl  TSH 0.763  He drinks alcohol 5 drinks every day for the last 34 years. No history of bleeding. No systemic symptoms. Referred to our clinic for further evaluation    On 08/17/2021: Hb 15.6  Hct 47.9   .6  WBC 7.0  ANC 5.24  ALC 0.75      Peripheral blood smear: Mild macrocytosis without anemia, normal red blood cell counts and   essentially normal red blood cell morphology.    Normal total white blood cell count with moderate absolute lymphopenia, normal numbers of neutrophils without toxic granulation or left shift of the granulocytic series, normal numbers of monocytes, eosinophils and basophils, normal white blood cell lineage morphology without   hypersegmented or dysmorphic neutrophils. Mild thrombocytopenia, no platelet clumping, essentially normal platelet morphology. PT 12.4   INR 1.1    BUN 15  Creat 1.3  LFTs wnl    Folate, VitB12, TSH, Zinc, Copper wnl    RF <10  RAMAN Negative    Acute hepatitis panel Non-Reactive  HIV Non-Reactive    Peripheral blood flow cytometry noted no immunophenotypic evidence of acute leukemia or a T-cell or B-cell neoplasm. Blasts account for approximately 0.1% of total events. Peripheral blood MDS FISH: Negative study  JAK2 Mutation Not detected    Abdomen U/S 08/30/2021 noted normal liver echogenicity and No Splenomegaly. Bone marrow biopsy recommended and ordered to r/o MDS/MPD. Patient declined bone marrow aspirate and biopsy and would like to follow his blood counts with his PCP. RTC PRN. Please call if any questions. Alcohol cessation recommended.     Marycarmen Castrejon MD   8/31/2021

## 2021-08-31 NOTE — TELEPHONE ENCOUNTER
Spoke with patdon. Oral surgeon wants cardiac clearance and to know if patient requires pre-med. Patient denies any chest pain or palpitations since last visit in 2019. Patient admits to shortness of breath with exertion for \"quite a while\". Please advise.

## 2021-08-31 NOTE — TELEPHONE ENCOUNTER
Ketty Max states Gilford Starring will be undergoing oral surgery with removal of bottom teeth. Surgery is not currently scheduled. Pt was overdue to see Dr. Phylicia Hernandez and appt was scheduled for 10/27/21. Ketty Max would like to know if he will need antibiotic prior to surgery. Please call.

## 2021-10-19 DIAGNOSIS — E78.2 MIXED HYPERLIPIDEMIA: ICD-10-CM

## 2021-10-19 DIAGNOSIS — I10 ESSENTIAL HYPERTENSION: ICD-10-CM

## 2021-10-19 DIAGNOSIS — I95.1 ORTHOSTATIC HYPOTENSION: ICD-10-CM

## 2021-10-21 RX ORDER — ATENOLOL 50 MG/1
TABLET ORAL
Qty: 180 TABLET | Refills: 1 | Status: SHIPPED
Start: 2021-10-21 | End: 2022-04-08

## 2021-10-21 RX ORDER — AMLODIPINE BESYLATE 5 MG/1
5 TABLET ORAL DAILY
Qty: 90 TABLET | Refills: 1 | Status: SHIPPED
Start: 2021-10-21 | End: 2022-04-08

## 2021-10-21 RX ORDER — GABAPENTIN 600 MG/1
TABLET ORAL
Qty: 270 TABLET | Refills: 1 | Status: SHIPPED
Start: 2021-10-21 | End: 2022-04-08

## 2021-10-21 RX ORDER — ATORVASTATIN CALCIUM 40 MG/1
40 TABLET, FILM COATED ORAL DAILY
Qty: 90 TABLET | Refills: 1 | Status: SHIPPED
Start: 2021-10-21 | End: 2022-04-08

## 2021-10-27 ENCOUNTER — OFFICE VISIT (OUTPATIENT)
Dept: CARDIOLOGY CLINIC | Age: 71
End: 2021-10-27
Payer: MEDICARE

## 2021-10-27 VITALS
WEIGHT: 161 LBS | RESPIRATION RATE: 16 BRPM | DIASTOLIC BLOOD PRESSURE: 64 MMHG | SYSTOLIC BLOOD PRESSURE: 162 MMHG | BODY MASS INDEX: 26.82 KG/M2 | OXYGEN SATURATION: 97 % | HEIGHT: 65 IN | HEART RATE: 59 BPM

## 2021-10-27 DIAGNOSIS — I63.231 STENOSIS OF RIGHT INTERNAL CAROTID ARTERY WITH CEREBRAL INFARCTION (HCC): ICD-10-CM

## 2021-10-27 DIAGNOSIS — I10 ESSENTIAL HYPERTENSION: ICD-10-CM

## 2021-10-27 DIAGNOSIS — I34.0 NONRHEUMATIC MITRAL VALVE REGURGITATION: Primary | ICD-10-CM

## 2021-10-27 DIAGNOSIS — F10.10 ETOH ABUSE: ICD-10-CM

## 2021-10-27 DIAGNOSIS — R55 SYNCOPE AND COLLAPSE: ICD-10-CM

## 2021-10-27 PROCEDURE — 1123F ACP DISCUSS/DSCN MKR DOCD: CPT | Performed by: INTERNAL MEDICINE

## 2021-10-27 PROCEDURE — G8427 DOCREV CUR MEDS BY ELIG CLIN: HCPCS | Performed by: INTERNAL MEDICINE

## 2021-10-27 PROCEDURE — G8417 CALC BMI ABV UP PARAM F/U: HCPCS | Performed by: INTERNAL MEDICINE

## 2021-10-27 PROCEDURE — 4040F PNEUMOC VAC/ADMIN/RCVD: CPT | Performed by: INTERNAL MEDICINE

## 2021-10-27 PROCEDURE — 99214 OFFICE O/P EST MOD 30 MIN: CPT | Performed by: INTERNAL MEDICINE

## 2021-10-27 PROCEDURE — 93000 ELECTROCARDIOGRAM COMPLETE: CPT | Performed by: INTERNAL MEDICINE

## 2021-10-27 PROCEDURE — 3017F COLORECTAL CA SCREEN DOC REV: CPT | Performed by: INTERNAL MEDICINE

## 2021-10-27 PROCEDURE — G8484 FLU IMMUNIZE NO ADMIN: HCPCS | Performed by: INTERNAL MEDICINE

## 2021-10-27 PROCEDURE — 1036F TOBACCO NON-USER: CPT | Performed by: INTERNAL MEDICINE

## 2021-10-27 NOTE — PROGRESS NOTES
OFFICE FOLLOW-UP    Name: Ivett Alcantar     Age: 66 y.o. Date of Service: 10/27/2021    Chief Complaint: Follow-up for syncope, MV prolapse, MR    Referring Physician: Abdulkadir Brito DO    Interim History:  No new cardiac complaints since last office visit. No further syncope. No chest pain. +ADHIKARI with moderate levels of exertion. Still with increased ETOH intake (at least 5 ETOH drinks/day). No history of palpitations, PND, or orthopnea.     Review of Systems:   Cardiac: As per HPI  General: No fever, chills  Pulmonary: As per HPI  HEENT: No visual disturbances, difficult swallowing  GI: No nausea, vomiting  : No dysuria, hematuria  Endocrine: No thyroid disease, +DM  Musculoskeletal: RAMIREZ x 4, no focal motor deficits  Skin: Intact, no rashes  Neuro: No headache, seizures  Psych: Currently with no depression, anxiety    Past Medical History:  Past Medical History:   Diagnosis Date    Anxiety     Depression     GERD (gastroesophageal reflux disease)     Heart palpitations     HTN (hypertension) 10/21/2010    Hyperlipidemia     Lightheadedness     OA (osteoarthritis)     Parathyroid adenoma     s/p surgery 2005    Pre-diabetes     Recurrent left pleural effusion 9/13/2018    Stenosis of right internal carotid artery with cerebral infarction [facility])        Past Surgical History:  Past Surgical History:   Procedure Laterality Date    BACK SURGERY  12/20/2016    revision cervical laminectomy    CAROTID ENDARTERECTOMY Right 8/25/2014    Delatore - bovine patch     CERVICAL FUSION  12/12/2016    C4-C5, C5-C6, C6-C7 ACF, C5-C6 Corpectomy    COLONOSCOPY N/A 6/26/2020    COLONOSCOPY POLYPECTOMY SNARE/COLD BIOPSY performed by Lázaro Courtney MD at [address]      EYE SURGERY  2001    lasex    FRACTURE SURGERY Left     plate in wrist    HAND SURGERY      PARATHYROIDECTOMY  11/7/2005    [place] THORSC DX LUNGS/PERICAR/MED/PLEURAL SPACE W/O BX Right 9/17/2018 THORACOSCOPY performed by Bety Campbell MD at [facility] ENDOSCOPY  2014    UPPER GASTROINTESTINAL ENDOSCOPY N/A 2020    EGD BIOPSY performed by Sue Diop MD at [facility] ENDOSCOPY       Family History:  Family History   Problem Relation Age of Onset    Thyroid Cancer Sister     Cancer Sister         thyroid    Stroke Mother     Diabetes Mother     Stroke Father     Other Brother          in surgery cause unknown    Heart Disease Maternal Grandmother     Arthritis Maternal Grandmother     Heart Disease Maternal Grandfather     Cancer Paternal Grandmother     Other Paternal Grandfather         unknown cause of death       Social History:  Social History     Socioeconomic History    Marital status: Life Partner     Spouse name: Not on file    Number of children: Not on file    Years of education: Not on file    Highest education level: Not on file   Occupational History    Not on file   Tobacco Use    Smoking status: Former Smoker     Packs/day: 3.00     Years: 14.00     Pack years: 42.00     Types: Cigarettes     Start date:      Quit date: 1983     Years since quittin.1    Smokeless tobacco: Never Used   Vaping Use    Vaping Use: Never used   Substance and Sexual Activity    Alcohol use: Yes     Alcohol/week: 5.0 standard drinks     Types: 5 Standard drinks or equivalent per week     Comment: 5 mixed drinks daily- GIN/TONIC    Drug use: No    Sexual activity: Yes     Partners: Female   Other Topics Concern    Not on file   Social History Narrative    Not on file     Social Determinants of Health     Financial Resource Strain:     Difficulty of Paying Living Expenses:    Food Insecurity:     Worried About Running Out of Food in the Last Year:     Ran Out of Food in the Last Year:    Transportation Needs:     Lack of Transportation (Medical):      Lack of Transportation (Non-Medical):    Physical Activity:     Days of Exercise per Week:     Minutes of Exercise per Session:    Stress:     Feeling of Stress :    Social Connections:     Frequency of Communication with Friends and Family:     Frequency of Social Gatherings with Friends and Family:     Attends Orthodox Services:     Active Member of Clubs or Organizations:     Attends Club or Organization Meetings:     Marital Status:    Intimate Partner Violence:     Fear of Current or Ex-Partner:     Emotionally Abused:     Physically Abused:     Sexually Abused: Allergies: Allergies   Allergen Reactions    Flexeril [Cyclobenzaprine] Other (See Comments)     HALLUCINATIONS    Lisinopril Swelling     Facial and lip    Influenza Virus Vaccine Other (See Comments)     \"Got the Flu\"    Metformin And Related Other (See Comments)     Lactic acidosis    Zocor [Simvastatin] Other (See Comments)     Elevated LFT's       Current Medications:  Current Outpatient Medications   Medication Sig Dispense Refill    atenolol (TENORMIN) 50 MG tablet TAKE 1 TABLET BY MOUTH  TWICE DAILY 180 tablet 1    gabapentin (NEURONTIN) 600 MG tablet TAKE 1 TABLET BY MOUTH 3  TIMES DAILY 270 tablet 1    amLODIPine (NORVASC) 5 MG tablet TAKE 1 TABLET BY MOUTH  DAILY 90 tablet 1    atorvastatin (LIPITOR) 40 MG tablet TAKE 1 TABLET BY MOUTH  DAILY 90 tablet 1    hydrALAZINE (APRESOLINE) 25 MG tablet Take 1 tablet by mouth 2 times daily 180 tablet 1    PARoxetine (PAXIL) 30 MG tablet TAKE 1 TABLET EVERY MORNING 90 tablet 1    Esomeprazole Magnesium (NEXIUM 24HR PO) Take by mouth as needed      Multiple Vitamins-Minerals (COMPLETE MULTIVITAMIN/MINERAL PO) Take 1 tablet by mouth daily      aspirin 81 MG tablet Take 81 mg by mouth every morning       acetaminophen (TYLENOL) 325 MG tablet Take 650 mg by mouth every 6 hours as needed for Pain       No current facility-administered medications for this visit.        Physical Exam:  BP (!) 162/64   Pulse 59   Resp 16   Ht 5' 5\" (1.651 m)   Wt 161 lb (73 kg)   SpO2 97%   BMI 26.79 kg/m²   Wt Readings from Last 3 Encounters:   08/31/21 158 lb (71.7 kg)   08/17/21 160 lb 4.8 oz (72.7 kg)   07/28/21 158 lb (71.7 kg)     Appearance: Awake, alert, no acute respiratory distress  Skin: Intact, no rash  Head: Normocephalic, atraumatic  Eyes: EOMI, no conjunctival erythema  ENMT: No pharyngeal erythema, MMM, no rhinorrhea  Neck: Supple, no elevated JVP, no carotid bruits  Lungs: Clear to auscultation bilaterally. No wheezes, rales, or rhonchi.   Cardiac: Regular rate and rhythm, +Z1F5, 1/6 systolic murmur  Abdomen: Soft, nontender, +bowel sounds  Extremities: Moves all extremities x 4, no lower extremity edema  Neurologic: No focal motor deficits apparent, normal mood and affect    Laboratory Tests:  Lab Results   Component Value Date    CREATININE 1.3 (H) 08/17/2021    BUN 15 08/17/2021     08/17/2021    K 4.3 08/17/2021     08/17/2021    CO2 30 (H) 08/17/2021     Lab Results   Component Value Date    WBC 7.0 08/17/2021    RBC 4.76 08/17/2021    HGB 15.6 08/17/2021    HCT 47.9 08/17/2021    .6 08/17/2021    MCH 32.8 08/17/2021    MCHC 32.6 08/17/2021    RDW 13.2 08/17/2021     08/17/2021    MPV 10.2 08/17/2021     Lab Results   Component Value Date    MG 1.7 10/15/2019     Lab Results   Component Value Date    PROTIME 12.4 08/17/2021    INR 1.1 08/17/2021     Lab Results   Component Value Date    TSH 0.844 08/17/2021     Lab Results   Component Value Date    TRIG 139 07/20/2021    TRIG 177 (H) 01/03/2020    TRIG 91 09/07/2018     Lab Results   Component Value Date    HDL 88 07/20/2021     01/03/2020    HDL 26 09/07/2018     Lab Results   Component Value Date    LDLCALC 51 07/20/2021    LDLCALC 48 01/03/2020    LDLCALC 29 09/07/2018       Cardiac Tests:  ECG: SR, rate 59, no acute STT changes    7/10/14 Echocardiogram:  Normal LV size  Normal LV function (EF 60-65%)  Normal RV size and function  Mitral valve prolapse (posterior leaflet) with moderate mitral regurgitation (anterior directed eccentric jet)    7/21/15 Echocardiogram: (read by Dr. Galina Ch)  Myxomatous mitral valve with bi-leaflet prolapse, most marked of  Posterior leaflet  Eccentric mitral insufficiency directed anteriorly, probably moderate in severity  Normal left ventricle size and systolic function    Echocardiogram: 10/27/16 ([place])   Normal left ventricular systolic function.   Ejection fraction is visually estimated at 60%.   Normal right ventricular size and function (TAPSE 2.0 cm).   Mitral valve prolapse (posterior leaflet).   Moderate mitral regurgitation (eccentric jet).   Physiologic and/or trace tricuspid regurgitation.   PASP is estimated at 34 mmHg. Echocardiogram: 7/8/19 ([place])   Normal left ventricular systolic function.   Ejection fraction is visually estimated at 65%.   Normal right ventricular size and function (TAPSE 3.2 cm).   Mitral valve prolapse (posterior leaflet).   Moderate mitral regurgitation (eccentric jet).  Mild tricuspid regurgitation.   PASP is estimated at 37 mmHg. ASSESSMENT / PLAN:  1. Syncope -- prior vasovagal/orthostatic episodes based on history. Positive TTT for vasodepressor syncope on 7/31/14. No further episodes. 2. Mitral valve prolapse (posterior leaflet) with moderate mitral regurgitation (anterior directed eccentric jet) on 7/10/14, 7/2015, 10/2016, and 7/2019 echocardiograms  3. Carotid artery disease s/p right-sided CEA on 8/25/14 -- 0-40% right-sided, 60-79 left-sided stenosis on 3/2019 carotid US, on ASA and statin --> no significant stenosis involving the right carotid artery and 50 to 69% stenosis involving the left internal carotid artery on 6/22/21 carotid ultrasound  4. HTN -- typically controlled, on hydralazine/norvasc/atenolol  5. HLD / DM -- Hgb A1c 5.4, results of 9/2018 lipid panel outlined above  6. Increased ETOH intake -- at least 5 ETOH beverages per day  7. Prior tobacco abuse  8. Prior CVA  9. No evidence of stress induced ischemia (EF 61%) on 5/10/14 Lexiscan nuclear stress test  10. Thrombocytopenia -- plt 105    - Results of 7/2019 echocardiogram reviewed with the patient today (patient educated re: imaging/treatment options for MR -- discussed re: performing a YOAN) -- repeat echocardiogram ordered today  - Continue off HCTZ  - Continue current medications otherwise   - Maintain adequate hydration (non-ETOH, patient admits to poor fluid intake other than ETOH) --> encouraged limiting ETOH intake  - Home BP monitoring (SBP ~ 130's at home per patient)  - Monitor CBC    Greater than 30 minutes was spent counseling the patient, reviewing the rationale for the above recommendations and reviewing the patient's current medication list, problem list and results of all previously ordered testing.     Kayleigh Tilley MD  [facility]) Cardiology

## 2021-12-15 ENCOUNTER — HOSPITAL ENCOUNTER (EMERGENCY)
Age: 71
Discharge: HOME | End: 2021-12-15
Attending: EMERGENCY MEDICINE
Payer: MEDICARE

## 2021-12-15 ENCOUNTER — APPOINTMENT (OUTPATIENT)
Dept: CT IMAGING | Age: 71
End: 2021-12-15
Payer: MEDICARE

## 2021-12-15 VITALS
OXYGEN SATURATION: 97 % | DIASTOLIC BLOOD PRESSURE: 90 MMHG | BODY MASS INDEX: 26.36 KG/M2 | HEART RATE: 63 BPM | RESPIRATION RATE: 18 BRPM | SYSTOLIC BLOOD PRESSURE: 173 MMHG | WEIGHT: 158.4 LBS | TEMPERATURE: 98.8 F
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
## 2021-12-15 DIAGNOSIS — S01.01XA LACERATION OF SCALP, INITIAL ENCOUNTER: ICD-10-CM

## 2021-12-15 PROCEDURE — 6360000002 HC RX W HCPCS: Performed by: STUDENT IN AN ORGANIZED HEALTH CARE EDUCATION/TRAINING PROGRAM

## 2021-12-15 PROCEDURE — 90714 TD VACC NO PRESV 7 YRS+ IM: CPT | Performed by: STUDENT IN AN ORGANIZED HEALTH CARE EDUCATION/TRAINING PROGRAM

## 2021-12-15 PROCEDURE — 99284 EMERGENCY DEPT VISIT MOD MDM: CPT

## 2021-12-15 PROCEDURE — 70450 CT HEAD/BRAIN W/O DYE: CPT

## 2021-12-15 PROCEDURE — 90471 IMMUNIZATION ADMIN: CPT | Performed by: STUDENT IN AN ORGANIZED HEALTH CARE EDUCATION/TRAINING PROGRAM

## 2021-12-15 PROCEDURE — 12002 RPR S/N/AX/GEN/TRNK2.6-7.5CM: CPT

## 2021-12-15 PROCEDURE — 72125 CT NECK SPINE W/O DYE: CPT

## 2021-12-15 PROCEDURE — 6370000000 HC RX 637 (ALT 250 FOR IP): Performed by: STUDENT IN AN ORGANIZED HEALTH CARE EDUCATION/TRAINING PROGRAM

## 2021-12-15 RX ORDER — GINSENG 100 MG
CAPSULE ORAL ONCE
Status: COMPLETED | OUTPATIENT
Start: 2021-12-15 | End: 2021-12-15

## 2021-12-15 RX ADMIN — BACITRACIN: 500 OINTMENT TOPICAL at 05:54

## 2021-12-15 RX ADMIN — CLOSTRIDIUM TETANI TOXOID ANTIGEN (FORMALDEHYDE INACTIVATED) AND CORYNEBACTERIUM DIPHTHERIAE TOXOID ANTIGEN (FORMALDEHYDE INACTIVATED) 0.5 ML: 5; 2 INJECTION, SUSPENSION INTRAMUSCULAR at 05:08

## 2021-12-15 ASSESSMENT — ENCOUNTER SYMPTOMS
SINUS PRESSURE: 0
SHORTNESS OF BREATH: 0
BACK PAIN: 0
SORE THROAT: 0
WHEEZING: 0
EYE PAIN: 0
NAUSEA: 0
EYE REDNESS: 0
VOMITING: 0
DIARRHEA: 0
EYE DISCHARGE: 0
ABDOMINAL PAIN: 0
COUGH: 0

## 2021-12-15 ASSESSMENT — PAIN DESCRIPTION - FREQUENCY: FREQUENCY: CONTINUOUS

## 2021-12-15 ASSESSMENT — PAIN DESCRIPTION - PAIN TYPE: TYPE: ACUTE PAIN

## 2021-12-15 ASSESSMENT — PAIN DESCRIPTION - DESCRIPTORS: DESCRIPTORS: SHARP

## 2021-12-15 ASSESSMENT — PAIN SCALES - GENERAL: PAINLEVEL_OUTOF10: 6

## 2021-12-15 ASSESSMENT — PAIN DESCRIPTION - ORIENTATION: ORIENTATION: RIGHT

## 2021-12-15 ASSESSMENT — PAIN DESCRIPTION - LOCATION: LOCATION: HEAD

## 2021-12-15 NOTE — ED PROVIDER NOTES
Patient presents after a fall. Patient states that he rolled out of bed and hit his head against the nightstand. He did not lose consciousness. Patient states that he does take a baby aspirin daily but denies any high blood thinners. He also mentions that he drinks alcohol daily and has had approximately 5 shots of alcohol prior to his fall. Patient states that he does have pain in his head. He rates his pain as a 6 out of 10. He has not done anything to make it better nor worse. He describes it as a throbbing sensation. Patient denies any numbness, tingling, weakness, headaches, chest pain, shortness of breath, nausea, or vomiting. The history is provided by the patient. No  was used. Review of Systems   Constitutional: Negative for chills and fever. HENT: Negative for ear pain, sinus pressure and sore throat. Eyes: Negative for pain, discharge and redness. Respiratory: Negative for cough, shortness of breath and wheezing. Cardiovascular: Negative for chest pain. Gastrointestinal: Negative for abdominal pain, diarrhea, nausea and vomiting. Genitourinary: Negative for dysuria and frequency. Musculoskeletal: Negative for arthralgias and back pain. Skin: Negative for rash and wound. Neurological: Negative for syncope, weakness and headaches. Hematological: Negative for adenopathy. All other systems reviewed and are negative. Physical Exam  Vitals and nursing note reviewed. Constitutional:       Appearance: He is well-developed. HENT:      Head: Normocephalic. Comments: Laceration to the right side of forehead. Eyes:      Conjunctiva/sclera: Conjunctivae normal.   Cardiovascular:      Rate and Rhythm: Normal rate and regular rhythm. Heart sounds: Normal heart sounds. No murmur heard. Pulmonary:      Effort: Pulmonary effort is normal. No respiratory distress. Breath sounds: Normal breath sounds. No wheezing or rales. Abdominal:      General: Bowel sounds are normal.      Palpations: Abdomen is soft. Tenderness: There is no abdominal tenderness. There is no guarding or rebound. Musculoskeletal:         General: No tenderness or deformity. Cervical back: Normal range of motion and neck supple. No rigidity or tenderness. Skin:     General: Skin is warm and dry. Neurological:      Mental Status: He is alert and oriented to person, place, and time. Cranial Nerves: No cranial nerve deficit. Coordination: Coordination normal.          Procedures   Laceration Repair Procedure Note    Indication: Laceration    Procedure: The patient was placed in the appropriate position and anesthesia around the laceration was obtained by infiltration using 1% Lidocaine without epinephrine. The area was then cleansed with Shur-Clens and draped in a sterile fashion. The laceration was closed with 5-0 Ethilon using interrupted sutures. There were no additional lacerations requiring repair. The wound area was then dressed with bacitracin and a sterile dressing. Minimal debridement was preformed, flaps were aligned. No foreign body was identified. Total repaired wound length: 3.5 cm. Other Items: Suture count: 11    The patient tolerated the procedure well. Complications: bleeding      MDM  Number of Diagnoses or Management Options  Diagnosis management comments: Patient presents after a fall. CT of the head was obtained and did not show any intracranial abnormalities or bleeds. CT of the cervical spine was also obtained did not show any cervical fractures. Patient's tetanus was updated. Laceration was repaired. At this point does not appear to be any emergent processes ongoing. Patient was informed the results and plan is agreeable. Patient be discharged with instructions to follow-up with his PCP for further evaluation care.        Amount and/or Complexity of Data Reviewed  Tests in the radiology section of CPT®: reviewed                    --------------------------------------------- PAST HISTORY ---------------------------------------------  Past Medical History:  has a past medical history of Anxiety, Depression, GERD (gastroesophageal reflux disease), Heart palpitations, HTN (hypertension), Hyperlipidemia, Lightheadedness, OA (osteoarthritis), Parathyroid adenoma, Pre-diabetes, Recurrent left pleural effusion, and Stenosis of right internal carotid artery with cerebral infarction ([facility] [address] Past Surgical History:  has a past surgical history that includes parathyroidectomy (11/7/2005); Tonsillectomy; Hand surgery; eye surgery (2001); fracture surgery (Left); Upper gastrointestinal endoscopy (07/07/2014); Carotid endarterectomy (Right, 8/25/2014); Endoscopy, colon, diagnostic; cervical fusion (12/12/2016); back surgery (12/20/2016); pr thorsc dx lungs/pericar/med/pleural space w/o bx (Right, 9/17/2018); Colonoscopy (N/A, 6/26/2020); and Upper gastrointestinal endoscopy (N/A, 6/26/2020). Social History:  reports that he quit smoking about 38 years ago. His smoking use included cigarettes. He started smoking about 52 years ago. He has a 42.00 pack-year smoking history. He has never used smokeless tobacco. He reports current alcohol use of about 5.0 standard drinks of alcohol per week. He reports that he does not use drugs. Family History: family history includes Arthritis in his maternal grandmother; Cancer in his paternal grandmother and sister; Diabetes in his mother; Heart Disease in his maternal grandfather and maternal grandmother; Other in his brother and paternal grandfather; Stroke in his father and mother; Thyroid Cancer in his sister. The patients home medications have been reviewed.     Allergies: Flexeril [cyclobenzaprine], Lisinopril, Influenza virus vaccine, Metformin and related, and Zocor [simvastatin]    -------------------------------------------------- RESULTS -------------------------------------------------  Labs:  No results found for this visit on 12/15/21. Radiology:  CT Head WO Contrast   Final Result   No acute intracranial abnormality. No basilar skull or calvarial fracture. Soft tissue injury to the lateral right forehead/frontal scalp, as described. Paranasal sinus changes as described. Findings are likely chronic and at   least mostly subclinical.  Isolated sphenoid sinusitis is relatively rare,   however greatest mucosal thickening seen is along the floor of the sphenoid   sinus. Please correlate clinically. RECOMMENDATIONS:   Unavailable         CT Cervical Spine WO Contrast   Final Result   No acute abnormality of the cervical spine. Postsurgical and degenerative changes, as described. Bandlike hyperdense focus of soft tissue at the right pulmonary apex in the   relatively sagittal plane, presumably fibro atelectatic change, likely benign. RECOMMENDATIONS:   Unavailable             ------------------------- NURSING NOTES AND VITALS REVIEWED ---------------------------  Date / Time Roomed:  12/15/2021  3:02 AM  ED Bed Assignment:  06/06    The nursing notes within the ED encounter and vital signs as below have been reviewed. BP (!) 173/90   Pulse 63   Temp 98.8 °F (37.1 °C) (Oral)   Resp 18   Wt 158 lb 6.4 oz (71.8 kg)   SpO2 97%   BMI 26.36 kg/m²   Oxygen Saturation Interpretation: Normal      ------------------------------------------ PROGRESS NOTES ------------------------------------------  5:42 AM EST  I have spoken with the patient and discussed todays results, in addition to providing specific details for the plan of care and counseling regarding the diagnosis and prognosis. Their questions are answered at this time and they are agreeable with the plan. I discussed at length with them reasons for immediate return here for re evaluation.  They will followup with their primary care physician by calling their office tomorrow. --------------------------------- ADDITIONAL PROVIDER NOTES ---------------------------------  At this time the patient is without objective evidence of an acute process requiring hospitalization or inpatient management. They have remained hemodynamically stable throughout their entire ED visit and are stable for discharge with outpatient follow-up. The plan has been discussed in detail and they are aware of the specific conditions for emergent return, as well as the importance of follow-up. New Prescriptions    No medications on file       Diagnosis:  1. Fall, initial encounter    2. Laceration of scalp, initial encounter        Disposition:  Patient's disposition: Discharge to home  Patient's condition is stable.     Patient was seen and evaluated by both myself and Zbigniew Love, [facility]  Resident  12/15/21 [id number]

## 2021-12-15 NOTE — ED NOTES
Bed: 06  Expected date:   Expected time:   Means of arrival:   Comments:  [address] Watson RN  12/15/21 [id number]

## 2021-12-21 ENCOUNTER — OFFICE VISIT (OUTPATIENT)
Dept: FAMILY MEDICINE CLINIC | Age: 71
End: 2021-12-21
Payer: MEDICARE

## 2021-12-21 VITALS
DIASTOLIC BLOOD PRESSURE: 68 MMHG | RESPIRATION RATE: 16 BRPM | HEART RATE: 59 BPM | OXYGEN SATURATION: 95 % | TEMPERATURE: 98.1 F | HEIGHT: 65 IN | BODY MASS INDEX: 26.66 KG/M2 | SYSTOLIC BLOOD PRESSURE: 130 MMHG | WEIGHT: 160 LBS

## 2021-12-21 DIAGNOSIS — S01.01XD SCALP LACERATION, SUBSEQUENT ENCOUNTER: Primary | ICD-10-CM

## 2021-12-21 PROCEDURE — 1036F TOBACCO NON-USER: CPT | Performed by: FAMILY MEDICINE

## 2021-12-21 PROCEDURE — G8484 FLU IMMUNIZE NO ADMIN: HCPCS | Performed by: FAMILY MEDICINE

## 2021-12-21 PROCEDURE — 1123F ACP DISCUSS/DSCN MKR DOCD: CPT | Performed by: FAMILY MEDICINE

## 2021-12-21 PROCEDURE — G8427 DOCREV CUR MEDS BY ELIG CLIN: HCPCS | Performed by: FAMILY MEDICINE

## 2021-12-21 PROCEDURE — G8417 CALC BMI ABV UP PARAM F/U: HCPCS | Performed by: FAMILY MEDICINE

## 2021-12-21 PROCEDURE — 99213 OFFICE O/P EST LOW 20 MIN: CPT | Performed by: FAMILY MEDICINE

## 2021-12-21 PROCEDURE — 3017F COLORECTAL CA SCREEN DOC REV: CPT | Performed by: FAMILY MEDICINE

## 2021-12-21 PROCEDURE — 4040F PNEUMOC VAC/ADMIN/RCVD: CPT | Performed by: FAMILY MEDICINE

## 2021-12-21 NOTE — PROGRESS NOTES
Nikita Ji is a 66 y.o. male who presents today for     Chief Complaint   Patient presents with    Fall     suture removal of scalp, dr Roger Faust patient , 12/15/2021, no headaches       PCP: Dr. Timmy Miguel, 12/15/21:    Patient presents after a fall. Patient states that he rolled out of bed and hit his head against the nightstand. He did not lose consciousness. Patient states that he does take a baby aspirin daily but denies any high blood thinners. He also mentions that he drinks alcohol daily and has had approximately 5 shots of alcohol prior to his fall. Patient states that he does have pain in his head. He rates his pain as a 6 out of 10. He has not done anything to make it better nor worse. He describes it as a throbbing sensation. Patient denies any numbness, tingling, weakness, headaches, chest pain, shortness of breath, nausea, or vomiting.           Physical Exam  Vitals and nursing note reviewed. Constitutional:       Appearance: He is well-developed. HENT:      Head: Normocephalic. Comments: Laceration to the right side of forehead.       Procedures   Laceration Repair Procedure Note     Indication: Laceration     Procedure: The patient was placed in the appropriate position and anesthesia around the laceration was obtained by infiltration using 1% Lidocaine without epinephrine. The area was then cleansed with Shur-Clens and draped in a sterile fashion. The laceration was closed with 5-0 Ethilon using interrupted sutures. There were no additional lacerations requiring repair. The wound area was then dressed with bacitracin and a sterile dressing. Minimal debridement was preformed, flaps were aligned.  No foreign body was identified.     Total repaired wound length: 3.5 cm.      Other Items: Suture count: 11     The patient tolerated the procedure well.     Complications: bleeding        MDM  Number of Diagnoses or Management Options  Diagnosis management comments: Patient presents after a fall. CT of the head was obtained and did not show any intracranial abnormalities or bleeds. CT of the cervical spine was also obtained did not show any cervical fractures. Patient's tetanus was updated. Laceration was repaired. At this point does not appear to be any emergent processes ongoing. Patient was informed the results and plan is agreeable. Patient be discharged with instructions to follow-up with his PCP for further evaluation care.        -------------------------------------------------- RESULTS -------------------------------------------------  Labs:  No results found for this visit on 12/15/21.     Radiology:  CT Head WO Contrast   Final Result   No acute intracranial abnormality.       No basilar skull or calvarial fracture.       Soft tissue injury to the lateral right forehead/frontal scalp, as described.               Diagnosis:  1. Fall, initial encounter    2. Laceration of scalp, initial encounter         CURRENT STATUS (12/21/21):  Here for suture removal.  Patient denies any other symptoms      [address]:  Patient's past medical, surgical, social and/or family history reviewed, updated in chart, and are non-contributory (unless otherwise stated). Medications and allergies also reviewed and updated in chart. Review of Systems  Review of Systems   All other systems reviewed and are negative.       Physical Exam:    VS:  /68   Pulse 59   Temp 98.1 °F (36.7 °C) (Infrared)   Resp 16   Ht 5' 5\" (1.651 m)   Wt 160 lb (72.6 kg)   SpO2 95%   BMI 26.63 kg/m²     LAST WEIGHT:  Wt Readings from Last 3 Encounters:   12/21/21 160 lb (72.6 kg)   12/15/21 158 lb 6.4 oz (71.8 kg)   10/27/21 161 lb (73 kg)       BMI Readings from Last 3 Encounters:   12/21/21 26.63 kg/m²   12/15/21 26.36 kg/m²   10/27/21 26.79 kg/m²       Physical Exam  HENT:      Head:           Labs:  Lab Results   Component Value Date     08/17/2021    K 4.3 08/17/2021    K 4.2 09/17/2018     08/17/2021    CO2 30 08/17/2021    BUN 15 08/17/2021    CREATININE 1.3 08/17/2021    PROT 7.5 08/17/2021    LABALBU 4.4 08/17/2021    LABALBU 4.8 08/08/2011    CALCIUM 9.9 08/17/2021    GFRAA >60 08/17/2021    LABGLOM 54 08/17/2021    GLUCOSE 111 08/17/2021    GLUCOSE 141 03/22/2012    AST 23 08/17/2021    ALT 19 08/17/2021    ALKPHOS 94 08/17/2021    BILITOT 0.5 08/17/2021    TSH 0.844 08/17/2021    CHOL 167 07/20/2021    TRIG 139 07/20/2021    HDL 88 07/20/2021    LDLCALC 51 07/20/2021    LABA1C 5.3 07/20/2021        Lab Results   Component Value Date    CHOL 167 07/20/2021    CHOL 186 01/03/2020    CHOL 73 09/07/2018     Lab Results   Component Value Date    TRIG 139 07/20/2021    TRIG 177 (H) 01/03/2020    TRIG 91 09/07/2018     Lab Results   Component Value Date    HDL 88 07/20/2021     01/03/2020    HDL 26 09/07/2018     Lab Results   Component Value Date    LDLCALC 51 07/20/2021    LDLCALC 48 01/03/2020    LDLCALC 29 09/07/2018       Lab Results   Component Value Date    LABA1C 5.3 07/20/2021    LABA1C 5.3 01/27/2021    LABA1C 5.5 01/03/2020     Lab Results   Component Value Date    LABMICR 50.2 (H) 07/20/2021    LDLCALC 51 07/20/2021    CREATININE 1.3 (H) 08/17/2021           Assessment / Plan:      Julius Nolasco was seen today for fall. Diagnoses and all orders for this visit:    Scalp laceration, subsequent encounter  -     WI REMOVAL OF SUTURES: Sutures removed without difficulty        Follow Up:  Return for Keep scheduled appointment(s) with Dr. Ana Kitchen. or sooner if necessary. Call or go to ED immediately if symptoms worsen or persist.    Educational materials and/or home exercises printed for patient's review and were included in patient instructions on his/her AfterVisit Summary and given to patient at the end of visit. Counseled regarding above diagnosis,including possible risks and complications,  especially if left uncontrolled.     Counseled regarding the possible side effects, risks, benefits and alternatives to treatment; patient and/or guardian verbalizes understanding, agrees, feels comfortable with and wishes to proceed with above treatment plan. Advised patient tocall with any new medication issues, and read all Rx info from pharmacy to assureaware of all possible risks and side effects of medication before taking. Reviewed age and gender appropriate health screening exams and vaccinations. Advisedpatient regarding importance of keeping up with recommended health maintenance andto schedule as soon as possible if overdue, as this is important in assessing forundiagnosed pathology, especially cancer, as well as protecting against potentially harmful/life threatening disease. Patient and/or guardian verbalizes understandingand agrees with above counseling, assessment and plan. All questions answered.     Vaughn Hope MD on 12/21/21

## 2022-02-01 ENCOUNTER — OFFICE VISIT (OUTPATIENT)
Dept: FAMILY MEDICINE CLINIC | Age: 72
End: 2022-02-01
Payer: MEDICARE

## 2022-02-01 VITALS
TEMPERATURE: 97.6 F | OXYGEN SATURATION: 97 % | WEIGHT: 159 LBS | BODY MASS INDEX: 26.49 KG/M2 | HEART RATE: 65 BPM | HEIGHT: 65 IN | RESPIRATION RATE: 16 BRPM | DIASTOLIC BLOOD PRESSURE: 80 MMHG | SYSTOLIC BLOOD PRESSURE: 136 MMHG

## 2022-02-01 DIAGNOSIS — D69.6 THROMBOCYTOPENIA (HCC): ICD-10-CM

## 2022-02-01 DIAGNOSIS — Z00.00 ROUTINE GENERAL MEDICAL EXAMINATION AT A HEALTH CARE FACILITY: Primary | ICD-10-CM

## 2022-02-01 DIAGNOSIS — R97.20 ELEVATED PSA: ICD-10-CM

## 2022-02-01 DIAGNOSIS — F41.1 GAD (GENERALIZED ANXIETY DISORDER): ICD-10-CM

## 2022-02-01 DIAGNOSIS — I10 ESSENTIAL HYPERTENSION: ICD-10-CM

## 2022-02-01 DIAGNOSIS — E11.9 DIET-CONTROLLED DIABETES MELLITUS (HCC): ICD-10-CM

## 2022-02-01 DIAGNOSIS — N18.31 STAGE 3A CHRONIC KIDNEY DISEASE (HCC): ICD-10-CM

## 2022-02-01 DIAGNOSIS — E78.5 DYSLIPIDEMIA: ICD-10-CM

## 2022-02-01 LAB
ALBUMIN SERPL-MCNC: 4.2 G/DL (ref 3.5–5.2)
ALP BLD-CCNC: 91 U/L (ref 40–129)
ALT SERPL-CCNC: 25 U/L (ref 0–40)
ANION GAP SERPL CALCULATED.3IONS-SCNC: 15 MMOL/L (ref 7–16)
AST SERPL-CCNC: 26 U/L (ref 0–39)
BASOPHILS ABSOLUTE: 0.08 E9/L (ref 0–0.2)
BASOPHILS RELATIVE PERCENT: 1.1 % (ref 0–2)
BILIRUB SERPL-MCNC: 0.7 MG/DL (ref 0–1.2)
BUN BLDV-MCNC: 15 MG/DL (ref 6–23)
CALCIUM SERPL-MCNC: 10.1 MG/DL (ref 8.6–10.2)
CHLORIDE BLD-SCNC: 105 MMOL/L (ref 98–107)
CO2: 25 MMOL/L (ref 22–29)
CREAT SERPL-MCNC: 1.3 MG/DL (ref 0.7–1.2)
EOSINOPHILS ABSOLUTE: 0.49 E9/L (ref 0.05–0.5)
EOSINOPHILS RELATIVE PERCENT: 6.6 % (ref 0–6)
GFR AFRICAN AMERICAN: >60
GFR NON-AFRICAN AMERICAN: 54 ML/MIN/1.73
GLUCOSE BLD-MCNC: 131 MG/DL (ref 74–99)
HCT VFR BLD CALC: 46.4 % (ref 37–54)
HEMOGLOBIN: 14.8 G/DL (ref 12.5–16.5)
IMMATURE GRANULOCYTES #: 0.02 E9/L
IMMATURE GRANULOCYTES %: 0.3 % (ref 0–5)
LYMPHOCYTES ABSOLUTE: 1.01 E9/L (ref 1.5–4)
LYMPHOCYTES RELATIVE PERCENT: 13.6 % (ref 20–42)
MCH RBC QN AUTO: 31.8 PG (ref 26–35)
MCHC RBC AUTO-ENTMCNC: 31.9 % (ref 32–34.5)
MCV RBC AUTO: 99.6 FL (ref 80–99.9)
MONOCYTES ABSOLUTE: 0.6 E9/L (ref 0.1–0.95)
MONOCYTES RELATIVE PERCENT: 8.1 % (ref 2–12)
NEUTROPHILS ABSOLUTE: 5.22 E9/L (ref 1.8–7.3)
NEUTROPHILS RELATIVE PERCENT: 70.3 % (ref 43–80)
PDW BLD-RTO: 12.7 FL (ref 11.5–15)
PLATELET # BLD: 95 E9/L (ref 130–450)
PLATELET CONFIRMATION: NORMAL
PMV BLD AUTO: 11.2 FL (ref 7–12)
POTASSIUM SERPL-SCNC: 5.2 MMOL/L (ref 3.5–5)
PROSTATE SPECIFIC ANTIGEN: 5.43 NG/ML (ref 0–4)
RBC # BLD: 4.66 E12/L (ref 3.8–5.8)
SODIUM BLD-SCNC: 145 MMOL/L (ref 132–146)
TOTAL PROTEIN: 7.2 G/DL (ref 6.4–8.3)
WBC # BLD: 7.4 E9/L (ref 4.5–11.5)

## 2022-02-01 PROCEDURE — 3017F COLORECTAL CA SCREEN DOC REV: CPT | Performed by: FAMILY MEDICINE

## 2022-02-01 PROCEDURE — 3046F HEMOGLOBIN A1C LEVEL >9.0%: CPT | Performed by: FAMILY MEDICINE

## 2022-02-01 PROCEDURE — 4040F PNEUMOC VAC/ADMIN/RCVD: CPT | Performed by: FAMILY MEDICINE

## 2022-02-01 PROCEDURE — G8484 FLU IMMUNIZE NO ADMIN: HCPCS | Performed by: FAMILY MEDICINE

## 2022-02-01 PROCEDURE — G0439 PPPS, SUBSEQ VISIT: HCPCS | Performed by: FAMILY MEDICINE

## 2022-02-01 PROCEDURE — 1123F ACP DISCUSS/DSCN MKR DOCD: CPT | Performed by: FAMILY MEDICINE

## 2022-02-01 SDOH — ECONOMIC STABILITY: FOOD INSECURITY: WITHIN THE PAST 12 MONTHS, THE FOOD YOU BOUGHT JUST DIDN'T LAST AND YOU DIDN'T HAVE MONEY TO GET MORE.: NEVER TRUE

## 2022-02-01 SDOH — ECONOMIC STABILITY: FOOD INSECURITY: WITHIN THE PAST 12 MONTHS, YOU WORRIED THAT YOUR FOOD WOULD RUN OUT BEFORE YOU GOT MONEY TO BUY MORE.: NEVER TRUE

## 2022-02-01 ASSESSMENT — LIFESTYLE VARIABLES
AUDIT TOTAL SCORE: 6
HOW OFTEN DURING THE LAST YEAR HAVE YOU FOUND THAT YOU WERE NOT ABLE TO STOP DRINKING ONCE YOU HAD STARTED: NEVER
HOW OFTEN DO YOU HAVE A DRINK CONTAINING ALCOHOL: 4
HOW OFTEN DURING THE LAST YEAR HAVE YOU NEEDED AN ALCOHOLIC DRINK FIRST THING IN THE MORNING TO GET YOURSELF GOING AFTER A NIGHT OF HEAVY DRINKING: 0
HOW OFTEN DURING THE LAST YEAR HAVE YOU HAD A FEELING OF GUILT OR REMORSE AFTER DRINKING: NEVER
HOW OFTEN DO YOU HAVE A DRINK CONTAINING ALCOHOL: FOUR OR MORE TIMES A WEEK
HOW OFTEN DURING THE LAST YEAR HAVE YOU BEEN UNABLE TO REMEMBER WHAT HAPPENED THE NIGHT BEFORE BECAUSE YOU HAD BEEN DRINKING: NEVER
HOW MANY STANDARD DRINKS CONTAINING ALCOHOL DO YOU HAVE ON A TYPICAL DAY: 2
HAS A RELATIVE, FRIEND, DOCTOR, OR ANOTHER HEALTH PROFESSIONAL EXPRESSED CONCERN ABOUT YOUR DRINKING OR SUGGESTED YOU CUT DOWN: NO
AUDIT-C TOTAL SCORE: 0
HOW OFTEN DURING THE LAST YEAR HAVE YOU FAILED TO DO WHAT WAS NORMALLY EXPECTED FROM YOU BECAUSE OF DRINKING: 0
HAVE YOU OR SOMEONE ELSE BEEN INJURED AS A RESULT OF YOUR DRINKING: NO
HOW OFTEN DURING THE LAST YEAR HAVE YOU FAILED TO DO WHAT WAS NORMALLY EXPECTED FROM YOU BECAUSE OF DRINKING: NEVER
HOW OFTEN DURING THE LAST YEAR HAVE YOU HAD A FEELING OF GUILT OR REMORSE AFTER DRINKING: 0
HOW OFTEN DO YOU HAVE SIX OR MORE DRINKS ON ONE OCCASION: NEVER
HOW OFTEN DURING THE LAST YEAR HAVE YOU FOUND THAT YOU WERE NOT ABLE TO STOP DRINKING ONCE YOU HAD STARTED: 0
HAS A RELATIVE, FRIEND, DOCTOR, OR ANOTHER HEALTH PROFESSIONAL EXPRESSED CONCERN ABOUT YOUR DRINKING OR SUGGESTED YOU CUT DOWN: 0
HOW OFTEN DO YOU HAVE SIX OR MORE DRINKS ON ONE OCCASION: 0
HOW OFTEN DURING THE LAST YEAR HAVE YOU BEEN UNABLE TO REMEMBER WHAT HAPPENED THE NIGHT BEFORE BECAUSE YOU HAD BEEN DRINKING: 0
AUDIT TOTAL SCORE: 0
HAVE YOU OR SOMEONE ELSE BEEN INJURED AS A RESULT OF YOUR DRINKING: 0
HOW OFTEN DURING THE LAST YEAR HAVE YOU NEEDED AN ALCOHOLIC DRINK FIRST THING IN THE MORNING TO GET YOURSELF GOING AFTER A NIGHT OF HEAVY DRINKING: NEVER
HOW MANY STANDARD DRINKS CONTAINING ALCOHOL DO YOU HAVE ON A TYPICAL DAY: FIVE OR SIX
AUDIT-C TOTAL SCORE: 6

## 2022-02-01 ASSESSMENT — PATIENT HEALTH QUESTIONNAIRE - PHQ9
SUM OF ALL RESPONSES TO PHQ QUESTIONS 1-9: 12
SUM OF ALL RESPONSES TO PHQ QUESTIONS 1-9: 12
8. MOVING OR SPEAKING SO SLOWLY THAT OTHER PEOPLE COULD HAVE NOTICED. OR THE OPPOSITE, BEING SO FIGETY OR RESTLESS THAT YOU HAVE BEEN MOVING AROUND A LOT MORE THAN USUAL: 0
9. THOUGHTS THAT YOU WOULD BE BETTER OFF DEAD, OR OF HURTING YOURSELF: 0
1. LITTLE INTEREST OR PLEASURE IN DOING THINGS: 3
5. POOR APPETITE OR OVEREATING: 0
SUM OF ALL RESPONSES TO PHQ QUESTIONS 1-9: 12
3. TROUBLE FALLING OR STAYING ASLEEP: 3
SUM OF ALL RESPONSES TO PHQ QUESTIONS 1-9: 12
4. FEELING TIRED OR HAVING LITTLE ENERGY: 3
6. FEELING BAD ABOUT YOURSELF - OR THAT YOU ARE A FAILURE OR HAVE LET YOURSELF OR YOUR FAMILY DOWN: 0
2. FEELING DOWN, DEPRESSED OR HOPELESS: 3
SUM OF ALL RESPONSES TO PHQ9 QUESTIONS 1 & 2: 6
10. IF YOU CHECKED OFF ANY PROBLEMS, HOW DIFFICULT HAVE THESE PROBLEMS MADE IT FOR YOU TO DO YOUR WORK, TAKE CARE OF THINGS AT HOME, OR GET ALONG WITH OTHER PEOPLE: 0
7. TROUBLE CONCENTRATING ON THINGS, SUCH AS READING THE NEWSPAPER OR WATCHING TELEVISION: 0

## 2022-02-01 ASSESSMENT — SOCIAL DETERMINANTS OF HEALTH (SDOH): HOW HARD IS IT FOR YOU TO PAY FOR THE VERY BASICS LIKE FOOD, HOUSING, MEDICAL CARE, AND HEATING?: NOT HARD AT ALL

## 2022-02-01 NOTE — PROGRESS NOTES
Medicare Annual Wellness Visit  Name: Dorene Crowe Date: 2022   MRN: [id number] Sex: Male   Age: 67 y.o. Ethnicity: Non- / Non    : 1950 Race: White (non-)      Pato Arana is here for Medicare AWV (didnt complete labs), Elevated PSA, and Health Maintenance (declines flu vaccine today)    Screenings for behavioral, psychosocial and functional/safety risks, and cognitive dysfunction are all negative except as indicated below. These results, as well as other patient data from the [address] form, are documented in Flowsheets linked to this Encounter. He also follows with Cardiology (Dr. Souza), Vascular (Dr. Bridges), Pulm (Dr. Mario), and Nephrology (Dr. Sherri Sy)     He has previously seen ENT, Urology    Interval Hx  - evaluated by Heme/Onc    Hypertension  Current treatment: atenolol 50 mg twice daily, hydralazine 25 mg twice daily, and amlodipine 5 mg daily  Recent medication changes: none    Hyperlipidemia  Current treatment: Atorvastatin 40 mg daily  Recent medication changes: None    Diabetic neuropathy  Current treatment: Gabapentin 600 mg 3 times daily  Recent medication changes: None     SANDRA  Current treatment: Paroxetine 30 mg daily  Recent medication changes: None     Elevated PSA  Recent PSA 4.99  Reports nighttime frequency, not interested in medication  Per EMR review, did see Urology years ago but did not follow up as directed    Allergies   Allergen Reactions    Flexeril [Cyclobenzaprine] Other (See Comments)     HALLUCINATIONS    Lisinopril Swelling     Facial and lip    Influenza Virus Vaccine Other (See Comments)     \"Got the Flu\"    Metformin And Related Other (See Comments)     Lactic acidosis    Zocor [Simvastatin] Other (See Comments)     Elevated LFT's         Prior to Visit Medications    Medication Sig Taking?  Authorizing Provider   PARoxetine (PAXIL) 30 MG tablet TAKE 1 TABLET BY MOUTH IN  THE MORNING Yes Terrie Shepherd DO   hydrALAZINE (APRESOLINE) 25 MG tablet TAKE 1 TABLET BY MOUTH  TWICE DAILY Yes Colt Goldman, DO   atenolol (TENORMIN) 50 MG tablet TAKE 1 TABLET BY MOUTH  TWICE DAILY Yes Colt Goldman DO   gabapentin (NEURONTIN) 600 MG tablet TAKE 1 TABLET BY MOUTH 3  TIMES DAILY Yes Colt Goldman, DO   amLODIPine (NORVASC) 5 MG tablet TAKE 1 TABLET BY MOUTH  DAILY Yes Colt Goldman DO   atorvastatin (LIPITOR) 40 MG tablet TAKE 1 TABLET BY MOUTH  DAILY Yes Ezella Tayla, DO   Esomeprazole Magnesium (NEXIUM 24HR PO) Take by mouth as needed Yes Historical Provider, MD   Multiple Vitamins-Minerals (COMPLETE MULTIVITAMIN/MINERAL PO) Take 1 tablet by mouth daily Yes Historical Provider, MD   aspirin 81 MG tablet Take 81 mg by mouth every morning  Yes Historical Provider, MD   acetaminophen (TYLENOL) 325 MG tablet Take 650 mg by mouth every 6 hours as needed for Pain Yes Historical Provider, MD         Past Medical History:   Diagnosis Date    Anxiety     Depression     GERD (gastroesophageal reflux disease)     Heart palpitations     HTN (hypertension) 10/21/2010    Hyperlipidemia     Lightheadedness     OA (osteoarthritis)     Parathyroid adenoma     s/p surgery 2005    Pre-diabetes     Recurrent left pleural effusion 9/13/2018    Stenosis of right internal carotid artery with cerebral infarction [facility])        Past Surgical History:   Procedure Laterality Date    BACK SURGERY  12/20/2016    revision cervical laminectomy    CAROTID ENDARTERECTOMY Right 8/25/2014    Delatore - bovine patch     CERVICAL FUSION  12/12/2016    C4-C5, C5-C6, C6-C7 ACF, C5-C6 Corpectomy    COLONOSCOPY N/A 6/26/2020    COLONOSCOPY POLYPECTOMY SNARE/COLD BIOPSY performed by Tejas Dallas MD at [address], DIAGNOSTIC      EYE SURGERY  2001    lasex    FRACTURE SURGERY Left     plate in wrist    HAND SURGERY      PARATHYROIDECTOMY  11/7/2005    DE THORSC DX LUNGS/PERICAR/MED/PLEURAL SPACE W/O BX Right 2018    THORACOSCOPY performed by Brii Valadez MD at [address]  2014    UPPER GASTROINTESTINAL ENDOSCOPY N/A 2020    EGD BIOPSY performed by Wilberto Lugo MD at [facility] History   Problem Relation Age of Onset    Thyroid Cancer Sister     Cancer Sister         thyroid    Stroke Mother     Diabetes Mother     Stroke Father     Other Brother          in surgery cause unknown    Heart Disease Maternal Grandmother     Arthritis Maternal Grandmother     Heart Disease Maternal Grandfather     Cancer Paternal Grandmother     Other Paternal Grandfather         unknown cause of death       CareTeam (Including outside providers/suppliers regularly involved in providing care):   Patient Care Team:  Enmanuel Lee DO as PCP - General (Family Medicine)  Enmanuel Lee DO as PCP - [facility] Empaneled Provider  Abdifatah Harley DO as Consulting Physician (Internal Medicine)  Rosa Maria Stafford MD as Surgeon (Vascular Surgery)  Dana Samayoa MD as Consulting Physician (Cardiology)    Wt Readings from Last 3 Encounters:   22 159 lb (72.1 kg)   21 160 lb (72.6 kg)   12/15/21 158 lb 6.4 oz (71.8 kg)     Vitals:    22 1252 22 1303   BP: (!) 160/80 136/80   Pulse: 65    Resp: 16    Temp: 97.6 °F (36.4 °C)    TempSrc: Temporal    SpO2: 97%    Weight: 159 lb (72.1 kg)    Height: 5' 5\" (1.651 m)      Body mass index is 26.46 kg/m². Based upon direct observation of the patient, evaluation of cognition reveals recent and remote memory intact. Patient's complete Health Risk Assessment and screening values have been reviewed and are found in Flowsheets. The following problems were reviewed today and where indicated follow up appointments were made and/or referrals ordered.       Positive Risk Factor Screenings with Interventions:       Depression:  PHQ-2 Score: 6  PHQ-9 Total Score: 12    Severity:1-4 = minimal depression, 5-9 = mild depression, 10-14 = moderate depression, 15-19 = moderately severe depression, 20-27 = severe depression  Depression Interventions:  · stable, continue paroxetine         Health Habits/Nutrition:  Health Habits/Nutrition  Do you exercise for at least 20 minutes 2-3 times per week?: (!) No  Have you lost any weight without trying in the past 3 months?: No  Do you eat only one meal per day?: No  Have you seen the dentist within the past year?: Yes  Body mass index: (!) 26.46  Health Habits/Nutrition Interventions:  · Inadequate physical activity:  educational materials provided to promote increased physical activity    Hearing/Vision:  No exam data present  Hearing/Vision  Do you or your family notice any trouble with your hearing that hasn't been managed with hearing aids?: (!) Yes  Do you have difficulty driving, watching TV, or doing any of your daily activities because of your eyesight?: No  Have you had an eye exam within the past year?: (!) No  Hearing/Vision Interventions:  · Hearing concerns:  recent evaluated by audiology, [facility]  · Vision concerns:  patient encouraged to make appointment with his/her eye specialist    Safety:  Safety  Do you have working smoke detectors?: Yes  Have all throw rugs been removed or fastened?: Yes  Do you have non-slip mats or surfaces in all bathtubs/showers?: Yes  Do all of your stairways have a railing or banister?: Yes  Are your doorways, halls and stairs free of clutter?: Yes  Do you always fasten your seatbelt when you are in a car?: (!) No  Safety Interventions:  · Home safety tips provided       Personalized Preventive Plan   Current Health Maintenance Status  Immunization History   Administered Date(s) Administered    Td (Adult), 5 Lf Tetanus Toxoid, Pf (Vergia Peyer) 12/15/2021        Health Maintenance   Topic Date Due    COVID-19 Vaccine (1) Never done    Depression Monitoring  Never done    Shingles Vaccine (1 of 2) Never done    Pneumococcal 65+ years Vaccine (1 of 1 - PPSV23) Never done    Diabetic retinal exam  06/18/2016    Diabetic foot exam  10/25/2018    Flu vaccine (1) Never done    DTaP/Tdap/Td vaccine (1 - Tdap) 12/16/2021    Annual Wellness Visit (AWV)  01/28/2022    A1C test (Diabetic or Prediabetic)  07/20/2022    Diabetic microalbuminuria test  07/20/2022    Lipid screen  07/20/2022    PSA counseling  07/20/2022    Potassium monitoring  08/17/2022    Creatinine monitoring  08/17/2022    Colon cancer screen colonoscopy  06/26/2023    AAA screen  Completed    Hepatitis C screen  Completed    Hepatitis A vaccine  Aged Out    Hib vaccine  Aged Out    Meningococcal (ACWY) vaccine  Aged Out     Recommendations for [employer] Due: see orders and patient instructions/AVS.  . Recommended screening schedule for the next 5-10 years is provided to the patient in written form: see Patient Camilo Hernandes was seen today for medicare awv, elevated psa and health maintenance. Diagnoses and all orders for this visit:    Routine general medical examination at a health care facility    Essential hypertension  -     CBC Auto Differential; Future  -     Comprehensive Metabolic Panel; Future    Dyslipidemia    Diet-controlled diabetes mellitus (HCC)    SANDRA (generalized anxiety disorder)    Stage 3a chronic kidney disease (HCC)    Thrombocytopenia (HCC)  -     CBC Auto Differential; Future  -     Comprehensive Metabolic Panel; Future    Elevated PSA  -     PSA, DIAGNOSTIC; Future      As above.   Covid vaccination discussed/encouraged    RTO in 6 months or sooner if needed

## 2022-04-23 DIAGNOSIS — F41.0 PANIC DISORDER: ICD-10-CM

## 2022-04-25 ENCOUNTER — TELEPHONE (OUTPATIENT)
Dept: VASCULAR SURGERY | Age: 72
End: 2022-04-25

## 2022-04-25 RX ORDER — PAROXETINE 30 MG/1
TABLET, FILM COATED ORAL
Qty: 120 TABLET | OUTPATIENT
Start: 2022-04-25

## 2022-04-25 NOTE — TELEPHONE ENCOUNTER
Called to confirm appointment for 4/26/22 at 11 a.m, left message with date, time, and phone number for patient.

## 2022-04-26 ENCOUNTER — OFFICE VISIT (OUTPATIENT)
Dept: VASCULAR SURGERY | Age: 72
End: 2022-04-26
Payer: MEDICARE

## 2022-04-26 DIAGNOSIS — I65.23 BILATERAL CAROTID ARTERY STENOSIS: Primary | ICD-10-CM

## 2022-04-26 PROCEDURE — 99213 OFFICE O/P EST LOW 20 MIN: CPT | Performed by: SURGERY

## 2022-04-26 NOTE — PROGRESS NOTES
Vascular Surgery Outpatient Progress Note      Chief Complaint   Patient presents with    Circulatory Problem     Has dizzines when looking up, near fainting. HISTORY OF PRESENT ILLNESS:                The patient is a 67 y.o. male who returns for follow-up evaluation of carotid artery stenosis. He is accompanied by his wife. The patient has a history of cervical fusion and has been experiencing severe neck pain. He underwent cervical spine pain injections. He states that these have not helped. The patient also complains of dizziness and almost passing out when looking straight back up over his head or twisting to the right. He denies unilateral weakness, slurred speech, or facial drooping.     Past Medical History:        Diagnosis Date    Anxiety     Depression     GERD (gastroesophageal reflux disease)     Heart palpitations     HTN (hypertension) 10/21/2010    Hyperlipidemia     Lightheadedness     OA (osteoarthritis)     Parathyroid adenoma     s/p surgery 2005    Pre-diabetes     Recurrent left pleural effusion 9/13/2018    Stenosis of right internal carotid artery with cerebral infarction [facility])      Past Surgical History:        Procedure Laterality Date    BACK SURGERY  12/20/2016    revision cervical laminectomy    CAROTID ENDARTERECTOMY Right 8/25/2014    Delatore - bovine patch     CERVICAL FUSION  12/12/2016    C4-C5, C5-C6, C6-C7 ACF, C5-C6 Corpectomy    COLONOSCOPY N/A 6/26/2020    COLONOSCOPY POLYPECTOMY SNARE/COLD BIOPSY performed by Abraham Robles MD at [address] [place] [address], [facility]      EYE SURGERY  2001    lasex    FRACTURE SURGERY Left     plate in wrist    HAND SURGERY      PARATHYROIDECTOMY  11/7/2005    SHAHID Nichols 405 DX LUNGS/PERICAR/MED/PLEURAL SPACE W/O BX Right 9/17/2018    THORACOSCOPY [facility]ose [facility] at [facility]  07/07/2014    UPPER GASTROINTESTINAL ENDOSCOPY N/A 2020    EGD BIOPSY performed by Delmy Tong MD at [address] [place] [address]     Current Medications:   Prior to Admission medications    Medication Sig Start Date End Date Taking?  Authorizing Provider   PARoxetine (PAXIL) 30 MG tablet TAKE 1 TABLET BY MOUTH IN  THE MORNING 22  Yes Colt Goldman DO   amLODIPine (NORVASC) 5 MG tablet TAKE 1 TABLET BY MOUTH  DAILY 22 Yes Colt Goldman DO   gabapentin (NEURONTIN) 600 MG tablet TAKE 1 TABLET BY MOUTH 3  TIMES DAILY 22 Yes Colt Goldman DO   atorvastatin (LIPITOR) 40 MG tablet TAKE 1 TABLET BY MOUTH  DAILY 22  Yes Colt Goldman DO   atenolol (TENORMIN) 50 MG tablet TAKE 1 TABLET BY MOUTH  TWICE DAILY 22  Yes Colt Goldman DO   hydrALAZINE (APRESOLINE) 25 MG tablet TAKE 1 TABLET BY MOUTH  TWICE DAILY 12/3/21  Yes Colt Goldman DO   Esomeprazole Magnesium (NEXIUM 24HR PO) Take by mouth as needed   Yes Historical Provider, MD   Multiple Vitamins-Minerals (COMPLETE MULTIVITAMIN/MINERAL PO) Take 1 tablet by mouth daily   Yes Historical Provider, MD   aspirin 81 MG tablet Take 81 mg by mouth every morning    Yes Historical Provider, MD   acetaminophen (TYLENOL) 325 MG tablet Take 650 mg by mouth every 6 hours as needed for Pain   Yes Historical Provider, MD     Allergies:  Flexeril [cyclobenzaprine], Lisinopril, Influenza virus vaccine, Metformin and related, and Zocor [simvastatin]    Social History     Socioeconomic History    Marital status: Life Partner     Spouse name: Not on file    Number of children: Not on file    Years of education: Not on file    Highest education level: Not on file   Occupational History    Not on file   Tobacco Use    Smoking status: Former Smoker     Packs/day: 3.00     Years: 14.00     Pack years: 42.00     Types: Cigarettes     Start date:      Quit date: 1983     Years since quittin.6    Smokeless tobacco: Never Used   Vaping Use    Vaping Use: Never used   Substance and Sexual Activity    Alcohol use: Yes     Alcohol/week: 10.0 standard drinks     Types: 5 Standard drinks or equivalent, 5 Shots of liquor per week     Comment: 5 mixed drinks daily- GIN/TONIC    Drug use: No    Sexual activity: Yes     Partners: Female   Other Topics Concern    Not on file   Social History Narrative    Not on file     Social Determinants of Health     Financial Resource Strain: Low Risk     Difficulty of Paying Living Expenses: Not hard at all   Food Insecurity: No Food Insecurity    Worried About Running Out of Food in the Last Year: Never true    Negro of Food in the Last Year: Never true   Transportation Needs:     Lack of Transportation (Medical): Not on file    Lack of Transportation (Non-Medical):  Not on file   Physical Activity:     Days of Exercise per Week: Not on file    Minutes of Exercise per Session: Not on file   Stress:     Feeling of Stress : Not on file   Social Connections:     Frequency of Communication with Friends and Family: Not on file    Frequency of Social Gatherings with Friends and Family: Not on file    Attends [employer] Services: Not on file    Active Member of [address] [place] [address] or Organizations: Not on file    Attends Club or Organization Meetings: Not on file    Marital Status: Not on file   Intimate Partner Violence:     Fear of Current or Ex-Partner: Not on file    Emotionally Abused: Not on file    Physically Abused: Not on file    Sexually Abused: Not on file   Housing Stability:     Unable to Pay for Housing in the Last Year: Not on file    Number of [place] in the Last Year: Not on file    Unstable Housing in the Last Year: Not on file        Family History   Problem Relation Age of Onset    Thyroid Cancer Sister     Cancer Sister         thyroid    Stroke Mother     Diabetes Mother     Stroke Father     Other Brother          in surgery cause unknown    Heart Disease Maternal Grandmother     Arthritis Maternal Grandmother     Heart Disease Maternal Grandfather     Cancer Paternal Grandmother     Other Paternal Grandfather         unknown cause of death       REVIEW OF SYSTEMS (New symptoms):    Eyes:      Blurred vision:  No [x]/Yes []               Diplopia:   No [x]/Yes []               Vision loss:       No [x]/Yes []   Ears, nose, throat:             Hearing loss:    No [x]/Yes []      Vertigo:   No []/Yes [x]                       Swallowing problem:  No [x]/Yes []               Nose bleeds:   No [x]/Yes []      Voice hoarseness:  No [x]/Yes []  Respiratory:             Cough:   No [x]/Yes []      Pleuritic chest pain:  No [x]/Yes []                        Dyspnea:   No [x]/Yes []      Wheezing:   No [x]/Yes []  Cardiovascular:             Angina:   No [x]/Yes []      Palpitations:   No [x]/Yes []          Claudication:    No [x]/Yes []      Leg swelling:   No [x]/Yes []  Gastrointestinal:             Nausea or vomiting:  No [x]/Yes []               Abdominal pain:  No [x]/Yes []                     Intestinal bleeding: No [x]/Yes []  Musculoskeletal:             Leg pain:   No [x]/Yes []      Back pain:   No []/Yes [x]                    Weakness:   No [x]/Yes []  Neurologic:             Numbness:   No [x]/Yes []      Paralysis:   No [x]/Yes []                       Headaches:   No [x]/Yes []  Hematologic, lymphatic:   Anemia:   No [x]/Yes []              Bleeding or bruising:  No [x]/Yes []              Fevers or chills: No [x]/Yes []  Endocrine:             Temp intolerance:   No [x]/Yes []                       Polydipsia, polyuria:  No [x]/Yes []  Skin:              Rash:    No [x]/Yes []      Ulcers:   No [x]/Yes []              Abnorm pigment: No [x]/Yes []  :              Frequency/urgency:  No [x]/Yes []      Hematuria:    No [x]/Yes []                      Incontinence:    No [x]/Yes []    PHYSICAL EXAM:  There were no vitals filed for this visit.   General Appearance: alert and oriented to person, place and time, in no acute distress, well developed and well- nourished  Neurologic: no cranial nerve deficit, speech normal  Head: normocephalic and atraumatic  Eyes: extraocular eye movements intact, conjunctivae normal  ENT: external ear and ear canal normal bilaterally, nose without deformity, no carotid bruits  Pulmonary/Chest: normal air movement, no respiratory distress  Cardiovascular: normal rate, regular rhythm, no murmur  Abdomen: non-distended, no masses  Musculoskeletal: no joint deformity or tenderness  Extremities: no leg edema bilaterally  Skin: warm and dry, no rash or erythema    PULSE EXAM      Right      Left   Brachial     Radial 3 3   Femoral     Popliteal     Dorsalis Pedis     Posterior Tibial     (3=normal, 2=diminished, 1=barely palpable, 4=widened)    RADIOLOGY: [facility] today    Problem List Items Addressed This Visit     None      Visit Diagnoses     Bilateral carotid artery stenosis    -  Primary          I reviewed with the patient that he has mild carotid disease and antegrade flow in both vertebral arteries. I am sure that his symptoms of dizziness are not due to compression of any of the arteries causing occlusion. I do not feel that he requires any arterial testing at this time and I will plan to see him again next year as scheduled. I asked him to call sooner with any changes or new problems. No follow-ups on file.

## 2022-06-17 ENCOUNTER — APPOINTMENT (OUTPATIENT)
Dept: GENERAL RADIOLOGY | Age: 72
End: 2022-06-17
Payer: MEDICARE

## 2022-06-17 ENCOUNTER — HOSPITAL ENCOUNTER (OUTPATIENT)
Age: 72
Setting detail: OBSERVATION
Discharge: OTHER INSTITUTION | End: 2022-06-18
Attending: EMERGENCY MEDICINE | Admitting: FAMILY MEDICINE
Payer: MEDICARE

## 2022-06-17 ENCOUNTER — APPOINTMENT (OUTPATIENT)
Dept: CT IMAGING | Age: 72
End: 2022-06-17
Payer: MEDICARE

## 2022-06-17 DIAGNOSIS — J44.1 COPD EXACERBATION (HCC): ICD-10-CM

## 2022-06-17 DIAGNOSIS — J96.01 ACUTE RESPIRATORY FAILURE WITH HYPOXIA (HCC): Primary | ICD-10-CM

## 2022-06-17 LAB
ALBUMIN SERPL-MCNC: 4.1 G/DL (ref 3.5–5.2)
ALP BLD-CCNC: 83 U/L (ref 40–129)
ALT SERPL-CCNC: 12 U/L (ref 0–40)
ANION GAP SERPL CALCULATED.3IONS-SCNC: 14 MMOL/L (ref 7–16)
AST SERPL-CCNC: 27 U/L (ref 0–39)
BASOPHILS ABSOLUTE: 0.05 E9/L (ref 0–0.2)
BASOPHILS RELATIVE PERCENT: 0.9 % (ref 0–2)
BILIRUB SERPL-MCNC: 0.6 MG/DL (ref 0–1.2)
BUN BLDV-MCNC: 10 MG/DL (ref 6–23)
CALCIUM SERPL-MCNC: 9 MG/DL (ref 8.6–10.2)
CHLORIDE BLD-SCNC: 100 MMOL/L (ref 98–107)
CO2: 21 MMOL/L (ref 22–29)
CREAT SERPL-MCNC: 1.2 MG/DL (ref 0.7–1.2)
D DIMER: 251 NG/ML DDU
EOSINOPHILS ABSOLUTE: 0.39 E9/L (ref 0.05–0.5)
EOSINOPHILS RELATIVE PERCENT: 6.7 % (ref 0–6)
GFR AFRICAN AMERICAN: >60
GFR NON-AFRICAN AMERICAN: 59 ML/MIN/1.73
GLUCOSE BLD-MCNC: 115 MG/DL (ref 74–99)
HCT VFR BLD CALC: 45.3 % (ref 37–54)
HEMOGLOBIN: 14.8 G/DL (ref 12.5–16.5)
IMMATURE GRANULOCYTES #: 0.02 E9/L
IMMATURE GRANULOCYTES %: 0.3 % (ref 0–5)
LYMPHOCYTES ABSOLUTE: 1.12 E9/L (ref 1.5–4)
LYMPHOCYTES RELATIVE PERCENT: 19.4 % (ref 20–42)
MCH RBC QN AUTO: 32 PG (ref 26–35)
MCHC RBC AUTO-ENTMCNC: 32.7 % (ref 32–34.5)
MCV RBC AUTO: 98.1 FL (ref 80–99.9)
MONOCYTES ABSOLUTE: 0.48 E9/L (ref 0.1–0.95)
MONOCYTES RELATIVE PERCENT: 8.3 % (ref 2–12)
NEUTROPHILS ABSOLUTE: 3.72 E9/L (ref 1.8–7.3)
NEUTROPHILS RELATIVE PERCENT: 64.4 % (ref 43–80)
PDW BLD-RTO: 14.1 FL (ref 11.5–15)
PLATELET # BLD: 93 E9/L (ref 130–450)
PLATELET CONFIRMATION: NORMAL
PMV BLD AUTO: 11.2 FL (ref 7–12)
POTASSIUM REFLEX MAGNESIUM: 4.5 MMOL/L (ref 3.5–5)
PRO-BNP: 418 PG/ML (ref 0–125)
RBC # BLD: 4.62 E12/L (ref 3.8–5.8)
SARS-COV-2, NAAT: NOT DETECTED
SODIUM BLD-SCNC: 135 MMOL/L (ref 132–146)
TOTAL PROTEIN: 6.9 G/DL (ref 6.4–8.3)
TROPONIN, HIGH SENSITIVITY: 10 NG/L (ref 0–11)
TROPONIN, HIGH SENSITIVITY: 10 NG/L (ref 0–11)
WBC # BLD: 5.8 E9/L (ref 4.5–11.5)

## 2022-06-17 PROCEDURE — 2580000003 HC RX 258: Performed by: STUDENT IN AN ORGANIZED HEALTH CARE EDUCATION/TRAINING PROGRAM

## 2022-06-17 PROCEDURE — 71045 X-RAY EXAM CHEST 1 VIEW: CPT

## 2022-06-17 PROCEDURE — 87635 SARS-COV-2 COVID-19 AMP PRB: CPT

## 2022-06-17 PROCEDURE — 6360000004 HC RX CONTRAST MEDICATION: Performed by: RADIOLOGY

## 2022-06-17 PROCEDURE — G0378 HOSPITAL OBSERVATION PER HR: HCPCS

## 2022-06-17 PROCEDURE — 99220 PR INITIAL OBSERVATION CARE/DAY 70 MINUTES: CPT | Performed by: STUDENT IN AN ORGANIZED HEALTH CARE EDUCATION/TRAINING PROGRAM

## 2022-06-17 PROCEDURE — 96365 THER/PROPH/DIAG IV INF INIT: CPT

## 2022-06-17 PROCEDURE — 71275 CT ANGIOGRAPHY CHEST: CPT

## 2022-06-17 PROCEDURE — 6370000000 HC RX 637 (ALT 250 FOR IP): Performed by: STUDENT IN AN ORGANIZED HEALTH CARE EDUCATION/TRAINING PROGRAM

## 2022-06-17 PROCEDURE — 85378 FIBRIN DEGRADE SEMIQUANT: CPT

## 2022-06-17 PROCEDURE — 93005 ELECTROCARDIOGRAM TRACING: CPT | Performed by: STUDENT IN AN ORGANIZED HEALTH CARE EDUCATION/TRAINING PROGRAM

## 2022-06-17 PROCEDURE — G0378 HOSPITAL OBSERVATION PER HR: HCPCS | Performed by: INTERNAL MEDICINE

## 2022-06-17 PROCEDURE — 85025 COMPLETE CBC W/AUTO DIFF WBC: CPT

## 2022-06-17 PROCEDURE — 83880 ASSAY OF NATRIURETIC PEPTIDE: CPT

## 2022-06-17 PROCEDURE — 80053 COMPREHEN METABOLIC PANEL: CPT

## 2022-06-17 PROCEDURE — 84484 ASSAY OF TROPONIN QUANT: CPT

## 2022-06-17 PROCEDURE — 6360000002 HC RX W HCPCS: Performed by: STUDENT IN AN ORGANIZED HEALTH CARE EDUCATION/TRAINING PROGRAM

## 2022-06-17 PROCEDURE — 94664 DEMO&/EVAL PT USE INHALER: CPT

## 2022-06-17 PROCEDURE — 2700000000 HC OXYGEN THERAPY PER DAY

## 2022-06-17 PROCEDURE — 99285 EMERGENCY DEPT VISIT HI MDM: CPT

## 2022-06-17 RX ORDER — 0.9 % SODIUM CHLORIDE 0.9 %
1000 INTRAVENOUS SOLUTION INTRAVENOUS ONCE
Status: COMPLETED | OUTPATIENT
Start: 2022-06-17 | End: 2022-06-17

## 2022-06-17 RX ORDER — SODIUM CHLORIDE 0.9 % (FLUSH) 0.9 %
5-40 SYRINGE (ML) INJECTION PRN
Status: DISCONTINUED | OUTPATIENT
Start: 2022-06-17 | End: 2022-06-18 | Stop reason: HOSPADM

## 2022-06-17 RX ORDER — ASPIRIN 81 MG/1
81 TABLET, CHEWABLE ORAL EVERY MORNING
Status: DISCONTINUED | OUTPATIENT
Start: 2022-06-18 | End: 2022-06-18 | Stop reason: HOSPADM

## 2022-06-17 RX ORDER — ATORVASTATIN CALCIUM 40 MG/1
40 TABLET, FILM COATED ORAL NIGHTLY
Status: DISCONTINUED | OUTPATIENT
Start: 2022-06-17 | End: 2022-06-18 | Stop reason: HOSPADM

## 2022-06-17 RX ORDER — ATENOLOL 50 MG/1
50 TABLET ORAL EVERY 12 HOURS
Status: DISCONTINUED | OUTPATIENT
Start: 2022-06-18 | End: 2022-06-18 | Stop reason: HOSPADM

## 2022-06-17 RX ORDER — MAGNESIUM SULFATE IN WATER 40 MG/ML
2000 INJECTION, SOLUTION INTRAVENOUS ONCE
Status: COMPLETED | OUTPATIENT
Start: 2022-06-17 | End: 2022-06-17

## 2022-06-17 RX ORDER — ACETAMINOPHEN 325 MG/1
650 TABLET ORAL EVERY 6 HOURS PRN
Status: DISCONTINUED | OUTPATIENT
Start: 2022-06-17 | End: 2022-06-18 | Stop reason: HOSPADM

## 2022-06-17 RX ORDER — ONDANSETRON 2 MG/ML
4 INJECTION INTRAMUSCULAR; INTRAVENOUS EVERY 6 HOURS PRN
Status: DISCONTINUED | OUTPATIENT
Start: 2022-06-17 | End: 2022-06-18 | Stop reason: HOSPADM

## 2022-06-17 RX ORDER — IPRATROPIUM BROMIDE AND ALBUTEROL SULFATE 2.5; .5 MG/3ML; MG/3ML
1 SOLUTION RESPIRATORY (INHALATION) ONCE
Status: COMPLETED | OUTPATIENT
Start: 2022-06-17 | End: 2022-06-17

## 2022-06-17 RX ORDER — GABAPENTIN 300 MG/1
300 CAPSULE ORAL 3 TIMES DAILY
Status: DISCONTINUED | OUTPATIENT
Start: 2022-06-17 | End: 2022-06-18 | Stop reason: HOSPADM

## 2022-06-17 RX ORDER — POLYETHYLENE GLYCOL 3350 17 G/17G
17 POWDER, FOR SOLUTION ORAL DAILY PRN
Status: DISCONTINUED | OUTPATIENT
Start: 2022-06-17 | End: 2022-06-18 | Stop reason: HOSPADM

## 2022-06-17 RX ORDER — SODIUM CHLORIDE 9 MG/ML
INJECTION, SOLUTION INTRAVENOUS PRN
Status: DISCONTINUED | OUTPATIENT
Start: 2022-06-17 | End: 2022-06-18 | Stop reason: HOSPADM

## 2022-06-17 RX ORDER — GABAPENTIN 600 MG/1
300 TABLET ORAL 3 TIMES DAILY
Status: DISCONTINUED | OUTPATIENT
Start: 2022-06-17 | End: 2022-06-17 | Stop reason: SDUPTHER

## 2022-06-17 RX ORDER — ACETAMINOPHEN 650 MG/1
650 SUPPOSITORY RECTAL EVERY 6 HOURS PRN
Status: DISCONTINUED | OUTPATIENT
Start: 2022-06-17 | End: 2022-06-18 | Stop reason: HOSPADM

## 2022-06-17 RX ORDER — SODIUM CHLORIDE 0.9 % (FLUSH) 0.9 %
5-40 SYRINGE (ML) INJECTION EVERY 12 HOURS SCHEDULED
Status: DISCONTINUED | OUTPATIENT
Start: 2022-06-17 | End: 2022-06-18 | Stop reason: HOSPADM

## 2022-06-17 RX ORDER — ENOXAPARIN SODIUM 100 MG/ML
40 INJECTION SUBCUTANEOUS DAILY
Status: DISCONTINUED | OUTPATIENT
Start: 2022-06-18 | End: 2022-06-18 | Stop reason: HOSPADM

## 2022-06-17 RX ORDER — ONDANSETRON 4 MG/1
4 TABLET, ORALLY DISINTEGRATING ORAL EVERY 8 HOURS PRN
Status: DISCONTINUED | OUTPATIENT
Start: 2022-06-17 | End: 2022-06-18 | Stop reason: HOSPADM

## 2022-06-17 RX ORDER — HYDRALAZINE HYDROCHLORIDE 25 MG/1
25 TABLET, FILM COATED ORAL EVERY 12 HOURS
Status: DISCONTINUED | OUTPATIENT
Start: 2022-06-17 | End: 2022-06-18 | Stop reason: HOSPADM

## 2022-06-17 RX ORDER — IPRATROPIUM BROMIDE AND ALBUTEROL SULFATE 2.5; .5 MG/3ML; MG/3ML
1 SOLUTION RESPIRATORY (INHALATION)
Status: DISCONTINUED | OUTPATIENT
Start: 2022-06-18 | End: 2022-06-18 | Stop reason: HOSPADM

## 2022-06-17 RX ORDER — AMLODIPINE BESYLATE 5 MG/1
5 TABLET ORAL DAILY
Status: DISCONTINUED | OUTPATIENT
Start: 2022-06-18 | End: 2022-06-18 | Stop reason: HOSPADM

## 2022-06-17 RX ORDER — PREDNISONE 20 MG/1
40 TABLET ORAL DAILY
Status: DISCONTINUED | OUTPATIENT
Start: 2022-06-18 | End: 2022-06-18 | Stop reason: HOSPADM

## 2022-06-17 RX ORDER — GUAIFENESIN 100 MG/5ML
200 SOLUTION ORAL EVERY 4 HOURS PRN
Status: DISCONTINUED | OUTPATIENT
Start: 2022-06-17 | End: 2022-06-18 | Stop reason: HOSPADM

## 2022-06-17 RX ADMIN — MAGNESIUM SULFATE HEPTAHYDRATE 2000 MG: 40 INJECTION, SOLUTION INTRAVENOUS at 16:57

## 2022-06-17 RX ADMIN — ATORVASTATIN CALCIUM 40 MG: 40 TABLET, FILM COATED ORAL at 22:33

## 2022-06-17 RX ADMIN — IOPAMIDOL 50 ML: 755 INJECTION, SOLUTION INTRAVENOUS at 19:49

## 2022-06-17 RX ADMIN — HYDRALAZINE HYDROCHLORIDE 25 MG: 25 TABLET, FILM COATED ORAL at 22:33

## 2022-06-17 RX ADMIN — SODIUM CHLORIDE 1000 ML: 9 INJECTION, SOLUTION INTRAVENOUS at 16:55

## 2022-06-17 RX ADMIN — IPRATROPIUM BROMIDE AND ALBUTEROL SULFATE 1 AMPULE: .5; 3 SOLUTION RESPIRATORY (INHALATION) at 16:49

## 2022-06-17 RX ADMIN — GABAPENTIN 300 MG: 300 CAPSULE ORAL at 22:33

## 2022-06-17 RX ADMIN — SODIUM CHLORIDE, PRESERVATIVE FREE 10 ML: 5 INJECTION INTRAVENOUS at 22:34

## 2022-06-17 ASSESSMENT — ENCOUNTER SYMPTOMS
BACK PAIN: 0
VOMITING: 0
COUGH: 1
DIARRHEA: 0
FACIAL SWELLING: 0
NAUSEA: 0
SHORTNESS OF BREATH: 1
WHEEZING: 1
ABDOMINAL PAIN: 0
TROUBLE SWALLOWING: 0

## 2022-06-17 ASSESSMENT — PAIN SCALES - GENERAL: PAINLEVEL_OUTOF10: 0

## 2022-06-17 ASSESSMENT — PAIN - FUNCTIONAL ASSESSMENT: PAIN_FUNCTIONAL_ASSESSMENT: NONE - DENIES PAIN

## 2022-06-17 ASSESSMENT — LIFESTYLE VARIABLES
HOW OFTEN DO YOU HAVE A DRINK CONTAINING ALCOHOL: 4 OR MORE TIMES A WEEK
HOW MANY STANDARD DRINKS CONTAINING ALCOHOL DO YOU HAVE ON A TYPICAL DAY: 3 OR 4

## 2022-06-17 NOTE — H&P
[facility] History and Physical          ASSESSMENT/PLAN:      Principal Problem:    Acute respiratory failure with hypoxia (HCC)  Resolved Problems:    * No resolved hospital problems. *    #Shortness of breath due to acute exacerbation of COPD, moderate  #Acute hypoxic respiratory failure due to asthma, improvingPE ruled out  #Noncompliance with bronchodilator therapy #Hypertension, uncontrolled  #Hyperlipidemia     Place patient on observation   Nasal cannula to maintain oxygen saturation 88-92%   Prednisone 40 daily   Azithromycin 500 daily   DuoNebs every 4 hours around-the-clock for the next 24 hours  - prn Mucolytics  - continue Pulmonary Toilet with Incentive Spirometry, Flutter Valve  -obtain previous PFTs if available   Establish pulmonology follow-up   Prescription for nebulizer system at discharge    Code Status: [unfilled]   DVT prophylaxis:lovenox 40mg SQ daily      CHIEF COMPLAINT:    Chief Complaint   Patient presents with    Shortness of Breath     sent by ambulance from [facility], hx of COPD, 125 mg solu-medrol and 2 duonebs via EMS       History of Present Illness:  Patient is a 67 y.o. male with a past medical history significant for emphysema/COPD, hypertension, GERD who presented to the emergency department with shortness of breath. Patient states that he has been having mild shortness of breath, wheezing and throat congestion over the past 2 to 3 days. He states that his normal state of health is without shortness of breath or wheezing and has not had to use his rescue inhaler for over a year. Patient states that when his symptoms started, he did not use his rescue inhaler because he did not think about it. He does not have a nebulizer machine. He does states that he has been feeling completely himself ever since he was diagnosed with COVID-19 on May 17, 2022. Patient states he does not have a pulmonologist at the time.   He denied any fevers, chills, chest pain, back pain, abdominal pain, nausea, vomiting, diarrhea, constipation, bloody stools, bloody urine, lower extremity swelling, numbness/tingling of extremities, focal weakness, recent injuries or loss of consciousness. No recent travel or surgery. No history of DVT or PE. Initial ED vitals: Temp 97.6, heart rate 59, RR 20, /74  O2 saturation 88 to 91% with ambulation. Patient was treated with normal saline 1 L bolus, magnesium sulfate, DuoNeb x1  Since chest x-ray shows no acute pathology. CT angio shows mild bronchitis with no evidence of PE. The patient reports improvement in symptoms after regimen in the ED.   Informant(s) for H&P: Patient    REVIEW OF SYSTEMS:  A comprehensive review of systems was negative except for: what is in the HPI      PMH:  Past Medical History:   Diagnosis Date    Anxiety     Depression     GERD (gastroesophageal reflux disease)     Heart palpitations     HTN (hypertension) 10/21/2010    Hyperlipidemia     Lightheadedness     OA (osteoarthritis)     Parathyroid adenoma     s/p surgery 2005    Pre-diabetes     Recurrent left pleural effusion 9/13/2018    Stenosis of right internal carotid artery with cerebral infarction [facility])        Surgical History:  Past Surgical History:   Procedure Laterality Date    BACK SURGERY  12/20/2016    revision cervical laminectomy    CAROTID ENDARTERECTOMY Right 8/25/2014    Delatore - bovine patch     CERVICAL FUSION  12/12/2016    C4-C5, C5-C6, C6-C7 ACF, C5-C6 Corpectomy    COLONOSCOPY N/A 6/26/2020    COLONOSCOPY POLYPECTOMY SNARE/COLD BIOPSY performed by Hafsa Reid MD at [facility] [place], DIAGNOSTIC      EYE SURGERY  2001    lasex    FRACTURE SURGERY Left     plate in wrist    HAND SURGERY      PARATHYROIDECTOMY  11/7/2005    SC THORSC DX LUNGS/PERICAR/MED/PLEURAL SPACE W/O BX Right 9/17/2018    THORACOSCOPY performed by Junior Cain MD at [facility] GASTROINTESTINAL ENDOSCOPY  07/07/2014    UPPER GASTROINTESTINAL ENDOSCOPY N/A 6/26/2020    EGD BIOPSY performed by Delmy Tong MD at [address] [place] [address]       Medications Prior to Admission:    Prior to Admission medications    Medication Sig Start Date End Date Taking? Authorizing Provider   PARoxetine (PAXIL) 30 MG tablet TAKE 1 TABLET BY MOUTH IN  THE MORNING 4/25/22   Colt Goldman,    amLODIPine (NORVASC) 5 MG tablet TAKE 1 TABLET BY MOUTH  DAILY 4/8/22 5/13/22  Colt Goldman, DO   gabapentin (NEURONTIN) 600 MG tablet TAKE 1 TABLET BY MOUTH 3  TIMES DAILY 4/8/22 7/7/22  Colt Goldman, DO   atorvastatin (LIPITOR) 40 MG tablet TAKE 1 TABLET BY MOUTH  DAILY 4/8/22   Colt Goldman, DO   atenolol (TENORMIN) 50 MG tablet TAKE 1 TABLET BY MOUTH  TWICE DAILY 4/8/22   Clot Goldman, DO   hydrALAZINE (APRESOLINE) 25 MG tablet TAKE 1 TABLET BY MOUTH  TWICE DAILY 12/3/21   oClt Goldman, DO   Esomeprazole Magnesium (NEXIUM 24HR PO) Take by mouth as needed    Historical Provider, MD   Multiple Vitamins-Minerals (COMPLETE MULTIVITAMIN/MINERAL PO) Take 1 tablet by mouth daily    Historical Provider, MD   aspirin 81 MG tablet Take 81 mg by mouth every morning     Historical Provider, MD   acetaminophen (TYLENOL) 325 MG tablet Take 650 mg by mouth every 6 hours as needed for Pain    Historical Provider, MD       Allergies:    Flexeril [cyclobenzaprine], Lisinopril, Influenza virus vaccine, Metformin and related, and Zocor [simvastatin]    Social History:    reports that he quit smoking about 38 years ago. His smoking use included cigarettes. He started smoking about 53 years ago. He has a 42.00 pack-year smoking history. He has never used smokeless tobacco. He reports current alcohol use of about 10.0 standard drinks of alcohol per week. He reports that he does not use drugs.     Family History:   family history includes Arthritis in his maternal grandmother; Cancer in his paternal grandmother and sister; Diabetes in his mother; Heart Disease in his maternal grandfather and maternal grandmother; Other in his brother and paternal grandfather; Stroke in his father and mother; Thyroid Cancer in his sister. PHYSICAL EXAM:  Vitals:  BP (!) 156/74   Pulse 62   Temp 97.6 °F (36.4 °C) (Oral)   Resp 20   Ht 5' 5\" (1.651 m)   Wt 160 lb (72.6 kg)   SpO2 94%   BMI 26.63 kg/m²     General Appearance: alert and oriented to person, place and time and in no acute distress  Skin: warm and dry  Head: normocephalic and atraumatic  Eyes: pupils equal, round, and reactive to light, extraocular eye movements intact, conjunctivae normal  Neck: neck supple and non tender without mass   Pulmonary/Chest: On room air, faint rhonchi heard at bilateral bases- no wheezes, no rales, normal air movement, no respiratory distress  Cardiovascular: normal rate, normal S1 and S2, grade 2/6 holosystolic murmur best heard at second ICS, no carotid bruits  Abdomen: soft, non-tender, non-distended, normal bowel sounds, no masses or organomegaly  Extremities: no cyanosis, no clubbing and no edema  Neurologic: no cranial nerve deficit and speech normal        LABS:  Recent Labs     06/17/22  1640      K 4.5      CO2 21*   BUN 10   CREATININE 1.2   GLUCOSE 115*   CALCIUM 9.0       Recent Labs     06/17/22  1640   WBC 5.8   RBC 4.62   HGB 14.8   HCT 45.3   MCV 98.1   MCH 32.0   MCHC 32.7   RDW 14.1   PLT 93*   MPV 11.2       No results for input(s): POCGLU in the last 72 hours.     CBC:   Lab Results   Component Value Date    WBC 5.8 06/17/2022    RBC 4.62 06/17/2022    HGB 14.8 06/17/2022    HCT 45.3 06/17/2022    MCV 98.1 06/17/2022    MCH 32.0 06/17/2022    MCHC 32.7 06/17/2022    RDW 14.1 06/17/2022    PLT 93 06/17/2022    MPV 11.2 06/17/2022     BMP:    Lab Results   Component Value Date     06/17/2022    K 4.5 06/17/2022     06/17/2022    CO2 21 06/17/2022    BUN 10 06/17/2022    LABALBU 4.1 06/17/2022    LABALBU 4.8 08/08/2011 CREATININE 1.2 06/17/2022    CALCIUM 9.0 06/17/2022    GFRAA >60 06/17/2022    LABGLOM 59 06/17/2022    GLUCOSE 115 06/17/2022    GLUCOSE 141 03/22/2012       Radiology: XR CHEST PORTABLE    Result Date: 6/17/2022  1. No acute cardiopulmonary disease. No significant change. 2.  Chronic blunting of the right costophrenic angle compatible with scarring. EKG:  normal sinus rhythm, unchanged from previous tracings. NOTE: This report was transcribed using voice recognition software. Every effort was made to ensure accuracy; however, inadvertent computerized transcription errors may be present.     Megan Baumgarten, MD   6/17/2022, 7:34 PM

## 2022-06-17 NOTE — ED PROVIDER NOTES
Chief Complaint   Patient presents with    Shortness of Breath     sent by ambulance from [facility], hx of COPD, 125 mg solu-medrol and 2 duonebs via EMS       Patient is a 26-year-old male with history of asthma presents today for shortness of breath from [place]. He states he has had a cough and shortness of breath for the past several days. He went to the PCPs office today, he was noted to be hypoxic with O2 saturation in the 80s. EMS was called. He was given 125 Solu-Medrol and 2 duo nebs in route. EMS states he was initially speaking in 2 word sentences, and in route with treatment has had vast improvement of her symptoms. He states symptoms have been persistent, moderate in severity, no aggravating leaving factors. Is not using his inhaler at home. No recent travel or surgery. No history of DVT or PE. On arrival he has diffuse wheezing throughout, he is speaking in full sentences. He denies chest pain, lightheadedness, dizziness, nausea, vomiting, syncope    The history is provided by the patient. No  was used. Review of Systems   Constitutional: Negative for chills, diaphoresis and fever. HENT: Negative for facial swelling and trouble swallowing. Eyes: Negative for visual disturbance. Respiratory: Positive for cough, shortness of breath and wheezing. Cardiovascular: Negative for chest pain. Gastrointestinal: Negative for abdominal pain, diarrhea, nausea and vomiting. Genitourinary: Negative for difficulty urinating and flank pain. Musculoskeletal: Negative for back pain. Skin: Negative for wound. Neurological: Negative for dizziness, syncope, weakness, numbness and headaches. Hematological: Negative for adenopathy. Psychiatric/Behavioral: Negative for behavioral problems and confusion. Physical Exam  Vitals and nursing note reviewed. Constitutional:       General: He is not in acute distress. Appearance: Normal appearance.  He is well-developed. He is not ill-appearing. HENT:      Head: Normocephalic and atraumatic. Eyes:      Pupils: Pupils are equal, round, and reactive to light. Cardiovascular:      Rate and Rhythm: Normal rate and regular rhythm. Pulses: Normal pulses. Heart sounds: Normal heart sounds. No murmur heard. Pulmonary:      Effort: Pulmonary effort is normal. No respiratory distress. Breath sounds: Wheezing present. No rales. Comments: Diffuse wheezing throughout  Abdominal:      General: Bowel sounds are normal.      Palpations: Abdomen is soft. Tenderness: There is no abdominal tenderness. There is no guarding or rebound. Musculoskeletal:         General: No tenderness. Normal range of motion. Cervical back: Normal range of motion and neck supple. Right lower leg: No edema. Left lower leg: No edema. Skin:     General: Skin is warm and dry. Capillary Refill: Capillary refill takes less than 2 seconds. Neurological:      General: No focal deficit present. Mental Status: He is alert and oriented to person, place, and time. Cranial Nerves: No cranial nerve deficit.       Coordination: Coordination normal.          Procedures     Labs Reviewed   CBC WITH AUTO DIFFERENTIAL - Abnormal; Notable for the following components:       Result Value    Platelets 93 (*)     Lymphocytes % 19.4 (*)     Eosinophils % 6.7 (*)     Lymphocytes Absolute 1.12 (*)     All other components within normal limits   COMPREHENSIVE METABOLIC PANEL W/ REFLEX TO MG FOR LOW K - Abnormal; Notable for the following components:    CO2 21 (*)     Glucose 115 (*)     All other components within normal limits   BRAIN NATRIURETIC PEPTIDE - Abnormal; Notable for the following components:    Pro- (*)     All other components within normal limits   BASIC METABOLIC PANEL W/ REFLEX TO MG FOR LOW K - Abnormal; Notable for the following components:    Glucose 247 (*)     All other components within normal limits   COVID-19, RAPID   TROPONIN   PLATELET CONFIRMATION   TROPONIN   D-DIMER, QUANTITATIVE   SERUM DRUG SCREEN   URINE DRUG SCREEN   URINALYSIS     CTA PULMONARY W CONTRAST   Final Result   No pulmonary embolism. No thoracic aortic aneurysm or dissection. Mild bronchitis. Atherosclerotic disease. XR CHEST PORTABLE   Final Result   1. No acute cardiopulmonary disease. No significant change. 2.  Chronic blunting of the right costophrenic angle compatible with scarring. EKG #1:   I personally interpreted this EKG  Time:  1714    Rate: 70  Rhythm: Sinus. Interpretation: Sinus rhythm, left axis deviation, no ST elevation. similar to previous. MDM  Number of Diagnoses or Management Options  Acute respiratory failure with hypoxia (HCC)  COPD exacerbation (HCC)  Diagnosis management comments: Patient is a 78-year-old male with history of COPD presents today for shortness of breath. Noted to be hypoxic. He had been given DuoNeb and steroid via EMS. On arrival to the department speaking full word sentences, although does have diffuse wheezing throughout. In the ER he was given DuoNeb and magnesium. Labs and imaging obtained. Troponin of 10, EKG shows no ischemic changes, unlikely ACS in etiology. X-ray shows hyperinflation of the lungs. D-dimer was elevated, therefore CTA was obtained. CTA shows no evidence of pulmonary embolus or other acute abnormality. Patient ambulated, O2 saturation 88 to 91% with ambulation. He was placed on nasal cannula.   He will be admitted to the hospital for further evaluation and treatment of acute respiratory failure with hypoxia with associated COPD exacerbation           Amount and/or Complexity of Data Reviewed  Clinical lab tests: reviewed  Tests in the radiology section of CPT®: reviewed  Tests in the medicine section of CPT®: reviewed             ED Course as of 06/18/22 1458   Fri Jun 17, 2022   1638 Patient received 125 Solu-Medrol 2 duo nebs via EMS in route []      ED Course User Index  [] Maury Vasquez, DO       --------------------------------------------- PAST HISTORY ---------------------------------------------  Past Medical History:  has a past medical history of Anxiety, Depression, GERD (gastroesophageal reflux disease), Heart palpitations, HTN (hypertension), Hyperlipidemia, Lightheadedness, OA (osteoarthritis), Parathyroid adenoma, Pre-diabetes, Recurrent left pleural effusion, and Stenosis of right internal carotid artery with cerebral infarction ([facility] [address] Past Surgical History:  has a past surgical history that includes parathyroidectomy (11/7/2005); Tonsillectomy; Hand surgery; eye surgery (2001); fracture surgery (Left); Upper gastrointestinal endoscopy (07/07/2014); Carotid endarterectomy (Right, 8/25/2014); Endoscopy, colon, diagnostic; cervical fusion (12/12/2016); back surgery (12/20/2016); pr thorsc dx lungs/pericar/med/pleural space w/o bx (Right, 9/17/2018); Colonoscopy (N/A, 6/26/2020); and Upper gastrointestinal endoscopy (N/A, 6/26/2020). Social History:  reports that he quit smoking about 38 years ago. His smoking use included cigarettes. He started smoking about 53 years ago. He has a 42.00 pack-year smoking history. He has never used smokeless tobacco. He reports current alcohol use of about 10.0 standard drinks of alcohol per week. He reports that he does not use drugs. Family History: family history includes Arthritis in his maternal grandmother; Cancer in his paternal grandmother and sister; Diabetes in his mother; Heart Disease in his maternal grandfather and maternal grandmother; Other in his brother and paternal grandfather; Stroke in his father and mother; Thyroid Cancer in his sister. The patients home medications have been reviewed.     Allergies: Flexeril [cyclobenzaprine], Lisinopril, Influenza virus vaccine, Metformin and related, and Zocor [simvastatin]    -------------------------------------------------- RESULTS -------------------------------------------------    LABS:  Results for orders placed or performed during the hospital encounter of 06/17/22   COVID-19, Rapid    Specimen: Nasopharyngeal Swab   Result Value Ref Range    SARS-CoV-2, NAAT Not Detected Not Detected   CBC with Auto Differential   Result Value Ref Range    WBC 5.8 4.5 - 11.5 E9/L    RBC 4.62 3.80 - 5.80 E12/L    Hemoglobin 14.8 12.5 - 16.5 g/dL    Hematocrit 45.3 37.0 - 54.0 %    MCV 98.1 80.0 - 99.9 fL    MCH 32.0 26.0 - 35.0 pg    MCHC 32.7 32.0 - 34.5 %    RDW 14.1 11.5 - 15.0 fL    Platelets 93 (L) 219 - 450 E9/L    MPV 11.2 7.0 - 12.0 fL    Neutrophils % 64.4 43.0 - 80.0 %    Immature Granulocytes % 0.3 0.0 - 5.0 %    Lymphocytes % 19.4 (L) 20.0 - 42.0 %    Monocytes % 8.3 2.0 - 12.0 %    Eosinophils % 6.7 (H) 0.0 - 6.0 %    Basophils % 0.9 0.0 - 2.0 %    Neutrophils Absolute 3.72 1.80 - 7.30 E9/L    Immature Granulocytes # 0.02 E9/L    Lymphocytes Absolute 1.12 (L) 1.50 - 4.00 E9/L    Monocytes Absolute 0.48 0.10 - 0.95 E9/L    Eosinophils Absolute 0.39 0.05 - 0.50 E9/L    Basophils Absolute 0.05 0.00 - 0.20 E9/L   Comprehensive Metabolic Panel w/ Reflex to MG   Result Value Ref Range    Sodium 135 132 - 146 mmol/L    Potassium reflex Magnesium 4.5 3.5 - 5.0 mmol/L    Chloride 100 98 - 107 mmol/L    CO2 21 (L) 22 - 29 mmol/L    Anion Gap 14 7 - 16 mmol/L    Glucose 115 (H) 74 - 99 mg/dL    BUN 10 6 - 23 mg/dL    CREATININE 1.2 0.7 - 1.2 mg/dL    GFR Non-African American 59 >=60 mL/min/1.73    GFR African American >60     Calcium 9.0 8.6 - 10.2 mg/dL    Total Protein 6.9 6.4 - 8.3 g/dL    Albumin 4.1 3.5 - 5.2 g/dL    Total Bilirubin 0.6 0.0 - 1.2 mg/dL    Alkaline Phosphatase 83 40 - 129 U/L    ALT 12 0 - 40 U/L    AST 27 0 - 39 U/L   Troponin   Result Value Ref Range    Troponin, High Sensitivity 10 0 - 11 ng/L   Brain Natriuretic Peptide   Result Value Ref Range Pro- (H) 0 - 125 pg/mL   Platelet Confirmation   Result Value Ref Range    Platelet Confirmation CONFIRMED    Troponin   Result Value Ref Range    Troponin, High Sensitivity 10 0 - 11 ng/L   D-Dimer, Quantitative   Result Value Ref Range    D-Dimer, Quant 251 ng/mL DDU   Basic Metabolic Panel w/ Reflex to MG   Result Value Ref Range    Sodium 137 132 - 146 mmol/L    Potassium reflex Magnesium 3.6 3.5 - 5.0 mmol/L    Chloride 100 98 - 107 mmol/L    CO2 23 22 - 29 mmol/L    Anion Gap 14 7 - 16 mmol/L    Glucose 247 (H) 74 - 99 mg/dL    BUN 10 6 - 23 mg/dL    CREATININE 1.1 0.7 - 1.2 mg/dL    GFR Non-African American >60 >=60 mL/min/1.73    GFR African American >60     Calcium 9.0 8.6 - 10.2 mg/dL   EKG 12 Lead   Result Value Ref Range    Ventricular Rate 70 BPM    Atrial Rate 70 BPM    P-R Interval 168 ms    QRS Duration 104 ms    Q-T Interval 446 ms    QTc Calculation (Bazett) 481 ms    P Axis 7 degrees    R Axis -44 degrees    T Axis 1 degrees       RADIOLOGY:  CTA PULMONARY W CONTRAST   Final Result   No pulmonary embolism. No thoracic aortic aneurysm or dissection. Mild bronchitis. Atherosclerotic disease. XR CHEST PORTABLE   Final Result   1. No acute cardiopulmonary disease. No significant change. 2.  Chronic blunting of the right costophrenic angle compatible with scarring.                 ------------------------- NURSING NOTES AND VITALS REVIEWED ---------------------------  Date / Time Roomed:  6/17/2022  4:21 PM  ED Bed Assignment:  [id number]    The nursing notes within the ED encounter and vital signs as below have been reviewed.      Patient Vitals for the past 24 hrs:   BP Temp Temp src Pulse Resp SpO2 Height Weight   06/18/22 1100 (!) 167/63          06/18/22 0915 (!) 181/78 97 °F (36.1 °C) Oral 65 18 95 %     06/18/22 0603        157 lb 8 oz (71.4 kg)   06/18/22 0230 (!) 158/67 97.5 °F (36.4 °C) Oral 67 18 94 %     06/17/22 2215 (!) 161/85 97.8 °F (36.6 °C) Oral 64 18 95 %     06/17/22 2101 (!) 174/75 97.6 °F (36.4 °C) Oral 70 18 95 %     06/17/22 1709      94 %     06/17/22 1649    62       06/17/22 1630 (!) 156/74   59       06/17/22 1627 (!) 156/74 97.6 °F (36.4 °C) Oral 59 20 99 % 5' 5\" (1.651 m) 160 lb (72.6 kg)       Oxygen Saturation Interpretation: Abnormal    ------------------------------------------ PROGRESS NOTES ------------------------------------------    Counseling:  I have spoken with the patient and discussed todays results, in addition to providing specific details for the plan of care and counseling regarding the diagnosis and prognosis. Their questions are answered at this time and they are agreeable with the plan of admission.    --------------------------------- ADDITIONAL PROVIDER NOTES ---------------------------------  Consultations:  Spoke with [facility].  Discussed case. They will admit the patient. This patient's ED course included: a personal history and physicial examination    This patient has remained hemodynamically stable during their ED course.       Medications   amLODIPine (NORVASC) tablet 5 mg (5 mg Oral Given 6/18/22 0909)   aspirin chewable tablet 81 mg (81 mg Oral Given 6/18/22 0910)   atenolol (TENORMIN) tablet 50 mg (50 mg Oral Given 6/18/22 0910)   atorvastatin (LIPITOR) tablet 40 mg (40 mg Oral Given 6/17/22 2233)   hydrALAZINE (APRESOLINE) tablet 25 mg (25 mg Oral Given 6/18/22 0910)   PARoxetine (PAXIL) tablet 30 mg (30 mg Oral Given 6/18/22 0910)   sodium chloride flush 0.9 % injection 5-40 mL (10 mLs IntraVENous Given 6/18/22 1103)   sodium chloride flush 0.9 % injection 5-40 mL (has no administration in time range)   0.9 % sodium chloride infusion (has no administration in time range)   enoxaparin (LOVENOX) injection 40 mg (40 mg SubCUTAneous Not Given 6/18/22 0924)   ondansetron (ZOFRAN-ODT) disintegrating tablet 4 mg (has no administration in time range)     Or ondansetron (ZOFRAN) injection 4 mg (has no administration in time range)   polyethylene glycol (GLYCOLAX) packet 17 g (has no administration in time range)   acetaminophen (TYLENOL) tablet 650 mg (has no administration in time range)     Or   acetaminophen (TYLENOL) suppository 650 mg (has no administration in time range)   ipratropium-albuterol (DUONEB) nebulizer solution 1 ampule (1 ampule Inhalation Not Given 6/18/22 1210)   predniSONE (DELTASONE) tablet 40 mg (40 mg Oral Given 6/18/22 0910)   guaiFENesin (ROBITUSSIN) 100 MG/5ML oral solution 200 mg (has no administration in time range)   gabapentin (NEURONTIN) capsule 300 mg (300 mg Oral Given 6/18/22 1444)   azithromycin (ZITHROMAX) tablet 500 mg (500 mg Oral Given 6/18/22 0909)   magnesium sulfate 2000 mg in 50 mL IVPB premix (0 mg IntraVENous Stopped 6/17/22 1748)   0.9 % sodium chloride bolus (0 mLs IntraVENous Stopped 6/17/22 1917)   ipratropium-albuterol (DUONEB) nebulizer solution 1 ampule (1 ampule Inhalation Given 6/17/22 1649)   iopamidol (ISOVUE-370) 76 % injection 50 mL (50 mLs IntraVENous Given 6/1949)         Diagnosis:  1. Acute respiratory failure with hypoxia ([facility] [address]    2. COPD exacerbation ([facility] [address]        Disposition:  Patient's disposition: Admit to telemetry  Patient's condition is stable.              Norma Reyes DO  Resident  06/18/22 [id number]

## 2022-06-17 NOTE — ED NOTES
Patient 88-91% on room air, remained the same throughout ambulation, denies SOB of the need for oxygen, states he is feeling better after the meds.       Gelacio Falcon, COLLIN  06/17/22 [id number]

## 2022-06-18 VITALS
OXYGEN SATURATION: 95 % | BODY MASS INDEX: 26.24 KG/M2 | DIASTOLIC BLOOD PRESSURE: 63 MMHG | SYSTOLIC BLOOD PRESSURE: 167 MMHG | HEART RATE: 65 BPM | WEIGHT: 157.5 LBS | HEIGHT: 65 IN | RESPIRATION RATE: 18 BRPM | TEMPERATURE: 97.7 F

## 2022-06-18 LAB
ACETAMINOPHEN LEVEL: <5 MCG/ML (ref 10–30)
AMPHETAMINE SCREEN, URINE: NOT DETECTED
ANION GAP SERPL CALCULATED.3IONS-SCNC: 14 MMOL/L (ref 7–16)
BARBITURATE SCREEN URINE: NOT DETECTED
BENZODIAZEPINE SCREEN, URINE: NOT DETECTED
BILIRUBIN URINE: NEGATIVE
BLOOD, URINE: NEGATIVE
BUN BLDV-MCNC: 10 MG/DL (ref 6–23)
CALCIUM SERPL-MCNC: 9 MG/DL (ref 8.6–10.2)
CANNABINOID SCREEN URINE: NOT DETECTED
CHLORIDE BLD-SCNC: 100 MMOL/L (ref 98–107)
CLARITY: CLEAR
CO2: 23 MMOL/L (ref 22–29)
COCAINE METABOLITE SCREEN URINE: NOT DETECTED
COLOR: YELLOW
CREAT SERPL-MCNC: 1.1 MG/DL (ref 0.7–1.2)
EKG ATRIAL RATE: 70 BPM
EKG P AXIS: 7 DEGREES
EKG P-R INTERVAL: 168 MS
EKG Q-T INTERVAL: 446 MS
EKG QRS DURATION: 104 MS
EKG QTC CALCULATION (BAZETT): 481 MS
EKG R AXIS: -44 DEGREES
EKG T AXIS: 1 DEGREES
EKG VENTRICULAR RATE: 70 BPM
ETHANOL: <10 MG/DL (ref 0–0.08)
FENTANYL SCREEN, URINE: NOT DETECTED
GFR AFRICAN AMERICAN: >60
GFR NON-AFRICAN AMERICAN: >60 ML/MIN/1.73
GLUCOSE BLD-MCNC: 247 MG/DL (ref 74–99)
GLUCOSE URINE: >=1000 MG/DL
KETONES, URINE: NEGATIVE MG/DL
LEUKOCYTE ESTERASE, URINE: NEGATIVE
Lab: NORMAL
METHADONE SCREEN, URINE: NOT DETECTED
NITRITE, URINE: NEGATIVE
OPIATE SCREEN URINE: NOT DETECTED
OXYCODONE URINE: NOT DETECTED
PH UA: 6 (ref 5–9)
PHENCYCLIDINE SCREEN URINE: NOT DETECTED
POTASSIUM REFLEX MAGNESIUM: 3.6 MMOL/L (ref 3.5–5)
PROTEIN UA: NEGATIVE MG/DL
SALICYLATE, SERUM: <0.3 MG/DL (ref 0–30)
SODIUM BLD-SCNC: 137 MMOL/L (ref 132–146)
SPECIFIC GRAVITY UA: <=1.005 (ref 1–1.03)
TRICYCLIC ANTIDEPRESSANTS SCREEN SERUM: NEGATIVE NG/ML
UROBILINOGEN, URINE: 0.2 E.U./DL

## 2022-06-18 PROCEDURE — G0378 HOSPITAL OBSERVATION PER HR: HCPCS

## 2022-06-18 PROCEDURE — 81003 URINALYSIS AUTO W/O SCOPE: CPT

## 2022-06-18 PROCEDURE — 94640 AIRWAY INHALATION TREATMENT: CPT

## 2022-06-18 PROCEDURE — 6370000000 HC RX 637 (ALT 250 FOR IP): Performed by: STUDENT IN AN ORGANIZED HEALTH CARE EDUCATION/TRAINING PROGRAM

## 2022-06-18 PROCEDURE — 80048 BASIC METABOLIC PNL TOTAL CA: CPT

## 2022-06-18 PROCEDURE — 80179 DRUG ASSAY SALICYLATE: CPT

## 2022-06-18 PROCEDURE — 99217 PR OBSERVATION CARE DISCHARGE MANAGEMENT: CPT | Performed by: INTERNAL MEDICINE

## 2022-06-18 PROCEDURE — 36415 COLL VENOUS BLD VENIPUNCTURE: CPT

## 2022-06-18 PROCEDURE — 2580000003 HC RX 258: Performed by: STUDENT IN AN ORGANIZED HEALTH CARE EDUCATION/TRAINING PROGRAM

## 2022-06-18 PROCEDURE — 80307 DRUG TEST PRSMV CHEM ANLYZR: CPT

## 2022-06-18 PROCEDURE — 82077 ASSAY SPEC XCP UR&BREATH IA: CPT

## 2022-06-18 PROCEDURE — 80143 DRUG ASSAY ACETAMINOPHEN: CPT

## 2022-06-18 RX ORDER — AZITHROMYCIN 250 MG/1
500 TABLET, FILM COATED ORAL DAILY
Status: DISCONTINUED | OUTPATIENT
Start: 2022-06-18 | End: 2022-06-18 | Stop reason: HOSPADM

## 2022-06-18 RX ORDER — AZITHROMYCIN 500 MG/1
500 TABLET, FILM COATED ORAL DAILY
Qty: 4 TABLET | Refills: 0 | Status: SHIPPED | OUTPATIENT
Start: 2022-06-19 | End: 2022-06-23

## 2022-06-18 RX ORDER — PREDNISONE 20 MG/1
TABLET ORAL
Qty: 10 TABLET | Refills: 0 | Status: SHIPPED | OUTPATIENT
Start: 2022-06-18 | End: 2022-08-03

## 2022-06-18 RX ORDER — PREDNISONE 20 MG/1
TABLET ORAL
Qty: 10 TABLET | Refills: 0 | Status: SHIPPED | OUTPATIENT
Start: 2022-06-18 | End: 2022-06-18

## 2022-06-18 RX ORDER — AZITHROMYCIN 500 MG/1
500 TABLET, FILM COATED ORAL DAILY
Qty: 4 TABLET | Refills: 0 | Status: SHIPPED | OUTPATIENT
Start: 2022-06-19 | End: 2022-06-18

## 2022-06-18 RX ADMIN — PAROXETINE 30 MG: 10 TABLET, FILM COATED ORAL at 09:10

## 2022-06-18 RX ADMIN — GABAPENTIN 300 MG: 300 CAPSULE ORAL at 14:44

## 2022-06-18 RX ADMIN — PREDNISONE 40 MG: 20 TABLET ORAL at 09:10

## 2022-06-18 RX ADMIN — IPRATROPIUM BROMIDE AND ALBUTEROL SULFATE 1 AMPULE: .5; 2.5 SOLUTION RESPIRATORY (INHALATION) at 09:18

## 2022-06-18 RX ADMIN — AMLODIPINE BESYLATE 5 MG: 5 TABLET ORAL at 09:09

## 2022-06-18 RX ADMIN — HYDRALAZINE HYDROCHLORIDE 25 MG: 25 TABLET, FILM COATED ORAL at 09:10

## 2022-06-18 RX ADMIN — SODIUM CHLORIDE, PRESERVATIVE FREE 10 ML: 5 INJECTION INTRAVENOUS at 11:03

## 2022-06-18 RX ADMIN — AZITHROMYCIN 500 MG: 250 TABLET, FILM COATED ORAL at 09:09

## 2022-06-18 RX ADMIN — ASPIRIN 81 MG CHEWABLE TABLET 81 MG: 81 TABLET CHEWABLE at 09:10

## 2022-06-18 RX ADMIN — GABAPENTIN 300 MG: 300 CAPSULE ORAL at 09:11

## 2022-06-18 RX ADMIN — ATENOLOL 50 MG: 50 TABLET ORAL at 09:10

## 2022-06-18 ASSESSMENT — PAIN SCALES - GENERAL: PAINLEVEL_OUTOF10: 0

## 2022-06-18 NOTE — FLOWSHEET NOTE
Pulse ox was _98_____% on room air at rest.  Ambulated patient on room air. Oxygen saturation was ___96___% on room air while ambulating. Oxygen applied. Recovery pulse ox was _97_____% on __0_____ liters of oxygen while ambulating.

## 2022-06-18 NOTE — DISCHARGE SUMMARY
[facility] EMERGENCY SERVICE Physician Discharge Summary       No follow-up provider specified. Activity level: as see    Diet: ADULT DIET; Regular; 5 carb choices (75 gm/meal); No Added Salt (3-4 gm)    Dispo:home    Condition at discharge: fair        Patient ID:  Cassidy Joy  [id number]  41 y.o.  1950    Admit date: 6/17/2022    Discharge date and time:  6/18/2022  6:06 PM    Admission Diagnoses: Principal Problem:    Acute respiratory failure with hypoxia ([address]  Resolved Problems:    * No resolved hospital problems. *      Discharge Diagnoses: Principal Problem:    Acute respiratory failure with hypoxia ([address]  Resolved Problems:    * No resolved hospital problems. *    Acute respiratory failure with hypoxia(o2sat in 80's pre-hospital)POA  Copd exacerbation  Alcoholism  Acidosis  thrombocytopenia      Consults:  None    Procedures: none    Hospital Course: Patient was admitted with COPD exacerbation ([address] [J44.1]  Acute respiratory failure with hypoxia ([facility] [J96.01]. Patient is a 67 y.o. male with a past medical history significant for emphysema/COPD, hypertension, GERD who presented to the emergency department with shortness of breath. Patient states that he has been having mild shortness of breath, wheezing and throat congestion over the past 2 to 3 days. He states that his normal state of health is without shortness of breath or wheezing and has not had to use his rescue inhaler for over a year. Patient states that when his symptoms started, he did not use his rescue inhaler because he did not think about it. He does not have a nebulizer machine. He does states that he has been feeling completely himself ever since he was diagnosed with COVID-19 on May 17, 2022. Patient states he does not have a pulmonologist at the time.   He denied any fevers, chills, chest pain, back pain, abdominal pain, nausea, vomiting, diarrhea, constipation, bloody stools, bloody urine, lower extremity swelling, numbness/tingling of extremities, focal weakness, recent injuries or loss of consciousness.   No recent travel or surgery.  No history of DVT or PE. Initial ED vitals: Temp 97.6, heart rate 59, RR 20, /74  O2 saturation 88 to 91% with ambulation. Patient was treated with normal saline 1 L bolus, magnesium sulfate, DuoNeb x1  Since chest x-ray shows no acute pathology. CT angio shows mild bronchitis with no evidence of PE.     The patient reports improvement in symptoms after regimen in the ED. Pt seen and examined. Pt improved with steroids. Pt did have nebulizer but did not want anymore due to way it made him feel and shake. Pt up front and honest about his alcoholism. Pt concerned about going through withdrawal and although cut down does not want to stop drinking at this time. Did discuss cessation with pt. Pt currently improved and able to ambulate without desaturation or symptoms. Exam does not elicit any wheezing or difficulty/restrictions in breathing. Did discuss different options of nebulizers and breathing treatments including xopenex and pt did not want any. Pt to f/u with pcp and arrange for new pulmonologist. Pt had seen Dr Arsen Brunner in past. On day of discharge, pt denied fevers, chills,n/v. Pt eager for discharge. Discharge planning d/w pt. Time given for questions and all questions answered. Discussed to come to ER should condition worsen.       Discharge Exam:  Vitals:    06/18/22 0603 06/18/22 0915 06/18/22 1100 06/18/22 1456   BP:  (!) 181/78 (!) 167/63    Pulse:  65     Resp:  18  18   Temp:  97 °F (36.1 °C)  97.7 °F (36.5 °C)   TempSrc:  Oral  Oral   SpO2:  95%  95%   Weight: 157 lb 8 oz (71.4 kg)      Height:           Skin: warm and dry, no rash or erythema  Pulmonary/Chest: clear to auscultation bilaterally- no wheezes, rales or rhonchi, normal air movement, no respiratory distress  Cardiovascular: rhythm reg at rate of 64  Abdomen: soft, non-tender, non-distended, normal bowel sounds, no masses or organomegaly  Extremities: no cyanosis, no clubbing and no edema  I/O last 3 completed shifts:  In: -   Out: 750 [Urine:750]  I/O this shift: In: 720 [P.O.:720]  Out: 350 [Urine:350]      LABS:  Recent Labs     06/17/22  1640 06/18/22  0415    137   K 4.5 3.6    100   CO2 21* 23   BUN 10 10   CREATININE 1.2 1.1   GLUCOSE 115* 247*   CALCIUM 9.0 9.0       Recent Labs     06/17/22  1640   WBC 5.8   RBC 4.62   HGB 14.8   HCT 45.3   MCV 98.1   MCH 32.0   MCHC 32.7   RDW 14.1   PLT 93*   MPV 11.2       No results for input(s): POCGLU in the last 72 hours. BMP:    Lab Results   Component Value Date     06/18/2022    K 3.6 06/18/2022     06/18/2022    CO2 23 06/18/2022    BUN 10 06/18/2022    LABALBU 4.1 06/17/2022    LABALBU 4.8 08/08/2011    CREATININE 1.1 06/18/2022    CALCIUM 9.0 06/18/2022    GFRAA >60 06/18/2022    LABGLOM >60 06/18/2022    GLUCOSE 247 06/18/2022    GLUCOSE 141 03/22/2012       Imaging:   CTA PULMONARY W CONTRAST   Final Result   No pulmonary embolism. No thoracic aortic aneurysm or dissection. Mild bronchitis. Atherosclerotic disease. XR CHEST PORTABLE   Final Result   1. No acute cardiopulmonary disease. No significant change. 2.  Chronic blunting of the right costophrenic angle compatible with scarring.              Patient Instructions:      Medication List      START taking these medications    azithromycin 500 MG tablet  Commonly known as: ZITHROMAX  Take 1 tablet by mouth daily for 4 doses  Start taking on: June 19, 2022     predniSONE 20 MG tablet  Commonly known as: DELTASONE  Take orally 4 tabs on 6/19; then 3 tabs on 6/20; then 2 tabs on 6/21; then 1 tab on 6/22        CONTINUE taking these medications    acetaminophen 325 MG tablet  Commonly known as: TYLENOL     amLODIPine 5 MG tablet  Commonly known as: NORVASC  TAKE 1 TABLET BY MOUTH  DAILY     aspirin 81 MG tablet     atenolol 50 MG tablet  Commonly known as: TENORMIN  TAKE 1 TABLET BY MOUTH  TWICE DAILY     atorvastatin 40 MG tablet  Commonly known as: LIPITOR  TAKE 1 TABLET BY MOUTH  DAILY     COMPLETE MULTIVITAMIN/MINERAL PO     gabapentin 600 MG tablet  Commonly known as: NEURONTIN  TAKE 1 TABLET BY MOUTH 3  TIMES DAILY     hydrALAZINE 25 MG tablet  Commonly known as: APRESOLINE  TAKE 1 TABLET BY MOUTH  TWICE DAILY     NEXIUM 24HR PO     PARoxetine 30 MG tablet  Commonly known as: PAXIL  TAKE 1 TABLET BY MOUTH IN  THE MORNING           Where to Get Your Medications      These medications were sent to [facility], [address] [facility].  Constanza Silver [phone] [facility] Figures [phone] [address] [facility], [address] [place] [id number]    Phone: [phone]   · azithromycin 500 MG tablet  · predniSONE 20 MG tablet           Total time for discharge is 37 min    Signed:  Electronically signed by Quan Vizcarra DO on 6/18/2022 at 6:06 PM

## 2022-07-05 ENCOUNTER — HOSPITAL ENCOUNTER (OUTPATIENT)
Dept: CARDIOLOGY | Age: 72
Discharge: HOME | End: 2022-07-05
Payer: MEDICARE

## 2022-07-05 DIAGNOSIS — I34.0 NONRHEUMATIC MITRAL VALVE REGURGITATION: ICD-10-CM

## 2022-07-05 LAB
LV EF: 60 %
LVEF MODALITY: NORMAL

## 2022-07-05 PROCEDURE — 93306 TTE W/DOPPLER COMPLETE: CPT

## 2022-07-07 ENCOUNTER — TELEPHONE (OUTPATIENT)
Dept: CARDIOLOGY CLINIC | Age: 72
End: 2022-07-07

## 2022-07-07 NOTE — TELEPHONE ENCOUNTER
----- Message from Jordan Tim MD sent at 7/7/2022  4:29 PM EDT -----  EF 60%, moderate mitral regurgitation (similar to prior echo)

## 2023-01-26 ENCOUNTER — HOSPITAL ENCOUNTER (OUTPATIENT)
Age: 73
Discharge: HOME | End: 2023-01-26
Payer: MEDICARE

## 2023-01-26 DIAGNOSIS — R79.9 ABNORMAL FINDING OF BLOOD CHEMISTRY, UNSPECIFIED: ICD-10-CM

## 2023-01-26 DIAGNOSIS — F41.1 GAD (GENERALIZED ANXIETY DISORDER): ICD-10-CM

## 2023-01-26 DIAGNOSIS — E78.2 MIXED HYPERLIPIDEMIA: ICD-10-CM

## 2023-01-26 DIAGNOSIS — I10 ESSENTIAL HYPERTENSION: ICD-10-CM

## 2023-01-26 DIAGNOSIS — R97.20 ELEVATED PSA: ICD-10-CM

## 2023-01-26 DIAGNOSIS — R42 DIZZINESS: ICD-10-CM

## 2023-01-26 DIAGNOSIS — N18.31 STAGE 3A CHRONIC KIDNEY DISEASE (HCC): ICD-10-CM

## 2023-01-26 DIAGNOSIS — G62.9 NEUROPATHY: ICD-10-CM

## 2023-01-26 LAB
ALBUMIN SERPL-MCNC: 3.8 G/DL (ref 3.5–5.2)
ALP BLD-CCNC: 90 U/L (ref 40–129)
ALT SERPL-CCNC: 16 U/L (ref 0–40)
ANION GAP SERPL CALCULATED.3IONS-SCNC: 13 MMOL/L (ref 7–16)
AST SERPL-CCNC: 22 U/L (ref 0–39)
BASOPHILS ABSOLUTE: 0.09 E9/L (ref 0–0.2)
BASOPHILS RELATIVE PERCENT: 1.6 % (ref 0–2)
BILIRUB SERPL-MCNC: 0.6 MG/DL (ref 0–1.2)
BUN BLDV-MCNC: 14 MG/DL (ref 6–23)
CALCIUM SERPL-MCNC: 9.2 MG/DL (ref 8.6–10.2)
CHLORIDE BLD-SCNC: 100 MMOL/L (ref 98–107)
CO2: 27 MMOL/L (ref 22–29)
CREAT SERPL-MCNC: 1.3 MG/DL (ref 0.7–1.2)
EOSINOPHILS ABSOLUTE: 0.43 E9/L (ref 0.05–0.5)
EOSINOPHILS RELATIVE PERCENT: 7.8 % (ref 0–6)
GFR SERPL CREATININE-BSD FRML MDRD: 58 ML/MIN/1.73
GLUCOSE BLD-MCNC: 114 MG/DL (ref 74–99)
HBA1C MFR BLD: 5.1 % (ref 4–5.6)
HCT VFR BLD CALC: 42.3 % (ref 37–54)
HEMOGLOBIN: 14.3 G/DL (ref 12.5–16.5)
IMMATURE GRANULOCYTES #: 0.01 E9/L
IMMATURE GRANULOCYTES %: 0.2 % (ref 0–5)
LYMPHOCYTES ABSOLUTE: 0.86 E9/L (ref 1.5–4)
LYMPHOCYTES RELATIVE PERCENT: 15.6 % (ref 20–42)
MCH RBC QN AUTO: 31.4 PG (ref 26–35)
MCHC RBC AUTO-ENTMCNC: 33.8 % (ref 32–34.5)
MCV RBC AUTO: 93 FL (ref 80–99.9)
MONOCYTES ABSOLUTE: 0.44 E9/L (ref 0.1–0.95)
MONOCYTES RELATIVE PERCENT: 8 % (ref 2–12)
NEUTROPHILS ABSOLUTE: 3.7 E9/L (ref 1.8–7.3)
NEUTROPHILS RELATIVE PERCENT: 66.8 % (ref 43–80)
PDW BLD-RTO: 12.7 FL (ref 11.5–15)
PLATELET # BLD: 117 E9/L (ref 130–450)
PMV BLD AUTO: 10.7 FL (ref 7–12)
POTASSIUM SERPL-SCNC: 3.6 MMOL/L (ref 3.5–5)
PROSTATE SPECIFIC ANTIGEN: 5.37 NG/ML (ref 0–4)
RBC # BLD: 4.55 E12/L (ref 3.8–5.8)
SODIUM BLD-SCNC: 140 MMOL/L (ref 132–146)
TOTAL PROTEIN: 6.3 G/DL (ref 6.4–8.3)
WBC # BLD: 5.5 E9/L (ref 4.5–11.5)

## 2023-01-26 PROCEDURE — 84153 ASSAY OF PSA TOTAL: CPT

## 2023-01-26 PROCEDURE — 36415 COLL VENOUS BLD VENIPUNCTURE: CPT

## 2023-01-26 PROCEDURE — 83036 HEMOGLOBIN GLYCOSYLATED A1C: CPT

## 2023-01-26 PROCEDURE — 80053 COMPREHEN METABOLIC PANEL: CPT

## 2023-01-26 PROCEDURE — 85025 COMPLETE CBC W/AUTO DIFF WBC: CPT

## 2023-02-06 PROBLEM — E11.9 DIET-CONTROLLED DIABETES MELLITUS (HCC): Status: RESOLVED | Noted: 2020-03-13 | Resolved: 2023-02-06

## 2023-02-06 PROBLEM — E11.40 TYPE 2 DIABETES MELLITUS WITH DIABETIC NEUROPATHY (HCC): Status: ACTIVE | Noted: 2023-02-06

## 2023-05-30 DIAGNOSIS — I65.23 BILATERAL CAROTID ARTERY STENOSIS: Primary | ICD-10-CM

## 2023-06-20 ENCOUNTER — OFFICE VISIT (OUTPATIENT)
Dept: VASCULAR SURGERY | Age: 73
End: 2023-06-20
Payer: MEDICARE

## 2023-06-20 ENCOUNTER — HOSPITAL ENCOUNTER (OUTPATIENT)
Dept: CARDIOLOGY | Age: 73
Discharge: HOME | End: 2023-06-20
Payer: MEDICARE

## 2023-06-20 VITALS — BODY MASS INDEX: 26.66 KG/M2 | WEIGHT: 160 LBS | HEIGHT: 65 IN

## 2023-06-20 DIAGNOSIS — I65.23 CAROTID STENOSIS, ASYMPTOMATIC, BILATERAL: Primary | ICD-10-CM

## 2023-06-20 DIAGNOSIS — I65.23 BILATERAL CAROTID ARTERY STENOSIS: ICD-10-CM

## 2023-06-20 PROCEDURE — 93880 EXTRACRANIAL BILAT STUDY: CPT | Performed by: SURGERY

## 2023-06-20 PROCEDURE — 93880 EXTRACRANIAL BILAT STUDY: CPT

## 2023-06-20 PROCEDURE — 99213 OFFICE O/P EST LOW 20 MIN: CPT | Performed by: PHYSICIAN ASSISTANT

## 2023-06-20 PROCEDURE — 1123F ACP DISCUSS/DSCN MKR DOCD: CPT | Performed by: PHYSICIAN ASSISTANT

## 2023-06-20 NOTE — PROGRESS NOTES
[facility] Heart & Vascular Lab - [facility]    This is a pre read worksheet - prior to official physician interpretation    Friolan Browngogo  1950  Date of study: 6/20/23    Indication for study:  Carotid artery stenosis  Study : Bilateral Carotid Artery Duplex Examination    Duplex examination of the RIGHT carotid artery system identifies atherosclerotic plaque. The peak systolic velocity in internal carotid artery was 89 centimeters / second. The maximum end diastolic velocity was 22 centimeters / second. The ICA/CCA ratio is 1.0. The right vertebral artery has antegrade flow. Duplex examination of the LEFT carotid artery system identifies atherosclerotic plaque. The peak systolic velocity in internal carotid artery was 198 centimeters / second. The maximum end diastolic velocity was 51 centimeters / second. The ICA/CCA ratio is 1.9. The left vertebral artery has antegrade flow.     LEFT DISTAL ICA very tortuous        LAST STUDY  6/22/2021  Rt wnl  Lt 50-69

## 2023-06-20 NOTE — PROGRESS NOTES
Vascular Surgery Outpatient Progress Note      Chief Complaint   Patient presents with    Circulatory Problem     Bilateral carotid artery stenosis       HISTORY OF PRESENT ILLNESS:                The patient is a 75 y.o. male who returns for follow-up evaluation of carotid artery stenosis. He is accompanied by his wife. His neck pain is back to baseline since his last visit. He has chronic neck pain s/p cervical fusion in 2003. He denies any unilateral weakness, numbness, slurred speech or amaurosis fugax. He is on asa, statin therapy. He does not smoke. Past Medical History:        Diagnosis Date    Anxiety     Depression     GERD (gastroesophageal reflux disease)     Heart palpitations     HTN (hypertension) 10/21/2010    Hyperlipidemia     Lightheadedness     OA (osteoarthritis)     Parathyroid adenoma     s/p surgery 2005    Pre-diabetes     Recurrent left pleural effusion 9/13/2018    Stenosis of right internal carotid artery with cerebral infarction [facility])      Past Surgical History:        Procedure Laterality Date    BACK SURGERY  12/20/2016    revision cervical laminectomy    CAROTID ENDARTERECTOMY Right 8/25/2014    Delatore - bovine patch     CERVICAL FUSION  12/12/2016    C4-C5, C5-C6, C6-C7 ACF, C5-C6 Corpectomy    COLONOSCOPY N/A 6/26/2020    COLONOSCOPY POLYPECTOMY SNARE/COLD BIOPSY performed by Amy Moncada MD at [facility], DIAGNOSTIC      EYE SURGERY  2001    lasex    FRACTURE SURGERY Left     plate in wrist    HAND SURGERY      PARATHYROIDECTOMY  11/7/2005    MS [address] DX LUNGS/PERICAR/MED/PLEURAL SPACE W/O BX Right 9/17/2018    THORACOSCOPY performed by Patricia Miranda MD at [address]  07/07/2014    UPPER GASTROINTESTINAL ENDOSCOPY N/A 6/26/2020    EGD BIOPSY performed by Amy Moncada MD at [address] [place] [address]     Current Medications:   Prior to Admission medications    Medication Sig Start Date End Date Taking?

## 2023-08-03 ENCOUNTER — HOSPITAL ENCOUNTER (OUTPATIENT)
Age: 73
Discharge: HOME | End: 2023-08-03
Payer: MEDICARE

## 2023-08-03 DIAGNOSIS — D69.6 THROMBOCYTOPENIA, UNSPECIFIED (HCC): ICD-10-CM

## 2023-08-03 DIAGNOSIS — I10 ESSENTIAL HYPERTENSION: ICD-10-CM

## 2023-08-03 DIAGNOSIS — N18.31 STAGE 3A CHRONIC KIDNEY DISEASE (HCC): ICD-10-CM

## 2023-08-03 DIAGNOSIS — E11.40 TYPE 2 DIABETES MELLITUS WITH DIABETIC NEUROPATHY, WITHOUT LONG-TERM CURRENT USE OF INSULIN (HCC): ICD-10-CM

## 2023-08-03 DIAGNOSIS — R97.20 ELEVATED PSA: ICD-10-CM

## 2023-08-03 DIAGNOSIS — E78.2 MIXED HYPERLIPIDEMIA: ICD-10-CM

## 2023-08-03 DIAGNOSIS — Z12.5 ENCOUNTER FOR SCREENING FOR MALIGNANT NEOPLASM OF PROSTATE: ICD-10-CM

## 2023-08-03 DIAGNOSIS — G62.9 NEUROPATHY: ICD-10-CM

## 2023-08-03 LAB
ALBUMIN SERPL-MCNC: 3.9 G/DL (ref 3.5–5.2)
ALP SERPL-CCNC: 136 U/L (ref 40–129)
ALT SERPL-CCNC: 33 U/L (ref 0–40)
ANION GAP SERPL CALCULATED.3IONS-SCNC: 15 MMOL/L (ref 7–16)
AST SERPL-CCNC: 44 U/L (ref 0–39)
BASOPHILS # BLD: 0.08 K/UL (ref 0–0.2)
BASOPHILS NFR BLD: 2 % (ref 0–2)
BILIRUB SERPL-MCNC: 0.7 MG/DL (ref 0–1.2)
BUN SERPL-MCNC: 14 MG/DL (ref 6–23)
CALCIUM SERPL-MCNC: 8.6 MG/DL (ref 8.6–10.2)
CHLORIDE SERPL-SCNC: 97 MMOL/L (ref 98–107)
CHOLEST SERPL-MCNC: 143 MG/DL
CO2 SERPL-SCNC: 26 MMOL/L (ref 22–29)
CREAT SERPL-MCNC: 1.4 MG/DL (ref 0.7–1.2)
CREAT UR-MCNC: 89.8 MG/DL (ref 40–278)
EOSINOPHIL # BLD: 0.36 K/UL (ref 0.05–0.5)
EOSINOPHILS RELATIVE PERCENT: 7 % (ref 0–6)
ERYTHROCYTE [DISTWIDTH] IN BLOOD BY AUTOMATED COUNT: 12.7 % (ref 11.5–15)
GFR SERPL CREATININE-BSD FRML MDRD: 51 ML/MIN/1.73M2
GLUCOSE SERPL-MCNC: 109 MG/DL (ref 74–99)
HBA1C MFR BLD: 5.2 % (ref 4–5.6)
HCT VFR BLD AUTO: 42.6 % (ref 37–54)
HDLC SERPL-MCNC: 100 MG/DL
HGB BLD-MCNC: 13.9 G/DL (ref 12.5–16.5)
IMM GRANULOCYTES # BLD AUTO: <0.03 K/UL (ref 0–0.58)
IMM GRANULOCYTES NFR BLD: 0 % (ref 0–5)
LDLC SERPL CALC-MCNC: 4 MG/DL
LYMPHOCYTES NFR BLD: 0.87 K/UL (ref 1.5–4)
LYMPHOCYTES RELATIVE PERCENT: 17 % (ref 20–42)
MCH RBC QN AUTO: 32.9 PG (ref 26–35)
MCHC RBC AUTO-ENTMCNC: 32.6 G/DL (ref 32–34.5)
MCV RBC AUTO: 100.7 FL (ref 80–99.9)
MICROALBUMIN UR-MCNC: <12 MG/L (ref 0–19)
MICROALBUMIN/CREAT UR-RTO: NORMAL MCG/MG CREAT (ref 0–30)
MONOCYTES NFR BLD: 0.57 K/UL (ref 0.1–0.95)
MONOCYTES NFR BLD: 11 % (ref 2–12)
NEUTROPHILS NFR BLD: 63 % (ref 43–80)
NEUTS SEG NFR BLD: 3.19 K/UL (ref 1.8–7.3)
PLATELET # BLD AUTO: 102 K/UL (ref 130–450)
PMV BLD AUTO: 10.2 FL (ref 7–12)
POTASSIUM SERPL-SCNC: 3.8 MMOL/L (ref 3.5–5)
PROT SERPL-MCNC: 6.2 G/DL (ref 6.4–8.3)
PSA SERPL-MCNC: 6.14 NG/ML (ref 0–4)
RBC # BLD AUTO: 4.23 M/UL (ref 3.8–5.8)
SODIUM SERPL-SCNC: 138 MMOL/L (ref 132–146)
TRIGL SERPL-MCNC: 197 MG/DL
TSH SERPL DL<=0.05 MIU/L-ACNC: 0.92 UIU/ML (ref 0.27–4.2)
VLDLC SERPL CALC-MCNC: 39 MG/DL
WBC OTHER # BLD: 5.1 K/UL (ref 4.5–11.5)

## 2023-08-03 PROCEDURE — 80061 LIPID PANEL: CPT

## 2023-08-03 PROCEDURE — 82043 UR ALBUMIN QUANTITATIVE: CPT

## 2023-08-03 PROCEDURE — 84443 ASSAY THYROID STIM HORMONE: CPT

## 2023-08-03 PROCEDURE — 85025 COMPLETE CBC W/AUTO DIFF WBC: CPT

## 2023-08-03 PROCEDURE — 36415 COLL VENOUS BLD VENIPUNCTURE: CPT

## 2023-08-03 PROCEDURE — 82570 ASSAY OF URINE CREATININE: CPT

## 2023-08-03 PROCEDURE — 80053 COMPREHEN METABOLIC PANEL: CPT

## 2023-08-03 PROCEDURE — 83036 HEMOGLOBIN GLYCOSYLATED A1C: CPT

## 2023-08-03 PROCEDURE — G0103 PSA SCREENING: HCPCS

## 2023-11-21 ENCOUNTER — APPOINTMENT (OUTPATIENT)
Dept: GENERAL RADIOLOGY | Age: 73
End: 2023-11-21
Payer: MEDICARE

## 2023-11-21 ENCOUNTER — HOSPITAL ENCOUNTER (INPATIENT)
Age: 73
LOS: 4 days | Discharge: HOME | End: 2023-11-25
Attending: EMERGENCY MEDICINE | Admitting: STUDENT IN AN ORGANIZED HEALTH CARE EDUCATION/TRAINING PROGRAM
Payer: MEDICARE

## 2023-11-21 DIAGNOSIS — J96.01 ACUTE RESPIRATORY FAILURE WITH HYPOXIA AND HYPERCAPNIA (HCC): Primary | ICD-10-CM

## 2023-11-21 DIAGNOSIS — J44.1 COPD WITH ACUTE EXACERBATION (HCC): ICD-10-CM

## 2023-11-21 DIAGNOSIS — J96.02 ACUTE RESPIRATORY FAILURE WITH HYPOXIA AND HYPERCAPNIA (HCC): Primary | ICD-10-CM

## 2023-11-21 PROBLEM — J96.00 ACUTE RESPIRATORY FAILURE (HCC): Status: ACTIVE | Noted: 2023-11-21

## 2023-11-21 LAB
ALBUMIN SERPL-MCNC: 4.3 G/DL (ref 3.5–5.2)
ALP SERPL-CCNC: 247 U/L (ref 40–129)
ALT SERPL-CCNC: 32 U/L (ref 0–40)
ANION GAP SERPL CALCULATED.3IONS-SCNC: 15 MMOL/L (ref 7–16)
APAP SERPL-MCNC: <5 UG/ML (ref 10–30)
AST SERPL-CCNC: 42 U/L (ref 0–39)
B PARAP IS1001 DNA NPH QL NAA+NON-PROBE: NOT DETECTED
B PERT DNA SPEC QL NAA+PROBE: NOT DETECTED
B.E.: -2 MMOL/L (ref -3–3)
BASOPHILS # BLD: 0.09 K/UL (ref 0–0.2)
BASOPHILS NFR BLD: 1 % (ref 0–2)
BILIRUB SERPL-MCNC: 0.5 MG/DL (ref 0–1.2)
BUN SERPL-MCNC: 12 MG/DL (ref 6–23)
C PNEUM DNA NPH QL NAA+NON-PROBE: NOT DETECTED
CALCIUM SERPL-MCNC: 9.3 MG/DL (ref 8.6–10.2)
CHLORIDE SERPL-SCNC: 98 MMOL/L (ref 98–107)
CO2 SERPL-SCNC: 24 MMOL/L (ref 22–29)
COHB: 0.7 % (ref 0–1.5)
CREAT SERPL-MCNC: 1.7 MG/DL (ref 0.7–1.2)
CRITICAL: ABNORMAL
DATE ANALYZED: ABNORMAL
DATE OF COLLECTION: ABNORMAL
EKG ATRIAL RATE: 85 BPM
EKG ATRIAL RATE: 85 BPM
EKG P AXIS: 21 DEGREES
EKG P AXIS: 24 DEGREES
EKG P-R INTERVAL: 162 MS
EKG P-R INTERVAL: 176 MS
EKG Q-T INTERVAL: 402 MS
EKG Q-T INTERVAL: 414 MS
EKG QRS DURATION: 96 MS
EKG QRS DURATION: 98 MS
EKG QTC CALCULATION (BAZETT): 478 MS
EKG QTC CALCULATION (BAZETT): 492 MS
EKG R AXIS: -32 DEGREES
EKG R AXIS: -38 DEGREES
EKG T AXIS: -13 DEGREES
EKG T AXIS: 35 DEGREES
EKG VENTRICULAR RATE: 85 BPM
EKG VENTRICULAR RATE: 85 BPM
EOSINOPHIL # BLD: 0.47 K/UL (ref 0.05–0.5)
EOSINOPHILS RELATIVE PERCENT: 4 % (ref 0–6)
ERYTHROCYTE [DISTWIDTH] IN BLOOD BY AUTOMATED COUNT: 13.7 % (ref 11.5–15)
ETHANOLAMINE SERPL-MCNC: <10 MG/DL
FIO2: 60 %
FLUAV RNA NPH QL NAA+NON-PROBE: NOT DETECTED
FLUBV RNA NPH QL NAA+NON-PROBE: NOT DETECTED
GFR SERPL CREATININE-BSD FRML MDRD: 41 ML/MIN/1.73M2
GLUCOSE SERPL-MCNC: 166 MG/DL (ref 74–99)
HADV DNA NPH QL NAA+NON-PROBE: NOT DETECTED
HBA1C MFR BLD: 5 % (ref 4–5.6)
HCO3: 24.8 MMOL/L (ref 22–26)
HCOV 229E RNA NPH QL NAA+NON-PROBE: NOT DETECTED
HCOV HKU1 RNA NPH QL NAA+NON-PROBE: NOT DETECTED
HCOV NL63 RNA NPH QL NAA+NON-PROBE: NOT DETECTED
HCOV OC43 RNA NPH QL NAA+NON-PROBE: NOT DETECTED
HCT VFR BLD AUTO: 42.3 % (ref 37–54)
HGB BLD-MCNC: 13.7 G/DL (ref 12.5–16.5)
HHB: 0.9 % (ref 0–5)
HMPV RNA NPH QL NAA+NON-PROBE: NOT DETECTED
HPIV1 RNA NPH QL NAA+NON-PROBE: NOT DETECTED
HPIV2 RNA NPH QL NAA+NON-PROBE: NOT DETECTED
HPIV3 RNA NPH QL NAA+NON-PROBE: NOT DETECTED
HPIV4 RNA NPH QL NAA+NON-PROBE: NOT DETECTED
IMM GRANULOCYTES # BLD AUTO: 0.05 K/UL (ref 0–0.58)
IMM GRANULOCYTES NFR BLD: 0 % (ref 0–5)
INFLUENZA A BY PCR: NOT DETECTED
INFLUENZA B BY PCR: NOT DETECTED
INR PPP: 1.1
LAB: ABNORMAL
LACTATE BLDV-SCNC: 1.1 MMOL/L (ref 0.5–2.2)
LACTATE BLDV-SCNC: 2.9 MMOL/L (ref 0.5–2.2)
LACTATE BLDV-SCNC: 4 MMOL/L (ref 0.5–2.2)
LACTATE BLDV-SCNC: 5.7 MMOL/L (ref 0.5–2.2)
LIPASE SERPL-CCNC: 45 U/L (ref 13–60)
LYMPHOCYTES NFR BLD: 0.72 K/UL (ref 1.5–4)
LYMPHOCYTES RELATIVE PERCENT: 6 % (ref 20–42)
Lab: 14
M PNEUMO DNA NPH QL NAA+NON-PROBE: NOT DETECTED
MAGNESIUM SERPL-MCNC: 1.6 MG/DL (ref 1.6–2.6)
MCH RBC QN AUTO: 33.7 PG (ref 26–35)
MCHC RBC AUTO-ENTMCNC: 32.4 G/DL (ref 32–34.5)
MCV RBC AUTO: 104.2 FL (ref 80–99.9)
METHB: 0.3 % (ref 0–1.5)
MODE: ABNORMAL
MONOCYTES NFR BLD: 0.8 K/UL (ref 0.1–0.95)
MONOCYTES NFR BLD: 6 % (ref 2–12)
NEUTROPHILS NFR BLD: 83 % (ref 43–80)
NEUTS SEG NFR BLD: 10.61 K/UL (ref 1.8–7.3)
O2 CONTENT: 20.2 ML/DL
O2 SATURATION: 99.1 % (ref 92–98.5)
O2HB: 98.1 % (ref 94–97)
OPERATOR ID: 7296
PATIENT TEMP: 37 C
PCO2: 50.2 MMHG (ref 35–45)
PEEP/CPAP: 6 CMH2O
PFO2: 2.8 MMHG/%
PH BLOOD GAS: 7.31 (ref 7.35–7.45)
PIP: 12 CMH2O
PLATELET CONFIRMATION: NORMAL
PLATELET, FLUORESCENCE: 78 K/UL (ref 130–450)
PMV BLD AUTO: 10.9 FL (ref 7–12)
PO2: 167.9 MMHG (ref 75–100)
POTASSIUM SERPL-SCNC: 3.4 MMOL/L (ref 3.5–5)
PROT SERPL-MCNC: 7 G/DL (ref 6.4–8.3)
PROTHROMBIN TIME: 13 SEC (ref 9.3–12.4)
RBC # BLD AUTO: 4.06 M/UL (ref 3.8–5.8)
RI(T): 1.22
RSV BY PCR: NOT DETECTED
RSV RNA NPH QL NAA+NON-PROBE: NOT DETECTED
RV+EV RNA NPH QL NAA+NON-PROBE: NOT DETECTED
SALICYLATES SERPL-MCNC: <0.3 MG/DL (ref 0–30)
SARS-COV-2 RDRP RESP QL NAA+PROBE: NOT DETECTED
SARS-COV-2 RNA NPH QL NAA+NON-PROBE: NOT DETECTED
SODIUM SERPL-SCNC: 137 MMOL/L (ref 132–146)
SOURCE, BLOOD GAS: ABNORMAL
SPECIMEN DESCRIPTION: NORMAL
SPECIMEN DESCRIPTION: NORMAL
SPECIMEN SOURCE: NORMAL
THB: 14.4 G/DL (ref 11.5–16.5)
TIME ANALYZED: 18
TOXIC TRICYCLIC SC,BLOOD: NEGATIVE
TROPONIN I SERPL HS-MCNC: 11 NG/L (ref 0–11)
TSH SERPL DL<=0.05 MIU/L-ACNC: 2.72 UIU/ML (ref 0.27–4.2)
WBC OTHER # BLD: 12.7 K/UL (ref 4.5–11.5)

## 2023-11-21 PROCEDURE — 80307 DRUG TEST PRSMV CHEM ANLYZR: CPT

## 2023-11-21 PROCEDURE — 87635 SARS-COV-2 COVID-19 AMP PRB: CPT

## 2023-11-21 PROCEDURE — 85025 COMPLETE CBC W/AUTO DIFF WBC: CPT

## 2023-11-21 PROCEDURE — 6370000000 HC RX 637 (ALT 250 FOR IP): Performed by: STUDENT IN AN ORGANIZED HEALTH CARE EDUCATION/TRAINING PROGRAM

## 2023-11-21 PROCEDURE — 85610 PROTHROMBIN TIME: CPT

## 2023-11-21 PROCEDURE — 80053 COMPREHEN METABOLIC PANEL: CPT

## 2023-11-21 PROCEDURE — 2700000000 HC OXYGEN THERAPY PER DAY

## 2023-11-21 PROCEDURE — 93005 ELECTROCARDIOGRAM TRACING: CPT | Performed by: EMERGENCY MEDICINE

## 2023-11-21 PROCEDURE — 83735 ASSAY OF MAGNESIUM: CPT

## 2023-11-21 PROCEDURE — 83605 ASSAY OF LACTIC ACID: CPT

## 2023-11-21 PROCEDURE — 87634 RSV DNA/RNA AMP PROBE: CPT

## 2023-11-21 PROCEDURE — 94640 AIRWAY INHALATION TREATMENT: CPT

## 2023-11-21 PROCEDURE — 6360000002 HC RX W HCPCS: Performed by: EMERGENCY MEDICINE

## 2023-11-21 PROCEDURE — 83690 ASSAY OF LIPASE: CPT

## 2023-11-21 PROCEDURE — 96375 TX/PRO/DX INJ NEW DRUG ADDON: CPT

## 2023-11-21 PROCEDURE — 82805 BLOOD GASES W/O2 SATURATION: CPT

## 2023-11-21 PROCEDURE — 87502 INFLUENZA DNA AMP PROBE: CPT

## 2023-11-21 PROCEDURE — 6370000000 HC RX 637 (ALT 250 FOR IP): Performed by: INTERNAL MEDICINE

## 2023-11-21 PROCEDURE — 71045 X-RAY EXAM CHEST 1 VIEW: CPT

## 2023-11-21 PROCEDURE — 6370000000 HC RX 637 (ALT 250 FOR IP): Performed by: EMERGENCY MEDICINE

## 2023-11-21 PROCEDURE — 99222 1ST HOSP IP/OBS MODERATE 55: CPT | Performed by: STUDENT IN AN ORGANIZED HEALTH CARE EDUCATION/TRAINING PROGRAM

## 2023-11-21 PROCEDURE — 87040 BLOOD CULTURE FOR BACTERIA: CPT

## 2023-11-21 PROCEDURE — G0480 DRUG TEST DEF 1-7 CLASSES: HCPCS

## 2023-11-21 PROCEDURE — 0202U NFCT DS 22 TRGT SARS-COV-2: CPT

## 2023-11-21 PROCEDURE — 84443 ASSAY THYROID STIM HORMONE: CPT

## 2023-11-21 PROCEDURE — 94660 CPAP INITIATION&MGMT: CPT

## 2023-11-21 PROCEDURE — HZ2ZZZZ DETOXIFICATION SERVICES FOR SUBSTANCE ABUSE TREATMENT: ICD-10-PCS | Performed by: STUDENT IN AN ORGANIZED HEALTH CARE EDUCATION/TRAINING PROGRAM

## 2023-11-21 PROCEDURE — 96365 THER/PROPH/DIAG IV INF INIT: CPT

## 2023-11-21 PROCEDURE — 99285 EMERGENCY DEPT VISIT HI MDM: CPT

## 2023-11-21 PROCEDURE — 93010 ELECTROCARDIOGRAM REPORT: CPT | Performed by: INTERNAL MEDICINE

## 2023-11-21 PROCEDURE — 2580000003 HC RX 258: Performed by: STUDENT IN AN ORGANIZED HEALTH CARE EDUCATION/TRAINING PROGRAM

## 2023-11-21 PROCEDURE — 80179 DRUG ASSAY SALICYLATE: CPT

## 2023-11-21 PROCEDURE — 5A09357 ASSISTANCE WITH RESPIRATORY VENTILATION, LESS THAN 24 CONSECUTIVE HOURS, CONTINUOUS POSITIVE AIRWAY PRESSURE: ICD-10-PCS | Performed by: EMERGENCY MEDICINE

## 2023-11-21 PROCEDURE — 84484 ASSAY OF TROPONIN QUANT: CPT

## 2023-11-21 PROCEDURE — 6360000002 HC RX W HCPCS: Performed by: STUDENT IN AN ORGANIZED HEALTH CARE EDUCATION/TRAINING PROGRAM

## 2023-11-21 PROCEDURE — 93005 ELECTROCARDIOGRAM TRACING: CPT | Performed by: INTERNAL MEDICINE

## 2023-11-21 PROCEDURE — 2060000000 HC ICU INTERMEDIATE R&B

## 2023-11-21 PROCEDURE — 83036 HEMOGLOBIN GLYCOSYLATED A1C: CPT

## 2023-11-21 PROCEDURE — 80143 DRUG ASSAY ACETAMINOPHEN: CPT

## 2023-11-21 PROCEDURE — 2580000003 HC RX 258: Performed by: INTERNAL MEDICINE

## 2023-11-21 RX ORDER — LORAZEPAM 2 MG/ML
3 INJECTION INTRAMUSCULAR
Status: DISCONTINUED | OUTPATIENT
Start: 2023-11-21 | End: 2023-11-25 | Stop reason: HOSPADM

## 2023-11-21 RX ORDER — IPRATROPIUM BROMIDE AND ALBUTEROL SULFATE 2.5; .5 MG/3ML; MG/3ML
1 SOLUTION RESPIRATORY (INHALATION)
Status: DISCONTINUED | OUTPATIENT
Start: 2023-11-21 | End: 2023-11-25 | Stop reason: HOSPADM

## 2023-11-21 RX ORDER — SODIUM CHLORIDE 9 MG/ML
INJECTION, SOLUTION INTRAVENOUS PRN
Status: DISCONTINUED | OUTPATIENT
Start: 2023-11-21 | End: 2023-11-25 | Stop reason: HOSPADM

## 2023-11-21 RX ORDER — LORAZEPAM 2 MG/ML
4 INJECTION INTRAMUSCULAR
Status: DISCONTINUED | OUTPATIENT
Start: 2023-11-21 | End: 2023-11-25 | Stop reason: HOSPADM

## 2023-11-21 RX ORDER — SODIUM CHLORIDE 0.9 % (FLUSH) 0.9 %
5-40 SYRINGE (ML) INJECTION EVERY 12 HOURS SCHEDULED
Status: DISCONTINUED | OUTPATIENT
Start: 2023-11-21 | End: 2023-11-25 | Stop reason: HOSPADM

## 2023-11-21 RX ORDER — LANOLIN ALCOHOL/MO/W.PET/CERES
100 CREAM (GRAM) TOPICAL DAILY
Status: DISCONTINUED | OUTPATIENT
Start: 2023-11-21 | End: 2023-11-25 | Stop reason: HOSPADM

## 2023-11-21 RX ORDER — POTASSIUM CHLORIDE 20 MEQ/1
40 TABLET, EXTENDED RELEASE ORAL PRN
Status: DISCONTINUED | OUTPATIENT
Start: 2023-11-21 | End: 2023-11-25 | Stop reason: HOSPADM

## 2023-11-21 RX ORDER — SODIUM CHLORIDE 0.9 % (FLUSH) 0.9 %
5-40 SYRINGE (ML) INJECTION PRN
Status: DISCONTINUED | OUTPATIENT
Start: 2023-11-21 | End: 2023-11-21 | Stop reason: SDUPTHER

## 2023-11-21 RX ORDER — ONDANSETRON 4 MG/1
4 TABLET, ORALLY DISINTEGRATING ORAL EVERY 8 HOURS PRN
Status: DISCONTINUED | OUTPATIENT
Start: 2023-11-21 | End: 2023-11-25 | Stop reason: HOSPADM

## 2023-11-21 RX ORDER — GABAPENTIN 300 MG/1
600 CAPSULE ORAL 3 TIMES DAILY
Status: DISCONTINUED | OUTPATIENT
Start: 2023-11-21 | End: 2023-11-21

## 2023-11-21 RX ORDER — ONDANSETRON 2 MG/ML
4 INJECTION INTRAMUSCULAR; INTRAVENOUS EVERY 6 HOURS PRN
Status: DISCONTINUED | OUTPATIENT
Start: 2023-11-21 | End: 2023-11-25 | Stop reason: HOSPADM

## 2023-11-21 RX ORDER — ENOXAPARIN SODIUM 100 MG/ML
40 INJECTION SUBCUTANEOUS DAILY
Status: DISCONTINUED | OUTPATIENT
Start: 2023-11-21 | End: 2023-11-25 | Stop reason: HOSPADM

## 2023-11-21 RX ORDER — HYDRALAZINE HYDROCHLORIDE 25 MG/1
25 TABLET, FILM COATED ORAL 2 TIMES DAILY
Status: DISCONTINUED | OUTPATIENT
Start: 2023-11-21 | End: 2023-11-25 | Stop reason: HOSPADM

## 2023-11-21 RX ORDER — POTASSIUM CHLORIDE 7.45 MG/ML
10 INJECTION INTRAVENOUS PRN
Status: DISCONTINUED | OUTPATIENT
Start: 2023-11-21 | End: 2023-11-25 | Stop reason: HOSPADM

## 2023-11-21 RX ORDER — LORAZEPAM 1 MG/1
1 TABLET ORAL
Status: DISCONTINUED | OUTPATIENT
Start: 2023-11-21 | End: 2023-11-25 | Stop reason: HOSPADM

## 2023-11-21 RX ORDER — ATENOLOL 50 MG/1
50 TABLET ORAL DAILY
Status: DISCONTINUED | OUTPATIENT
Start: 2023-11-21 | End: 2023-11-25 | Stop reason: HOSPADM

## 2023-11-21 RX ORDER — ASPIRIN 81 MG/1
81 TABLET, CHEWABLE ORAL EVERY MORNING
Status: DISCONTINUED | OUTPATIENT
Start: 2023-11-21 | End: 2023-11-25 | Stop reason: HOSPADM

## 2023-11-21 RX ORDER — ACETAMINOPHEN 325 MG/1
650 TABLET ORAL EVERY 6 HOURS PRN
Status: DISCONTINUED | OUTPATIENT
Start: 2023-11-21 | End: 2023-11-25 | Stop reason: HOSPADM

## 2023-11-21 RX ORDER — SODIUM CHLORIDE 0.9 % (FLUSH) 0.9 %
5-40 SYRINGE (ML) INJECTION PRN
Status: DISCONTINUED | OUTPATIENT
Start: 2023-11-21 | End: 2023-11-25 | Stop reason: HOSPADM

## 2023-11-21 RX ORDER — LORAZEPAM 1 MG/1
4 TABLET ORAL
Status: DISCONTINUED | OUTPATIENT
Start: 2023-11-21 | End: 2023-11-25 | Stop reason: HOSPADM

## 2023-11-21 RX ORDER — LORAZEPAM 1 MG/1
2 TABLET ORAL
Status: DISCONTINUED | OUTPATIENT
Start: 2023-11-21 | End: 2023-11-25 | Stop reason: HOSPADM

## 2023-11-21 RX ORDER — SODIUM CHLORIDE 9 MG/ML
INJECTION, SOLUTION INTRAVENOUS CONTINUOUS
Status: DISCONTINUED | OUTPATIENT
Start: 2023-11-21 | End: 2023-11-25 | Stop reason: HOSPADM

## 2023-11-21 RX ORDER — IPRATROPIUM BROMIDE AND ALBUTEROL SULFATE 2.5; .5 MG/3ML; MG/3ML
1 SOLUTION RESPIRATORY (INHALATION)
Status: COMPLETED | OUTPATIENT
Start: 2023-11-21 | End: 2023-11-21

## 2023-11-21 RX ORDER — ATORVASTATIN CALCIUM 40 MG/1
40 TABLET, FILM COATED ORAL DAILY
Status: DISCONTINUED | OUTPATIENT
Start: 2023-11-21 | End: 2023-11-25 | Stop reason: HOSPADM

## 2023-11-21 RX ORDER — LORAZEPAM 2 MG/ML
2 INJECTION INTRAMUSCULAR
Status: DISCONTINUED | OUTPATIENT
Start: 2023-11-21 | End: 2023-11-25 | Stop reason: HOSPADM

## 2023-11-21 RX ORDER — METHYLPREDNISOLONE SODIUM SUCCINATE 40 MG/ML
40 INJECTION, POWDER, LYOPHILIZED, FOR SOLUTION INTRAMUSCULAR; INTRAVENOUS EVERY 8 HOURS
Status: DISCONTINUED | OUTPATIENT
Start: 2023-11-21 | End: 2023-11-22

## 2023-11-21 RX ORDER — MAGNESIUM SULFATE IN WATER 40 MG/ML
2000 INJECTION, SOLUTION INTRAVENOUS PRN
Status: DISCONTINUED | OUTPATIENT
Start: 2023-11-21 | End: 2023-11-25 | Stop reason: HOSPADM

## 2023-11-21 RX ORDER — DEXAMETHASONE SODIUM PHOSPHATE 10 MG/ML
10 INJECTION INTRAMUSCULAR; INTRAVENOUS ONCE
Status: COMPLETED | OUTPATIENT
Start: 2023-11-21 | End: 2023-11-21

## 2023-11-21 RX ORDER — LORAZEPAM 2 MG/ML
1 INJECTION INTRAMUSCULAR
Status: DISCONTINUED | OUTPATIENT
Start: 2023-11-21 | End: 2023-11-25 | Stop reason: HOSPADM

## 2023-11-21 RX ORDER — ACETAMINOPHEN 650 MG/1
650 SUPPOSITORY RECTAL EVERY 6 HOURS PRN
Status: DISCONTINUED | OUTPATIENT
Start: 2023-11-21 | End: 2023-11-25 | Stop reason: HOSPADM

## 2023-11-21 RX ORDER — SODIUM CHLORIDE 9 MG/ML
INJECTION, SOLUTION INTRAVENOUS PRN
Status: DISCONTINUED | OUTPATIENT
Start: 2023-11-21 | End: 2023-11-21 | Stop reason: SDUPTHER

## 2023-11-21 RX ORDER — MAGNESIUM SULFATE IN WATER 40 MG/ML
2000 INJECTION, SOLUTION INTRAVENOUS ONCE
Status: COMPLETED | OUTPATIENT
Start: 2023-11-21 | End: 2023-11-21

## 2023-11-21 RX ORDER — SODIUM CHLORIDE 0.9 % (FLUSH) 0.9 %
5-40 SYRINGE (ML) INJECTION EVERY 12 HOURS SCHEDULED
Status: DISCONTINUED | OUTPATIENT
Start: 2023-11-21 | End: 2023-11-21 | Stop reason: SDUPTHER

## 2023-11-21 RX ORDER — LORAZEPAM 1 MG/1
3 TABLET ORAL
Status: DISCONTINUED | OUTPATIENT
Start: 2023-11-21 | End: 2023-11-25 | Stop reason: HOSPADM

## 2023-11-21 RX ORDER — ALBUTEROL SULFATE 2.5 MG/3ML
2.5 SOLUTION RESPIRATORY (INHALATION) EVERY 4 HOURS PRN
Status: DISCONTINUED | OUTPATIENT
Start: 2023-11-21 | End: 2023-11-25 | Stop reason: HOSPADM

## 2023-11-21 RX ORDER — POLYETHYLENE GLYCOL 3350 17 G/17G
17 POWDER, FOR SOLUTION ORAL DAILY PRN
Status: DISCONTINUED | OUTPATIENT
Start: 2023-11-21 | End: 2023-11-25 | Stop reason: HOSPADM

## 2023-11-21 RX ORDER — AMLODIPINE BESYLATE 5 MG/1
5 TABLET ORAL DAILY
Status: DISCONTINUED | OUTPATIENT
Start: 2023-11-21 | End: 2023-11-25 | Stop reason: HOSPADM

## 2023-11-21 RX ADMIN — Medication 10 ML: at 21:25

## 2023-11-21 RX ADMIN — ASPIRIN 81 MG CHEWABLE TABLET 81 MG: 81 TABLET CHEWABLE at 08:38

## 2023-11-21 RX ADMIN — PAROXETINE 30 MG: 10 TABLET, FILM COATED ORAL at 08:38

## 2023-11-21 RX ADMIN — LORAZEPAM 2 MG: 2 INJECTION INTRAMUSCULAR at 09:33

## 2023-11-21 RX ADMIN — GABAPENTIN 600 MG: 300 CAPSULE ORAL at 10:43

## 2023-11-21 RX ADMIN — IPRATROPIUM BROMIDE AND ALBUTEROL SULFATE 1 DOSE: 2.5; .5 SOLUTION RESPIRATORY (INHALATION) at 20:02

## 2023-11-21 RX ADMIN — METHYLPREDNISOLONE SODIUM SUCCINATE 40 MG: 40 INJECTION INTRAMUSCULAR; INTRAVENOUS at 05:39

## 2023-11-21 RX ADMIN — HYDRALAZINE HYDROCHLORIDE 25 MG: 25 TABLET, FILM COATED ORAL at 12:00

## 2023-11-21 RX ADMIN — IPRATROPIUM BROMIDE AND ALBUTEROL SULFATE 1 DOSE: 2.5; .5 SOLUTION RESPIRATORY (INHALATION) at 08:55

## 2023-11-21 RX ADMIN — DEXAMETHASONE SODIUM PHOSPHATE 10 MG: 10 INJECTION INTRAMUSCULAR; INTRAVENOUS at 00:25

## 2023-11-21 RX ADMIN — IPRATROPIUM BROMIDE AND ALBUTEROL SULFATE 1 DOSE: 2.5; .5 SOLUTION RESPIRATORY (INHALATION) at 13:29

## 2023-11-21 RX ADMIN — IPRATROPIUM BROMIDE AND ALBUTEROL SULFATE 1 DOSE: 2.5; .5 SOLUTION RESPIRATORY (INHALATION) at 17:26

## 2023-11-21 RX ADMIN — METHYLPREDNISOLONE SODIUM SUCCINATE 40 MG: 40 INJECTION INTRAMUSCULAR; INTRAVENOUS at 20:27

## 2023-11-21 RX ADMIN — POTASSIUM BICARBONATE 40 MEQ: 782 TABLET, EFFERVESCENT ORAL at 08:36

## 2023-11-21 RX ADMIN — IPRATROPIUM BROMIDE AND ALBUTEROL SULFATE 1 DOSE: .5; 2.5 SOLUTION RESPIRATORY (INHALATION) at 00:15

## 2023-11-21 RX ADMIN — LORAZEPAM 1 MG: 2 INJECTION INTRAMUSCULAR; INTRAVENOUS at 20:27

## 2023-11-21 RX ADMIN — IPRATROPIUM BROMIDE AND ALBUTEROL SULFATE 1 DOSE: .5; 2.5 SOLUTION RESPIRATORY (INHALATION) at 00:16

## 2023-11-21 RX ADMIN — METHYLPREDNISOLONE SODIUM SUCCINATE 40 MG: 40 INJECTION INTRAMUSCULAR; INTRAVENOUS at 14:39

## 2023-11-21 RX ADMIN — AMLODIPINE BESYLATE 5 MG: 5 TABLET ORAL at 08:36

## 2023-11-21 RX ADMIN — IPRATROPIUM BROMIDE AND ALBUTEROL SULFATE 1 DOSE: .5; 2.5 SOLUTION RESPIRATORY (INHALATION) at 00:17

## 2023-11-21 RX ADMIN — DESMOPRESSIN ACETATE 40 MG: 0.2 TABLET ORAL at 08:39

## 2023-11-21 RX ADMIN — Medication 100 MG: at 08:39

## 2023-11-21 RX ADMIN — SODIUM CHLORIDE: 9 INJECTION, SOLUTION INTRAVENOUS at 10:57

## 2023-11-21 RX ADMIN — LORAZEPAM 4 MG: 2 INJECTION INTRAMUSCULAR; INTRAVENOUS at 13:31

## 2023-11-21 RX ADMIN — LORAZEPAM 3 MG: 2 INJECTION INTRAMUSCULAR at 12:00

## 2023-11-21 RX ADMIN — MAGNESIUM SULFATE HEPTAHYDRATE 2000 MG: 2 INJECTION, SOLUTION INTRAVENOUS at 00:25

## 2023-11-21 RX ADMIN — Medication 10 ML: at 08:34

## 2023-11-21 RX ADMIN — ENOXAPARIN SODIUM 40 MG: 100 INJECTION SUBCUTANEOUS at 08:35

## 2023-11-21 RX ADMIN — ATENOLOL 50 MG: 50 TABLET ORAL at 08:39

## 2023-11-21 RX ADMIN — LORAZEPAM 1 MG: 2 INJECTION INTRAMUSCULAR; INTRAVENOUS at 10:50

## 2023-11-21 ASSESSMENT — LIFESTYLE VARIABLES
HOW MANY STANDARD DRINKS CONTAINING ALCOHOL DO YOU HAVE ON A TYPICAL DAY: PATIENT DOES NOT DRINK
HOW OFTEN DO YOU HAVE A DRINK CONTAINING ALCOHOL: NEVER

## 2023-11-21 ASSESSMENT — ENCOUNTER SYMPTOMS
WHEEZING: 1
ABDOMINAL PAIN: 0
COUGH: 1
SHORTNESS OF BREATH: 1

## 2023-11-21 ASSESSMENT — PAIN - FUNCTIONAL ASSESSMENT: PAIN_FUNCTIONAL_ASSESSMENT: NONE - DENIES PAIN

## 2023-11-21 NOTE — PROGRESS NOTES
Dr. Gricelda Nazario notified of patient's visual hallucinations and cognition change. New order to discontinue Neurontin. Patient's room changed to 643 to be closer to nursing desk for safety. Patient's significant other in room and updated.

## 2023-11-21 NOTE — CARE COORDINATION
Peer Recovery Support Note    Name: Alexis Jo  Date: 11/21/2023    Chief Complaint   Patient presents with    Respiratory Distress     From home. 70% on RA       Peer Support met with patient. [] Support and education provided  [] Resources provided   [] Treatment referral:   [x] Other: Left contact information  [x] Patient declined peer recovery services     Referred By: Magalie Valencia (NEIL)    Notes: Briefly met with patient who had no desire to follow up with any form of treatment for his alcohol use. Did leave Peer contact information.     Signed: Alex Emerson, 11/21/2023

## 2023-11-21 NOTE — ACP (ADVANCE CARE PLANNING)
Advance Care Planning   Healthcare Decision Maker:    Primary Decision Maker: Hao Balbuena - Domestic Partner - [phone]    Today we documented Decision Maker(s) consistent with ACP documents on file.

## 2023-11-21 NOTE — CONSULTS
Pulmonary Consultation    Admit Date: 11/21/2023  Requesting Physician: Leonela Delgado DO    Reason for consultation:  Acute respiratory failure with hypoxia    HPI:  Patient is a 75year old male who presents to emergency room with complaints of shortness of breath, cough, and congestion. He admits to these symptoms since June. Patient denies any fevers. His respiratory panel and chest xray are negative. He was hypoxic on arrival in distress for which a Bipap was placed on patient with recovery. He was given a dose of steroids and aerosols in ER. Patient does have a history of smoking 90 pack years, quit in 1983. Patient does drink 5 alcoholic drinks a day. Patient admits he was not seen a pulmonologist. He was recently given an albuterol inhaler for what was considered bronchitis. He has never completed a sleep study or pulmonary function test but admits in the past he was told he has sleep apnea and COPD. Patient is seen on 2L nasal cannula. He does feel his breathing has improved. His cough is now productive. He has some tremors to bilateral upper extremities with witnessed anxiety.        PMH:    Past Medical History:   Diagnosis Date    Anxiety     Depression     GERD (gastroesophageal reflux disease)     Heart palpitations     HTN (hypertension) 10/21/2010    Hyperlipidemia     Lightheadedness     OA (osteoarthritis)     Parathyroid adenoma     s/p surgery 2005    Pre-diabetes     Recurrent left pleural effusion 9/13/2018    Stenosis of right internal carotid artery with cerebral infarction [facility])      PSH:   Past Surgical History:   Procedure Laterality Date    BACK SURGERY  12/20/2016    revision cervical laminectomy    CAROTID ENDARTERECTOMY Right 8/25/2014    Delatore - bovine patch     CERVICAL FUSION  12/12/2016    C4-C5, C5-C6, C6-C7 ACF, C5-C6 Corpectomy    COLONOSCOPY N/A 6/26/2020    COLONOSCOPY POLYPECTOMY SNARE/COLD BIOPSY performed by Darshan Arora MD at [facility] ENDOSCOPY, COLON, DIAGNOSTIC      EYE SURGERY  2001    lasex    FRACTURE SURGERY Left     plate in wrist    HAND SURGERY      PARATHYROIDECTOMY  11/7/2005    OH THORSC DX LUNGS/PERICAR/MED/PLEURAL SPACE W/O BX Right 9/17/2018    THORACOSCOPY performed by Pattie Strong MD at [address]  07/07/2014    UPPER GASTROINTESTINAL ENDOSCOPY N/A 6/26/2020    EGD BIOPSY performed by Kodi Brooks MD at [address] of Systems:   Constitutional: As noted in the HPI. Eyes: No visual changes or diplopia. No scleral icterus. ENT: No headaches, hearing loss or vertigo. No nasal congestion, or sore throat. Cardiovascular: No chest pain, dyspnea on exertion, or palpitations. Respiratory: See above  Gastrointestinal: No abdominal pain, nausea or emesis. No diarrhea or rectal bleeding or melena. No change in bowel habits. Genitourinary: No dysuria, urinary frequency, or incontinence. No hematuria. Musculoskeletal: No gait disturbance, weakness or joint complaints. Integumentary: No rash or pruritis. No abnormal pigmentation, hair or nail changes. Neurological: No headache, diplopia, dizziness, (+) tremor, no change in muscle strength, numbness or tingling. No change in gait, balance, coordination, mood, affect, memory, mentation, behavior. Psychiatric: (+) anxiety or depression. Endocrine: No temperature intolerance, excessive thirst, fluid intake, urinary frequency, excessive appetite, or recent weight change. Hematologic/Lymphatic: No abnormal bruising or bleeding, blood clots or swollen lymph nodes. No anemia, fever, chills, night sweats, or swollen glands. Allergic/Immunologic: No seasonal or perenial allergies. No history of hives or atopic dermatitis.     Social History:  Alcohol:  daily  Tobacco:   quit 1983  Employment:  no silica or asbestos exposure  Family:  No family history of lung disease    Medications:   sodium chloride      sodium

## 2023-11-21 NOTE — PROGRESS NOTES
Date: 11/21/2023    Time: 12:22 AM    Patient Placed On BIPAP/CPAP/ Non-Invasive Ventilation? Yes    If no must comment. Facial area red/color change? No           If YES are Blister/Lesion present? No   If yes must notify nursing staff  BIPAP/CPAP skin barrier?   Yes    Skin barrier type:mepilexlite       Comments:        Mc England RCP

## 2023-11-21 NOTE — PROGRESS NOTES
4 Eyes Skin Assessment     NAME:  Danny Ordaz  YOB: 1950  MEDICAL RECORD NUMBER:  [id number]    The patient is being assessed for  Admission    I agree that at least one RN has performed a thorough Head to Toe Skin Assessment on the patient. ALL assessment sites listed below have been assessed. Areas assessed by both nurses:    Head, Face, Ears, Shoulders, Back, Chest, Arms, Elbows, Hands, Sacrum. Buttock, Coccyx, Ischium, and Legs. Feet and Heels        Does the Patient have a Wound?  No noted wound(s)       Florian Prevention initiated by RN: No  Wound Care Orders initiated by RN: No    Pressure Injury (Stage 3,4, Unstageable, DTI, NWPT, and Complex wounds) if present, place Wound referral order by RN under : No    New Ostomies, if present place, Ostomy referral order under : No     Nurse 1 eSignature: Electronically signed by Alyssa Fernández RN on 11/21/23 at 6:09 AM EST    **SHARE this note so that the co-signing nurse can place an eSignature**    Nurse 2 eSignature: Electronically signed by Olinda Rosario RN on 11/21/23 at 6:10 AM EST

## 2023-11-21 NOTE — PROGRESS NOTES
Patient requesting Neurontin 600 mg po TID (Home medication). Dr. Bertha Serrano notified; new order for Neurontin 600mg po TID and EKG.

## 2023-11-21 NOTE — CARE COORDINATION
Social Work / Discharge Planning : Patient admitted with acute respiratory failure. Patient independent from HOME. Currently on 2 liters : NEW. Goal to wean. SW noted consult for Rehab. Patient is a daily drinker. Tin Vazquez from [facility] Met with patient and they are not interested . AWait treatment plan. Patient has an established PCP and insurance. SW to follow. Electronically signed by RAY Bolton on 11/21/23 at 12:17 PM EST     Addendum: Patient starting to withdrawal. CIWA scale initiated and patient moved to closer room. SW to follow.  Electronically signed by RAY Bolton on 11/21/23 at 1:31 PM EST

## 2023-11-21 NOTE — CONSULTS
Consult for Dr. Cuca Sánchez noted. Patient has been seen in the past by Dr. Selina Burnham. Consult changed to his service.

## 2023-11-21 NOTE — ED PROVIDER NOTES
[address]        Pt Name: Sayda Tabares  MRN: [id number]  [facility] 1950  Date of evaluation: 11/21/2023  Provider: Nancy Maravilla DO  PCP: Nancy Alcala DO  Note Started: 12:42 AM EST 11/21/23    CHIEF COMPLAINT       Chief Complaint   Patient presents with    Respiratory Distress     From home. 70% on RA       HISTORY OF PRESENT ILLNESS: 1 or more Elements     History from : Patient    Limitations to history : None    Sayda Tabares is a 75 y.o. male who history of COPD, respiratory failure thrombocytopenia carotid disease diabetes alcoholism has had recent cough and congestion at home. He presented wheezing and shortness of breath via EMS on CPAP. Patient had marked respiratory distress with wheezing and intercostal muscle retractions. He is was immediately transitioned to BiPAP and given DuoNebs with significant relief in the emergency room. Allegedly he was in the urgent care earlier today and had significant worsening, he was recently diagnosed with upper respiratory infection. He allegedly had a negative COVID test earlier today. Patient has 5 alcoholic beverages per day, patient also has pasthistory of illicit drug use. Nursing Notes were all reviewed and agreed with or any disagreements were addressed in the HPI. REVIEW OF SYSTEMS :      Review of Systems   Constitutional:  Positive for chills and fever. HENT:  Positive for congestion. Eyes:  Negative for visual disturbance. Respiratory:  Positive for cough, shortness of breath and wheezing. Cardiovascular:  Negative for chest pain and palpitations. Gastrointestinal:  Negative for abdominal pain. Endocrine: Positive for polyuria. Genitourinary:  Negative for difficulty urinating. Neurological:  Negative for dizziness and light-headedness. Positives and Pertinent negatives as per HPI.      SURGICAL HISTORY     Past Surgical History:   Procedure Laterality Date

## 2023-11-21 NOTE — PROGRESS NOTES
[facility] Quality Flow/Interdisciplinary Rounds Progress Note        Quality Flow Rounds held on November 21, 2023    Disciplines Attending:  Bedside Nurse, , , and Nursing Unit Leadership    Gurpreet Vital was admitted on 11/21/2023 12:06 AM    Anticipated Discharge Date:  Expected Discharge Date: 11/23/23    Disposition: home    Florian Score:  Florian Scale Score: 19    Readmission Risk              Risk of Unplanned Readmission:  14           Discussed patient goal for the day, patient clinical progression, and barriers to discharge.   The following Goal(s) of the Day/Commitment(s) have been identified:   IV solu q8 continued, pulm RYLIE sorto RN  November 21, 2023

## 2023-11-22 PROBLEM — J44.1 COPD EXACERBATION (HCC): Status: ACTIVE | Noted: 2023-11-22

## 2023-11-22 PROBLEM — G93.40 ENCEPHALOPATHY: Status: ACTIVE | Noted: 2023-11-22

## 2023-11-22 LAB
ANION GAP SERPL CALCULATED.3IONS-SCNC: 11 MMOL/L (ref 7–16)
BASOPHILS # BLD: 0 K/UL (ref 0–0.2)
BASOPHILS NFR BLD: 0 % (ref 0–2)
BUN SERPL-MCNC: 19 MG/DL (ref 6–23)
CALCIUM SERPL-MCNC: 8.5 MG/DL (ref 8.6–10.2)
CHLORIDE SERPL-SCNC: 102 MMOL/L (ref 98–107)
CO2 SERPL-SCNC: 24 MMOL/L (ref 22–29)
CREAT SERPL-MCNC: 1.2 MG/DL (ref 0.7–1.2)
EOSINOPHIL # BLD: 0 K/UL (ref 0.05–0.5)
EOSINOPHILS RELATIVE PERCENT: 0 % (ref 0–6)
ERYTHROCYTE [DISTWIDTH] IN BLOOD BY AUTOMATED COUNT: 13.9 % (ref 11.5–15)
GFR SERPL CREATININE-BSD FRML MDRD: >60 ML/MIN/1.73M2
GLUCOSE SERPL-MCNC: 182 MG/DL (ref 74–99)
HCT VFR BLD AUTO: 33.7 % (ref 37–54)
HGB BLD-MCNC: 10.9 G/DL (ref 12.5–16.5)
LYMPHOCYTES NFR BLD: 0.26 K/UL (ref 1.5–4)
LYMPHOCYTES RELATIVE PERCENT: 3 % (ref 20–42)
MCH RBC QN AUTO: 33.7 PG (ref 26–35)
MCHC RBC AUTO-ENTMCNC: 32.3 G/DL (ref 32–34.5)
MCV RBC AUTO: 104.3 FL (ref 80–99.9)
MONOCYTES NFR BLD: 0.43 K/UL (ref 0.1–0.95)
MONOCYTES NFR BLD: 4 % (ref 2–12)
NEUTROPHILS NFR BLD: 93 % (ref 43–80)
NEUTS SEG NFR BLD: 9.12 K/UL (ref 1.8–7.3)
PLATELET # BLD AUTO: 71 K/UL (ref 130–450)
PLATELET CONFIRMATION: NORMAL
PMV BLD AUTO: 11.3 FL (ref 7–12)
POTASSIUM SERPL-SCNC: 4.3 MMOL/L (ref 3.5–5)
PROCALCITONIN SERPL-MCNC: 0.07 NG/ML (ref 0–0.08)
RBC # BLD AUTO: 3.23 M/UL (ref 3.8–5.8)
RBC # BLD: ABNORMAL 10*6/UL
SODIUM SERPL-SCNC: 137 MMOL/L (ref 132–146)
WBC OTHER # BLD: 9.8 K/UL (ref 4.5–11.5)

## 2023-11-22 PROCEDURE — 6370000000 HC RX 637 (ALT 250 FOR IP): Performed by: STUDENT IN AN ORGANIZED HEALTH CARE EDUCATION/TRAINING PROGRAM

## 2023-11-22 PROCEDURE — 6360000002 HC RX W HCPCS: Performed by: STUDENT IN AN ORGANIZED HEALTH CARE EDUCATION/TRAINING PROGRAM

## 2023-11-22 PROCEDURE — 2060000000 HC ICU INTERMEDIATE R&B

## 2023-11-22 PROCEDURE — 36415 COLL VENOUS BLD VENIPUNCTURE: CPT

## 2023-11-22 PROCEDURE — 85025 COMPLETE CBC W/AUTO DIFF WBC: CPT

## 2023-11-22 PROCEDURE — 99233 SBSQ HOSP IP/OBS HIGH 50: CPT | Performed by: INTERNAL MEDICINE

## 2023-11-22 PROCEDURE — 2700000000 HC OXYGEN THERAPY PER DAY

## 2023-11-22 PROCEDURE — 6360000002 HC RX W HCPCS: Performed by: INTERNAL MEDICINE

## 2023-11-22 PROCEDURE — 80048 BASIC METABOLIC PNL TOTAL CA: CPT

## 2023-11-22 PROCEDURE — 84145 PROCALCITONIN (PCT): CPT

## 2023-11-22 PROCEDURE — 94660 CPAP INITIATION&MGMT: CPT

## 2023-11-22 PROCEDURE — 2580000003 HC RX 258: Performed by: INTERNAL MEDICINE

## 2023-11-22 PROCEDURE — 2580000003 HC RX 258: Performed by: STUDENT IN AN ORGANIZED HEALTH CARE EDUCATION/TRAINING PROGRAM

## 2023-11-22 PROCEDURE — 94640 AIRWAY INHALATION TREATMENT: CPT

## 2023-11-22 PROCEDURE — 6360000002 HC RX W HCPCS

## 2023-11-22 RX ORDER — METHYLPREDNISOLONE SODIUM SUCCINATE 40 MG/ML
40 INJECTION, POWDER, LYOPHILIZED, FOR SOLUTION INTRAMUSCULAR; INTRAVENOUS EVERY 12 HOURS
Status: DISCONTINUED | OUTPATIENT
Start: 2023-11-22 | End: 2023-11-22

## 2023-11-22 RX ORDER — HALOPERIDOL 5 MG/ML
5 INJECTION INTRAMUSCULAR ONCE
Status: COMPLETED | OUTPATIENT
Start: 2023-11-22 | End: 2023-11-22

## 2023-11-22 RX ORDER — BUDESONIDE 0.5 MG/2ML
0.5 INHALANT ORAL
Status: DISCONTINUED | OUTPATIENT
Start: 2023-11-22 | End: 2023-11-25 | Stop reason: HOSPADM

## 2023-11-22 RX ORDER — ARFORMOTEROL TARTRATE 15 UG/2ML
15 SOLUTION RESPIRATORY (INHALATION)
Status: DISCONTINUED | OUTPATIENT
Start: 2023-11-22 | End: 2023-11-25 | Stop reason: HOSPADM

## 2023-11-22 RX ORDER — METHYLPREDNISOLONE SODIUM SUCCINATE 40 MG/ML
40 INJECTION, POWDER, LYOPHILIZED, FOR SOLUTION INTRAMUSCULAR; INTRAVENOUS DAILY
Status: COMPLETED | OUTPATIENT
Start: 2023-11-22 | End: 2023-11-25

## 2023-11-22 RX ADMIN — IPRATROPIUM BROMIDE AND ALBUTEROL SULFATE 1 DOSE: 2.5; .5 SOLUTION RESPIRATORY (INHALATION) at 21:11

## 2023-11-22 RX ADMIN — ARFORMOTEROL TARTRATE 15 MCG: 15 SOLUTION RESPIRATORY (INHALATION) at 11:47

## 2023-11-22 RX ADMIN — IPRATROPIUM BROMIDE AND ALBUTEROL SULFATE 1 DOSE: 2.5; .5 SOLUTION RESPIRATORY (INHALATION) at 11:47

## 2023-11-22 RX ADMIN — METHYLPREDNISOLONE SODIUM SUCCINATE 40 MG: 40 INJECTION, POWDER, LYOPHILIZED, FOR SOLUTION INTRAMUSCULAR; INTRAVENOUS at 13:21

## 2023-11-22 RX ADMIN — METHYLPREDNISOLONE SODIUM SUCCINATE 40 MG: 40 INJECTION INTRAMUSCULAR; INTRAVENOUS at 04:48

## 2023-11-22 RX ADMIN — ARFORMOTEROL TARTRATE 15 MCG: 15 SOLUTION RESPIRATORY (INHALATION) at 21:11

## 2023-11-22 RX ADMIN — BUDESONIDE INHALATION 500 MCG: 0.5 SUSPENSION RESPIRATORY (INHALATION) at 11:47

## 2023-11-22 RX ADMIN — HALOPERIDOL LACTATE 5 MG: 5 INJECTION, SOLUTION INTRAMUSCULAR at 08:28

## 2023-11-22 RX ADMIN — BUDESONIDE INHALATION 500 MCG: 0.5 SUSPENSION RESPIRATORY (INHALATION) at 21:10

## 2023-11-22 RX ADMIN — LORAZEPAM 1 MG: 2 INJECTION INTRAMUSCULAR; INTRAVENOUS at 04:48

## 2023-11-22 RX ADMIN — SODIUM CHLORIDE: 9 INJECTION, SOLUTION INTRAVENOUS at 13:24

## 2023-11-22 RX ADMIN — Medication 10 ML: at 08:29

## 2023-11-22 RX ADMIN — LORAZEPAM 3 MG: 2 INJECTION INTRAMUSCULAR at 07:40

## 2023-11-22 RX ADMIN — IPRATROPIUM BROMIDE AND ALBUTEROL SULFATE 1 DOSE: 2.5; .5 SOLUTION RESPIRATORY (INHALATION) at 16:54

## 2023-11-22 RX ADMIN — LORAZEPAM 2 MG: 2 INJECTION INTRAMUSCULAR at 13:28

## 2023-11-22 ASSESSMENT — PAIN SCALES - GENERAL
PAINLEVEL_OUTOF10: 0
PAINLEVEL_OUTOF10: 0

## 2023-11-22 NOTE — PROGRESS NOTES
[facility] Quality Flow/Interdisciplinary Rounds Progress Note        Quality Flow Rounds held on November 22, 2023    Disciplines Attending:  Bedside Nurse, , , and Nursing Unit Leadership    Gurpreet Vital was admitted on 11/21/2023 12:06 AM    Anticipated Discharge Date:  Expected Discharge Date: 11/23/23    Disposition:    Florian Score:  Florian Scale Score: 12    Readmission Risk              Risk of Unplanned Readmission:  17           Discussed patient goal for the day, patient clinical progression, and barriers to discharge.   The following Goal(s) of the Day/Commitment(s) have been identified:   sitter, iv fluids, iv steroids, CIWA scale      Alaina Moses RN  November 22, 2023

## 2023-11-22 NOTE — PROGRESS NOTES
During  Bsr, myself and the night shift RN witnessed patient attempting to get out of bed. Sitter reinitiated at the bedside for patient safety. Approximately 7:45 am, patient became aggressive towards staff, walking around room unsteady on his feet and being assisted by multiple staff members on unit. Patient then attempting to punch staff and refusing to allow us to help him, stating he is going to leave. Code violet called, officers and ED staff reported to unit to assist with controlling the patient. Dr. Ely Sotelo called via telephone, notified of situation and came to the unit to see patient. Patient medicated per orders and finally assisted safely into bed.  Sitter remains at bedside for patient safety and significant other notified via telephone, came to visit patient to assist.

## 2023-11-23 LAB
ANION GAP SERPL CALCULATED.3IONS-SCNC: 8 MMOL/L (ref 7–16)
BASOPHILS # BLD: 0 K/UL (ref 0–0.2)
BASOPHILS NFR BLD: 0 % (ref 0–2)
BUN SERPL-MCNC: 17 MG/DL (ref 6–23)
CALCIUM SERPL-MCNC: 8 MG/DL (ref 8.6–10.2)
CHLORIDE SERPL-SCNC: 105 MMOL/L (ref 98–107)
CO2 SERPL-SCNC: 25 MMOL/L (ref 22–29)
CREAT SERPL-MCNC: 1 MG/DL (ref 0.7–1.2)
EOSINOPHIL # BLD: 0 K/UL (ref 0.05–0.5)
EOSINOPHILS RELATIVE PERCENT: 0 % (ref 0–6)
ERYTHROCYTE [DISTWIDTH] IN BLOOD BY AUTOMATED COUNT: 14 % (ref 11.5–15)
GFR SERPL CREATININE-BSD FRML MDRD: >60 ML/MIN/1.73M2
GLUCOSE SERPL-MCNC: 155 MG/DL (ref 74–99)
HCT VFR BLD AUTO: 33.3 % (ref 37–54)
HGB BLD-MCNC: 10.9 G/DL (ref 12.5–16.5)
IMM GRANULOCYTES # BLD AUTO: 0.06 K/UL (ref 0–0.58)
IMM GRANULOCYTES NFR BLD: 1 % (ref 0–5)
LYMPHOCYTES NFR BLD: 0.31 K/UL (ref 1.5–4)
LYMPHOCYTES RELATIVE PERCENT: 5 % (ref 20–42)
MAGNESIUM SERPL-MCNC: 2 MG/DL (ref 1.6–2.6)
MCH RBC QN AUTO: 34.5 PG (ref 26–35)
MCHC RBC AUTO-ENTMCNC: 32.7 G/DL (ref 32–34.5)
MCV RBC AUTO: 105.4 FL (ref 80–99.9)
MONOCYTES NFR BLD: 0.44 K/UL (ref 0.1–0.95)
MONOCYTES NFR BLD: 7 % (ref 2–12)
NEUTROPHILS NFR BLD: 88 % (ref 43–80)
NEUTS SEG NFR BLD: 5.76 K/UL (ref 1.8–7.3)
PHOSPHATE SERPL-MCNC: 2.6 MG/DL (ref 2.5–4.5)
PLATELET # BLD AUTO: 85 K/UL (ref 130–450)
PLATELET CONFIRMATION: NORMAL
PMV BLD AUTO: 11.2 FL (ref 7–12)
POTASSIUM SERPL-SCNC: 3.6 MMOL/L (ref 3.5–5)
RBC # BLD AUTO: 3.16 M/UL (ref 3.8–5.8)
RBC # BLD: ABNORMAL 10*6/UL
SODIUM SERPL-SCNC: 138 MMOL/L (ref 132–146)
WBC OTHER # BLD: 6.6 K/UL (ref 4.5–11.5)

## 2023-11-23 PROCEDURE — 83735 ASSAY OF MAGNESIUM: CPT

## 2023-11-23 PROCEDURE — 6360000002 HC RX W HCPCS

## 2023-11-23 PROCEDURE — 94660 CPAP INITIATION&MGMT: CPT

## 2023-11-23 PROCEDURE — 2060000000 HC ICU INTERMEDIATE R&B

## 2023-11-23 PROCEDURE — 2700000000 HC OXYGEN THERAPY PER DAY

## 2023-11-23 PROCEDURE — 6370000000 HC RX 637 (ALT 250 FOR IP): Performed by: STUDENT IN AN ORGANIZED HEALTH CARE EDUCATION/TRAINING PROGRAM

## 2023-11-23 PROCEDURE — 85025 COMPLETE CBC W/AUTO DIFF WBC: CPT

## 2023-11-23 PROCEDURE — 6360000002 HC RX W HCPCS: Performed by: STUDENT IN AN ORGANIZED HEALTH CARE EDUCATION/TRAINING PROGRAM

## 2023-11-23 PROCEDURE — 99232 SBSQ HOSP IP/OBS MODERATE 35: CPT | Performed by: STUDENT IN AN ORGANIZED HEALTH CARE EDUCATION/TRAINING PROGRAM

## 2023-11-23 PROCEDURE — 2580000003 HC RX 258: Performed by: INTERNAL MEDICINE

## 2023-11-23 PROCEDURE — 94640 AIRWAY INHALATION TREATMENT: CPT

## 2023-11-23 PROCEDURE — 84100 ASSAY OF PHOSPHORUS: CPT

## 2023-11-23 PROCEDURE — 80048 BASIC METABOLIC PNL TOTAL CA: CPT

## 2023-11-23 PROCEDURE — 2580000003 HC RX 258: Performed by: STUDENT IN AN ORGANIZED HEALTH CARE EDUCATION/TRAINING PROGRAM

## 2023-11-23 PROCEDURE — 6360000002 HC RX W HCPCS: Performed by: INTERNAL MEDICINE

## 2023-11-23 RX ADMIN — Medication 100 MG: at 08:44

## 2023-11-23 RX ADMIN — ASPIRIN 81 MG CHEWABLE TABLET 81 MG: 81 TABLET CHEWABLE at 08:44

## 2023-11-23 RX ADMIN — PAROXETINE 30 MG: 10 TABLET, FILM COATED ORAL at 08:45

## 2023-11-23 RX ADMIN — ATENOLOL 50 MG: 50 TABLET ORAL at 08:44

## 2023-11-23 RX ADMIN — IPRATROPIUM BROMIDE AND ALBUTEROL SULFATE 1 DOSE: 2.5; .5 SOLUTION RESPIRATORY (INHALATION) at 12:32

## 2023-11-23 RX ADMIN — IPRATROPIUM BROMIDE AND ALBUTEROL SULFATE 1 DOSE: 2.5; .5 SOLUTION RESPIRATORY (INHALATION) at 09:00

## 2023-11-23 RX ADMIN — ARFORMOTEROL TARTRATE 15 MCG: 15 SOLUTION RESPIRATORY (INHALATION) at 20:31

## 2023-11-23 RX ADMIN — IPRATROPIUM BROMIDE AND ALBUTEROL SULFATE 1 DOSE: 2.5; .5 SOLUTION RESPIRATORY (INHALATION) at 20:31

## 2023-11-23 RX ADMIN — METHYLPREDNISOLONE SODIUM SUCCINATE 40 MG: 40 INJECTION, POWDER, LYOPHILIZED, FOR SOLUTION INTRAMUSCULAR; INTRAVENOUS at 08:46

## 2023-11-23 RX ADMIN — SODIUM CHLORIDE: 9 INJECTION, SOLUTION INTRAVENOUS at 12:02

## 2023-11-23 RX ADMIN — BUDESONIDE INHALATION 500 MCG: 0.5 SUSPENSION RESPIRATORY (INHALATION) at 20:31

## 2023-11-23 RX ADMIN — LORAZEPAM 2 MG: 2 INJECTION INTRAMUSCULAR at 13:28

## 2023-11-23 RX ADMIN — HYDRALAZINE HYDROCHLORIDE 25 MG: 25 TABLET, FILM COATED ORAL at 08:44

## 2023-11-23 RX ADMIN — Medication 10 ML: at 20:43

## 2023-11-23 RX ADMIN — Medication 10 ML: at 08:46

## 2023-11-23 RX ADMIN — AMLODIPINE BESYLATE 5 MG: 5 TABLET ORAL at 08:44

## 2023-11-23 RX ADMIN — ENOXAPARIN SODIUM 40 MG: 100 INJECTION SUBCUTANEOUS at 08:44

## 2023-11-23 RX ADMIN — HYDRALAZINE HYDROCHLORIDE 25 MG: 25 TABLET, FILM COATED ORAL at 16:59

## 2023-11-23 RX ADMIN — LORAZEPAM 1 MG: 2 INJECTION INTRAMUSCULAR; INTRAVENOUS at 18:41

## 2023-11-23 RX ADMIN — IPRATROPIUM BROMIDE AND ALBUTEROL SULFATE 1 DOSE: 2.5; .5 SOLUTION RESPIRATORY (INHALATION) at 16:51

## 2023-11-23 RX ADMIN — ARFORMOTEROL TARTRATE 15 MCG: 15 SOLUTION RESPIRATORY (INHALATION) at 09:00

## 2023-11-23 RX ADMIN — BUDESONIDE INHALATION 500 MCG: 0.5 SUSPENSION RESPIRATORY (INHALATION) at 09:00

## 2023-11-23 RX ADMIN — LORAZEPAM 1 MG: 2 INJECTION INTRAMUSCULAR; INTRAVENOUS at 20:42

## 2023-11-23 RX ADMIN — DESMOPRESSIN ACETATE 40 MG: 0.2 TABLET ORAL at 08:45

## 2023-11-23 ASSESSMENT — PAIN SCALES - GENERAL: PAINLEVEL_OUTOF10: 0

## 2023-11-23 NOTE — PROGRESS NOTES
Date: 11/22/2023    Time: 9:24 PM    Patient Placed On BIPAP/CPAP/ Non-Invasive Ventilation? Yes    If no must comment. Facial area red/color change? No           If YES are Blister/Lesion present? No   If yes must notify nursing staff  BIPAP/CPAP skin barrier?   Yes    Skin barrier type:mepilexlite       Comments:        Mc England RCP

## 2023-11-23 NOTE — PROGRESS NOTES
[facility] Quality Flow/Interdisciplinary Rounds Progress Note        Quality Flow Rounds held on November 23, 2023    Disciplines Attending:  Bedside Nurse, , , and Nursing Unit Leadership    Nikhil President was admitted on 11/21/2023 12:06 AM    Anticipated Discharge Date:  Expected Discharge Date: 11/23/23    Disposition:    Florian Score:  Florian Scale Score: 20    Readmission Risk              Risk of Unplanned Readmission:  18           Discussed patient goal for the day, patient clinical progression, and barriers to discharge.   The following Goal(s) of the Day/Commitment(s) have been identified:   sitter, iv steroids, iv fluids, wean oxygen      Litzy Counts, RN  November 23, 2023

## 2023-11-24 LAB
ANION GAP SERPL CALCULATED.3IONS-SCNC: 9 MMOL/L (ref 7–16)
BASOPHILS # BLD: 0.01 K/UL (ref 0–0.2)
BASOPHILS NFR BLD: 0 % (ref 0–2)
BUN SERPL-MCNC: 13 MG/DL (ref 6–23)
CALCIUM SERPL-MCNC: 8.1 MG/DL (ref 8.6–10.2)
CHLORIDE SERPL-SCNC: 106 MMOL/L (ref 98–107)
CO2 SERPL-SCNC: 26 MMOL/L (ref 22–29)
CREAT SERPL-MCNC: 1.1 MG/DL (ref 0.7–1.2)
EOSINOPHIL # BLD: 0.01 K/UL (ref 0.05–0.5)
EOSINOPHILS RELATIVE PERCENT: 0 % (ref 0–6)
ERYTHROCYTE [DISTWIDTH] IN BLOOD BY AUTOMATED COUNT: 13.6 % (ref 11.5–15)
GFR SERPL CREATININE-BSD FRML MDRD: >60 ML/MIN/1.73M2
GLUCOSE SERPL-MCNC: 109 MG/DL (ref 74–99)
HCT VFR BLD AUTO: 35.8 % (ref 37–54)
HGB BLD-MCNC: 11.7 G/DL (ref 12.5–16.5)
IMM GRANULOCYTES # BLD AUTO: 0.05 K/UL (ref 0–0.58)
IMM GRANULOCYTES NFR BLD: 1 % (ref 0–5)
LYMPHOCYTES NFR BLD: 0.67 K/UL (ref 1.5–4)
LYMPHOCYTES RELATIVE PERCENT: 11 % (ref 20–42)
MCH RBC QN AUTO: 33.6 PG (ref 26–35)
MCHC RBC AUTO-ENTMCNC: 32.7 G/DL (ref 32–34.5)
MCV RBC AUTO: 102.9 FL (ref 80–99.9)
MONOCYTES NFR BLD: 0.46 K/UL (ref 0.1–0.95)
MONOCYTES NFR BLD: 8 % (ref 2–12)
NEUTROPHILS NFR BLD: 80 % (ref 43–80)
NEUTS SEG NFR BLD: 4.73 K/UL (ref 1.8–7.3)
PLATELET # BLD AUTO: 101 K/UL (ref 130–450)
PMV BLD AUTO: 10.3 FL (ref 7–12)
POTASSIUM SERPL-SCNC: 3.2 MMOL/L (ref 3.5–5)
RBC # BLD AUTO: 3.48 M/UL (ref 3.8–5.8)
SODIUM SERPL-SCNC: 141 MMOL/L (ref 132–146)
WBC OTHER # BLD: 5.9 K/UL (ref 4.5–11.5)

## 2023-11-24 PROCEDURE — 6370000000 HC RX 637 (ALT 250 FOR IP): Performed by: STUDENT IN AN ORGANIZED HEALTH CARE EDUCATION/TRAINING PROGRAM

## 2023-11-24 PROCEDURE — 85025 COMPLETE CBC W/AUTO DIFF WBC: CPT

## 2023-11-24 PROCEDURE — 80048 BASIC METABOLIC PNL TOTAL CA: CPT

## 2023-11-24 PROCEDURE — 6360000002 HC RX W HCPCS: Performed by: STUDENT IN AN ORGANIZED HEALTH CARE EDUCATION/TRAINING PROGRAM

## 2023-11-24 PROCEDURE — 36415 COLL VENOUS BLD VENIPUNCTURE: CPT

## 2023-11-24 PROCEDURE — 2060000000 HC ICU INTERMEDIATE R&B

## 2023-11-24 PROCEDURE — 99232 SBSQ HOSP IP/OBS MODERATE 35: CPT | Performed by: STUDENT IN AN ORGANIZED HEALTH CARE EDUCATION/TRAINING PROGRAM

## 2023-11-24 PROCEDURE — 94660 CPAP INITIATION&MGMT: CPT

## 2023-11-24 PROCEDURE — 2580000003 HC RX 258: Performed by: STUDENT IN AN ORGANIZED HEALTH CARE EDUCATION/TRAINING PROGRAM

## 2023-11-24 PROCEDURE — 94640 AIRWAY INHALATION TREATMENT: CPT

## 2023-11-24 PROCEDURE — 6360000002 HC RX W HCPCS: Performed by: INTERNAL MEDICINE

## 2023-11-24 PROCEDURE — 6360000002 HC RX W HCPCS

## 2023-11-24 RX ADMIN — LORAZEPAM 1 MG: 2 INJECTION INTRAMUSCULAR; INTRAVENOUS at 08:42

## 2023-11-24 RX ADMIN — Medication 100 MG: at 08:02

## 2023-11-24 RX ADMIN — POTASSIUM CHLORIDE 40 MEQ: 1500 TABLET, EXTENDED RELEASE ORAL at 06:34

## 2023-11-24 RX ADMIN — ENOXAPARIN SODIUM 40 MG: 100 INJECTION SUBCUTANEOUS at 08:02

## 2023-11-24 RX ADMIN — AMLODIPINE BESYLATE 5 MG: 5 TABLET ORAL at 08:02

## 2023-11-24 RX ADMIN — DESMOPRESSIN ACETATE 40 MG: 0.2 TABLET ORAL at 08:02

## 2023-11-24 RX ADMIN — ASPIRIN 81 MG CHEWABLE TABLET 81 MG: 81 TABLET CHEWABLE at 08:02

## 2023-11-24 RX ADMIN — HYDRALAZINE HYDROCHLORIDE 25 MG: 25 TABLET, FILM COATED ORAL at 08:02

## 2023-11-24 RX ADMIN — BUDESONIDE INHALATION 500 MCG: 0.5 SUSPENSION RESPIRATORY (INHALATION) at 09:07

## 2023-11-24 RX ADMIN — BUDESONIDE INHALATION 500 MCG: 0.5 SUSPENSION RESPIRATORY (INHALATION) at 21:15

## 2023-11-24 RX ADMIN — IPRATROPIUM BROMIDE AND ALBUTEROL SULFATE 1 DOSE: 2.5; .5 SOLUTION RESPIRATORY (INHALATION) at 16:35

## 2023-11-24 RX ADMIN — METHYLPREDNISOLONE SODIUM SUCCINATE 40 MG: 40 INJECTION, POWDER, LYOPHILIZED, FOR SOLUTION INTRAMUSCULAR; INTRAVENOUS at 08:02

## 2023-11-24 RX ADMIN — LORAZEPAM 1 MG: 2 INJECTION INTRAMUSCULAR; INTRAVENOUS at 04:34

## 2023-11-24 RX ADMIN — IPRATROPIUM BROMIDE AND ALBUTEROL SULFATE 1 DOSE: 2.5; .5 SOLUTION RESPIRATORY (INHALATION) at 06:15

## 2023-11-24 RX ADMIN — ARFORMOTEROL TARTRATE 15 MCG: 15 SOLUTION RESPIRATORY (INHALATION) at 09:07

## 2023-11-24 RX ADMIN — ATENOLOL 50 MG: 50 TABLET ORAL at 08:02

## 2023-11-24 RX ADMIN — IPRATROPIUM BROMIDE AND ALBUTEROL SULFATE 1 DOSE: 2.5; .5 SOLUTION RESPIRATORY (INHALATION) at 12:51

## 2023-11-24 RX ADMIN — IPRATROPIUM BROMIDE AND ALBUTEROL SULFATE 1 DOSE: 2.5; .5 SOLUTION RESPIRATORY (INHALATION) at 21:15

## 2023-11-24 RX ADMIN — HYDRALAZINE HYDROCHLORIDE 25 MG: 25 TABLET, FILM COATED ORAL at 15:08

## 2023-11-24 RX ADMIN — PAROXETINE 30 MG: 10 TABLET, FILM COATED ORAL at 08:02

## 2023-11-24 RX ADMIN — ARFORMOTEROL TARTRATE 15 MCG: 15 SOLUTION RESPIRATORY (INHALATION) at 21:15

## 2023-11-24 NOTE — PROGRESS NOTES
[facility] Quality Flow/Interdisciplinary Rounds Progress Note        Quality Flow Rounds held on November 24, 2023    Disciplines Attending:  Bedside Nurse, , , and Nursing Unit Leadership    Jacquelyn Krishnamurthy was admitted on 11/21/2023 12:06 AM    Anticipated Discharge Date:  Expected Discharge Date: 11/23/23    Disposition:    Florian Score:  Florian Scale Score: 20    Readmission Risk              Risk of Unplanned Readmission:  19           Discussed patient goal for the day, patient clinical progression, and barriers to discharge. The following Goal(s) of the Day/Commitment(s) have been identified:   remove sitter, iv steroids.       Darlene Elder RN  November 24, 2023

## 2023-11-24 NOTE — PLAN OF CARE
Problem: Safety - Adult  Goal: Free from fall injury  11/24/2023 1408 by Reggie Muñiz RN  Outcome: Progressing  11/24/2023 0509 by Curtis Hanley RN  Outcome: Progressing     Problem: Skin/Tissue Integrity  Goal: Absence of new skin breakdown  11/24/2023 1408 by Reggie Muñiz RN  Outcome: Progressing  11/24/2023 0509 by Curtis Hanley RN  Outcome: Progressing     Problem: Chronic Conditions and Co-morbidities  Goal: Patient's chronic conditions and co-morbidity symptoms are monitored and maintained or improved  Outcome: Progressing

## 2023-11-25 VITALS
DIASTOLIC BLOOD PRESSURE: 63 MMHG | HEIGHT: 65 IN | BODY MASS INDEX: 27.54 KG/M2 | HEART RATE: 70 BPM | SYSTOLIC BLOOD PRESSURE: 132 MMHG | RESPIRATION RATE: 16 BRPM | TEMPERATURE: 97.4 F | WEIGHT: 165.3 LBS | OXYGEN SATURATION: 95 %

## 2023-11-25 LAB
ANION GAP SERPL CALCULATED.3IONS-SCNC: 10 MMOL/L (ref 7–16)
BASOPHILS # BLD: 0.01 K/UL (ref 0–0.2)
BASOPHILS NFR BLD: 0 % (ref 0–2)
BUN SERPL-MCNC: 12 MG/DL (ref 6–23)
CALCIUM SERPL-MCNC: 8.1 MG/DL (ref 8.6–10.2)
CHLORIDE SERPL-SCNC: 101 MMOL/L (ref 98–107)
CO2 SERPL-SCNC: 26 MMOL/L (ref 22–29)
CREAT SERPL-MCNC: 1.1 MG/DL (ref 0.7–1.2)
EOSINOPHIL # BLD: 0.05 K/UL (ref 0.05–0.5)
EOSINOPHILS RELATIVE PERCENT: 1 % (ref 0–6)
ERYTHROCYTE [DISTWIDTH] IN BLOOD BY AUTOMATED COUNT: 13.3 % (ref 11.5–15)
GFR SERPL CREATININE-BSD FRML MDRD: >60 ML/MIN/1.73M2
GLUCOSE SERPL-MCNC: 111 MG/DL (ref 74–99)
HCT VFR BLD AUTO: 35.7 % (ref 37–54)
HGB BLD-MCNC: 11.9 G/DL (ref 12.5–16.5)
IMM GRANULOCYTES # BLD AUTO: 0.07 K/UL (ref 0–0.58)
IMM GRANULOCYTES NFR BLD: 1 % (ref 0–5)
LYMPHOCYTES NFR BLD: 0.81 K/UL (ref 1.5–4)
LYMPHOCYTES RELATIVE PERCENT: 9 % (ref 20–42)
MCH RBC QN AUTO: 33.7 PG (ref 26–35)
MCHC RBC AUTO-ENTMCNC: 33.3 G/DL (ref 32–34.5)
MCV RBC AUTO: 101.1 FL (ref 80–99.9)
MONOCYTES NFR BLD: 0.81 K/UL (ref 0.1–0.95)
MONOCYTES NFR BLD: 9 % (ref 2–12)
NEUTROPHILS NFR BLD: 80 % (ref 43–80)
NEUTS SEG NFR BLD: 7 K/UL (ref 1.8–7.3)
PLATELET # BLD AUTO: 148 K/UL (ref 130–450)
PMV BLD AUTO: 11.4 FL (ref 7–12)
POTASSIUM SERPL-SCNC: 3.8 MMOL/L (ref 3.5–5)
RBC # BLD AUTO: 3.53 M/UL (ref 3.8–5.8)
SODIUM SERPL-SCNC: 137 MMOL/L (ref 132–146)
WBC OTHER # BLD: 8.8 K/UL (ref 4.5–11.5)

## 2023-11-25 PROCEDURE — 80048 BASIC METABOLIC PNL TOTAL CA: CPT

## 2023-11-25 PROCEDURE — 6370000000 HC RX 637 (ALT 250 FOR IP): Performed by: STUDENT IN AN ORGANIZED HEALTH CARE EDUCATION/TRAINING PROGRAM

## 2023-11-25 PROCEDURE — 2580000003 HC RX 258: Performed by: STUDENT IN AN ORGANIZED HEALTH CARE EDUCATION/TRAINING PROGRAM

## 2023-11-25 PROCEDURE — 85025 COMPLETE CBC W/AUTO DIFF WBC: CPT

## 2023-11-25 PROCEDURE — 94640 AIRWAY INHALATION TREATMENT: CPT

## 2023-11-25 PROCEDURE — 6360000002 HC RX W HCPCS: Performed by: INTERNAL MEDICINE

## 2023-11-25 PROCEDURE — 2580000003 HC RX 258: Performed by: INTERNAL MEDICINE

## 2023-11-25 PROCEDURE — 99239 HOSP IP/OBS DSCHRG MGMT >30: CPT | Performed by: STUDENT IN AN ORGANIZED HEALTH CARE EDUCATION/TRAINING PROGRAM

## 2023-11-25 PROCEDURE — 6360000002 HC RX W HCPCS: Performed by: STUDENT IN AN ORGANIZED HEALTH CARE EDUCATION/TRAINING PROGRAM

## 2023-11-25 PROCEDURE — 6360000002 HC RX W HCPCS

## 2023-11-25 PROCEDURE — 94660 CPAP INITIATION&MGMT: CPT

## 2023-11-25 RX ORDER — PREDNISONE 10 MG/1
TABLET ORAL
Qty: 12 TABLET | Refills: 0 | Status: SHIPPED | OUTPATIENT
Start: 2023-11-26

## 2023-11-25 RX ORDER — UMECLIDINIUM BROMIDE AND VILANTEROL TRIFENATATE 62.5; 25 UG/1; UG/1
1 POWDER RESPIRATORY (INHALATION) DAILY
Qty: 1 EACH | Refills: 1 | Status: SHIPPED | OUTPATIENT
Start: 2023-11-25

## 2023-11-25 RX ADMIN — PAROXETINE 30 MG: 10 TABLET, FILM COATED ORAL at 09:14

## 2023-11-25 RX ADMIN — ARFORMOTEROL TARTRATE 15 MCG: 15 SOLUTION RESPIRATORY (INHALATION) at 09:48

## 2023-11-25 RX ADMIN — Medication 10 ML: at 09:24

## 2023-11-25 RX ADMIN — METHYLPREDNISOLONE SODIUM SUCCINATE 40 MG: 40 INJECTION, POWDER, LYOPHILIZED, FOR SOLUTION INTRAMUSCULAR; INTRAVENOUS at 09:24

## 2023-11-25 RX ADMIN — Medication 100 MG: at 09:15

## 2023-11-25 RX ADMIN — SODIUM CHLORIDE: 9 INJECTION, SOLUTION INTRAVENOUS at 05:37

## 2023-11-25 RX ADMIN — HYDRALAZINE HYDROCHLORIDE 25 MG: 25 TABLET, FILM COATED ORAL at 09:15

## 2023-11-25 RX ADMIN — ATENOLOL 50 MG: 50 TABLET ORAL at 09:15

## 2023-11-25 RX ADMIN — ENOXAPARIN SODIUM 40 MG: 100 INJECTION SUBCUTANEOUS at 09:15

## 2023-11-25 RX ADMIN — ASPIRIN 81 MG CHEWABLE TABLET 81 MG: 81 TABLET CHEWABLE at 09:15

## 2023-11-25 RX ADMIN — DESMOPRESSIN ACETATE 40 MG: 0.2 TABLET ORAL at 09:15

## 2023-11-25 RX ADMIN — AMLODIPINE BESYLATE 5 MG: 5 TABLET ORAL at 09:15

## 2023-11-25 RX ADMIN — BUDESONIDE INHALATION 500 MCG: 0.5 SUSPENSION RESPIRATORY (INHALATION) at 09:48

## 2023-11-25 RX ADMIN — IPRATROPIUM BROMIDE AND ALBUTEROL SULFATE 1 DOSE: 2.5; .5 SOLUTION RESPIRATORY (INHALATION) at 09:48

## 2023-11-25 NOTE — DISCHARGE SUMMARY
[facility] Physician Discharge Summary       No follow-up provider specified. Activity level: As tolerated    Dispo: Home      Condition on discharge: Stable    Patient ID:  Piedad Ruiz  [id number]  75 y.o.  1950    Admit date: 11/21/2023    Discharge date and time:  11/25/2023  3:08 PM    Admission Diagnoses: Principal Problem:    Acute respiratory failure ([address])  Active Problems:    COPD with acute exacerbation ([address])    Encephalopathy    COPD exacerbation ([address])  Resolved Problems:    * No resolved hospital problems. *      Discharge Diagnoses: Principal Problem:    Acute respiratory failure ([address])  Active Problems:    COPD with acute exacerbation (HCC)    Encephalopathy    COPD exacerbation (HCC)  Resolved Problems:    * No resolved hospital problems. *      Consults:  IP CONSULT TO SOCIAL WORK  IP CONSULT TO PULMONOLOGY  IP CONSULT TO IV TEAM    Procedures: none    Hospital Course:   Patient Piedad Ruiz is a 75 y.o. presented with Acute respiratory failure ([address]) [J96.00]  COPD with acute exacerbation ([address]) [J44.1]  Acute respiratory failure with hypoxia and hypercapnia ([address]) [J96.01, J96.02]  Brief summary:  Patient presented with acute respiratory failure presumed d/t COPDe and was treated with steroids and breathing treatments with improvement in respiratory status to baseline status. He was seen by Pulm, will start Anoro as outpt and cont steroid taper PO at discharge, and will need outpt PFTs with Pulm. He is also to continue nebulizer which was started as outpt. He is being discharged in stable condition. Of note, patient with EtOH w/d sxs during admission which were treated and have now resolved, confusion now returned to baseline mental status. Discussed importance of decreased EtOH use in future, patient reports understanding.     **Most recent hospitalist plan**  Acute respiratory failure with hypoxia- pt noted to have pulse ox in 70's at home and hypercapnia- wean o2

## 2023-11-25 NOTE — PROGRESS NOTES
P Quality Flow/Interdisciplinary Rounds Progress Note        Quality Flow Rounds held on November 25, 2023    Disciplines Attending:  Bedside Nurse and Nursing Unit Leadership    Cleve Chacko was admitted on 11/21/2023 12:06 AM    Anticipated Discharge Date:  Expected Discharge Date: 11/23/23    Disposition:    Florian Score:  Florian Scale Score: 19    Readmission Risk              Risk of Unplanned Readmission:  19           Discussed patient goal for the day, patient clinical progression, and barriers to discharge. The following Goal(s) of the Day/Commitment(s) have been identified:   discharge home.        Gladys Grimm RN  November 25, 2023

## 2023-11-25 NOTE — PROGRESS NOTES
Patient at rest SpO2 95%, patient ambulated on room air SpO2 91%. Pt had no complaints of shortness of breath with recovering SpO2 at 95%.

## 2023-11-26 LAB
MICROORGANISM SPEC CULT: ABNORMAL
MICROORGANISM SPEC CULT: NORMAL
MICROORGANISM/AGENT SPEC: ABNORMAL
SERVICE CMNT-IMP: ABNORMAL
SERVICE CMNT-IMP: NORMAL
SPECIMEN DESCRIPTION: ABNORMAL
SPECIMEN DESCRIPTION: NORMAL

## 2023-12-20 ENCOUNTER — APPOINTMENT (OUTPATIENT)
Dept: GENERAL RADIOLOGY | Age: 73
DRG: 291 | End: 2023-12-20
Payer: MEDICARE

## 2023-12-20 ENCOUNTER — HOSPITAL ENCOUNTER (INPATIENT)
Age: 73
LOS: 3 days | Discharge: HOME | DRG: 291 | End: 2023-12-23
Attending: EMERGENCY MEDICINE | Admitting: INTERNAL MEDICINE
Payer: MEDICARE

## 2023-12-20 ENCOUNTER — APPOINTMENT (OUTPATIENT)
Dept: CT IMAGING | Age: 73
DRG: 291 | End: 2023-12-20
Payer: MEDICARE

## 2023-12-20 DIAGNOSIS — I50.9 ACUTE CONGESTIVE HEART FAILURE, UNSPECIFIED HEART FAILURE TYPE (HCC): ICD-10-CM

## 2023-12-20 DIAGNOSIS — I48.91 ATRIAL FIBRILLATION, UNSPECIFIED TYPE (HCC): ICD-10-CM

## 2023-12-20 DIAGNOSIS — I50.9 ACUTE ON CHRONIC CONGESTIVE HEART FAILURE, UNSPECIFIED HEART FAILURE TYPE (HCC): Primary | ICD-10-CM

## 2023-12-20 DIAGNOSIS — J90 PLEURAL EFFUSION: ICD-10-CM

## 2023-12-20 DIAGNOSIS — J90 PLEURAL EFFUSION, BILATERAL: ICD-10-CM

## 2023-12-20 DIAGNOSIS — J96.01 ACUTE RESPIRATORY FAILURE WITH HYPOXIA (HCC): ICD-10-CM

## 2023-12-20 LAB
ALBUMIN SERPL-MCNC: 3.7 G/DL (ref 3.5–5.2)
ALP SERPL-CCNC: 134 U/L (ref 40–129)
ALT SERPL-CCNC: 12 U/L (ref 0–40)
ANION GAP SERPL CALCULATED.3IONS-SCNC: 11 MMOL/L (ref 7–16)
AST SERPL-CCNC: 18 U/L (ref 0–39)
B PARAP IS1001 DNA NPH QL NAA+NON-PROBE: NOT DETECTED
B PERT DNA SPEC QL NAA+PROBE: NOT DETECTED
BASOPHILS # BLD: 0.05 K/UL (ref 0–0.2)
BASOPHILS NFR BLD: 1 % (ref 0–2)
BILIRUB SERPL-MCNC: 0.7 MG/DL (ref 0–1.2)
BNP SERPL-MCNC: 3042 PG/ML (ref 0–450)
BUN SERPL-MCNC: 13 MG/DL (ref 6–23)
C PNEUM DNA NPH QL NAA+NON-PROBE: NOT DETECTED
CALCIUM SERPL-MCNC: 8.9 MG/DL (ref 8.6–10.2)
CHLORIDE SERPL-SCNC: 98 MMOL/L (ref 98–107)
CO2 SERPL-SCNC: 24 MMOL/L (ref 22–29)
CREAT SERPL-MCNC: 1.2 MG/DL (ref 0.7–1.2)
D DIMER: 614 NG/ML DDU (ref 0–232)
EOSINOPHIL # BLD: 0.51 K/UL (ref 0.05–0.5)
EOSINOPHILS RELATIVE PERCENT: 7 % (ref 0–6)
ERYTHROCYTE [DISTWIDTH] IN BLOOD BY AUTOMATED COUNT: 13.6 % (ref 11.5–15)
FLUAV RNA NPH QL NAA+NON-PROBE: NOT DETECTED
FLUBV RNA NPH QL NAA+NON-PROBE: NOT DETECTED
GFR SERPL CREATININE-BSD FRML MDRD: >60 ML/MIN/1.73M2
GLUCOSE SERPL-MCNC: 103 MG/DL (ref 74–99)
HADV DNA NPH QL NAA+NON-PROBE: NOT DETECTED
HCOV 229E RNA NPH QL NAA+NON-PROBE: NOT DETECTED
HCOV HKU1 RNA NPH QL NAA+NON-PROBE: NOT DETECTED
HCOV NL63 RNA NPH QL NAA+NON-PROBE: NOT DETECTED
HCOV OC43 RNA NPH QL NAA+NON-PROBE: NOT DETECTED
HCT VFR BLD AUTO: 39.8 % (ref 37–54)
HGB BLD-MCNC: 13.1 G/DL (ref 12.5–16.5)
HMPV RNA NPH QL NAA+NON-PROBE: NOT DETECTED
HPIV1 RNA NPH QL NAA+NON-PROBE: NOT DETECTED
HPIV2 RNA NPH QL NAA+NON-PROBE: NOT DETECTED
HPIV3 RNA NPH QL NAA+NON-PROBE: NOT DETECTED
HPIV4 RNA NPH QL NAA+NON-PROBE: NOT DETECTED
IMM GRANULOCYTES # BLD AUTO: 0.03 K/UL (ref 0–0.58)
IMM GRANULOCYTES NFR BLD: 0 % (ref 0–5)
LYMPHOCYTES NFR BLD: 0.66 K/UL (ref 1.5–4)
LYMPHOCYTES RELATIVE PERCENT: 9 % (ref 20–42)
M PNEUMO DNA NPH QL NAA+NON-PROBE: NOT DETECTED
MCH RBC QN AUTO: 33.3 PG (ref 26–35)
MCHC RBC AUTO-ENTMCNC: 32.9 G/DL (ref 32–34.5)
MCV RBC AUTO: 101.3 FL (ref 80–99.9)
MONOCYTES NFR BLD: 0.77 K/UL (ref 0.1–0.95)
MONOCYTES NFR BLD: 11 % (ref 2–12)
NEUTROPHILS NFR BLD: 72 % (ref 43–80)
NEUTS SEG NFR BLD: 5.15 K/UL (ref 1.8–7.3)
PLATELET # BLD AUTO: 133 K/UL (ref 130–450)
PMV BLD AUTO: 10.1 FL (ref 7–12)
POTASSIUM SERPL-SCNC: 4.1 MMOL/L (ref 3.5–5)
PROT SERPL-MCNC: 6.1 G/DL (ref 6.4–8.3)
RBC # BLD AUTO: 3.93 M/UL (ref 3.8–5.8)
RSV RNA NPH QL NAA+NON-PROBE: NOT DETECTED
RV+EV RNA NPH QL NAA+NON-PROBE: NOT DETECTED
SARS-COV-2 RNA NPH QL NAA+NON-PROBE: NOT DETECTED
SODIUM SERPL-SCNC: 133 MMOL/L (ref 132–146)
SPECIMEN DESCRIPTION: NORMAL
TROPONIN I SERPL HS-MCNC: 15 NG/L (ref 0–11)
TROPONIN I SERPL HS-MCNC: 17 NG/L (ref 0–11)
WBC OTHER # BLD: 7.2 K/UL (ref 4.5–11.5)

## 2023-12-20 PROCEDURE — 81001 URINALYSIS AUTO W/SCOPE: CPT

## 2023-12-20 PROCEDURE — APPSS60 APP SPLIT SHARED TIME 46-60 MINUTES: Performed by: NURSE PRACTITIONER

## 2023-12-20 PROCEDURE — 93005 ELECTROCARDIOGRAM TRACING: CPT | Performed by: NURSE PRACTITIONER

## 2023-12-20 PROCEDURE — 6370000000 HC RX 637 (ALT 250 FOR IP)

## 2023-12-20 PROCEDURE — 71045 X-RAY EXAM CHEST 1 VIEW: CPT

## 2023-12-20 PROCEDURE — 0202U NFCT DS 22 TRGT SARS-COV-2: CPT

## 2023-12-20 PROCEDURE — 2060000000 HC ICU INTERMEDIATE R&B

## 2023-12-20 PROCEDURE — 96375 TX/PRO/DX INJ NEW DRUG ADDON: CPT

## 2023-12-20 PROCEDURE — 94640 AIRWAY INHALATION TREATMENT: CPT

## 2023-12-20 PROCEDURE — 84484 ASSAY OF TROPONIN QUANT: CPT

## 2023-12-20 PROCEDURE — 96374 THER/PROPH/DIAG INJ IV PUSH: CPT

## 2023-12-20 PROCEDURE — 6360000002 HC RX W HCPCS

## 2023-12-20 PROCEDURE — 83880 ASSAY OF NATRIURETIC PEPTIDE: CPT

## 2023-12-20 PROCEDURE — 80053 COMPREHEN METABOLIC PANEL: CPT

## 2023-12-20 PROCEDURE — 85379 FIBRIN DEGRADATION QUANT: CPT

## 2023-12-20 PROCEDURE — 6360000004 HC RX CONTRAST MEDICATION: Performed by: RADIOLOGY

## 2023-12-20 PROCEDURE — 99222 1ST HOSP IP/OBS MODERATE 55: CPT | Performed by: STUDENT IN AN ORGANIZED HEALTH CARE EDUCATION/TRAINING PROGRAM

## 2023-12-20 PROCEDURE — 85025 COMPLETE CBC W/AUTO DIFF WBC: CPT

## 2023-12-20 PROCEDURE — 71275 CT ANGIOGRAPHY CHEST: CPT

## 2023-12-20 PROCEDURE — 99285 EMERGENCY DEPT VISIT HI MDM: CPT

## 2023-12-20 PROCEDURE — 94664 DEMO&/EVAL PT USE INHALER: CPT

## 2023-12-20 PROCEDURE — 2580000003 HC RX 258

## 2023-12-20 PROCEDURE — 2700000000 HC OXYGEN THERAPY PER DAY

## 2023-12-20 RX ORDER — ATORVASTATIN CALCIUM 40 MG/1
40 TABLET, FILM COATED ORAL DAILY
Status: DISCONTINUED | OUTPATIENT
Start: 2023-12-21 | End: 2023-12-23 | Stop reason: HOSPADM

## 2023-12-20 RX ORDER — LORAZEPAM 1 MG/1
1 TABLET ORAL
Status: DISCONTINUED | OUTPATIENT
Start: 2023-12-20 | End: 2023-12-21 | Stop reason: RX

## 2023-12-20 RX ORDER — SODIUM CHLORIDE 0.9 % (FLUSH) 0.9 %
5-40 SYRINGE (ML) INJECTION EVERY 12 HOURS SCHEDULED
Status: DISCONTINUED | OUTPATIENT
Start: 2023-12-20 | End: 2023-12-23 | Stop reason: HOSPADM

## 2023-12-20 RX ORDER — M-VIT,TX,IRON,MINS/CALC/FOLIC 27MG-0.4MG
1 TABLET ORAL DAILY
Status: DISCONTINUED | OUTPATIENT
Start: 2023-12-21 | End: 2023-12-23 | Stop reason: HOSPADM

## 2023-12-20 RX ORDER — FUROSEMIDE 10 MG/ML
10 INJECTION INTRAMUSCULAR; INTRAVENOUS ONCE
Status: COMPLETED | OUTPATIENT
Start: 2023-12-20 | End: 2023-12-20

## 2023-12-20 RX ORDER — FUROSEMIDE 10 MG/ML
20 INJECTION INTRAMUSCULAR; INTRAVENOUS 2 TIMES DAILY
Status: DISCONTINUED | OUTPATIENT
Start: 2023-12-21 | End: 2023-12-22

## 2023-12-20 RX ORDER — LORAZEPAM 2 MG/ML
1 INJECTION INTRAMUSCULAR
Status: DISCONTINUED | OUTPATIENT
Start: 2023-12-20 | End: 2023-12-21 | Stop reason: RX

## 2023-12-20 RX ORDER — SODIUM CHLORIDE 9 MG/ML
INJECTION, SOLUTION INTRAVENOUS PRN
Status: DISCONTINUED | OUTPATIENT
Start: 2023-12-20 | End: 2023-12-23 | Stop reason: HOSPADM

## 2023-12-20 RX ORDER — PHENOBARBITAL SODIUM 65 MG/ML
130 INJECTION INTRAMUSCULAR ONCE
Status: COMPLETED | OUTPATIENT
Start: 2023-12-20 | End: 2023-12-20

## 2023-12-20 RX ORDER — HYDRALAZINE HYDROCHLORIDE 25 MG/1
25 TABLET, FILM COATED ORAL 2 TIMES DAILY
Status: DISCONTINUED | OUTPATIENT
Start: 2023-12-20 | End: 2023-12-23 | Stop reason: HOSPADM

## 2023-12-20 RX ORDER — ACETAMINOPHEN 325 MG/1
650 TABLET ORAL EVERY 6 HOURS PRN
Status: DISCONTINUED | OUTPATIENT
Start: 2023-12-20 | End: 2023-12-23 | Stop reason: HOSPADM

## 2023-12-20 RX ORDER — LORAZEPAM 1 MG/1
4 TABLET ORAL
Status: DISCONTINUED | OUTPATIENT
Start: 2023-12-20 | End: 2023-12-21 | Stop reason: RX

## 2023-12-20 RX ORDER — METHYLPREDNISOLONE SODIUM SUCCINATE 125 MG/2ML
125 INJECTION, POWDER, LYOPHILIZED, FOR SOLUTION INTRAMUSCULAR; INTRAVENOUS ONCE
Status: COMPLETED | OUTPATIENT
Start: 2023-12-20 | End: 2023-12-20

## 2023-12-20 RX ORDER — LORAZEPAM 1 MG/1
2 TABLET ORAL
Status: DISCONTINUED | OUTPATIENT
Start: 2023-12-20 | End: 2023-12-21 | Stop reason: RX

## 2023-12-20 RX ORDER — ONDANSETRON 2 MG/ML
4 INJECTION INTRAMUSCULAR; INTRAVENOUS EVERY 6 HOURS PRN
Status: DISCONTINUED | OUTPATIENT
Start: 2023-12-20 | End: 2023-12-23 | Stop reason: HOSPADM

## 2023-12-20 RX ORDER — LORAZEPAM 2 MG/ML
3 INJECTION INTRAMUSCULAR
Status: DISCONTINUED | OUTPATIENT
Start: 2023-12-20 | End: 2023-12-21 | Stop reason: RX

## 2023-12-20 RX ORDER — ASPIRIN 81 MG/1
81 TABLET, CHEWABLE ORAL EVERY MORNING
Status: DISCONTINUED | OUTPATIENT
Start: 2023-12-21 | End: 2023-12-23 | Stop reason: HOSPADM

## 2023-12-20 RX ORDER — LORAZEPAM 2 MG/ML
2 INJECTION INTRAMUSCULAR
Status: DISCONTINUED | OUTPATIENT
Start: 2023-12-20 | End: 2023-12-21 | Stop reason: RX

## 2023-12-20 RX ORDER — IPRATROPIUM BROMIDE AND ALBUTEROL SULFATE 2.5; .5 MG/3ML; MG/3ML
1 SOLUTION RESPIRATORY (INHALATION)
Status: DISCONTINUED | OUTPATIENT
Start: 2023-12-21 | End: 2023-12-23 | Stop reason: HOSPADM

## 2023-12-20 RX ORDER — INSULIN LISPRO 100 [IU]/ML
0-4 INJECTION, SOLUTION INTRAVENOUS; SUBCUTANEOUS
Status: DISCONTINUED | OUTPATIENT
Start: 2023-12-21 | End: 2023-12-23 | Stop reason: HOSPADM

## 2023-12-20 RX ORDER — ONDANSETRON 4 MG/1
4 TABLET, ORALLY DISINTEGRATING ORAL EVERY 8 HOURS PRN
Status: DISCONTINUED | OUTPATIENT
Start: 2023-12-20 | End: 2023-12-23 | Stop reason: HOSPADM

## 2023-12-20 RX ORDER — AMLODIPINE BESYLATE 5 MG/1
5 TABLET ORAL DAILY
Status: DISCONTINUED | OUTPATIENT
Start: 2023-12-21 | End: 2023-12-23 | Stop reason: HOSPADM

## 2023-12-20 RX ORDER — ALBUTEROL SULFATE 2.5 MG/3ML
2.5 SOLUTION RESPIRATORY (INHALATION) EVERY 4 HOURS PRN
Status: DISCONTINUED | OUTPATIENT
Start: 2023-12-20 | End: 2023-12-23 | Stop reason: HOSPADM

## 2023-12-20 RX ORDER — PANTOPRAZOLE SODIUM 40 MG/1
40 TABLET, DELAYED RELEASE ORAL DAILY PRN
Status: DISCONTINUED | OUTPATIENT
Start: 2023-12-20 | End: 2023-12-23 | Stop reason: HOSPADM

## 2023-12-20 RX ORDER — IPRATROPIUM BROMIDE AND ALBUTEROL SULFATE 2.5; .5 MG/3ML; MG/3ML
3 SOLUTION RESPIRATORY (INHALATION) ONCE
Status: COMPLETED | OUTPATIENT
Start: 2023-12-20 | End: 2023-12-20

## 2023-12-20 RX ORDER — LANOLIN ALCOHOL/MO/W.PET/CERES
100 CREAM (GRAM) TOPICAL DAILY
Status: DISCONTINUED | OUTPATIENT
Start: 2023-12-20 | End: 2023-12-23 | Stop reason: HOSPADM

## 2023-12-20 RX ORDER — SODIUM CHLORIDE 0.9 % (FLUSH) 0.9 %
5-40 SYRINGE (ML) INJECTION PRN
Status: DISCONTINUED | OUTPATIENT
Start: 2023-12-20 | End: 2023-12-23 | Stop reason: HOSPADM

## 2023-12-20 RX ORDER — ATENOLOL 25 MG/1
50 TABLET ORAL 2 TIMES DAILY
Status: DISCONTINUED | OUTPATIENT
Start: 2023-12-20 | End: 2023-12-21

## 2023-12-20 RX ORDER — LORAZEPAM 1 MG/1
3 TABLET ORAL
Status: DISCONTINUED | OUTPATIENT
Start: 2023-12-20 | End: 2023-12-21 | Stop reason: RX

## 2023-12-20 RX ORDER — INSULIN LISPRO 100 [IU]/ML
0-4 INJECTION, SOLUTION INTRAVENOUS; SUBCUTANEOUS NIGHTLY
Status: DISCONTINUED | OUTPATIENT
Start: 2023-12-21 | End: 2023-12-23 | Stop reason: HOSPADM

## 2023-12-20 RX ORDER — LORAZEPAM 2 MG/ML
4 INJECTION INTRAMUSCULAR
Status: DISCONTINUED | OUTPATIENT
Start: 2023-12-20 | End: 2023-12-21 | Stop reason: RX

## 2023-12-20 RX ORDER — POLYETHYLENE GLYCOL 3350 17 G/17G
17 POWDER, FOR SOLUTION ORAL DAILY PRN
Status: DISCONTINUED | OUTPATIENT
Start: 2023-12-20 | End: 2023-12-23 | Stop reason: HOSPADM

## 2023-12-20 RX ORDER — ACETAMINOPHEN 650 MG/1
650 SUPPOSITORY RECTAL EVERY 6 HOURS PRN
Status: DISCONTINUED | OUTPATIENT
Start: 2023-12-20 | End: 2023-12-23 | Stop reason: HOSPADM

## 2023-12-20 RX ADMIN — Medication 100 MG: at 18:38

## 2023-12-20 RX ADMIN — FUROSEMIDE 10 MG: 10 INJECTION, SOLUTION INTRAMUSCULAR; INTRAVENOUS at 19:39

## 2023-12-20 RX ADMIN — IOPAMIDOL 75 ML: 755 INJECTION, SOLUTION INTRAVENOUS at 21:16

## 2023-12-20 RX ADMIN — METHYLPREDNISOLONE SODIUM SUCCINATE 125 MG: 125 INJECTION INTRAMUSCULAR; INTRAVENOUS at 18:39

## 2023-12-20 RX ADMIN — IPRATROPIUM BROMIDE AND ALBUTEROL SULFATE 3 DOSE: 2.5; .5 SOLUTION RESPIRATORY (INHALATION) at 18:01

## 2023-12-20 RX ADMIN — PHENOBARBITAL SODIUM 130 MG: 65 INJECTION INTRAMUSCULAR at 18:42

## 2023-12-20 RX ADMIN — SODIUM CHLORIDE, PRESERVATIVE FREE 10 ML: 5 INJECTION INTRAVENOUS at 23:29

## 2023-12-20 NOTE — ED NOTES
Department of Emergency Medicine  FIRST PROVIDER TRIAGE NOTE             Independent MLP           12/20/23  4:40 PM EST    Date of Encounter: 12/20/23   MRN: [id number]      HPI: Efrain Naylor is a 75 y.o. male who presents to the ED for Shortness of Breath (SOB x 4 weeks. Worsening over last week. SPO2 83% on RA at [facility])     CAME DIRECTLY HERE FROM PULMONARY DOCTOR. DENIES CHEST PAIN. STATES FEELS LIKE HE NEEDS TO USE HIS STOMACH TO BREATH. HAS HAD ANKLE EDEMA. ROS: Negative for back pain or fever. PE: Gen Appearance/Constitutional: alert  HEENT: NC/NT. PERRLA,  Airway patent. Initial Plan of Care: All treatment areas with department are currently occupied. Plan to order/Initiate the following while awaiting opening in ED: labs, EKG, and imaging studies.   Initiate Treatment-Testing, Proceed toTreatment Area When Bed Available for ED Attending/MLP to Continue Care    Electronically signed by HAYLEY Myles CNP   DD: 12/20/23      HAYLEY Myles CNP  12/20/23 5149

## 2023-12-21 LAB
ANION GAP SERPL CALCULATED.3IONS-SCNC: 11 MMOL/L (ref 7–16)
BILIRUB UR QL STRIP: NEGATIVE
BUN SERPL-MCNC: 15 MG/DL (ref 6–23)
CALCIUM SERPL-MCNC: 8.5 MG/DL (ref 8.6–10.2)
CHLORIDE SERPL-SCNC: 101 MMOL/L (ref 98–107)
CHOLEST SERPL-MCNC: 115 MG/DL
CHP ED QC CHECK: YES
CLARITY UR: CLEAR
CO2 SERPL-SCNC: 24 MMOL/L (ref 22–29)
COLOR UR: YELLOW
CREAT SERPL-MCNC: 1.2 MG/DL (ref 0.7–1.2)
EKG ATRIAL RATE: 100 BPM
EKG ATRIAL RATE: 102 BPM
EKG Q-T INTERVAL: 410 MS
EKG Q-T INTERVAL: 438 MS
EKG QRS DURATION: 96 MS
EKG QRS DURATION: 96 MS
EKG QTC CALCULATION (BAZETT): 479 MS
EKG QTC CALCULATION (BAZETT): 486 MS
EKG R AXIS: -27 DEGREES
EKG R AXIS: -36 DEGREES
EKG T AXIS: -2 DEGREES
EKG T AXIS: 31 DEGREES
EKG VENTRICULAR RATE: 74 BPM
EKG VENTRICULAR RATE: 82 BPM
GFR SERPL CREATININE-BSD FRML MDRD: >60 ML/MIN/1.73M2
GLUCOSE BLD-MCNC: 164 MG/DL (ref 74–99)
GLUCOSE BLD-MCNC: 181 MG/DL (ref 74–99)
GLUCOSE BLD-MCNC: 184 MG/DL
GLUCOSE BLD-MCNC: 184 MG/DL (ref 74–99)
GLUCOSE BLD-MCNC: 187 MG/DL (ref 74–99)
GLUCOSE SERPL-MCNC: 191 MG/DL (ref 74–99)
GLUCOSE UR STRIP-MCNC: NEGATIVE MG/DL
HDLC SERPL-MCNC: 88 MG/DL
HGB UR QL STRIP.AUTO: NEGATIVE
KETONES UR STRIP-MCNC: NEGATIVE MG/DL
LDLC SERPL CALC-MCNC: 19 MG/DL
LEUKOCYTE ESTERASE UR QL STRIP: NEGATIVE
MAGNESIUM SERPL-MCNC: 1.7 MG/DL (ref 1.6–2.6)
NITRITE UR QL STRIP: NEGATIVE
PH UR STRIP: 6 [PH] (ref 5–9)
POTASSIUM SERPL-SCNC: 4.3 MMOL/L (ref 3.5–5)
PROT UR STRIP-MCNC: NEGATIVE MG/DL
RBC #/AREA URNS HPF: NORMAL /HPF
SODIUM SERPL-SCNC: 136 MMOL/L (ref 132–146)
SP GR UR STRIP: 1.01 (ref 1–1.03)
T4 FREE SERPL-MCNC: 1.3 NG/DL (ref 0.9–1.7)
TRIGL SERPL-MCNC: 42 MG/DL
TROPONIN I SERPL HS-MCNC: 12 NG/L (ref 0–11)
TSH SERPL DL<=0.05 MIU/L-ACNC: 0.63 UIU/ML (ref 0.27–4.2)
UROBILINOGEN UR STRIP-ACNC: 0.2 EU/DL (ref 0–1)
VLDLC SERPL CALC-MCNC: 8 MG/DL
WBC #/AREA URNS HPF: NORMAL /HPF

## 2023-12-21 PROCEDURE — 6370000000 HC RX 637 (ALT 250 FOR IP)

## 2023-12-21 PROCEDURE — 99232 SBSQ HOSP IP/OBS MODERATE 35: CPT | Performed by: STUDENT IN AN ORGANIZED HEALTH CARE EDUCATION/TRAINING PROGRAM

## 2023-12-21 PROCEDURE — 6360000002 HC RX W HCPCS: Performed by: STUDENT IN AN ORGANIZED HEALTH CARE EDUCATION/TRAINING PROGRAM

## 2023-12-21 PROCEDURE — 99222 1ST HOSP IP/OBS MODERATE 55: CPT | Performed by: INTERNAL MEDICINE

## 2023-12-21 PROCEDURE — 6370000000 HC RX 637 (ALT 250 FOR IP): Performed by: NURSE PRACTITIONER

## 2023-12-21 PROCEDURE — APPSS60 APP SPLIT SHARED TIME 46-60 MINUTES

## 2023-12-21 PROCEDURE — 82962 GLUCOSE BLOOD TEST: CPT

## 2023-12-21 PROCEDURE — 6360000002 HC RX W HCPCS: Performed by: NURSE PRACTITIONER

## 2023-12-21 PROCEDURE — 2700000000 HC OXYGEN THERAPY PER DAY

## 2023-12-21 PROCEDURE — 80061 LIPID PANEL: CPT

## 2023-12-21 PROCEDURE — 6370000000 HC RX 637 (ALT 250 FOR IP): Performed by: STUDENT IN AN ORGANIZED HEALTH CARE EDUCATION/TRAINING PROGRAM

## 2023-12-21 PROCEDURE — 94640 AIRWAY INHALATION TREATMENT: CPT

## 2023-12-21 PROCEDURE — 93010 ELECTROCARDIOGRAM REPORT: CPT | Performed by: INTERNAL MEDICINE

## 2023-12-21 PROCEDURE — 2580000003 HC RX 258: Performed by: NURSE PRACTITIONER

## 2023-12-21 PROCEDURE — 2060000000 HC ICU INTERMEDIATE R&B

## 2023-12-21 PROCEDURE — 83735 ASSAY OF MAGNESIUM: CPT

## 2023-12-21 PROCEDURE — 84439 ASSAY OF FREE THYROXINE: CPT

## 2023-12-21 PROCEDURE — 93005 ELECTROCARDIOGRAM TRACING: CPT | Performed by: NURSE PRACTITIONER

## 2023-12-21 PROCEDURE — 84484 ASSAY OF TROPONIN QUANT: CPT

## 2023-12-21 PROCEDURE — 6360000002 HC RX W HCPCS

## 2023-12-21 PROCEDURE — 80048 BASIC METABOLIC PNL TOTAL CA: CPT

## 2023-12-21 PROCEDURE — 84443 ASSAY THYROID STIM HORMONE: CPT

## 2023-12-21 RX ORDER — MIDAZOLAM HYDROCHLORIDE 2 MG/2ML
2 INJECTION, SOLUTION INTRAMUSCULAR; INTRAVENOUS EVERY 4 HOURS PRN
Status: DISCONTINUED | OUTPATIENT
Start: 2023-12-21 | End: 2023-12-23 | Stop reason: HOSPADM

## 2023-12-21 RX ORDER — DIAZEPAM 5 MG/1
15 TABLET ORAL
Status: DISCONTINUED | OUTPATIENT
Start: 2023-12-21 | End: 2023-12-23 | Stop reason: HOSPADM

## 2023-12-21 RX ORDER — DIAZEPAM 5 MG/ML
15 INJECTION, SOLUTION INTRAMUSCULAR; INTRAVENOUS
Status: DISCONTINUED | OUTPATIENT
Start: 2023-12-21 | End: 2023-12-23 | Stop reason: HOSPADM

## 2023-12-21 RX ORDER — METOPROLOL SUCCINATE 50 MG/1
50 TABLET, EXTENDED RELEASE ORAL 2 TIMES DAILY
Status: DISCONTINUED | OUTPATIENT
Start: 2023-12-21 | End: 2023-12-23 | Stop reason: HOSPADM

## 2023-12-21 RX ORDER — MAGNESIUM SULFATE IN WATER 40 MG/ML
2000 INJECTION, SOLUTION INTRAVENOUS ONCE
Status: COMPLETED | OUTPATIENT
Start: 2023-12-21 | End: 2023-12-21

## 2023-12-21 RX ORDER — ENOXAPARIN SODIUM 100 MG/ML
1 INJECTION SUBCUTANEOUS 2 TIMES DAILY
Status: DISCONTINUED | OUTPATIENT
Start: 2023-12-21 | End: 2023-12-23 | Stop reason: HOSPADM

## 2023-12-21 RX ORDER — DIAZEPAM 5 MG/ML
5 INJECTION, SOLUTION INTRAMUSCULAR; INTRAVENOUS
Status: DISCONTINUED | OUTPATIENT
Start: 2023-12-21 | End: 2023-12-23 | Stop reason: HOSPADM

## 2023-12-21 RX ORDER — DIAZEPAM 5 MG/1
10 TABLET ORAL
Status: DISCONTINUED | OUTPATIENT
Start: 2023-12-21 | End: 2023-12-23 | Stop reason: HOSPADM

## 2023-12-21 RX ORDER — DIAZEPAM 5 MG/ML
10 INJECTION, SOLUTION INTRAMUSCULAR; INTRAVENOUS
Status: DISCONTINUED | OUTPATIENT
Start: 2023-12-21 | End: 2023-12-23 | Stop reason: HOSPADM

## 2023-12-21 RX ORDER — DIAZEPAM 5 MG/ML
20 INJECTION, SOLUTION INTRAMUSCULAR; INTRAVENOUS
Status: DISCONTINUED | OUTPATIENT
Start: 2023-12-21 | End: 2023-12-23 | Stop reason: HOSPADM

## 2023-12-21 RX ORDER — DIAZEPAM 5 MG/1
20 TABLET ORAL
Status: DISCONTINUED | OUTPATIENT
Start: 2023-12-21 | End: 2023-12-23 | Stop reason: HOSPADM

## 2023-12-21 RX ORDER — BUDESONIDE 0.5 MG/2ML
0.5 INHALANT ORAL
Status: DISCONTINUED | OUTPATIENT
Start: 2023-12-21 | End: 2023-12-23 | Stop reason: HOSPADM

## 2023-12-21 RX ORDER — DIAZEPAM 5 MG/1
5 TABLET ORAL
Status: DISCONTINUED | OUTPATIENT
Start: 2023-12-21 | End: 2023-12-23 | Stop reason: HOSPADM

## 2023-12-21 RX ORDER — ARFORMOTEROL TARTRATE 15 UG/2ML
15 SOLUTION RESPIRATORY (INHALATION)
Status: DISCONTINUED | OUTPATIENT
Start: 2023-12-21 | End: 2023-12-23 | Stop reason: HOSPADM

## 2023-12-21 RX ADMIN — FUROSEMIDE 20 MG: 10 INJECTION, SOLUTION INTRAMUSCULAR; INTRAVENOUS at 18:31

## 2023-12-21 RX ADMIN — SALINE NASAL SPRAY 1 SPRAY: 1.5 SOLUTION NASAL at 18:31

## 2023-12-21 RX ADMIN — MAGNESIUM SULFATE HEPTAHYDRATE 2000 MG: 40 INJECTION, SOLUTION INTRAVENOUS at 14:16

## 2023-12-21 RX ADMIN — ATENOLOL 50 MG: 25 TABLET ORAL at 14:05

## 2023-12-21 RX ADMIN — Medication 10 ML: at 00:12

## 2023-12-21 RX ADMIN — IPRATROPIUM BROMIDE AND ALBUTEROL SULFATE 1 DOSE: 2.5; .5 SOLUTION RESPIRATORY (INHALATION) at 11:26

## 2023-12-21 RX ADMIN — ARFORMOTEROL TARTRATE 15 MCG: 15 SOLUTION RESPIRATORY (INHALATION) at 20:00

## 2023-12-21 RX ADMIN — IPRATROPIUM BROMIDE AND ALBUTEROL SULFATE 1 DOSE: 2.5; .5 SOLUTION RESPIRATORY (INHALATION) at 20:00

## 2023-12-21 RX ADMIN — METOPROLOL SUCCINATE 50 MG: 50 TABLET, EXTENDED RELEASE ORAL at 20:28

## 2023-12-21 RX ADMIN — ATORVASTATIN CALCIUM 40 MG: 40 TABLET, FILM COATED ORAL at 09:54

## 2023-12-21 RX ADMIN — Medication 10 ML: at 20:29

## 2023-12-21 RX ADMIN — PAROXETINE 30 MG: 10 TABLET, FILM COATED ORAL at 14:05

## 2023-12-21 RX ADMIN — ENOXAPARIN SODIUM 70 MG: 100 INJECTION SUBCUTANEOUS at 15:56

## 2023-12-21 RX ADMIN — Medication 10 ML: at 09:54

## 2023-12-21 RX ADMIN — FUROSEMIDE 20 MG: 10 INJECTION, SOLUTION INTRAMUSCULAR; INTRAVENOUS at 09:53

## 2023-12-21 RX ADMIN — ASPIRIN 81 MG CHEWABLE TABLET 81 MG: 81 TABLET CHEWABLE at 09:54

## 2023-12-21 RX ADMIN — IPRATROPIUM BROMIDE AND ALBUTEROL SULFATE 1 DOSE: 2.5; .5 SOLUTION RESPIRATORY (INHALATION) at 15:57

## 2023-12-21 RX ADMIN — Medication 1 TABLET: at 14:05

## 2023-12-21 RX ADMIN — HYDRALAZINE HYDROCHLORIDE 25 MG: 25 TABLET, FILM COATED ORAL at 00:06

## 2023-12-21 RX ADMIN — HYDRALAZINE HYDROCHLORIDE 25 MG: 25 TABLET, FILM COATED ORAL at 20:28

## 2023-12-21 RX ADMIN — IPRATROPIUM BROMIDE AND ALBUTEROL SULFATE 1 DOSE: 2.5; .5 SOLUTION RESPIRATORY (INHALATION) at 09:13

## 2023-12-21 RX ADMIN — ENOXAPARIN SODIUM 70 MG: 100 INJECTION SUBCUTANEOUS at 20:28

## 2023-12-21 RX ADMIN — BUDESONIDE INHALATION 500 MCG: 0.5 SUSPENSION RESPIRATORY (INHALATION) at 20:00

## 2023-12-21 RX ADMIN — MIDAZOLAM HYDROCHLORIDE 2 MG: 1 INJECTION, SOLUTION INTRAMUSCULAR; INTRAVENOUS at 14:34

## 2023-12-21 RX ADMIN — AMLODIPINE BESYLATE 5 MG: 5 TABLET ORAL at 09:54

## 2023-12-21 RX ADMIN — Medication 100 MG: at 14:05

## 2023-12-21 RX ADMIN — HYDRALAZINE HYDROCHLORIDE 25 MG: 25 TABLET, FILM COATED ORAL at 09:54

## 2023-12-21 NOTE — CONSULTS
Pulmonary Consultation    Admit Date: 12/20/2023  Requesting Physician: Pia Bryant DO      Reason for consultation:  Pleural effusions      HPI:  Patient is a 75year old male who presents to emergency room with complaints of shortness of breath for 10 days. He was evaluated in office yesterday with labored breathing and low saturations. Patient was sent directly to ER with his wife accompanying him. Patient was found to have bilateral pleural effusions and new onset atrial fibrillation. He was evaluated by cardiology and a stress test is planned as well as intravenous diuresis. Patient likely has COPD underlying. No pulmonary functions completed currently due to recent illnesses. He was just admitted in November 2023 for COPD exacerbation. Patient does have nebulizer at home, no supplemental oxygen. He admits to having a thoracentesis completed in the past on his right lung. He does drink alcohol daily and has experience with delirium tremens. Patient is seen on nasal cannula. He admits to improvement in his breathing since being admitted. No other complaints.          PMH:    Past Medical History:   Diagnosis Date    Anxiety     Depression     GERD (gastroesophageal reflux disease)     Heart palpitations     HTN (hypertension) 10/21/2010    Hyperlipidemia     Lightheadedness     OA (osteoarthritis)     Parathyroid adenoma     s/p surgery 2005    Pre-diabetes     Recurrent left pleural effusion 9/13/2018    Stenosis of right internal carotid artery with cerebral infarction [facility])      PSH:   Past Surgical History:   Procedure Laterality Date    BACK SURGERY  12/20/2016    revision cervical laminectomy    CAROTID ENDARTERECTOMY Right 8/25/2014    Delatore - bovine patch     CERVICAL FUSION  12/12/2016    C4-C5, C5-C6, C6-C7 ACF, C5-C6 Corpectomy    COLONOSCOPY N/A 6/26/2020    COLONOSCOPY POLYPECTOMY SNARE/COLD BIOPSY performed by Janna Carroll MD at [address],

## 2023-12-21 NOTE — PROGRESS NOTES
Database initiated pharmacy and medications verified with the patient. He is A&O comes in from home. He has a cane but doesn't need it and states he is RA at baseline.

## 2023-12-21 NOTE — CONSULTS
Inpatient Cardiology Consultation      Reason for Consult: CHF/A-fib    Consulting Physician: Dr Veena Ann    Requesting Physician:  Sushila Faulkner, APRN - CNP    Date of Consultation: 12/21/2023    HISTORY OF PRESENT ILLNESS:   Patient is a 28-year-old male who is known to [facility] cardiology through Dr Michelle Delacruz.  He was last seen outpatient 10/27/2021 for follow-up of syncope, MV prolapse, and MR. At that time patient had no complaints of syncope or chest pain but did complain of continued ADHIKARI with moderate levels of exertion and was still drinking 5 alcoholic drinks daily. Repeat echocardiogram ordered. Patient was continued off of hydrochlorothiazide and told to increase hydration and decrease EtOH intake. HPI:  Patient presented to ER 12/20/2023 with complaints of shortness of breath and hypoxia of 82% PCP with bilateral leg swelling. CTA pulmonary was negative for PE but did show bilateral pleural effusions. Also concerning for pleural plaques consistent with previous asbestos exposure. CXR showed findings of interval development of CHF. BNP elevated at 3000. Patient given 10 mg Lasix in ER due to naivety to Lasix. Patient now requiring NC oxygen. Patient also found to be in new atrial fibrillation with CVR. Echocardiogram ordered by primary service. Lasix 20 mg IV continued twice daily. Patient on CIWA scale and given phenobarbital x 1.  VS upon arrival 97.7, 19 respirations, 98 pulse, 130/80, 83% room air. Labs: Potassium 4.3, BUN 15, creatinine 1.2, magnesium 1.7, glucose 191, serum calcium 8.5, total protein 6.1, proBNP 3042, troponin 17 > 15 > 12, cholesterol 115, HDL 88, LDL 19, triglycerides 42, albumin 3.7, alk phos 134, TSH 0.63, T4 free 1.3, WBC 7.2, H&H 13/39, platelet 003, D-dimer 614, viral panel negative, UA noninfectious  CTA pulmonary: No PE. New moderate left and mild right pleural effusions.   New moderate compressive atelectasis of posterior basilar segments of the left

## 2023-12-21 NOTE — PROGRESS NOTES
artery is normal in caliber. Mediastinum: No evidence of mediastinal lymphadenopathy. The heart and pericardium demonstrate no acute abnormality. There is no acute abnormality of the thoracic aorta. Lungs/pleura: There is a new moderate left pleural effusion and a new mild right pleural effusion. There is new moderate compressive atelectasis of the posterior basilar segments of the left lower lobe adjacent to the effusion. There is new mild linear compressive atelectasis of the posterior posterior-medial right lower lobe adjacent to the effusion. There is stable moderate calcified pleural scarring across the medial right apex, consistent with a calcified pleural plaque from previous asbestos exposure. A mild calcified pleural plaque is seen in the posterior right mid-upper chest, extending superiorly to the dominant band in the right apex described above. Upper Abdomen: Limited images of the upper abdomen are unremarkable. Soft Tissues/Bones: No acute bone or soft tissue abnormality. No sign of pulmonary embolism. New moderate left and mild right pleural effusions. New moderate compressive atelectasis of the posterior basilar segments of the left lower lobe. Mild compressive atelectasis of the posterior-medial right lower lobe. Calcified pleural plaques in the right chest as described above consistent with previous asbestos exposure. XR CHEST PORTABLE    Result Date: 12/20/2023  EXAMINATION: ONE XRAY VIEW OF THE CHEST 12/20/2023 4:57 pm COMPARISON: November 21, 2023 HISTORY: ORDERING SYSTEM PROVIDED HISTORY: shortness of breath, hypoxia TECHNOLOGIST PROVIDED HISTORY: Reason for exam:->shortness of breath, hypoxia FINDINGS: The heart is enlarged. There are bibasilar opacities. Pulmonary vessels are prominent. No pneumothorax. There is blunting of bilateral costophrenic angles. Interval development of CHF. Superimposed pneumonia cannot be excluded.        Assessment:    Principal Problem:    Pleural

## 2023-12-21 NOTE — PROGRESS NOTES
4 Eyes Skin Assessment     NAME:  Bob Quintanilla  YOB: 1950  MEDICAL RECORD NUMBER:  [id number]    The patient is being assessed for  Admission    I agree that at least one RN has performed a thorough Head to Toe Skin Assessment on the patient. ALL assessment sites listed below have been assessed. Areas assessed by both nurses:    Head, Face, Ears, Shoulders, Back, Chest, Arms, Elbows, Hands, Sacrum. Buttock, Coccyx, Ischium, and Legs. Feet and Heels        Does the Patient have a Wound?  No noted wound(s)       Florian Prevention initiated by RN: No  Wound Care Orders initiated by RN: No    Pressure Injury (Stage 3,4, Unstageable, DTI, NWPT, and Complex wounds) if present, place Wound referral order by RN under : No    New Ostomies, if present place, Ostomy referral order under : No     Nurse 1 eSignature: Electronically signed by Kathren Dakin, RN on 12/21/23 at 5:42 PM EST    **SHARE this note so that the co-signing nurse can place an eSignature**    Nurse 2 eSignature: Electronically signed by Isadora Klein RN on 12/21/23 at 5:43 PM EST4 Eyes Skin Assessment     NAME:

## 2023-12-22 ENCOUNTER — APPOINTMENT (OUTPATIENT)
Age: 73
DRG: 291 | End: 2023-12-22
Payer: MEDICARE

## 2023-12-22 LAB
ANION GAP SERPL CALCULATED.3IONS-SCNC: 10 MMOL/L (ref 7–16)
BUN SERPL-MCNC: 24 MG/DL (ref 6–23)
CALCIUM SERPL-MCNC: 8.9 MG/DL (ref 8.6–10.2)
CHLORIDE SERPL-SCNC: 101 MMOL/L (ref 98–107)
CO2 SERPL-SCNC: 26 MMOL/L (ref 22–29)
CREAT SERPL-MCNC: 1.4 MG/DL (ref 0.7–1.2)
ECHO AO ASC DIAM: 2.8 CM
ECHO AO ASCENDING AORTA INDEX: 1.56 CM/M2
ECHO AV AREA PEAK VELOCITY: 2.4 CM2
ECHO AV AREA VTI: 2.3 CM2
ECHO AV AREA/BSA PEAK VELOCITY: 1.3 CM2/M2
ECHO AV AREA/BSA VTI: 1.3 CM2/M2
ECHO AV CUSP MM: 1.7 CM
ECHO AV MEAN GRADIENT: 2 MMHG
ECHO AV MEAN VELOCITY: 0.7 M/S
ECHO AV PEAK GRADIENT: 4 MMHG
ECHO AV PEAK VELOCITY: 1.1 M/S
ECHO AV VELOCITY RATIO: 0.91
ECHO AV VTI: 17.5 CM
ECHO BSA: 1.81 M2
ECHO LA DIAMETER INDEX: 2.51 CM/M2
ECHO LA DIAMETER: 4.5 CM
ECHO LA VOL A-L A2C: 42 ML (ref 18–58)
ECHO LA VOL A-L A4C: 52 ML (ref 18–58)
ECHO LA VOL MOD A2C: 35 ML (ref 18–58)
ECHO LA VOL MOD A4C: 48 ML (ref 18–58)
ECHO LA VOLUME AREA LENGTH: 55 ML
ECHO LA VOLUME INDEX A-L A2C: 23 ML/M2 (ref 16–34)
ECHO LA VOLUME INDEX A-L A4C: 29 ML/M2 (ref 16–34)
ECHO LA VOLUME INDEX AREA LENGTH: 31 ML/M2 (ref 16–34)
ECHO LA VOLUME INDEX MOD A2C: 20 ML/M2 (ref 16–34)
ECHO LA VOLUME INDEX MOD A4C: 27 ML/M2 (ref 16–34)
ECHO LV EDV A2C: 51 ML
ECHO LV EDV A4C: 77 ML
ECHO LV EDV BP: 65 ML (ref 67–155)
ECHO LV EDV INDEX A4C: 43 ML/M2
ECHO LV EDV INDEX BP: 36 ML/M2
ECHO LV EDV NDEX A2C: 28 ML/M2
ECHO LV EJECTION FRACTION A2C: 62 %
ECHO LV EJECTION FRACTION A4C: 50 %
ECHO LV EJECTION FRACTION BIPLANE: 56 % (ref 55–100)
ECHO LV ESV A2C: 20 ML
ECHO LV ESV A4C: 39 ML
ECHO LV ESV BP: 28 ML (ref 22–58)
ECHO LV ESV INDEX A2C: 11 ML/M2
ECHO LV ESV INDEX A4C: 22 ML/M2
ECHO LV ESV INDEX BP: 16 ML/M2
ECHO LV FRACTIONAL SHORTENING: 42 % (ref 28–44)
ECHO LV INTERNAL DIMENSION DIASTOLE INDEX: 2.4 CM/M2
ECHO LV INTERNAL DIMENSION DIASTOLIC: 4.3 CM (ref 4.2–5.9)
ECHO LV INTERNAL DIMENSION SYSTOLIC INDEX: 1.4 CM/M2
ECHO LV INTERNAL DIMENSION SYSTOLIC: 2.5 CM
ECHO LV ISOVOLUMETRIC RELAXATION TIME (IVRT): 55.4 MS
ECHO LV IVSD: 1.1 CM (ref 0.6–1)
ECHO LV IVSS: 1.3 CM
ECHO LV MASS 2D: 196.1 G (ref 88–224)
ECHO LV MASS INDEX 2D: 109.5 G/M2 (ref 49–115)
ECHO LV POSTERIOR WALL DIASTOLIC: 1.4 CM (ref 0.6–1)
ECHO LV POSTERIOR WALL SYSTOLIC: 1.2 CM
ECHO LV RELATIVE WALL THICKNESS RATIO: 0.65
ECHO LVOT AREA: 2.5 CM2
ECHO LVOT AV VTI INDEX: 0.9
ECHO LVOT DIAM: 1.8 CM
ECHO LVOT MEAN GRADIENT: 2 MMHG
ECHO LVOT PEAK GRADIENT: 4 MMHG
ECHO LVOT PEAK VELOCITY: 1 M/S
ECHO LVOT STROKE VOLUME INDEX: 22.5 ML/M2
ECHO LVOT SV: 40.2 ML
ECHO LVOT VTI: 15.8 CM
ECHO MV A VELOCITY: 0.41 M/S
ECHO MV AREA PHT: 4.5 CM2
ECHO MV AREA VTI: 1.6 CM2
ECHO MV E DECELERATION TIME (DT): 139.7 MS
ECHO MV E VELOCITY: 1.33 M/S
ECHO MV E/A RATIO: 3.24
ECHO MV LVOT VTI INDEX: 1.59
ECHO MV MAX VELOCITY: 1.3 M/S
ECHO MV MEAN GRADIENT: 2 MMHG
ECHO MV MEAN VELOCITY: 0.7 M/S
ECHO MV PEAK GRADIENT: 7 MMHG
ECHO MV PRESSURE HALF TIME (PHT): 48.4 MS
ECHO MV VTI: 25.1 CM
ECHO RV INTERNAL DIMENSION: 3.2 CM
ECHO TV REGURGITANT MAX VELOCITY: 3.13 M/S
ECHO TV REGURGITANT PEAK GRADIENT: 39 MMHG
GFR SERPL CREATININE-BSD FRML MDRD: 54 ML/MIN/1.73M2
GLUCOSE BLD-MCNC: 104 MG/DL (ref 74–99)
GLUCOSE BLD-MCNC: 106 MG/DL (ref 74–99)
GLUCOSE BLD-MCNC: 108 MG/DL (ref 74–99)
GLUCOSE BLD-MCNC: 129 MG/DL (ref 74–99)
GLUCOSE SERPL-MCNC: 124 MG/DL (ref 74–99)
MAGNESIUM SERPL-MCNC: 2 MG/DL (ref 1.6–2.6)
POTASSIUM SERPL-SCNC: 4.2 MMOL/L (ref 3.5–5)
SODIUM SERPL-SCNC: 137 MMOL/L (ref 132–146)

## 2023-12-22 PROCEDURE — 6370000000 HC RX 637 (ALT 250 FOR IP)

## 2023-12-22 PROCEDURE — 80048 BASIC METABOLIC PNL TOTAL CA: CPT

## 2023-12-22 PROCEDURE — 99232 SBSQ HOSP IP/OBS MODERATE 35: CPT | Performed by: STUDENT IN AN ORGANIZED HEALTH CARE EDUCATION/TRAINING PROGRAM

## 2023-12-22 PROCEDURE — 94640 AIRWAY INHALATION TREATMENT: CPT

## 2023-12-22 PROCEDURE — 93306 TTE W/DOPPLER COMPLETE: CPT

## 2023-12-22 PROCEDURE — 2700000000 HC OXYGEN THERAPY PER DAY

## 2023-12-22 PROCEDURE — 82962 GLUCOSE BLOOD TEST: CPT

## 2023-12-22 PROCEDURE — 99233 SBSQ HOSP IP/OBS HIGH 50: CPT | Performed by: INTERNAL MEDICINE

## 2023-12-22 PROCEDURE — 93306 TTE W/DOPPLER COMPLETE: CPT | Performed by: INTERNAL MEDICINE

## 2023-12-22 PROCEDURE — 6370000000 HC RX 637 (ALT 250 FOR IP): Performed by: NURSE PRACTITIONER

## 2023-12-22 PROCEDURE — 2580000003 HC RX 258: Performed by: NURSE PRACTITIONER

## 2023-12-22 PROCEDURE — 6360000002 HC RX W HCPCS: Performed by: STUDENT IN AN ORGANIZED HEALTH CARE EDUCATION/TRAINING PROGRAM

## 2023-12-22 PROCEDURE — 6360000002 HC RX W HCPCS

## 2023-12-22 PROCEDURE — 83735 ASSAY OF MAGNESIUM: CPT

## 2023-12-22 PROCEDURE — 2060000000 HC ICU INTERMEDIATE R&B

## 2023-12-22 RX ORDER — DEXTROSE MONOHYDRATE 100 MG/ML
INJECTION, SOLUTION INTRAVENOUS CONTINUOUS PRN
Status: DISCONTINUED | OUTPATIENT
Start: 2023-12-22 | End: 2023-12-23 | Stop reason: HOSPADM

## 2023-12-22 RX ORDER — FUROSEMIDE 10 MG/ML
20 INJECTION INTRAMUSCULAR; INTRAVENOUS DAILY
Status: DISCONTINUED | OUTPATIENT
Start: 2023-12-22 | End: 2023-12-22

## 2023-12-22 RX ORDER — FUROSEMIDE 20 MG/1
20 TABLET ORAL 2 TIMES DAILY
Status: DISCONTINUED | OUTPATIENT
Start: 2023-12-23 | End: 2023-12-23 | Stop reason: HOSPADM

## 2023-12-22 RX ADMIN — HYDRALAZINE HYDROCHLORIDE 25 MG: 25 TABLET, FILM COATED ORAL at 21:38

## 2023-12-22 RX ADMIN — FUROSEMIDE 20 MG: 10 INJECTION, SOLUTION INTRAMUSCULAR; INTRAVENOUS at 09:19

## 2023-12-22 RX ADMIN — ATORVASTATIN CALCIUM 40 MG: 40 TABLET, FILM COATED ORAL at 09:12

## 2023-12-22 RX ADMIN — ASPIRIN 81 MG CHEWABLE TABLET 81 MG: 81 TABLET CHEWABLE at 09:13

## 2023-12-22 RX ADMIN — IPRATROPIUM BROMIDE AND ALBUTEROL SULFATE 1 DOSE: 2.5; .5 SOLUTION RESPIRATORY (INHALATION) at 20:18

## 2023-12-22 RX ADMIN — METOPROLOL SUCCINATE 50 MG: 50 TABLET, EXTENDED RELEASE ORAL at 21:38

## 2023-12-22 RX ADMIN — BUDESONIDE INHALATION 500 MCG: 0.5 SUSPENSION RESPIRATORY (INHALATION) at 09:50

## 2023-12-22 RX ADMIN — Medication 1 TABLET: at 09:13

## 2023-12-22 RX ADMIN — IPRATROPIUM BROMIDE AND ALBUTEROL SULFATE 1 DOSE: 2.5; .5 SOLUTION RESPIRATORY (INHALATION) at 09:51

## 2023-12-22 RX ADMIN — Medication 100 MG: at 09:13

## 2023-12-22 RX ADMIN — Medication 10 ML: at 21:38

## 2023-12-22 RX ADMIN — BUDESONIDE INHALATION 500 MCG: 0.5 SUSPENSION RESPIRATORY (INHALATION) at 20:18

## 2023-12-22 RX ADMIN — IPRATROPIUM BROMIDE AND ALBUTEROL SULFATE 1 DOSE: 2.5; .5 SOLUTION RESPIRATORY (INHALATION) at 13:23

## 2023-12-22 RX ADMIN — ARFORMOTEROL TARTRATE 15 MCG: 15 SOLUTION RESPIRATORY (INHALATION) at 09:51

## 2023-12-22 RX ADMIN — ARFORMOTEROL TARTRATE 15 MCG: 15 SOLUTION RESPIRATORY (INHALATION) at 20:18

## 2023-12-22 RX ADMIN — METOPROLOL SUCCINATE 50 MG: 50 TABLET, EXTENDED RELEASE ORAL at 09:13

## 2023-12-22 RX ADMIN — ENOXAPARIN SODIUM 70 MG: 100 INJECTION SUBCUTANEOUS at 09:20

## 2023-12-22 RX ADMIN — ENOXAPARIN SODIUM 70 MG: 100 INJECTION SUBCUTANEOUS at 21:37

## 2023-12-22 RX ADMIN — HYDRALAZINE HYDROCHLORIDE 25 MG: 25 TABLET, FILM COATED ORAL at 09:13

## 2023-12-22 RX ADMIN — Medication 10 ML: at 09:19

## 2023-12-22 RX ADMIN — IPRATROPIUM BROMIDE AND ALBUTEROL SULFATE 1 DOSE: 2.5; .5 SOLUTION RESPIRATORY (INHALATION) at 16:19

## 2023-12-22 RX ADMIN — PAROXETINE 30 MG: 10 TABLET, FILM COATED ORAL at 09:19

## 2023-12-22 NOTE — ACP (ADVANCE CARE PLANNING)
Advance Care Planning   Healthcare Decision Maker:    Primary Decision Maker: George Gregory - Domestic Partner - [phone]      Today we documented Decision Maker(s) consistent with Legal Next of Kin hierarchy.

## 2023-12-22 NOTE — PLAN OF CARE
Problem: ABCDS Injury Assessment  Goal: Absence of physical injury  12/21/2023 2356 by Chris Amaya RN  Outcome: Progressing  12/21/2023 1755 by Tex Mccoy RN  Outcome: Progressing     Problem: Discharge Planning  Goal: Discharge to home or other facility with appropriate resources  Outcome: Progressing     Problem: Pain  Goal: Verbalizes/displays adequate comfort level or baseline comfort level  12/21/2023 2356 by Chris Amaya RN  Outcome: Progressing  12/21/2023 1755 by Tex Mccoy RN  Outcome: Progressing

## 2023-12-22 NOTE — PLAN OF CARE
Problem: ABCDS Injury Assessment  Goal: Absence of physical injury  12/22/2023 1153 by Chet Beebe RN  Outcome: Progressing     Problem: Discharge Planning  Goal: Discharge to home or other facility with appropriate resources  12/22/2023 1153 by Chet Beebe RN  Outcome: Progressing     Problem: Pain  Goal: Verbalizes/displays adequate comfort level or baseline comfort level  12/22/2023 1153 by Chet Beebe RN  Outcome: Progressing

## 2023-12-22 NOTE — PROGRESS NOTES
Nutrition Education    Educated on diet for HF  Learners: Patient and Significant Other  Readiness: Acceptance  Method: Explanation and Handout  Response: Demonstrated Understanding  Contact name and number provided. ADM: Pleural effusion, Acute on chronic congestive heart failure. PMH: DM. Consulted for HD diet education. PT and wife both in room. Educated on 2 gm Na+ diet w/general FR (2000 ml). Also reinfoced limiting concentrated sweets. Both denied he had diabetes. He is not a fan of the food stating they lack flavor. Encouraged wholesome foods. Will monitor while inpatient.     Bakari Bang RD  Contact Number: [phone]

## 2023-12-22 NOTE — DISCHARGE INSTRUCTIONS
gain of 3 pounds or more in 1 day         OR 5 pounds or more in one week  More shortness of breath  More swelling of your stomach, legs, ankles or feet  Feeling more tired, No energy  Dry hacky cough  Dizziness  More chest pain / discomfort       (CALL 911 IF ANY OF THE FOLLOWING OCCURS  Chest pain (not relieved with nitroglycerine, if you have been prescribed this medication)  Severe shortness of breath  Faint / Pass out  Confusion / cannot think clearly  If symptoms get worse           SMOKING - TOBACCO USE:  * IF YOU SMOKE - STOP! Kick the habit. [facility] Program is offered at [address] [facility] and [address] [facility]. Call [phone] extension [phone] for more information. ACTIVITY:   (Ask your doctor when you will be able to return to work and before starting any exercise program.  Do not drive unless unless your doctor has given you permission to do so). Start light exercise. Even if you can only do a small amount, exercise will help you get stronger, have more energy, help manage your weight and decrease  stress. Walking is an easy way to get exercise. Start out slowly and  increase the amount you walk as tolerated  If you become short of breath, dizzy or have chest pain; stop, sit down, and rest.  If you feel \"wiped out\" the day after you exercise, walk at a slower pace or for a shorter distance. You can gradually increase the pace or amount of time. (Do not exercise right after a meal or in extreme temperatures, such as above 85 degrees, if the air is really humid, or wind chill is less than 20 degrees)                                             ADDITIONAL INFORMATION:  Avoid getting sick from colds and the flu. Stay away from friends or family that you know may have a contagious illness  Get plenty of rest   Get a flu shot each year. Get a pneumococcal vaccine shot.  If you have had one before, ask your doctor whether you need another dose. My Goal for Self-management of Heart Failure Includes 5 steps :    1. Notice a change in symptoms ( weight gain, short of breath, leg swelling, decreased activity level, bloating. ...)    2. Evaluate the change: (use the Heart Failure Zones )     3. Decide to take action: decide what your options are, such as: (call your doctor for an extra visit, take a prescribed medication, such as your water pill if your doctor has given you directions to do so, [facility])    4. Come up with a strategy:  (now you call the doctor for advice / appointment. This is where you take action!!! Do not wait, catch the symptom early and treat it before it worsens. 5. Evaluate the response: The next day, check your Heart Failure Zones: are you in the GREEN ZONE (safe zone)? Worsening symptoms of YELLOW ZONE? Or have you moved to the RED ZONE and need to call 911 or go to the Emergency Room for evaluation? Call your doctor's office to update them on your symptoms of heart failure.

## 2023-12-22 NOTE — PROGRESS NOTES
[facility] Progress Note    Admitting Date and Time: 12/20/2023  4:59 PM  Admit Dx: Pleural effusion [J90]  Pleural effusion, bilateral [J90]  Acute respiratory failure with hypoxia (HCC) [J96.01]  Atrial fibrillation, unspecified type (HCC) [I48.91]  Acute congestive heart failure, unspecified heart failure type ([address]) [I50.9]  Acute on chronic congestive heart failure, unspecified heart failure type ([address]) [I50.9]    Subjective:  Patient is being followed for Pleural effusion [J90]  Pleural effusion, bilateral [J90]  Acute respiratory failure with hypoxia (HCC) [J96.01]  Atrial fibrillation, unspecified type ([address]) [I48.91]  Acute congestive heart failure, unspecified heart failure type ([address]) [I50.9]  Acute on chronic congestive heart failure, unspecified heart failure type ([address]) [I50.9]   Pt feels breathing has improved. Per RN: No major concerns, minimal CIWA.     ROS: denies fever, chills, cp, sob, n/v, HA unless stated above.       [START ON 12/23/2023] furosemide  20 mg IntraVENous Daily    budesonide  0.5 mg Nebulization BID RT    arformoterol tartrate  15 mcg Nebulization BID RT    enoxaparin  1 mg/kg SubCUTAneous BID    metoprolol succinate  50 mg Oral BID    sodium chloride flush  5-40 mL IntraVENous 2 times per day    thiamine  100 mg Oral Daily    sodium chloride flush  5-40 mL IntraVENous 2 times per day    [Held by provider] amLODIPine  5 mg Oral Daily    aspirin  81 mg Oral QAM    hydrALAZINE  25 mg Oral BID    ipratropium 0.5 mg-albuterol 2.5 mg  1 Dose Inhalation Q4H WA RT    therapeutic multivitamin-minerals  1 tablet Oral Daily    PARoxetine  30 mg Oral QAM    atorvastatin  40 mg Oral Daily    insulin lispro  0-4 Units SubCUTAneous TID WC    insulin lispro  0-4 Units SubCUTAneous Nightly     perflutren lipid microspheres, 1.5 mL, ONCE PRN  midazolam, 2 mg, Q4H PRN  diazePAM, 5 mg, Q1H PRN   Or  diazePAM, 10 mg, Q1H PRN   Or  diazePAM, 15 mg, Q1H PRN   Or  diazePAM, 20 mg, Q1H 12/20/2023  EXAMINATION: CTA OF THE CHEST 12/20/2023 9:12 pm TECHNIQUE: CTA of the chest was performed after the administration of intravenous contrast.  Multiplanar reformatted images are provided for review. MIP images are provided for review. Automated exposure control, iterative reconstruction, and/or weight based adjustment of the mA/kV was utilized to reduce the radiation dose to as low as reasonably achievable. COMPARISON: CT pulmonary angiogram from 06/17/2022. HISTORY: ORDERING SYSTEM PROVIDED HISTORY: concern for PE TECHNOLOGIST PROVIDED HISTORY: Reason for exam:->concern for PE Additional Contrast?->1 FINDINGS: Pulmonary Arteries: Pulmonary arteries are adequately opacified for evaluation. No evidence of intraluminal filling defect to suggest pulmonary embolism. Main pulmonary artery is normal in caliber. Mediastinum: No evidence of mediastinal lymphadenopathy. The heart and pericardium demonstrate no acute abnormality. There is no acute abnormality of the thoracic aorta. Lungs/pleura: There is a new moderate left pleural effusion and a new mild right pleural effusion. There is new moderate compressive atelectasis of the posterior basilar segments of the left lower lobe adjacent to the effusion. There is new mild linear compressive atelectasis of the posterior posterior-medial right lower lobe adjacent to the effusion. There is stable moderate calcified pleural scarring across the medial right apex, consistent with a calcified pleural plaque from previous asbestos exposure. A mild calcified pleural plaque is seen in the posterior right mid-upper chest, extending superiorly to the dominant band in the right apex described above. Upper Abdomen: Limited images of the upper abdomen are unremarkable. Soft Tissues/Bones: No acute bone or soft tissue abnormality. No sign of pulmonary embolism. New moderate left and mild right pleural effusions.  New moderate compressive atelectasis of the posterior

## 2023-12-22 NOTE — PLAN OF CARE
Patient's chart updated to reflect:      . - HF care plan, HF education points and HF discharge instructions.  -Orders: 2 gram sodium diet, daily weights, I/O.  -PCP and cardiology follow up appointments to be scheduled within 7 days of hospital discharge. -CHF education session will be provided to the patient prior to hospital discharge.        Diana Martini RN, MSN, CV-BC  Heart Failure Navigator

## 2023-12-22 NOTE — CARE COORDINATION
Readmission noted w/SW consult for possibility of rehab. CM made in room visit to pt w/role of CM explained. Pt states he is home w/spouse in a one story home, seven steps to enter and is independent w/o the use of any assistive devices, but does have a cane. He has a PCP and [facility] in [place] is his Pharmacy. No HHC hx of SNF stay. Pt is currently on 3l, this is new. Goal is to wean, pt has no preference of O2 supplier if unable to wean w/referral to [facility], [phone]. Pt does have a nebulizer, no cpap. Ambulatory pulse ox testing needed prior to discharge. Pt denied any needs for discharge and refused to speak to peer recovery services. Will follow.   MRAAH PickeringN, RN  [facility] Case Management  [phone]

## 2023-12-22 NOTE — PROGRESS NOTES
Inpatient Cardiology Consultation      Reason for Consult: CHF/A-fib    Consulting Physician: Dr Zeus Contreras    Requesting Physician:  Addy Romero, HAYLEY - CNP    Date of Consultation: 12/22/2023    HISTORY OF PRESENT ILLNESS:   Patient is a 50-year-old male who is known to [facility] cardiology through Dr Rambo Wren.  He was last seen outpatient 10/27/2021 for follow-up of syncope, MV prolapse, and MR. At that time patient had no complaints of syncope or chest pain but did complain of continued ADHIKARI with moderate levels of exertion and was still drinking 5 alcoholic drinks daily. Repeat echocardiogram ordered. Patient was continued off of hydrochlorothiazide and told to increase hydration and decrease EtOH intake. Interim:  Spoke with patient and wife at length regarding crucial need of alcohol abuse cessation. Denies chest pain or shortness of breath. Past Medical History:    NM stress 5/2014: EF 61%. No evidence of stress-induced ischemia. VHD:  Mitral valve prolapse (posterior leaflet) with moderate mitral regurgitation (anterior directed eccentric jet) on 7/10/14, 7/2015, 10/2016, and 7/2019 echocardiograms  TTE 10/27/2021: EF 60%. Indeterminate diastolic function. IV septum and PW 1.1 cm. Normal RV size and function, TAPSE 2.9 cm. Dilated LA. Mitral valve prolapse posterior leaflet. Moderate MR, eccentric jet. Mild TR. PASP 30 mmHg. AV trileaflet. Mild GA. Hypertension  Hyperlipidemia  Diabetes with neuropathy  History of CVA  COPD  CKD baseline 1.1-1.2  Syncope:  Prior vasovagal/orthostatic episodes based on history. Positive TTT for vasodepressor syncope on 7/31/14. No further episodes.   History of thrombocytopenia  Parathyroid adenoma s/p surgery 2005  Recurrent left pleural effusion  PAD:  Carotid artery disease s/p right-sided CEA on 8/25/14 -- 0-40% right-sided, 60-79 left-sided stenosis on 3/2019 carotid US, on ASA and statin --> no significant stenosis involving the right carotid (PAXIL) 30 MG tablet TAKE 1 TABLET BY MOUTH EVERY DAY IN THE MORNING 8/1/23   Colt Goldman DO   gabapentin (NEURONTIN) 600 MG tablet TAKE 1 TABLET BY MOUTH 3  TIMES DAILY 2/6/23 12/21/23  Dulce Goldman, DO   albuterol sulfate HFA (VENTOLIN HFA) 108 (90 Base) MCG/ACT inhaler Inhale 2 puffs into the lungs 4 times daily as needed for Wheezing 6/22/22   Colt Goldman DO   Esomeprazole Magnesium (NEXIUM 24HR PO) Take by mouth as needed    Azalea Oconnor MD   Multiple Vitamins-Minerals (COMPLETE MULTIVITAMIN/MINERAL PO) Take 1 tablet by mouth daily    Azalea Oconnor MD   aspirin 81 MG tablet Take 1 tablet by mouth every morning    Azalea Oconnor MD   acetaminophen (TYLENOL) 325 MG tablet Take 2 tablets by mouth every 6 hours as needed for Pain    ProviderAzalea MD       Current Medications:    Current Facility-Administered Medications: furosemide (LASIX) injection 20 mg, 20 mg, IntraVENous, Daily  dextrose bolus 10% 125 mL, 125 mL, IntraVENous, PRN **OR** dextrose bolus 10% 250 mL, 250 mL, IntraVENous, PRN  glucagon injection 1 mg, 1 mg, SubCUTAneous, PRN  dextrose 10 % infusion, , IntraVENous, Continuous PRN  Glucose (TRUEPLUS) oral gel 15 g, 15 g, Oral, Once PRN  perflutren lipid microspheres (DEFINITY) injection 1.5 mL, 1.5 mL, IntraVENous, ONCE PRN  budesonide (PULMICORT) nebulizer suspension 500 mcg, 0.5 mg, Nebulization, BID RT  arformoterol tartrate (BROVANA) nebulizer solution 15 mcg, 15 mcg, Nebulization, BID RT  midazolam PF (VERSED) injection 2 mg, 2 mg, IntraVENous, Q4H PRN  enoxaparin (LOVENOX) injection 70 mg, 1 mg/kg, SubCUTAneous, BID  diazePAM (VALIUM) injection 5 mg, 5 mg, IntraVENous, Q1H PRN **OR** diazePAM (VALIUM) injection 10 mg, 10 mg, IntraVENous, Q1H PRN **OR** diazePAM (VALIUM) injection 15 mg, 15 mg, IntraVENous, Q1H PRN **OR** diazePAM (VALIUM) injection 20 mg, 20 mg, IntraVENous, Q1H PRN **OR** diazePAM (VALIUM) tablet 5 mg, 5 mg, Oral, Q1H PRN **OR**

## 2023-12-22 NOTE — CONSULTS
[address] [facility]   Inpatient CHF Nurse Navigator Consult      Cardiologist:     Lulu Chavez is a 75 y.o. (1950) male with a history of HFpEF, most recent EF:  Lab Results   Component Value Date    LVEF 60 07/05/2022       Patient was awake and alert, laying in bed during the consultation and is agreeable to heart failure education. He was engaged and asked appropriate questions throughout the education session. Barriers identified during consult contributing to HF Hospitalization:  [] Limited medication adherence   [] Poor health literacy, education regarding HF medications provided   [] Pill box provided to patient  [] Difficulty affording medications  [] Prescription assistance information given     [x] Not weighing themselves daily  [x] Weight log provided for easy monitoring  [] Scale provided     [x] Not following low sodium diet  [] Food insecurity   [x] 2 gram sodium diet education provided   [] Low sodium recipes provided  [] Sodium free seasoning provided   [] Low sodium meal delivery options given to patient  [] Dietician consulted     [] Lack of transportation to appointments     [] Depression, given chronic illness  [] Primary team notified     [] Goals of care need addressed  [] Palliative care consulted     [] CHF CHW consulted, to assist with         Chart Reviewed:  Diet: ADULT DIET; Regular;  Low Sodium (2 gm)   Daily Weights: Patient Vitals for the past 96 hrs (Last 3 readings):   Weight   12/22/23 1439 71.7 kg (158 lb)   12/22/23 1240 71.7 kg (158 lb)   12/22/23 1032 71.8 kg (158 lb 3.2 oz)     I/O:   Intake/Output Summary (Last 24 hours) at 12/22/2023 1732  Last data filed at 12/22/2023 1500  Gross per 24 hour   Intake 360 ml   Output 1500 ml   Net -1140 ml       [] Nursing staff/manager notified of inaccurate alvarenga weights or I/O        Discharge Plan:  Above identified barriers reviewed and needs addressed    Patient/family educated on daily monitoring

## 2023-12-23 ENCOUNTER — APPOINTMENT (OUTPATIENT)
Dept: GENERAL RADIOLOGY | Age: 73
DRG: 291 | End: 2023-12-23
Payer: MEDICARE

## 2023-12-23 VITALS
WEIGHT: 158 LBS | BODY MASS INDEX: 26.33 KG/M2 | SYSTOLIC BLOOD PRESSURE: 126 MMHG | RESPIRATION RATE: 20 BRPM | DIASTOLIC BLOOD PRESSURE: 92 MMHG | HEART RATE: 88 BPM | HEIGHT: 65 IN | TEMPERATURE: 98.3 F | OXYGEN SATURATION: 98 %

## 2023-12-23 LAB
ANION GAP SERPL CALCULATED.3IONS-SCNC: 9 MMOL/L (ref 7–16)
BNP SERPL-MCNC: 3144 PG/ML (ref 0–450)
BUN SERPL-MCNC: 19 MG/DL (ref 6–23)
CALCIUM SERPL-MCNC: 8.5 MG/DL (ref 8.6–10.2)
CHLORIDE SERPL-SCNC: 102 MMOL/L (ref 98–107)
CO2 SERPL-SCNC: 27 MMOL/L (ref 22–29)
CREAT SERPL-MCNC: 1.2 MG/DL (ref 0.7–1.2)
GFR SERPL CREATININE-BSD FRML MDRD: >60 ML/MIN/1.73M2
GLUCOSE BLD-MCNC: 103 MG/DL (ref 74–99)
GLUCOSE BLD-MCNC: 108 MG/DL (ref 74–99)
GLUCOSE SERPL-MCNC: 97 MG/DL (ref 74–99)
MAGNESIUM SERPL-MCNC: 1.9 MG/DL (ref 1.6–2.6)
POTASSIUM SERPL-SCNC: 4.3 MMOL/L (ref 3.5–5)
SODIUM SERPL-SCNC: 138 MMOL/L (ref 132–146)

## 2023-12-23 PROCEDURE — 6370000000 HC RX 637 (ALT 250 FOR IP): Performed by: INTERNAL MEDICINE

## 2023-12-23 PROCEDURE — 6370000000 HC RX 637 (ALT 250 FOR IP): Performed by: NURSE PRACTITIONER

## 2023-12-23 PROCEDURE — 6360000002 HC RX W HCPCS: Performed by: STUDENT IN AN ORGANIZED HEALTH CARE EDUCATION/TRAINING PROGRAM

## 2023-12-23 PROCEDURE — 6370000000 HC RX 637 (ALT 250 FOR IP)

## 2023-12-23 PROCEDURE — 71045 X-RAY EXAM CHEST 1 VIEW: CPT

## 2023-12-23 PROCEDURE — 2580000003 HC RX 258

## 2023-12-23 PROCEDURE — 94640 AIRWAY INHALATION TREATMENT: CPT

## 2023-12-23 PROCEDURE — 99239 HOSP IP/OBS DSCHRG MGMT >30: CPT | Performed by: STUDENT IN AN ORGANIZED HEALTH CARE EDUCATION/TRAINING PROGRAM

## 2023-12-23 PROCEDURE — 80048 BASIC METABOLIC PNL TOTAL CA: CPT

## 2023-12-23 PROCEDURE — 2700000000 HC OXYGEN THERAPY PER DAY

## 2023-12-23 PROCEDURE — 82962 GLUCOSE BLOOD TEST: CPT

## 2023-12-23 PROCEDURE — 83880 ASSAY OF NATRIURETIC PEPTIDE: CPT

## 2023-12-23 PROCEDURE — 6360000002 HC RX W HCPCS

## 2023-12-23 PROCEDURE — 83735 ASSAY OF MAGNESIUM: CPT

## 2023-12-23 RX ORDER — FLUTICASONE FUROATE, UMECLIDINIUM BROMIDE AND VILANTEROL TRIFENATATE 200; 62.5; 25 UG/1; UG/1; UG/1
1 POWDER RESPIRATORY (INHALATION) DAILY
Qty: 1 EACH | Refills: 3 | COMMUNITY
Start: 2023-12-23 | End: 2023-12-27 | Stop reason: SDUPTHER

## 2023-12-23 RX ORDER — FUROSEMIDE 20 MG/1
20 TABLET ORAL 2 TIMES DAILY
Qty: 60 TABLET | Refills: 3 | Status: SHIPPED | OUTPATIENT
Start: 2023-12-23

## 2023-12-23 RX ORDER — METOPROLOL SUCCINATE 50 MG/1
50 TABLET, EXTENDED RELEASE ORAL 2 TIMES DAILY
Qty: 30 TABLET | Refills: 3 | Status: SHIPPED | OUTPATIENT
Start: 2023-12-23

## 2023-12-23 RX ORDER — LANOLIN ALCOHOL/MO/W.PET/CERES
100 CREAM (GRAM) TOPICAL DAILY
Qty: 30 TABLET | Refills: 3 | Status: SHIPPED | OUTPATIENT
Start: 2023-12-24

## 2023-12-23 RX ADMIN — ENOXAPARIN SODIUM 70 MG: 100 INJECTION SUBCUTANEOUS at 09:39

## 2023-12-23 RX ADMIN — PAROXETINE 30 MG: 10 TABLET, FILM COATED ORAL at 09:39

## 2023-12-23 RX ADMIN — SODIUM CHLORIDE, PRESERVATIVE FREE 10 ML: 5 INJECTION INTRAVENOUS at 09:41

## 2023-12-23 RX ADMIN — FUROSEMIDE 20 MG: 20 TABLET ORAL at 09:39

## 2023-12-23 RX ADMIN — HYDRALAZINE HYDROCHLORIDE 25 MG: 25 TABLET, FILM COATED ORAL at 09:39

## 2023-12-23 RX ADMIN — METOPROLOL SUCCINATE 50 MG: 50 TABLET, EXTENDED RELEASE ORAL at 09:39

## 2023-12-23 RX ADMIN — ASPIRIN 81 MG CHEWABLE TABLET 81 MG: 81 TABLET CHEWABLE at 09:39

## 2023-12-23 RX ADMIN — ARFORMOTEROL TARTRATE 15 MCG: 15 SOLUTION RESPIRATORY (INHALATION) at 09:58

## 2023-12-23 RX ADMIN — Medication 1 TABLET: at 09:38

## 2023-12-23 RX ADMIN — ATORVASTATIN CALCIUM 40 MG: 40 TABLET, FILM COATED ORAL at 09:39

## 2023-12-23 RX ADMIN — Medication 100 MG: at 09:39

## 2023-12-23 RX ADMIN — IPRATROPIUM BROMIDE AND ALBUTEROL SULFATE 1 DOSE: 2.5; .5 SOLUTION RESPIRATORY (INHALATION) at 09:58

## 2023-12-23 RX ADMIN — BUDESONIDE INHALATION 500 MCG: 0.5 SUSPENSION RESPIRATORY (INHALATION) at 09:58

## 2023-12-23 RX ADMIN — IPRATROPIUM BROMIDE AND ALBUTEROL SULFATE 1 DOSE: 2.5; .5 SOLUTION RESPIRATORY (INHALATION) at 13:54

## 2023-12-23 NOTE — PLAN OF CARE
Problem: Safety - Adult  Goal: Free from fall injury  Outcome: Progressing     Problem: Pain  Goal: Verbalizes/displays adequate comfort level or baseline comfort level  12/23/2023 0140 by Yvette Silver RN  Outcome: Progressing     Problem: Discharge Planning  Goal: Discharge to home or other facility with appropriate resources  12/23/2023 0140 by Yvette Silver RN  Outcome: Progressing     Problem: ABCDS Injury Assessment  Goal: Absence of physical injury  12/23/2023 0140 by Yvette Silver RN  Outcome: Progressing     Problem: Chronic Conditions and Co-morbidities  Goal: Patient's chronic conditions and co-morbidity symptoms are monitored and maintained or improved  Outcome: Progressing

## 2023-12-23 NOTE — CARE COORDINATION
Transition of Care-Unit called regarding discharge and need for home oxygen. Sent message to Yanet Cheatham with [facility] ([phone]) as well as faxed all requested information to [phone]. She is aware patient is leaving today.     Oniel CRYSTALN, RN  [facility]

## 2023-12-23 NOTE — PROGRESS NOTES
Dc instructions given to patient and wife, oxygen delivered to bedside, IV and tele removed, awaiting transport

## 2023-12-23 NOTE — DISCHARGE SUMMARY
[facility] Physician Discharge Summary       No follow-up provider specified. Activity level: As tolerated     Dispo: Home      Condition on discharge: Stable     Patient ID:  Piedad Ruiz  [id number]  86 y.o.  1950    Admit date: 12/20/2023    Discharge date and time:  12/23/2023  1:21 PM    Admission Diagnoses: Principal Problem:    Pleural effusion  Active Problems:    Acute respiratory failure with hypoxia (HCC)    Alcoholism ([address])    Type 2 diabetes mellitus with diabetic neuropathy    Essential hypertension    Acute on chronic congestive heart failure (HCC)    Atrial fibrillation ([address])  Resolved Problems:    * No resolved hospital problems. *      Discharge Diagnoses: Principal Problem:    Pleural effusion  Active Problems:    Acute respiratory failure with hypoxia (HCC)    Alcoholism (HCC)    Type 2 diabetes mellitus with diabetic neuropathy    Essential hypertension    Acute on chronic congestive heart failure (HCC)    Atrial fibrillation ([address])  Resolved Problems:    * No resolved hospital problems. *      Consults:  IP CONSULT TO SOCIAL WORK  IP CONSULT TO HEART FAILURE NURSE/COORDINATOR  IP CONSULT TO DIETITIAN  IP CONSULT TO CARDIOLOGY  IP CONSULT TO PULMONOLOGY    Hospital Course:   Patient Piedad Ruiz is a 75 y.o. presented with Pleural effusion [J90]  Pleural effusion, bilateral [J90]  Acute respiratory failure with hypoxia (HCC) [J96.01]  Atrial fibrillation, unspecified type ([address]) [I48.91]  Acute congestive heart failure, unspecified heart failure type ([address]) [I50.9]  Acute on chronic congestive heart failure, unspecified heart failure type [facility]) [I50.9]  Patient was admitted and managed for Acute CHF - BNP 3042. Last echo done 7/2022 showing EF of 60%. s/p 20 mg IV Lasix . Consulted cardiology. Acute hypoxic respiratory failure - baseline of room air. DuoNebs every 4 hours while awake. Albuterol as needed. Continue home inhaler. Wean as tolerated.  B/L Pleural

## 2023-12-23 NOTE — PROGRESS NOTES
Ambulating pulse ox completed  Sp02 at rest on room air 88-92% HR 87  Sp02 with ambulation on room air 86% HR 140s  Sp02 with ambulation on 2L nc 95%   Sp02 at rest on 2L nc 96% HR 90

## 2023-12-26 ENCOUNTER — TELEPHONE (OUTPATIENT)
Dept: ADMINISTRATIVE | Age: 73
End: 2023-12-26

## 2023-12-26 NOTE — TELEPHONE ENCOUNTER
Please advise scheduling patient 12/20/2023 Hospital follow up with Dr. Chelsea Young dx Acute on chronic congestive heart failure

## 2024-01-04 ENCOUNTER — TELEPHONE (OUTPATIENT)
Dept: CARDIOLOGY CLINIC | Age: 74
End: 2024-01-04

## 2024-01-04 ENCOUNTER — OFFICE VISIT (OUTPATIENT)
Dept: CARDIOLOGY CLINIC | Age: 74
End: 2024-01-04
Payer: MEDICARE

## 2024-01-04 VITALS
WEIGHT: 150.6 LBS | BODY MASS INDEX: 25.09 KG/M2 | SYSTOLIC BLOOD PRESSURE: 138 MMHG | DIASTOLIC BLOOD PRESSURE: 70 MMHG | HEART RATE: 90 BPM | HEIGHT: 65 IN | RESPIRATION RATE: 16 BRPM

## 2024-01-04 DIAGNOSIS — I48.92 ATRIAL FLUTTER WITH CONTROLLED RESPONSE (HCC): Primary | ICD-10-CM

## 2024-01-04 DIAGNOSIS — F10.20 ALCOHOLISM (HCC): ICD-10-CM

## 2024-01-04 DIAGNOSIS — I50.32 CHRONIC HEART FAILURE WITH PRESERVED EJECTION FRACTION (HFPEF) (HCC): ICD-10-CM

## 2024-01-04 DIAGNOSIS — I34.0 NONRHEUMATIC MITRAL VALVE REGURGITATION: ICD-10-CM

## 2024-01-04 DIAGNOSIS — Z79.01 CHRONIC ANTICOAGULATION: ICD-10-CM

## 2024-01-04 DIAGNOSIS — I48.91 ATRIAL FIBRILLATION, UNSPECIFIED TYPE (HCC): Primary | ICD-10-CM

## 2024-01-04 PROCEDURE — 99215 OFFICE O/P EST HI 40 MIN: CPT | Performed by: INTERNAL MEDICINE

## 2024-01-04 PROCEDURE — 3078F DIAST BP <80 MM HG: CPT | Performed by: INTERNAL MEDICINE

## 2024-01-04 PROCEDURE — 1123F ACP DISCUSS/DSCN MKR DOCD: CPT | Performed by: INTERNAL MEDICINE

## 2024-01-04 PROCEDURE — 93000 ELECTROCARDIOGRAM COMPLETE: CPT | Performed by: INTERNAL MEDICINE

## 2024-01-04 PROCEDURE — 3075F SYST BP GE 130 - 139MM HG: CPT | Performed by: INTERNAL MEDICINE

## 2024-01-04 NOTE — TELEPHONE ENCOUNTER
Patient is scheduled for a DCCV on 2/9 at 8:00am [facility]  Patient was given and understood instructions.

## 2024-01-04 NOTE — PROGRESS NOTES
OFFICE FOLLOW-UP    Name: Catarino Bolanos     Age: 73 y.o.    Date of Service: 1/4/2024    Chief Complaint: Follow-up for syncope, MV prolapse, MR, AF/AFL, CHF    Referring Physician: Colt Goldman DO    Interim History:  No new cardiac complaints since recent hospital discharge (12/2023). No recent syncope. No chest pain. +ADHIKARI with moderate levels of exertion (stable/improved). Still with increased ETOH intake (at least 5 ETOH drinks/day). No history of palpitations, PND, or orthopnea. Rate controlled AF/AFL on EKG.    Review of Systems:   Cardiac: As per HPI  General: No fever, chills  Pulmonary: As per HPI  HEENT: No visual disturbances, difficult swallowing  GI: No nausea, vomiting  : No dysuria, hematuria  Endocrine: No thyroid disease, +DM  Musculoskeletal: RAMIREZ x 4, no focal motor deficits  Skin: Intact, no rashes  Neuro: No headache, seizures  Psych: Currently with no depression, anxiety    Past Medical History:  Past Medical History:   Diagnosis Date    Anxiety     Depression     GERD (gastroesophageal reflux disease)     Heart palpitations     HTN (hypertension) 10/21/2010    Hyperlipidemia     Lightheadedness     OA (osteoarthritis)     Parathyroid adenoma     s/p surgery 2005    Pre-diabetes     Recurrent left pleural effusion 9/13/2018    Stenosis of right internal carotid artery with cerebral infarction (HCC)        Past Surgical History:  Past Surgical History:   Procedure Laterality Date    BACK SURGERY  12/20/2016    revision cervical laminectomy    CAROTID ENDARTERECTOMY Right 8/25/2014    Delatore - bovine patch     CERVICAL FUSION  12/12/2016    C4-C5, C5-C6, C6-C7 ACF, C5-C6 Corpectomy    COLONOSCOPY N/A 6/26/2020    COLONOSCOPY POLYPECTOMY SNARE/COLD BIOPSY performed by Kosta Junior MD at [facility] [place] [place] ENDOSCOPY    ENDOSCOPY, COLON, DIAGNOSTIC      EYE SURGERY  2001    lasex    FRACTURE SURGERY Left     plate in wrist    HAND SURGERY      PARATHYROIDECTOMY  11/7/2005    WA THORSC DX

## 2024-01-05 ENCOUNTER — CARE COORDINATION (OUTPATIENT)
Dept: CARE COORDINATION | Age: 74
End: 2024-01-05

## 2024-01-05 NOTE — CARE COORDINATION
[facility] contacted Catarino to offer enrollment in care coordination.  Care coordination services explained to Catarino.  He denies needs and declines enrollment.

## 2024-01-16 ENCOUNTER — TELEPHONE (OUTPATIENT)
Dept: CARDIOLOGY CLINIC | Age: 74
End: 2024-01-16

## 2024-01-16 ENCOUNTER — TELEPHONE (OUTPATIENT)
Dept: ADMINISTRATIVE | Age: 74
End: 2024-01-16

## 2024-01-16 NOTE — TELEPHONE ENCOUNTER
Patient is scheduled for a tooth extraction on Thursday and needs instructions regarding Eliquis and to whether he needs to premedicate, please advise

## 2024-01-16 NOTE — TELEPHONE ENCOUNTER
A prescription for cyclobenzaprine 10 mg at bedtime was sent to the patient's home [place] pharmacy on file  Patient decided to wait until after cardioversion which is scheduled on 2/9.

## 2024-01-16 NOTE — TELEPHONE ENCOUNTER
Wife calling pt is scheduled for a tooth extraction on Thur and asking if this is ok to do with his heart.

## 2024-02-05 ENCOUNTER — HOSPITAL ENCOUNTER (OUTPATIENT)
Age: 74
Discharge: HOME | End: 2024-02-05
Payer: MEDICARE

## 2024-02-05 DIAGNOSIS — I10 ESSENTIAL HYPERTENSION: ICD-10-CM

## 2024-02-05 DIAGNOSIS — R97.20 ELEVATED PSA: ICD-10-CM

## 2024-02-05 DIAGNOSIS — N18.31 STAGE 3A CHRONIC KIDNEY DISEASE (HCC): ICD-10-CM

## 2024-02-05 DIAGNOSIS — D69.6 THROMBOCYTOPENIA, UNSPECIFIED (HCC): ICD-10-CM

## 2024-02-05 LAB
ALBUMIN SERPL-MCNC: 4 G/DL (ref 3.5–5.2)
ALP SERPL-CCNC: 113 U/L (ref 40–129)
ALT SERPL-CCNC: 14 U/L (ref 0–40)
ANION GAP SERPL CALCULATED.3IONS-SCNC: 22 MMOL/L (ref 7–16)
AST SERPL-CCNC: 23 U/L (ref 0–39)
BASOPHILS # BLD: 0.07 K/UL (ref 0–0.2)
BASOPHILS NFR BLD: 1 % (ref 0–2)
BILIRUB SERPL-MCNC: 0.9 MG/DL (ref 0–1.2)
BUN SERPL-MCNC: 14 MG/DL (ref 6–23)
CALCIUM SERPL-MCNC: 9.6 MG/DL (ref 8.6–10.2)
CHLORIDE SERPL-SCNC: 101 MMOL/L (ref 98–107)
CO2 SERPL-SCNC: 29 MMOL/L (ref 22–29)
CREAT SERPL-MCNC: 1.4 MG/DL (ref 0.7–1.2)
EOSINOPHIL # BLD: 0.44 K/UL (ref 0.05–0.5)
EOSINOPHILS RELATIVE PERCENT: 5 % (ref 0–6)
ERYTHROCYTE [DISTWIDTH] IN BLOOD BY AUTOMATED COUNT: 15.4 % (ref 11.5–15)
GFR SERPL CREATININE-BSD FRML MDRD: 54 ML/MIN/1.73M2
GLUCOSE SERPL-MCNC: 110 MG/DL (ref 74–99)
HCT VFR BLD AUTO: 41.3 % (ref 37–54)
HGB BLD-MCNC: 13.3 G/DL (ref 12.5–16.5)
IMM GRANULOCYTES # BLD AUTO: 0.04 K/UL (ref 0–0.58)
IMM GRANULOCYTES NFR BLD: 1 % (ref 0–5)
LYMPHOCYTES NFR BLD: 0.82 K/UL (ref 1.5–4)
LYMPHOCYTES RELATIVE PERCENT: 10 % (ref 20–42)
MCH RBC QN AUTO: 32 PG (ref 26–35)
MCHC RBC AUTO-ENTMCNC: 32.2 G/DL (ref 32–34.5)
MCV RBC AUTO: 99.3 FL (ref 80–99.9)
MONOCYTES NFR BLD: 0.58 K/UL (ref 0.1–0.95)
MONOCYTES NFR BLD: 7 % (ref 2–12)
NEUTROPHILS NFR BLD: 77 % (ref 43–80)
NEUTS SEG NFR BLD: 6.55 K/UL (ref 1.8–7.3)
PLATELET CONFIRMATION: NORMAL
PLATELET, FLUORESCENCE: 82 K/UL (ref 130–450)
PMV BLD AUTO: 10.4 FL (ref 7–12)
POTASSIUM SERPL-SCNC: 3.6 MMOL/L (ref 3.5–5)
PROT SERPL-MCNC: 6.4 G/DL (ref 6.4–8.3)
PSA SERPL-MCNC: 6.84 NG/ML (ref 0–4)
RBC # BLD AUTO: 4.16 M/UL (ref 3.8–5.8)
SODIUM SERPL-SCNC: 152 MMOL/L (ref 132–146)
WBC OTHER # BLD: 8.5 K/UL (ref 4.5–11.5)

## 2024-02-05 PROCEDURE — 85025 COMPLETE CBC W/AUTO DIFF WBC: CPT

## 2024-02-05 PROCEDURE — 36415 COLL VENOUS BLD VENIPUNCTURE: CPT

## 2024-02-05 PROCEDURE — 80053 COMPREHEN METABOLIC PANEL: CPT

## 2024-02-05 PROCEDURE — 84153 ASSAY OF PSA TOTAL: CPT

## 2024-02-06 NOTE — PROGRESS NOTES
[facility] PRE-ADMISSION TESTING INSTRUCTIONS    The Preadmission Testing patient is instructed accordingly using the following criteria (check applicable):    ARRIVAL INSTRUCTIONS:  [x] Parking the day of Surgery is located in the Main Entrance lot.  Upon entering the door, make an immediate right to the surgery reception desk    [x] Bring photo ID and insurance card    [x] Bring in a copy of Living will or Durable Power of  papers.    [x] Please be sure to arrange for responsible adult to provide transportation to and from the hospital    [x] Please arrange for responsible adult to be with you for the 24 hour period post procedure due to having anesthesia    [x] If you awake am of surgery not feeling well or have temperature >100 please call [phone]    GENERAL INSTRUCTIONS:    [x] Nothing by mouth after midnight, including gum, candy, mints or water    [x] You may brush your teeth, but do not swallow any water    [x] Take medications as instructed with 1-2 oz of water    [x] Stop herbal supplements and vitamins 5 days prior to procedure    [x] Follow preop dosing of blood thinners per physician instructions    [] Take 1/2 dose of evening insulin, but no insulin after midnight    [] No oral diabetic medications after midnight    [] If diabetic and have low blood sugar or feel symptomatic, take 1-2oz apple juice only    [] Bring inhalers day of surgery    [] Bring C-PAP/ Bi-Pap day of surgery    [] Bring urine specimen day of surgery    [x] Shower or bath with soap, lather and rinse well, AM of Surgery, no lotion, powders or creams to surgical site    [] Follow bowel prep as instructed per surgeon    [x] No tobacco products within 24 hours of surgery     [x] No alcohol or illegal drug use within 24 hours of surgery.    [x] Jewelry, body piercing's, eyeglasses, contact lenses and dentures are not permitted into surgery (bring cases)      [x] Please do not wear any nail polish,

## 2024-02-09 ENCOUNTER — HOSPITAL ENCOUNTER (OUTPATIENT)
Dept: INPATIENT UNIT | Age: 74
Setting detail: OUTPATIENT SURGERY
Discharge: HOME | End: 2024-02-09
Attending: INTERNAL MEDICINE
Payer: MEDICARE

## 2024-02-09 ENCOUNTER — ANESTHESIA EVENT (OUTPATIENT)
Dept: INPATIENT UNIT | Age: 74
End: 2024-02-09
Payer: MEDICARE

## 2024-02-09 ENCOUNTER — ANESTHESIA (OUTPATIENT)
Dept: INPATIENT UNIT | Age: 74
End: 2024-02-09
Payer: MEDICARE

## 2024-02-09 VITALS
WEIGHT: 141 LBS | DIASTOLIC BLOOD PRESSURE: 73 MMHG | RESPIRATION RATE: 24 BRPM | OXYGEN SATURATION: 95 % | SYSTOLIC BLOOD PRESSURE: 154 MMHG | HEIGHT: 65 IN | HEART RATE: 77 BPM | TEMPERATURE: 97.5 F | BODY MASS INDEX: 23.49 KG/M2

## 2024-02-09 DIAGNOSIS — I48.19 PERSISTENT ATRIAL FIBRILLATION (HCC): Primary | ICD-10-CM

## 2024-02-09 LAB
EKG ATRIAL RATE: 71 BPM
EKG P AXIS: 51 DEGREES
EKG P-R INTERVAL: 178 MS
EKG Q-T INTERVAL: 446 MS
EKG QRS DURATION: 98 MS
EKG QTC CALCULATION (BAZETT): 484 MS
EKG R AXIS: -37 DEGREES
EKG T AXIS: -23 DEGREES
EKG VENTRICULAR RATE: 71 BPM

## 2024-02-09 PROCEDURE — 7100000010 HC PHASE II RECOVERY - FIRST 15 MIN

## 2024-02-09 PROCEDURE — 92960 CARDIOVERSION ELECTRIC EXT: CPT

## 2024-02-09 PROCEDURE — 7100000011 HC PHASE II RECOVERY - ADDTL 15 MIN

## 2024-02-09 PROCEDURE — 2580000003 HC RX 258: Performed by: NURSE ANESTHETIST, CERTIFIED REGISTERED

## 2024-02-09 PROCEDURE — 6360000002 HC RX W HCPCS: Performed by: NURSE ANESTHETIST, CERTIFIED REGISTERED

## 2024-02-09 PROCEDURE — 3700000000 HC ANESTHESIA ATTENDED CARE

## 2024-02-09 RX ORDER — PROPOFOL 10 MG/ML
INJECTION, EMULSION INTRAVENOUS PRN
Status: DISCONTINUED | OUTPATIENT
Start: 2024-02-09 | End: 2024-02-09 | Stop reason: SDUPTHER

## 2024-02-09 RX ORDER — SODIUM CHLORIDE 9 MG/ML
INJECTION, SOLUTION INTRAVENOUS CONTINUOUS PRN
Status: DISCONTINUED | OUTPATIENT
Start: 2024-02-09 | End: 2024-02-09 | Stop reason: SDUPTHER

## 2024-02-09 RX ADMIN — SODIUM CHLORIDE: 9 INJECTION, SOLUTION INTRAVENOUS at 08:04

## 2024-02-09 RX ADMIN — PROPOFOL 70 MG: 10 INJECTION, EMULSION INTRAVENOUS at 08:06

## 2024-02-09 ASSESSMENT — PAIN SCALES - GENERAL
PAINLEVEL_OUTOF10: 0

## 2024-02-09 ASSESSMENT — PAIN - FUNCTIONAL ASSESSMENT: PAIN_FUNCTIONAL_ASSESSMENT: NONE - DENIES PAIN

## 2024-02-09 NOTE — ANESTHESIA POSTPROCEDURE EVALUATION
Department of Anesthesiology  Postprocedure Note    Patient: Catarino Bolanos  MRN: [id number]  YOB: 1950  Date of evaluation: 2/9/2024    Procedure Summary     Date: 02/09/24 Room / Location: [facility] Stage I    Anesthesia Start: 0804 Anesthesia Stop: 0812    Procedure: CARDIOVERSION EXTERNAL Diagnosis:       Persistent atrial fibrillation (HCC)      Atrial fibrillation (HCC)      (Atrial fibrillation)    Scheduled Providers:  Responsible Provider: Erasmo Steele MD    Anesthesia Type: MAC ASA Status: 4          Anesthesia Type: No value filed.    Magui Phase I: Magui Score: 10    Magui Phase II: Magui Score: 10    Anesthesia Post Evaluation    Patient location during evaluation: PACU  Patient participation: complete - patient participated  Level of consciousness: awake and alert  Airway patency: patent  Nausea & Vomiting: no nausea and no vomiting  Cardiovascular status: hemodynamically stable  Respiratory status: acceptable  Hydration status: euvolemic  There was medical reason for not using a multimodal analgesia pain management approach.Pain management: adequate        No notable events documented.

## 2024-02-09 NOTE — PROCEDURES
Catarino Bolanos is a 74 y.o. male patient.  1. 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 rate 24, height 1.651 m (5' 5\"), weight 64 kg (141 lb), SpO2 95 %.    Cardioversion    Date/Time: 2/9/2024 12:16 PM    Performed by: Wali Souza MD  Authorized by: Wali Souza MD  Consent: Verbal consent obtained. Written consent obtained.  Risks and benefits: risks, benefits and alternatives were discussed  Consent given by: patient  Patient understanding: patient states understanding of the procedure being performed  Patient consent: the patient's understanding of the procedure matches consent given  Procedure consent: procedure consent matches procedure scheduled  Relevant documents: relevant documents present and verified  Test results: test results available and properly labeled  Site marked: the operative site was marked  Imaging studies: imaging studies available  Required items: required blood products, implants, devices, and special equipment available  Patient identity confirmed: verbally with patient, arm band, provided demographic data and hospital-assigned identification number  Time out: Immediately prior to procedure a \"time out\" was called to verify the correct patient, procedure, equipment, support staff and site/side marked as required.  Preparation: Patient was prepped and draped in the usual sterile fashion.    Sedation:  Patient sedated:

## 2024-02-09 NOTE — ANESTHESIA PRE PROCEDURE
Department of Anesthesiology  Preprocedure Note       Name:  Catarino Bolanos   Age:  74 y.o.  :  1950                                          MRN:  [id number]         Date:  2024      Surgeon: * No surgeons listed *    Procedure: * No procedures listed *    Vital Signs (Current)   Vitals:    2435   BP: (!) 140/76   Pulse: 75   Resp: 10   Temp:    SpO2: 95%     Vital Signs Statistics (for past 48 hrs)     Temp  Av.3 °C (97.3 °F)  Min: 36.2 °C (97.2 °F)   Min taken time: 24  Max: 36.3 °C (97.3 °F)   Max taken time: 24 130  Pulse  Av  Min: 63   Min taken time: 24 130  Max: 87   Max taken time: 24  Resp  Av.7  Min: 10   Min taken time: 24  Max: 18   Max taken time: 24  BP  Min: 132/70   Min taken time: 24 1306  Max: 146/77   Max taken time: 24  SpO2  Av.3 %  Min: 94 %   Min taken time: 24 130  Max: 95 %   Max taken time: 24    BP Readings from Last 3 Encounters:   24 (!) 140/76   24 132/70   24 118/68     BMI  Body mass index is 23.46 kg/m².  Estimated body mass index is 23.46 kg/m² as calculated from the following:    Height as of this encounter: 1.651 m (5' 5\").    Weight as of this encounter: 64 kg (141 lb).    CBC   Lab Results   Component Value Date/Time    WBC 8.5 2024 09:59 AM    RBC 4.16 2024 09:59 AM    RBC 4.62 2022 11:26 AM    HGB 13.3 2024 09:59 AM    HCT 41.3 2024 09:59 AM    MCV 99.3 2024 09:59 AM    RDW 15.4 2024 09:59 AM     2023 05:29 PM     CMP    Lab Results   Component Value Date/Time     2024 09:59 AM    K 3.6 2024 09:59 AM    K 3.6 2022 04:15 AM     2024 09:59 AM    CO2 29 2024 09:59 AM    BUN 14 2024 09:59 AM    CREATININE 1.4 2024 09:59 AM    GFRAA >60 2022 04:15 AM    LABGLOM 54 2024 09:59 AM    GLUCOSE 110 2024 09:59 AM

## 2024-02-09 NOTE — H&P
OFFICE FOLLOW-UP    Name: Catarino Bolanos     Age: 74 y.o.    Date of Service: 2/9/2024    Chief Complaint: Follow-up for syncope, MV prolapse, MR, AF/AFL, CHF    Referring Physician: Colt Goldman DO    Interim History:  No new cardiac complaints since recent hospital discharge (12/2023). No recent syncope. No chest pain. +ADHIKARI with moderate levels of exertion (stable/improved). Still with increased ETOH intake (at least 5 ETOH drinks/day). No history of palpitations, PND, or orthopnea. Rate controlled AF/AFL on EKG.    Review of Systems:   Cardiac: As per HPI  General: No fever, chills  Pulmonary: As per HPI  HEENT: No visual disturbances, difficult swallowing  GI: No nausea, vomiting  : No dysuria, hematuria  Endocrine: No thyroid disease, +DM  Musculoskeletal: RAMIREZ x 4, no focal motor deficits  Skin: Intact, no rashes  Neuro: No headache, seizures  Psych: Currently with no depression, anxiety    Past Medical History:  Past Medical History:   Diagnosis Date    Anxiety     Atrial fibrillation and flutter (HCC)     Depression     GERD (gastroesophageal reflux disease)     Heart palpitations     HTN (hypertension) 10/21/2010    Hyperlipidemia     Lightheadedness     OA (osteoarthritis)     Parathyroid adenoma     s/p surgery 2005    Pre-diabetes     Recurrent left pleural effusion 09/13/2018    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

## 2024-02-13 ENCOUNTER — TELEPHONE (OUTPATIENT)
Dept: CARDIOLOGY CLINIC | Age: 74
End: 2024-02-13

## 2024-02-13 ENCOUNTER — TELEPHONE (OUTPATIENT)
Dept: ADMINISTRATIVE | Age: 74
End: 2024-02-13

## 2024-02-13 NOTE — TELEPHONE ENCOUNTER
Radha called to schedule a one month follow up  w/Dr Souza after cardio aversion and also to schedule a test?   Please advise pt of scheduling

## 2024-02-13 NOTE — TELEPHONE ENCOUNTER
Have patient in for EKG please.    Marie German D.O.  Cardiologist  Cardiology, [facility]

## 2024-02-13 NOTE — TELEPHONE ENCOUNTER
Patient's partner states that he has been having dizziness and lightheadedness, his bp today is 146/85 and heart rate is 80, patient had a cardioversion last week but believes he might be back in a-fib, please advise

## 2024-02-14 ENCOUNTER — NURSE ONLY (OUTPATIENT)
Dept: CARDIOLOGY CLINIC | Age: 74
End: 2024-02-14
Payer: MEDICARE

## 2024-02-14 DIAGNOSIS — I48.92 ATRIAL FLUTTER WITH CONTROLLED RESPONSE (HCC): Primary | ICD-10-CM

## 2024-02-14 PROCEDURE — 93000 ELECTROCARDIOGRAM COMPLETE: CPT | Performed by: INTERNAL MEDICINE

## 2024-02-14 NOTE — PROGRESS NOTES
Patient was in for EKG per .  The patient complained of dizziness and SOB.     Bp : 122/70  Spo2: 98%        Yanni Irvin MA

## 2024-02-16 ENCOUNTER — TELEPHONE (OUTPATIENT)
Dept: ADMINISTRATIVE | Age: 74
End: 2024-02-16

## 2024-02-16 ENCOUNTER — TELEPHONE (OUTPATIENT)
Dept: CARDIOLOGY CLINIC | Age: 74
End: 2024-02-16

## 2024-02-16 NOTE — TELEPHONE ENCOUNTER
Wife calling she states the nurse was supposed to get back in touch with them re: what to do s/p his EKG, was given results but no further instructions?

## 2024-02-16 NOTE — TELEPHONE ENCOUNTER
Only take lasix for now if weight is up or he sees swelling in ankles.     He is back in afib according to EKG.     Please arrange OV with Wali to discuss further plans.    Marie German D.O.  Cardiologist  Cardiology, [facility]

## 2024-02-16 NOTE — TELEPHONE ENCOUNTER
Patient has been holding lasix since 2/14 as instructed, he states today that he is feeling a little better, not as dizzy, bp is normal however was not able to provide number and pulse Ox 98, please advise

## 2024-02-19 ENCOUNTER — OFFICE VISIT (OUTPATIENT)
Dept: CARDIOLOGY CLINIC | Age: 74
End: 2024-02-19
Payer: MEDICARE

## 2024-02-19 VITALS
DIASTOLIC BLOOD PRESSURE: 80 MMHG | BODY MASS INDEX: 24.43 KG/M2 | SYSTOLIC BLOOD PRESSURE: 140 MMHG | HEART RATE: 83 BPM | RESPIRATION RATE: 16 BRPM | WEIGHT: 146.6 LBS | HEIGHT: 65 IN

## 2024-02-19 DIAGNOSIS — I10 ESSENTIAL HYPERTENSION: Primary | ICD-10-CM

## 2024-02-19 PROCEDURE — 1123F ACP DISCUSS/DSCN MKR DOCD: CPT | Performed by: INTERNAL MEDICINE

## 2024-02-19 PROCEDURE — 93000 ELECTROCARDIOGRAM COMPLETE: CPT | Performed by: INTERNAL MEDICINE

## 2024-02-19 PROCEDURE — 3079F DIAST BP 80-89 MM HG: CPT | Performed by: INTERNAL MEDICINE

## 2024-02-19 PROCEDURE — 3077F SYST BP >= 140 MM HG: CPT | Performed by: INTERNAL MEDICINE

## 2024-02-19 PROCEDURE — 99214 OFFICE O/P EST MOD 30 MIN: CPT | Performed by: INTERNAL MEDICINE

## 2024-02-19 NOTE — PROGRESS NOTES
OFFICE FOLLOW-UP    Name: Catarino Bolanos     Age: 74 y.o.    Date of Service: 2/19/2024    Chief Complaint: Follow-up for syncope, MV prolapse, MR, AF/AFL, CHF    Referring Physician: Colt Goldman DO    Interim History:  No new cardiac complaints since recent hospital discharge (12/2023). No recent syncope. No chest pain. +ADHIKARI with moderate levels of exertion (stable/improved). Still with increased ETOH intake (at least 5 ETOH drinks/day). No history of palpitations, PND, or orthopnea. Rate controlled AF/AFL on EKG.    - S/p DC CVN to SR on 2/9/24 --> patient reports he didn't notice a difference clinically following cardioversion (\"I didn't feel bad going into the procedure\")  - Rate controlled atrial fibrillation/flutter on 2/14/24 EKG (HR 78)  - Rate controlled atrial fibrillation/flutter on EKG today -- HR 83 (2/19/24)    Review of Systems:   Cardiac: As per HPI  General: No fever, chills  Pulmonary: As per HPI  HEENT: No visual disturbances, difficult swallowing  GI: No nausea, vomiting  : No dysuria, hematuria  Endocrine: No thyroid disease, +DM  Musculoskeletal: RAMIREZ x 4, no focal motor deficits  Skin: Intact, no rashes  Neuro: No headache, seizures  Psych: Currently with no depression, anxiety    Past Medical History:  Past Medical History:   Diagnosis Date    Anxiety     Atrial fibrillation and flutter (HCC)     Depression     GERD (gastroesophageal reflux disease)     Heart palpitations     HTN (hypertension) 10/21/2010    Hyperlipidemia     Lightheadedness     OA (osteoarthritis)     Parathyroid adenoma     s/p surgery 2005    Pre-diabetes     Recurrent left pleural effusion 09/13/2018    Stenosis of right internal carotid artery with cerebral infarction (HCC)        Past Surgical History:  Past Surgical History:   Procedure Laterality Date    BACK SURGERY  12/20/2016    revision cervical laminectomy    CAROTID ENDARTERECTOMY Right 8/25/2014    Delatore - bovine patch     CERVICAL FUSION  12/12/2016

## 2024-02-26 ENCOUNTER — HOSPITAL ENCOUNTER (OUTPATIENT)
Dept: PULMONOLOGY | Age: 74
Discharge: HOME | End: 2024-02-26
Payer: MEDICARE

## 2024-02-26 DIAGNOSIS — J44.9 CHRONIC OBSTRUCTIVE PULMONARY DISEASE, UNSPECIFIED COPD TYPE (HCC): ICD-10-CM

## 2024-02-26 PROCEDURE — 94060 EVALUATION OF WHEEZING: CPT

## 2024-02-26 PROCEDURE — 94726 PLETHYSMOGRAPHY LUNG VOLUMES: CPT

## 2024-02-26 PROCEDURE — 94729 DIFFUSING CAPACITY: CPT

## 2024-03-28 ENCOUNTER — HOSPITAL ENCOUNTER (OUTPATIENT)
Age: 74
Discharge: HOME | End: 2024-03-30
Payer: MEDICARE

## 2024-03-28 ENCOUNTER — HOSPITAL ENCOUNTER (OUTPATIENT)
Dept: GENERAL RADIOLOGY | Age: 74
Discharge: HOME | End: 2024-03-30
Payer: MEDICARE

## 2024-03-28 DIAGNOSIS — J44.9 CHRONIC OBSTRUCTIVE PULMONARY DISEASE, UNSPECIFIED COPD TYPE (HCC): ICD-10-CM

## 2024-03-28 PROCEDURE — 71046 X-RAY EXAM CHEST 2 VIEWS: CPT

## 2024-03-30 NOTE — PROGRESS NOTES
icterus. Neck: Normal range of motion. Neck supple. No JVD present. No tracheal deviation present. No thyromegaly present. Pulmonary/Chest: No stridor. No apnea, no tachypnea and no bradypnea. He is not intubated. Abdominal: Normal appearance. He exhibits no distension. Musculoskeletal: He exhibits no edema, tenderness or deformity. Lymphadenopathy:     He has no cervical adenopathy. Neurological: He is alert and oriented to person, place, and time. He has normal strength and normal reflexes. He is not disoriented. He displays no atrophy, no tremor and normal reflexes. No cranial nerve deficit or sensory deficit. He exhibits normal muscle tone. He displays a negative Romberg sign. Coordination and gait normal. GCS eye subscore is 4. GCS verbal subscore is 5. GCS motor subscore is 6. He displays no Babinski's sign on the right side. He displays no Babinski's sign on the left side. Reflex Scores:       Tricep reflexes are 2+ on the right side and 2+ on the left side. Bicep reflexes are 2+ on the right side and 2+ on the left side. Brachioradialis reflexes are 2+ on the right side and 2+ on the left side. Patellar reflexes are 2+ on the right side and 2+ on the left side. Achilles reflexes are 2+ on the right side and 2+ on the left side. Skin: Skin is warm and dry. No rash noted. He is not diaphoretic. No erythema. No pallor. Psychiatric: He has a normal mood and affect. His behavior is normal. Judgment and thought content normal.   Vitals reviewed. Assessment:      The patient is a 70year old male who presents with worsening neck pain. He had an anterior C3-C7 corpectomy almost 2 years ago. His MRI does not show any significant stenosis. Plan:      I will get a CT scan to assess the fusion.         Justin Ospina MD
Yes

## 2024-04-01 ENCOUNTER — HOSPITAL ENCOUNTER (OUTPATIENT)
Age: 74
Discharge: HOME | End: 2024-04-03
Payer: MEDICARE

## 2024-04-01 ENCOUNTER — HOSPITAL ENCOUNTER (OUTPATIENT)
Dept: GENERAL RADIOLOGY | Age: 74
Discharge: HOME | End: 2024-04-03
Payer: MEDICARE

## 2024-04-01 DIAGNOSIS — J44.9 CHRONIC OBSTRUCTIVE PULMONARY DISEASE, UNSPECIFIED COPD TYPE (HCC): ICD-10-CM

## 2024-04-01 PROCEDURE — 71046 X-RAY EXAM CHEST 2 VIEWS: CPT

## 2024-04-04 ENCOUNTER — HOSPITAL ENCOUNTER (OUTPATIENT)
Dept: CT IMAGING | Age: 74
Discharge: HOME | End: 2024-04-06
Payer: MEDICARE

## 2024-04-04 ENCOUNTER — HOSPITAL ENCOUNTER (OUTPATIENT)
Age: 74
Discharge: HOME | End: 2024-04-06
Payer: MEDICARE

## 2024-04-04 DIAGNOSIS — J90 PLEURAL EFFUSION: ICD-10-CM

## 2024-04-04 PROCEDURE — 71250 CT THORAX DX C-: CPT

## 2024-04-27 ENCOUNTER — APPOINTMENT (OUTPATIENT)
Dept: GENERAL RADIOLOGY | Age: 74
DRG: 812 | End: 2024-04-27
Payer: MEDICARE

## 2024-04-27 ENCOUNTER — HOSPITAL ENCOUNTER (INPATIENT)
Age: 74
LOS: 5 days | Discharge: NURSING, REHAB OR LONG-TERM CARE | DRG: 812 | End: 2024-05-03
Attending: EMERGENCY MEDICINE | Admitting: INTERNAL MEDICINE
Payer: MEDICARE

## 2024-04-27 DIAGNOSIS — K92.2 GASTROINTESTINAL HEMORRHAGE, UNSPECIFIED GASTROINTESTINAL HEMORRHAGE TYPE: Primary | ICD-10-CM

## 2024-04-27 DIAGNOSIS — D64.9 ACUTE ANEMIA: ICD-10-CM

## 2024-04-27 DIAGNOSIS — R55 NEAR SYNCOPE: ICD-10-CM

## 2024-04-27 DIAGNOSIS — D64.9 ANEMIA, UNSPECIFIED TYPE: ICD-10-CM

## 2024-04-27 LAB
ALBUMIN SERPL-MCNC: 3.5 G/DL (ref 3.5–5.2)
ALP SERPL-CCNC: 105 U/L (ref 40–129)
ALT SERPL-CCNC: 15 U/L (ref 0–40)
ANION GAP SERPL CALCULATED.3IONS-SCNC: 17 MMOL/L (ref 7–16)
AST SERPL-CCNC: 34 U/L (ref 0–39)
BASOPHILS # BLD: 0.06 K/UL (ref 0–0.2)
BASOPHILS NFR BLD: 1 % (ref 0–2)
BILIRUB SERPL-MCNC: 0.5 MG/DL (ref 0–1.2)
BNP SERPL-MCNC: 3215 PG/ML (ref 0–450)
BUN SERPL-MCNC: 23 MG/DL (ref 6–23)
CALCIUM SERPL-MCNC: 8.1 MG/DL (ref 8.6–10.2)
CHLORIDE SERPL-SCNC: 100 MMOL/L (ref 98–107)
CO2 SERPL-SCNC: 20 MMOL/L (ref 22–29)
CREAT SERPL-MCNC: 1.2 MG/DL (ref 0.7–1.2)
EOSINOPHIL # BLD: 0.26 K/UL (ref 0.05–0.5)
EOSINOPHILS RELATIVE PERCENT: 3 % (ref 0–6)
ERYTHROCYTE [DISTWIDTH] IN BLOOD BY AUTOMATED COUNT: 17.9 % (ref 11.5–15)
GFR SERPL CREATININE-BSD FRML MDRD: 61 ML/MIN/1.73M2
GLUCOSE SERPL-MCNC: 102 MG/DL (ref 74–99)
HCT VFR BLD AUTO: 25.7 % (ref 37–54)
HGB BLD-MCNC: 8.1 G/DL (ref 12.5–16.5)
IMM GRANULOCYTES # BLD AUTO: 0.05 K/UL (ref 0–0.58)
IMM GRANULOCYTES NFR BLD: 1 % (ref 0–5)
LYMPHOCYTES NFR BLD: 1.08 K/UL (ref 1.5–4)
LYMPHOCYTES RELATIVE PERCENT: 13 % (ref 20–42)
MAGNESIUM SERPL-MCNC: 1.6 MG/DL (ref 1.6–2.6)
MCH RBC QN AUTO: 31.6 PG (ref 26–35)
MCHC RBC AUTO-ENTMCNC: 31.5 G/DL (ref 32–34.5)
MCV RBC AUTO: 100.4 FL (ref 80–99.9)
MONOCYTES NFR BLD: 0.74 K/UL (ref 0.1–0.95)
MONOCYTES NFR BLD: 9 % (ref 2–12)
NEUTROPHILS NFR BLD: 73 % (ref 43–80)
NEUTS SEG NFR BLD: 5.85 K/UL (ref 1.8–7.3)
PLATELET # BLD AUTO: 141 K/UL (ref 130–450)
PMV BLD AUTO: 11.9 FL (ref 7–12)
POTASSIUM SERPL-SCNC: 3.8 MMOL/L (ref 3.5–5)
PROT SERPL-MCNC: 5.5 G/DL (ref 6.4–8.3)
RBC # BLD AUTO: 2.56 M/UL (ref 3.8–5.8)
SODIUM SERPL-SCNC: 137 MMOL/L (ref 132–146)
TROPONIN I SERPL HS-MCNC: 18 NG/L (ref 0–11)
WBC OTHER # BLD: 8 K/UL (ref 4.5–11.5)

## 2024-04-27 PROCEDURE — 85025 COMPLETE CBC W/AUTO DIFF WBC: CPT

## 2024-04-27 PROCEDURE — 83735 ASSAY OF MAGNESIUM: CPT

## 2024-04-27 PROCEDURE — 96374 THER/PROPH/DIAG INJ IV PUSH: CPT

## 2024-04-27 PROCEDURE — 84484 ASSAY OF TROPONIN QUANT: CPT

## 2024-04-27 PROCEDURE — 2580000003 HC RX 258: Performed by: EMERGENCY MEDICINE

## 2024-04-27 PROCEDURE — 86923 COMPATIBILITY TEST ELECTRIC: CPT

## 2024-04-27 PROCEDURE — 99285 EMERGENCY DEPT VISIT HI MDM: CPT

## 2024-04-27 PROCEDURE — 86900 BLOOD TYPING SEROLOGIC ABO: CPT

## 2024-04-27 PROCEDURE — 93005 ELECTROCARDIOGRAM TRACING: CPT

## 2024-04-27 PROCEDURE — 6360000002 HC RX W HCPCS: Performed by: EMERGENCY MEDICINE

## 2024-04-27 PROCEDURE — 86850 RBC ANTIBODY SCREEN: CPT

## 2024-04-27 PROCEDURE — C9113 INJ PANTOPRAZOLE SODIUM, VIA: HCPCS | Performed by: EMERGENCY MEDICINE

## 2024-04-27 PROCEDURE — 71045 X-RAY EXAM CHEST 1 VIEW: CPT

## 2024-04-27 PROCEDURE — 83880 ASSAY OF NATRIURETIC PEPTIDE: CPT

## 2024-04-27 PROCEDURE — 80053 COMPREHEN METABOLIC PANEL: CPT

## 2024-04-27 PROCEDURE — 86901 BLOOD TYPING SEROLOGIC RH(D): CPT

## 2024-04-27 RX ORDER — 0.9 % SODIUM CHLORIDE 0.9 %
1000 INTRAVENOUS SOLUTION INTRAVENOUS ONCE
Status: COMPLETED | OUTPATIENT
Start: 2024-04-27 | End: 2024-04-27

## 2024-04-27 RX ORDER — PANTOPRAZOLE SODIUM 40 MG/10ML
80 INJECTION, POWDER, LYOPHILIZED, FOR SOLUTION INTRAVENOUS ONCE
Status: COMPLETED | OUTPATIENT
Start: 2024-04-27 | End: 2024-04-27

## 2024-04-27 RX ADMIN — PANTOPRAZOLE SODIUM 80 MG: 40 INJECTION, POWDER, FOR SOLUTION INTRAVENOUS at 21:44

## 2024-04-27 RX ADMIN — SODIUM CHLORIDE 1000 ML: 9 INJECTION, SOLUTION INTRAVENOUS at 21:19

## 2024-04-27 ASSESSMENT — LIFESTYLE VARIABLES
HOW OFTEN DO YOU HAVE A DRINK CONTAINING ALCOHOL: NEVER
HOW MANY STANDARD DRINKS CONTAINING ALCOHOL DO YOU HAVE ON A TYPICAL DAY: PATIENT DOES NOT DRINK

## 2024-04-27 ASSESSMENT — PAIN - FUNCTIONAL ASSESSMENT
PAIN_FUNCTIONAL_ASSESSMENT: 0-10
PAIN_FUNCTIONAL_ASSESSMENT: NONE - DENIES PAIN
PAIN_FUNCTIONAL_ASSESSMENT: NONE - DENIES PAIN

## 2024-04-27 ASSESSMENT — PAIN DESCRIPTION - LOCATION: LOCATION: NECK;BACK

## 2024-04-27 ASSESSMENT — PAIN SCALES - GENERAL: PAINLEVEL_OUTOF10: 5

## 2024-04-27 ASSESSMENT — PAIN DESCRIPTION - PAIN TYPE: TYPE: CHRONIC PAIN

## 2024-04-27 ASSESSMENT — PAIN DESCRIPTION - DESCRIPTORS: DESCRIPTORS: ACHING

## 2024-04-27 ASSESSMENT — PAIN DESCRIPTION - ORIENTATION: ORIENTATION: RIGHT;LEFT;LOWER;UPPER

## 2024-04-27 ASSESSMENT — PAIN DESCRIPTION - FREQUENCY: FREQUENCY: CONTINUOUS

## 2024-04-28 PROBLEM — D62 ACUTE BLOOD LOSS ANEMIA: Status: ACTIVE | Noted: 2024-04-28

## 2024-04-28 PROBLEM — K92.2 GASTROINTESTINAL HEMORRHAGE: Status: ACTIVE | Noted: 2024-04-28

## 2024-04-28 LAB
ALBUMIN SERPL-MCNC: 3.1 G/DL (ref 3.5–5.2)
ALBUMIN SERPL-MCNC: 3.2 G/DL (ref 3.5–5.2)
ALP SERPL-CCNC: 101 U/L (ref 40–129)
ALP SERPL-CCNC: 93 U/L (ref 40–129)
ALT SERPL-CCNC: 12 U/L (ref 0–40)
ALT SERPL-CCNC: 13 U/L (ref 0–40)
ANION GAP SERPL CALCULATED.3IONS-SCNC: 12 MMOL/L (ref 7–16)
ANION GAP SERPL CALCULATED.3IONS-SCNC: 14 MMOL/L (ref 7–16)
APAP SERPL-MCNC: <5 UG/ML (ref 10–30)
AST SERPL-CCNC: 23 U/L (ref 0–39)
AST SERPL-CCNC: 24 U/L (ref 0–39)
BILIRUB SERPL-MCNC: 0.4 MG/DL (ref 0–1.2)
BILIRUB SERPL-MCNC: 1 MG/DL (ref 0–1.2)
BUN SERPL-MCNC: 19 MG/DL (ref 6–23)
BUN SERPL-MCNC: 21 MG/DL (ref 6–23)
CALCIUM SERPL-MCNC: 7.7 MG/DL (ref 8.6–10.2)
CALCIUM SERPL-MCNC: 7.9 MG/DL (ref 8.6–10.2)
CHLORIDE SERPL-SCNC: 106 MMOL/L (ref 98–107)
CHLORIDE SERPL-SCNC: 107 MMOL/L (ref 98–107)
CHOLEST SERPL-MCNC: 77 MG/DL
CHOLEST SERPL-MCNC: 87 MG/DL
CO2 SERPL-SCNC: 20 MMOL/L (ref 22–29)
CO2 SERPL-SCNC: 20 MMOL/L (ref 22–29)
CREAT SERPL-MCNC: 1.2 MG/DL (ref 0.7–1.2)
CREAT SERPL-MCNC: 1.2 MG/DL (ref 0.7–1.2)
ERYTHROCYTE [DISTWIDTH] IN BLOOD BY AUTOMATED COUNT: 18 % (ref 11.5–15)
ETHANOLAMINE SERPL-MCNC: 16 MG/DL
GFR SERPL CREATININE-BSD FRML MDRD: 61 ML/MIN/1.73M2
GFR SERPL CREATININE-BSD FRML MDRD: 66 ML/MIN/1.73M2
GLUCOSE SERPL-MCNC: 108 MG/DL (ref 74–99)
GLUCOSE SERPL-MCNC: 94 MG/DL (ref 74–99)
HCT VFR BLD AUTO: 22.5 % (ref 37–54)
HCT VFR BLD AUTO: 26.9 % (ref 37–54)
HCT VFR BLD AUTO: 27.4 % (ref 37–54)
HDLC SERPL-MCNC: 45 MG/DL
HDLC SERPL-MCNC: 45 MG/DL
HGB BLD-MCNC: 7 G/DL (ref 12.5–16.5)
HGB BLD-MCNC: 8.6 G/DL (ref 12.5–16.5)
HGB BLD-MCNC: 8.7 G/DL (ref 12.5–16.5)
LDLC SERPL CALC-MCNC: 12 MG/DL
LDLC SERPL CALC-MCNC: 18 MG/DL
MAGNESIUM SERPL-MCNC: 1.5 MG/DL (ref 1.6–2.6)
MAGNESIUM SERPL-MCNC: 2.1 MG/DL (ref 1.6–2.6)
MCH RBC QN AUTO: 31.8 PG (ref 26–35)
MCHC RBC AUTO-ENTMCNC: 31.1 G/DL (ref 32–34.5)
MCV RBC AUTO: 102.3 FL (ref 80–99.9)
PHOSPHATE SERPL-MCNC: 3 MG/DL (ref 2.5–4.5)
PLATELET # BLD AUTO: 88 K/UL (ref 130–450)
PLATELET CONFIRMATION: NORMAL
PMV BLD AUTO: 11.9 FL (ref 7–12)
POTASSIUM SERPL-SCNC: 3.6 MMOL/L (ref 3.5–5)
POTASSIUM SERPL-SCNC: 3.8 MMOL/L (ref 3.5–5)
PROT SERPL-MCNC: 4.8 G/DL (ref 6.4–8.3)
PROT SERPL-MCNC: 5 G/DL (ref 6.4–8.3)
RBC # BLD AUTO: 2.2 M/UL (ref 3.8–5.8)
SALICYLATES SERPL-MCNC: <0.3 MG/DL (ref 0–30)
SODIUM SERPL-SCNC: 139 MMOL/L (ref 132–146)
SODIUM SERPL-SCNC: 140 MMOL/L (ref 132–146)
TOXIC TRICYCLIC SC,BLOOD: NEGATIVE
TRIGL SERPL-MCNC: 150 MG/DL
TRIGL SERPL-MCNC: 72 MG/DL
TROPONIN I SERPL HS-MCNC: 17 NG/L (ref 0–11)
TSH SERPL DL<=0.05 MIU/L-ACNC: 1.57 UIU/ML (ref 0.27–4.2)
TSH SERPL DL<=0.05 MIU/L-ACNC: 1.81 UIU/ML (ref 0.27–4.2)
VLDLC SERPL CALC-MCNC: 14 MG/DL
VLDLC SERPL CALC-MCNC: 30 MG/DL
WBC OTHER # BLD: 7.5 K/UL (ref 4.5–11.5)

## 2024-04-28 PROCEDURE — 84484 ASSAY OF TROPONIN QUANT: CPT

## 2024-04-28 PROCEDURE — C9113 INJ PANTOPRAZOLE SODIUM, VIA: HCPCS | Performed by: INTERNAL MEDICINE

## 2024-04-28 PROCEDURE — 80053 COMPREHEN METABOLIC PANEL: CPT

## 2024-04-28 PROCEDURE — 2580000003 HC RX 258

## 2024-04-28 PROCEDURE — 30233N1 TRANSFUSION OF NONAUTOLOGOUS RED BLOOD CELLS INTO PERIPHERAL VEIN, PERCUTANEOUS APPROACH: ICD-10-PCS

## 2024-04-28 PROCEDURE — 94664 DEMO&/EVAL PT USE INHALER: CPT

## 2024-04-28 PROCEDURE — 6360000002 HC RX W HCPCS: Performed by: INTERNAL MEDICINE

## 2024-04-28 PROCEDURE — 84100 ASSAY OF PHOSPHORUS: CPT

## 2024-04-28 PROCEDURE — 94640 AIRWAY INHALATION TREATMENT: CPT

## 2024-04-28 PROCEDURE — 99222 1ST HOSP IP/OBS MODERATE 55: CPT | Performed by: INTERNAL MEDICINE

## 2024-04-28 PROCEDURE — 6370000000 HC RX 637 (ALT 250 FOR IP): Performed by: INTERNAL MEDICINE

## 2024-04-28 PROCEDURE — G0480 DRUG TEST DEF 1-7 CLASSES: HCPCS

## 2024-04-28 PROCEDURE — 85018 HEMOGLOBIN: CPT

## 2024-04-28 PROCEDURE — 85027 COMPLETE CBC AUTOMATED: CPT

## 2024-04-28 PROCEDURE — 80143 DRUG ASSAY ACETAMINOPHEN: CPT

## 2024-04-28 PROCEDURE — 80307 DRUG TEST PRSMV CHEM ANLYZR: CPT

## 2024-04-28 PROCEDURE — 80061 LIPID PANEL: CPT

## 2024-04-28 PROCEDURE — 85014 HEMATOCRIT: CPT

## 2024-04-28 PROCEDURE — 2580000003 HC RX 258: Performed by: INTERNAL MEDICINE

## 2024-04-28 PROCEDURE — 83735 ASSAY OF MAGNESIUM: CPT

## 2024-04-28 PROCEDURE — 83036 HEMOGLOBIN GLYCOSYLATED A1C: CPT

## 2024-04-28 PROCEDURE — 36430 TRANSFUSION BLD/BLD COMPNT: CPT

## 2024-04-28 PROCEDURE — 99222 1ST HOSP IP/OBS MODERATE 55: CPT | Performed by: SURGERY

## 2024-04-28 PROCEDURE — 80179 DRUG ASSAY SALICYLATE: CPT

## 2024-04-28 PROCEDURE — 6360000002 HC RX W HCPCS: Performed by: NURSE PRACTITIONER

## 2024-04-28 PROCEDURE — 2140000000 HC CCU INTERMEDIATE R&B

## 2024-04-28 PROCEDURE — P9016 RBC LEUKOCYTES REDUCED: HCPCS

## 2024-04-28 PROCEDURE — 84443 ASSAY THYROID STIM HORMONE: CPT

## 2024-04-28 RX ORDER — BENZONATATE 100 MG/1
100 CAPSULE ORAL 3 TIMES DAILY PRN
Status: DISCONTINUED | OUTPATIENT
Start: 2024-04-28 | End: 2024-05-03 | Stop reason: HOSPADM

## 2024-04-28 RX ORDER — BUDESONIDE 0.5 MG/2ML
500 INHALANT ORAL 2 TIMES DAILY
Status: DISCONTINUED | OUTPATIENT
Start: 2024-04-28 | End: 2024-05-03 | Stop reason: HOSPADM

## 2024-04-28 RX ORDER — IPRATROPIUM BROMIDE AND ALBUTEROL SULFATE 2.5; .5 MG/3ML; MG/3ML
1 SOLUTION RESPIRATORY (INHALATION) EVERY 4 HOURS PRN
Status: DISCONTINUED | OUTPATIENT
Start: 2024-04-28 | End: 2024-05-03 | Stop reason: HOSPADM

## 2024-04-28 RX ORDER — SODIUM CHLORIDE 0.9 % (FLUSH) 0.9 %
5-40 SYRINGE (ML) INJECTION PRN
Status: DISCONTINUED | OUTPATIENT
Start: 2024-04-28 | End: 2024-05-03 | Stop reason: HOSPADM

## 2024-04-28 RX ORDER — PAROXETINE 30 MG/1
30 TABLET, FILM COATED ORAL EVERY MORNING
COMMUNITY

## 2024-04-28 RX ORDER — LORAZEPAM 1 MG/1
4 TABLET ORAL
Status: DISCONTINUED | OUTPATIENT
Start: 2024-04-28 | End: 2024-05-03 | Stop reason: HOSPADM

## 2024-04-28 RX ORDER — ARFORMOTEROL TARTRATE 15 UG/2ML
15 SOLUTION RESPIRATORY (INHALATION) 2 TIMES DAILY
Status: DISCONTINUED | OUTPATIENT
Start: 2024-04-28 | End: 2024-05-03 | Stop reason: HOSPADM

## 2024-04-28 RX ORDER — CALCIUM CARBONATE 500 MG/1
500 TABLET, CHEWABLE ORAL 3 TIMES DAILY PRN
Status: DISCONTINUED | OUTPATIENT
Start: 2024-04-28 | End: 2024-05-03 | Stop reason: HOSPADM

## 2024-04-28 RX ORDER — LANOLIN ALCOHOL/MO/W.PET/CERES
3 CREAM (GRAM) TOPICAL NIGHTLY PRN
Status: DISCONTINUED | OUTPATIENT
Start: 2024-04-28 | End: 2024-05-03 | Stop reason: HOSPADM

## 2024-04-28 RX ORDER — GABAPENTIN 300 MG/1
600 CAPSULE ORAL 3 TIMES DAILY
Status: DISCONTINUED | OUTPATIENT
Start: 2024-04-28 | End: 2024-05-03 | Stop reason: HOSPADM

## 2024-04-28 RX ORDER — HYDRALAZINE HYDROCHLORIDE 20 MG/ML
10 INJECTION INTRAMUSCULAR; INTRAVENOUS EVERY 6 HOURS PRN
Status: DISCONTINUED | OUTPATIENT
Start: 2024-04-28 | End: 2024-05-03 | Stop reason: HOSPADM

## 2024-04-28 RX ORDER — MAGNESIUM SULFATE IN WATER 40 MG/ML
2000 INJECTION, SOLUTION INTRAVENOUS PRN
Status: DISCONTINUED | OUTPATIENT
Start: 2024-04-28 | End: 2024-05-03 | Stop reason: HOSPADM

## 2024-04-28 RX ORDER — SODIUM CHLORIDE 9 MG/ML
INJECTION, SOLUTION INTRAVENOUS PRN
Status: DISCONTINUED | OUTPATIENT
Start: 2024-04-28 | End: 2024-05-03 | Stop reason: HOSPADM

## 2024-04-28 RX ORDER — ATORVASTATIN CALCIUM 40 MG/1
40 TABLET, FILM COATED ORAL DAILY
COMMUNITY

## 2024-04-28 RX ORDER — LORAZEPAM 2 MG/ML
1 INJECTION INTRAMUSCULAR
Status: DISCONTINUED | OUTPATIENT
Start: 2024-04-28 | End: 2024-05-03 | Stop reason: HOSPADM

## 2024-04-28 RX ORDER — SODIUM CHLORIDE 9 MG/ML
INJECTION, SOLUTION INTRAVENOUS PRN
Status: DISCONTINUED | OUTPATIENT
Start: 2024-04-28 | End: 2024-04-28 | Stop reason: SDUPTHER

## 2024-04-28 RX ORDER — ATORVASTATIN CALCIUM 40 MG/1
40 TABLET, FILM COATED ORAL DAILY
Status: DISCONTINUED | OUTPATIENT
Start: 2024-04-28 | End: 2024-05-03 | Stop reason: HOSPADM

## 2024-04-28 RX ORDER — SODIUM CHLORIDE 0.9 % (FLUSH) 0.9 %
5-40 SYRINGE (ML) INJECTION EVERY 12 HOURS SCHEDULED
Status: DISCONTINUED | OUTPATIENT
Start: 2024-04-28 | End: 2024-04-28 | Stop reason: SDUPTHER

## 2024-04-28 RX ORDER — METOPROLOL SUCCINATE 50 MG/1
50 TABLET, EXTENDED RELEASE ORAL 2 TIMES DAILY
Status: DISCONTINUED | OUTPATIENT
Start: 2024-04-28 | End: 2024-05-03 | Stop reason: HOSPADM

## 2024-04-28 RX ORDER — HYDRALAZINE HYDROCHLORIDE 25 MG/1
25 TABLET, FILM COATED ORAL 2 TIMES DAILY
COMMUNITY

## 2024-04-28 RX ORDER — GABAPENTIN 600 MG/1
600 TABLET ORAL 3 TIMES DAILY
COMMUNITY

## 2024-04-28 RX ORDER — PANTOPRAZOLE SODIUM 40 MG/10ML
40 INJECTION, POWDER, LYOPHILIZED, FOR SOLUTION INTRAVENOUS 2 TIMES DAILY
Status: DISCONTINUED | OUTPATIENT
Start: 2024-04-28 | End: 2024-05-03

## 2024-04-28 RX ORDER — POTASSIUM CHLORIDE 7.45 MG/ML
10 INJECTION INTRAVENOUS PRN
Status: DISCONTINUED | OUTPATIENT
Start: 2024-04-28 | End: 2024-05-03 | Stop reason: HOSPADM

## 2024-04-28 RX ORDER — PAROXETINE 10 MG/1
30 TABLET, FILM COATED ORAL EVERY MORNING
Status: DISCONTINUED | OUTPATIENT
Start: 2024-04-28 | End: 2024-05-03 | Stop reason: HOSPADM

## 2024-04-28 RX ORDER — LORAZEPAM 1 MG/1
2 TABLET ORAL
Status: DISCONTINUED | OUTPATIENT
Start: 2024-04-28 | End: 2024-05-03 | Stop reason: HOSPADM

## 2024-04-28 RX ORDER — ONDANSETRON 4 MG/1
4 TABLET, ORALLY DISINTEGRATING ORAL EVERY 8 HOURS PRN
Status: DISCONTINUED | OUTPATIENT
Start: 2024-04-28 | End: 2024-05-03 | Stop reason: HOSPADM

## 2024-04-28 RX ORDER — LANOLIN ALCOHOL/MO/W.PET/CERES
100 CREAM (GRAM) TOPICAL DAILY
Status: DISCONTINUED | OUTPATIENT
Start: 2024-04-28 | End: 2024-05-03 | Stop reason: HOSPADM

## 2024-04-28 RX ORDER — ACETAMINOPHEN 650 MG/1
650 SUPPOSITORY RECTAL EVERY 6 HOURS PRN
Status: DISCONTINUED | OUTPATIENT
Start: 2024-04-28 | End: 2024-05-03 | Stop reason: HOSPADM

## 2024-04-28 RX ORDER — LORAZEPAM 2 MG/ML
4 INJECTION INTRAMUSCULAR
Status: DISCONTINUED | OUTPATIENT
Start: 2024-04-28 | End: 2024-05-03 | Stop reason: HOSPADM

## 2024-04-28 RX ORDER — ALBUTEROL SULFATE 90 UG/1
2 AEROSOL, METERED RESPIRATORY (INHALATION) 4 TIMES DAILY PRN
Status: DISCONTINUED | OUTPATIENT
Start: 2024-04-28 | End: 2024-04-28 | Stop reason: CLARIF

## 2024-04-28 RX ORDER — FOLIC ACID 1 MG/1
1 TABLET ORAL DAILY
Status: DISCONTINUED | OUTPATIENT
Start: 2024-04-28 | End: 2024-05-03 | Stop reason: HOSPADM

## 2024-04-28 RX ORDER — SODIUM CHLORIDE 0.9 % (FLUSH) 0.9 %
5-40 SYRINGE (ML) INJECTION EVERY 12 HOURS SCHEDULED
Status: DISCONTINUED | OUTPATIENT
Start: 2024-04-28 | End: 2024-05-03 | Stop reason: HOSPADM

## 2024-04-28 RX ORDER — MAGNESIUM SULFATE IN WATER 40 MG/ML
2000 INJECTION, SOLUTION INTRAVENOUS ONCE
Status: COMPLETED | OUTPATIENT
Start: 2024-04-28 | End: 2024-04-28

## 2024-04-28 RX ORDER — SODIUM CHLORIDE 0.9 % (FLUSH) 0.9 %
5-40 SYRINGE (ML) INJECTION PRN
Status: DISCONTINUED | OUTPATIENT
Start: 2024-04-28 | End: 2024-04-28 | Stop reason: SDUPTHER

## 2024-04-28 RX ORDER — LORAZEPAM 1 MG/1
1 TABLET ORAL
Status: DISCONTINUED | OUTPATIENT
Start: 2024-04-28 | End: 2024-05-03 | Stop reason: HOSPADM

## 2024-04-28 RX ORDER — ALBUTEROL SULFATE 2.5 MG/3ML
2.5 SOLUTION RESPIRATORY (INHALATION) 4 TIMES DAILY PRN
Status: DISCONTINUED | OUTPATIENT
Start: 2024-04-28 | End: 2024-05-03 | Stop reason: HOSPADM

## 2024-04-28 RX ORDER — ASPIRIN 81 MG/1
81 TABLET ORAL EVERY MORNING
Status: DISCONTINUED | OUTPATIENT
Start: 2024-04-28 | End: 2024-05-03 | Stop reason: HOSPADM

## 2024-04-28 RX ORDER — ONDANSETRON 2 MG/ML
4 INJECTION INTRAMUSCULAR; INTRAVENOUS EVERY 6 HOURS PRN
Status: DISCONTINUED | OUTPATIENT
Start: 2024-04-28 | End: 2024-05-03 | Stop reason: HOSPADM

## 2024-04-28 RX ORDER — POTASSIUM CHLORIDE 20 MEQ/1
40 TABLET, EXTENDED RELEASE ORAL PRN
Status: DISCONTINUED | OUTPATIENT
Start: 2024-04-28 | End: 2024-05-03 | Stop reason: HOSPADM

## 2024-04-28 RX ORDER — HYDRALAZINE HYDROCHLORIDE 25 MG/1
25 TABLET, FILM COATED ORAL 2 TIMES DAILY
Status: DISCONTINUED | OUTPATIENT
Start: 2024-04-28 | End: 2024-05-03 | Stop reason: HOSPADM

## 2024-04-28 RX ORDER — POLYETHYLENE GLYCOL 3350 17 G/17G
17 POWDER, FOR SOLUTION ORAL DAILY PRN
Status: DISCONTINUED | OUTPATIENT
Start: 2024-04-28 | End: 2024-05-03 | Stop reason: HOSPADM

## 2024-04-28 RX ORDER — SODIUM CHLORIDE, SODIUM LACTATE, POTASSIUM CHLORIDE, CALCIUM CHLORIDE 600; 310; 30; 20 MG/100ML; MG/100ML; MG/100ML; MG/100ML
INJECTION, SOLUTION INTRAVENOUS CONTINUOUS
Status: DISCONTINUED | OUTPATIENT
Start: 2024-04-29 | End: 2024-05-02

## 2024-04-28 RX ORDER — FUROSEMIDE 20 MG/1
20 TABLET ORAL 2 TIMES DAILY
Status: DISCONTINUED | OUTPATIENT
Start: 2024-04-28 | End: 2024-05-03 | Stop reason: HOSPADM

## 2024-04-28 RX ORDER — LORAZEPAM 2 MG/ML
2 INJECTION INTRAMUSCULAR
Status: DISCONTINUED | OUTPATIENT
Start: 2024-04-28 | End: 2024-05-03 | Stop reason: HOSPADM

## 2024-04-28 RX ORDER — LORAZEPAM 2 MG/ML
3 INJECTION INTRAMUSCULAR
Status: DISCONTINUED | OUTPATIENT
Start: 2024-04-28 | End: 2024-05-03 | Stop reason: HOSPADM

## 2024-04-28 RX ORDER — ACETAMINOPHEN 325 MG/1
650 TABLET ORAL EVERY 6 HOURS PRN
Status: DISCONTINUED | OUTPATIENT
Start: 2024-04-28 | End: 2024-05-03 | Stop reason: HOSPADM

## 2024-04-28 RX ORDER — LORAZEPAM 1 MG/1
3 TABLET ORAL
Status: DISCONTINUED | OUTPATIENT
Start: 2024-04-28 | End: 2024-05-03 | Stop reason: HOSPADM

## 2024-04-28 RX ADMIN — GABAPENTIN 600 MG: 300 CAPSULE ORAL at 21:34

## 2024-04-28 RX ADMIN — BUDESONIDE INHALATION 500 MCG: 0.5 SUSPENSION RESPIRATORY (INHALATION) at 21:22

## 2024-04-28 RX ADMIN — SODIUM CHLORIDE, PRESERVATIVE FREE 10 ML: 5 INJECTION INTRAVENOUS at 21:34

## 2024-04-28 RX ADMIN — HYDRALAZINE HYDROCHLORIDE 25 MG: 25 TABLET ORAL at 10:53

## 2024-04-28 RX ADMIN — METOPROLOL SUCCINATE 50 MG: 50 TABLET, EXTENDED RELEASE ORAL at 21:34

## 2024-04-28 RX ADMIN — LORAZEPAM 1 MG: 1 TABLET ORAL at 16:41

## 2024-04-28 RX ADMIN — PANTOPRAZOLE SODIUM 40 MG: 40 INJECTION, POWDER, FOR SOLUTION INTRAVENOUS at 09:13

## 2024-04-28 RX ADMIN — FUROSEMIDE 20 MG: 20 TABLET ORAL at 19:14

## 2024-04-28 RX ADMIN — LORAZEPAM 1 MG: 2 INJECTION INTRAMUSCULAR; INTRAVENOUS at 04:01

## 2024-04-28 RX ADMIN — FOLIC ACID 1 MG: 1 TABLET ORAL at 09:06

## 2024-04-28 RX ADMIN — GABAPENTIN 600 MG: 300 CAPSULE ORAL at 13:13

## 2024-04-28 RX ADMIN — LORAZEPAM 2 MG: 1 TABLET ORAL at 21:34

## 2024-04-28 RX ADMIN — PANTOPRAZOLE SODIUM 40 MG: 40 INJECTION, POWDER, FOR SOLUTION INTRAVENOUS at 21:34

## 2024-04-28 RX ADMIN — HYDRALAZINE HYDROCHLORIDE 25 MG: 25 TABLET ORAL at 21:34

## 2024-04-28 RX ADMIN — PAROXETINE 30 MG: 10 TABLET, FILM COATED ORAL at 09:13

## 2024-04-28 RX ADMIN — Medication 100 MG: at 09:07

## 2024-04-28 RX ADMIN — ATORVASTATIN CALCIUM 40 MG: 40 TABLET, FILM COATED ORAL at 09:07

## 2024-04-28 RX ADMIN — METOPROLOL SUCCINATE 50 MG: 50 TABLET, EXTENDED RELEASE ORAL at 09:07

## 2024-04-28 RX ADMIN — MAGNESIUM SULFATE HEPTAHYDRATE 2000 MG: 40 INJECTION, SOLUTION INTRAVENOUS at 06:59

## 2024-04-28 RX ADMIN — LORAZEPAM 1 MG: 2 INJECTION INTRAMUSCULAR; INTRAVENOUS at 06:41

## 2024-04-28 RX ADMIN — FUROSEMIDE 20 MG: 20 TABLET ORAL at 09:07

## 2024-04-28 RX ADMIN — ARFORMOTEROL TARTRATE 15 MCG: 15 SOLUTION RESPIRATORY (INHALATION) at 07:38

## 2024-04-28 RX ADMIN — ARFORMOTEROL TARTRATE 15 MCG: 15 SOLUTION RESPIRATORY (INHALATION) at 21:22

## 2024-04-28 RX ADMIN — BUDESONIDE INHALATION 500 MCG: 0.5 SUSPENSION RESPIRATORY (INHALATION) at 07:38

## 2024-04-28 RX ADMIN — GABAPENTIN 600 MG: 300 CAPSULE ORAL at 09:12

## 2024-04-28 RX ADMIN — SODIUM CHLORIDE, POTASSIUM CHLORIDE, SODIUM LACTATE AND CALCIUM CHLORIDE: 600; 310; 30; 20 INJECTION, SOLUTION INTRAVENOUS at 23:36

## 2024-04-28 RX ADMIN — LORAZEPAM 1 MG: 1 TABLET ORAL at 10:52

## 2024-04-28 ASSESSMENT — PAIN - FUNCTIONAL ASSESSMENT: PAIN_FUNCTIONAL_ASSESSMENT: NONE - DENIES PAIN

## 2024-04-28 NOTE — ED NOTES
Consent verified, signed by pt.  Pt identity verified by two RN.  Details of RBC to be transfused double checked by two RN at bedside, documented.  Pt started blood transfusion of 1 pack RBC with piggyback 0.9% sodium chloride.  RN stayed at bedside of pt for the first 15 mins.  Vs monitored, documented, stable.  Health teachings regarding the possible complications, possible infusion reaction given.  Pt verbalized understanding.  No complaints, no pain, no discomfort noted at this time.

## 2024-04-28 NOTE — ED PROVIDER NOTES
DO Becca       CONSULTS: (Who and What was discussed)  IP CONSULT TO HOSPITALIST  IP CONSULT TO GENERAL SURGERY    FINAL IMPRESSION      1. Gastrointestinal hemorrhage, unspecified gastrointestinal hemorrhage type    2. Acute anemia    3. Near syncope          DISPOSITION/PLAN     DISPOSITION Admitted 04/28/2024 12:36:28 AM    (Please note that portions of this note were completed with a voice recognition program.  Efforts were made to edit the dictations but occasionally words are mis-transcribed.)    Becca Morgan DO (electronically signed)

## 2024-04-28 NOTE — ED NOTES
Wife requested and wanted for the nurse in charge to call her when there is definite time tomorrow for the scope the doctor mentioned to them.

## 2024-04-28 NOTE — ED NOTES
NP made aware of drop in Hgb at this time   High Dose Vitamin A Counseling: Side effects reviewed, pt to contact office should one occur.

## 2024-04-28 NOTE — PLAN OF CARE
Brief internal medicine progress note:     Patient seen and examined, H&P and billing done on this calendar day by admitting service.     I did also seen and examined patient.     Admitted with GIB.   Dark tarry stools. Last colonoscopy ~10 years ago, 3 polyps removed and states it was normal.   Records with colonoscopy polypectomy snare/cold biopsy performed on 6/26/2020.   Hx positive colo guard, hx peptic ulcer disease.   Colonoscopy polypectomy snare/bx and EGD/bx on 6/26/2020 with Dr. Junior and found chronic GERD and 3 colon polyps, patient was recommended to have repeat colonoscopy in 3 years which would have been 6/2023 and not done yet.     Agree with assessment and plan.   General surgery consulted, keep NPO in case of scope.   Monitor Hg and transfuse if less than 7.   Hg 8.7 this morning.   Continue to hold Eliquis.   Monitor for bleedings.   Thrombocytopenia, obtain PBS. Plt 88 today   Continue CIWA, monitor for withdrawals.   Social work consult for rehab consideration.   Monitor magnesium and replace as needed.   Wife bedside and updated.     Electronically signed by Shanon Barnett MD on 4/28/2024 at 9:50 AM

## 2024-04-28 NOTE — H&P
Inpatient H&P      PCP:  Colt Goldman DO  Admitting Physician:  Sylvia Jerez DO  Consultants:  Gen surg  Chief Complaint:    Chief Complaint   Patient presents with    Fatigue     PT reports increase weakness preventing him from ambulating.  PT reports \"I was afraid I would fall.\"  Per EMS 20-40s.  PMH afib       History of Present Illness  Catarino Bolanos is a 74 y.o. male who presents to [facility] ER complaining of fatigue.    Catarino Bolanos has a past medical history that includes atrial fibrillation, CHF, hypertension, hyperlipidemia, alcohol use, history of CVA, prior tobacco abuse    Catarino presents to the ER with complaint of fatigue.  He states he has been having worsening weakness and fatigue over the past several weeks or even months.  He states that today that all his symptoms seem to be worse and his fatigue became severe.  He admits to near syncope. He states he has had dark bowel movements over the past few days. He admits to heartburn. He drinks 5 gin and tonics per day.  He is on Eliquis and aspirin.  He was FOBT positive in the ER.  Hemoglobin 8.1. Last hemoglobin 13.3  Discussed patient's case with ED physician.    ER Course  Upon presentation to the ER, routine labwork was performed which revealed CO2 20, AG 17, proBNP 3215, hemoglobin 8.1.  Imaging results are as outlined below in the Imaging section of this note.   Upon arrival to the ER, patient was 115/98.  The patient received protonix, 1 L NS in the emergency room and was admitted to [facility].    Last Hospital Admission - I personally reviewed admission from 12/20/23    Pleural effusion  Active Problems:    Acute respiratory failure with hypoxia (HCC)    Alcoholism (HCC)    Type 2 diabetes mellitus with diabetic neuropathy    Essential hypertension    Acute on chronic congestive heart failure (HCC)    Atrial fibrillation (HCC)    Last Echocardiogram - I reviewed echocardiogram from 12/22/23    Left Ventricle: The EF by visual      Difficulty of Paying Living Expenses: Not hard at all   Food Insecurity: No Food Insecurity (12/21/2023)    Hunger Vital Sign     Worried About Running Out of Food in the Last Year: Never true     Ran Out of Food in the Last Year: Never true   Transportation Needs: No Transportation Needs (12/21/2023)    PRAPARE - Transportation     Lack of Transportation (Medical): No     Lack of Transportation (Non-Medical): No   Physical Activity: Insufficiently Active (8/7/2023)    Exercise Vital Sign     Days of Exercise per Week: 1 day     Minutes of Exercise per Session: 10 min   Stress: Not on file   Social Connections: Not on file   Intimate Partner Violence: Not on file   Housing Stability: Low Risk  (12/21/2023)    Housing Stability Vital Sign     Unable to Pay for Housing in the Last Year: No     Number of Places Lived in the Last Year: 1     Unstable Housing in the Last Year: No       Review of Systems  All bolded are positive; please see HPI  General:  Fever, chills, diaphoresis, fatigue, malaise, night sweats, weight loss  Psychological:  Anxiety, disorientation, hallucinations.  ENT:  Epistaxis, headaches, vertigo, visual changes.  Cardiovascular:  Chest pain, irregular heartbeats, palpitations, paroxysmal nocturnal dyspnea.  Respiratory:  Shortness of breath, coughing, sputum production, hemoptysis, wheezing, orthopnea.  Gastrointestinal:  Nausea, vomiting, diarrhea, heartburn, constipation, abdominal pain, hematemesis, hematochezia, melena, acholic stools  Genito-Urinary:  Dysuria, urgency, frequency, hematuria  Musculoskeletal:  Joint pain, joint stiffness, joint swelling, muscle pain  Neurology:  Headache, focal neurological deficits, weakness, numbness, paresthesia  Derm:  Rashes, ulcers, excoriations, bruising  Extremities:  Decreased ROM, peripheral edema, mottling    Physical Examination  Vitals:  /69   Pulse (!) 102   Resp 23   Ht 1.651 m (5' 5\")   Wt 66.2 kg (146 lb)   SpO2 95%   BMI 24.30

## 2024-04-28 NOTE — PROGRESS NOTES
4 Eyes Skin Assessment     NAME:  Catarino Bolanos  YOB: 1950  MEDICAL RECORD NUMBER:  [id number]    The patient is being assessed for  Admission    I agree that at least one RN has performed a thorough Head to Toe Skin Assessment on the patient. ALL assessment sites listed below have been assessed.      Areas assessed by both nurses:    Head, Face, Ears, Shoulders, Back, Chest, Arms, Elbows, Hands, Sacrum. Buttock, Coccyx, Ischium, Legs. Feet and Heels, and Under Medical Devices         Does the Patient have a Wound? No noted wound(s)       Florian Prevention initiated by RN: No  Wound Care Orders initiated by RN: No    Pressure Injury (Stage 3,4, Unstageable, DTI, NWPT, and Complex wounds) if present, place Wound referral order by RN under : No    New Ostomies, if present place, Ostomy referral order under : No     Nurse 1 eSignature: Electronically signed by Ana Rand RN on 4/28/24 at 6:52 PM EDT    **SHARE this note so that the co-signing nurse can place an eSignature**    Nurse 2 eSignature: {Esignature:[id number]}

## 2024-04-28 NOTE — CONSULTS
GENERAL SURGERY  CONSULT NOTE  4/28/2024    Physician Consulted: Dr. Cantu  Reason for Consult: GIB  Referring Physician: Dr. Anibal LEE  Catarino Bolanos is a 74 y.o. male who presents to general surgery service for evaluation of dark, tarry stools for the past 5 days. His Hb was 7.0 after being 8.1 at presentation, per chart review Hb was 13 in February. He states he has been feeling lightheaded and dizzy. Denies any abdominal pain, nausea/vomiting. Denies NSAID use. Pt denies other abdominal surgeries. Has been on eliquis for afib.     Pt had EGD/Cscope in 2020 with Dr. Junior and was found to have mild gastritis. On colonoscopy, he had 3 polyps, one in descending colon and 2 in sigmoid which were found to be tubular adenomas, also had diverticulosis and hemorrhoids. He was recommended to have repeat colonoscopy in 3 years.       Past Medical History:   Diagnosis Date    Anxiety     Atrial fibrillation and flutter (HCC)     Depression     GERD (gastroesophageal reflux disease)     Heart palpitations     HTN (hypertension) 10/21/2010    Hyperlipidemia     Lightheadedness     OA (osteoarthritis)     Parathyroid adenoma     s/p surgery 2005    Pre-diabetes     Recurrent left pleural effusion 09/13/2018    Stenosis of right internal carotid artery with cerebral infarction (HCC)        Past Surgical History:   Procedure Laterality Date    BACK SURGERY  12/20/2016    revision cervical laminectomy    CAROTID ENDARTERECTOMY Right 8/25/2014    Delatore - bovine patch     CERVICAL FUSION  12/12/2016    C4-C5, C5-C6, C6-C7 ACF, C5-C6 Corpectomy    COLONOSCOPY N/A 6/26/2020    COLONOSCOPY POLYPECTOMY SNARE/COLD BIOPSY performed by Kosta Junior MD at [facility] ENDOSCOPY    ENDOSCOPY, COLON, DIAGNOSTIC      EYE SURGERY  2001    lasex    FRACTURE SURGERY Left     plate in wrist    HAND SURGERY      PARATHYROIDECTOMY  11/7/2005    IN THORSC DX LUNGS/PERICAR/MED/PLEURAL SPACE W/O BX Right 9/17/2018    THORACOSCOPY performed    SpO2:        General Appearance:  lying in bed, in no acute distress  Skin:  warm and dry, no cyanosis  Head/face:  NCAT  Lungs:  normal respiratory effort on RA  Heart: extremities well perfused  Abdomen:  soft, nontender, nondistended   Extremities: atraumatic, no focal motor deficits       LABS:    CBC  Recent Labs     04/28/24  0113 04/28/24  0724   WBC 7.5  --    HGB 7.0* 8.7*   HCT 22.5* 27.4*   PLT 88*  --      BMP  Recent Labs     04/28/24  0900      K 3.8      CO2 20*   BUN 19   CREATININE 1.2   CALCIUM 7.7*     Liver Function  Recent Labs     04/28/24  0900   BILITOT 1.0   AST 23   ALT 12   ALKPHOS 93   PROT 4.8*     No results for input(s): \"LACTATE\" in the last 72 hours.  No results for input(s): \"INR\", \"PTT\" in the last 72 hours.    Invalid input(s): \"PT\"    RADIOLOGY    XR CHEST PORTABLE    Result Date: 4/27/2024  EXAMINATION: ONE XRAY VIEW OF THE CHEST 4/27/2024 8:43 pm COMPARISON: 01/01/2024 HISTORY: ORDERING SYSTEM PROVIDED [facility] PROVIDED HISTORY: Reason for exam:->chset pain FINDINGS: The lungs are without acute focal process.  There is no effusion or pneumothorax. The cardiomediastinal silhouette is without acute process. The osseous structures are without acute process.     No acute process.         ASSESSMENT:  74 y.o. male with acute drop in Hb with 5 day history of dark, tarry stools    PLAN:   - plan for EGD tomorrow   - okay for CLD today   - hold eliquis   - PPI BID   - pt will need outpatient colonoscopy due to polyps seen on scope in 2020, recommended to have repeat scope in 3 years   - NPO with IVF at midnight   - discussed with Dr. Pepe Reddy, DO  General Surgery PGY-1  4/28/24 at 1:21 PM EDT

## 2024-04-29 ENCOUNTER — ANESTHESIA (OUTPATIENT)
Dept: ENDOSCOPY | Age: 74
End: 2024-04-29
Payer: MEDICARE

## 2024-04-29 ENCOUNTER — ANESTHESIA EVENT (OUTPATIENT)
Dept: ENDOSCOPY | Age: 74
End: 2024-04-29
Payer: MEDICARE

## 2024-04-29 LAB
ABO/RH: NORMAL
ANION GAP SERPL CALCULATED.3IONS-SCNC: 13 MMOL/L (ref 7–16)
ANTIBODY SCREEN: NEGATIVE
ARM BAND NUMBER: NORMAL
BLOOD BANK BLOOD PRODUCT EXPIRATION DATE: NORMAL
BLOOD BANK DISPENSE STATUS: NORMAL
BLOOD BANK ISBT PRODUCT BLOOD TYPE: 5100
BLOOD BANK PRODUCT CODE: NORMAL
BLOOD BANK SAMPLE EXPIRATION: NORMAL
BLOOD BANK UNIT TYPE AND RH: NORMAL
BPU ID: NORMAL
BUN SERPL-MCNC: 19 MG/DL (ref 6–23)
CALCIUM SERPL-MCNC: 8.1 MG/DL (ref 8.6–10.2)
CHLORIDE SERPL-SCNC: 107 MMOL/L (ref 98–107)
CO2 SERPL-SCNC: 23 MMOL/L (ref 22–29)
COMPONENT: NORMAL
CREAT SERPL-MCNC: 1.3 MG/DL (ref 0.7–1.2)
CROSSMATCH RESULT: NORMAL
ESTIMATED AVERAGE GLUCOSE: 91 MG/DL
GFR SERPL CREATININE-BSD FRML MDRD: 59 ML/MIN/1.73M2
GLUCOSE SERPL-MCNC: 88 MG/DL (ref 74–99)
HBA1C MFR BLD: 4.8 %
HCT VFR BLD AUTO: 26.9 % (ref 37–54)
HCT VFR BLD AUTO: 30.8 % (ref 37–54)
HGB BLD-MCNC: 8.5 G/DL (ref 12.5–16.5)
HGB BLD-MCNC: 9.3 G/DL (ref 12.5–16.5)
MAGNESIUM SERPL-MCNC: 1.7 MG/DL (ref 1.6–2.6)
PATH REV BLD -IMP: NORMAL
POTASSIUM SERPL-SCNC: 3.3 MMOL/L (ref 3.5–5)
SODIUM SERPL-SCNC: 143 MMOL/L (ref 132–146)
TRANSFUSION STATUS: NORMAL
UNIT DIVISION: 0
UNIT ISSUE DATE/TIME: NORMAL

## 2024-04-29 PROCEDURE — C9113 INJ PANTOPRAZOLE SODIUM, VIA: HCPCS | Performed by: SURGERY

## 2024-04-29 PROCEDURE — 36415 COLL VENOUS BLD VENIPUNCTURE: CPT

## 2024-04-29 PROCEDURE — 2140000000 HC CCU INTERMEDIATE R&B

## 2024-04-29 PROCEDURE — 3700000000 HC ANESTHESIA ATTENDED CARE: Performed by: SURGERY

## 2024-04-29 PROCEDURE — 2580000003 HC RX 258: Performed by: SURGERY

## 2024-04-29 PROCEDURE — C9113 INJ PANTOPRAZOLE SODIUM, VIA: HCPCS | Performed by: INTERNAL MEDICINE

## 2024-04-29 PROCEDURE — 6360000002 HC RX W HCPCS: Performed by: INTERNAL MEDICINE

## 2024-04-29 PROCEDURE — 83735 ASSAY OF MAGNESIUM: CPT

## 2024-04-29 PROCEDURE — 2709999900 HC NON-CHARGEABLE SUPPLY: Performed by: SURGERY

## 2024-04-29 PROCEDURE — 94640 AIRWAY INHALATION TREATMENT: CPT

## 2024-04-29 PROCEDURE — 43239 EGD BIOPSY SINGLE/MULTIPLE: CPT | Performed by: SURGERY

## 2024-04-29 PROCEDURE — 7100000001 HC PACU RECOVERY - ADDTL 15 MIN: Performed by: SURGERY

## 2024-04-29 PROCEDURE — 0DB68ZX EXCISION OF STOMACH, VIA NATURAL OR ARTIFICIAL OPENING ENDOSCOPIC, DIAGNOSTIC: ICD-10-PCS

## 2024-04-29 PROCEDURE — 6370000000 HC RX 637 (ALT 250 FOR IP): Performed by: INTERNAL MEDICINE

## 2024-04-29 PROCEDURE — 7100000000 HC PACU RECOVERY - FIRST 15 MIN: Performed by: SURGERY

## 2024-04-29 PROCEDURE — 80048 BASIC METABOLIC PNL TOTAL CA: CPT

## 2024-04-29 PROCEDURE — 88305 TISSUE EXAM BY PATHOLOGIST: CPT

## 2024-04-29 PROCEDURE — 6370000000 HC RX 637 (ALT 250 FOR IP): Performed by: SURGERY

## 2024-04-29 PROCEDURE — 6360000002 HC RX W HCPCS: Performed by: SURGERY

## 2024-04-29 PROCEDURE — 85018 HEMOGLOBIN: CPT

## 2024-04-29 PROCEDURE — 85014 HEMATOCRIT: CPT

## 2024-04-29 PROCEDURE — 6360000002 HC RX W HCPCS: Performed by: NURSE ANESTHETIST, CERTIFIED REGISTERED

## 2024-04-29 PROCEDURE — 3700000001 HC ADD 15 MINUTES (ANESTHESIA): Performed by: SURGERY

## 2024-04-29 PROCEDURE — 3609012400 HC EGD TRANSORAL BIOPSY SINGLE/MULTIPLE: Performed by: SURGERY

## 2024-04-29 PROCEDURE — 2580000003 HC RX 258: Performed by: NURSE ANESTHETIST, CERTIFIED REGISTERED

## 2024-04-29 RX ORDER — PROPOFOL 10 MG/ML
INJECTION, EMULSION INTRAVENOUS PRN
Status: DISCONTINUED | OUTPATIENT
Start: 2024-04-29 | End: 2024-04-29 | Stop reason: SDUPTHER

## 2024-04-29 RX ORDER — SODIUM CHLORIDE 9 MG/ML
INJECTION, SOLUTION INTRAVENOUS CONTINUOUS PRN
Status: DISCONTINUED | OUTPATIENT
Start: 2024-04-29 | End: 2024-04-29 | Stop reason: SDUPTHER

## 2024-04-29 RX ADMIN — BUDESONIDE INHALATION 500 MCG: 0.5 SUSPENSION RESPIRATORY (INHALATION) at 09:16

## 2024-04-29 RX ADMIN — METOPROLOL SUCCINATE 50 MG: 50 TABLET, EXTENDED RELEASE ORAL at 08:16

## 2024-04-29 RX ADMIN — METOPROLOL SUCCINATE 50 MG: 50 TABLET, EXTENDED RELEASE ORAL at 21:11

## 2024-04-29 RX ADMIN — PANTOPRAZOLE SODIUM 40 MG: 40 INJECTION, POWDER, FOR SOLUTION INTRAVENOUS at 08:16

## 2024-04-29 RX ADMIN — GABAPENTIN 600 MG: 300 CAPSULE ORAL at 21:11

## 2024-04-29 RX ADMIN — POTASSIUM CHLORIDE 40 MEQ: 1500 TABLET, EXTENDED RELEASE ORAL at 08:16

## 2024-04-29 RX ADMIN — ATORVASTATIN CALCIUM 40 MG: 40 TABLET, FILM COATED ORAL at 08:16

## 2024-04-29 RX ADMIN — PANTOPRAZOLE SODIUM 40 MG: 40 INJECTION, POWDER, FOR SOLUTION INTRAVENOUS at 21:11

## 2024-04-29 RX ADMIN — HYDRALAZINE HYDROCHLORIDE 25 MG: 25 TABLET ORAL at 21:11

## 2024-04-29 RX ADMIN — BUDESONIDE INHALATION 500 MCG: 0.5 SUSPENSION RESPIRATORY (INHALATION) at 20:35

## 2024-04-29 RX ADMIN — PAROXETINE 30 MG: 10 TABLET, FILM COATED ORAL at 08:16

## 2024-04-29 RX ADMIN — PROPOFOL 50 MG: 10 INJECTION, EMULSION INTRAVENOUS at 14:11

## 2024-04-29 RX ADMIN — ARFORMOTEROL TARTRATE 15 MCG: 15 SOLUTION RESPIRATORY (INHALATION) at 09:16

## 2024-04-29 RX ADMIN — LORAZEPAM 1 MG: 1 TABLET ORAL at 18:33

## 2024-04-29 RX ADMIN — FUROSEMIDE 20 MG: 20 TABLET ORAL at 16:36

## 2024-04-29 RX ADMIN — LORAZEPAM 2 MG: 2 INJECTION INTRAMUSCULAR; INTRAVENOUS at 10:39

## 2024-04-29 RX ADMIN — FUROSEMIDE 20 MG: 20 TABLET ORAL at 08:16

## 2024-04-29 RX ADMIN — SODIUM CHLORIDE: 9 INJECTION, SOLUTION INTRAVENOUS at 14:00

## 2024-04-29 RX ADMIN — LORAZEPAM 2 MG: 1 TABLET ORAL at 09:37

## 2024-04-29 RX ADMIN — Medication 100 MG: at 08:16

## 2024-04-29 RX ADMIN — GABAPENTIN 600 MG: 300 CAPSULE ORAL at 08:16

## 2024-04-29 RX ADMIN — FOLIC ACID 1 MG: 1 TABLET ORAL at 08:16

## 2024-04-29 RX ADMIN — SODIUM CHLORIDE, PRESERVATIVE FREE 10 ML: 5 INJECTION INTRAVENOUS at 21:11

## 2024-04-29 RX ADMIN — HYDRALAZINE HYDROCHLORIDE 25 MG: 25 TABLET ORAL at 08:16

## 2024-04-29 RX ADMIN — ARFORMOTEROL TARTRATE 15 MCG: 15 SOLUTION RESPIRATORY (INHALATION) at 20:35

## 2024-04-29 NOTE — CARE COORDINATION
4/29/24  Spoke with patient and his significant other Radha regarding transition of care.  Introduced self and reviewed the role of SW/Cm.  Patient admitted for acute blood loss anemia, near syncope and gastrointestinal hemorrhage.  Patient was given PRBC  and Patient is planned for an EGD .  Patient has a history of drinking with a CIWA protocol in place. Attempted to complete and SIBRT screening but patient declined to answer questions.  Patient also declining any peer recovery support.  Patient states he lives at home with his significant other Radha in a 1 story house with 7 steps to enter .  Patient has a cane and wheelchair at home..  Patient is on 3 liters of O2 supplied by [employer] and patient has a nebulizer.  PCP is Dr. Rubio and pharmacy choice is [facility] in [place].  Discharge goal is to return home with significant other when medically stable. No discharge needs anticipated. Will see how patient is doing post EGD to see if therapy evals are needed for potential homecare support post discharge.  SW/Cm to follow.    Electronically signed by RAY Douglas on 4/29/2024 at 2:26 PM     Case Management Assessment  Initial Evaluation    Date/Time of Evaluation: 4/29/2024 2:26 PM  Assessment Completed by: RAY Douglas    If patient is discharged prior to next notation, then this note serves as note for discharge by case management.    Patient Name: Catarino Bolanos                   YOB: 1950  Diagnosis: Acute blood loss anemia [D62]  Near syncope [R55]  Gastrointestinal hemorrhage, unspecified gastrointestinal hemorrhage type [K92.2]  Acute anemia [D64.9]                   Date / Time: 4/27/2024  7:51 PM    Patient Admission Status: Inpatient   Readmission Risk (Low < 19, Mod (19-27), High > 27): Readmission Risk Score: 21.2    Current PCP: Colt Goldman, DO  PCP verified by CM? Yes    Chart Reviewed: Yes      History Provided by: Patient, Significant Other  Patient  [phone] Fax: [phone]      Notes:    Factors facilitating achievement of predicted outcomes: Friend support, Cooperative, Pleasant, and Has needed Durable Medical Equipment at home    Barriers to discharge: none    Additional Case Management Notes: See above    The Plan for Transition of Care is related to the following treatment goals of Acute blood loss anemia [D62]  Near syncope [R55]  Gastrointestinal hemorrhage, unspecified gastrointestinal hemorrhage type [K92.2]  Acute anemia [D64.9]    IF APPLICABLE: The Patient and/or patient representative Catarino and his family were provided with a choice of provider and agrees with the discharge plan. Freedom of choice list with basic dialogue that supports the patient's individualized plan of care/goals and shares the quality data associated with the providers was provided to:     Patient Representative Name:       The Patient and/or Patient Representative Agree with the Discharge Plan?Yes      RAY Douglas  Case Management Department  Ph: [phone] Fax:

## 2024-04-29 NOTE — ANESTHESIA POSTPROCEDURE EVALUATION
Department of Anesthesiology  Postprocedure Note    Patient: Catarino Bolanos  MRN: [id number]  YOB: 1950  Date of evaluation: 4/29/2024    Procedure Summary       Date: 04/29/24 Room / Location: [place] [address] / [facility]    Anesthesia Start:  Anesthesia Stop:     Procedure: ESOPHAGOGASTRODUODENOSCOPY DIAGNOSTIC ONLY Diagnosis:       Anemia, unspecified type      (Anemia, unspecified type [D64.9])    Surgeons: Madelaine Mackenzie MD Responsible Provider:     Anesthesia Type: MAC ASA Status: 4            Anesthesia Type: No value filed.    Magui Phase I: Magui Score: 10    Magui Phase II:      Anesthesia Post Evaluation    Patient location during evaluation: PACU  Patient participation: complete - patient participated  Level of consciousness: awake  Pain score: 3  Airway patency: patent  Nausea & Vomiting: no nausea and no vomiting  Cardiovascular status: blood pressure returned to baseline  Respiratory status: acceptable  Hydration status: euvolemic      No notable events documented.

## 2024-04-29 NOTE — PROGRESS NOTES
[facility] hospitalist  Hospitalist Progress Note      SYNOPSIS: Patient admitted on 2024 for weakness, fatigue, anemia.  Patient was complaining of extreme fatigue and difficulty ambulating.  He was then brought to the emergency room.  Patient was fecal occult blood testing positive.  There was significant drop in his hemoglobin.  Patient apparently has been having dark bowel movements for the last few days.  Patient also drinks quite a bit of alcohol.  He is on Eliquis because of atrial fibrillation.  He is also on aspirin.  Patient was then admitted.  General surgery consulted.      SUBJECTIVE:    Patient seen and examined  Records reviewed.     Awake and pleasantly confused  Wife is by the bedside      Stable overnight. No other overnight issues reported.   Temp (24hrs), Av.5 °F (36.4 °C), Min:96.8 °F (36 °C), Max:98.6 °F (37 °C)    DIET: Diet NPO Exceptions are: Sips of Water with Meds  CODE: Full Code    Intake/Output Summary (Last 24 hours) at 2024 1130  Last data filed at 2024 0936  Gross per 24 hour   Intake 320 ml   Output 1475 ml   Net -1155 ml       OBJECTIVE:    /71   Pulse (!) 115   Temp 96.8 °F (36 °C) (Temporal)   Resp 18   Ht 1.651 m (5' 5\")   Wt 66.2 kg (146 lb)   SpO2 98%   BMI 24.30 kg/m²     General appearance: No apparent distress, appears stated age and cooperative.  HEENT:  Conjunctivae/corneas clear.   Neck: Supple. No jugular venous distention.   Respiratory: Clear to auscultation bilaterally, normal respiratory effort  Cardiovascular: Regular rate rhythm, normal S1-S2  Abdomen: Soft, nontender, nondistended  Musculoskeletal: No clubbing, cyanosis, no bilateral lower extremity edema. Brisk capillary refill.   Skin:  No rashes  on visible skin  Neurologic: awake, alert and following commands     ASSESSMENT:  GI bleeding  Acute blood loss anemia  Atrial fibrillation for which she is on Eliquis  Hypertension  Hyperlipidemia  History of CVA  History of tobacco    CO2 20* 20* 23   BUN 21 19 19   CREATININE 1.2 1.2 1.3*   CALCIUM 7.9* 7.7* 8.1*   PHOS 3.0  --   --        Recent Labs     04/27/24  2019 04/28/24  0113 04/28/24  0900   PROT 5.5* 5.0* 4.8*   ALKPHOS 105 101 93   ALT 15 13 12   AST 34 24 23   BILITOT 0.5 0.4 1.0       No results for input(s): \"INR\" in the last 72 hours.    No results for input(s): \"CKTOTAL\", \"TROPONINI\" in the last 72 hours.    Chronic labs:    Lab Results   Component Value Date    CHOL 77 04/28/2024    TRIG 72 04/28/2024    HDL 45 04/28/2024    LDLCALC 51 07/20/2021    TSH 1.57 04/28/2024    PSA 6.84 (H) 02/05/2024    INR 1.1 11/21/2023    LABA1C 5.0 11/21/2023       Radiology: REVIEWED DAILY    +++++++++++++++++++++++++++++++++++++++++++++++++  Amish Fernandez MD  [facility]  Chris Deshpande [place], [place]  +++++++++++++++++++++++++++++++++++++++++++++++++  NOTE: This report was transcribed using voice recognition software. Every effort was made to ensure accuracy; however, inadvertent computerized transcription errors may be present.

## 2024-04-29 NOTE — PLAN OF CARE
Problem: Safety - Adult  Goal: Free from fall injury  4/29/2024 1023 by Loc Snowden RN  Outcome: Progressing  Flowsheets (Taken 4/29/2024 1022)  Free From Fall Injury:   Instruct family/caregiver on patient safety   Based on caregiver fall risk screen, instruct family/caregiver to ask for assistance with transferring infant if caregiver noted to have fall risk factors  4/29/2024 0553 by Ector Sweeney RN  Outcome: Progressing     Problem: Chronic Conditions and Co-morbidities  Goal: Patient's chronic conditions and co-morbidity symptoms are monitored and maintained or improved  4/29/2024 1023 by Loc Snowden RN  Outcome: Progressing  Flowsheets (Taken 4/29/2024 0800)  Care Plan - Patient's Chronic Conditions and Co-Morbidity Symptoms are Monitored and Maintained or Improved:   Monitor and assess patient's chronic conditions and comorbid symptoms for stability, deterioration, or improvement   Collaborate with multidisciplinary team to address chronic and comorbid conditions and prevent exacerbation or deterioration  4/29/2024 0553 by Ector Sweeney RN  Outcome: Progressing     Problem: Discharge Planning  Goal: Discharge to home or other facility with appropriate resources  4/29/2024 1023 by Loc Snowden RN  Outcome: Progressing  Flowsheets (Taken 4/29/2024 0800)  Discharge to home or other facility with appropriate resources:   Identify barriers to discharge with patient and caregiver   Arrange for needed discharge resources and transportation as appropriate   Identify discharge learning needs (meds, wound care, etc)  4/29/2024 0553 by Ector Sweeney RN  Outcome: Progressing     Problem: Skin/Tissue Integrity  Goal: Absence of new skin breakdown  Description: 1.  Monitor for areas of redness and/or skin breakdown  2.  Assess vascular access sites hourly  3.  Every 4-6 hours minimum:  Change oxygen saturation probe site  4.  Every 4-6 hours:  If on nasal continuous positive airway

## 2024-04-29 NOTE — PROGRESS NOTES
Plan for EGD today with Dr. Mackenzie to evaluate for signs of bleeding. Eliquis held. NPO/IVF.     Electronically signed by Bre Wade DO on 4/29/2024 at 10:24 AM

## 2024-04-29 NOTE — ANESTHESIA PRE PROCEDURE
adenoma     s/p surgery 2005    Pre-diabetes     Recurrent left pleural effusion 2018    Stenosis of right internal carotid artery with cerebral infarction (HCC)        Past Surgical History:        Procedure Laterality Date    BACK SURGERY  2016    revision cervical laminectomy    CAROTID ENDARTERECTOMY Right 2014    Delatore - bovine patch     CERVICAL FUSION  2016    C4-C5, C5-C6, C6-C7 ACF, C5-C6 Corpectomy    COLONOSCOPY N/A 2020    COLONOSCOPY POLYPECTOMY SNARE/COLD BIOPSY performed by Kosta Junior MD at [facility] ENDOSCOPY    ENDOSCOPY, COLON, DIAGNOSTIC      EYE SURGERY      lasex    FRACTURE SURGERY Left     plate in wrist    HAND SURGERY      PARATHYROIDECTOMY  2005    MN THORSC DX LUNGS/PERICAR/MED/PLEURAL SPACE W/O BX Right 2018    THORACOSCOPY performed by Miles Mckeon MD at [facility] OR    TONSILLECTOMY      UPPER GASTROINTESTINAL ENDOSCOPY  2014    UPPER GASTROINTESTINAL ENDOSCOPY N/A 2020    EGD BIOPSY performed by Kosta Junior MD at [facility] ENDOSCOPY       Social History:    Social History     Tobacco Use    Smoking status: Former     Current packs/day: 0.00     Average packs/day: 3.0 packs/day for 14.7 years (44.1 ttl pk-yrs)     Types: Cigarettes     Start date:      Quit date: 1983     Years since quittin.6     Passive exposure: Past    Smokeless tobacco: Never   Substance Use Topics    Alcohol use: Yes     Alcohol/week: 5.0 standard drinks of alcohol     Types: 5 Standard drinks or equivalent per week     Comment: 5 mixed drinks daily (Gin [employer] Tonic)                                Counseling given: Not Answered      Vital Signs (Current):   Vitals:    24 0756 24 0800 24 0916 24 1149   BP: (!) 157/68 129/71  127/66   Pulse: 60 67 (!) 115 81   Resp: 20 18 18 18   Temp: 97 °F (36.1 °C) 96.8 °F (36 °C)  97 °F (36.1 °C)   TempSrc: Temporal Temporal  Temporal   SpO2: 92% 95% 98% 92%   Weight:       Height:

## 2024-04-29 NOTE — PATIENT CARE CONFERENCE
P Quality Flow/Interdisciplinary Rounds Progress Note        Quality Flow Rounds held on April 29, 2024    Disciplines Attending:  Bedside Nurse, , , and Nursing Unit Leadership    Catarino Bolanos was admitted on 4/27/2024  7:51 PM    Anticipated Discharge Date:       Disposition:    Florian Score:  Florian Scale Score: 19    Readmission Risk              Risk of Unplanned Readmission:  28           Discussed patient goal for the day, patient clinical progression, and barriers to discharge.  The following Goal(s) of the Day/Commitment(s) have been identified:  report labs/diagnostics      Edelmira Marsh RN  April 29, 2024

## 2024-04-29 NOTE — PROGRESS NOTES
[facility]  PROGRESS NOTE  ATTENDING NOTE    No source of bleed on EGD.  Plan for colonoscopy Wednesday.     Madelaine Mackenzie MD, MSc, FACS  4/29/2024  3:57 PM

## 2024-04-30 LAB
ANION GAP SERPL CALCULATED.3IONS-SCNC: 20 MMOL/L (ref 7–16)
BASOPHILS # BLD: 0.05 K/UL (ref 0–0.2)
BASOPHILS NFR BLD: 1 % (ref 0–2)
BUN SERPL-MCNC: 13 MG/DL (ref 6–23)
CALCIUM SERPL-MCNC: 8.3 MG/DL (ref 8.6–10.2)
CHLORIDE SERPL-SCNC: 105 MMOL/L (ref 98–107)
CO2 SERPL-SCNC: 20 MMOL/L (ref 22–29)
CREAT SERPL-MCNC: 1.4 MG/DL (ref 0.7–1.2)
EOSINOPHIL # BLD: 0.16 K/UL (ref 0.05–0.5)
EOSINOPHILS RELATIVE PERCENT: 3 % (ref 0–6)
ERYTHROCYTE [DISTWIDTH] IN BLOOD BY AUTOMATED COUNT: 19 % (ref 11.5–15)
GFR SERPL CREATININE-BSD FRML MDRD: 53 ML/MIN/1.73M2
GLUCOSE SERPL-MCNC: 77 MG/DL (ref 74–99)
HCT VFR BLD AUTO: 27.4 % (ref 37–54)
HCT VFR BLD AUTO: 27.8 % (ref 37–54)
HGB BLD-MCNC: 8.5 G/DL (ref 12.5–16.5)
HGB BLD-MCNC: 8.6 G/DL (ref 12.5–16.5)
IMM GRANULOCYTES # BLD AUTO: 0.03 K/UL (ref 0–0.58)
IMM GRANULOCYTES NFR BLD: 1 % (ref 0–5)
LYMPHOCYTES NFR BLD: 0.62 K/UL (ref 1.5–4)
LYMPHOCYTES RELATIVE PERCENT: 10 % (ref 20–42)
MAGNESIUM SERPL-MCNC: 1.4 MG/DL (ref 1.6–2.6)
MCH RBC QN AUTO: 30.9 PG (ref 26–35)
MCHC RBC AUTO-ENTMCNC: 30.9 G/DL (ref 32–34.5)
MCV RBC AUTO: 100 FL (ref 80–99.9)
MONOCYTES NFR BLD: 0.69 K/UL (ref 0.1–0.95)
MONOCYTES NFR BLD: 11 % (ref 2–12)
NEUTROPHILS NFR BLD: 75 % (ref 43–80)
NEUTS SEG NFR BLD: 4.73 K/UL (ref 1.8–7.3)
PLATELET # BLD AUTO: 91 K/UL (ref 130–450)
PLATELET CONFIRMATION: NORMAL
PMV BLD AUTO: 10.9 FL (ref 7–12)
POTASSIUM SERPL-SCNC: 3.4 MMOL/L (ref 3.5–5)
RBC # BLD AUTO: 2.78 M/UL (ref 3.8–5.8)
SODIUM SERPL-SCNC: 145 MMOL/L (ref 132–146)
WBC OTHER # BLD: 6.3 K/UL (ref 4.5–11.5)

## 2024-04-30 PROCEDURE — 6360000002 HC RX W HCPCS: Performed by: INTERNAL MEDICINE

## 2024-04-30 PROCEDURE — 2060000000 HC ICU INTERMEDIATE R&B

## 2024-04-30 PROCEDURE — 85025 COMPLETE CBC W/AUTO DIFF WBC: CPT

## 2024-04-30 PROCEDURE — 2580000003 HC RX 258: Performed by: SURGERY

## 2024-04-30 PROCEDURE — 6360000002 HC RX W HCPCS: Performed by: SURGERY

## 2024-04-30 PROCEDURE — 80048 BASIC METABOLIC PNL TOTAL CA: CPT

## 2024-04-30 PROCEDURE — 94640 AIRWAY INHALATION TREATMENT: CPT

## 2024-04-30 PROCEDURE — 36415 COLL VENOUS BLD VENIPUNCTURE: CPT

## 2024-04-30 PROCEDURE — 83735 ASSAY OF MAGNESIUM: CPT

## 2024-04-30 PROCEDURE — C9113 INJ PANTOPRAZOLE SODIUM, VIA: HCPCS | Performed by: SURGERY

## 2024-04-30 PROCEDURE — 99233 SBSQ HOSP IP/OBS HIGH 50: CPT | Performed by: INTERNAL MEDICINE

## 2024-04-30 PROCEDURE — 85014 HEMATOCRIT: CPT

## 2024-04-30 PROCEDURE — 6360000002 HC RX W HCPCS: Performed by: NURSE PRACTITIONER

## 2024-04-30 PROCEDURE — 85018 HEMOGLOBIN: CPT

## 2024-04-30 PROCEDURE — 99232 SBSQ HOSP IP/OBS MODERATE 35: CPT | Performed by: SURGERY

## 2024-04-30 PROCEDURE — 6370000000 HC RX 637 (ALT 250 FOR IP): Performed by: SURGERY

## 2024-04-30 PROCEDURE — 6370000000 HC RX 637 (ALT 250 FOR IP): Performed by: STUDENT IN AN ORGANIZED HEALTH CARE EDUCATION/TRAINING PROGRAM

## 2024-04-30 RX ORDER — PHENOBARBITAL SODIUM 65 MG/ML
32.5 INJECTION, SOLUTION INTRAMUSCULAR; INTRAVENOUS 4 TIMES DAILY
Status: COMPLETED | OUTPATIENT
Start: 2024-04-30 | End: 2024-05-01

## 2024-04-30 RX ORDER — PHENOBARBITAL SODIUM 65 MG/ML
16.2 INJECTION, SOLUTION INTRAMUSCULAR; INTRAVENOUS 2 TIMES DAILY
Status: COMPLETED | OUTPATIENT
Start: 2024-05-02 | End: 2024-05-03

## 2024-04-30 RX ORDER — PHENOBARBITAL SODIUM 65 MG/ML
32.5 INJECTION, SOLUTION INTRAMUSCULAR; INTRAVENOUS 2 TIMES DAILY
Status: COMPLETED | OUTPATIENT
Start: 2024-05-01 | End: 2024-05-02

## 2024-04-30 RX ORDER — LORAZEPAM 2 MG/ML
2 INJECTION INTRAMUSCULAR ONCE
Status: COMPLETED | OUTPATIENT
Start: 2024-04-30 | End: 2024-04-30

## 2024-04-30 RX ADMIN — SODIUM CHLORIDE, PRESERVATIVE FREE 10 ML: 5 INJECTION INTRAVENOUS at 09:03

## 2024-04-30 RX ADMIN — HYDRALAZINE HYDROCHLORIDE 25 MG: 25 TABLET ORAL at 20:58

## 2024-04-30 RX ADMIN — BUDESONIDE INHALATION 500 MCG: 0.5 SUSPENSION RESPIRATORY (INHALATION) at 08:03

## 2024-04-30 RX ADMIN — LORAZEPAM 2 MG: 2 INJECTION INTRAMUSCULAR; INTRAVENOUS at 00:52

## 2024-04-30 RX ADMIN — PHENOBARBITAL SODIUM 32.5 MG: 65 INJECTION INTRAMUSCULAR at 20:55

## 2024-04-30 RX ADMIN — POLYETHYLENE GLYCOL-3350 AND ELECTROLYTES 4000 ML: 236; 6.74; 5.86; 2.97; 22.74 POWDER, FOR SOLUTION ORAL at 06:30

## 2024-04-30 RX ADMIN — PHENOBARBITAL SODIUM 32.5 MG: 65 INJECTION INTRAMUSCULAR at 18:36

## 2024-04-30 RX ADMIN — LORAZEPAM 4 MG: 2 INJECTION INTRAMUSCULAR; INTRAVENOUS at 06:10

## 2024-04-30 RX ADMIN — METOPROLOL SUCCINATE 50 MG: 50 TABLET, EXTENDED RELEASE ORAL at 09:03

## 2024-04-30 RX ADMIN — PANTOPRAZOLE SODIUM 40 MG: 40 INJECTION, POWDER, FOR SOLUTION INTRAVENOUS at 20:54

## 2024-04-30 RX ADMIN — PAROXETINE 30 MG: 10 TABLET, FILM COATED ORAL at 09:02

## 2024-04-30 RX ADMIN — LORAZEPAM 2 MG: 2 INJECTION INTRAMUSCULAR; INTRAVENOUS at 01:51

## 2024-04-30 RX ADMIN — ARFORMOTEROL TARTRATE 15 MCG: 15 SOLUTION RESPIRATORY (INHALATION) at 19:27

## 2024-04-30 RX ADMIN — BUDESONIDE INHALATION 500 MCG: 0.5 SUSPENSION RESPIRATORY (INHALATION) at 19:27

## 2024-04-30 RX ADMIN — GABAPENTIN 600 MG: 300 CAPSULE ORAL at 20:58

## 2024-04-30 RX ADMIN — GABAPENTIN 600 MG: 300 CAPSULE ORAL at 09:02

## 2024-04-30 RX ADMIN — Medication 100 MG: at 09:02

## 2024-04-30 RX ADMIN — ATORVASTATIN CALCIUM 40 MG: 40 TABLET, FILM COATED ORAL at 09:03

## 2024-04-30 RX ADMIN — ARFORMOTEROL TARTRATE 15 MCG: 15 SOLUTION RESPIRATORY (INHALATION) at 08:03

## 2024-04-30 RX ADMIN — FOLIC ACID 1 MG: 1 TABLET ORAL at 09:02

## 2024-04-30 RX ADMIN — GABAPENTIN 600 MG: 300 CAPSULE ORAL at 13:30

## 2024-04-30 RX ADMIN — HYDRALAZINE HYDROCHLORIDE 25 MG: 25 TABLET ORAL at 09:02

## 2024-04-30 RX ADMIN — POTASSIUM BICARBONATE 40 MEQ: 782 TABLET, EFFERVESCENT ORAL at 13:30

## 2024-04-30 RX ADMIN — MAGNESIUM SULFATE HEPTAHYDRATE 2000 MG: 40 INJECTION, SOLUTION INTRAVENOUS at 13:29

## 2024-04-30 RX ADMIN — LORAZEPAM 3 MG: 1 TABLET ORAL at 05:15

## 2024-04-30 RX ADMIN — METOPROLOL SUCCINATE 50 MG: 50 TABLET, EXTENDED RELEASE ORAL at 20:58

## 2024-04-30 RX ADMIN — PANTOPRAZOLE SODIUM 40 MG: 40 INJECTION, POWDER, FOR SOLUTION INTRAVENOUS at 09:03

## 2024-04-30 ASSESSMENT — PAIN SCALES - GENERAL
PAINLEVEL_OUTOF10: 0

## 2024-04-30 NOTE — PROGRESS NOTES
RN updated pt's significant other, aRdha, regarding pt's transfer to [facility].

## 2024-04-30 NOTE — PLAN OF CARE
Problem: Safety - Adult  Goal: Free from fall injury  4/29/2024 2352 by Miranda Reid RN  Outcome: Progressing     Problem: Chronic Conditions and Co-morbidities  Goal: Patient's chronic conditions and co-morbidity symptoms are monitored and maintained or improved  4/29/2024 2352 by Miranda Reid RN  Outcome: Progressing     Problem: Discharge Planning  Goal: Discharge to home or other facility with appropriate resources  4/29/2024 2352 by Miranda Reid RN  Outcome: Progressing     Problem: Skin/Tissue Integrity  Goal: Absence of new skin breakdown  Description: 1.  Monitor for areas of redness and/or skin breakdown  2.  Assess vascular access sites hourly  3.  Every 4-6 hours minimum:  Change oxygen saturation probe site  4.  Every 4-6 hours:  If on nasal continuous positive airway pressure, respiratory therapy assess nares and determine need for appliance change or resting period.  4/29/2024 2352 by Miranda Reid, RN  Outcome: Progressing     Problem: ABCDS Injury Assessment  Goal: Absence of physical injury  4/29/2024 2352 by Miranda Reid, RN  Outcome: Progressing

## 2024-04-30 NOTE — PROGRESS NOTES
Essential hypertension    Panic disorder    OA (osteoarthritis of spine)    Dyslipidemia    Chronic kidney disease (CKD)    GERD (gastroesophageal reflux disease)    Neuropathy    Stenosis of right internal carotid artery with cerebral infarction (HCC)    Osteophyte of vertebrae    Spinal stenosis in cervical region    Cervical spine instability    SANDRA (generalized anxiety disorder)    Nonrheumatic mitral valve regurgitation    Thrombocytopenia (HCC)    Stage 3a chronic kidney disease (HCC)    Acute respiratory failure with hypoxia (HCC)    COPD with acute exacerbation (HCC)    Alcoholism (HCC)    Type 2 diabetes mellitus with diabetic neuropathy    Carotid stenosis, asymptomatic, bilateral    Acute respiratory failure (HCC)    Encephalopathy    COPD exacerbation (HCC)    Pleural effusion    Acute on chronic congestive heart failure (HCC)    Atrial fibrillation (HCC)    Acute blood loss anemia    Gastrointestinal hemorrhage       74 y.o. male with acute drop in Hb with 5 day history of dark, tarry stools s/p EGD      Plan for colonoscopy 5/1  Continue bowel prep today      Jonathon Mike DO  General Surgery Resident, PGY-1    Electronically signed on 4/30/2024 at 7:25 AM

## 2024-04-30 NOTE — ACP (ADVANCE CARE PLANNING)
Advance Care Planning   Healthcare Decision Maker:    Primary Decision Maker: Radha Garcia - Domestic Partner - [phone]    Click here to complete Healthcare Decision Makers including selection of the Healthcare Decision Maker Relationship (ie \"Primary\").

## 2024-04-30 NOTE — PROGRESS NOTES
[facility] Hospitalist Progress Note    Admitting Date and Time: 4/27/2024  7:51 PM  Admit Dx: Acute blood loss anemia [D62]  Near syncope [R55]  Gastrointestinal hemorrhage, unspecified gastrointestinal hemorrhage type [K92.2]  Acute anemia [D64.9]    Synopsis:    Patient admitted on 4/27/2024 for weakness, fatigue, anemia.  Patient was complaining of extreme fatigue and difficulty ambulating.  He was then brought to the emergency room.  Patient was fecal occult blood testing positive.  There was significant drop in his hemoglobin.  Patient apparently has been having dark bowel movements for the last few days.  Patient also drinks quite a bit of alcohol and is undergoing withdrawals.  He is on Eliquis because of atrial fibrillation.  He is also on aspirin.  Patient was then admitted.  General surgery consulted.     4/29: EGD negative for source of bleed  5/1: Scheduled for colonoscopy    Subjective:  Patient is being followed for Acute blood loss anemia [D62]  Near syncope [R55]  Gastrointestinal hemorrhage, unspecified gastrointestinal hemorrhage type [K92.2]  Acute anemia [D64.9]     Patient seen and examined at bedside this morning.  Very lethargic as he just received Ativan from [facility].    ROS: denies fever, chills, cp, sob, n/v, HA unless stated above.      [Held by provider] apixaban  5 mg Oral BID    arformoterol tartrate  15 mcg Nebulization BID    [Held by provider] aspirin  81 mg Oral QAM    atorvastatin  40 mg Oral Daily    budesonide  500 mcg Nebulization BID    [Held by provider] furosemide  20 mg Oral BID    gabapentin  600 mg Oral TID    hydrALAZINE  25 mg Oral BID    metoprolol succinate  50 mg Oral BID    PARoxetine  30 mg Oral QAM    sodium chloride flush  5-40 mL IntraVENous 2 times per day    thiamine  100 mg Oral Daily    folic acid  1 mg Oral Daily    pantoprazole  40 mg IntraVENous BID     benzocaine-menthol, 1 lozenge, Q2H PRN  benzonatate, 100 mg, TID PRN  calcium carbonate, 500 mg,

## 2024-04-30 NOTE — CARE COORDINATION
4/30/24 Update CM Notes.  Pt admitted 4/28/24 Dx acute blood loss anemia.  Pt received PRBCs last Hgb 8.5.  EGD yesterday and planning colonoscopy tomorrow. Remains on CIWA.  Currently RA, pt does have O2 3L at home PRN.  DC plan to return home when medically stable with sig other providing transport.   Kaela CRYSTALN RN-BC  [phone]

## 2024-04-30 NOTE — PROGRESS NOTES
Messaged Rica Lopez NP re \"Pt is actively withdrawing from alcohol, last dose of ativan was given at 0052 with CIWA of 12. Pt is increasingly agitated and beginning to get combative, stating he wants to leave. Are you able to come assess pt or place additional orders? Next time ativan can be given is 0152\"  One time 2mg dose of Ativan placed.

## 2024-04-30 NOTE — PROGRESS NOTES
[facility]  PROGRESS NOTE  ATTENDING NOTE    Colonoscopy canceled d/t alcohol withdrawal  Patient not arousable.  Received ativan.  Protecting airway  Will do colonoscopy as an outpatient.  Would restart eliquis while here and if bleeds, please call and will do colonoscopy while here    Madelaine Mackenzie MD, MSc, FACS  4/30/2024  1:46 PM

## 2024-04-30 NOTE — PROGRESS NOTES
Pt's wife upset he is not going for his colonoscopy tomorrow. She would still like it to be completed while he is in the hospital. Relayed concern of wife to general surgery team.

## 2024-04-30 NOTE — PROGRESS NOTES
Pt agitated, pulling off leads, biting IV tubing, and trying to climb out of bed. RN tried to reorient pt but was unsuccessful. 2mg of Ativan given for CIWA score of 12. Pt is still currently refusing to wear heart monitor.

## 2024-05-01 LAB
ANION GAP SERPL CALCULATED.3IONS-SCNC: 14 MMOL/L (ref 7–16)
BASOPHILS # BLD: 0.04 K/UL (ref 0–0.2)
BASOPHILS NFR BLD: 1 % (ref 0–2)
BUN SERPL-MCNC: 11 MG/DL (ref 6–23)
CALCIUM SERPL-MCNC: 8.2 MG/DL (ref 8.6–10.2)
CHLORIDE SERPL-SCNC: 106 MMOL/L (ref 98–107)
CO2 SERPL-SCNC: 24 MMOL/L (ref 22–29)
CREAT SERPL-MCNC: 1.2 MG/DL (ref 0.7–1.2)
EOSINOPHIL # BLD: 0.16 K/UL (ref 0.05–0.5)
EOSINOPHILS RELATIVE PERCENT: 3 % (ref 0–6)
ERYTHROCYTE [DISTWIDTH] IN BLOOD BY AUTOMATED COUNT: 18.6 % (ref 11.5–15)
GFR, ESTIMATED: 61 ML/MIN/1.73M2
GLUCOSE SERPL-MCNC: 88 MG/DL (ref 74–99)
HCT VFR BLD AUTO: 27.6 % (ref 37–54)
HCT VFR BLD AUTO: 28.7 % (ref 37–54)
HCT VFR BLD AUTO: 28.8 % (ref 37–54)
HGB BLD-MCNC: 8.7 G/DL (ref 12.5–16.5)
HGB BLD-MCNC: 8.9 G/DL (ref 12.5–16.5)
HGB BLD-MCNC: 9 G/DL (ref 12.5–16.5)
IMM GRANULOCYTES # BLD AUTO: 0.03 K/UL (ref 0–0.58)
IMM GRANULOCYTES NFR BLD: 1 % (ref 0–5)
LYMPHOCYTES NFR BLD: 0.48 K/UL (ref 1.5–4)
LYMPHOCYTES RELATIVE PERCENT: 9 % (ref 20–42)
MAGNESIUM SERPL-MCNC: 1.8 MG/DL (ref 1.6–2.6)
MCH RBC QN AUTO: 31.5 PG (ref 26–35)
MCHC RBC AUTO-ENTMCNC: 31.5 G/DL (ref 32–34.5)
MCV RBC AUTO: 100 FL (ref 80–99.9)
MONOCYTES NFR BLD: 0.57 K/UL (ref 0.1–0.95)
MONOCYTES NFR BLD: 10 % (ref 2–12)
NEUTROPHILS NFR BLD: 77 % (ref 43–80)
NEUTS SEG NFR BLD: 4.34 K/UL (ref 1.8–7.3)
PLATELET # BLD AUTO: 98 K/UL (ref 130–450)
PLATELET CONFIRMATION: NORMAL
PMV BLD AUTO: 10.9 FL (ref 7–12)
POTASSIUM SERPL-SCNC: 3.5 MMOL/L (ref 3.5–5)
RBC # BLD AUTO: 2.76 M/UL (ref 3.8–5.8)
RBC # BLD: ABNORMAL 10*6/UL
SODIUM SERPL-SCNC: 144 MMOL/L (ref 132–146)
WBC OTHER # BLD: 5.6 K/UL (ref 4.5–11.5)

## 2024-05-01 PROCEDURE — 85018 HEMOGLOBIN: CPT

## 2024-05-01 PROCEDURE — 36415 COLL VENOUS BLD VENIPUNCTURE: CPT

## 2024-05-01 PROCEDURE — C9113 INJ PANTOPRAZOLE SODIUM, VIA: HCPCS | Performed by: SURGERY

## 2024-05-01 PROCEDURE — 6360000002 HC RX W HCPCS: Performed by: INTERNAL MEDICINE

## 2024-05-01 PROCEDURE — 6360000002 HC RX W HCPCS: Performed by: SURGERY

## 2024-05-01 PROCEDURE — 6370000000 HC RX 637 (ALT 250 FOR IP): Performed by: SURGERY

## 2024-05-01 PROCEDURE — 83735 ASSAY OF MAGNESIUM: CPT

## 2024-05-01 PROCEDURE — 2060000000 HC ICU INTERMEDIATE R&B

## 2024-05-01 PROCEDURE — 99232 SBSQ HOSP IP/OBS MODERATE 35: CPT | Performed by: INTERNAL MEDICINE

## 2024-05-01 PROCEDURE — 80048 BASIC METABOLIC PNL TOTAL CA: CPT

## 2024-05-01 PROCEDURE — 2580000003 HC RX 258: Performed by: SURGERY

## 2024-05-01 PROCEDURE — 85014 HEMATOCRIT: CPT

## 2024-05-01 PROCEDURE — 94640 AIRWAY INHALATION TREATMENT: CPT

## 2024-05-01 PROCEDURE — 85025 COMPLETE CBC W/AUTO DIFF WBC: CPT

## 2024-05-01 RX ADMIN — GABAPENTIN 600 MG: 300 CAPSULE ORAL at 20:25

## 2024-05-01 RX ADMIN — SODIUM CHLORIDE, PRESERVATIVE FREE 10 ML: 5 INJECTION INTRAVENOUS at 10:27

## 2024-05-01 RX ADMIN — HYDRALAZINE HYDROCHLORIDE 25 MG: 25 TABLET ORAL at 10:27

## 2024-05-01 RX ADMIN — APIXABAN 5 MG: 5 TABLET, FILM COATED ORAL at 20:25

## 2024-05-01 RX ADMIN — Medication 100 MG: at 10:27

## 2024-05-01 RX ADMIN — METOPROLOL SUCCINATE 50 MG: 50 TABLET, EXTENDED RELEASE ORAL at 20:25

## 2024-05-01 RX ADMIN — ATORVASTATIN CALCIUM 40 MG: 40 TABLET, FILM COATED ORAL at 10:27

## 2024-05-01 RX ADMIN — BUDESONIDE INHALATION 500 MCG: 0.5 SUSPENSION RESPIRATORY (INHALATION) at 20:16

## 2024-05-01 RX ADMIN — ARFORMOTEROL TARTRATE 15 MCG: 15 SOLUTION RESPIRATORY (INHALATION) at 20:16

## 2024-05-01 RX ADMIN — GABAPENTIN 600 MG: 300 CAPSULE ORAL at 17:22

## 2024-05-01 RX ADMIN — SODIUM CHLORIDE, PRESERVATIVE FREE 10 ML: 5 INJECTION INTRAVENOUS at 20:24

## 2024-05-01 RX ADMIN — SODIUM CHLORIDE, POTASSIUM CHLORIDE, SODIUM LACTATE AND CALCIUM CHLORIDE: 600; 310; 30; 20 INJECTION, SOLUTION INTRAVENOUS at 17:30

## 2024-05-01 RX ADMIN — HYDRALAZINE HYDROCHLORIDE 25 MG: 25 TABLET ORAL at 20:25

## 2024-05-01 RX ADMIN — GABAPENTIN 600 MG: 300 CAPSULE ORAL at 10:27

## 2024-05-01 RX ADMIN — METOPROLOL SUCCINATE 50 MG: 50 TABLET, EXTENDED RELEASE ORAL at 10:27

## 2024-05-01 RX ADMIN — PANTOPRAZOLE SODIUM 40 MG: 40 INJECTION, POWDER, FOR SOLUTION INTRAVENOUS at 20:24

## 2024-05-01 RX ADMIN — PHENOBARBITAL SODIUM 32.5 MG: 65 INJECTION INTRAMUSCULAR; INTRAVENOUS at 20:25

## 2024-05-01 RX ADMIN — BUDESONIDE INHALATION 500 MCG: 0.5 SUSPENSION RESPIRATORY (INHALATION) at 06:05

## 2024-05-01 RX ADMIN — PAROXETINE 30 MG: 10 TABLET, FILM COATED ORAL at 10:27

## 2024-05-01 RX ADMIN — FOLIC ACID 1 MG: 1 TABLET ORAL at 10:27

## 2024-05-01 RX ADMIN — ARFORMOTEROL TARTRATE 15 MCG: 15 SOLUTION RESPIRATORY (INHALATION) at 06:05

## 2024-05-01 RX ADMIN — PANTOPRAZOLE SODIUM 40 MG: 40 INJECTION, POWDER, FOR SOLUTION INTRAVENOUS at 10:33

## 2024-05-01 RX ADMIN — APIXABAN 5 MG: 5 TABLET, FILM COATED ORAL at 10:33

## 2024-05-01 RX ADMIN — PHENOBARBITAL SODIUM 32.5 MG: 65 INJECTION INTRAMUSCULAR at 10:26

## 2024-05-01 RX ADMIN — PHENOBARBITAL SODIUM 32.5 MG: 65 INJECTION INTRAMUSCULAR at 17:23

## 2024-05-01 ASSESSMENT — PAIN SCALES - GENERAL
PAINLEVEL_OUTOF10: 0

## 2024-05-01 NOTE — CARE COORDINATION
5/1/24 Update CM Notes. Pt admitted 4/28/24 Dx acute blood loss anemia. Pt received PRBCs last Hgb 8.9. Eliquis restarted.  EGD done, colonoscopy cancelled and not planned for outpatient.  Remains on CIWA. DC plan to return home when medically stable with sig other providing transport.   Kaela CRYSTALN RN-BC  [phone]

## 2024-05-01 NOTE — PROGRESS NOTES
[facility] Hospitalist Progress Note    Admitting Date and Time: 4/27/2024  7:51 PM  Admit Dx: Acute blood loss anemia [D62]  Near syncope [R55]  Gastrointestinal hemorrhage, unspecified gastrointestinal hemorrhage type [K92.2]  Acute anemia [D64.9]    Synopsis:    Patient admitted on 4/27/2024 for weakness, fatigue, anemia.  Patient was complaining of extreme fatigue and difficulty ambulating.  He was then brought to the emergency room.  Patient was fecal occult blood testing positive.  There was significant drop in his hemoglobin.  Patient apparently has been having dark bowel movements for the last few days.  Patient also drinks quite a bit of alcohol and is undergoing withdrawals.  He is on Eliquis because of atrial fibrillation.  He is also on aspirin.  Patient was then admitted.  General surgery consulted.     4/29: EGD negative for source of bleed  4/30: Inpatient colonoscopy canceled by general surgery as patient is going through acute alcohol withdrawals    Subjective:  Patient is being followed for Acute blood loss anemia [D62]  Near syncope [R55]  Gastrointestinal hemorrhage, unspecified gastrointestinal hemorrhage type [K92.2]  Acute anemia [D64.9]     Patient seen and examined at bedside this morning.  Remains lethargic but wakes up to name calling and follows some commands today.  Oriented to self.  Improved from yesterday.    ROS: denies fever, chills, cp, sob, n/v, HA unless stated above.      PHENobarbital  32.5 mg IntraVENous 4x daily    Followed by    PHENobarbital  32.5 mg IntraVENous BID    Followed by    [START ON 5/2/2024] PHENobarbital  16.2 mg IntraVENous BID    apixaban  5 mg Oral BID    arformoterol tartrate  15 mcg Nebulization BID    [Held by provider] aspirin  81 mg Oral QAM    atorvastatin  40 mg Oral Daily    budesonide  500 mcg Nebulization BID    [Held by provider] furosemide  20 mg Oral BID    gabapentin  600 mg Oral TID    hydrALAZINE  25 mg Oral BID    metoprolol  5/1/2024 at 11:34 AM

## 2024-05-02 LAB
ANION GAP SERPL CALCULATED.3IONS-SCNC: 13 MMOL/L (ref 7–16)
BASOPHILS # BLD: 0.05 K/UL (ref 0–0.2)
BASOPHILS NFR BLD: 1 % (ref 0–2)
BUN SERPL-MCNC: 9 MG/DL (ref 6–23)
CALCIUM SERPL-MCNC: 8.3 MG/DL (ref 8.6–10.2)
CHLORIDE SERPL-SCNC: 104 MMOL/L (ref 98–107)
CO2 SERPL-SCNC: 23 MMOL/L (ref 22–29)
CREAT SERPL-MCNC: 1.2 MG/DL (ref 0.7–1.2)
EKG ATRIAL RATE: 26 BPM
EKG Q-T INTERVAL: 438 MS
EKG QRS DURATION: 98 MS
EKG QTC CALCULATION (BAZETT): 479 MS
EKG R AXIS: -28 DEGREES
EKG T AXIS: -54 DEGREES
EKG VENTRICULAR RATE: 72 BPM
EOSINOPHIL # BLD: 0.2 K/UL (ref 0.05–0.5)
EOSINOPHILS RELATIVE PERCENT: 3 % (ref 0–6)
ERYTHROCYTE [DISTWIDTH] IN BLOOD BY AUTOMATED COUNT: 17.9 % (ref 11.5–15)
GFR, ESTIMATED: 62 ML/MIN/1.73M2
GLUCOSE SERPL-MCNC: 108 MG/DL (ref 74–99)
HCT VFR BLD AUTO: 26.8 % (ref 37–54)
HCT VFR BLD AUTO: 27.5 % (ref 37–54)
HCT VFR BLD AUTO: 27.6 % (ref 37–54)
HGB BLD-MCNC: 8.3 G/DL (ref 12.5–16.5)
HGB BLD-MCNC: 8.5 G/DL (ref 12.5–16.5)
HGB BLD-MCNC: 8.8 G/DL (ref 12.5–16.5)
IMM GRANULOCYTES # BLD AUTO: 0.03 K/UL (ref 0–0.58)
IMM GRANULOCYTES NFR BLD: 0 % (ref 0–5)
LYMPHOCYTES NFR BLD: 0.67 K/UL (ref 1.5–4)
LYMPHOCYTES RELATIVE PERCENT: 10 % (ref 20–42)
MCH RBC QN AUTO: 30.5 PG (ref 26–35)
MCHC RBC AUTO-ENTMCNC: 31 G/DL (ref 32–34.5)
MCV RBC AUTO: 98.5 FL (ref 80–99.9)
MONOCYTES NFR BLD: 0.82 K/UL (ref 0.1–0.95)
MONOCYTES NFR BLD: 12 % (ref 2–12)
NEUTROPHILS NFR BLD: 75 % (ref 43–80)
NEUTS SEG NFR BLD: 5.2 K/UL (ref 1.8–7.3)
PLATELET # BLD AUTO: 110 K/UL (ref 130–450)
PMV BLD AUTO: 11.6 FL (ref 7–12)
POTASSIUM SERPL-SCNC: 3.5 MMOL/L (ref 3.5–5)
RBC # BLD AUTO: 2.72 M/UL (ref 3.8–5.8)
SODIUM SERPL-SCNC: 140 MMOL/L (ref 132–146)
WBC OTHER # BLD: 7 K/UL (ref 4.5–11.5)

## 2024-05-02 PROCEDURE — 85018 HEMOGLOBIN: CPT

## 2024-05-02 PROCEDURE — 2580000003 HC RX 258: Performed by: SURGERY

## 2024-05-02 PROCEDURE — 97530 THERAPEUTIC ACTIVITIES: CPT

## 2024-05-02 PROCEDURE — 97165 OT EVAL LOW COMPLEX 30 MIN: CPT

## 2024-05-02 PROCEDURE — 97161 PT EVAL LOW COMPLEX 20 MIN: CPT

## 2024-05-02 PROCEDURE — 36415 COLL VENOUS BLD VENIPUNCTURE: CPT

## 2024-05-02 PROCEDURE — 6360000002 HC RX W HCPCS: Performed by: INTERNAL MEDICINE

## 2024-05-02 PROCEDURE — 97535 SELF CARE MNGMENT TRAINING: CPT

## 2024-05-02 PROCEDURE — 85025 COMPLETE CBC W/AUTO DIFF WBC: CPT

## 2024-05-02 PROCEDURE — C9113 INJ PANTOPRAZOLE SODIUM, VIA: HCPCS | Performed by: SURGERY

## 2024-05-02 PROCEDURE — 80048 BASIC METABOLIC PNL TOTAL CA: CPT

## 2024-05-02 PROCEDURE — 85014 HEMATOCRIT: CPT

## 2024-05-02 PROCEDURE — 99232 SBSQ HOSP IP/OBS MODERATE 35: CPT | Performed by: INTERNAL MEDICINE

## 2024-05-02 PROCEDURE — 2060000000 HC ICU INTERMEDIATE R&B

## 2024-05-02 PROCEDURE — 6360000002 HC RX W HCPCS: Performed by: SURGERY

## 2024-05-02 PROCEDURE — 94640 AIRWAY INHALATION TREATMENT: CPT

## 2024-05-02 PROCEDURE — 6370000000 HC RX 637 (ALT 250 FOR IP): Performed by: SURGERY

## 2024-05-02 RX ADMIN — BUDESONIDE INHALATION 500 MCG: 0.5 SUSPENSION RESPIRATORY (INHALATION) at 20:11

## 2024-05-02 RX ADMIN — HYDRALAZINE HYDROCHLORIDE 25 MG: 25 TABLET ORAL at 08:51

## 2024-05-02 RX ADMIN — APIXABAN 5 MG: 5 TABLET, FILM COATED ORAL at 21:08

## 2024-05-02 RX ADMIN — PHENOBARBITAL SODIUM 16.2 MG: 65 INJECTION INTRAMUSCULAR; INTRAVENOUS at 21:08

## 2024-05-02 RX ADMIN — APIXABAN 5 MG: 5 TABLET, FILM COATED ORAL at 08:50

## 2024-05-02 RX ADMIN — PAROXETINE 30 MG: 10 TABLET, FILM COATED ORAL at 08:50

## 2024-05-02 RX ADMIN — SODIUM CHLORIDE, PRESERVATIVE FREE 10 ML: 5 INJECTION INTRAVENOUS at 21:13

## 2024-05-02 RX ADMIN — ATORVASTATIN CALCIUM 40 MG: 40 TABLET, FILM COATED ORAL at 08:50

## 2024-05-02 RX ADMIN — METOPROLOL SUCCINATE 50 MG: 50 TABLET, EXTENDED RELEASE ORAL at 21:08

## 2024-05-02 RX ADMIN — HYDRALAZINE HYDROCHLORIDE 25 MG: 25 TABLET ORAL at 21:08

## 2024-05-02 RX ADMIN — FOLIC ACID 1 MG: 1 TABLET ORAL at 08:50

## 2024-05-02 RX ADMIN — GABAPENTIN 600 MG: 300 CAPSULE ORAL at 08:50

## 2024-05-02 RX ADMIN — GABAPENTIN 600 MG: 300 CAPSULE ORAL at 21:08

## 2024-05-02 RX ADMIN — PHENOBARBITAL SODIUM 32.5 MG: 65 INJECTION INTRAMUSCULAR; INTRAVENOUS at 08:51

## 2024-05-02 RX ADMIN — Medication 100 MG: at 08:50

## 2024-05-02 RX ADMIN — GABAPENTIN 600 MG: 300 CAPSULE ORAL at 15:06

## 2024-05-02 RX ADMIN — METOPROLOL SUCCINATE 50 MG: 50 TABLET, EXTENDED RELEASE ORAL at 08:50

## 2024-05-02 RX ADMIN — SODIUM CHLORIDE, PRESERVATIVE FREE 10 ML: 5 INJECTION INTRAVENOUS at 08:51

## 2024-05-02 RX ADMIN — ARFORMOTEROL TARTRATE 15 MCG: 15 SOLUTION RESPIRATORY (INHALATION) at 20:11

## 2024-05-02 RX ADMIN — BUDESONIDE INHALATION 500 MCG: 0.5 SUSPENSION RESPIRATORY (INHALATION) at 10:01

## 2024-05-02 RX ADMIN — ARFORMOTEROL TARTRATE 15 MCG: 15 SOLUTION RESPIRATORY (INHALATION) at 10:01

## 2024-05-02 RX ADMIN — PANTOPRAZOLE SODIUM 40 MG: 40 INJECTION, POWDER, FOR SOLUTION INTRAVENOUS at 21:08

## 2024-05-02 RX ADMIN — PANTOPRAZOLE SODIUM 40 MG: 40 INJECTION, POWDER, FOR SOLUTION INTRAVENOUS at 08:51

## 2024-05-02 ASSESSMENT — PAIN SCALES - GENERAL
PAINLEVEL_OUTOF10: 0

## 2024-05-02 NOTE — DISCHARGE INSTR - COC
Continuity of Care Form    Patient Name: Catarino Bolanos   :  1950  MRN:  [id number]    Admit date:  2024  Discharge date:  5/3/2024    Code Status Order: Full Code   Advance Directives:     Admitting Physician:  Sylvia Jerez DO  PCP: Colt Goldman DO    Discharging Nurse: Meenakshi Norton RN  Discharging Hospital Unit/Room#: 7404/7404-A  Discharging Unit Phone Number: [phone]    Emergency Contact:   Extended Emergency Contact Information  Primary Emergency Contact: Radha Garcia  Address: [facility] [address]  Home Phone: [phone]  Mobile Phone: [phone]  Relation: Domestic Partner    Past Surgical History:  Past Surgical History:   Procedure Laterality Date    BACK SURGERY  2016    revision cervical laminectomy    CAROTID ENDARTERECTOMY Right 2014    Delatore - bovine patch     CERVICAL FUSION  2016    C4-C5, C5-C6, C6-C7 ACF, C5-C6 Corpectomy    COLONOSCOPY N/A 2020    COLONOSCOPY POLYPECTOMY SNARE/COLD BIOPSY performed by Kosta Junior MD at [facility]OSCOPY    ENDOSCOPY, COLON, DIAGNOSTIC      EYE SURGERY      lasex    FRACTURE SURGERY Left     plate in wrist    HAND SURGERY      PARATHYROIDECTOMY  2005    NM THORSC DX LUNGS/PERICAR/MED/PLEURAL SPACE W/O BX Right 2018    THORACOSCOPY performed by Miles Mckeon MD at [facility] OR    TONSILLECTOMY      UPPER GASTROINTESTINAL ENDOSCOPY  2014    UPPER GASTROINTESTINAL ENDOSCOPY N/A 2020    EGD BIOPSY performed by Kosta Junior MD at [facility] ENDOSCOPY    UPPER GASTROINTESTINAL ENDOSCOPY N/A 2024    ESOPHAGOGASTRODUODENOSCOPY BIOPSY performed by Madelaine Mackenzie MD at [facility] ENDOSCOPY       Immunization History:   Immunization History   Administered Date(s) Administered    TD 5LF, TENIVAC, (age 7y+), IM, 0.5mL 12/15/2021       Active Problems:  Patient Active Problem List   Diagnosis Code    Essential hypertension I10    Panic

## 2024-05-02 NOTE — PROGRESS NOTES
training 11800 - minutes  [] Manual therapy 32406 - minutes  [x] Therapeutic activities 84759 23 minutes  [] Therapeutic exercises 63119 - minutes  [] Neuromuscular reeducation 64050 - minutes     Ector Langley PT, DPT  [id number]       Otezla Pregnancy And Lactation Text: This medication is Pregnancy Category C and it isn't known if it is safe during pregnancy. It is unknown if it is excreted in breast milk.

## 2024-05-02 NOTE — PROGRESS NOTES
OCCUPATIONAL THERAPY INITIAL EVALUATION    [facility]  [address], [place]        Date:2024                                                  Patient Name: Catarino Bolanos    MRN: [id number]    : 1950    Room: [address] [place] [address]          Evaluating OT: Tammy Tipton OTR/L; [id number]       Referring Provider: Marcelo Membreno MD     Specific Provider Orders/Date: OT Eval and Treat 24       Diagnosis: Acute blood loss anemia;  Alcoholism; Gastrointestinal hemorrhage     Surgery: 24 ESOPHAGOGASTRODUODENOSCOPY DIAGNOSTIC ONLY      Pertinent Medical History:  has a past medical history of Anxiety, Atrial fibrillation and flutter (HCC), Depression, GERD (gastroesophageal reflux disease), Heart palpitations, HTN (hypertension), Hyperlipidemia, Lightheadedness, OA (osteoarthritis), Parathyroid adenoma, Pre-diabetes, Recurrent left pleural effusion, and 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     Recommended Adaptive Equipment: 3-in-1 commode     Precautions:  Fall Risk, CIWA, TSM, cognition, +alarms, seizure precautions, incontinence     Assessment of current deficits    [x] Functional mobility  [x]ADLs  [x] Strength               [x]Cognition    [x] Functional transfers   [x] IADLs         [x] Safety Awareness   [x]Endurance    [x] Fine Coordination              [x] Balance      [] Vision/perception   []Sensation     []Gross Motor Coordination  [] ROM  [] Delirium                   [] Motor Control     OT PLAN OF CARE   OT POC based on physician orders, patient diagnosis and results of clinical assessment    Frequency/Duration 1-3 days/wk for 2 weeks PRN   Specific OT Treatment Interventions to include:   * Instruction/training on adapted ADL techniques and AE recommendations to increase functional independence within precautions       * Training on energy conservation strategies, correct breathing  ADL/functional transfer/mobility tasks. Therapist facilitated ADL tasks, functional transfers, functional mobility, bed mobility to address safety awareness, implementation of fall prevention strategies, & functional engagement throughout daily activities. Pt would benefit from continued skilled OT to increase safety and independence with completion of ADL/IADL tasks for functional independence and quality of life.    Treatment: Pt required min vc's for proper technique/safety with hand placement/body mechanics/posture for bed mobility/ADLs/functional tranfers/mobility/ww management. Pt required min vc's for sequencing/initiation of ADLs/functional transfers. Pt able to sit EOB ~5 mins & stand at sink ~5 mins to increase core strength/balance/activity tolerance for ease with ADLs. Pt educated on activity modifications/adaptations to promote implementation of fall prevention strategies, safety awareness, & skin/joint integrity. Pt required increased time to complete ADLs/functional transfers 2/2 deconditioning & limited standing balance. Pt appeared to have tolerated session well and appears motivated/cooperative/pleasant. Pt instructed on use of call light for assistance and fall prevention. Pt demo'ing fair- understanding of education provided. Continue to educate.     Rehab Potential: Good for established goals     LTG: maximize independence with ADLs to return to PLOF    Patient and/or family were instructed on functional diagnosis, prognosis/goals and OT plan of care. Demonstrated fair- understanding.     Eval Complexity: Low  History: Expanded chart review of medical records and additional review of physical, cognitive, or psychosocial history related to current functional performance  Exam: 3+ performance deficits  Assistance/Modification: Min/mod assistance or modifications required to perform tasks. May have comorbidities that affect occupational performance.    Evaluation time includes thorough review of

## 2024-05-02 NOTE — PLAN OF CARE
Problem: Safety - Adult  Goal: Free from fall injury  5/2/2024 0041 by Marina Robert, RN  Outcome: Progressing     Problem: Chronic Conditions and Co-morbidities  Goal: Patient's chronic conditions and co-morbidity symptoms are monitored and maintained or improved  5/2/2024 0041 by Marina Robert, RN  Outcome: Progressing     Problem: Discharge Planning  Goal: Discharge to home or other facility with appropriate resources  5/2/2024 0041 by Marina Robert, RN  Outcome: Progressing     Problem: Skin/Tissue Integrity  Goal: Absence of new skin breakdown  Description: 1.  Monitor for areas of redness and/or skin breakdown  2.  Assess vascular access sites hourly  3.  Every 4-6 hours minimum:  Change oxygen saturation probe site  4.  Every 4-6 hours:  If on nasal continuous positive airway pressure, respiratory therapy assess nares and determine need for appliance change or resting period.  5/2/2024 0041 by Marina Robert, RN  Outcome: Progressing     Problem: ABCDS Injury Assessment  Goal: Absence of physical injury  Outcome: Progressing

## 2024-05-02 NOTE — CARE COORDINATION
5/2/24 Update CM Notes. Pt admitted 4/28/24 Dx acute blood loss anemia. Pt received PRBCs last Hgb 8.3. Eliquis restarted yesterday.  Pt on Phenobarb taper for alcohol withdrawal. Less lethargic and oriented to self.  Per attending anticipated DC next 24 hrs. PT/OT to see. DC plan to return home when medically stable with sig other providing transport   Kaela MARCELO RN-BC  [phone]    1519 Addendum:PT14 .  Reviewed needs with sig other.  PT and sig other agreeable for SNF.  Choice list reviewed. 1st choice [facility]-referral made.     7613 Addendum : Pt accepted at [facility].  Pt and sig other in agreement with dc plan.  Precert to start as soon as OT note available. Facility will accept prior to precert.  Destination/LUC updated.  Envelope /ambulette form  on chart. 9319 Completed.

## 2024-05-02 NOTE — PROGRESS NOTES
[facility] Hospitalist Progress Note    Admitting Date and Time: 4/27/2024  7:51 PM  Admit Dx: Acute blood loss anemia [D62]  Near syncope [R55]  Gastrointestinal hemorrhage, unspecified gastrointestinal hemorrhage type [K92.2]  Acute anemia [D64.9]    Synopsis:    Patient admitted on 4/27/2024 for weakness, fatigue, anemia.  Patient was complaining of extreme fatigue and difficulty ambulating.  He was then brought to the emergency room.  Patient was fecal occult blood testing positive.  There was significant drop in his hemoglobin.  Patient apparently has been having dark bowel movements for the last few days.  Patient also drinks quite a bit of alcohol and is undergoing withdrawals.  He is on Eliquis because of atrial fibrillation.  He is also on aspirin.  Patient was then admitted.  General surgery consulted.     4/29: EGD negative for source of bleed  4/30: Inpatient colonoscopy canceled by general surgery as patient is going through acute alcohol withdrawals  5/2: Mentation seems to be back at baseline, hemoglobin remained stable despite being on Eliquis    Subjective:  Patient is being followed for Acute blood loss anemia [D62]  Near syncope [R55]  Gastrointestinal hemorrhage, unspecified gastrointestinal hemorrhage type [K92.2]  Acute anemia [D64.9]     Patient seen and examined at bedside this morning.  Much more awake and alert today.  Answers questions appropriately.  No complaints.    ROS: denies fever, chills, cp, sob, n/v, HA unless stated above.      PHENobarbital  16.2 mg IntraVENous BID    apixaban  5 mg Oral BID    arformoterol tartrate  15 mcg Nebulization BID    [Held by provider] aspirin  81 mg Oral QAM    atorvastatin  40 mg Oral Daily    budesonide  500 mcg Nebulization BID    [Held by provider] furosemide  20 mg Oral BID    gabapentin  600 mg Oral TID    hydrALAZINE  25 mg Oral BID    metoprolol succinate  50 mg Oral BID    PARoxetine  30 mg Oral QAM    sodium chloride flush  5-40 mL

## 2024-05-03 VITALS
DIASTOLIC BLOOD PRESSURE: 77 MMHG | RESPIRATION RATE: 17 BRPM | BODY MASS INDEX: 24.32 KG/M2 | OXYGEN SATURATION: 94 % | SYSTOLIC BLOOD PRESSURE: 117 MMHG | WEIGHT: 146 LBS | HEART RATE: 68 BPM | HEIGHT: 65 IN | TEMPERATURE: 97.3 F

## 2024-05-03 LAB
ANION GAP SERPL CALCULATED.3IONS-SCNC: 12 MMOL/L (ref 7–16)
BASOPHILS # BLD: 0.04 K/UL (ref 0–0.2)
BASOPHILS NFR BLD: 1 % (ref 0–2)
BUN SERPL-MCNC: 12 MG/DL (ref 6–23)
CALCIUM SERPL-MCNC: 8.2 MG/DL (ref 8.6–10.2)
CHLORIDE SERPL-SCNC: 103 MMOL/L (ref 98–107)
CO2 SERPL-SCNC: 24 MMOL/L (ref 22–29)
CREAT SERPL-MCNC: 1.4 MG/DL (ref 0.7–1.2)
EOSINOPHIL # BLD: 0.19 K/UL (ref 0.05–0.5)
EOSINOPHILS RELATIVE PERCENT: 3 % (ref 0–6)
ERYTHROCYTE [DISTWIDTH] IN BLOOD BY AUTOMATED COUNT: 17.8 % (ref 11.5–15)
GFR, ESTIMATED: 51 ML/MIN/1.73M2
GLUCOSE SERPL-MCNC: 117 MG/DL (ref 74–99)
HCT VFR BLD AUTO: 24.7 % (ref 37–54)
HCT VFR BLD AUTO: 25.7 % (ref 37–54)
HGB BLD-MCNC: 7.7 G/DL (ref 12.5–16.5)
HGB BLD-MCNC: 7.9 G/DL (ref 12.5–16.5)
IMM GRANULOCYTES # BLD AUTO: <0.03 K/UL (ref 0–0.58)
IMM GRANULOCYTES NFR BLD: 0 % (ref 0–5)
LYMPHOCYTES NFR BLD: 0.57 K/UL (ref 1.5–4)
LYMPHOCYTES RELATIVE PERCENT: 10 % (ref 20–42)
MCH RBC QN AUTO: 30.9 PG (ref 26–35)
MCHC RBC AUTO-ENTMCNC: 31.2 G/DL (ref 32–34.5)
MCV RBC AUTO: 99.2 FL (ref 80–99.9)
MONOCYTES NFR BLD: 0.78 K/UL (ref 0.1–0.95)
MONOCYTES NFR BLD: 14 % (ref 2–12)
NEUTROPHILS NFR BLD: 72 % (ref 43–80)
NEUTS SEG NFR BLD: 4.15 K/UL (ref 1.8–7.3)
PLATELET # BLD AUTO: 104 K/UL (ref 130–450)
PMV BLD AUTO: 11.7 FL (ref 7–12)
POTASSIUM SERPL-SCNC: 3.8 MMOL/L (ref 3.5–5)
RBC # BLD AUTO: 2.49 M/UL (ref 3.8–5.8)
RBC # BLD: ABNORMAL 10*6/UL
SODIUM SERPL-SCNC: 139 MMOL/L (ref 132–146)
SURGICAL PATHOLOGY REPORT: NORMAL
WBC # BLD: ABNORMAL 10*3/UL
WBC OTHER # BLD: 5.8 K/UL (ref 4.5–11.5)

## 2024-05-03 PROCEDURE — 97530 THERAPEUTIC ACTIVITIES: CPT

## 2024-05-03 PROCEDURE — 94640 AIRWAY INHALATION TREATMENT: CPT

## 2024-05-03 PROCEDURE — 97535 SELF CARE MNGMENT TRAINING: CPT

## 2024-05-03 PROCEDURE — 36415 COLL VENOUS BLD VENIPUNCTURE: CPT

## 2024-05-03 PROCEDURE — 6360000002 HC RX W HCPCS: Performed by: SURGERY

## 2024-05-03 PROCEDURE — 85018 HEMOGLOBIN: CPT

## 2024-05-03 PROCEDURE — 6370000000 HC RX 637 (ALT 250 FOR IP): Performed by: SURGERY

## 2024-05-03 PROCEDURE — 2580000003 HC RX 258: Performed by: SURGERY

## 2024-05-03 PROCEDURE — 99239 HOSP IP/OBS DSCHRG MGMT >30: CPT | Performed by: INTERNAL MEDICINE

## 2024-05-03 PROCEDURE — 80048 BASIC METABOLIC PNL TOTAL CA: CPT

## 2024-05-03 PROCEDURE — 85014 HEMATOCRIT: CPT

## 2024-05-03 PROCEDURE — 6360000002 HC RX W HCPCS: Performed by: INTERNAL MEDICINE

## 2024-05-03 PROCEDURE — 85025 COMPLETE CBC W/AUTO DIFF WBC: CPT

## 2024-05-03 PROCEDURE — C9113 INJ PANTOPRAZOLE SODIUM, VIA: HCPCS | Performed by: SURGERY

## 2024-05-03 RX ORDER — PANTOPRAZOLE SODIUM 40 MG/1
40 TABLET, DELAYED RELEASE ORAL
Status: DISCONTINUED | OUTPATIENT
Start: 2024-05-03 | End: 2024-05-03 | Stop reason: HOSPADM

## 2024-05-03 RX ORDER — LANOLIN ALCOHOL/MO/W.PET/CERES
100 CREAM (GRAM) TOPICAL DAILY
Qty: 30 TABLET | Refills: 3 | Status: SHIPPED | OUTPATIENT
Start: 2024-05-04

## 2024-05-03 RX ORDER — FOLIC ACID 1 MG/1
1 TABLET ORAL DAILY
Qty: 30 TABLET | Refills: 3 | Status: SHIPPED | OUTPATIENT
Start: 2024-05-04

## 2024-05-03 RX ORDER — PANTOPRAZOLE SODIUM 40 MG/1
40 TABLET, DELAYED RELEASE ORAL
Qty: 30 TABLET | Refills: 3 | Status: SHIPPED | OUTPATIENT
Start: 2024-05-03

## 2024-05-03 RX ADMIN — Medication 100 MG: at 08:55

## 2024-05-03 RX ADMIN — METOPROLOL SUCCINATE 50 MG: 50 TABLET, EXTENDED RELEASE ORAL at 08:54

## 2024-05-03 RX ADMIN — BUDESONIDE INHALATION 500 MCG: 0.5 SUSPENSION RESPIRATORY (INHALATION) at 06:09

## 2024-05-03 RX ADMIN — APIXABAN 5 MG: 5 TABLET, FILM COATED ORAL at 08:54

## 2024-05-03 RX ADMIN — HYDRALAZINE HYDROCHLORIDE 25 MG: 25 TABLET ORAL at 08:54

## 2024-05-03 RX ADMIN — ATORVASTATIN CALCIUM 40 MG: 40 TABLET, FILM COATED ORAL at 09:01

## 2024-05-03 RX ADMIN — PHENOBARBITAL SODIUM 16.2 MG: 65 INJECTION INTRAMUSCULAR; INTRAVENOUS at 08:55

## 2024-05-03 RX ADMIN — SODIUM CHLORIDE, PRESERVATIVE FREE 10 ML: 5 INJECTION INTRAVENOUS at 08:55

## 2024-05-03 RX ADMIN — GABAPENTIN 600 MG: 300 CAPSULE ORAL at 08:55

## 2024-05-03 RX ADMIN — PANTOPRAZOLE SODIUM 40 MG: 40 INJECTION, POWDER, FOR SOLUTION INTRAVENOUS at 08:55

## 2024-05-03 RX ADMIN — ARFORMOTEROL TARTRATE 15 MCG: 15 SOLUTION RESPIRATORY (INHALATION) at 06:09

## 2024-05-03 RX ADMIN — PAROXETINE 30 MG: 10 TABLET, FILM COATED ORAL at 08:53

## 2024-05-03 RX ADMIN — FOLIC ACID 1 MG: 1 TABLET ORAL at 08:54

## 2024-05-03 ASSESSMENT — PAIN SCALES - GENERAL: PAINLEVEL_OUTOF10: 0

## 2024-05-03 NOTE — CARE COORDINATION
5/3/24 Update CM Notes. Pt admitted 4/28/24 Dx acute blood loss anemia. Discharge plan for SNF.  Pt accepted at [facility]. Pt and sig other in agreement with dc plan. Precert started. Facility will accept prior to precert. Destination/ULC updated. Envelope /ambulette form on chart. 1670 Completed.   Kaela MARCELO RN-BC  [phone]    0925 Addendum: Discharge planned for today.  PAS ambulette scheduled for 1130am Bedside and charge RNs, facility liaison  and sig other notified.

## 2024-05-03 NOTE — PROGRESS NOTES
OCCUPATIONAL THERAPY TREATMENT NOTE    [facility]   [address] [facility]       Date:5/3/2024  Patient Name: Catarino Bolanos  MRN: [id number]  : 1950  Room: [address] [place] [address]     Evaluating OT: Tammy Tipton OTR/L; [id number]        Referring Provider: Marcelo Membreno MD     Specific Provider Orders/Date: OT Eval and Treat 24        Diagnosis: Acute blood loss anemia;  Alcoholism; Gastrointestinal hemorrhage     Surgery: 24 ESOPHAGOGASTRODUODENOSCOPY DIAGNOSTIC ONLY      Pertinent Medical History:  has a past medical history of Anxiety, Atrial fibrillation and flutter (HCC), Depression, GERD (gastroesophageal reflux disease), Heart palpitations, HTN (hypertension), Hyperlipidemia, Lightheadedness, OA (osteoarthritis), Parathyroid adenoma, Pre-diabetes, Recurrent left pleural effusion, and 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      Recommended Adaptive Equipment: 3-in-1 commode      Precautions:  Fall Risk, CIWA, TSM, cognition, +alarms, seizure precautions, incontinence      Assessment of current deficits    [x] Functional mobility            [x]ADLs           [x] Strength                  [x]Cognition    [x] Functional transfers          [x] IADLs         [x] Safety Awareness   [x]Endurance    [x] Fine Coordination                         [x] Balance      [] Vision/perception   []Sensation      []Gross Motor Coordination             [] ROM           [] Delirium                   [] Motor Control      OT PLAN OF CARE   OT POC based on physician orders, patient diagnosis and results of clinical assessment     Frequency/Duration 1-3 days/wk for 2 weeks PRN   Specific OT Treatment Interventions to include:   * Instruction/training on adapted ADL techniques and AE recommendations to increase functional independence within precautions       * Training on energy conservation strategies, correct breathing  made fair  progress towards set goals.   Continue with current plan of care      Treatment Time In:1016            Treatment Time Out: 1045                Treatment Charges: Mins Units   Ther Ex  18501     Manual Therapy 01361     Thera Activities 27045 15 1   ADL/Home Mgt 58750 14 1   Neuro Re-ed 32743     Group Therapy      Orthotic manage/training  25081     Non-Billable Time     Total Timed Treatment 29 2       Treatment Time additionally includes review of current medical information, gathering information on past medical history/social history and prior level of function, interpretation of standardized testing/informal observation of tasks, treatment for plan of care and goals.     Erasmo MIRZA/DINESH [id number]

## 2024-05-03 NOTE — DISCHARGE SUMMARY
[facility] Hospitalist Physician Discharge Summary     Patient ID:  Catarino Bolanos  [id number]  74 y.o.  1950    Admit date: 4/27/2024    Discharge date and time:  5/3/2024  10:00 AM    Hospital Course:   Patient Catarino Bolanos is a 74 y.o. presented with Acute blood loss anemia [D62]  Near syncope [R55]  Gastrointestinal hemorrhage, unspecified gastrointestinal hemorrhage type [K92.2]  Acute anemia [D64.9]    Patient admitted on 4/27/2024 for weakness, fatigue, anemia.  Patient was complaining of extreme fatigue and difficulty ambulating.  He was then brought to the emergency room.  Patient was fecal occult blood testing positive.  There was significant drop in his hemoglobin.  Patient apparently has been having dark bowel movements for the last few days.  Patient also drinks quite a bit of alcohol and is undergoing withdrawals.  He is on Eliquis because of atrial fibrillation.  He is also on aspirin.  Patient was then admitted.  General surgery consulted.      4/29: EGD negative for source of bleed  4/30: Inpatient colonoscopy canceled by general surgery as patient is going through acute alcohol withdrawals  5/2: Mentation seems to be back at baseline, hemoglobin remained stable despite being on Eliquis    On the day of discharge, patient had no complaints.  Mentation remains at baseline.  Hemoglobin remained relatively stable despite being on Eliquis.  Denied any recurrence of GI bleed.  Stable for discharge to facility today.     Advised patient to seriously consider abstaining from alcohol use and to present to general surgery clinic for outpatient colonoscopy.  Patient understood the recommendations but politely stated he will not follow them.  States he will continue to drink and not go to the clinic for his colonoscopy despite recommendations.  He understands the risks.  He is to take Protonix twice daily, folic acid/thiamine daily.      Follow Up:    Madelaine Mackenzie MD  [address] [place] [address]  [facility] [address]  [phone]    Follow up in 2 week(s)  to discuss colonoscopy    [facility]  [address] [id number]  [phone]            Disposition:    Activity level: As tolerated     Dispo: [facility]      Condition on discharge: Stable       Discharge Exam:    General Appearance: alert and oriented to person, place and time and in no acute distress  Skin: warm and dry  Head: normocephalic and atraumatic  Eyes: pupils equal, round, and reactive to light, extraocular eye movements intact, conjunctivae normal  Neck: neck supple and non tender without mass   Pulmonary/Chest: clear to auscultation bilaterally- no wheezes, rales or rhonchi, normal air movement, no respiratory distress  Cardiovascular: normal rate, normal S1 and S2 and no carotid bruits  Abdomen: soft, non-tender, non-distended, normal bowel sounds, no masses or organomegaly  Extremities: no cyanosis, no clubbing and no edema  Neurologic: no cranial nerve deficit and speech normal      Admission Diagnoses: Principal Problem:    Acute blood loss anemia  Active Problems:    Alcoholism (HCC)    Essential hypertension    Dyslipidemia    Atrial fibrillation (HCC)    Gastrointestinal hemorrhage  Resolved Problems:    * No resolved hospital problems. *      Discharge Diagnoses: Principal Problem:    Acute blood loss anemia  Active Problems:    Alcoholism (HCC)    Essential hypertension    Dyslipidemia    Atrial fibrillation (HCC)    Gastrointestinal hemorrhage  Resolved Problems:    * No resolved hospital problems. *      Consults:  IP CONSULT TO HOSPITALIST  IP CONSULT TO GENERAL SURGERY  IP CONSULT TO SOCIAL WORK      I/O last 3 completed shifts:  In: 1630 [P.O.:1130; I.V.:500]  Out: 500 [Urine:500]  I/O this shift:  In: 480 [P.O.:480]  Out: -       LABS:  Recent Labs     05/01/24  0524 05/02/24  0632 05/03/24  0615    140 139   K 3.5 3.5 3.8    104 103   CO2 24 23 24   BUN 11

## 2024-05-16 ENCOUNTER — APPOINTMENT (OUTPATIENT)
Dept: CT IMAGING | Age: 74
DRG: 163 | End: 2024-05-16
Payer: MEDICARE

## 2024-05-16 ENCOUNTER — APPOINTMENT (OUTPATIENT)
Dept: GENERAL RADIOLOGY | Age: 74
DRG: 163 | End: 2024-05-16
Payer: MEDICARE

## 2024-05-16 ENCOUNTER — HOSPITAL ENCOUNTER (INPATIENT)
Age: 74
LOS: 26 days | Discharge: HOSPICE | DRG: 163 | End: 2024-06-11
Attending: EMERGENCY MEDICINE | Admitting: FAMILY MEDICINE
Payer: MEDICARE

## 2024-05-16 DIAGNOSIS — R55 SYNCOPE AND COLLAPSE: ICD-10-CM

## 2024-05-16 DIAGNOSIS — R09.02 HYPOXIA: Primary | ICD-10-CM

## 2024-05-16 DIAGNOSIS — F10.10 ETOH ABUSE: ICD-10-CM

## 2024-05-16 DIAGNOSIS — J90 PLEURAL EFFUSION: ICD-10-CM

## 2024-05-16 LAB
ALBUMIN SERPL-MCNC: 3.9 G/DL (ref 3.5–5.2)
ALP SERPL-CCNC: 136 U/L (ref 40–129)
ALT SERPL-CCNC: 13 U/L (ref 0–40)
ANION GAP SERPL CALCULATED.3IONS-SCNC: 17 MMOL/L (ref 7–16)
AST SERPL-CCNC: 18 U/L (ref 0–39)
BASOPHILS # BLD: 0.11 K/UL (ref 0–0.2)
BASOPHILS NFR BLD: 1 % (ref 0–2)
BILIRUB SERPL-MCNC: 0.6 MG/DL (ref 0–1.2)
BNP SERPL-MCNC: 7075 PG/ML (ref 0–450)
BUN SERPL-MCNC: 13 MG/DL (ref 6–23)
CALCIUM SERPL-MCNC: 9.1 MG/DL (ref 8.6–10.2)
CHLORIDE SERPL-SCNC: 94 MMOL/L (ref 98–107)
CO2 SERPL-SCNC: 24 MMOL/L (ref 22–29)
CREAT SERPL-MCNC: 1.2 MG/DL (ref 0.7–1.2)
D-DIMER QUANTITATIVE: 379 NG/ML DDU (ref 0–230)
EOSINOPHIL # BLD: 0.26 K/UL (ref 0.05–0.5)
EOSINOPHILS RELATIVE PERCENT: 2 % (ref 0–6)
ERYTHROCYTE [DISTWIDTH] IN BLOOD BY AUTOMATED COUNT: 18.6 % (ref 11.5–15)
GFR, ESTIMATED: 63 ML/MIN/1.73M2
GLUCOSE SERPL-MCNC: 112 MG/DL (ref 74–99)
HCT VFR BLD AUTO: 30.6 % (ref 37–54)
HGB BLD-MCNC: 9.2 G/DL (ref 12.5–16.5)
IMM GRANULOCYTES # BLD AUTO: 0.07 K/UL (ref 0–0.58)
IMM GRANULOCYTES NFR BLD: 1 % (ref 0–5)
INR PPP: 2.4
LYMPHOCYTES NFR BLD: 0.81 K/UL (ref 1.5–4)
LYMPHOCYTES RELATIVE PERCENT: 7 % (ref 20–42)
MAGNESIUM SERPL-MCNC: 1.7 MG/DL (ref 1.6–2.6)
MCH RBC QN AUTO: 27.3 PG (ref 26–35)
MCHC RBC AUTO-ENTMCNC: 30.1 G/DL (ref 32–34.5)
MCV RBC AUTO: 90.8 FL (ref 80–99.9)
MONOCYTES NFR BLD: 1.1 K/UL (ref 0.1–0.95)
MONOCYTES NFR BLD: 9 % (ref 2–12)
NEUTROPHILS NFR BLD: 81 % (ref 43–80)
NEUTS SEG NFR BLD: 9.81 K/UL (ref 1.8–7.3)
PLATELET # BLD AUTO: 121 K/UL (ref 130–450)
PMV BLD AUTO: 10.6 FL (ref 7–12)
POTASSIUM SERPL-SCNC: 3.6 MMOL/L (ref 3.5–5)
PROT SERPL-MCNC: 6.5 G/DL (ref 6.4–8.3)
PROTHROMBIN TIME: 26.5 SEC (ref 9.3–12.4)
RBC # BLD AUTO: 3.37 M/UL (ref 3.8–5.8)
SODIUM SERPL-SCNC: 135 MMOL/L (ref 132–146)
TROPONIN I SERPL HS-MCNC: 22 NG/L (ref 0–11)
WBC OTHER # BLD: 12.2 K/UL (ref 4.5–11.5)

## 2024-05-16 PROCEDURE — 71046 X-RAY EXAM CHEST 2 VIEWS: CPT

## 2024-05-16 PROCEDURE — 83880 ASSAY OF NATRIURETIC PEPTIDE: CPT

## 2024-05-16 PROCEDURE — 87899 AGENT NOS ASSAY W/OPTIC: CPT

## 2024-05-16 PROCEDURE — 2580000003 HC RX 258: Performed by: EMERGENCY MEDICINE

## 2024-05-16 PROCEDURE — 93005 ELECTROCARDIOGRAM TRACING: CPT | Performed by: EMERGENCY MEDICINE

## 2024-05-16 PROCEDURE — 86738 MYCOPLASMA ANTIBODY: CPT

## 2024-05-16 PROCEDURE — 87449 NOS EACH ORGANISM AG IA: CPT

## 2024-05-16 PROCEDURE — 83735 ASSAY OF MAGNESIUM: CPT

## 2024-05-16 PROCEDURE — 70450 CT HEAD/BRAIN W/O DYE: CPT

## 2024-05-16 PROCEDURE — 6360000004 HC RX CONTRAST MEDICATION: Performed by: RADIOLOGY

## 2024-05-16 PROCEDURE — 71275 CT ANGIOGRAPHY CHEST: CPT

## 2024-05-16 PROCEDURE — 85025 COMPLETE CBC W/AUTO DIFF WBC: CPT

## 2024-05-16 PROCEDURE — 2500000003 HC RX 250 WO HCPCS: Performed by: FAMILY MEDICINE

## 2024-05-16 PROCEDURE — 85379 FIBRIN DEGRADATION QUANT: CPT

## 2024-05-16 PROCEDURE — 99222 1ST HOSP IP/OBS MODERATE 55: CPT | Performed by: FAMILY MEDICINE

## 2024-05-16 PROCEDURE — 81001 URINALYSIS AUTO W/SCOPE: CPT

## 2024-05-16 PROCEDURE — 2060000000 HC ICU INTERMEDIATE R&B

## 2024-05-16 PROCEDURE — 85610 PROTHROMBIN TIME: CPT

## 2024-05-16 PROCEDURE — 99285 EMERGENCY DEPT VISIT HI MDM: CPT

## 2024-05-16 PROCEDURE — 84484 ASSAY OF TROPONIN QUANT: CPT

## 2024-05-16 PROCEDURE — 2580000003 HC RX 258: Performed by: FAMILY MEDICINE

## 2024-05-16 PROCEDURE — 80053 COMPREHEN METABOLIC PANEL: CPT

## 2024-05-16 PROCEDURE — 6360000002 HC RX W HCPCS: Performed by: FAMILY MEDICINE

## 2024-05-16 RX ORDER — SODIUM CHLORIDE 0.9 % (FLUSH) 0.9 %
5-40 SYRINGE (ML) INJECTION PRN
Status: DISCONTINUED | OUTPATIENT
Start: 2024-05-16 | End: 2024-06-11 | Stop reason: HOSPADM

## 2024-05-16 RX ORDER — SODIUM CHLORIDE 9 MG/ML
INJECTION, SOLUTION INTRAVENOUS PRN
Status: DISCONTINUED | OUTPATIENT
Start: 2024-05-16 | End: 2024-05-21

## 2024-05-16 RX ORDER — POTASSIUM CHLORIDE 20 MEQ/1
40 TABLET, EXTENDED RELEASE ORAL PRN
Status: DISCONTINUED | OUTPATIENT
Start: 2024-05-16 | End: 2024-05-21

## 2024-05-16 RX ORDER — LORAZEPAM 2 MG/ML
1 INJECTION INTRAMUSCULAR
Status: DISCONTINUED | OUTPATIENT
Start: 2024-05-16 | End: 2024-05-27

## 2024-05-16 RX ORDER — LORAZEPAM 1 MG/1
4 TABLET ORAL
Status: DISCONTINUED | OUTPATIENT
Start: 2024-05-16 | End: 2024-06-11 | Stop reason: HOSPADM

## 2024-05-16 RX ORDER — SODIUM CHLORIDE 0.9 % (FLUSH) 0.9 %
5-40 SYRINGE (ML) INJECTION EVERY 12 HOURS SCHEDULED
Status: DISCONTINUED | OUTPATIENT
Start: 2024-05-16 | End: 2024-05-21

## 2024-05-16 RX ORDER — ONDANSETRON 2 MG/ML
4 INJECTION INTRAMUSCULAR; INTRAVENOUS EVERY 6 HOURS PRN
Status: DISCONTINUED | OUTPATIENT
Start: 2024-05-16 | End: 2024-05-21

## 2024-05-16 RX ORDER — LORAZEPAM 2 MG/ML
3 INJECTION INTRAMUSCULAR
Status: DISCONTINUED | OUTPATIENT
Start: 2024-05-16 | End: 2024-05-27

## 2024-05-16 RX ORDER — LORAZEPAM 2 MG/ML
2 INJECTION INTRAMUSCULAR
Status: DISCONTINUED | OUTPATIENT
Start: 2024-05-16 | End: 2024-05-27

## 2024-05-16 RX ORDER — ACETAMINOPHEN 325 MG/1
650 TABLET ORAL EVERY 6 HOURS PRN
Status: DISCONTINUED | OUTPATIENT
Start: 2024-05-16 | End: 2024-06-11 | Stop reason: HOSPADM

## 2024-05-16 RX ORDER — POLYETHYLENE GLYCOL 3350 17 G/17G
17 POWDER, FOR SOLUTION ORAL DAILY PRN
Status: DISCONTINUED | OUTPATIENT
Start: 2024-05-16 | End: 2024-05-21

## 2024-05-16 RX ORDER — 0.9 % SODIUM CHLORIDE 0.9 %
1000 INTRAVENOUS SOLUTION INTRAVENOUS ONCE
Status: COMPLETED | OUTPATIENT
Start: 2024-05-16 | End: 2024-05-16

## 2024-05-16 RX ORDER — POTASSIUM CHLORIDE 7.45 MG/ML
10 INJECTION INTRAVENOUS PRN
Status: DISCONTINUED | OUTPATIENT
Start: 2024-05-16 | End: 2024-05-21

## 2024-05-16 RX ORDER — MAGNESIUM SULFATE IN WATER 40 MG/ML
2000 INJECTION, SOLUTION INTRAVENOUS PRN
Status: DISCONTINUED | OUTPATIENT
Start: 2024-05-16 | End: 2024-05-21

## 2024-05-16 RX ORDER — ACETAMINOPHEN 650 MG/1
650 SUPPOSITORY RECTAL EVERY 6 HOURS PRN
Status: DISCONTINUED | OUTPATIENT
Start: 2024-05-16 | End: 2024-06-11 | Stop reason: HOSPADM

## 2024-05-16 RX ORDER — LORAZEPAM 1 MG/1
2 TABLET ORAL
Status: DISCONTINUED | OUTPATIENT
Start: 2024-05-16 | End: 2024-05-27

## 2024-05-16 RX ORDER — LORAZEPAM 2 MG/ML
4 INJECTION INTRAMUSCULAR
Status: DISCONTINUED | OUTPATIENT
Start: 2024-05-16 | End: 2024-05-27

## 2024-05-16 RX ORDER — LORAZEPAM 1 MG/1
3 TABLET ORAL
Status: DISCONTINUED | OUTPATIENT
Start: 2024-05-16 | End: 2024-05-27

## 2024-05-16 RX ORDER — SODIUM CHLORIDE 0.9 % (FLUSH) 0.9 %
5-40 SYRINGE (ML) INJECTION PRN
Status: DISCONTINUED | OUTPATIENT
Start: 2024-05-16 | End: 2024-05-21

## 2024-05-16 RX ORDER — PANTOPRAZOLE SODIUM 40 MG/1
80 TABLET, DELAYED RELEASE ORAL
Qty: 180 TABLET | Refills: 1 | Status: CANCELLED | OUTPATIENT
Start: 2024-05-16

## 2024-05-16 RX ORDER — LANOLIN ALCOHOL/MO/W.PET/CERES
100 CREAM (GRAM) TOPICAL DAILY
Status: DISCONTINUED | OUTPATIENT
Start: 2024-05-17 | End: 2024-05-17 | Stop reason: SDUPTHER

## 2024-05-16 RX ORDER — ONDANSETRON 4 MG/1
4 TABLET, ORALLY DISINTEGRATING ORAL EVERY 8 HOURS PRN
Status: DISCONTINUED | OUTPATIENT
Start: 2024-05-16 | End: 2024-05-21

## 2024-05-16 RX ORDER — LORAZEPAM 1 MG/1
1 TABLET ORAL
Status: DISCONTINUED | OUTPATIENT
Start: 2024-05-16 | End: 2024-05-27

## 2024-05-16 RX ADMIN — DOXYCYCLINE 100 MG: 100 INJECTION, POWDER, LYOPHILIZED, FOR SOLUTION INTRAVENOUS at 23:04

## 2024-05-16 RX ADMIN — SODIUM CHLORIDE, PRESERVATIVE FREE 10 ML: 5 INJECTION INTRAVENOUS at 23:50

## 2024-05-16 RX ADMIN — SODIUM CHLORIDE, PRESERVATIVE FREE 10 ML: 5 INJECTION INTRAVENOUS at 23:05

## 2024-05-16 RX ADMIN — IOPAMIDOL 75 ML: 755 INJECTION, SOLUTION INTRAVENOUS at 20:19

## 2024-05-16 RX ADMIN — WATER 1000 MG: 1 INJECTION INTRAMUSCULAR; INTRAVENOUS; SUBCUTANEOUS at 22:53

## 2024-05-16 RX ADMIN — SODIUM CHLORIDE 1000 ML: 9 INJECTION, SOLUTION INTRAVENOUS at 18:31

## 2024-05-16 ASSESSMENT — LIFESTYLE VARIABLES
HOW MANY STANDARD DRINKS CONTAINING ALCOHOL DO YOU HAVE ON A TYPICAL DAY: 5 OR 6
HOW OFTEN DO YOU HAVE A DRINK CONTAINING ALCOHOL: 4 OR MORE TIMES A WEEK

## 2024-05-16 NOTE — ED PROVIDER NOTES
HPI:  5/16/24,   Time: 6:21 PM EDT       Catarino Bolanos is a 74 y.o. male presenting to the ED for syncope/gait issues, beginning weeks ago.  The complaint has been  intermittnet syn/cont gait issues , moderate in severity, and worsened by nothing.  Recurrent syncope episodes over past several weeks.  States has been feeling weak and legs.  Just does not feel well.  Denies chest pain.  Positive shortness of breath.  No nausea vomiting diarrhea.  Decreased p.o. intake.  Nothing makes better.    Review of Systems:   Pertinent positives and negatives are stated within HPI, all other systems reviewed and are negative.          --------------------------------------------- PAST HISTORY ---------------------------------------------  Past Medical History:  has a past medical history of Anxiety, Atrial fibrillation and flutter (HCC), Depression, GERD (gastroesophageal reflux disease), GI bleed, Heart palpitations, HTN (hypertension), Hyperlipidemia, Lightheadedness, OA (osteoarthritis), Parathyroid adenoma, Pre-diabetes, Recurrent left pleural effusion, and 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    Past Surgical History:  has a past surgical history that includes parathyroidectomy (11/7/2005); Tonsillectomy; Hand surgery; eye surgery (2001); fracture surgery (Left); Upper gastrointestinal endoscopy (07/07/2014); Carotid endarterectomy (Right, 8/25/2014); Endoscopy, colon, diagnostic; cervical fusion (12/12/2016); back surgery (12/20/2016); pr thorsc dx lungs/pericar/med/pleural space w/o bx (Right, 9/17/2018); Colonoscopy (N/A, 6/26/2020); Upper gastrointestinal endoscopy (N/A, 6/26/2020); and Upper gastrointestinal endoscopy (N/A, 4/29/2024).    Social History:  reports that he quit smoking about 40 years ago. His smoking use included cigarettes. He started smoking about 55 years ago. He has a 44.1 pack-year smoking history. He has been exposed to tobacco smoke. He has never used

## 2024-05-17 PROBLEM — R55 SYNCOPE AND COLLAPSE: Status: ACTIVE | Noted: 2024-05-17

## 2024-05-17 PROBLEM — F10.10 ALCOHOL ABUSE: Status: ACTIVE | Noted: 2024-05-17

## 2024-05-17 PROBLEM — D64.9 ANEMIA: Status: ACTIVE | Noted: 2024-05-17

## 2024-05-17 PROBLEM — R09.02 HYPOXIA: Status: ACTIVE | Noted: 2024-05-17

## 2024-05-17 PROBLEM — I05.9 MITRAL VALVE DISEASE: Status: ACTIVE | Noted: 2024-05-17

## 2024-05-17 PROBLEM — I50.32 CHRONIC DIASTOLIC (CONGESTIVE) HEART FAILURE (HCC): Status: ACTIVE | Noted: 2024-05-17

## 2024-05-17 PROBLEM — J44.9 CHRONIC OBSTRUCTIVE PULMONARY DISEASE (HCC): Status: ACTIVE | Noted: 2023-11-22

## 2024-05-17 PROBLEM — J96.21 ACUTE ON CHRONIC HYPOXIC RESPIRATORY FAILURE (HCC): Status: ACTIVE | Noted: 2024-05-17

## 2024-05-17 PROBLEM — E78.00 PURE HYPERCHOLESTEROLEMIA: Status: ACTIVE | Noted: 2024-05-17

## 2024-05-17 PROBLEM — J96.01 ACUTE HYPOXIC RESPIRATORY FAILURE (HCC): Status: ACTIVE | Noted: 2023-11-21

## 2024-05-17 LAB
ANION GAP SERPL CALCULATED.3IONS-SCNC: 14 MMOL/L (ref 7–16)
BASOPHILS # BLD: 0 K/UL (ref 0–0.2)
BASOPHILS NFR BLD: 0 % (ref 0–2)
BILIRUB UR QL STRIP: NEGATIVE
BUN SERPL-MCNC: 13 MG/DL (ref 6–23)
CALCIUM SERPL-MCNC: 9 MG/DL (ref 8.6–10.2)
CHLORIDE SERPL-SCNC: 97 MMOL/L (ref 98–107)
CLARITY UR: CLEAR
CO2 SERPL-SCNC: 25 MMOL/L (ref 22–29)
COLOR UR: YELLOW
CREAT SERPL-MCNC: 1.2 MG/DL (ref 0.7–1.2)
EKG ATRIAL RATE: 60 BPM
EKG Q-T INTERVAL: 470 MS
EKG QRS DURATION: 94 MS
EKG QTC CALCULATION (BAZETT): 528 MS
EKG R AXIS: -22 DEGREES
EKG T AXIS: -51 DEGREES
EKG VENTRICULAR RATE: 76 BPM
EOSINOPHIL # BLD: 0 K/UL (ref 0.05–0.5)
EOSINOPHILS RELATIVE PERCENT: 0 % (ref 0–6)
ERYTHROCYTE [DISTWIDTH] IN BLOOD BY AUTOMATED COUNT: 19 % (ref 11.5–15)
GFR, ESTIMATED: 64 ML/MIN/1.73M2
GLUCOSE SERPL-MCNC: 103 MG/DL (ref 74–99)
GLUCOSE UR STRIP-MCNC: NEGATIVE MG/DL
HCT VFR BLD AUTO: 29 % (ref 37–54)
HGB BLD-MCNC: 8.9 G/DL (ref 12.5–16.5)
HGB UR QL STRIP.AUTO: NEGATIVE
KETONES UR STRIP-MCNC: NEGATIVE MG/DL
L PNEUMO1 AG UR QL IA.RAPID: NEGATIVE
LEUKOCYTE ESTERASE UR QL STRIP: NEGATIVE
LYMPHOCYTES NFR BLD: 0.48 K/UL (ref 1.5–4)
LYMPHOCYTES RELATIVE PERCENT: 7 % (ref 20–42)
MCH RBC QN AUTO: 27.6 PG (ref 26–35)
MCHC RBC AUTO-ENTMCNC: 30.7 G/DL (ref 32–34.5)
MCV RBC AUTO: 90.1 FL (ref 80–99.9)
MONOCYTES NFR BLD: 0.12 K/UL (ref 0.1–0.95)
MONOCYTES NFR BLD: 2 % (ref 2–12)
NEUTROPHILS NFR BLD: 91 % (ref 43–80)
NEUTS SEG NFR BLD: 6.3 K/UL (ref 1.8–7.3)
NITRITE UR QL STRIP: NEGATIVE
PH UR STRIP: 6 [PH] (ref 5–9)
PHOSPHATE SERPL-MCNC: 4.2 MG/DL (ref 2.5–4.5)
PLATELET # BLD AUTO: 88 K/UL (ref 130–450)
PLATELET CONFIRMATION: NORMAL
PMV BLD AUTO: 11.1 FL (ref 7–12)
POTASSIUM SERPL-SCNC: 3.7 MMOL/L (ref 3.5–5)
PROCALCITONIN SERPL-MCNC: 0.07 NG/ML (ref 0–0.08)
PROT UR STRIP-MCNC: NEGATIVE MG/DL
RBC # BLD AUTO: 3.22 M/UL (ref 3.8–5.8)
RBC # BLD: ABNORMAL 10*6/UL
RBC #/AREA URNS HPF: ABNORMAL /HPF
S PNEUM AG SPEC QL: NEGATIVE
SODIUM SERPL-SCNC: 136 MMOL/L (ref 132–146)
SP GR UR STRIP: <1.005 (ref 1–1.03)
SPECIMEN SOURCE: NORMAL
UROBILINOGEN UR STRIP-ACNC: 0.2 EU/DL (ref 0–1)
WBC #/AREA URNS HPF: ABNORMAL /HPF
WBC OTHER # BLD: 6.9 K/UL (ref 4.5–11.5)

## 2024-05-17 PROCEDURE — 6370000000 HC RX 637 (ALT 250 FOR IP): Performed by: INTERNAL MEDICINE

## 2024-05-17 PROCEDURE — 87081 CULTURE SCREEN ONLY: CPT

## 2024-05-17 PROCEDURE — 6360000002 HC RX W HCPCS: Performed by: FAMILY MEDICINE

## 2024-05-17 PROCEDURE — 99223 1ST HOSP IP/OBS HIGH 75: CPT | Performed by: INTERNAL MEDICINE

## 2024-05-17 PROCEDURE — 2500000003 HC RX 250 WO HCPCS: Performed by: FAMILY MEDICINE

## 2024-05-17 PROCEDURE — 2060000000 HC ICU INTERMEDIATE R&B

## 2024-05-17 PROCEDURE — APPSS180 APP SPLIT SHARED TIME > 60 MINUTES: Performed by: CLINICAL NURSE SPECIALIST

## 2024-05-17 PROCEDURE — 2700000000 HC OXYGEN THERAPY PER DAY

## 2024-05-17 PROCEDURE — 93010 ELECTROCARDIOGRAM REPORT: CPT | Performed by: INTERNAL MEDICINE

## 2024-05-17 PROCEDURE — 6370000000 HC RX 637 (ALT 250 FOR IP): Performed by: FAMILY MEDICINE

## 2024-05-17 PROCEDURE — 84100 ASSAY OF PHOSPHORUS: CPT

## 2024-05-17 PROCEDURE — 94664 DEMO&/EVAL PT USE INHALER: CPT

## 2024-05-17 PROCEDURE — 85025 COMPLETE CBC W/AUTO DIFF WBC: CPT

## 2024-05-17 PROCEDURE — 99232 SBSQ HOSP IP/OBS MODERATE 35: CPT | Performed by: INTERNAL MEDICINE

## 2024-05-17 PROCEDURE — 84145 PROCALCITONIN (PCT): CPT

## 2024-05-17 PROCEDURE — 2580000003 HC RX 258: Performed by: FAMILY MEDICINE

## 2024-05-17 PROCEDURE — 94640 AIRWAY INHALATION TREATMENT: CPT

## 2024-05-17 PROCEDURE — 0202U NFCT DS 22 TRGT SARS-COV-2: CPT

## 2024-05-17 PROCEDURE — 80048 BASIC METABOLIC PNL TOTAL CA: CPT

## 2024-05-17 RX ORDER — ALBUTEROL SULFATE 90 UG/1
2 AEROSOL, METERED RESPIRATORY (INHALATION) EVERY 6 HOURS PRN
Status: DISCONTINUED | OUTPATIENT
Start: 2024-05-17 | End: 2024-05-17 | Stop reason: CLARIF

## 2024-05-17 RX ORDER — METOPROLOL SUCCINATE 50 MG/1
50 TABLET, EXTENDED RELEASE ORAL 2 TIMES DAILY
Status: DISCONTINUED | OUTPATIENT
Start: 2024-05-17 | End: 2024-05-18

## 2024-05-17 RX ORDER — BUDESONIDE 0.5 MG/2ML
500 INHALANT ORAL 2 TIMES DAILY
Status: DISCONTINUED | OUTPATIENT
Start: 2024-05-17 | End: 2024-06-11 | Stop reason: HOSPADM

## 2024-05-17 RX ORDER — LANOLIN ALCOHOL/MO/W.PET/CERES
100 CREAM (GRAM) TOPICAL DAILY
Status: DISCONTINUED | OUTPATIENT
Start: 2024-05-17 | End: 2024-05-21

## 2024-05-17 RX ORDER — GABAPENTIN 300 MG/1
600 CAPSULE ORAL 3 TIMES DAILY
Status: DISCONTINUED | OUTPATIENT
Start: 2024-05-17 | End: 2024-06-11 | Stop reason: HOSPADM

## 2024-05-17 RX ORDER — ARFORMOTEROL TARTRATE 15 UG/2ML
15 SOLUTION RESPIRATORY (INHALATION)
Status: DISCONTINUED | OUTPATIENT
Start: 2024-05-17 | End: 2024-06-11 | Stop reason: HOSPADM

## 2024-05-17 RX ORDER — BUDESONIDE 0.5 MG/2ML
1 INHALANT ORAL 2 TIMES DAILY PRN
Status: ON HOLD | COMMUNITY

## 2024-05-17 RX ORDER — METOPROLOL SUCCINATE 50 MG/1
50 TABLET, EXTENDED RELEASE ORAL 2 TIMES DAILY
Status: ON HOLD | COMMUNITY

## 2024-05-17 RX ORDER — PANTOPRAZOLE SODIUM 40 MG/1
40 TABLET, DELAYED RELEASE ORAL
Status: DISCONTINUED | OUTPATIENT
Start: 2024-05-17 | End: 2024-05-21

## 2024-05-17 RX ORDER — M-VIT,TX,IRON,MINS/CALC/FOLIC 27MG-0.4MG
1 TABLET ORAL DAILY
Status: DISCONTINUED | OUTPATIENT
Start: 2024-05-17 | End: 2024-06-04 | Stop reason: ALTCHOICE

## 2024-05-17 RX ORDER — ASPIRIN 81 MG/1
81 TABLET ORAL EVERY MORNING
Status: DISCONTINUED | OUTPATIENT
Start: 2024-05-17 | End: 2024-05-17

## 2024-05-17 RX ORDER — FUROSEMIDE 20 MG/1
20 TABLET ORAL DAILY
Status: DISCONTINUED | OUTPATIENT
Start: 2024-05-17 | End: 2024-06-02

## 2024-05-17 RX ORDER — ACETAMINOPHEN 325 MG/1
650 TABLET ORAL EVERY 6 HOURS PRN
Status: DISCONTINUED | OUTPATIENT
Start: 2024-05-17 | End: 2024-06-11 | Stop reason: HOSPADM

## 2024-05-17 RX ORDER — ATORVASTATIN CALCIUM 40 MG/1
40 TABLET, FILM COATED ORAL DAILY
Status: DISCONTINUED | OUTPATIENT
Start: 2024-05-17 | End: 2024-06-11 | Stop reason: HOSPADM

## 2024-05-17 RX ORDER — FUROSEMIDE 10 MG/ML
40 INJECTION INTRAMUSCULAR; INTRAVENOUS DAILY
Status: DISCONTINUED | OUTPATIENT
Start: 2024-05-17 | End: 2024-05-17

## 2024-05-17 RX ORDER — ALBUTEROL SULFATE 2.5 MG/3ML
2.5 SOLUTION RESPIRATORY (INHALATION) EVERY 6 HOURS PRN
Status: DISCONTINUED | OUTPATIENT
Start: 2024-05-17 | End: 2024-05-21

## 2024-05-17 RX ORDER — FOLIC ACID 1 MG/1
1 TABLET ORAL DAILY
Status: DISCONTINUED | OUTPATIENT
Start: 2024-05-17 | End: 2024-05-21

## 2024-05-17 RX ORDER — HYDRALAZINE HYDROCHLORIDE 25 MG/1
25 TABLET, FILM COATED ORAL 2 TIMES DAILY
Status: DISCONTINUED | OUTPATIENT
Start: 2024-05-17 | End: 2024-05-18

## 2024-05-17 RX ORDER — FUROSEMIDE 20 MG/1
20 TABLET ORAL 2 TIMES DAILY
Status: DISCONTINUED | OUTPATIENT
Start: 2024-05-17 | End: 2024-05-17

## 2024-05-17 RX ORDER — IPRATROPIUM BROMIDE AND ALBUTEROL SULFATE 2.5; .5 MG/3ML; MG/3ML
1 SOLUTION RESPIRATORY (INHALATION)
Status: DISCONTINUED | OUTPATIENT
Start: 2024-05-17 | End: 2024-05-23

## 2024-05-17 RX ORDER — ESOMEPRAZOLE MAGNESIUM 20 MG/1
20 GRANULE, DELAYED RELEASE ORAL DAILY PRN
Status: ON HOLD | COMMUNITY

## 2024-05-17 RX ADMIN — METOPROLOL SUCCINATE 50 MG: 25 TABLET, EXTENDED RELEASE ORAL at 20:22

## 2024-05-17 RX ADMIN — ASPIRIN 81 MG: 81 TABLET, COATED ORAL at 08:19

## 2024-05-17 RX ADMIN — IPRATROPIUM BROMIDE AND ALBUTEROL SULFATE 1 DOSE: 2.5; .5 SOLUTION RESPIRATORY (INHALATION) at 11:49

## 2024-05-17 RX ADMIN — ATORVASTATIN CALCIUM 40 MG: 40 TABLET, FILM COATED ORAL at 08:19

## 2024-05-17 RX ADMIN — IPRATROPIUM BROMIDE AND ALBUTEROL SULFATE 1 DOSE: 2.5; .5 SOLUTION RESPIRATORY (INHALATION) at 07:53

## 2024-05-17 RX ADMIN — METOPROLOL SUCCINATE 50 MG: 25 TABLET, EXTENDED RELEASE ORAL at 08:19

## 2024-05-17 RX ADMIN — APIXABAN 5 MG: 5 TABLET, FILM COATED ORAL at 08:19

## 2024-05-17 RX ADMIN — SODIUM CHLORIDE, PRESERVATIVE FREE 10 ML: 5 INJECTION INTRAVENOUS at 20:22

## 2024-05-17 RX ADMIN — BUDESONIDE INHALATION 500 MCG: 0.5 SUSPENSION RESPIRATORY (INHALATION) at 07:53

## 2024-05-17 RX ADMIN — DOXYCYCLINE 100 MG: 100 INJECTION, POWDER, LYOPHILIZED, FOR SOLUTION INTRAVENOUS at 10:14

## 2024-05-17 RX ADMIN — GABAPENTIN 600 MG: 300 CAPSULE ORAL at 16:14

## 2024-05-17 RX ADMIN — LORAZEPAM 1 MG: 2 INJECTION INTRAMUSCULAR; INTRAVENOUS at 14:16

## 2024-05-17 RX ADMIN — Medication 1 TABLET: at 10:09

## 2024-05-17 RX ADMIN — Medication 100 MG: at 08:19

## 2024-05-17 RX ADMIN — IPRATROPIUM BROMIDE AND ALBUTEROL SULFATE 1 DOSE: 2.5; .5 SOLUTION RESPIRATORY (INHALATION) at 16:36

## 2024-05-17 RX ADMIN — GABAPENTIN 600 MG: 300 CAPSULE ORAL at 20:22

## 2024-05-17 RX ADMIN — LORAZEPAM 1 MG: 1 TABLET ORAL at 23:02

## 2024-05-17 RX ADMIN — ARFORMOTEROL TARTRATE 15 MCG: 15 SOLUTION RESPIRATORY (INHALATION) at 21:07

## 2024-05-17 RX ADMIN — WATER 1000 MG: 1 INJECTION INTRAMUSCULAR; INTRAVENOUS; SUBCUTANEOUS at 23:00

## 2024-05-17 RX ADMIN — HYDRALAZINE HYDROCHLORIDE 25 MG: 25 TABLET ORAL at 08:19

## 2024-05-17 RX ADMIN — FOLIC ACID 1 MG: 1 TABLET ORAL at 08:19

## 2024-05-17 RX ADMIN — SODIUM CHLORIDE, PRESERVATIVE FREE 10 ML: 5 INJECTION INTRAVENOUS at 20:23

## 2024-05-17 RX ADMIN — GABAPENTIN 600 MG: 300 CAPSULE ORAL at 08:19

## 2024-05-17 RX ADMIN — LORAZEPAM 1 MG: 1 TABLET ORAL at 16:15

## 2024-05-17 RX ADMIN — IPRATROPIUM BROMIDE AND ALBUTEROL SULFATE 1 DOSE: 2.5; .5 SOLUTION RESPIRATORY (INHALATION) at 21:07

## 2024-05-17 RX ADMIN — ACETAMINOPHEN 650 MG: 325 TABLET ORAL at 20:21

## 2024-05-17 RX ADMIN — PANTOPRAZOLE SODIUM 40 MG: 40 TABLET, DELAYED RELEASE ORAL at 16:15

## 2024-05-17 RX ADMIN — DOXYCYCLINE 100 MG: 100 INJECTION, POWDER, LYOPHILIZED, FOR SOLUTION INTRAVENOUS at 23:11

## 2024-05-17 RX ADMIN — PANTOPRAZOLE SODIUM 40 MG: 40 TABLET, DELAYED RELEASE ORAL at 07:13

## 2024-05-17 RX ADMIN — ARFORMOTEROL TARTRATE 15 MCG: 15 SOLUTION RESPIRATORY (INHALATION) at 07:53

## 2024-05-17 RX ADMIN — FUROSEMIDE 20 MG: 20 TABLET ORAL at 18:17

## 2024-05-17 RX ADMIN — BUDESONIDE INHALATION 500 MCG: 0.5 SUSPENSION RESPIRATORY (INHALATION) at 21:07

## 2024-05-17 RX ADMIN — HYDRALAZINE HYDROCHLORIDE 25 MG: 25 TABLET ORAL at 20:21

## 2024-05-17 ASSESSMENT — PAIN DESCRIPTION - LOCATION: LOCATION: NECK;BACK

## 2024-05-17 ASSESSMENT — PAIN DESCRIPTION - DESCRIPTORS: DESCRIPTORS: ACHING;DISCOMFORT

## 2024-05-17 ASSESSMENT — LIFESTYLE VARIABLES
HOW OFTEN DO YOU HAVE A DRINK CONTAINING ALCOHOL: 4 OR MORE TIMES A WEEK
HOW MANY STANDARD DRINKS CONTAINING ALCOHOL DO YOU HAVE ON A TYPICAL DAY: 5 OR 6

## 2024-05-17 ASSESSMENT — PAIN DESCRIPTION - ORIENTATION: ORIENTATION: LOWER

## 2024-05-17 ASSESSMENT — PAIN SCALES - GENERAL
PAINLEVEL_OUTOF10: 0
PAINLEVEL_OUTOF10: 3

## 2024-05-17 NOTE — ED NOTES
Bedside report received from Lorna POLANCO. Patient resting comfortably in bed, alert & oriented. No complaints at this time

## 2024-05-17 NOTE — H&P
Hospital Medicine History & Physical      PCP: Colt Goldman DO    Date of Admission: 5/16/2024    Date of Service: Pt seen/examined on 5/16/2024 and Admitted to Inpatient with expected LOS greater than two midnights due to medical therapy.     Chief Complaint: Shortness of breath, syncope      History Of Present Illness:    74 y.o. male who presented to [facility] with concern for recurrent syncope.  Orthostatic vital signs in the ER was negative.  Was found to be hypoxic at 86% on room air was placed on 6 L oxygen.  Of note patient recently discharged from the hospital 5/3/2024 for GI bleed ED was negative colonoscopy could not be performed given patient alcohol withdrawal confusion.  Hemoglobin stable was resumed on his Eliquis for atrial fibrillation.  CT brain with no acute intracranial findings.  Chest x-ray reveals concern for pneumonia CT of the chest angiogram was obtained negative for pulm embolism but showed left greater than right pleural effusion.  Hemoglobin 9.2 compared to 7.9 on last discharge.  Troponin of 22.  EKG with A-fib heart rate of 72. Patient is currently being admitted for acute respiratory failure with pleural effusion and recurrent syncope.Patient mentioned that he gets very lightheaded from sitting to standing and has to slowly get up.  He mentioned that sometimes he can feel when he is about to pass out and has to sit down so he does not fall.  He does use 3L oxygen at night.    Past Medical History:          Diagnosis Date    Anxiety     Atrial fibrillation and flutter (HCC)     Depression     GERD (gastroesophageal reflux disease)     GI bleed 05/2024    Heart palpitations     HTN (hypertension) 10/21/2010    Hyperlipidemia     Lightheadedness     OA (osteoarthritis)     Parathyroid adenoma     s/p surgery 2005    Pre-diabetes     Recurrent left pleural effusion 09/13/2018    Stenosis of right internal carotid artery with cerebral infarction (HCC)        Past

## 2024-05-18 ENCOUNTER — APPOINTMENT (OUTPATIENT)
Dept: CT IMAGING | Age: 74
DRG: 163 | End: 2024-05-18
Payer: MEDICARE

## 2024-05-18 LAB
ANION GAP SERPL CALCULATED.3IONS-SCNC: 15 MMOL/L (ref 7–16)
B PARAP IS1001 DNA NPH QL NAA+NON-PROBE: NOT DETECTED
B PERT DNA SPEC QL NAA+PROBE: NOT DETECTED
BUN SERPL-MCNC: 18 MG/DL (ref 6–23)
C PNEUM DNA NPH QL NAA+NON-PROBE: NOT DETECTED
CALCIUM SERPL-MCNC: 8.5 MG/DL (ref 8.6–10.2)
CHLORIDE SERPL-SCNC: 100 MMOL/L (ref 98–107)
CO2 SERPL-SCNC: 22 MMOL/L (ref 22–29)
CREAT SERPL-MCNC: 1.4 MG/DL (ref 0.7–1.2)
FLUAV RNA NPH QL NAA+NON-PROBE: NOT DETECTED
FLUBV RNA NPH QL NAA+NON-PROBE: NOT DETECTED
GFR, ESTIMATED: 54 ML/MIN/1.73M2
GLUCOSE SERPL-MCNC: 107 MG/DL (ref 74–99)
HADV DNA NPH QL NAA+NON-PROBE: NOT DETECTED
HCOV 229E RNA NPH QL NAA+NON-PROBE: NOT DETECTED
HCOV HKU1 RNA NPH QL NAA+NON-PROBE: NOT DETECTED
HCOV NL63 RNA NPH QL NAA+NON-PROBE: NOT DETECTED
HCOV OC43 RNA NPH QL NAA+NON-PROBE: NOT DETECTED
HMPV RNA NPH QL NAA+NON-PROBE: NOT DETECTED
HPIV1 RNA NPH QL NAA+NON-PROBE: NOT DETECTED
HPIV2 RNA NPH QL NAA+NON-PROBE: NOT DETECTED
HPIV3 RNA NPH QL NAA+NON-PROBE: NOT DETECTED
HPIV4 RNA NPH QL NAA+NON-PROBE: NOT DETECTED
M PNEUMO DNA NPH QL NAA+NON-PROBE: NOT DETECTED
MAGNESIUM SERPL-MCNC: 1.4 MG/DL (ref 1.6–2.6)
MICROORGANISM SPEC CULT: NORMAL
POTASSIUM SERPL-SCNC: 3.8 MMOL/L (ref 3.5–5)
RSV RNA NPH QL NAA+NON-PROBE: NOT DETECTED
RV+EV RNA NPH QL NAA+NON-PROBE: NOT DETECTED
SARS-COV-2 RNA NPH QL NAA+NON-PROBE: NOT DETECTED
SODIUM SERPL-SCNC: 137 MMOL/L (ref 132–146)
SPECIMEN DESCRIPTION: NORMAL
SPECIMEN DESCRIPTION: NORMAL

## 2024-05-18 PROCEDURE — 6360000002 HC RX W HCPCS: Performed by: FAMILY MEDICINE

## 2024-05-18 PROCEDURE — 99233 SBSQ HOSP IP/OBS HIGH 50: CPT | Performed by: INTERNAL MEDICINE

## 2024-05-18 PROCEDURE — 2580000003 HC RX 258: Performed by: FAMILY MEDICINE

## 2024-05-18 PROCEDURE — 6360000002 HC RX W HCPCS: Performed by: INTERNAL MEDICINE

## 2024-05-18 PROCEDURE — 2060000000 HC ICU INTERMEDIATE R&B

## 2024-05-18 PROCEDURE — 99232 SBSQ HOSP IP/OBS MODERATE 35: CPT | Performed by: INTERNAL MEDICINE

## 2024-05-18 PROCEDURE — 6370000000 HC RX 637 (ALT 250 FOR IP): Performed by: INTERNAL MEDICINE

## 2024-05-18 PROCEDURE — 6370000000 HC RX 637 (ALT 250 FOR IP): Performed by: FAMILY MEDICINE

## 2024-05-18 PROCEDURE — 94640 AIRWAY INHALATION TREATMENT: CPT

## 2024-05-18 PROCEDURE — 2700000000 HC OXYGEN THERAPY PER DAY

## 2024-05-18 PROCEDURE — 86738 MYCOPLASMA ANTIBODY: CPT

## 2024-05-18 PROCEDURE — 2500000003 HC RX 250 WO HCPCS: Performed by: FAMILY MEDICINE

## 2024-05-18 PROCEDURE — 80048 BASIC METABOLIC PNL TOTAL CA: CPT

## 2024-05-18 PROCEDURE — 83735 ASSAY OF MAGNESIUM: CPT

## 2024-05-18 PROCEDURE — 36415 COLL VENOUS BLD VENIPUNCTURE: CPT

## 2024-05-18 PROCEDURE — 70450 CT HEAD/BRAIN W/O DYE: CPT

## 2024-05-18 RX ORDER — METOPROLOL SUCCINATE 25 MG/1
75 TABLET, EXTENDED RELEASE ORAL 2 TIMES DAILY
Status: DISCONTINUED | OUTPATIENT
Start: 2024-05-18 | End: 2024-05-28

## 2024-05-18 RX ORDER — MAGNESIUM SULFATE IN WATER 40 MG/ML
2000 INJECTION, SOLUTION INTRAVENOUS ONCE
Status: COMPLETED | OUTPATIENT
Start: 2024-05-18 | End: 2024-05-18

## 2024-05-18 RX ADMIN — GABAPENTIN 600 MG: 300 CAPSULE ORAL at 19:44

## 2024-05-18 RX ADMIN — METOPROLOL SUCCINATE 75 MG: 25 TABLET, EXTENDED RELEASE ORAL at 19:44

## 2024-05-18 RX ADMIN — ATORVASTATIN CALCIUM 40 MG: 40 TABLET, FILM COATED ORAL at 09:12

## 2024-05-18 RX ADMIN — Medication 1 TABLET: at 09:12

## 2024-05-18 RX ADMIN — IPRATROPIUM BROMIDE AND ALBUTEROL SULFATE 1 DOSE: 2.5; .5 SOLUTION RESPIRATORY (INHALATION) at 13:55

## 2024-05-18 RX ADMIN — SODIUM CHLORIDE, PRESERVATIVE FREE 10 ML: 5 INJECTION INTRAVENOUS at 19:45

## 2024-05-18 RX ADMIN — PANTOPRAZOLE SODIUM 40 MG: 40 TABLET, DELAYED RELEASE ORAL at 15:33

## 2024-05-18 RX ADMIN — PANTOPRAZOLE SODIUM 40 MG: 40 TABLET, DELAYED RELEASE ORAL at 05:56

## 2024-05-18 RX ADMIN — PAROXETINE 30 MG: 10 TABLET, FILM COATED ORAL at 09:13

## 2024-05-18 RX ADMIN — DOXYCYCLINE 100 MG: 100 INJECTION, POWDER, LYOPHILIZED, FOR SOLUTION INTRAVENOUS at 23:01

## 2024-05-18 RX ADMIN — BUDESONIDE INHALATION 500 MCG: 0.5 SUSPENSION RESPIRATORY (INHALATION) at 20:48

## 2024-05-18 RX ADMIN — DOXYCYCLINE 100 MG: 100 INJECTION, POWDER, LYOPHILIZED, FOR SOLUTION INTRAVENOUS at 11:28

## 2024-05-18 RX ADMIN — IPRATROPIUM BROMIDE AND ALBUTEROL SULFATE 1 DOSE: 2.5; .5 SOLUTION RESPIRATORY (INHALATION) at 20:48

## 2024-05-18 RX ADMIN — SODIUM CHLORIDE, PRESERVATIVE FREE 10 ML: 5 INJECTION INTRAVENOUS at 19:44

## 2024-05-18 RX ADMIN — SODIUM CHLORIDE, PRESERVATIVE FREE 10 ML: 5 INJECTION INTRAVENOUS at 09:19

## 2024-05-18 RX ADMIN — LORAZEPAM 1 MG: 1 TABLET ORAL at 11:25

## 2024-05-18 RX ADMIN — WATER 1000 MG: 1 INJECTION INTRAMUSCULAR; INTRAVENOUS; SUBCUTANEOUS at 22:55

## 2024-05-18 RX ADMIN — GABAPENTIN 600 MG: 300 CAPSULE ORAL at 09:12

## 2024-05-18 RX ADMIN — FOLIC ACID 1 MG: 1 TABLET ORAL at 09:12

## 2024-05-18 RX ADMIN — LORAZEPAM 3 MG: 2 INJECTION INTRAMUSCULAR; INTRAVENOUS at 19:46

## 2024-05-18 RX ADMIN — SODIUM CHLORIDE, PRESERVATIVE FREE 10 ML: 5 INJECTION INTRAVENOUS at 09:13

## 2024-05-18 RX ADMIN — ARFORMOTEROL TARTRATE 15 MCG: 15 SOLUTION RESPIRATORY (INHALATION) at 10:06

## 2024-05-18 RX ADMIN — LORAZEPAM 3 MG: 2 INJECTION INTRAMUSCULAR; INTRAVENOUS at 15:29

## 2024-05-18 RX ADMIN — BUDESONIDE INHALATION 500 MCG: 0.5 SUSPENSION RESPIRATORY (INHALATION) at 10:06

## 2024-05-18 RX ADMIN — Medication 100 MG: at 09:12

## 2024-05-18 RX ADMIN — ARFORMOTEROL TARTRATE 15 MCG: 15 SOLUTION RESPIRATORY (INHALATION) at 20:48

## 2024-05-18 RX ADMIN — IPRATROPIUM BROMIDE AND ALBUTEROL SULFATE 1 DOSE: 2.5; .5 SOLUTION RESPIRATORY (INHALATION) at 10:06

## 2024-05-18 RX ADMIN — LORAZEPAM 3 MG: 2 INJECTION INTRAMUSCULAR; INTRAVENOUS at 18:15

## 2024-05-18 RX ADMIN — GABAPENTIN 600 MG: 300 CAPSULE ORAL at 13:47

## 2024-05-18 RX ADMIN — MAGNESIUM SULFATE HEPTAHYDRATE 2000 MG: 40 INJECTION, SOLUTION INTRAVENOUS at 11:30

## 2024-05-18 RX ADMIN — METOPROLOL SUCCINATE 75 MG: 25 TABLET, EXTENDED RELEASE ORAL at 09:12

## 2024-05-18 RX ADMIN — LORAZEPAM 2 MG: 1 TABLET ORAL at 13:47

## 2024-05-18 ASSESSMENT — PAIN SCALES - GENERAL
PAINLEVEL_OUTOF10: 0

## 2024-05-19 ENCOUNTER — APPOINTMENT (OUTPATIENT)
Dept: GENERAL RADIOLOGY | Age: 74
DRG: 163 | End: 2024-05-19
Payer: MEDICARE

## 2024-05-19 LAB
ANION GAP SERPL CALCULATED.3IONS-SCNC: 14 MMOL/L (ref 7–16)
BASOPHILS # BLD: 0.05 K/UL (ref 0–0.2)
BASOPHILS NFR BLD: 1 % (ref 0–2)
BUN SERPL-MCNC: 14 MG/DL (ref 6–23)
CALCIUM SERPL-MCNC: 8.2 MG/DL (ref 8.6–10.2)
CHLORIDE SERPL-SCNC: 101 MMOL/L (ref 98–107)
CO2 SERPL-SCNC: 23 MMOL/L (ref 22–29)
CREAT SERPL-MCNC: 1.2 MG/DL (ref 0.7–1.2)
EOSINOPHIL # BLD: 0.14 K/UL (ref 0.05–0.5)
EOSINOPHILS RELATIVE PERCENT: 3 % (ref 0–6)
ERYTHROCYTE [DISTWIDTH] IN BLOOD BY AUTOMATED COUNT: 18.8 % (ref 11.5–15)
GFR, ESTIMATED: 65 ML/MIN/1.73M2
GLUCOSE SERPL-MCNC: 82 MG/DL (ref 74–99)
HCT VFR BLD AUTO: 24.2 % (ref 37–54)
HGB BLD-MCNC: 7.5 G/DL (ref 12.5–16.5)
LYMPHOCYTES NFR BLD: 0.51 K/UL (ref 1.5–4)
LYMPHOCYTES RELATIVE PERCENT: 10 % (ref 20–42)
MAGNESIUM SERPL-MCNC: 1.6 MG/DL (ref 1.6–2.6)
MCH RBC QN AUTO: 27.8 PG (ref 26–35)
MCHC RBC AUTO-ENTMCNC: 31 G/DL (ref 32–34.5)
MCV RBC AUTO: 89.6 FL (ref 80–99.9)
METAMYELOCYTES ABSOLUTE COUNT: 0.05 K/UL (ref 0–0.12)
METAMYELOCYTES: 1 % (ref 0–1)
MONOCYTES NFR BLD: 0.28 K/UL (ref 0.1–0.95)
MONOCYTES NFR BLD: 5 % (ref 2–12)
NEUTROPHILS NFR BLD: 81 % (ref 43–80)
NEUTS SEG NFR BLD: 4.28 K/UL (ref 1.8–7.3)
PLATELET # BLD AUTO: 76 K/UL (ref 130–450)
PLATELET CONFIRMATION: NORMAL
PMV BLD AUTO: 11.6 FL (ref 7–12)
POTASSIUM SERPL-SCNC: 3.5 MMOL/L (ref 3.5–5)
RBC # BLD AUTO: 2.7 M/UL (ref 3.8–5.8)
RBC # BLD: ABNORMAL 10*6/UL
SODIUM SERPL-SCNC: 138 MMOL/L (ref 132–146)
WBC OTHER # BLD: 5.3 K/UL (ref 4.5–11.5)

## 2024-05-19 PROCEDURE — 99233 SBSQ HOSP IP/OBS HIGH 50: CPT | Performed by: INTERNAL MEDICINE

## 2024-05-19 PROCEDURE — 83735 ASSAY OF MAGNESIUM: CPT

## 2024-05-19 PROCEDURE — 99232 SBSQ HOSP IP/OBS MODERATE 35: CPT | Performed by: INTERNAL MEDICINE

## 2024-05-19 PROCEDURE — 71045 X-RAY EXAM CHEST 1 VIEW: CPT

## 2024-05-19 PROCEDURE — 85025 COMPLETE CBC W/AUTO DIFF WBC: CPT

## 2024-05-19 PROCEDURE — 80048 BASIC METABOLIC PNL TOTAL CA: CPT

## 2024-05-19 PROCEDURE — 2700000000 HC OXYGEN THERAPY PER DAY

## 2024-05-19 PROCEDURE — 36415 COLL VENOUS BLD VENIPUNCTURE: CPT

## 2024-05-19 PROCEDURE — 2060000000 HC ICU INTERMEDIATE R&B

## 2024-05-19 PROCEDURE — 6370000000 HC RX 637 (ALT 250 FOR IP): Performed by: FAMILY MEDICINE

## 2024-05-19 PROCEDURE — 2580000003 HC RX 258: Performed by: FAMILY MEDICINE

## 2024-05-19 PROCEDURE — 94640 AIRWAY INHALATION TREATMENT: CPT

## 2024-05-19 PROCEDURE — 6360000002 HC RX W HCPCS: Performed by: FAMILY MEDICINE

## 2024-05-19 PROCEDURE — 6370000000 HC RX 637 (ALT 250 FOR IP): Performed by: INTERNAL MEDICINE

## 2024-05-19 RX ADMIN — PANTOPRAZOLE SODIUM 40 MG: 40 TABLET, DELAYED RELEASE ORAL at 16:17

## 2024-05-19 RX ADMIN — IPRATROPIUM BROMIDE AND ALBUTEROL SULFATE 1 DOSE: 2.5; .5 SOLUTION RESPIRATORY (INHALATION) at 17:34

## 2024-05-19 RX ADMIN — LORAZEPAM 3 MG: 2 INJECTION INTRAMUSCULAR; INTRAVENOUS at 20:25

## 2024-05-19 RX ADMIN — GABAPENTIN 600 MG: 300 CAPSULE ORAL at 14:22

## 2024-05-19 RX ADMIN — FOLIC ACID 1 MG: 1 TABLET ORAL at 09:03

## 2024-05-19 RX ADMIN — SODIUM CHLORIDE, PRESERVATIVE FREE 10 ML: 5 INJECTION INTRAVENOUS at 09:04

## 2024-05-19 RX ADMIN — IPRATROPIUM BROMIDE AND ALBUTEROL SULFATE 1 DOSE: 2.5; .5 SOLUTION RESPIRATORY (INHALATION) at 08:27

## 2024-05-19 RX ADMIN — BUDESONIDE INHALATION 500 MCG: 0.5 SUSPENSION RESPIRATORY (INHALATION) at 08:27

## 2024-05-19 RX ADMIN — MAGNESIUM SULFATE HEPTAHYDRATE 2000 MG: 40 INJECTION, SOLUTION INTRAVENOUS at 08:06

## 2024-05-19 RX ADMIN — IPRATROPIUM BROMIDE AND ALBUTEROL SULFATE 1 DOSE: 2.5; .5 SOLUTION RESPIRATORY (INHALATION) at 12:38

## 2024-05-19 RX ADMIN — ARFORMOTEROL TARTRATE 15 MCG: 15 SOLUTION RESPIRATORY (INHALATION) at 20:27

## 2024-05-19 RX ADMIN — POTASSIUM CHLORIDE 40 MEQ: 1500 TABLET, EXTENDED RELEASE ORAL at 08:55

## 2024-05-19 RX ADMIN — METOPROLOL SUCCINATE 75 MG: 25 TABLET, EXTENDED RELEASE ORAL at 08:57

## 2024-05-19 RX ADMIN — IPRATROPIUM BROMIDE AND ALBUTEROL SULFATE 1 DOSE: 2.5; .5 SOLUTION RESPIRATORY (INHALATION) at 20:27

## 2024-05-19 RX ADMIN — LORAZEPAM 2 MG: 2 INJECTION INTRAMUSCULAR; INTRAVENOUS at 18:49

## 2024-05-19 RX ADMIN — Medication 1 TABLET: at 09:03

## 2024-05-19 RX ADMIN — PANTOPRAZOLE SODIUM 40 MG: 40 TABLET, DELAYED RELEASE ORAL at 06:06

## 2024-05-19 RX ADMIN — Medication 100 MG: at 08:58

## 2024-05-19 RX ADMIN — ARFORMOTEROL TARTRATE 15 MCG: 15 SOLUTION RESPIRATORY (INHALATION) at 08:27

## 2024-05-19 RX ADMIN — PAROXETINE 30 MG: 10 TABLET, FILM COATED ORAL at 09:13

## 2024-05-19 RX ADMIN — ATORVASTATIN CALCIUM 40 MG: 40 TABLET, FILM COATED ORAL at 08:58

## 2024-05-19 RX ADMIN — GABAPENTIN 600 MG: 300 CAPSULE ORAL at 08:52

## 2024-05-19 RX ADMIN — BUDESONIDE INHALATION 500 MCG: 0.5 SUSPENSION RESPIRATORY (INHALATION) at 20:27

## 2024-05-19 ASSESSMENT — PAIN SCALES - GENERAL
PAINLEVEL_OUTOF10: 0
PAINLEVEL_OUTOF10: 0

## 2024-05-20 ENCOUNTER — APPOINTMENT (OUTPATIENT)
Dept: CT IMAGING | Age: 74
DRG: 163 | End: 2024-05-20
Payer: MEDICARE

## 2024-05-20 LAB
ANION GAP SERPL CALCULATED.3IONS-SCNC: 10 MMOL/L (ref 7–16)
BASOPHILS # BLD: 0.06 K/UL (ref 0–0.2)
BASOPHILS NFR BLD: 1 % (ref 0–2)
BUN SERPL-MCNC: 17 MG/DL (ref 6–23)
CALCIUM SERPL-MCNC: 8.3 MG/DL (ref 8.6–10.2)
CHLORIDE SERPL-SCNC: 98 MMOL/L (ref 98–107)
CO2 SERPL-SCNC: 25 MMOL/L (ref 22–29)
CREAT SERPL-MCNC: 1.4 MG/DL (ref 0.7–1.2)
EOSINOPHIL # BLD: 0.38 K/UL (ref 0.05–0.5)
EOSINOPHILS RELATIVE PERCENT: 6 % (ref 0–6)
ERYTHROCYTE [DISTWIDTH] IN BLOOD BY AUTOMATED COUNT: 19.1 % (ref 11.5–15)
GFR, ESTIMATED: 55 ML/MIN/1.73M2
GLUCOSE SERPL-MCNC: 85 MG/DL (ref 74–99)
HCT VFR BLD AUTO: 26 % (ref 37–54)
HGB BLD-MCNC: 7.7 G/DL (ref 12.5–16.5)
IMM GRANULOCYTES # BLD AUTO: 0.03 K/UL (ref 0–0.58)
IMM GRANULOCYTES NFR BLD: 0 % (ref 0–5)
LYMPHOCYTES NFR BLD: 0.7 K/UL (ref 1.5–4)
LYMPHOCYTES RELATIVE PERCENT: 11 % (ref 20–42)
MAGNESIUM SERPL-MCNC: 2 MG/DL (ref 1.6–2.6)
MCH RBC QN AUTO: 27 PG (ref 26–35)
MCHC RBC AUTO-ENTMCNC: 29.6 G/DL (ref 32–34.5)
MCV RBC AUTO: 91.2 FL (ref 80–99.9)
MONOCYTES NFR BLD: 0.91 K/UL (ref 0.1–0.95)
MONOCYTES NFR BLD: 14 % (ref 2–12)
NEUTROPHILS NFR BLD: 69 % (ref 43–80)
NEUTS SEG NFR BLD: 4.61 K/UL (ref 1.8–7.3)
PLATELET # BLD AUTO: 92 K/UL (ref 130–450)
PLATELET CONFIRMATION: NORMAL
PMV BLD AUTO: 12.1 FL (ref 7–12)
POTASSIUM SERPL-SCNC: 4.1 MMOL/L (ref 3.5–5)
RBC # BLD AUTO: 2.85 M/UL (ref 3.8–5.8)
SODIUM SERPL-SCNC: 133 MMOL/L (ref 132–146)
WBC OTHER # BLD: 6.7 K/UL (ref 4.5–11.5)

## 2024-05-20 PROCEDURE — 6370000000 HC RX 637 (ALT 250 FOR IP): Performed by: INTERNAL MEDICINE

## 2024-05-20 PROCEDURE — 71275 CT ANGIOGRAPHY CHEST: CPT

## 2024-05-20 PROCEDURE — 85025 COMPLETE CBC W/AUTO DIFF WBC: CPT

## 2024-05-20 PROCEDURE — 94640 AIRWAY INHALATION TREATMENT: CPT

## 2024-05-20 PROCEDURE — 6370000000 HC RX 637 (ALT 250 FOR IP): Performed by: FAMILY MEDICINE

## 2024-05-20 PROCEDURE — 2700000000 HC OXYGEN THERAPY PER DAY

## 2024-05-20 PROCEDURE — 6360000004 HC RX CONTRAST MEDICATION: Performed by: RADIOLOGY

## 2024-05-20 PROCEDURE — 6360000002 HC RX W HCPCS: Performed by: FAMILY MEDICINE

## 2024-05-20 PROCEDURE — 83735 ASSAY OF MAGNESIUM: CPT

## 2024-05-20 PROCEDURE — 2060000000 HC ICU INTERMEDIATE R&B

## 2024-05-20 PROCEDURE — 99232 SBSQ HOSP IP/OBS MODERATE 35: CPT | Performed by: INTERNAL MEDICINE

## 2024-05-20 PROCEDURE — 36415 COLL VENOUS BLD VENIPUNCTURE: CPT

## 2024-05-20 PROCEDURE — 80048 BASIC METABOLIC PNL TOTAL CA: CPT

## 2024-05-20 PROCEDURE — 2580000003 HC RX 258: Performed by: FAMILY MEDICINE

## 2024-05-20 RX ORDER — SODIUM CHLORIDE 0.9 % (FLUSH) 0.9 %
10 SYRINGE (ML) INJECTION
Status: ACTIVE | OUTPATIENT
Start: 2024-05-20 | End: 2024-05-21

## 2024-05-20 RX ADMIN — ARFORMOTEROL TARTRATE 15 MCG: 15 SOLUTION RESPIRATORY (INHALATION) at 08:12

## 2024-05-20 RX ADMIN — SODIUM CHLORIDE, PRESERVATIVE FREE 10 ML: 5 INJECTION INTRAVENOUS at 10:30

## 2024-05-20 RX ADMIN — METOPROLOL SUCCINATE 75 MG: 25 TABLET, EXTENDED RELEASE ORAL at 21:01

## 2024-05-20 RX ADMIN — SODIUM CHLORIDE, PRESERVATIVE FREE 10 ML: 5 INJECTION INTRAVENOUS at 21:06

## 2024-05-20 RX ADMIN — IOPAMIDOL 75 ML: 755 INJECTION, SOLUTION INTRAVENOUS at 07:41

## 2024-05-20 RX ADMIN — ARFORMOTEROL TARTRATE 15 MCG: 15 SOLUTION RESPIRATORY (INHALATION) at 21:22

## 2024-05-20 RX ADMIN — APIXABAN 5 MG: 5 TABLET, FILM COATED ORAL at 21:01

## 2024-05-20 RX ADMIN — BUDESONIDE INHALATION 500 MCG: 0.5 SUSPENSION RESPIRATORY (INHALATION) at 08:12

## 2024-05-20 RX ADMIN — LORAZEPAM 2 MG: 2 INJECTION INTRAMUSCULAR; INTRAVENOUS at 16:34

## 2024-05-20 RX ADMIN — GABAPENTIN 600 MG: 300 CAPSULE ORAL at 21:01

## 2024-05-20 RX ADMIN — IPRATROPIUM BROMIDE AND ALBUTEROL SULFATE 1 DOSE: 2.5; .5 SOLUTION RESPIRATORY (INHALATION) at 08:12

## 2024-05-20 RX ADMIN — SODIUM CHLORIDE, PRESERVATIVE FREE 10 ML: 5 INJECTION INTRAVENOUS at 21:02

## 2024-05-20 RX ADMIN — IPRATROPIUM BROMIDE AND ALBUTEROL SULFATE 1 DOSE: 2.5; .5 SOLUTION RESPIRATORY (INHALATION) at 21:22

## 2024-05-20 RX ADMIN — Medication 1 TABLET: at 18:08

## 2024-05-20 RX ADMIN — FOLIC ACID 1 MG: 1 TABLET ORAL at 18:09

## 2024-05-20 RX ADMIN — PAROXETINE 30 MG: 10 TABLET, FILM COATED ORAL at 18:08

## 2024-05-20 RX ADMIN — GABAPENTIN 600 MG: 300 CAPSULE ORAL at 18:09

## 2024-05-20 RX ADMIN — Medication 100 MG: at 18:09

## 2024-05-20 RX ADMIN — IPRATROPIUM BROMIDE AND ALBUTEROL SULFATE 1 DOSE: 2.5; .5 SOLUTION RESPIRATORY (INHALATION) at 13:00

## 2024-05-20 RX ADMIN — BUDESONIDE INHALATION 500 MCG: 0.5 SUSPENSION RESPIRATORY (INHALATION) at 21:22

## 2024-05-20 RX ADMIN — PANTOPRAZOLE SODIUM 40 MG: 40 TABLET, DELAYED RELEASE ORAL at 18:08

## 2024-05-20 ASSESSMENT — PAIN SCALES - GENERAL
PAINLEVEL_OUTOF10: 0
PAINLEVEL_OUTOF10: 0

## 2024-05-20 NOTE — CARE COORDINATION
Transition of Care: Met with patient at bedside and called Radha (SO) and explained case management role. Patient confused, in restraints. Patient lives with SO in a one story home with seven steps to enter. Owns a cane but does not use it. Owns a nebulizer O2 through [employer]. 3 liters baseline. Hx HHC can't remember agency, Hx [employer] [place], [employer] for DME. Patient primary care physician is Colt Goldman DO. Patient uses [facility] pharmacy in [place]. Primary decision maker is Radha. Discharge plan is Home with no home health care needs. Radha refused HHC. Patient came to hospital with SOB and weakness. CM/SW to follow. MT     Case Management Assessment  Initial Evaluation    Date/Time of Evaluation: 5/20/2024 3:44 PM  Assessment Completed by: Deven Mcmullen RN    If patient is discharged prior to next notation, then this note serves as note for discharge by case management.    Patient Name: Catarino Bolanos                   YOB: 1950  Diagnosis: Syncope and collapse [R55]  Pleural effusion [J90]  Hypoxia [R09.02]  Acute respiratory failure with hypoxia (HCC) [J96.01]                   Date / Time: 5/16/2024  5:54 PM    Patient Admission Status: Inpatient   Readmission Risk (Low < 19, Mod (19-27), High > 27): Readmission Risk Score: 23.4    Current PCP: Colt Goldman DO  PCP verified by CM? Yes    Chart Reviewed: Yes      History Provided by: Significant Other  Patient Orientation: Person    Patient Cognition: Severely Impaired    Hospitalization in the last 30 days (Readmission):  Yes    If yes, Readmission Assessment in  Navigator will be completed.    Advance Directives:      Code Status: Full Code   Patient's Primary Decision Maker is: Named in Scanned ACP Document    Primary Decision Maker: EvyyariRadha - Domestic Partner - [phone]    Discharge Planning:    Patient lives with: Spouse/Significant Other Type of Home: House  Primary Care Giver: Self  Patient Support Systems include: Family

## 2024-05-21 ENCOUNTER — APPOINTMENT (OUTPATIENT)
Dept: GENERAL RADIOLOGY | Age: 74
DRG: 163 | End: 2024-05-21
Payer: MEDICARE

## 2024-05-21 ENCOUNTER — APPOINTMENT (OUTPATIENT)
Dept: CT IMAGING | Age: 74
DRG: 163 | End: 2024-05-21
Payer: MEDICARE

## 2024-05-21 PROBLEM — R56.9 SEIZURE (HCC): Status: ACTIVE | Noted: 2024-05-21

## 2024-05-21 PROBLEM — E44.0 MODERATE PROTEIN-CALORIE MALNUTRITION (HCC): Status: ACTIVE | Noted: 2024-05-21

## 2024-05-21 LAB
25(OH)D3 SERPL-MCNC: 50.8 NG/ML (ref 30–100)
AADO2: 152.9 MMHG
ALBUMIN SERPL-MCNC: 3.3 G/DL (ref 3.5–5.2)
ALP SERPL-CCNC: 98 U/L (ref 40–129)
ALT SERPL-CCNC: 10 U/L (ref 0–40)
AMMONIA PLAS-SCNC: 10 UMOL/L (ref 16–60)
AMMONIA PLAS-SCNC: 15 UMOL/L (ref 16–60)
AMPHET UR QL SCN: NEGATIVE
ANION GAP SERPL CALCULATED.3IONS-SCNC: 13 MMOL/L (ref 7–16)
APAP SERPL-MCNC: <5 UG/ML (ref 10–30)
AST SERPL-CCNC: 16 U/L (ref 0–39)
B PARAP IS1001 DNA NPH QL NAA+NON-PROBE: NOT DETECTED
B PERT DNA SPEC QL NAA+PROBE: NOT DETECTED
B.E.: -0.1 MMOL/L (ref -3–3)
B.E.: -2.9 MMOL/L (ref -3–3)
B.E.: 0.6 MMOL/L (ref -3–3)
BARBITURATES UR QL SCN: POSITIVE
BENZODIAZ UR QL: POSITIVE
BILIRUB SERPL-MCNC: 0.4 MG/DL (ref 0–1.2)
BNP SERPL-MCNC: 4228 PG/ML (ref 0–450)
BUN SERPL-MCNC: 14 MG/DL (ref 6–23)
BUPRENORPHINE UR QL: NEGATIVE
C PNEUM DNA NPH QL NAA+NON-PROBE: NOT DETECTED
CA-I BLD-SCNC: 1.17 MMOL/L (ref 1.15–1.33)
CALCIUM SERPL-MCNC: 8.6 MG/DL (ref 8.6–10.2)
CANNABINOIDS UR QL SCN: NEGATIVE
CHLORIDE SERPL-SCNC: 103 MMOL/L (ref 98–107)
CO2 SERPL-SCNC: 24 MMOL/L (ref 22–29)
COCAINE UR QL SCN: NEGATIVE
COHB: 0.4 % (ref 0–1.5)
COHB: 0.6 % (ref 0–1.5)
COHB: 0.6 % (ref 0–1.5)
CREAT SERPL-MCNC: 1.2 MG/DL (ref 0.7–1.2)
CRITICAL: ABNORMAL
CRP SERPL HS-MCNC: 15 MG/L (ref 0–5)
DATE ANALYZED: ABNORMAL
DATE OF COLLECTION: ABNORMAL
EKG ATRIAL RATE: 416 BPM
EKG Q-T INTERVAL: 444 MS
EKG QRS DURATION: 98 MS
EKG QTC CALCULATION (BAZETT): 479 MS
EKG R AXIS: -168 DEGREES
EKG T AXIS: -153 DEGREES
EKG VENTRICULAR RATE: 70 BPM
ERYTHROCYTE [DISTWIDTH] IN BLOOD BY AUTOMATED COUNT: 18.6 % (ref 11.5–15)
ETHANOLAMINE SERPL-MCNC: <10 MG/DL (ref 0–0.08)
FENTANYL UR QL: NEGATIVE
FERRITIN SERPL-MCNC: 42 NG/ML
FIO2: 60 %
FLUAV RNA NPH QL NAA+NON-PROBE: NOT DETECTED
FLUBV RNA NPH QL NAA+NON-PROBE: NOT DETECTED
FOLATE SERPL-MCNC: >20 NG/ML (ref 4.8–24.2)
GFR, ESTIMATED: 61 ML/MIN/1.73M2
GLUCOSE BLD-MCNC: 102 MG/DL (ref 74–99)
GLUCOSE BLD-MCNC: 103 MG/DL (ref 74–99)
GLUCOSE BLD-MCNC: 139 MG/DL (ref 74–99)
GLUCOSE BLD-MCNC: 153 MG/DL (ref 74–99)
GLUCOSE SERPL-MCNC: 99 MG/DL (ref 74–99)
HADV DNA NPH QL NAA+NON-PROBE: NOT DETECTED
HCO3: 22.6 MMOL/L (ref 22–26)
HCO3: 24.1 MMOL/L (ref 22–26)
HCO3: 24.4 MMOL/L (ref 22–26)
HCOV 229E RNA NPH QL NAA+NON-PROBE: NOT DETECTED
HCOV HKU1 RNA NPH QL NAA+NON-PROBE: NOT DETECTED
HCOV NL63 RNA NPH QL NAA+NON-PROBE: NOT DETECTED
HCOV OC43 RNA NPH QL NAA+NON-PROBE: NOT DETECTED
HCT VFR BLD AUTO: 26 % (ref 37–54)
HGB BLD-MCNC: 8.1 G/DL (ref 12.5–16.5)
HHB: 0.3 % (ref 0–5)
HHB: 1.8 % (ref 0–5)
HHB: 2.1 % (ref 0–5)
HMPV RNA NPH QL NAA+NON-PROBE: NOT DETECTED
HPIV1 RNA NPH QL NAA+NON-PROBE: NOT DETECTED
HPIV2 RNA NPH QL NAA+NON-PROBE: NOT DETECTED
HPIV3 RNA NPH QL NAA+NON-PROBE: NOT DETECTED
HPIV4 RNA NPH QL NAA+NON-PROBE: NOT DETECTED
INR PPP: 2.1
IRON SATN MFR SERPL: 9 % (ref 20–55)
IRON SERPL-MCNC: 27 UG/DL (ref 59–158)
LAB: ABNORMAL
LACTATE BLDV-SCNC: 1.7 MMOL/L (ref 0.5–2.2)
Lab: 1434
Lab: 328
Lab: 655
M PNEUMO DNA NPH QL NAA+NON-PROBE: NOT DETECTED
MAGNESIUM SERPL-MCNC: 1.9 MG/DL (ref 1.6–2.6)
MCH RBC QN AUTO: 28.4 PG (ref 26–35)
MCHC RBC AUTO-ENTMCNC: 31.2 G/DL (ref 32–34.5)
MCV RBC AUTO: 91.2 FL (ref 80–99.9)
METHADONE UR QL: NEGATIVE
METHB: 0.6 % (ref 0–1.5)
METHB: 0.6 % (ref 0–1.5)
METHB: 0.7 % (ref 0–1.5)
MODE: ABNORMAL
MODE: ABNORMAL
MODE: AC
O2 CONTENT: 11.9 ML/DL
O2 CONTENT: 12.1 ML/DL
O2 CONTENT: 12.3 ML/DL
O2 SATURATION: 97.9 % (ref 92–98.5)
O2 SATURATION: 98.2 % (ref 92–98.5)
O2 SATURATION: 99.7 % (ref 92–98.5)
O2HB: 96.7 % (ref 94–97)
O2HB: 97.2 % (ref 94–97)
O2HB: 98.4 % (ref 94–97)
OPERATOR ID: 1768
OPERATOR ID: 2860
OPERATOR ID: 3214
OPIATES UR QL SCN: NEGATIVE
OXYCODONE UR QL SCN: NEGATIVE
PARTIAL THROMBOPLASTIN TIME: 40.4 SEC (ref 24.5–35.1)
PATIENT TEMP: 37 C
PCO2: 33.9 MMHG (ref 35–45)
PCO2: 39.1 MMHG (ref 35–45)
PCO2: 42.1 MMHG (ref 35–45)
PCP UR QL SCN: NEGATIVE
PEEP/CPAP: 5 CMH2O
PFO2: 3.96 MMHG/%
PH BLOOD GAS: 7.35 (ref 7.35–7.45)
PH BLOOD GAS: 7.41 (ref 7.35–7.45)
PH BLOOD GAS: 7.47 (ref 7.35–7.45)
PHOSPHATE SERPL-MCNC: 3.4 MG/DL (ref 2.5–4.5)
PLATELET # BLD AUTO: 113 K/UL (ref 130–450)
PMV BLD AUTO: 11.4 FL (ref 7–12)
PO2: 109.1 MMHG (ref 75–100)
PO2: 121.2 MMHG (ref 75–100)
PO2: 237.6 MMHG (ref 75–100)
POTASSIUM SERPL-SCNC: 3.9 MMOL/L (ref 3.5–5)
POTASSIUM SERPL-SCNC: 4.2 MMOL/L (ref 3.5–5)
PROCALCITONIN SERPL-MCNC: 0.04 NG/ML (ref 0–0.08)
PROT SERPL-MCNC: 5.3 G/DL (ref 6.4–8.3)
PROTHROMBIN TIME: 22.9 SEC (ref 9.3–12.4)
RBC # BLD AUTO: 2.85 M/UL (ref 3.8–5.8)
RI(T): 0.64
RR MECHANICAL: 16 B/MIN
RSV RNA NPH QL NAA+NON-PROBE: NOT DETECTED
SALICYLATES SERPL-MCNC: <0.3 MG/DL (ref 0–30)
SARS-COV-2 RNA NPH QL NAA+NON-PROBE: NOT DETECTED
SODIUM SERPL-SCNC: 140 MMOL/L (ref 132–146)
SOURCE, BLOOD GAS: ABNORMAL
SPECIMEN DESCRIPTION: NORMAL
TEST INFORMATION: ABNORMAL
THB: 8.3 G/DL (ref 11.5–16.5)
THB: 8.6 G/DL (ref 11.5–16.5)
THB: 8.8 G/DL (ref 11.5–16.5)
TIBC SERPL-MCNC: 296 UG/DL (ref 250–450)
TIME ANALYZED: 1441
TIME ANALYZED: 333
TIME ANALYZED: 658
TOXIC TRICYCLIC SC,BLOOD: NEGATIVE
TROPONIN I SERPL HS-MCNC: 18 NG/L (ref 0–11)
VIT B12 SERPL-MCNC: 1414 PG/ML (ref 211–946)
VT MECHANICAL: 400 ML
WBC OTHER # BLD: 10.1 K/UL (ref 4.5–11.5)

## 2024-05-21 PROCEDURE — 80307 DRUG TEST PRSMV CHEM ANLYZR: CPT

## 2024-05-21 PROCEDURE — 6360000002 HC RX W HCPCS

## 2024-05-21 PROCEDURE — 83550 IRON BINDING TEST: CPT

## 2024-05-21 PROCEDURE — 99291 CRITICAL CARE FIRST HOUR: CPT | Performed by: INTERNAL MEDICINE

## 2024-05-21 PROCEDURE — 2500000003 HC RX 250 WO HCPCS

## 2024-05-21 PROCEDURE — 2580000003 HC RX 258: Performed by: CHIROPRACTOR

## 2024-05-21 PROCEDURE — 83880 ASSAY OF NATRIURETIC PEPTIDE: CPT

## 2024-05-21 PROCEDURE — 93005 ELECTROCARDIOGRAM TRACING: CPT | Performed by: CHIROPRACTOR

## 2024-05-21 PROCEDURE — 82607 VITAMIN B-12: CPT

## 2024-05-21 PROCEDURE — 6360000002 HC RX W HCPCS: Performed by: FAMILY MEDICINE

## 2024-05-21 PROCEDURE — 51701 INSERT BLADDER CATHETER: CPT

## 2024-05-21 PROCEDURE — 6360000002 HC RX W HCPCS: Performed by: CHIROPRACTOR

## 2024-05-21 PROCEDURE — 5A1935Z RESPIRATORY VENTILATION, LESS THAN 24 CONSECUTIVE HOURS: ICD-10-PCS | Performed by: INTERNAL MEDICINE

## 2024-05-21 PROCEDURE — 85027 COMPLETE CBC AUTOMATED: CPT

## 2024-05-21 PROCEDURE — 31500 INSERT EMERGENCY AIRWAY: CPT

## 2024-05-21 PROCEDURE — 71045 X-RAY EXAM CHEST 1 VIEW: CPT

## 2024-05-21 PROCEDURE — 80053 COMPREHEN METABOLIC PANEL: CPT

## 2024-05-21 PROCEDURE — 2500000003 HC RX 250 WO HCPCS: Performed by: FAMILY MEDICINE

## 2024-05-21 PROCEDURE — 82805 BLOOD GASES W/O2 SATURATION: CPT

## 2024-05-21 PROCEDURE — 80179 DRUG ASSAY SALICYLATE: CPT

## 2024-05-21 PROCEDURE — 2580000003 HC RX 258: Performed by: INTERNAL MEDICINE

## 2024-05-21 PROCEDURE — 82306 VITAMIN D 25 HYDROXY: CPT

## 2024-05-21 PROCEDURE — 2580000003 HC RX 258: Performed by: FAMILY MEDICINE

## 2024-05-21 PROCEDURE — 87040 BLOOD CULTURE FOR BACTERIA: CPT

## 2024-05-21 PROCEDURE — 84484 ASSAY OF TROPONIN QUANT: CPT

## 2024-05-21 PROCEDURE — 94799 UNLISTED PULMONARY SVC/PX: CPT

## 2024-05-21 PROCEDURE — C9113 INJ PANTOPRAZOLE SODIUM, VIA: HCPCS

## 2024-05-21 PROCEDURE — 94002 VENT MGMT INPAT INIT DAY: CPT

## 2024-05-21 PROCEDURE — 2000000000 HC ICU R&B

## 2024-05-21 PROCEDURE — 2580000003 HC RX 258

## 2024-05-21 PROCEDURE — 87081 CULTURE SCREEN ONLY: CPT

## 2024-05-21 PROCEDURE — 84100 ASSAY OF PHOSPHORUS: CPT

## 2024-05-21 PROCEDURE — 0202U NFCT DS 22 TRGT SARS-COV-2: CPT

## 2024-05-21 PROCEDURE — 80143 DRUG ASSAY ACETAMINOPHEN: CPT

## 2024-05-21 PROCEDURE — 82746 ASSAY OF FOLIC ACID SERUM: CPT

## 2024-05-21 PROCEDURE — 85610 PROTHROMBIN TIME: CPT

## 2024-05-21 PROCEDURE — 51798 US URINE CAPACITY MEASURE: CPT

## 2024-05-21 PROCEDURE — 83605 ASSAY OF LACTIC ACID: CPT

## 2024-05-21 PROCEDURE — 6370000000 HC RX 637 (ALT 250 FOR IP): Performed by: FAMILY MEDICINE

## 2024-05-21 PROCEDURE — 84132 ASSAY OF SERUM POTASSIUM: CPT

## 2024-05-21 PROCEDURE — 84145 PROCALCITONIN (PCT): CPT

## 2024-05-21 PROCEDURE — 83540 ASSAY OF IRON: CPT

## 2024-05-21 PROCEDURE — 6370000000 HC RX 637 (ALT 250 FOR IP): Performed by: INTERNAL MEDICINE

## 2024-05-21 PROCEDURE — G0480 DRUG TEST DEF 1-7 CLASSES: HCPCS

## 2024-05-21 PROCEDURE — 2700000000 HC OXYGEN THERAPY PER DAY

## 2024-05-21 PROCEDURE — 6360000002 HC RX W HCPCS: Performed by: INTERNAL MEDICINE

## 2024-05-21 PROCEDURE — 83735 ASSAY OF MAGNESIUM: CPT

## 2024-05-21 PROCEDURE — 70450 CT HEAD/BRAIN W/O DYE: CPT

## 2024-05-21 PROCEDURE — 85730 THROMBOPLASTIN TIME PARTIAL: CPT

## 2024-05-21 PROCEDURE — 0BH17EZ INSERTION OF ENDOTRACHEAL AIRWAY INTO TRACHEA, VIA NATURAL OR ARTIFICIAL OPENING: ICD-10-PCS | Performed by: INTERNAL MEDICINE

## 2024-05-21 PROCEDURE — 82140 ASSAY OF AMMONIA: CPT

## 2024-05-21 PROCEDURE — 94640 AIRWAY INHALATION TREATMENT: CPT

## 2024-05-21 PROCEDURE — 86140 C-REACTIVE PROTEIN: CPT

## 2024-05-21 PROCEDURE — 82962 GLUCOSE BLOOD TEST: CPT

## 2024-05-21 PROCEDURE — 99233 SBSQ HOSP IP/OBS HIGH 50: CPT | Performed by: INTERNAL MEDICINE

## 2024-05-21 PROCEDURE — 82728 ASSAY OF FERRITIN: CPT

## 2024-05-21 PROCEDURE — 36415 COLL VENOUS BLD VENIPUNCTURE: CPT

## 2024-05-21 PROCEDURE — 82330 ASSAY OF CALCIUM: CPT

## 2024-05-21 RX ORDER — MIDAZOLAM HYDROCHLORIDE 2 MG/2ML
4 INJECTION, SOLUTION INTRAMUSCULAR; INTRAVENOUS ONCE
Status: COMPLETED | OUTPATIENT
Start: 2024-05-21 | End: 2024-05-21

## 2024-05-21 RX ORDER — LORAZEPAM 2 MG/ML
4 INJECTION INTRAMUSCULAR ONCE
Status: COMPLETED | OUTPATIENT
Start: 2024-05-21 | End: 2024-05-21

## 2024-05-21 RX ORDER — DEXAMETHASONE SODIUM PHOSPHATE 10 MG/ML
10 INJECTION, SOLUTION INTRAMUSCULAR; INTRAVENOUS ONCE
Status: COMPLETED | OUTPATIENT
Start: 2024-05-21 | End: 2024-05-21

## 2024-05-21 RX ORDER — PREDNISONE 20 MG/1
40 TABLET ORAL DAILY
Status: DISCONTINUED | OUTPATIENT
Start: 2024-05-21 | End: 2024-05-22

## 2024-05-21 RX ORDER — GLUCAGON 1 MG/ML
1 KIT INJECTION PRN
Status: DISCONTINUED | OUTPATIENT
Start: 2024-05-21 | End: 2024-06-11 | Stop reason: HOSPADM

## 2024-05-21 RX ORDER — MAGNESIUM SULFATE 1 G/100ML
1000 INJECTION INTRAVENOUS ONCE
Status: COMPLETED | OUTPATIENT
Start: 2024-05-21 | End: 2024-05-21

## 2024-05-21 RX ORDER — CHLORHEXIDINE GLUCONATE ORAL RINSE 1.2 MG/ML
15 SOLUTION DENTAL 2 TIMES DAILY
Status: DISCONTINUED | OUTPATIENT
Start: 2024-05-21 | End: 2024-05-21

## 2024-05-21 RX ORDER — ACETAMINOPHEN 325 MG/1
650 TABLET ORAL EVERY 6 HOURS PRN
Status: DISCONTINUED | OUTPATIENT
Start: 2024-05-21 | End: 2024-05-21 | Stop reason: SDUPTHER

## 2024-05-21 RX ORDER — MIDAZOLAM HYDROCHLORIDE 1 MG/ML
1-10 INJECTION, SOLUTION INTRAVENOUS CONTINUOUS
Status: DISCONTINUED | OUTPATIENT
Start: 2024-05-21 | End: 2024-05-23

## 2024-05-21 RX ORDER — SODIUM CHLORIDE 9 MG/ML
INJECTION, SOLUTION INTRAVENOUS PRN
Status: DISCONTINUED | OUTPATIENT
Start: 2024-05-21 | End: 2024-06-11 | Stop reason: HOSPADM

## 2024-05-21 RX ORDER — ACETAMINOPHEN 650 MG/1
650 SUPPOSITORY RECTAL EVERY 6 HOURS PRN
Status: DISCONTINUED | OUTPATIENT
Start: 2024-05-21 | End: 2024-05-21 | Stop reason: SDUPTHER

## 2024-05-21 RX ORDER — MINERAL OIL AND WHITE PETROLATUM 150; 830 MG/G; MG/G
OINTMENT OPHTHALMIC EVERY 4 HOURS
Status: DISCONTINUED | OUTPATIENT
Start: 2024-05-21 | End: 2024-05-21 | Stop reason: CLARIF

## 2024-05-21 RX ORDER — MIDAZOLAM HYDROCHLORIDE 2 MG/2ML
2 INJECTION, SOLUTION INTRAMUSCULAR; INTRAVENOUS ONCE
Status: COMPLETED | OUTPATIENT
Start: 2024-05-21 | End: 2024-05-21

## 2024-05-21 RX ORDER — SODIUM CHLORIDE 0.9 % (FLUSH) 0.9 %
5-40 SYRINGE (ML) INJECTION EVERY 12 HOURS SCHEDULED
Status: DISCONTINUED | OUTPATIENT
Start: 2024-05-21 | End: 2024-05-21

## 2024-05-21 RX ORDER — MIDAZOLAM HYDROCHLORIDE 1 MG/ML
INJECTION INTRAMUSCULAR; INTRAVENOUS
Status: COMPLETED
Start: 2024-05-21 | End: 2024-05-21

## 2024-05-21 RX ORDER — SODIUM CHLORIDE 0.9 % (FLUSH) 0.9 %
5-40 SYRINGE (ML) INJECTION PRN
Status: DISCONTINUED | OUTPATIENT
Start: 2024-05-21 | End: 2024-06-03

## 2024-05-21 RX ORDER — MINERAL OIL AND WHITE PETROLATUM 150; 830 MG/G; MG/G
OINTMENT OPHTHALMIC EVERY 4 HOURS
Status: DISCONTINUED | OUTPATIENT
Start: 2024-05-21 | End: 2024-05-21

## 2024-05-21 RX ORDER — POLYETHYLENE GLYCOL 3350 17 G/17G
17 POWDER, FOR SOLUTION ORAL DAILY PRN
Status: DISCONTINUED | OUTPATIENT
Start: 2024-05-21 | End: 2024-06-11 | Stop reason: HOSPADM

## 2024-05-21 RX ORDER — FENTANYL CITRATE-0.9 % NACL/PF 10 MCG/ML
25-200 PLASTIC BAG, INJECTION (ML) INTRAVENOUS CONTINUOUS
Status: DISCONTINUED | OUTPATIENT
Start: 2024-05-21 | End: 2024-05-21

## 2024-05-21 RX ORDER — LANOLIN ALCOHOL/MO/W.PET/CERES
100 CREAM (GRAM) TOPICAL DAILY
Status: DISCONTINUED | OUTPATIENT
Start: 2024-05-28 | End: 2024-05-27

## 2024-05-21 RX ORDER — PANTOPRAZOLE SODIUM 40 MG/10ML
40 INJECTION, POWDER, LYOPHILIZED, FOR SOLUTION INTRAVENOUS DAILY
Status: DISCONTINUED | OUTPATIENT
Start: 2024-05-21 | End: 2024-05-28

## 2024-05-21 RX ADMIN — WATER 1000 MG: 1 INJECTION INTRAMUSCULAR; INTRAVENOUS; SUBCUTANEOUS at 10:20

## 2024-05-21 RX ADMIN — LORAZEPAM 4 MG: 2 INJECTION INTRAMUSCULAR; INTRAVENOUS at 04:35

## 2024-05-21 RX ADMIN — MIDAZOLAM 4 MG: 1 INJECTION INTRAMUSCULAR; INTRAVENOUS at 04:40

## 2024-05-21 RX ADMIN — IPRATROPIUM BROMIDE AND ALBUTEROL SULFATE 1 DOSE: 2.5; .5 SOLUTION RESPIRATORY (INHALATION) at 09:48

## 2024-05-21 RX ADMIN — THIAMINE HYDROCHLORIDE 500 MG: 100 INJECTION, SOLUTION INTRAMUSCULAR; INTRAVENOUS at 19:56

## 2024-05-21 RX ADMIN — VANCOMYCIN HYDROCHLORIDE 1500 MG: 10 INJECTION, POWDER, LYOPHILIZED, FOR SOLUTION INTRAVENOUS at 06:25

## 2024-05-21 RX ADMIN — BUDESONIDE INHALATION 500 MCG: 0.5 SUSPENSION RESPIRATORY (INHALATION) at 09:47

## 2024-05-21 RX ADMIN — BUDESONIDE INHALATION 500 MCG: 0.5 SUSPENSION RESPIRATORY (INHALATION) at 21:57

## 2024-05-21 RX ADMIN — LEVETIRACETAM 3000 MG: 100 INJECTION, SOLUTION INTRAVENOUS at 05:38

## 2024-05-21 RX ADMIN — THIAMINE HYDROCHLORIDE 500 MG: 100 INJECTION, SOLUTION INTRAMUSCULAR; INTRAVENOUS at 08:23

## 2024-05-21 RX ADMIN — THIAMINE HYDROCHLORIDE 500 MG: 100 INJECTION, SOLUTION INTRAMUSCULAR; INTRAVENOUS at 13:42

## 2024-05-21 RX ADMIN — POLYVINYL ALCOHOL, POVIDONE 1 DROP: 14; 6 SOLUTION/ DROPS OPHTHALMIC at 08:29

## 2024-05-21 RX ADMIN — ARFORMOTEROL TARTRATE 15 MCG: 15 SOLUTION RESPIRATORY (INHALATION) at 09:47

## 2024-05-21 RX ADMIN — ARFORMOTEROL TARTRATE 15 MCG: 15 SOLUTION RESPIRATORY (INHALATION) at 21:57

## 2024-05-21 RX ADMIN — MIDAZOLAM 2 MG: 1 INJECTION INTRAMUSCULAR; INTRAVENOUS at 05:34

## 2024-05-21 RX ADMIN — Medication 1 MG/HR: at 07:14

## 2024-05-21 RX ADMIN — ACYCLOVIR SODIUM 750 MG: 50 INJECTION, SOLUTION INTRAVENOUS at 06:24

## 2024-05-21 RX ADMIN — DEXMEDETOMIDINE 0.2 MCG/KG/HR: 100 INJECTION, SOLUTION INTRAVENOUS at 09:05

## 2024-05-21 RX ADMIN — MIDAZOLAM 2 MG: 1 INJECTION INTRAMUSCULAR; INTRAVENOUS at 05:38

## 2024-05-21 RX ADMIN — DEXAMETHASONE SODIUM PHOSPHATE 10 MG: 10 INJECTION, SOLUTION INTRAMUSCULAR; INTRAVENOUS at 08:13

## 2024-05-21 RX ADMIN — PANTOPRAZOLE SODIUM 40 MG: 40 INJECTION, POWDER, FOR SOLUTION INTRAVENOUS at 08:13

## 2024-05-21 RX ADMIN — DOXYCYCLINE 100 MG: 100 INJECTION, POWDER, LYOPHILIZED, FOR SOLUTION INTRAVENOUS at 10:23

## 2024-05-21 RX ADMIN — LORAZEPAM 1 MG: 2 INJECTION INTRAMUSCULAR; INTRAVENOUS at 22:57

## 2024-05-21 RX ADMIN — CHLORHEXIDINE GLUCONATE 15 ML: 1.2 RINSE ORAL at 08:01

## 2024-05-21 RX ADMIN — IPRATROPIUM BROMIDE AND ALBUTEROL SULFATE 1 DOSE: 2.5; .5 SOLUTION RESPIRATORY (INHALATION) at 12:33

## 2024-05-21 RX ADMIN — IPRATROPIUM BROMIDE AND ALBUTEROL SULFATE 1 DOSE: 2.5; .5 SOLUTION RESPIRATORY (INHALATION) at 21:57

## 2024-05-21 RX ADMIN — MAGNESIUM SULFATE HEPTAHYDRATE 1000 MG: 1 INJECTION, SOLUTION INTRAVENOUS at 08:20

## 2024-05-21 ASSESSMENT — PULMONARY FUNCTION TESTS
PIF_VALUE: 31
PIF_VALUE: 45
PIF_VALUE: 30
PIF_VALUE: 37
PIF_VALUE: 14
PIF_VALUE: 36

## 2024-05-21 ASSESSMENT — PAIN SCALES - GENERAL
PAINLEVEL_OUTOF10: 8
PAINLEVEL_OUTOF10: 0

## 2024-05-21 NOTE — SIGNIFICANT EVENT
Critical Care - Rapid Response Team Note      Date of event: 5/21/2024   Time of event: 03:25 AM     Catarino Bolanos 74 y.o. year old male   YOB: 1950     PCP:  Colt Goldman DO     [facility]   Witnessed? : [x]Yes  [] No  Initial Code status: [x] Full  [] DNR-CCA  []DNR-CC    []Limited  ______________________________________________________________________  Reason for RRT:   Altered Mental Status    Chief Complaint for this admission:   Chief Complaint   Patient presents with    Gait Problem     Patient states he is having difficulty ambulating, keeps falling states \"I think I keep having small strokes\" Hx TIAs     Shortness of Breath     Patient states \"my heart is out of rhythm Hx A fib     Admit date:  5/16/2024     Admitting Diagnosis: Syncope and collapse [R55]  Pleural effusion [J90]  Hypoxia [R09.02]  Acute respiratory failure with hypoxia (HCC) [J96.01]      Subjective:   Called to RRT for AMS. Pt has had intermittent behavior disturbances and confusion but was responding and getting out of bed earlier in the evening after which he was placed in restraints for his safety.   Pt was given ativan 2 per CIWA scale around 4PM otherwise no sedating meds. . Does not respond to name or sternal rub. Using accessory muscles and belly breathing on 2L NC. Noted x1 desaturation, otherwise maintained saturations. Respiratory present, had reported to nursing & in chart pt had diminished breath sounds and upper airway stridor that dissipated with position change.   ABG [id number].   CXR w sm b/l pleural effusions and atelectasis, no new changes to prior.   Pt taken to CT head.   No change/improvement in patients assessment/LOC, saturations remain adequate but concern for impending respiratory failure w prolonged AMS, decision to transfer to MICU.     Objective:   Initial Assessment on arrival:  Vital signs: Temp: 97.6, BP: 123/76, RR: ~20, HR: A fib rate 90-low 100s.     Airway and

## 2024-05-21 NOTE — PROCEDURES
Patient was extubated to 3 liters/min via nasal cannula. Breath Sounds post extubation were equal. Stridor was not present post extubation. SPO2 was 100%.      Performed by  GEOVANNA BurciagaPPROCEDURE NOTE  Date: 5/21/2024   Name: Catarino Bolanos  YOB: 1950    Procedures

## 2024-05-22 ENCOUNTER — APPOINTMENT (OUTPATIENT)
Dept: NEUROLOGY | Age: 74
DRG: 163 | End: 2024-05-22
Payer: MEDICARE

## 2024-05-22 ENCOUNTER — APPOINTMENT (OUTPATIENT)
Dept: MRI IMAGING | Age: 74
DRG: 163 | End: 2024-05-22
Payer: MEDICARE

## 2024-05-22 LAB
ANION GAP SERPL CALCULATED.3IONS-SCNC: 18 MMOL/L (ref 7–16)
BASOPHILS # BLD: 0.01 K/UL (ref 0–0.2)
BASOPHILS NFR BLD: 0 % (ref 0–2)
BUN SERPL-MCNC: 18 MG/DL (ref 6–23)
CALCIUM SERPL-MCNC: 8.2 MG/DL (ref 8.6–10.2)
CHLORIDE SERPL-SCNC: 103 MMOL/L (ref 98–107)
CO2 SERPL-SCNC: 18 MMOL/L (ref 22–29)
CREAT SERPL-MCNC: 1.3 MG/DL (ref 0.7–1.2)
EKG ATRIAL RATE: 120 BPM
EKG Q-T INTERVAL: 426 MS
EKG QRS DURATION: 100 MS
EKG QTC CALCULATION (BAZETT): 485 MS
EKG R AXIS: -14 DEGREES
EKG T AXIS: -66 DEGREES
EKG VENTRICULAR RATE: 78 BPM
EOSINOPHIL # BLD: 0 K/UL (ref 0.05–0.5)
EOSINOPHILS RELATIVE PERCENT: 0 % (ref 0–6)
ERYTHROCYTE [DISTWIDTH] IN BLOOD BY AUTOMATED COUNT: 18.8 % (ref 11.5–15)
GFR, ESTIMATED: 59 ML/MIN/1.73M2
GLUCOSE BLD-MCNC: 150 MG/DL (ref 74–99)
GLUCOSE SERPL-MCNC: 119 MG/DL (ref 74–99)
HCT VFR BLD AUTO: 25.3 % (ref 37–54)
HGB BLD-MCNC: 7.5 G/DL (ref 12.5–16.5)
IMM GRANULOCYTES # BLD AUTO: 0.04 K/UL (ref 0–0.58)
IMM GRANULOCYTES NFR BLD: 0 % (ref 0–5)
LYMPHOCYTES NFR BLD: 0.31 K/UL (ref 1.5–4)
LYMPHOCYTES RELATIVE PERCENT: 3 % (ref 20–42)
MAGNESIUM SERPL-MCNC: 1.9 MG/DL (ref 1.6–2.6)
MCH RBC QN AUTO: 26.7 PG (ref 26–35)
MCHC RBC AUTO-ENTMCNC: 29.6 G/DL (ref 32–34.5)
MCV RBC AUTO: 90 FL (ref 80–99.9)
MICROORGANISM SPEC CULT: NORMAL
MONOCYTES NFR BLD: 0.64 K/UL (ref 0.1–0.95)
MONOCYTES NFR BLD: 7 % (ref 2–12)
NEUTROPHILS NFR BLD: 90 % (ref 43–80)
NEUTS SEG NFR BLD: 8.73 K/UL (ref 1.8–7.3)
PLATELET # BLD AUTO: 112 K/UL (ref 130–450)
PMV BLD AUTO: 12.1 FL (ref 7–12)
POTASSIUM SERPL-SCNC: 4.6 MMOL/L (ref 3.5–5)
RBC # BLD AUTO: 2.81 M/UL (ref 3.8–5.8)
RBC # BLD: ABNORMAL 10*6/UL
SODIUM SERPL-SCNC: 139 MMOL/L (ref 132–146)
SPECIMEN DESCRIPTION: NORMAL
WBC OTHER # BLD: 9.7 K/UL (ref 4.5–11.5)

## 2024-05-22 PROCEDURE — 6360000002 HC RX W HCPCS: Performed by: CHIROPRACTOR

## 2024-05-22 PROCEDURE — 2580000003 HC RX 258: Performed by: CHIROPRACTOR

## 2024-05-22 PROCEDURE — 6360000002 HC RX W HCPCS

## 2024-05-22 PROCEDURE — 82962 GLUCOSE BLOOD TEST: CPT

## 2024-05-22 PROCEDURE — 2000000000 HC ICU R&B

## 2024-05-22 PROCEDURE — 2580000003 HC RX 258: Performed by: INTERNAL MEDICINE

## 2024-05-22 PROCEDURE — 99232 SBSQ HOSP IP/OBS MODERATE 35: CPT | Performed by: INTERNAL MEDICINE

## 2024-05-22 PROCEDURE — 6360000002 HC RX W HCPCS: Performed by: FAMILY MEDICINE

## 2024-05-22 PROCEDURE — 70551 MRI BRAIN STEM W/O DYE: CPT

## 2024-05-22 PROCEDURE — 51701 INSERT BLADDER CATHETER: CPT

## 2024-05-22 PROCEDURE — 2500000003 HC RX 250 WO HCPCS

## 2024-05-22 PROCEDURE — 2700000000 HC OXYGEN THERAPY PER DAY

## 2024-05-22 PROCEDURE — 94667 MNPJ CHEST WALL 1ST: CPT

## 2024-05-22 PROCEDURE — 99291 CRITICAL CARE FIRST HOUR: CPT | Performed by: INTERNAL MEDICINE

## 2024-05-22 PROCEDURE — 99231 SBSQ HOSP IP/OBS SF/LOW 25: CPT | Performed by: PSYCHIATRY & NEUROLOGY

## 2024-05-22 PROCEDURE — 6370000000 HC RX 637 (ALT 250 FOR IP): Performed by: FAMILY MEDICINE

## 2024-05-22 PROCEDURE — 83735 ASSAY OF MAGNESIUM: CPT

## 2024-05-22 PROCEDURE — 85025 COMPLETE CBC W/AUTO DIFF WBC: CPT

## 2024-05-22 PROCEDURE — 80048 BASIC METABOLIC PNL TOTAL CA: CPT

## 2024-05-22 PROCEDURE — 51798 US URINE CAPACITY MEASURE: CPT

## 2024-05-22 PROCEDURE — 2580000003 HC RX 258: Performed by: FAMILY MEDICINE

## 2024-05-22 PROCEDURE — 94640 AIRWAY INHALATION TREATMENT: CPT

## 2024-05-22 PROCEDURE — 2580000003 HC RX 258

## 2024-05-22 PROCEDURE — 93010 ELECTROCARDIOGRAM REPORT: CPT | Performed by: INTERNAL MEDICINE

## 2024-05-22 PROCEDURE — C9113 INJ PANTOPRAZOLE SODIUM, VIA: HCPCS

## 2024-05-22 RX ORDER — DIMETHICONE, OXYBENZONE, AND PADIMATE O 2; 2.5; 6.6 G/100G; G/100G; G/100G
STICK TOPICAL PRN
Status: DISCONTINUED | OUTPATIENT
Start: 2024-05-22 | End: 2024-06-11 | Stop reason: HOSPADM

## 2024-05-22 RX ORDER — ENOXAPARIN SODIUM 100 MG/ML
40 INJECTION SUBCUTANEOUS DAILY
Status: DISCONTINUED | OUTPATIENT
Start: 2024-05-22 | End: 2024-05-27

## 2024-05-22 RX ORDER — LORAZEPAM 2 MG/ML
0.5 INJECTION INTRAMUSCULAR ONCE
Status: DISCONTINUED | OUTPATIENT
Start: 2024-05-22 | End: 2024-05-22

## 2024-05-22 RX ORDER — LORAZEPAM 2 MG/ML
1 INJECTION INTRAMUSCULAR ONCE
Status: COMPLETED | OUTPATIENT
Start: 2024-05-22 | End: 2024-05-22

## 2024-05-22 RX ORDER — MAGNESIUM SULFATE 1 G/100ML
1000 INJECTION INTRAVENOUS ONCE
Status: COMPLETED | OUTPATIENT
Start: 2024-05-22 | End: 2024-05-22

## 2024-05-22 RX ORDER — DEXTROSE MONOHYDRATE AND SODIUM CHLORIDE 5; .9 G/100ML; G/100ML
INJECTION, SOLUTION INTRAVENOUS CONTINUOUS
Status: DISCONTINUED | OUTPATIENT
Start: 2024-05-22 | End: 2024-05-27

## 2024-05-22 RX ADMIN — ARFORMOTEROL TARTRATE 15 MCG: 15 SOLUTION RESPIRATORY (INHALATION) at 11:31

## 2024-05-22 RX ADMIN — BUDESONIDE INHALATION 500 MCG: 0.5 SUSPENSION RESPIRATORY (INHALATION) at 21:47

## 2024-05-22 RX ADMIN — BUDESONIDE INHALATION 500 MCG: 0.5 SUSPENSION RESPIRATORY (INHALATION) at 11:30

## 2024-05-22 RX ADMIN — ARFORMOTEROL TARTRATE 15 MCG: 15 SOLUTION RESPIRATORY (INHALATION) at 21:47

## 2024-05-22 RX ADMIN — THIAMINE HYDROCHLORIDE 500 MG: 100 INJECTION, SOLUTION INTRAMUSCULAR; INTRAVENOUS at 11:25

## 2024-05-22 RX ADMIN — IPRATROPIUM BROMIDE AND ALBUTEROL SULFATE 1 DOSE: 2.5; .5 SOLUTION RESPIRATORY (INHALATION) at 11:29

## 2024-05-22 RX ADMIN — IPRATROPIUM BROMIDE AND ALBUTEROL SULFATE 1 DOSE: 2.5; .5 SOLUTION RESPIRATORY (INHALATION) at 21:47

## 2024-05-22 RX ADMIN — THIAMINE HYDROCHLORIDE 500 MG: 100 INJECTION, SOLUTION INTRAMUSCULAR; INTRAVENOUS at 20:37

## 2024-05-22 RX ADMIN — PANTOPRAZOLE SODIUM 40 MG: 40 INJECTION, POWDER, FOR SOLUTION INTRAVENOUS at 07:47

## 2024-05-22 RX ADMIN — DEXTROSE AND SODIUM CHLORIDE: 5; 900 INJECTION, SOLUTION INTRAVENOUS at 23:17

## 2024-05-22 RX ADMIN — MAGNESIUM SULFATE HEPTAHYDRATE 1000 MG: 1 INJECTION, SOLUTION INTRAVENOUS at 05:12

## 2024-05-22 RX ADMIN — LORAZEPAM 1 MG: 2 INJECTION INTRAMUSCULAR; INTRAVENOUS at 02:14

## 2024-05-22 RX ADMIN — LORAZEPAM 2 MG: 2 INJECTION INTRAMUSCULAR; INTRAVENOUS at 20:00

## 2024-05-22 RX ADMIN — ENOXAPARIN SODIUM 40 MG: 100 INJECTION SUBCUTANEOUS at 17:44

## 2024-05-22 RX ADMIN — THIAMINE HYDROCHLORIDE 500 MG: 100 INJECTION, SOLUTION INTRAMUSCULAR; INTRAVENOUS at 04:12

## 2024-05-22 RX ADMIN — LORAZEPAM 2 MG: 2 INJECTION INTRAMUSCULAR; INTRAVENOUS at 01:49

## 2024-05-22 RX ADMIN — SODIUM CHLORIDE, PRESERVATIVE FREE 10 ML: 5 INJECTION INTRAVENOUS at 07:47

## 2024-05-22 RX ADMIN — WATER 40 MG: 1 INJECTION INTRAMUSCULAR; INTRAVENOUS; SUBCUTANEOUS at 12:17

## 2024-05-22 RX ADMIN — IPRATROPIUM BROMIDE AND ALBUTEROL SULFATE 1 DOSE: 2.5; .5 SOLUTION RESPIRATORY (INHALATION) at 16:28

## 2024-05-22 RX ADMIN — DEXMEDETOMIDINE 0.2 MCG/KG/HR: 100 INJECTION, SOLUTION INTRAVENOUS at 01:59

## 2024-05-22 RX ADMIN — WATER 1000 MG: 1 INJECTION INTRAMUSCULAR; INTRAVENOUS; SUBCUTANEOUS at 07:46

## 2024-05-22 NOTE — CARE COORDINATION
Care Coordination: LOS 6- Day.   RRT 5/21/24 from 45 Acute encephalopathy with concern for impending respiratory failure. Intubated for airway protection., extubated later evening, 4 point, CIWA, MRI and EEG pending, neurology following.  Plan per prev CM is home with significant other. SO refused [facility].     Electronically signed by Argenis Elias RN on 5/22/2024 at 11:09 AM

## 2024-05-23 ENCOUNTER — APPOINTMENT (OUTPATIENT)
Dept: NEUROLOGY | Age: 74
DRG: 163 | End: 2024-05-23
Payer: MEDICARE

## 2024-05-23 ENCOUNTER — APPOINTMENT (OUTPATIENT)
Dept: GENERAL RADIOLOGY | Age: 74
DRG: 163 | End: 2024-05-23
Payer: MEDICARE

## 2024-05-23 LAB
ANION GAP SERPL CALCULATED.3IONS-SCNC: 11 MMOL/L (ref 7–16)
ANION GAP SERPL CALCULATED.3IONS-SCNC: 13 MMOL/L (ref 7–16)
BASOPHILS # BLD: 0.01 K/UL (ref 0–0.2)
BASOPHILS NFR BLD: 0 % (ref 0–2)
BILIRUB UR QL STRIP: ABNORMAL
BUN SERPL-MCNC: 23 MG/DL (ref 6–23)
BUN SERPL-MCNC: 24 MG/DL (ref 6–23)
CA-I BLD-SCNC: 1.12 MMOL/L (ref 1.15–1.33)
CALCIUM SERPL-MCNC: 8.2 MG/DL (ref 8.6–10.2)
CALCIUM SERPL-MCNC: 8.5 MG/DL (ref 8.6–10.2)
CHLORIDE SERPL-SCNC: 105 MMOL/L (ref 98–107)
CHLORIDE SERPL-SCNC: 107 MMOL/L (ref 98–107)
CLARITY UR: ABNORMAL
CO2 SERPL-SCNC: 20 MMOL/L (ref 22–29)
CO2 SERPL-SCNC: 23 MMOL/L (ref 22–29)
COLOR UR: ABNORMAL
CREAT SERPL-MCNC: 1.2 MG/DL (ref 0.7–1.2)
CREAT SERPL-MCNC: 1.3 MG/DL (ref 0.7–1.2)
EKG ATRIAL RATE: 115 BPM
EKG Q-T INTERVAL: 418 MS
EKG QRS DURATION: 100 MS
EKG QTC CALCULATION (BAZETT): 525 MS
EKG R AXIS: -20 DEGREES
EKG T AXIS: -69 DEGREES
EKG VENTRICULAR RATE: 95 BPM
EOSINOPHIL # BLD: 0.01 K/UL (ref 0.05–0.5)
EOSINOPHILS RELATIVE PERCENT: 0 % (ref 0–6)
ERYTHROCYTE [DISTWIDTH] IN BLOOD BY AUTOMATED COUNT: 18.7 % (ref 11.5–15)
GFR, ESTIMATED: 59 ML/MIN/1.73M2
GFR, ESTIMATED: 61 ML/MIN/1.73M2
GLUCOSE SERPL-MCNC: 121 MG/DL (ref 74–99)
GLUCOSE SERPL-MCNC: 170 MG/DL (ref 74–99)
GLUCOSE UR STRIP-MCNC: NEGATIVE MG/DL
HCT VFR BLD AUTO: 25 % (ref 37–54)
HGB BLD-MCNC: 7.7 G/DL (ref 12.5–16.5)
HGB UR QL STRIP.AUTO: ABNORMAL
KETONES UR STRIP-MCNC: ABNORMAL MG/DL
LEUKOCYTE ESTERASE UR QL STRIP: ABNORMAL
LYMPHOCYTES NFR BLD: 0.39 K/UL (ref 1.5–4)
LYMPHOCYTES RELATIVE PERCENT: 5 % (ref 20–42)
MAGNESIUM SERPL-MCNC: 2.1 MG/DL (ref 1.6–2.6)
MAGNESIUM SERPL-MCNC: 2.2 MG/DL (ref 1.6–2.6)
MCH RBC QN AUTO: 27.5 PG (ref 26–35)
MCHC RBC AUTO-ENTMCNC: 30.8 G/DL (ref 32–34.5)
MCV RBC AUTO: 89.3 FL (ref 80–99.9)
MONOCYTES NFR BLD: 0.57 K/UL (ref 0.1–0.95)
MONOCYTES NFR BLD: 8 % (ref 2–12)
MYCOPLASMA AB,IGM: PRESENT
NEUTROPHILS NFR BLD: 87 % (ref 43–80)
NEUTS SEG NFR BLD: 6.53 K/UL (ref 1.8–7.3)
NITRITE UR QL STRIP: NEGATIVE
PH UR STRIP: 5.5 [PH] (ref 5–9)
PHOSPHATE SERPL-MCNC: 3.3 MG/DL (ref 2.5–4.5)
PHOSPHATE SERPL-MCNC: 3.4 MG/DL (ref 2.5–4.5)
PLATELET # BLD AUTO: 116 K/UL (ref 130–450)
PMV BLD AUTO: 11.1 FL (ref 7–12)
POTASSIUM SERPL-SCNC: 4.1 MMOL/L (ref 3.5–5)
POTASSIUM SERPL-SCNC: 4.1 MMOL/L (ref 3.5–5)
PROT UR STRIP-MCNC: 100 MG/DL
RBC # BLD AUTO: 2.8 M/UL (ref 3.8–5.8)
RBC # BLD: ABNORMAL 10*6/UL
RBC #/AREA URNS HPF: ABNORMAL /HPF
SODIUM SERPL-SCNC: 139 MMOL/L (ref 132–146)
SODIUM SERPL-SCNC: 140 MMOL/L (ref 132–146)
SP GR UR STRIP: >1.03 (ref 1–1.03)
UROBILINOGEN UR STRIP-ACNC: 0.2 EU/DL (ref 0–1)
WBC # BLD: ABNORMAL 10*3/UL
WBC #/AREA URNS HPF: ABNORMAL /HPF
WBC OTHER # BLD: 7.6 K/UL (ref 4.5–11.5)

## 2024-05-23 PROCEDURE — 6360000002 HC RX W HCPCS: Performed by: FAMILY MEDICINE

## 2024-05-23 PROCEDURE — 6360000002 HC RX W HCPCS

## 2024-05-23 PROCEDURE — 6370000000 HC RX 637 (ALT 250 FOR IP): Performed by: FAMILY MEDICINE

## 2024-05-23 PROCEDURE — 2580000003 HC RX 258: Performed by: CHIROPRACTOR

## 2024-05-23 PROCEDURE — C9113 INJ PANTOPRAZOLE SODIUM, VIA: HCPCS

## 2024-05-23 PROCEDURE — 81001 URINALYSIS AUTO W/SCOPE: CPT

## 2024-05-23 PROCEDURE — 2580000003 HC RX 258: Performed by: INTERNAL MEDICINE

## 2024-05-23 PROCEDURE — 93005 ELECTROCARDIOGRAM TRACING: CPT

## 2024-05-23 PROCEDURE — 99232 SBSQ HOSP IP/OBS MODERATE 35: CPT | Performed by: INTERNAL MEDICINE

## 2024-05-23 PROCEDURE — 95819 EEG AWAKE AND ASLEEP: CPT

## 2024-05-23 PROCEDURE — 51798 US URINE CAPACITY MEASURE: CPT

## 2024-05-23 PROCEDURE — 99291 CRITICAL CARE FIRST HOUR: CPT | Performed by: INTERNAL MEDICINE

## 2024-05-23 PROCEDURE — 80048 BASIC METABOLIC PNL TOTAL CA: CPT

## 2024-05-23 PROCEDURE — 6360000002 HC RX W HCPCS: Performed by: CHIROPRACTOR

## 2024-05-23 PROCEDURE — 85025 COMPLETE CBC W/AUTO DIFF WBC: CPT

## 2024-05-23 PROCEDURE — 94669 MECHANICAL CHEST WALL OSCILL: CPT

## 2024-05-23 PROCEDURE — 93010 ELECTROCARDIOGRAM REPORT: CPT | Performed by: INTERNAL MEDICINE

## 2024-05-23 PROCEDURE — 94640 AIRWAY INHALATION TREATMENT: CPT

## 2024-05-23 PROCEDURE — 83735 ASSAY OF MAGNESIUM: CPT

## 2024-05-23 PROCEDURE — 82330 ASSAY OF CALCIUM: CPT

## 2024-05-23 PROCEDURE — 93005 ELECTROCARDIOGRAM TRACING: CPT | Performed by: CHIROPRACTOR

## 2024-05-23 PROCEDURE — 2580000003 HC RX 258

## 2024-05-23 PROCEDURE — 2000000000 HC ICU R&B

## 2024-05-23 PROCEDURE — 71045 X-RAY EXAM CHEST 1 VIEW: CPT

## 2024-05-23 PROCEDURE — 84100 ASSAY OF PHOSPHORUS: CPT

## 2024-05-23 PROCEDURE — 2500000003 HC RX 250 WO HCPCS: Performed by: CHIROPRACTOR

## 2024-05-23 RX ADMIN — ENOXAPARIN SODIUM 40 MG: 100 INJECTION SUBCUTANEOUS at 08:41

## 2024-05-23 RX ADMIN — DEXTROSE AND SODIUM CHLORIDE: 5; 900 INJECTION, SOLUTION INTRAVENOUS at 14:27

## 2024-05-23 RX ADMIN — DEXMEDETOMIDINE 0.4 MCG/KG/HR: 100 INJECTION, SOLUTION INTRAVENOUS at 16:22

## 2024-05-23 RX ADMIN — WATER 40 MG: 1 INJECTION INTRAMUSCULAR; INTRAVENOUS; SUBCUTANEOUS at 08:57

## 2024-05-23 RX ADMIN — LORAZEPAM 4 MG: 2 INJECTION INTRAMUSCULAR; INTRAVENOUS at 16:05

## 2024-05-23 RX ADMIN — LORAZEPAM 2 MG: 2 INJECTION INTRAMUSCULAR; INTRAVENOUS at 02:01

## 2024-05-23 RX ADMIN — Medication 1 TABLET: at 08:41

## 2024-05-23 RX ADMIN — BUDESONIDE INHALATION 500 MCG: 0.5 SUSPENSION RESPIRATORY (INHALATION) at 09:14

## 2024-05-23 RX ADMIN — BUDESONIDE INHALATION 500 MCG: 0.5 SUSPENSION RESPIRATORY (INHALATION) at 21:18

## 2024-05-23 RX ADMIN — LORAZEPAM 4 MG: 2 INJECTION INTRAMUSCULAR; INTRAVENOUS at 17:26

## 2024-05-23 RX ADMIN — IPRATROPIUM BROMIDE AND ALBUTEROL SULFATE 1 DOSE: 2.5; .5 SOLUTION RESPIRATORY (INHALATION) at 12:50

## 2024-05-23 RX ADMIN — IPRATROPIUM BROMIDE 0.5 MG: 0.5 SOLUTION RESPIRATORY (INHALATION) at 21:19

## 2024-05-23 RX ADMIN — WATER 1000 MG: 1 INJECTION INTRAMUSCULAR; INTRAVENOUS; SUBCUTANEOUS at 08:41

## 2024-05-23 RX ADMIN — PANTOPRAZOLE SODIUM 40 MG: 40 INJECTION, POWDER, FOR SOLUTION INTRAVENOUS at 08:41

## 2024-05-23 RX ADMIN — THIAMINE HYDROCHLORIDE 250 MG: 100 INJECTION, SOLUTION INTRAMUSCULAR; INTRAVENOUS at 08:59

## 2024-05-23 RX ADMIN — CALCIUM GLUCONATE 2000 MG: 98 INJECTION, SOLUTION INTRAVENOUS at 06:11

## 2024-05-23 RX ADMIN — ARFORMOTEROL TARTRATE 15 MCG: 15 SOLUTION RESPIRATORY (INHALATION) at 09:13

## 2024-05-23 RX ADMIN — PAROXETINE 30 MG: 10 TABLET, FILM COATED ORAL at 08:41

## 2024-05-23 RX ADMIN — LORAZEPAM 2 MG: 2 INJECTION INTRAMUSCULAR; INTRAVENOUS at 05:49

## 2024-05-23 RX ADMIN — LORAZEPAM 3 MG: 2 INJECTION INTRAMUSCULAR; INTRAVENOUS at 03:42

## 2024-05-23 RX ADMIN — ARFORMOTEROL TARTRATE 15 MCG: 15 SOLUTION RESPIRATORY (INHALATION) at 21:17

## 2024-05-23 RX ADMIN — IPRATROPIUM BROMIDE AND ALBUTEROL SULFATE 1 DOSE: 2.5; .5 SOLUTION RESPIRATORY (INHALATION) at 09:12

## 2024-05-23 ASSESSMENT — PAIN SCALES - GENERAL
PAINLEVEL_OUTOF10: 0

## 2024-05-24 LAB
ANION GAP SERPL CALCULATED.3IONS-SCNC: 11 MMOL/L (ref 7–16)
BASOPHILS # BLD: 0 K/UL (ref 0–0.2)
BASOPHILS NFR BLD: 0 % (ref 0–2)
BUN SERPL-MCNC: 22 MG/DL (ref 6–23)
CALCIUM SERPL-MCNC: 8.3 MG/DL (ref 8.6–10.2)
CHLORIDE SERPL-SCNC: 108 MMOL/L (ref 98–107)
CO2 SERPL-SCNC: 20 MMOL/L (ref 22–29)
CREAT SERPL-MCNC: 1.2 MG/DL (ref 0.7–1.2)
EKG ATRIAL RATE: 87 BPM
EKG Q-T INTERVAL: 394 MS
EKG QRS DURATION: 100 MS
EKG QTC CALCULATION (BAZETT): 508 MS
EKG R AXIS: -20 DEGREES
EKG T AXIS: -62 DEGREES
EKG VENTRICULAR RATE: 100 BPM
EOSINOPHIL # BLD: 0.02 K/UL (ref 0.05–0.5)
EOSINOPHILS RELATIVE PERCENT: 1 % (ref 0–6)
ERYTHROCYTE [DISTWIDTH] IN BLOOD BY AUTOMATED COUNT: 19 % (ref 11.5–15)
GFR, ESTIMATED: 61 ML/MIN/1.73M2
GLUCOSE SERPL-MCNC: 134 MG/DL (ref 74–99)
HCT VFR BLD AUTO: 24.8 % (ref 37–54)
HGB BLD-MCNC: 7.6 G/DL (ref 12.5–16.5)
IMM GRANULOCYTES # BLD AUTO: <0.03 K/UL (ref 0–0.58)
IMM GRANULOCYTES NFR BLD: 1 % (ref 0–5)
LYMPHOCYTES NFR BLD: 0.42 K/UL (ref 1.5–4)
LYMPHOCYTES RELATIVE PERCENT: 11 % (ref 20–42)
MAGNESIUM SERPL-MCNC: 2 MG/DL (ref 1.6–2.6)
MCH RBC QN AUTO: 27.1 PG (ref 26–35)
MCHC RBC AUTO-ENTMCNC: 30.6 G/DL (ref 32–34.5)
MCV RBC AUTO: 88.6 FL (ref 80–99.9)
MONOCYTES NFR BLD: 0.43 K/UL (ref 0.1–0.95)
MONOCYTES NFR BLD: 11 % (ref 2–12)
NEUTROPHILS NFR BLD: 77 % (ref 43–80)
NEUTS SEG NFR BLD: 2.91 K/UL (ref 1.8–7.3)
PLATELET # BLD AUTO: 108 K/UL (ref 130–450)
PMV BLD AUTO: 11.7 FL (ref 7–12)
POTASSIUM SERPL-SCNC: 4.3 MMOL/L (ref 3.5–5)
RBC # BLD AUTO: 2.8 M/UL (ref 3.8–5.8)
RBC # BLD: ABNORMAL 10*6/UL
SODIUM SERPL-SCNC: 139 MMOL/L (ref 132–146)
WBC OTHER # BLD: 3.8 K/UL (ref 4.5–11.5)

## 2024-05-24 PROCEDURE — 6360000002 HC RX W HCPCS: Performed by: CHIROPRACTOR

## 2024-05-24 PROCEDURE — 80048 BASIC METABOLIC PNL TOTAL CA: CPT

## 2024-05-24 PROCEDURE — 2580000003 HC RX 258: Performed by: INTERNAL MEDICINE

## 2024-05-24 PROCEDURE — 99291 CRITICAL CARE FIRST HOUR: CPT | Performed by: INTERNAL MEDICINE

## 2024-05-24 PROCEDURE — 6360000002 HC RX W HCPCS

## 2024-05-24 PROCEDURE — 2580000003 HC RX 258: Performed by: CHIROPRACTOR

## 2024-05-24 PROCEDURE — 95819 EEG AWAKE AND ASLEEP: CPT | Performed by: PSYCHIATRY & NEUROLOGY

## 2024-05-24 PROCEDURE — 93010 ELECTROCARDIOGRAM REPORT: CPT | Performed by: INTERNAL MEDICINE

## 2024-05-24 PROCEDURE — 2000000000 HC ICU R&B

## 2024-05-24 PROCEDURE — 83735 ASSAY OF MAGNESIUM: CPT

## 2024-05-24 PROCEDURE — 6360000002 HC RX W HCPCS: Performed by: FAMILY MEDICINE

## 2024-05-24 PROCEDURE — C9113 INJ PANTOPRAZOLE SODIUM, VIA: HCPCS

## 2024-05-24 PROCEDURE — 85025 COMPLETE CBC W/AUTO DIFF WBC: CPT

## 2024-05-24 PROCEDURE — 51798 US URINE CAPACITY MEASURE: CPT

## 2024-05-24 PROCEDURE — 2580000003 HC RX 258

## 2024-05-24 PROCEDURE — 94640 AIRWAY INHALATION TREATMENT: CPT

## 2024-05-24 RX ORDER — PHENOBARBITAL SODIUM 65 MG/ML
16.2 INJECTION, SOLUTION INTRAMUSCULAR; INTRAVENOUS EVERY 6 HOURS PRN
Status: DISPENSED | OUTPATIENT
Start: 2024-05-27 | End: 2024-05-28

## 2024-05-24 RX ORDER — PHENOBARBITAL SODIUM 65 MG/ML
65 INJECTION, SOLUTION INTRAMUSCULAR; INTRAVENOUS EVERY 6 HOURS PRN
Status: ACTIVE | OUTPATIENT
Start: 2024-05-24 | End: 2024-05-25

## 2024-05-24 RX ORDER — PHENOBARBITAL SODIUM 65 MG/ML
32.5 INJECTION, SOLUTION INTRAMUSCULAR; INTRAVENOUS EVERY 6 HOURS PRN
Status: DISPENSED | OUTPATIENT
Start: 2024-05-25 | End: 2024-05-27

## 2024-05-24 RX ORDER — PHENOBARBITAL SODIUM 65 MG/ML
32.5 INJECTION, SOLUTION INTRAMUSCULAR; INTRAVENOUS 4 TIMES DAILY
Status: COMPLETED | OUTPATIENT
Start: 2024-05-25 | End: 2024-05-26

## 2024-05-24 RX ORDER — PHENOBARBITAL SODIUM 65 MG/ML
16.2 INJECTION, SOLUTION INTRAMUSCULAR; INTRAVENOUS 2 TIMES DAILY
Status: DISPENSED | OUTPATIENT
Start: 2024-05-27 | End: 2024-05-28

## 2024-05-24 RX ORDER — PHENOBARBITAL SODIUM 65 MG/ML
65 INJECTION, SOLUTION INTRAMUSCULAR; INTRAVENOUS 4 TIMES DAILY
Status: COMPLETED | OUTPATIENT
Start: 2024-05-24 | End: 2024-05-25

## 2024-05-24 RX ORDER — PHENOBARBITAL SODIUM 65 MG/ML
32.5 INJECTION, SOLUTION INTRAMUSCULAR; INTRAVENOUS 2 TIMES DAILY
Status: COMPLETED | OUTPATIENT
Start: 2024-05-26 | End: 2024-05-27

## 2024-05-24 RX ADMIN — ENOXAPARIN SODIUM 40 MG: 100 INJECTION SUBCUTANEOUS at 09:37

## 2024-05-24 RX ADMIN — ARFORMOTEROL TARTRATE 15 MCG: 15 SOLUTION RESPIRATORY (INHALATION) at 21:15

## 2024-05-24 RX ADMIN — LORAZEPAM 2 MG: 2 INJECTION INTRAMUSCULAR; INTRAVENOUS at 12:06

## 2024-05-24 RX ADMIN — IPRATROPIUM BROMIDE 0.5 MG: 0.5 SOLUTION RESPIRATORY (INHALATION) at 17:04

## 2024-05-24 RX ADMIN — ARFORMOTEROL TARTRATE 15 MCG: 15 SOLUTION RESPIRATORY (INHALATION) at 10:06

## 2024-05-24 RX ADMIN — WATER 40 MG: 1 INJECTION INTRAMUSCULAR; INTRAVENOUS; SUBCUTANEOUS at 09:39

## 2024-05-24 RX ADMIN — IPRATROPIUM BROMIDE 0.5 MG: 0.5 SOLUTION RESPIRATORY (INHALATION) at 14:11

## 2024-05-24 RX ADMIN — PANTOPRAZOLE SODIUM 40 MG: 40 INJECTION, POWDER, FOR SOLUTION INTRAVENOUS at 09:39

## 2024-05-24 RX ADMIN — IPRATROPIUM BROMIDE 0.5 MG: 0.5 SOLUTION RESPIRATORY (INHALATION) at 21:15

## 2024-05-24 RX ADMIN — PHENOBARBITAL SODIUM 65 MG: 65 INJECTION INTRAMUSCULAR at 22:29

## 2024-05-24 RX ADMIN — BUDESONIDE INHALATION 500 MCG: 0.5 SUSPENSION RESPIRATORY (INHALATION) at 21:15

## 2024-05-24 RX ADMIN — WATER 1000 MG: 1 INJECTION INTRAMUSCULAR; INTRAVENOUS; SUBCUTANEOUS at 09:39

## 2024-05-24 RX ADMIN — PHENOBARBITAL SODIUM 65 MG: 65 INJECTION INTRAMUSCULAR at 16:26

## 2024-05-24 RX ADMIN — PHENOBARBITAL SODIUM 65 MG: 65 INJECTION INTRAMUSCULAR at 13:50

## 2024-05-24 RX ADMIN — IPRATROPIUM BROMIDE 0.5 MG: 0.5 SOLUTION RESPIRATORY (INHALATION) at 00:30

## 2024-05-24 RX ADMIN — THIAMINE HYDROCHLORIDE 250 MG: 100 INJECTION, SOLUTION INTRAMUSCULAR; INTRAVENOUS at 10:28

## 2024-05-24 RX ADMIN — IPRATROPIUM BROMIDE 0.5 MG: 0.5 SOLUTION RESPIRATORY (INHALATION) at 10:05

## 2024-05-24 RX ADMIN — LORAZEPAM 4 MG: 2 INJECTION INTRAMUSCULAR; INTRAVENOUS at 13:35

## 2024-05-24 RX ADMIN — DEXTROSE AND SODIUM CHLORIDE: 5; 900 INJECTION, SOLUTION INTRAVENOUS at 16:26

## 2024-05-24 RX ADMIN — BUDESONIDE INHALATION 500 MCG: 0.5 SUSPENSION RESPIRATORY (INHALATION) at 10:05

## 2024-05-24 ASSESSMENT — PAIN SCALES - GENERAL: PAINLEVEL_OUTOF10: 0

## 2024-05-24 NOTE — CARE COORDINATION
Care Coordination LOS 8-day Room air, CIWA, Precedex, 4 point restraint. Neurology following.  Pt has no emergency contact, lives with significant other Radha. Initial plan was home at discharge with significant other. Current discharge needs unclear.  Attempted to reach Radha via phone, left VM, will follow    Electronically signed by Argenis Elias RN on 5/24/2024 at 4:20 PM

## 2024-05-24 NOTE — PROCEDURES
PROCEDURE NOTE  Date: 5/24/2024   Name: Catarino Bolanos  YOB: 1950    Procedures      EEG - portable without video:        Method:   This recording was done using 16 channel of EEG recording and 1 channel of EKG recording. The recording was done at the patient's bedside.        Interpretation:   There is a posterior dominant rhythm of 7 Hz theta activity. There is no evidence of fronto central Beta activity seen in this recording. There are no epileptiform abnormalities or electrographic seizures seen. There was prominent bifrontal motion artifact seen in this recording.      EKG demonstrates regular rhythm at 90 bpm.         Summary: This is an abnormal Electroencephalogram with evidence of the following:     There is generalized background slowing suggestive of metabolic/toxic encephalopathy, sedative medications, post ictal state.   There are no epileptiform abnormality or electrographic seizure in this recording.     Ratna Augiar MD

## 2024-05-25 PROBLEM — E87.0 HYPERNATREMIA: Status: ACTIVE | Noted: 2024-05-25

## 2024-05-25 LAB
ANION GAP SERPL CALCULATED.3IONS-SCNC: 11 MMOL/L (ref 7–16)
ANION GAP SERPL CALCULATED.3IONS-SCNC: 12 MMOL/L (ref 7–16)
BASOPHILS # BLD: 0 K/UL (ref 0–0.2)
BASOPHILS NFR BLD: 0 % (ref 0–2)
BUN SERPL-MCNC: 17 MG/DL (ref 6–23)
BUN SERPL-MCNC: 19 MG/DL (ref 6–23)
CALCIUM SERPL-MCNC: 8.1 MG/DL (ref 8.6–10.2)
CALCIUM SERPL-MCNC: 8.3 MG/DL (ref 8.6–10.2)
CHLORIDE SERPL-SCNC: 109 MMOL/L (ref 98–107)
CHLORIDE SERPL-SCNC: 114 MMOL/L (ref 98–107)
CO2 SERPL-SCNC: 22 MMOL/L (ref 22–29)
CO2 SERPL-SCNC: 22 MMOL/L (ref 22–29)
CREAT SERPL-MCNC: 1 MG/DL (ref 0.7–1.2)
CREAT SERPL-MCNC: 1.1 MG/DL (ref 0.7–1.2)
EOSINOPHIL # BLD: 0 K/UL (ref 0.05–0.5)
EOSINOPHILS RELATIVE PERCENT: 0 % (ref 0–6)
ERYTHROCYTE [DISTWIDTH] IN BLOOD BY AUTOMATED COUNT: 19.3 % (ref 11.5–15)
GFR, ESTIMATED: 71 ML/MIN/1.73M2
GFR, ESTIMATED: 78 ML/MIN/1.73M2
GLUCOSE BLD-MCNC: 189 MG/DL (ref 74–99)
GLUCOSE BLD-MCNC: 193 MG/DL (ref 74–99)
GLUCOSE BLD-MCNC: 89 MG/DL (ref 74–99)
GLUCOSE SERPL-MCNC: 174 MG/DL (ref 74–99)
GLUCOSE SERPL-MCNC: 181 MG/DL (ref 74–99)
HCT VFR BLD AUTO: 26.1 % (ref 37–54)
HGB BLD-MCNC: 7.7 G/DL (ref 12.5–16.5)
IMM GRANULOCYTES # BLD AUTO: <0.03 K/UL (ref 0–0.58)
IMM GRANULOCYTES NFR BLD: 0 % (ref 0–5)
LYMPHOCYTES NFR BLD: 0.41 K/UL (ref 1.5–4)
LYMPHOCYTES RELATIVE PERCENT: 14 % (ref 20–42)
MAGNESIUM SERPL-MCNC: 1.9 MG/DL (ref 1.6–2.6)
MCH RBC QN AUTO: 26.3 PG (ref 26–35)
MCHC RBC AUTO-ENTMCNC: 29.5 G/DL (ref 32–34.5)
MCV RBC AUTO: 89.1 FL (ref 80–99.9)
MONOCYTES NFR BLD: 0.4 K/UL (ref 0.1–0.95)
MONOCYTES NFR BLD: 13 % (ref 2–12)
NEUTROPHILS NFR BLD: 73 % (ref 43–80)
NEUTS SEG NFR BLD: 2.19 K/UL (ref 1.8–7.3)
PLATELET # BLD AUTO: 110 K/UL (ref 130–450)
PMV BLD AUTO: 11.4 FL (ref 7–12)
POTASSIUM SERPL-SCNC: 4.1 MMOL/L (ref 3.5–5)
POTASSIUM SERPL-SCNC: 4.3 MMOL/L (ref 3.5–5)
RBC # BLD AUTO: 2.93 M/UL (ref 3.8–5.8)
RBC # BLD: ABNORMAL 10*6/UL
SODIUM SERPL-SCNC: 142 MMOL/L (ref 132–146)
SODIUM SERPL-SCNC: 148 MMOL/L (ref 132–146)
WBC OTHER # BLD: 3 K/UL (ref 4.5–11.5)

## 2024-05-25 PROCEDURE — 2580000003 HC RX 258

## 2024-05-25 PROCEDURE — 6360000002 HC RX W HCPCS

## 2024-05-25 PROCEDURE — 2000000000 HC ICU R&B

## 2024-05-25 PROCEDURE — 6360000002 HC RX W HCPCS: Performed by: FAMILY MEDICINE

## 2024-05-25 PROCEDURE — 87389 HIV-1 AG W/HIV-1&-2 AB AG IA: CPT

## 2024-05-25 PROCEDURE — 80048 BASIC METABOLIC PNL TOTAL CA: CPT

## 2024-05-25 PROCEDURE — 83735 ASSAY OF MAGNESIUM: CPT

## 2024-05-25 PROCEDURE — 94640 AIRWAY INHALATION TREATMENT: CPT

## 2024-05-25 PROCEDURE — 82962 GLUCOSE BLOOD TEST: CPT

## 2024-05-25 PROCEDURE — 99233 SBSQ HOSP IP/OBS HIGH 50: CPT | Performed by: HOSPITALIST

## 2024-05-25 PROCEDURE — C9113 INJ PANTOPRAZOLE SODIUM, VIA: HCPCS

## 2024-05-25 PROCEDURE — 6360000002 HC RX W HCPCS: Performed by: CHIROPRACTOR

## 2024-05-25 PROCEDURE — 85025 COMPLETE CBC W/AUTO DIFF WBC: CPT

## 2024-05-25 PROCEDURE — 99233 SBSQ HOSP IP/OBS HIGH 50: CPT | Performed by: NURSE PRACTITIONER

## 2024-05-25 PROCEDURE — 99291 CRITICAL CARE FIRST HOUR: CPT | Performed by: INTERNAL MEDICINE

## 2024-05-25 PROCEDURE — 86618 LYME DISEASE ANTIBODY: CPT

## 2024-05-25 PROCEDURE — 2500000003 HC RX 250 WO HCPCS

## 2024-05-25 PROCEDURE — 2580000003 HC RX 258: Performed by: CHIROPRACTOR

## 2024-05-25 RX ORDER — MAGNESIUM SULFATE 1 G/100ML
1000 INJECTION INTRAVENOUS ONCE
Status: COMPLETED | OUTPATIENT
Start: 2024-05-25 | End: 2024-05-25

## 2024-05-25 RX ORDER — CALCIUM GLUCONATE 10 MG/ML
1000 INJECTION, SOLUTION INTRAVENOUS ONCE
Status: COMPLETED | OUTPATIENT
Start: 2024-05-25 | End: 2024-05-25

## 2024-05-25 RX ADMIN — ARFORMOTEROL TARTRATE 15 MCG: 15 SOLUTION RESPIRATORY (INHALATION) at 20:44

## 2024-05-25 RX ADMIN — CALCIUM GLUCONATE 1000 MG: 10 INJECTION, SOLUTION INTRAVENOUS at 10:49

## 2024-05-25 RX ADMIN — BUDESONIDE INHALATION 500 MCG: 0.5 SUSPENSION RESPIRATORY (INHALATION) at 08:07

## 2024-05-25 RX ADMIN — PHENOBARBITAL SODIUM 65 MG: 65 INJECTION INTRAMUSCULAR at 07:47

## 2024-05-25 RX ADMIN — MAGNESIUM SULFATE HEPTAHYDRATE 1000 MG: 1 INJECTION, SOLUTION INTRAVENOUS at 07:03

## 2024-05-25 RX ADMIN — PHENOBARBITAL SODIUM 32.5 MG: 65 INJECTION, SOLUTION INTRAMUSCULAR; INTRAVENOUS at 12:24

## 2024-05-25 RX ADMIN — PANTOPRAZOLE SODIUM 40 MG: 40 INJECTION, POWDER, FOR SOLUTION INTRAVENOUS at 07:46

## 2024-05-25 RX ADMIN — IPRATROPIUM BROMIDE 0.5 MG: 0.5 SOLUTION RESPIRATORY (INHALATION) at 20:44

## 2024-05-25 RX ADMIN — THIAMINE HYDROCHLORIDE 250 MG: 100 INJECTION, SOLUTION INTRAMUSCULAR; INTRAVENOUS at 08:38

## 2024-05-25 RX ADMIN — IPRATROPIUM BROMIDE 0.5 MG: 0.5 SOLUTION RESPIRATORY (INHALATION) at 04:14

## 2024-05-25 RX ADMIN — DEXTROSE AND SODIUM CHLORIDE: 5; 900 INJECTION, SOLUTION INTRAVENOUS at 18:33

## 2024-05-25 RX ADMIN — ENOXAPARIN SODIUM 40 MG: 100 INJECTION SUBCUTANEOUS at 07:46

## 2024-05-25 RX ADMIN — IPRATROPIUM BROMIDE 0.5 MG: 0.5 SOLUTION RESPIRATORY (INHALATION) at 17:07

## 2024-05-25 RX ADMIN — IPRATROPIUM BROMIDE 0.5 MG: 0.5 SOLUTION RESPIRATORY (INHALATION) at 08:07

## 2024-05-25 RX ADMIN — ARFORMOTEROL TARTRATE 15 MCG: 15 SOLUTION RESPIRATORY (INHALATION) at 08:07

## 2024-05-25 RX ADMIN — PHENOBARBITAL SODIUM 32.5 MG: 65 INJECTION, SOLUTION INTRAMUSCULAR; INTRAVENOUS at 15:46

## 2024-05-25 RX ADMIN — IPRATROPIUM BROMIDE 0.5 MG: 0.5 SOLUTION RESPIRATORY (INHALATION) at 00:50

## 2024-05-25 RX ADMIN — LORAZEPAM 2 MG: 2 INJECTION INTRAMUSCULAR; INTRAVENOUS at 15:20

## 2024-05-25 RX ADMIN — IPRATROPIUM BROMIDE 0.5 MG: 0.5 SOLUTION RESPIRATORY (INHALATION) at 11:30

## 2024-05-25 RX ADMIN — BUDESONIDE INHALATION 500 MCG: 0.5 SUSPENSION RESPIRATORY (INHALATION) at 20:44

## 2024-05-25 RX ADMIN — WATER 1000 MG: 1 INJECTION INTRAMUSCULAR; INTRAVENOUS; SUBCUTANEOUS at 07:47

## 2024-05-25 RX ADMIN — WATER 40 MG: 1 INJECTION INTRAMUSCULAR; INTRAVENOUS; SUBCUTANEOUS at 07:46

## 2024-05-25 RX ADMIN — PHENOBARBITAL SODIUM 32.5 MG: 65 INJECTION, SOLUTION INTRAMUSCULAR; INTRAVENOUS at 20:26

## 2024-05-25 ASSESSMENT — PAIN SCALES - GENERAL
PAINLEVEL_OUTOF10: 0
PAINLEVEL_OUTOF10: 0

## 2024-05-25 NOTE — FLOWSHEET NOTE
Patient continues to meet order criteria. Continues to attempt pulling at lines and discontinue therapeutic interventions.

## 2024-05-26 LAB
ANION GAP SERPL CALCULATED.3IONS-SCNC: 10 MMOL/L (ref 7–16)
BASOPHILS # BLD: 0 K/UL (ref 0–0.2)
BASOPHILS NFR BLD: 0 % (ref 0–2)
BUN SERPL-MCNC: 13 MG/DL (ref 6–23)
CALCIUM SERPL-MCNC: 7.7 MG/DL (ref 8.6–10.2)
CHLORIDE SERPL-SCNC: 109 MMOL/L (ref 98–107)
CO2 SERPL-SCNC: 24 MMOL/L (ref 22–29)
CREAT SERPL-MCNC: 0.9 MG/DL (ref 0.7–1.2)
EOSINOPHIL # BLD: 0.03 K/UL (ref 0.05–0.5)
EOSINOPHILS RELATIVE PERCENT: 1 % (ref 0–6)
ERYTHROCYTE [DISTWIDTH] IN BLOOD BY AUTOMATED COUNT: 19.4 % (ref 11.5–15)
GFR, ESTIMATED: 86 ML/MIN/1.73M2
GLUCOSE BLD-MCNC: 158 MG/DL (ref 74–99)
GLUCOSE SERPL-MCNC: 214 MG/DL (ref 74–99)
HCT VFR BLD AUTO: 26.9 % (ref 37–54)
HGB BLD-MCNC: 7.9 G/DL (ref 12.5–16.5)
IMM GRANULOCYTES # BLD AUTO: <0.03 K/UL (ref 0–0.58)
IMM GRANULOCYTES NFR BLD: 1 % (ref 0–5)
LYMPHOCYTES NFR BLD: 0.49 K/UL (ref 1.5–4)
LYMPHOCYTES RELATIVE PERCENT: 14 % (ref 20–42)
MAGNESIUM SERPL-MCNC: 1.8 MG/DL (ref 1.6–2.6)
MCH RBC QN AUTO: 26.6 PG (ref 26–35)
MCHC RBC AUTO-ENTMCNC: 29.4 G/DL (ref 32–34.5)
MCV RBC AUTO: 90.6 FL (ref 80–99.9)
MICROORGANISM SPEC CULT: NORMAL
MICROORGANISM SPEC CULT: NORMAL
MONOCYTES NFR BLD: 0.33 K/UL (ref 0.1–0.95)
MONOCYTES NFR BLD: 10 % (ref 2–12)
NEUTROPHILS NFR BLD: 75 % (ref 43–80)
NEUTS SEG NFR BLD: 2.62 K/UL (ref 1.8–7.3)
PLATELET # BLD AUTO: 105 K/UL (ref 130–450)
PMV BLD AUTO: 10.8 FL (ref 7–12)
POTASSIUM SERPL-SCNC: 3.3 MMOL/L (ref 3.5–5)
RBC # BLD AUTO: 2.97 M/UL (ref 3.8–5.8)
RBC # BLD: ABNORMAL 10*6/UL
SERVICE CMNT-IMP: NORMAL
SERVICE CMNT-IMP: NORMAL
SODIUM SERPL-SCNC: 143 MMOL/L (ref 132–146)
SPECIMEN DESCRIPTION: NORMAL
SPECIMEN DESCRIPTION: NORMAL
WBC OTHER # BLD: 3.5 K/UL (ref 4.5–11.5)

## 2024-05-26 PROCEDURE — 6360000002 HC RX W HCPCS

## 2024-05-26 PROCEDURE — C9113 INJ PANTOPRAZOLE SODIUM, VIA: HCPCS

## 2024-05-26 PROCEDURE — 2580000003 HC RX 258

## 2024-05-26 PROCEDURE — 6370000000 HC RX 637 (ALT 250 FOR IP): Performed by: FAMILY MEDICINE

## 2024-05-26 PROCEDURE — 85025 COMPLETE CBC W/AUTO DIFF WBC: CPT

## 2024-05-26 PROCEDURE — 6360000002 HC RX W HCPCS: Performed by: CHIROPRACTOR

## 2024-05-26 PROCEDURE — 82962 GLUCOSE BLOOD TEST: CPT

## 2024-05-26 PROCEDURE — 80048 BASIC METABOLIC PNL TOTAL CA: CPT

## 2024-05-26 PROCEDURE — 6360000002 HC RX W HCPCS: Performed by: FAMILY MEDICINE

## 2024-05-26 PROCEDURE — 94640 AIRWAY INHALATION TREATMENT: CPT

## 2024-05-26 PROCEDURE — 83735 ASSAY OF MAGNESIUM: CPT

## 2024-05-26 PROCEDURE — 2000000000 HC ICU R&B

## 2024-05-26 PROCEDURE — 99291 CRITICAL CARE FIRST HOUR: CPT | Performed by: INTERNAL MEDICINE

## 2024-05-26 PROCEDURE — 99232 SBSQ HOSP IP/OBS MODERATE 35: CPT | Performed by: HOSPITALIST

## 2024-05-26 RX ORDER — MAGNESIUM SULFATE IN WATER 40 MG/ML
2000 INJECTION, SOLUTION INTRAVENOUS ONCE
Status: COMPLETED | OUTPATIENT
Start: 2024-05-26 | End: 2024-05-26

## 2024-05-26 RX ORDER — POTASSIUM CHLORIDE 7.45 MG/ML
10 INJECTION INTRAVENOUS
Status: COMPLETED | OUTPATIENT
Start: 2024-05-26 | End: 2024-05-26

## 2024-05-26 RX ORDER — LIDOCAINE HYDROCHLORIDE 10 MG/ML
INJECTION, SOLUTION INFILTRATION; PERINEURAL
Status: DISPENSED
Start: 2024-05-26 | End: 2024-05-27

## 2024-05-26 RX ADMIN — BUDESONIDE INHALATION 500 MCG: 0.5 SUSPENSION RESPIRATORY (INHALATION) at 20:35

## 2024-05-26 RX ADMIN — IPRATROPIUM BROMIDE 0.5 MG: 0.5 SOLUTION RESPIRATORY (INHALATION) at 08:23

## 2024-05-26 RX ADMIN — BUDESONIDE INHALATION 500 MCG: 0.5 SUSPENSION RESPIRATORY (INHALATION) at 08:23

## 2024-05-26 RX ADMIN — THIAMINE HYDROCHLORIDE 250 MG: 100 INJECTION, SOLUTION INTRAMUSCULAR; INTRAVENOUS at 09:42

## 2024-05-26 RX ADMIN — IPRATROPIUM BROMIDE 0.5 MG: 0.5 SOLUTION RESPIRATORY (INHALATION) at 04:03

## 2024-05-26 RX ADMIN — IPRATROPIUM BROMIDE 0.5 MG: 0.5 SOLUTION RESPIRATORY (INHALATION) at 11:21

## 2024-05-26 RX ADMIN — IPRATROPIUM BROMIDE 0.5 MG: 0.5 SOLUTION RESPIRATORY (INHALATION) at 20:35

## 2024-05-26 RX ADMIN — MAGNESIUM SULFATE HEPTAHYDRATE 2000 MG: 40 INJECTION, SOLUTION INTRAVENOUS at 06:47

## 2024-05-26 RX ADMIN — Medication 1 TABLET: at 10:38

## 2024-05-26 RX ADMIN — POTASSIUM CHLORIDE 10 MEQ: 10 INJECTION, SOLUTION INTRAVENOUS at 07:42

## 2024-05-26 RX ADMIN — ENOXAPARIN SODIUM 40 MG: 100 INJECTION SUBCUTANEOUS at 09:38

## 2024-05-26 RX ADMIN — LORAZEPAM 2 MG: 2 INJECTION INTRAMUSCULAR; INTRAVENOUS at 16:04

## 2024-05-26 RX ADMIN — PHENOBARBITAL SODIUM 32.5 MG: 65 INJECTION, SOLUTION INTRAMUSCULAR; INTRAVENOUS at 14:34

## 2024-05-26 RX ADMIN — PANTOPRAZOLE SODIUM 40 MG: 40 INJECTION, POWDER, FOR SOLUTION INTRAVENOUS at 09:43

## 2024-05-26 RX ADMIN — POTASSIUM CHLORIDE 10 MEQ: 10 INJECTION, SOLUTION INTRAVENOUS at 06:38

## 2024-05-26 RX ADMIN — POTASSIUM CHLORIDE 10 MEQ: 10 INJECTION, SOLUTION INTRAVENOUS at 09:43

## 2024-05-26 RX ADMIN — PHENOBARBITAL SODIUM 32.5 MG: 65 INJECTION, SOLUTION INTRAMUSCULAR; INTRAVENOUS at 21:01

## 2024-05-26 RX ADMIN — POTASSIUM CHLORIDE 10 MEQ: 10 INJECTION, SOLUTION INTRAVENOUS at 10:51

## 2024-05-26 RX ADMIN — ARFORMOTEROL TARTRATE 15 MCG: 15 SOLUTION RESPIRATORY (INHALATION) at 20:35

## 2024-05-26 RX ADMIN — IPRATROPIUM BROMIDE 0.5 MG: 0.5 SOLUTION RESPIRATORY (INHALATION) at 16:55

## 2024-05-26 RX ADMIN — PHENOBARBITAL SODIUM 32.5 MG: 65 INJECTION, SOLUTION INTRAMUSCULAR; INTRAVENOUS at 09:03

## 2024-05-26 RX ADMIN — ARFORMOTEROL TARTRATE 15 MCG: 15 SOLUTION RESPIRATORY (INHALATION) at 08:23

## 2024-05-26 RX ADMIN — IPRATROPIUM BROMIDE 0.5 MG: 0.5 SOLUTION RESPIRATORY (INHALATION) at 00:39

## 2024-05-26 ASSESSMENT — PAIN SCALES - GENERAL
PAINLEVEL_OUTOF10: 0

## 2024-05-26 NOTE — FLOWSHEET NOTE
Patient continues to meet order criteria. Pt attempts to pull at IV lines/discontinue therapeutic interventions

## 2024-05-27 LAB
AMMONIA PLAS-SCNC: 12 UMOL/L (ref 16–60)
ANION GAP SERPL CALCULATED.3IONS-SCNC: 12 MMOL/L (ref 7–16)
B BURGDOR AB SER IA-ACNC: 0.46 IV
BASOPHILS # BLD: 0 K/UL (ref 0–0.2)
BASOPHILS NFR BLD: 0 % (ref 0–2)
BUN SERPL-MCNC: 9 MG/DL (ref 6–23)
CA-I BLD-SCNC: 1.11 MMOL/L (ref 1.15–1.33)
CALCIUM SERPL-MCNC: 7.6 MG/DL (ref 8.6–10.2)
CHLORIDE SERPL-SCNC: 110 MMOL/L (ref 98–107)
CO2 SERPL-SCNC: 20 MMOL/L (ref 22–29)
CREAT SERPL-MCNC: 0.8 MG/DL (ref 0.7–1.2)
EOSINOPHIL # BLD: 0.07 K/UL (ref 0.05–0.5)
EOSINOPHILS RELATIVE PERCENT: 2 % (ref 0–6)
ERYTHROCYTE [DISTWIDTH] IN BLOOD BY AUTOMATED COUNT: 19.4 % (ref 11.5–15)
GFR, ESTIMATED: >90 ML/MIN/1.73M2
GLUCOSE SERPL-MCNC: 146 MG/DL (ref 74–99)
HCT VFR BLD AUTO: 25.2 % (ref 37–54)
HGB BLD-MCNC: 7.5 G/DL (ref 12.5–16.5)
LYMPHOCYTES NFR BLD: 0.22 K/UL (ref 1.5–4)
LYMPHOCYTES RELATIVE PERCENT: 5 % (ref 20–42)
MAGNESIUM SERPL-MCNC: 1.8 MG/DL (ref 1.6–2.6)
MCH RBC QN AUTO: 26.7 PG (ref 26–35)
MCHC RBC AUTO-ENTMCNC: 29.8 G/DL (ref 32–34.5)
MCV RBC AUTO: 89.7 FL (ref 80–99.9)
MONOCYTES NFR BLD: 0.07 K/UL (ref 0.1–0.95)
MONOCYTES NFR BLD: 2 % (ref 2–12)
NEUTROPHILS NFR BLD: 91 % (ref 43–80)
NEUTS SEG NFR BLD: 3.93 K/UL (ref 1.8–7.3)
NUCLEATED RED BLOOD CELLS: 3 PER 100 WBC
PLATELET # BLD AUTO: 97 K/UL (ref 130–450)
PLATELET CONFIRMATION: NORMAL
PMV BLD AUTO: 12.6 FL (ref 7–12)
POTASSIUM SERPL-SCNC: 3.5 MMOL/L (ref 3.5–5)
RBC # BLD AUTO: 2.81 M/UL (ref 3.8–5.8)
RBC # BLD: ABNORMAL 10*6/UL
SODIUM SERPL-SCNC: 142 MMOL/L (ref 132–146)
WBC OTHER # BLD: 4.3 K/UL (ref 4.5–11.5)

## 2024-05-27 PROCEDURE — 99232 SBSQ HOSP IP/OBS MODERATE 35: CPT | Performed by: HOSPITALIST

## 2024-05-27 PROCEDURE — 2000000000 HC ICU R&B

## 2024-05-27 PROCEDURE — 6360000002 HC RX W HCPCS

## 2024-05-27 PROCEDURE — 6360000002 HC RX W HCPCS: Performed by: FAMILY MEDICINE

## 2024-05-27 PROCEDURE — 6370000000 HC RX 637 (ALT 250 FOR IP): Performed by: FAMILY MEDICINE

## 2024-05-27 PROCEDURE — 2580000003 HC RX 258

## 2024-05-27 PROCEDURE — 2500000003 HC RX 250 WO HCPCS

## 2024-05-27 PROCEDURE — 6360000002 HC RX W HCPCS: Performed by: CHIROPRACTOR

## 2024-05-27 PROCEDURE — 82330 ASSAY OF CALCIUM: CPT

## 2024-05-27 PROCEDURE — 82140 ASSAY OF AMMONIA: CPT

## 2024-05-27 PROCEDURE — C9113 INJ PANTOPRAZOLE SODIUM, VIA: HCPCS

## 2024-05-27 PROCEDURE — 6370000000 HC RX 637 (ALT 250 FOR IP)

## 2024-05-27 PROCEDURE — 99291 CRITICAL CARE FIRST HOUR: CPT | Performed by: INTERNAL MEDICINE

## 2024-05-27 PROCEDURE — 80048 BASIC METABOLIC PNL TOTAL CA: CPT

## 2024-05-27 PROCEDURE — 85025 COMPLETE CBC W/AUTO DIFF WBC: CPT

## 2024-05-27 PROCEDURE — 94640 AIRWAY INHALATION TREATMENT: CPT

## 2024-05-27 PROCEDURE — 83735 ASSAY OF MAGNESIUM: CPT

## 2024-05-27 RX ORDER — CHLORDIAZEPOXIDE HYDROCHLORIDE 25 MG/1
25 CAPSULE, GELATIN COATED ORAL 4 TIMES DAILY
Status: DISCONTINUED | OUTPATIENT
Start: 2024-05-27 | End: 2024-06-03

## 2024-05-27 RX ORDER — SODIUM CHLORIDE 0.9 % (FLUSH) 0.9 %
5-40 SYRINGE (ML) INJECTION PRN
Status: DISCONTINUED | OUTPATIENT
Start: 2024-05-27 | End: 2024-06-03

## 2024-05-27 RX ORDER — SODIUM CHLORIDE 0.9 % (FLUSH) 0.9 %
5-40 SYRINGE (ML) INJECTION EVERY 12 HOURS SCHEDULED
Status: DISCONTINUED | OUTPATIENT
Start: 2024-05-27 | End: 2024-06-03

## 2024-05-27 RX ORDER — LANOLIN ALCOHOL/MO/W.PET/CERES
100 CREAM (GRAM) TOPICAL DAILY
Status: DISCONTINUED | OUTPATIENT
Start: 2024-05-28 | End: 2024-05-30

## 2024-05-27 RX ORDER — SODIUM CHLORIDE 9 MG/ML
INJECTION, SOLUTION INTRAVENOUS PRN
Status: DISCONTINUED | OUTPATIENT
Start: 2024-05-27 | End: 2024-06-03

## 2024-05-27 RX ORDER — MAGNESIUM SULFATE 1 G/100ML
1000 INJECTION INTRAVENOUS ONCE
Status: COMPLETED | OUTPATIENT
Start: 2024-05-27 | End: 2024-05-27

## 2024-05-27 RX ORDER — CALCIUM GLUCONATE 10 MG/ML
1000 INJECTION, SOLUTION INTRAVENOUS ONCE
Status: COMPLETED | OUTPATIENT
Start: 2024-05-27 | End: 2024-05-27

## 2024-05-27 RX ADMIN — IPRATROPIUM BROMIDE 0.5 MG: 0.5 SOLUTION RESPIRATORY (INHALATION) at 12:01

## 2024-05-27 RX ADMIN — PHENOBARBITAL SODIUM 16.2 MG: 65 INJECTION, SOLUTION INTRAMUSCULAR; INTRAVENOUS at 18:07

## 2024-05-27 RX ADMIN — MAGNESIUM SULFATE HEPTAHYDRATE 1000 MG: 1 INJECTION, SOLUTION INTRAVENOUS at 12:44

## 2024-05-27 RX ADMIN — Medication 1 TABLET: at 08:52

## 2024-05-27 RX ADMIN — ENOXAPARIN SODIUM 40 MG: 100 INJECTION SUBCUTANEOUS at 08:52

## 2024-05-27 RX ADMIN — IPRATROPIUM BROMIDE 0.5 MG: 0.5 SOLUTION RESPIRATORY (INHALATION) at 03:52

## 2024-05-27 RX ADMIN — PANTOPRAZOLE SODIUM 40 MG: 40 INJECTION, POWDER, FOR SOLUTION INTRAVENOUS at 08:52

## 2024-05-27 RX ADMIN — THIAMINE HYDROCHLORIDE 250 MG: 100 INJECTION, SOLUTION INTRAMUSCULAR; INTRAVENOUS at 10:00

## 2024-05-27 RX ADMIN — LORAZEPAM 2 MG: 2 INJECTION INTRAMUSCULAR; INTRAVENOUS at 13:06

## 2024-05-27 RX ADMIN — IPRATROPIUM BROMIDE 0.5 MG: 0.5 SOLUTION RESPIRATORY (INHALATION) at 21:17

## 2024-05-27 RX ADMIN — IPRATROPIUM BROMIDE 0.5 MG: 0.5 SOLUTION RESPIRATORY (INHALATION) at 08:52

## 2024-05-27 RX ADMIN — IPRATROPIUM BROMIDE 0.5 MG: 0.5 SOLUTION RESPIRATORY (INHALATION) at 00:33

## 2024-05-27 RX ADMIN — ARFORMOTEROL TARTRATE 15 MCG: 15 SOLUTION RESPIRATORY (INHALATION) at 08:52

## 2024-05-27 RX ADMIN — CHLORDIAZEPOXIDE HYDROCHLORIDE 25 MG: 25 CAPSULE ORAL at 21:22

## 2024-05-27 RX ADMIN — CHLORDIAZEPOXIDE HYDROCHLORIDE 25 MG: 25 CAPSULE ORAL at 18:05

## 2024-05-27 RX ADMIN — BUDESONIDE INHALATION 500 MCG: 0.5 SUSPENSION RESPIRATORY (INHALATION) at 21:19

## 2024-05-27 RX ADMIN — ARFORMOTEROL TARTRATE 15 MCG: 15 SOLUTION RESPIRATORY (INHALATION) at 21:18

## 2024-05-27 RX ADMIN — PHENOBARBITAL SODIUM 32.5 MG: 65 INJECTION, SOLUTION INTRAMUSCULAR; INTRAVENOUS at 08:48

## 2024-05-27 RX ADMIN — BUDESONIDE INHALATION 500 MCG: 0.5 SUSPENSION RESPIRATORY (INHALATION) at 08:52

## 2024-05-27 RX ADMIN — IPRATROPIUM BROMIDE 0.5 MG: 0.5 SOLUTION RESPIRATORY (INHALATION) at 17:03

## 2024-05-27 RX ADMIN — CHLORDIAZEPOXIDE HYDROCHLORIDE 25 MG: 25 CAPSULE ORAL at 16:10

## 2024-05-27 RX ADMIN — CALCIUM GLUCONATE 1000 MG: 10 INJECTION, SOLUTION INTRAVENOUS at 21:29

## 2024-05-27 RX ADMIN — SODIUM CHLORIDE, PRESERVATIVE FREE 10 ML: 5 INJECTION INTRAVENOUS at 21:27

## 2024-05-27 ASSESSMENT — PAIN SCALES - GENERAL
PAINLEVEL_OUTOF10: 0

## 2024-05-27 NOTE — FLOWSHEET NOTE
Need for restraints assessed, nurse removed restraints and patient immediately tried to remove himself from hospital bed and grabbed IV pole next to the bed. Patient also grabbing IV lines connected to patient. Patient aggressive with nursing and is not verbally redirectable.

## 2024-05-28 LAB
ANION GAP SERPL CALCULATED.3IONS-SCNC: 13 MMOL/L (ref 7–16)
ANION GAP SERPL CALCULATED.3IONS-SCNC: 13 MMOL/L (ref 7–16)
BASOPHILS # BLD: 0.02 K/UL (ref 0–0.2)
BASOPHILS NFR BLD: 0 % (ref 0–2)
BUN SERPL-MCNC: 7 MG/DL (ref 6–23)
BUN SERPL-MCNC: 8 MG/DL (ref 6–23)
CALCIUM SERPL-MCNC: 8.4 MG/DL (ref 8.6–10.2)
CALCIUM SERPL-MCNC: 8.4 MG/DL (ref 8.6–10.2)
CHLORIDE SERPL-SCNC: 106 MMOL/L (ref 98–107)
CHLORIDE SERPL-SCNC: 106 MMOL/L (ref 98–107)
CO2 SERPL-SCNC: 21 MMOL/L (ref 22–29)
CO2 SERPL-SCNC: 22 MMOL/L (ref 22–29)
CREAT SERPL-MCNC: 0.8 MG/DL (ref 0.7–1.2)
CREAT SERPL-MCNC: 0.9 MG/DL (ref 0.7–1.2)
EKG ATRIAL RATE: 300 BPM
EKG Q-T INTERVAL: 368 MS
EKG QRS DURATION: 100 MS
EKG QTC CALCULATION (BAZETT): 507 MS
EKG R AXIS: -19 DEGREES
EKG T AXIS: -139 DEGREES
EKG VENTRICULAR RATE: 114 BPM
EOSINOPHIL # BLD: 0.42 K/UL (ref 0.05–0.5)
EOSINOPHILS RELATIVE PERCENT: 5 % (ref 0–6)
ERYTHROCYTE [DISTWIDTH] IN BLOOD BY AUTOMATED COUNT: 19.6 % (ref 11.5–15)
GFR, ESTIMATED: 90 ML/MIN/1.73M2
GFR, ESTIMATED: >90 ML/MIN/1.73M2
GLUCOSE BLD-MCNC: 111 MG/DL (ref 74–99)
GLUCOSE BLD-MCNC: 80 MG/DL (ref 74–99)
GLUCOSE SERPL-MCNC: 83 MG/DL (ref 74–99)
GLUCOSE SERPL-MCNC: 86 MG/DL (ref 74–99)
HCT VFR BLD AUTO: 25.4 % (ref 37–54)
HGB BLD-MCNC: 7.9 G/DL (ref 12.5–16.5)
HIV 1+2 AB+HIV1 P24 AG SERPL QL IA: NONREACTIVE
IMM GRANULOCYTES # BLD AUTO: 0.05 K/UL (ref 0–0.58)
IMM GRANULOCYTES NFR BLD: 1 % (ref 0–5)
LYMPHOCYTES NFR BLD: 0.63 K/UL (ref 1.5–4)
LYMPHOCYTES RELATIVE PERCENT: 7 % (ref 20–42)
MAGNESIUM SERPL-MCNC: 1.8 MG/DL (ref 1.6–2.6)
MAGNESIUM SERPL-MCNC: 1.8 MG/DL (ref 1.6–2.6)
MCH RBC QN AUTO: 26.5 PG (ref 26–35)
MCHC RBC AUTO-ENTMCNC: 31.1 G/DL (ref 32–34.5)
MCV RBC AUTO: 85.2 FL (ref 80–99.9)
MONOCYTES NFR BLD: 0.7 K/UL (ref 0.1–0.95)
MONOCYTES NFR BLD: 8 % (ref 2–12)
NEUTROPHILS NFR BLD: 80 % (ref 43–80)
NEUTS SEG NFR BLD: 7.29 K/UL (ref 1.8–7.3)
PHOSPHATE SERPL-MCNC: 2.8 MG/DL (ref 2.5–4.5)
PLATELET # BLD AUTO: 141 K/UL (ref 130–450)
PMV BLD AUTO: 11.8 FL (ref 7–12)
POTASSIUM SERPL-SCNC: 3.8 MMOL/L (ref 3.5–5)
POTASSIUM SERPL-SCNC: 3.9 MMOL/L (ref 3.5–5)
RBC # BLD AUTO: 2.98 M/UL (ref 3.8–5.8)
SODIUM SERPL-SCNC: 140 MMOL/L (ref 132–146)
SODIUM SERPL-SCNC: 141 MMOL/L (ref 132–146)
TROPONIN I SERPL HS-MCNC: 19 NG/L (ref 0–11)
WBC OTHER # BLD: 9.1 K/UL (ref 4.5–11.5)

## 2024-05-28 PROCEDURE — 2580000003 HC RX 258

## 2024-05-28 PROCEDURE — C9113 INJ PANTOPRAZOLE SODIUM, VIA: HCPCS

## 2024-05-28 PROCEDURE — 6370000000 HC RX 637 (ALT 250 FOR IP)

## 2024-05-28 PROCEDURE — 6360000002 HC RX W HCPCS: Performed by: CHIROPRACTOR

## 2024-05-28 PROCEDURE — 82962 GLUCOSE BLOOD TEST: CPT

## 2024-05-28 PROCEDURE — 94640 AIRWAY INHALATION TREATMENT: CPT

## 2024-05-28 PROCEDURE — 6360000002 HC RX W HCPCS: Performed by: FAMILY MEDICINE

## 2024-05-28 PROCEDURE — 6370000000 HC RX 637 (ALT 250 FOR IP): Performed by: INTERNAL MEDICINE

## 2024-05-28 PROCEDURE — 97166 OT EVAL MOD COMPLEX 45 MIN: CPT

## 2024-05-28 PROCEDURE — 6370000000 HC RX 637 (ALT 250 FOR IP): Performed by: STUDENT IN AN ORGANIZED HEALTH CARE EDUCATION/TRAINING PROGRAM

## 2024-05-28 PROCEDURE — 97162 PT EVAL MOD COMPLEX 30 MIN: CPT

## 2024-05-28 PROCEDURE — 83735 ASSAY OF MAGNESIUM: CPT

## 2024-05-28 PROCEDURE — 2000000000 HC ICU R&B

## 2024-05-28 PROCEDURE — 6360000002 HC RX W HCPCS

## 2024-05-28 PROCEDURE — 97530 THERAPEUTIC ACTIVITIES: CPT

## 2024-05-28 PROCEDURE — 84100 ASSAY OF PHOSPHORUS: CPT

## 2024-05-28 PROCEDURE — 93010 ELECTROCARDIOGRAM REPORT: CPT | Performed by: INTERNAL MEDICINE

## 2024-05-28 PROCEDURE — 85025 COMPLETE CBC W/AUTO DIFF WBC: CPT

## 2024-05-28 PROCEDURE — 6370000000 HC RX 637 (ALT 250 FOR IP): Performed by: FAMILY MEDICINE

## 2024-05-28 PROCEDURE — 99291 CRITICAL CARE FIRST HOUR: CPT | Performed by: INTERNAL MEDICINE

## 2024-05-28 PROCEDURE — 93005 ELECTROCARDIOGRAM TRACING: CPT

## 2024-05-28 PROCEDURE — 84484 ASSAY OF TROPONIN QUANT: CPT

## 2024-05-28 PROCEDURE — 2500000003 HC RX 250 WO HCPCS

## 2024-05-28 PROCEDURE — 80048 BASIC METABOLIC PNL TOTAL CA: CPT

## 2024-05-28 PROCEDURE — 6360000002 HC RX W HCPCS: Performed by: INTERNAL MEDICINE

## 2024-05-28 RX ORDER — MAGNESIUM SULFATE IN WATER 40 MG/ML
2000 INJECTION, SOLUTION INTRAVENOUS ONCE
Status: COMPLETED | OUTPATIENT
Start: 2024-05-28 | End: 2024-05-28

## 2024-05-28 RX ORDER — PANTOPRAZOLE SODIUM 40 MG/1
40 TABLET, DELAYED RELEASE ORAL
Status: DISCONTINUED | OUTPATIENT
Start: 2024-05-29 | End: 2024-06-04 | Stop reason: ALTCHOICE

## 2024-05-28 RX ORDER — MAGNESIUM SULFATE IN WATER 40 MG/ML
2000 INJECTION, SOLUTION INTRAVENOUS ONCE
Status: CANCELLED | OUTPATIENT
Start: 2024-05-28 | End: 2024-05-28

## 2024-05-28 RX ORDER — CALCIUM GLUCONATE 10 MG/ML
1000 INJECTION, SOLUTION INTRAVENOUS ONCE
Status: COMPLETED | OUTPATIENT
Start: 2024-05-28 | End: 2024-05-28

## 2024-05-28 RX ORDER — METOPROLOL SUCCINATE 50 MG/1
75 TABLET, EXTENDED RELEASE ORAL 2 TIMES DAILY
Status: DISCONTINUED | OUTPATIENT
Start: 2024-05-28 | End: 2024-06-11 | Stop reason: HOSPADM

## 2024-05-28 RX ORDER — POTASSIUM CHLORIDE 7.45 MG/ML
10 INJECTION INTRAVENOUS
Status: COMPLETED | OUTPATIENT
Start: 2024-05-28 | End: 2024-05-28

## 2024-05-28 RX ADMIN — ARFORMOTEROL TARTRATE 15 MCG: 15 SOLUTION RESPIRATORY (INHALATION) at 08:21

## 2024-05-28 RX ADMIN — Medication 100 MG: at 07:38

## 2024-05-28 RX ADMIN — PAROXETINE 30 MG: 10 TABLET, FILM COATED ORAL at 07:44

## 2024-05-28 RX ADMIN — APIXABAN 5 MG: 5 TABLET, FILM COATED ORAL at 07:37

## 2024-05-28 RX ADMIN — SODIUM CHLORIDE, PRESERVATIVE FREE 10 ML: 5 INJECTION INTRAVENOUS at 07:38

## 2024-05-28 RX ADMIN — SODIUM CHLORIDE, PRESERVATIVE FREE 10 ML: 5 INJECTION INTRAVENOUS at 21:44

## 2024-05-28 RX ADMIN — METOPROLOL SUCCINATE 75 MG: 25 TABLET, EXTENDED RELEASE ORAL at 07:37

## 2024-05-28 RX ADMIN — APIXABAN 5 MG: 5 TABLET, FILM COATED ORAL at 20:30

## 2024-05-28 RX ADMIN — MAGNESIUM SULFATE HEPTAHYDRATE 2000 MG: 40 INJECTION, SOLUTION INTRAVENOUS at 06:58

## 2024-05-28 RX ADMIN — CHLORDIAZEPOXIDE HYDROCHLORIDE 25 MG: 25 CAPSULE ORAL at 12:59

## 2024-05-28 RX ADMIN — GABAPENTIN 600 MG: 300 CAPSULE ORAL at 20:30

## 2024-05-28 RX ADMIN — FUROSEMIDE 20 MG: 20 TABLET ORAL at 07:38

## 2024-05-28 RX ADMIN — POTASSIUM CHLORIDE 10 MEQ: 7.46 INJECTION, SOLUTION INTRAVENOUS at 06:59

## 2024-05-28 RX ADMIN — IPRATROPIUM BROMIDE 0.5 MG: 0.5 SOLUTION RESPIRATORY (INHALATION) at 08:22

## 2024-05-28 RX ADMIN — Medication 1 TABLET: at 07:44

## 2024-05-28 RX ADMIN — CHLORDIAZEPOXIDE HYDROCHLORIDE 25 MG: 25 CAPSULE ORAL at 07:38

## 2024-05-28 RX ADMIN — PANTOPRAZOLE SODIUM 40 MG: 40 INJECTION, POWDER, FOR SOLUTION INTRAVENOUS at 07:37

## 2024-05-28 RX ADMIN — IPRATROPIUM BROMIDE 0.5 MG: 0.5 SOLUTION RESPIRATORY (INHALATION) at 21:43

## 2024-05-28 RX ADMIN — POTASSIUM CHLORIDE 10 MEQ: 7.46 INJECTION, SOLUTION INTRAVENOUS at 09:56

## 2024-05-28 RX ADMIN — METOPROLOL SUCCINATE 75 MG: 25 TABLET, EXTENDED RELEASE ORAL at 00:55

## 2024-05-28 RX ADMIN — POTASSIUM CHLORIDE 10 MEQ: 7.46 INJECTION, SOLUTION INTRAVENOUS at 08:49

## 2024-05-28 RX ADMIN — ATORVASTATIN CALCIUM 40 MG: 40 TABLET, FILM COATED ORAL at 07:37

## 2024-05-28 RX ADMIN — METOPROLOL SUCCINATE 75 MG: 25 TABLET, EXTENDED RELEASE ORAL at 21:49

## 2024-05-28 RX ADMIN — BUDESONIDE INHALATION 500 MCG: 0.5 SUSPENSION RESPIRATORY (INHALATION) at 21:42

## 2024-05-28 RX ADMIN — PHENOBARBITAL SODIUM 16.2 MG: 65 INJECTION, SOLUTION INTRAMUSCULAR; INTRAVENOUS at 07:41

## 2024-05-28 RX ADMIN — GABAPENTIN 600 MG: 300 CAPSULE ORAL at 16:23

## 2024-05-28 RX ADMIN — CHLORDIAZEPOXIDE HYDROCHLORIDE 25 MG: 25 CAPSULE ORAL at 16:24

## 2024-05-28 RX ADMIN — ARFORMOTEROL TARTRATE 15 MCG: 15 SOLUTION RESPIRATORY (INHALATION) at 21:42

## 2024-05-28 RX ADMIN — CALCIUM GLUCONATE 1000 MG: 10 INJECTION, SOLUTION INTRAVENOUS at 06:55

## 2024-05-28 RX ADMIN — BUDESONIDE INHALATION 500 MCG: 0.5 SUSPENSION RESPIRATORY (INHALATION) at 08:21

## 2024-05-28 RX ADMIN — CHLORDIAZEPOXIDE HYDROCHLORIDE 25 MG: 25 CAPSULE ORAL at 20:30

## 2024-05-28 ASSESSMENT — PAIN SCALES - GENERAL
PAINLEVEL_OUTOF10: 0

## 2024-05-29 ENCOUNTER — APPOINTMENT (OUTPATIENT)
Dept: GENERAL RADIOLOGY | Age: 74
DRG: 163 | End: 2024-05-29
Payer: MEDICARE

## 2024-05-29 LAB
ANION GAP SERPL CALCULATED.3IONS-SCNC: 17 MMOL/L (ref 7–16)
BASOPHILS # BLD: 0 K/UL (ref 0–0.2)
BASOPHILS NFR BLD: 0 % (ref 0–2)
BUN SERPL-MCNC: 12 MG/DL (ref 6–23)
CALCIUM SERPL-MCNC: 8.5 MG/DL (ref 8.6–10.2)
CHLORIDE SERPL-SCNC: 108 MMOL/L (ref 98–107)
CO2 SERPL-SCNC: 18 MMOL/L (ref 22–29)
CREAT SERPL-MCNC: 1.1 MG/DL (ref 0.7–1.2)
EOSINOPHIL # BLD: 0 K/UL (ref 0.05–0.5)
EOSINOPHILS RELATIVE PERCENT: 0 % (ref 0–6)
ERYTHROCYTE [DISTWIDTH] IN BLOOD BY AUTOMATED COUNT: 20 % (ref 11.5–15)
GFR, ESTIMATED: 68 ML/MIN/1.73M2
GLUCOSE BLD-MCNC: 112 MG/DL (ref 74–99)
GLUCOSE BLD-MCNC: 112 MG/DL (ref 74–99)
GLUCOSE BLD-MCNC: 115 MG/DL (ref 74–99)
GLUCOSE BLD-MCNC: 144 MG/DL (ref 74–99)
GLUCOSE BLD-MCNC: 170 MG/DL (ref 74–99)
GLUCOSE BLD-MCNC: 55 MG/DL (ref 74–99)
GLUCOSE SERPL-MCNC: 116 MG/DL (ref 74–99)
HCT VFR BLD AUTO: 26.3 % (ref 37–54)
HGB BLD-MCNC: 7.9 G/DL (ref 12.5–16.5)
LYMPHOCYTES NFR BLD: 0.24 K/UL (ref 1.5–4)
LYMPHOCYTES RELATIVE PERCENT: 2 % (ref 20–42)
MAGNESIUM SERPL-MCNC: 1.8 MG/DL (ref 1.6–2.6)
MCH RBC QN AUTO: 26 PG (ref 26–35)
MCHC RBC AUTO-ENTMCNC: 30 G/DL (ref 32–34.5)
MCV RBC AUTO: 86.5 FL (ref 80–99.9)
MONOCYTES NFR BLD: 0.48 K/UL (ref 0.1–0.95)
MONOCYTES NFR BLD: 4 % (ref 2–12)
NEUTROPHILS NFR BLD: 95 % (ref 43–80)
NEUTS SEG NFR BLD: 12.98 K/UL (ref 1.8–7.3)
PLATELET # BLD AUTO: 122 K/UL (ref 130–450)
PMV BLD AUTO: 11.5 FL (ref 7–12)
POTASSIUM SERPL-SCNC: 4 MMOL/L (ref 3.5–5)
RBC # BLD AUTO: 3.04 M/UL (ref 3.8–5.8)
RBC # BLD: ABNORMAL 10*6/UL
SODIUM SERPL-SCNC: 143 MMOL/L (ref 132–146)
WBC OTHER # BLD: 13.7 K/UL (ref 4.5–11.5)

## 2024-05-29 PROCEDURE — 6370000000 HC RX 637 (ALT 250 FOR IP)

## 2024-05-29 PROCEDURE — 85025 COMPLETE CBC W/AUTO DIFF WBC: CPT

## 2024-05-29 PROCEDURE — 2500000003 HC RX 250 WO HCPCS

## 2024-05-29 PROCEDURE — 2580000003 HC RX 258

## 2024-05-29 PROCEDURE — 36415 COLL VENOUS BLD VENIPUNCTURE: CPT

## 2024-05-29 PROCEDURE — 97530 THERAPEUTIC ACTIVITIES: CPT

## 2024-05-29 PROCEDURE — 6360000002 HC RX W HCPCS

## 2024-05-29 PROCEDURE — 2580000003 HC RX 258: Performed by: INTERNAL MEDICINE

## 2024-05-29 PROCEDURE — 71045 X-RAY EXAM CHEST 1 VIEW: CPT

## 2024-05-29 PROCEDURE — 97110 THERAPEUTIC EXERCISES: CPT

## 2024-05-29 PROCEDURE — 80048 BASIC METABOLIC PNL TOTAL CA: CPT

## 2024-05-29 PROCEDURE — 2700000000 HC OXYGEN THERAPY PER DAY

## 2024-05-29 PROCEDURE — 87040 BLOOD CULTURE FOR BACTERIA: CPT

## 2024-05-29 PROCEDURE — 94640 AIRWAY INHALATION TREATMENT: CPT

## 2024-05-29 PROCEDURE — 6360000002 HC RX W HCPCS: Performed by: INTERNAL MEDICINE

## 2024-05-29 PROCEDURE — 83735 ASSAY OF MAGNESIUM: CPT

## 2024-05-29 PROCEDURE — 99291 CRITICAL CARE FIRST HOUR: CPT | Performed by: INTERNAL MEDICINE

## 2024-05-29 PROCEDURE — 82962 GLUCOSE BLOOD TEST: CPT

## 2024-05-29 PROCEDURE — 2000000000 HC ICU R&B

## 2024-05-29 RX ORDER — LORAZEPAM 2 MG/ML
2 INJECTION INTRAMUSCULAR ONCE
Status: COMPLETED | OUTPATIENT
Start: 2024-05-29 | End: 2024-05-29

## 2024-05-29 RX ORDER — ZIPRASIDONE MESYLATE 20 MG/ML
INJECTION, POWDER, LYOPHILIZED, FOR SOLUTION INTRAMUSCULAR
Status: DISCONTINUED
Start: 2024-05-29 | End: 2024-05-29

## 2024-05-29 RX ORDER — CALCIUM GLUCONATE 10 MG/ML
1000 INJECTION, SOLUTION INTRAVENOUS ONCE
Status: COMPLETED | OUTPATIENT
Start: 2024-05-29 | End: 2024-05-29

## 2024-05-29 RX ORDER — LORAZEPAM 2 MG/ML
1 INJECTION INTRAMUSCULAR ONCE
Status: DISCONTINUED | OUTPATIENT
Start: 2024-05-29 | End: 2024-05-29

## 2024-05-29 RX ORDER — DROPERIDOL 2.5 MG/ML
0.62 INJECTION, SOLUTION INTRAMUSCULAR; INTRAVENOUS ONCE
Status: DISCONTINUED | OUTPATIENT
Start: 2024-05-29 | End: 2024-06-11 | Stop reason: HOSPADM

## 2024-05-29 RX ORDER — WATER 10 ML/10ML
INJECTION INTRAMUSCULAR; INTRAVENOUS; SUBCUTANEOUS
Status: DISPENSED
Start: 2024-05-29 | End: 2024-05-30

## 2024-05-29 RX ORDER — MAGNESIUM SULFATE 1 G/100ML
1000 INJECTION INTRAVENOUS ONCE
Status: COMPLETED | OUTPATIENT
Start: 2024-05-29 | End: 2024-05-29

## 2024-05-29 RX ADMIN — BUDESONIDE INHALATION 500 MCG: 0.5 SUSPENSION RESPIRATORY (INHALATION) at 09:25

## 2024-05-29 RX ADMIN — IPRATROPIUM BROMIDE 0.5 MG: 0.5 SOLUTION RESPIRATORY (INHALATION) at 09:25

## 2024-05-29 RX ADMIN — LORAZEPAM 2 MG: 2 INJECTION INTRAMUSCULAR; INTRAVENOUS at 20:55

## 2024-05-29 RX ADMIN — ZIPRASIDONE MESYLATE 10 MG: 20 INJECTION, POWDER, LYOPHILIZED, FOR SOLUTION INTRAMUSCULAR at 20:35

## 2024-05-29 RX ADMIN — PANTOPRAZOLE SODIUM 40 MG: 40 TABLET, DELAYED RELEASE ORAL at 06:27

## 2024-05-29 RX ADMIN — PIPERACILLIN AND TAZOBACTAM 4500 MG: 4; .5 INJECTION, POWDER, FOR SOLUTION INTRAVENOUS at 22:56

## 2024-05-29 RX ADMIN — ARFORMOTEROL TARTRATE 15 MCG: 15 SOLUTION RESPIRATORY (INHALATION) at 09:25

## 2024-05-29 RX ADMIN — MAGNESIUM SULFATE HEPTAHYDRATE 1000 MG: 1 INJECTION, SOLUTION INTRAVENOUS at 13:17

## 2024-05-29 RX ADMIN — CALCIUM GLUCONATE 1000 MG: 10 INJECTION, SOLUTION INTRAVENOUS at 13:10

## 2024-05-29 RX ADMIN — SODIUM CHLORIDE, PRESERVATIVE FREE 10 ML: 5 INJECTION INTRAVENOUS at 20:55

## 2024-05-29 RX ADMIN — DEXTROSE MONOHYDRATE 125 ML: 100 INJECTION, SOLUTION INTRAVENOUS at 10:16

## 2024-05-29 ASSESSMENT — PAIN SCALES - GENERAL
PAINLEVEL_OUTOF10: 0

## 2024-05-29 NOTE — FLOWSHEET NOTE
Patient remained difficult to arouse. Patient blood glucose 55 treated with D10 IV prn administration .Patient awoke on recheck blood glucose 170 but confused and aggressive as well as uncooperative with nurse or family.

## 2024-05-29 NOTE — FLOWSHEET NOTE
Patient continues to grasp at IV sites when released for repositioning. 2 point soft bilateral wrist restraints continued.

## 2024-05-30 LAB — GLUCOSE BLD-MCNC: 90 MG/DL (ref 74–99)

## 2024-05-30 PROCEDURE — 6370000000 HC RX 637 (ALT 250 FOR IP)

## 2024-05-30 PROCEDURE — 99291 CRITICAL CARE FIRST HOUR: CPT | Performed by: INTERNAL MEDICINE

## 2024-05-30 PROCEDURE — 6360000002 HC RX W HCPCS

## 2024-05-30 PROCEDURE — 2580000003 HC RX 258

## 2024-05-30 PROCEDURE — 87081 CULTURE SCREEN ONLY: CPT

## 2024-05-30 PROCEDURE — 82962 GLUCOSE BLOOD TEST: CPT

## 2024-05-30 PROCEDURE — 2700000000 HC OXYGEN THERAPY PER DAY

## 2024-05-30 PROCEDURE — 2000000000 HC ICU R&B

## 2024-05-30 PROCEDURE — 94640 AIRWAY INHALATION TREATMENT: CPT

## 2024-05-30 RX ORDER — THIAMINE HYDROCHLORIDE 100 MG/ML
100 INJECTION, SOLUTION INTRAMUSCULAR; INTRAVENOUS DAILY
Status: DISCONTINUED | OUTPATIENT
Start: 2024-05-30 | End: 2024-06-03

## 2024-05-30 RX ADMIN — IPRATROPIUM BROMIDE 0.5 MG: 0.5 SOLUTION RESPIRATORY (INHALATION) at 08:24

## 2024-05-30 RX ADMIN — APIXABAN 5 MG: 5 TABLET, FILM COATED ORAL at 20:53

## 2024-05-30 RX ADMIN — SODIUM CHLORIDE, PRESERVATIVE FREE 10 ML: 5 INJECTION INTRAVENOUS at 09:08

## 2024-05-30 RX ADMIN — BUDESONIDE INHALATION 500 MCG: 0.5 SUSPENSION RESPIRATORY (INHALATION) at 08:24

## 2024-05-30 RX ADMIN — GABAPENTIN 600 MG: 300 CAPSULE ORAL at 20:53

## 2024-05-30 RX ADMIN — SODIUM CHLORIDE, PRESERVATIVE FREE 10 ML: 5 INJECTION INTRAVENOUS at 20:52

## 2024-05-30 RX ADMIN — PIPERACILLIN AND TAZOBACTAM 4500 MG: 4; .5 INJECTION, POWDER, FOR SOLUTION INTRAVENOUS at 05:27

## 2024-05-30 RX ADMIN — METOPROLOL SUCCINATE 75 MG: 25 TABLET, EXTENDED RELEASE ORAL at 20:53

## 2024-05-30 RX ADMIN — PIPERACILLIN AND TAZOBACTAM 4500 MG: 4; .5 INJECTION, POWDER, FOR SOLUTION INTRAVENOUS at 21:32

## 2024-05-30 RX ADMIN — PIPERACILLIN AND TAZOBACTAM 4500 MG: 4; .5 INJECTION, POWDER, FOR SOLUTION INTRAVENOUS at 16:09

## 2024-05-30 RX ADMIN — VANCOMYCIN HYDROCHLORIDE 2000 MG: 10 INJECTION, POWDER, LYOPHILIZED, FOR SOLUTION INTRAVENOUS at 17:54

## 2024-05-30 RX ADMIN — CHLORDIAZEPOXIDE HYDROCHLORIDE 25 MG: 25 CAPSULE ORAL at 20:53

## 2024-05-30 RX ADMIN — THIAMINE HYDROCHLORIDE 100 MG: 100 INJECTION, SOLUTION INTRAMUSCULAR; INTRAVENOUS at 09:46

## 2024-05-30 RX ADMIN — ARFORMOTEROL TARTRATE 15 MCG: 15 SOLUTION RESPIRATORY (INHALATION) at 08:24

## 2024-05-30 ASSESSMENT — PAIN SCALES - WONG BAKER: WONGBAKER_NUMERICALRESPONSE: NO HURT

## 2024-05-30 ASSESSMENT — PAIN SCALES - GENERAL
PAINLEVEL_OUTOF10: 0
PAINLEVEL_OUTOF10: 0

## 2024-05-31 LAB
ANION GAP SERPL CALCULATED.3IONS-SCNC: 18 MMOL/L (ref 7–16)
BASOPHILS # BLD: 0.09 K/UL (ref 0–0.2)
BASOPHILS NFR BLD: 1 % (ref 0–2)
BUN SERPL-MCNC: 13 MG/DL (ref 6–23)
CALCIUM SERPL-MCNC: 8.6 MG/DL (ref 8.6–10.2)
CHLORIDE SERPL-SCNC: 108 MMOL/L (ref 98–107)
CO2 SERPL-SCNC: 20 MMOL/L (ref 22–29)
CREAT SERPL-MCNC: 1.1 MG/DL (ref 0.7–1.2)
EOSINOPHIL # BLD: 0.75 K/UL (ref 0.05–0.5)
EOSINOPHILS RELATIVE PERCENT: 7 % (ref 0–6)
ERYTHROCYTE [DISTWIDTH] IN BLOOD BY AUTOMATED COUNT: 20 % (ref 11.5–15)
GFR, ESTIMATED: 71 ML/MIN/1.73M2
GLUCOSE BLD-MCNC: 89 MG/DL (ref 74–99)
GLUCOSE BLD-MCNC: 93 MG/DL (ref 74–99)
GLUCOSE SERPL-MCNC: 73 MG/DL (ref 74–99)
HCT VFR BLD AUTO: 27.7 % (ref 37–54)
HGB BLD-MCNC: 8.1 G/DL (ref 12.5–16.5)
LYMPHOCYTES NFR BLD: 0.09 K/UL (ref 1.5–4)
LYMPHOCYTES RELATIVE PERCENT: 1 % (ref 20–42)
MAGNESIUM SERPL-MCNC: 1.7 MG/DL (ref 1.6–2.6)
MCH RBC QN AUTO: 26 PG (ref 26–35)
MCHC RBC AUTO-ENTMCNC: 29.2 G/DL (ref 32–34.5)
MCV RBC AUTO: 88.8 FL (ref 80–99.9)
METAMYELOCYTES ABSOLUTE COUNT: 0.09 K/UL (ref 0–0.12)
METAMYELOCYTES: 1 % (ref 0–1)
MICROORGANISM SPEC CULT: NORMAL
MONOCYTES NFR BLD: 0 % (ref 2–12)
MONOCYTES NFR BLD: 0 K/UL (ref 0.1–0.95)
NEUTROPHILS NFR BLD: 90 % (ref 43–80)
NEUTS SEG NFR BLD: 9.37 K/UL (ref 1.8–7.3)
PHOSPHATE SERPL-MCNC: 3.4 MG/DL (ref 2.5–4.5)
PLATELET # BLD AUTO: 101 K/UL (ref 130–450)
PMV BLD AUTO: 12 FL (ref 7–12)
POTASSIUM SERPL-SCNC: 3.6 MMOL/L (ref 3.5–5)
RBC # BLD AUTO: 3.12 M/UL (ref 3.8–5.8)
RBC # BLD: ABNORMAL 10*6/UL
SODIUM SERPL-SCNC: 146 MMOL/L (ref 132–146)
SPECIMEN DESCRIPTION: NORMAL
WBC OTHER # BLD: 10.4 K/UL (ref 4.5–11.5)

## 2024-05-31 PROCEDURE — 2580000003 HC RX 258

## 2024-05-31 PROCEDURE — 6360000002 HC RX W HCPCS

## 2024-05-31 PROCEDURE — 6370000000 HC RX 637 (ALT 250 FOR IP)

## 2024-05-31 PROCEDURE — 2700000000 HC OXYGEN THERAPY PER DAY

## 2024-05-31 PROCEDURE — 80048 BASIC METABOLIC PNL TOTAL CA: CPT

## 2024-05-31 PROCEDURE — 99291 CRITICAL CARE FIRST HOUR: CPT | Performed by: INTERNAL MEDICINE

## 2024-05-31 PROCEDURE — 2000000000 HC ICU R&B

## 2024-05-31 PROCEDURE — 84100 ASSAY OF PHOSPHORUS: CPT

## 2024-05-31 PROCEDURE — 83735 ASSAY OF MAGNESIUM: CPT

## 2024-05-31 PROCEDURE — 99232 SBSQ HOSP IP/OBS MODERATE 35: CPT | Performed by: STUDENT IN AN ORGANIZED HEALTH CARE EDUCATION/TRAINING PROGRAM

## 2024-05-31 PROCEDURE — 85025 COMPLETE CBC W/AUTO DIFF WBC: CPT

## 2024-05-31 PROCEDURE — 94640 AIRWAY INHALATION TREATMENT: CPT

## 2024-05-31 PROCEDURE — 82962 GLUCOSE BLOOD TEST: CPT

## 2024-05-31 RX ADMIN — APIXABAN 5 MG: 5 TABLET, FILM COATED ORAL at 07:42

## 2024-05-31 RX ADMIN — ARFORMOTEROL TARTRATE 15 MCG: 15 SOLUTION RESPIRATORY (INHALATION) at 08:15

## 2024-05-31 RX ADMIN — FUROSEMIDE 20 MG: 20 TABLET ORAL at 07:42

## 2024-05-31 RX ADMIN — CHLORDIAZEPOXIDE HYDROCHLORIDE 25 MG: 25 CAPSULE ORAL at 15:10

## 2024-05-31 RX ADMIN — PIPERACILLIN AND TAZOBACTAM 4500 MG: 4; .5 INJECTION, POWDER, FOR SOLUTION INTRAVENOUS at 05:17

## 2024-05-31 RX ADMIN — Medication 1 TABLET: at 07:42

## 2024-05-31 RX ADMIN — SODIUM CHLORIDE, PRESERVATIVE FREE 10 ML: 5 INJECTION INTRAVENOUS at 07:43

## 2024-05-31 RX ADMIN — THIAMINE HYDROCHLORIDE 100 MG: 100 INJECTION, SOLUTION INTRAMUSCULAR; INTRAVENOUS at 07:43

## 2024-05-31 RX ADMIN — CHLORDIAZEPOXIDE HYDROCHLORIDE 25 MG: 25 CAPSULE ORAL at 20:48

## 2024-05-31 RX ADMIN — GABAPENTIN 600 MG: 300 CAPSULE ORAL at 07:42

## 2024-05-31 RX ADMIN — CHLORDIAZEPOXIDE HYDROCHLORIDE 25 MG: 25 CAPSULE ORAL at 18:07

## 2024-05-31 RX ADMIN — METOPROLOL SUCCINATE 75 MG: 25 TABLET, EXTENDED RELEASE ORAL at 07:42

## 2024-05-31 RX ADMIN — PIPERACILLIN AND TAZOBACTAM 4500 MG: 4; .5 INJECTION, POWDER, FOR SOLUTION INTRAVENOUS at 13:27

## 2024-05-31 RX ADMIN — PIPERACILLIN AND TAZOBACTAM 4500 MG: 4; .5 INJECTION, POWDER, FOR SOLUTION INTRAVENOUS at 21:08

## 2024-05-31 RX ADMIN — VANCOMYCIN HYDROCHLORIDE 1500 MG: 10 INJECTION, POWDER, LYOPHILIZED, FOR SOLUTION INTRAVENOUS at 09:30

## 2024-05-31 RX ADMIN — GABAPENTIN 600 MG: 300 CAPSULE ORAL at 20:47

## 2024-05-31 RX ADMIN — BUDESONIDE INHALATION 500 MCG: 0.5 SUSPENSION RESPIRATORY (INHALATION) at 08:15

## 2024-05-31 RX ADMIN — METOPROLOL SUCCINATE 75 MG: 25 TABLET, EXTENDED RELEASE ORAL at 20:47

## 2024-05-31 RX ADMIN — GABAPENTIN 600 MG: 300 CAPSULE ORAL at 15:10

## 2024-05-31 RX ADMIN — ATORVASTATIN CALCIUM 40 MG: 40 TABLET, FILM COATED ORAL at 08:12

## 2024-05-31 RX ADMIN — IPRATROPIUM BROMIDE 0.5 MG: 0.5 SOLUTION RESPIRATORY (INHALATION) at 08:15

## 2024-05-31 RX ADMIN — APIXABAN 5 MG: 5 TABLET, FILM COATED ORAL at 20:47

## 2024-05-31 RX ADMIN — PAROXETINE 30 MG: 10 TABLET, FILM COATED ORAL at 07:42

## 2024-05-31 RX ADMIN — SODIUM CHLORIDE, PRESERVATIVE FREE 10 ML: 5 INJECTION INTRAVENOUS at 20:55

## 2024-05-31 RX ADMIN — CHLORDIAZEPOXIDE HYDROCHLORIDE 25 MG: 25 CAPSULE ORAL at 07:42

## 2024-05-31 ASSESSMENT — PAIN SCALES - GENERAL: PAINLEVEL_OUTOF10: 0

## 2024-05-31 NOTE — CARE COORDINATION
Care Coordination: LOS 15 day. Oriented to self, as per chart review- increased agitation. Cxr R side consolidation, Vanc and Zosyn. Started on Librium, off Precedex. Continues CIWA and IV thiamine. Neurology following, 2 ltr nc.  Tentative plan per significant other and POA is home when he is medically stable and oriented. She feels he will refuse PIPO and declines for now.  She also feels he will adamantly decline peer recovery. Discharge needs unclear at this time    Electronically signed by Argenis Elias RN on 5/31/2024 at 2:49 PM

## 2024-06-01 ENCOUNTER — APPOINTMENT (OUTPATIENT)
Dept: GENERAL RADIOLOGY | Age: 74
DRG: 163 | End: 2024-06-01
Payer: MEDICARE

## 2024-06-01 LAB
ANION GAP SERPL CALCULATED.3IONS-SCNC: 15 MMOL/L (ref 7–16)
BUN SERPL-MCNC: 10 MG/DL (ref 6–23)
CA-I BLD-SCNC: 1.15 MMOL/L (ref 1.15–1.33)
CALCIUM SERPL-MCNC: 7.7 MG/DL (ref 8.6–10.2)
CHLORIDE SERPL-SCNC: 109 MMOL/L (ref 98–107)
CO2 SERPL-SCNC: 19 MMOL/L (ref 22–29)
CREAT SERPL-MCNC: 1 MG/DL (ref 0.7–1.2)
DATE LAST DOSE: NORMAL
GFR, ESTIMATED: 76 ML/MIN/1.73M2
GLUCOSE BLD-MCNC: 138 MG/DL (ref 74–99)
GLUCOSE BLD-MCNC: 82 MG/DL (ref 74–99)
GLUCOSE SERPL-MCNC: 57 MG/DL (ref 74–99)
MAGNESIUM SERPL-MCNC: 1.7 MG/DL (ref 1.6–2.6)
POTASSIUM SERPL-SCNC: 4.9 MMOL/L (ref 3.5–5)
PROCALCITONIN SERPL-MCNC: 0.12 NG/ML (ref 0–0.08)
SODIUM SERPL-SCNC: 143 MMOL/L (ref 132–146)
TME LAST DOSE: NORMAL H
VANCOMYCIN DOSE: NORMAL MG
VANCOMYCIN SERPL-MCNC: 15.4 UG/ML (ref 5–40)

## 2024-06-01 PROCEDURE — 6360000002 HC RX W HCPCS

## 2024-06-01 PROCEDURE — 2580000003 HC RX 258

## 2024-06-01 PROCEDURE — 2500000003 HC RX 250 WO HCPCS: Performed by: CHIROPRACTOR

## 2024-06-01 PROCEDURE — 99291 CRITICAL CARE FIRST HOUR: CPT | Performed by: INTERNAL MEDICINE

## 2024-06-01 PROCEDURE — 6370000000 HC RX 637 (ALT 250 FOR IP)

## 2024-06-01 PROCEDURE — 80048 BASIC METABOLIC PNL TOTAL CA: CPT

## 2024-06-01 PROCEDURE — 2580000003 HC RX 258: Performed by: CHIROPRACTOR

## 2024-06-01 PROCEDURE — 82330 ASSAY OF CALCIUM: CPT

## 2024-06-01 PROCEDURE — 80202 ASSAY OF VANCOMYCIN: CPT

## 2024-06-01 PROCEDURE — 84145 PROCALCITONIN (PCT): CPT

## 2024-06-01 PROCEDURE — 74018 RADEX ABDOMEN 1 VIEW: CPT

## 2024-06-01 PROCEDURE — 2700000000 HC OXYGEN THERAPY PER DAY

## 2024-06-01 PROCEDURE — 94640 AIRWAY INHALATION TREATMENT: CPT

## 2024-06-01 PROCEDURE — 6370000000 HC RX 637 (ALT 250 FOR IP): Performed by: CHIROPRACTOR

## 2024-06-01 PROCEDURE — 83735 ASSAY OF MAGNESIUM: CPT

## 2024-06-01 PROCEDURE — 82962 GLUCOSE BLOOD TEST: CPT

## 2024-06-01 PROCEDURE — 2000000000 HC ICU R&B

## 2024-06-01 RX ORDER — FENTANYL CITRATE 50 UG/ML
50 INJECTION, SOLUTION INTRAMUSCULAR; INTRAVENOUS ONCE
Status: COMPLETED | OUTPATIENT
Start: 2024-06-01 | End: 2024-06-01

## 2024-06-01 RX ORDER — QUETIAPINE FUMARATE 25 MG/1
25 TABLET, FILM COATED ORAL 2 TIMES DAILY
Status: DISCONTINUED | OUTPATIENT
Start: 2024-06-01 | End: 2024-06-11 | Stop reason: HOSPADM

## 2024-06-01 RX ADMIN — CHLORDIAZEPOXIDE HYDROCHLORIDE 25 MG: 25 CAPSULE ORAL at 21:23

## 2024-06-01 RX ADMIN — CHLORDIAZEPOXIDE HYDROCHLORIDE 25 MG: 25 CAPSULE ORAL at 07:39

## 2024-06-01 RX ADMIN — SODIUM CHLORIDE, PRESERVATIVE FREE 10 ML: 5 INJECTION INTRAVENOUS at 21:24

## 2024-06-01 RX ADMIN — GABAPENTIN 600 MG: 300 CAPSULE ORAL at 21:23

## 2024-06-01 RX ADMIN — IPRATROPIUM BROMIDE 0.5 MG: 0.5 SOLUTION RESPIRATORY (INHALATION) at 20:17

## 2024-06-01 RX ADMIN — Medication 1 TABLET: at 07:40

## 2024-06-01 RX ADMIN — QUETIAPINE FUMARATE 25 MG: 25 TABLET ORAL at 21:23

## 2024-06-01 RX ADMIN — IPRATROPIUM BROMIDE 0.5 MG: 0.5 SOLUTION RESPIRATORY (INHALATION) at 09:14

## 2024-06-01 RX ADMIN — BUDESONIDE INHALATION 500 MCG: 0.5 SUSPENSION RESPIRATORY (INHALATION) at 09:14

## 2024-06-01 RX ADMIN — BUDESONIDE INHALATION 500 MCG: 0.5 SUSPENSION RESPIRATORY (INHALATION) at 20:17

## 2024-06-01 RX ADMIN — FENTANYL CITRATE 50 MCG: 50 INJECTION INTRAMUSCULAR; INTRAVENOUS at 09:11

## 2024-06-01 RX ADMIN — PIPERACILLIN AND TAZOBACTAM 4500 MG: 4; .5 INJECTION, POWDER, FOR SOLUTION INTRAVENOUS at 14:30

## 2024-06-01 RX ADMIN — PIPERACILLIN AND TAZOBACTAM 4500 MG: 4; .5 INJECTION, POWDER, FOR SOLUTION INTRAVENOUS at 21:27

## 2024-06-01 RX ADMIN — PANTOPRAZOLE SODIUM 40 MG: 40 TABLET, DELAYED RELEASE ORAL at 06:03

## 2024-06-01 RX ADMIN — APIXABAN 5 MG: 5 TABLET, FILM COATED ORAL at 07:39

## 2024-06-01 RX ADMIN — PIPERACILLIN AND TAZOBACTAM 4500 MG: 4; .5 INJECTION, POWDER, FOR SOLUTION INTRAVENOUS at 06:02

## 2024-06-01 RX ADMIN — ARFORMOTEROL TARTRATE 15 MCG: 15 SOLUTION RESPIRATORY (INHALATION) at 20:17

## 2024-06-01 RX ADMIN — THIAMINE HYDROCHLORIDE 100 MG: 100 INJECTION, SOLUTION INTRAMUSCULAR; INTRAVENOUS at 07:40

## 2024-06-01 RX ADMIN — GABAPENTIN 600 MG: 300 CAPSULE ORAL at 07:39

## 2024-06-01 RX ADMIN — CHLORDIAZEPOXIDE HYDROCHLORIDE 25 MG: 25 CAPSULE ORAL at 18:07

## 2024-06-01 RX ADMIN — APIXABAN 5 MG: 5 TABLET, FILM COATED ORAL at 21:23

## 2024-06-01 RX ADMIN — METOPROLOL SUCCINATE 75 MG: 25 TABLET, EXTENDED RELEASE ORAL at 07:39

## 2024-06-01 RX ADMIN — SODIUM CHLORIDE, PRESERVATIVE FREE 10 ML: 5 INJECTION INTRAVENOUS at 07:40

## 2024-06-01 RX ADMIN — PAROXETINE 30 MG: 10 TABLET, FILM COATED ORAL at 07:39

## 2024-06-01 RX ADMIN — FUROSEMIDE 20 MG: 20 TABLET ORAL at 07:39

## 2024-06-01 RX ADMIN — ATORVASTATIN CALCIUM 40 MG: 40 TABLET, FILM COATED ORAL at 07:39

## 2024-06-01 RX ADMIN — DEXMEDETOMIDINE 0.2 MCG/KG/HR: 100 INJECTION, SOLUTION INTRAVENOUS at 15:53

## 2024-06-01 RX ADMIN — METOPROLOL SUCCINATE 75 MG: 25 TABLET, EXTENDED RELEASE ORAL at 21:23

## 2024-06-01 RX ADMIN — ARFORMOTEROL TARTRATE 15 MCG: 15 SOLUTION RESPIRATORY (INHALATION) at 09:14

## 2024-06-01 ASSESSMENT — PAIN SCALES - GENERAL: PAINLEVEL_OUTOF10: 0

## 2024-06-01 NOTE — FLOWSHEET NOTE
Patient will reach at lines and tubes needing to be redirected much of time. Attempting to provide calm environment and reorientation, but patient still assessed to be a risk of harm to self. Family aware for need of restraints. Will continue to monitor patient and assess for earliest removal capability.

## 2024-06-02 ENCOUNTER — APPOINTMENT (OUTPATIENT)
Dept: GENERAL RADIOLOGY | Age: 74
DRG: 163 | End: 2024-06-02
Payer: MEDICARE

## 2024-06-02 ENCOUNTER — APPOINTMENT (OUTPATIENT)
Dept: MRI IMAGING | Age: 74
DRG: 163 | End: 2024-06-02
Payer: MEDICARE

## 2024-06-02 LAB
ANION GAP SERPL CALCULATED.3IONS-SCNC: 14 MMOL/L (ref 7–16)
BASOPHILS # BLD: 0.04 K/UL (ref 0–0.2)
BASOPHILS NFR BLD: 1 % (ref 0–2)
BNP SERPL-MCNC: 6270 PG/ML (ref 0–450)
BUN SERPL-MCNC: 9 MG/DL (ref 6–23)
CALCIUM SERPL-MCNC: 7.6 MG/DL (ref 8.6–10.2)
CHLORIDE SERPL-SCNC: 111 MMOL/L (ref 98–107)
CO2 SERPL-SCNC: 19 MMOL/L (ref 22–29)
CREAT SERPL-MCNC: 0.9 MG/DL (ref 0.7–1.2)
EOSINOPHIL # BLD: 0.19 K/UL (ref 0.05–0.5)
EOSINOPHILS RELATIVE PERCENT: 4 % (ref 0–6)
ERYTHROCYTE [DISTWIDTH] IN BLOOD BY AUTOMATED COUNT: 19.7 % (ref 11.5–15)
GFR, ESTIMATED: 86 ML/MIN/1.73M2
GLUCOSE BLD-MCNC: 127 MG/DL (ref 74–99)
GLUCOSE BLD-MCNC: 81 MG/DL (ref 74–99)
GLUCOSE SERPL-MCNC: 114 MG/DL (ref 74–99)
HCT VFR BLD AUTO: 24.1 % (ref 37–54)
HCT VFR BLD AUTO: 25.3 % (ref 37–54)
HCT VFR BLD AUTO: 29.7 % (ref 37–54)
HGB BLD-MCNC: 6.8 G/DL (ref 12.5–16.5)
HGB BLD-MCNC: 7.4 G/DL (ref 12.5–16.5)
HGB BLD-MCNC: 8.7 G/DL (ref 12.5–16.5)
LYMPHOCYTES NFR BLD: 0.38 K/UL (ref 1.5–4)
LYMPHOCYTES RELATIVE PERCENT: 9 % (ref 20–42)
MAGNESIUM SERPL-MCNC: 1.5 MG/DL (ref 1.6–2.6)
MCH RBC QN AUTO: 25.4 PG (ref 26–35)
MCHC RBC AUTO-ENTMCNC: 28.2 G/DL (ref 32–34.5)
MCV RBC AUTO: 89.9 FL (ref 80–99.9)
MONOCYTES NFR BLD: 0.04 K/UL (ref 0.1–0.95)
MONOCYTES NFR BLD: 1 % (ref 2–12)
NEUTROPHILS NFR BLD: 85 % (ref 43–80)
NEUTS SEG NFR BLD: 3.66 K/UL (ref 1.8–7.3)
PHOSPHATE SERPL-MCNC: 2.5 MG/DL (ref 2.5–4.5)
PLATELET # BLD AUTO: 82 K/UL (ref 130–450)
PLATELET CONFIRMATION: NORMAL
PMV BLD AUTO: 11.5 FL (ref 7–12)
POTASSIUM SERPL-SCNC: 3.5 MMOL/L (ref 3.5–5)
RBC # BLD AUTO: 2.68 M/UL (ref 3.8–5.8)
RBC # BLD: ABNORMAL 10*6/UL
SODIUM SERPL-SCNC: 144 MMOL/L (ref 132–146)
WBC OTHER # BLD: 4.3 K/UL (ref 4.5–11.5)

## 2024-06-02 PROCEDURE — 83880 ASSAY OF NATRIURETIC PEPTIDE: CPT

## 2024-06-02 PROCEDURE — 6370000000 HC RX 637 (ALT 250 FOR IP)

## 2024-06-02 PROCEDURE — 71045 X-RAY EXAM CHEST 1 VIEW: CPT

## 2024-06-02 PROCEDURE — 6360000002 HC RX W HCPCS

## 2024-06-02 PROCEDURE — 6370000000 HC RX 637 (ALT 250 FOR IP): Performed by: CHIROPRACTOR

## 2024-06-02 PROCEDURE — 94640 AIRWAY INHALATION TREATMENT: CPT

## 2024-06-02 PROCEDURE — P9016 RBC LEUKOCYTES REDUCED: HCPCS

## 2024-06-02 PROCEDURE — 85018 HEMOGLOBIN: CPT

## 2024-06-02 PROCEDURE — 02HV33Z INSERTION OF INFUSION DEVICE INTO SUPERIOR VENA CAVA, PERCUTANEOUS APPROACH: ICD-10-PCS | Performed by: INTERNAL MEDICINE

## 2024-06-02 PROCEDURE — 36430 TRANSFUSION BLD/BLD COMPNT: CPT

## 2024-06-02 PROCEDURE — 99291 CRITICAL CARE FIRST HOUR: CPT | Performed by: INTERNAL MEDICINE

## 2024-06-02 PROCEDURE — 2580000003 HC RX 258

## 2024-06-02 PROCEDURE — 30233N1 TRANSFUSION OF NONAUTOLOGOUS RED BLOOD CELLS INTO PERIPHERAL VEIN, PERCUTANEOUS APPROACH: ICD-10-PCS | Performed by: INTERNAL MEDICINE

## 2024-06-02 PROCEDURE — 86592 SYPHILIS TEST NON-TREP QUAL: CPT

## 2024-06-02 PROCEDURE — 86900 BLOOD TYPING SEROLOGIC ABO: CPT

## 2024-06-02 PROCEDURE — 86850 RBC ANTIBODY SCREEN: CPT

## 2024-06-02 PROCEDURE — 86923 COMPATIBILITY TEST ELECTRIC: CPT

## 2024-06-02 PROCEDURE — 2700000000 HC OXYGEN THERAPY PER DAY

## 2024-06-02 PROCEDURE — 86901 BLOOD TYPING SEROLOGIC RH(D): CPT

## 2024-06-02 PROCEDURE — 2000000000 HC ICU R&B

## 2024-06-02 PROCEDURE — 80048 BASIC METABOLIC PNL TOTAL CA: CPT

## 2024-06-02 PROCEDURE — 85014 HEMATOCRIT: CPT

## 2024-06-02 PROCEDURE — 82962 GLUCOSE BLOOD TEST: CPT

## 2024-06-02 PROCEDURE — 83735 ASSAY OF MAGNESIUM: CPT

## 2024-06-02 PROCEDURE — 70551 MRI BRAIN STEM W/O DYE: CPT

## 2024-06-02 PROCEDURE — 85025 COMPLETE CBC W/AUTO DIFF WBC: CPT

## 2024-06-02 PROCEDURE — 84100 ASSAY OF PHOSPHORUS: CPT

## 2024-06-02 RX ORDER — SODIUM CHLORIDE 9 MG/ML
INJECTION, SOLUTION INTRAVENOUS PRN
Status: DISCONTINUED | OUTPATIENT
Start: 2024-06-02 | End: 2024-06-03

## 2024-06-02 RX ORDER — SODIUM CHLORIDE 0.9 % (FLUSH) 0.9 %
5-40 SYRINGE (ML) INJECTION PRN
Status: DISCONTINUED | OUTPATIENT
Start: 2024-06-02 | End: 2024-06-03

## 2024-06-02 RX ORDER — MAGNESIUM SULFATE IN WATER 40 MG/ML
2000 INJECTION, SOLUTION INTRAVENOUS ONCE
Status: COMPLETED | OUTPATIENT
Start: 2024-06-02 | End: 2024-06-02

## 2024-06-02 RX ORDER — GUAIFENESIN 400 MG/1
400 TABLET ORAL 3 TIMES DAILY
Status: DISCONTINUED | OUTPATIENT
Start: 2024-06-02 | End: 2024-06-04 | Stop reason: SDUPTHER

## 2024-06-02 RX ORDER — HEPARIN 100 UNIT/ML
3 SYRINGE INTRAVENOUS PRN
Status: DISCONTINUED | OUTPATIENT
Start: 2024-06-02 | End: 2024-06-11 | Stop reason: HOSPADM

## 2024-06-02 RX ORDER — HEPARIN 100 UNIT/ML
3 SYRINGE INTRAVENOUS EVERY 12 HOURS SCHEDULED
Status: DISCONTINUED | OUTPATIENT
Start: 2024-06-02 | End: 2024-06-11 | Stop reason: HOSPADM

## 2024-06-02 RX ORDER — FUROSEMIDE 40 MG/1
40 TABLET ORAL DAILY
Status: DISCONTINUED | OUTPATIENT
Start: 2024-06-03 | End: 2024-06-11 | Stop reason: HOSPADM

## 2024-06-02 RX ORDER — SODIUM CHLORIDE 0.9 % (FLUSH) 0.9 %
5-40 SYRINGE (ML) INJECTION EVERY 12 HOURS SCHEDULED
Status: DISCONTINUED | OUTPATIENT
Start: 2024-06-02 | End: 2024-06-11 | Stop reason: HOSPADM

## 2024-06-02 RX ORDER — LIDOCAINE HYDROCHLORIDE 10 MG/ML
5 INJECTION, SOLUTION EPIDURAL; INFILTRATION; INTRACAUDAL; PERINEURAL ONCE
Status: DISCONTINUED | OUTPATIENT
Start: 2024-06-02 | End: 2024-06-03

## 2024-06-02 RX ADMIN — GUAIFENESIN 400 MG: 400 TABLET ORAL at 12:54

## 2024-06-02 RX ADMIN — GABAPENTIN 600 MG: 300 CAPSULE ORAL at 07:47

## 2024-06-02 RX ADMIN — CHLORDIAZEPOXIDE HYDROCHLORIDE 25 MG: 25 CAPSULE ORAL at 12:54

## 2024-06-02 RX ADMIN — GUAIFENESIN 400 MG: 400 TABLET ORAL at 21:03

## 2024-06-02 RX ADMIN — MAGNESIUM SULFATE HEPTAHYDRATE 2000 MG: 40 INJECTION, SOLUTION INTRAVENOUS at 09:48

## 2024-06-02 RX ADMIN — PANTOPRAZOLE SODIUM 40 MG: 40 TABLET, DELAYED RELEASE ORAL at 05:50

## 2024-06-02 RX ADMIN — CHLORDIAZEPOXIDE HYDROCHLORIDE 25 MG: 25 CAPSULE ORAL at 16:45

## 2024-06-02 RX ADMIN — GABAPENTIN 600 MG: 300 CAPSULE ORAL at 12:54

## 2024-06-02 RX ADMIN — HEPARIN 300 UNITS: 100 SYRINGE at 21:07

## 2024-06-02 RX ADMIN — PIPERACILLIN AND TAZOBACTAM 4500 MG: 4; .5 INJECTION, POWDER, FOR SOLUTION INTRAVENOUS at 15:20

## 2024-06-02 RX ADMIN — ARFORMOTEROL TARTRATE 15 MCG: 15 SOLUTION RESPIRATORY (INHALATION) at 08:46

## 2024-06-02 RX ADMIN — GUAIFENESIN 400 MG: 400 TABLET ORAL at 11:04

## 2024-06-02 RX ADMIN — IPRATROPIUM BROMIDE 0.5 MG: 0.5 SOLUTION RESPIRATORY (INHALATION) at 20:43

## 2024-06-02 RX ADMIN — BUDESONIDE INHALATION 500 MCG: 0.5 SUSPENSION RESPIRATORY (INHALATION) at 08:46

## 2024-06-02 RX ADMIN — GABAPENTIN 600 MG: 300 CAPSULE ORAL at 21:04

## 2024-06-02 RX ADMIN — THIAMINE HYDROCHLORIDE 100 MG: 100 INJECTION, SOLUTION INTRAMUSCULAR; INTRAVENOUS at 07:47

## 2024-06-02 RX ADMIN — IPRATROPIUM BROMIDE 0.5 MG: 0.5 SOLUTION RESPIRATORY (INHALATION) at 08:46

## 2024-06-02 RX ADMIN — QUETIAPINE FUMARATE 25 MG: 25 TABLET ORAL at 21:04

## 2024-06-02 RX ADMIN — Medication 1 TABLET: at 07:47

## 2024-06-02 RX ADMIN — PAROXETINE 30 MG: 10 TABLET, FILM COATED ORAL at 07:47

## 2024-06-02 RX ADMIN — CHLORDIAZEPOXIDE HYDROCHLORIDE 25 MG: 25 CAPSULE ORAL at 07:47

## 2024-06-02 RX ADMIN — FUROSEMIDE 20 MG: 20 TABLET ORAL at 07:47

## 2024-06-02 RX ADMIN — SODIUM CHLORIDE, PRESERVATIVE FREE 10 ML: 5 INJECTION INTRAVENOUS at 07:47

## 2024-06-02 RX ADMIN — BUDESONIDE INHALATION 500 MCG: 0.5 SUSPENSION RESPIRATORY (INHALATION) at 20:43

## 2024-06-02 RX ADMIN — PIPERACILLIN AND TAZOBACTAM 4500 MG: 4; .5 INJECTION, POWDER, FOR SOLUTION INTRAVENOUS at 21:17

## 2024-06-02 RX ADMIN — ARFORMOTEROL TARTRATE 15 MCG: 15 SOLUTION RESPIRATORY (INHALATION) at 20:43

## 2024-06-02 RX ADMIN — QUETIAPINE FUMARATE 25 MG: 25 TABLET ORAL at 07:47

## 2024-06-02 RX ADMIN — POTASSIUM BICARBONATE 40 MEQ: 782 TABLET, EFFERVESCENT ORAL at 07:47

## 2024-06-02 RX ADMIN — ATORVASTATIN CALCIUM 40 MG: 40 TABLET, FILM COATED ORAL at 07:47

## 2024-06-02 RX ADMIN — CHLORDIAZEPOXIDE HYDROCHLORIDE 25 MG: 25 CAPSULE ORAL at 21:04

## 2024-06-02 RX ADMIN — APIXABAN 5 MG: 5 TABLET, FILM COATED ORAL at 21:03

## 2024-06-02 RX ADMIN — PIPERACILLIN AND TAZOBACTAM 4500 MG: 4; .5 INJECTION, POWDER, FOR SOLUTION INTRAVENOUS at 05:50

## 2024-06-02 RX ADMIN — APIXABAN 5 MG: 5 TABLET, FILM COATED ORAL at 07:47

## 2024-06-02 ASSESSMENT — PAIN SCALES - WONG BAKER

## 2024-06-02 ASSESSMENT — PAIN SCALES - GENERAL
PAINLEVEL_OUTOF10: 2
PAINLEVEL_OUTOF10: 0

## 2024-06-03 LAB
ABO/RH: NORMAL
ANION GAP SERPL CALCULATED.3IONS-SCNC: 14 MMOL/L (ref 7–16)
ANTIBODY SCREEN: NEGATIVE
ARM BAND NUMBER: NORMAL
BASOPHILS # BLD: 0.17 K/UL (ref 0–0.2)
BASOPHILS NFR BLD: 3 % (ref 0–2)
BLOOD BANK BLOOD PRODUCT EXPIRATION DATE: NORMAL
BLOOD BANK DISPENSE STATUS: NORMAL
BLOOD BANK ISBT PRODUCT BLOOD TYPE: 5100
BLOOD BANK PRODUCT CODE: NORMAL
BLOOD BANK SAMPLE EXPIRATION: NORMAL
BLOOD BANK UNIT TYPE AND RH: NORMAL
BPU ID: NORMAL
BUN SERPL-MCNC: 7 MG/DL (ref 6–23)
CALCIUM SERPL-MCNC: 7.8 MG/DL (ref 8.6–10.2)
CHLORIDE SERPL-SCNC: 110 MMOL/L (ref 98–107)
CO2 SERPL-SCNC: 22 MMOL/L (ref 22–29)
COMPONENT: NORMAL
CREAT SERPL-MCNC: 1.1 MG/DL (ref 0.7–1.2)
CROSSMATCH RESULT: NORMAL
EKG ATRIAL RATE: 75 BPM
EKG Q-T INTERVAL: 516 MS
EKG QRS DURATION: 92 MS
EKG QTC CALCULATION (BAZETT): 540 MS
EKG R AXIS: -39 DEGREES
EKG T AXIS: -105 DEGREES
EKG VENTRICULAR RATE: 66 BPM
EOSINOPHIL # BLD: 0.17 K/UL (ref 0.05–0.5)
EOSINOPHILS RELATIVE PERCENT: 3 % (ref 0–6)
ERYTHROCYTE [DISTWIDTH] IN BLOOD BY AUTOMATED COUNT: 19.7 % (ref 11.5–15)
GFR, ESTIMATED: 74 ML/MIN/1.73M2
GLUCOSE BLD-MCNC: 110 MG/DL (ref 74–99)
GLUCOSE SERPL-MCNC: 84 MG/DL (ref 74–99)
HCT VFR BLD AUTO: 28.3 % (ref 37–54)
HGB BLD-MCNC: 8.6 G/DL (ref 12.5–16.5)
LYMPHOCYTES NFR BLD: 0.23 K/UL (ref 1.5–4)
LYMPHOCYTES RELATIVE PERCENT: 4 % (ref 20–42)
MAGNESIUM SERPL-MCNC: 1.8 MG/DL (ref 1.6–2.6)
MCH RBC QN AUTO: 27.4 PG (ref 26–35)
MCHC RBC AUTO-ENTMCNC: 30.4 G/DL (ref 32–34.5)
MCV RBC AUTO: 90.1 FL (ref 80–99.9)
MICROORGANISM SPEC CULT: NORMAL
MONOCYTES NFR BLD: 0.17 K/UL (ref 0.1–0.95)
MONOCYTES NFR BLD: 3 % (ref 2–12)
NEUTROPHILS NFR BLD: 89 % (ref 43–80)
NEUTS SEG NFR BLD: 5.85 K/UL (ref 1.8–7.3)
PHOSPHATE SERPL-MCNC: 2.5 MG/DL (ref 2.5–4.5)
PLATELET # BLD AUTO: 75 K/UL (ref 130–450)
PLATELET CONFIRMATION: NORMAL
PMV BLD AUTO: 11.9 FL (ref 7–12)
POTASSIUM SERPL-SCNC: 3.4 MMOL/L (ref 3.5–5)
RBC # BLD AUTO: 3.14 M/UL (ref 3.8–5.8)
RBC # BLD: ABNORMAL 10*6/UL
RPR SER QL: NONREACTIVE
SERVICE CMNT-IMP: NORMAL
SODIUM SERPL-SCNC: 146 MMOL/L (ref 132–146)
SPECIMEN DESCRIPTION: NORMAL
TRANSFUSION STATUS: NORMAL
UNIT DIVISION: 0
UNIT ISSUE DATE/TIME: NORMAL
WBC OTHER # BLD: 6.6 K/UL (ref 4.5–11.5)

## 2024-06-03 PROCEDURE — 6370000000 HC RX 637 (ALT 250 FOR IP)

## 2024-06-03 PROCEDURE — 2580000003 HC RX 258: Performed by: INTERNAL MEDICINE

## 2024-06-03 PROCEDURE — 6360000002 HC RX W HCPCS

## 2024-06-03 PROCEDURE — 83735 ASSAY OF MAGNESIUM: CPT

## 2024-06-03 PROCEDURE — 6370000000 HC RX 637 (ALT 250 FOR IP): Performed by: INTERNAL MEDICINE

## 2024-06-03 PROCEDURE — 94640 AIRWAY INHALATION TREATMENT: CPT

## 2024-06-03 PROCEDURE — 6360000002 HC RX W HCPCS: Performed by: INTERNAL MEDICINE

## 2024-06-03 PROCEDURE — 87070 CULTURE OTHR SPECIMN AEROBIC: CPT

## 2024-06-03 PROCEDURE — 2580000003 HC RX 258

## 2024-06-03 PROCEDURE — 87205 SMEAR GRAM STAIN: CPT

## 2024-06-03 PROCEDURE — 2700000000 HC OXYGEN THERAPY PER DAY

## 2024-06-03 PROCEDURE — 84100 ASSAY OF PHOSPHORUS: CPT

## 2024-06-03 PROCEDURE — 2060000000 HC ICU INTERMEDIATE R&B

## 2024-06-03 PROCEDURE — 80048 BASIC METABOLIC PNL TOTAL CA: CPT

## 2024-06-03 PROCEDURE — 99233 SBSQ HOSP IP/OBS HIGH 50: CPT | Performed by: INTERNAL MEDICINE

## 2024-06-03 PROCEDURE — 84591 ASSAY OF NOS VITAMIN: CPT

## 2024-06-03 PROCEDURE — 82962 GLUCOSE BLOOD TEST: CPT

## 2024-06-03 PROCEDURE — 85025 COMPLETE CBC W/AUTO DIFF WBC: CPT

## 2024-06-03 PROCEDURE — 6370000000 HC RX 637 (ALT 250 FOR IP): Performed by: CHIROPRACTOR

## 2024-06-03 RX ORDER — LORAZEPAM 2 MG/ML
1 INJECTION INTRAMUSCULAR ONCE
Status: COMPLETED | OUTPATIENT
Start: 2024-06-03 | End: 2024-06-03

## 2024-06-03 RX ORDER — FOLIC ACID 1 MG/1
1 TABLET ORAL DAILY
Status: DISCONTINUED | OUTPATIENT
Start: 2024-06-03 | End: 2024-06-11 | Stop reason: HOSPADM

## 2024-06-03 RX ORDER — LANOLIN ALCOHOL/MO/W.PET/CERES
100 CREAM (GRAM) TOPICAL DAILY
Status: DISCONTINUED | OUTPATIENT
Start: 2024-06-03 | End: 2024-06-11 | Stop reason: HOSPADM

## 2024-06-03 RX ORDER — UBIDECARENONE 75 MG
50 CAPSULE ORAL DAILY
Status: DISCONTINUED | OUTPATIENT
Start: 2024-06-03 | End: 2024-06-11 | Stop reason: HOSPADM

## 2024-06-03 RX ORDER — CHLORDIAZEPOXIDE HYDROCHLORIDE 25 MG/1
25 CAPSULE, GELATIN COATED ORAL 3 TIMES DAILY
Status: DISCONTINUED | OUTPATIENT
Start: 2024-06-03 | End: 2024-06-11 | Stop reason: HOSPADM

## 2024-06-03 RX ORDER — PAROXETINE 10 MG/1
10 TABLET, FILM COATED ORAL EVERY MORNING
Status: DISCONTINUED | OUTPATIENT
Start: 2024-06-04 | End: 2024-06-11 | Stop reason: HOSPADM

## 2024-06-03 RX ORDER — MAGNESIUM SULFATE IN WATER 40 MG/ML
2000 INJECTION, SOLUTION INTRAVENOUS ONCE
Status: COMPLETED | OUTPATIENT
Start: 2024-06-03 | End: 2024-06-03

## 2024-06-03 RX ADMIN — Medication 1 TABLET: at 08:16

## 2024-06-03 RX ADMIN — QUETIAPINE FUMARATE 25 MG: 25 TABLET ORAL at 08:14

## 2024-06-03 RX ADMIN — FUROSEMIDE 40 MG: 40 TABLET ORAL at 08:13

## 2024-06-03 RX ADMIN — GUAIFENESIN 400 MG: 400 TABLET ORAL at 20:59

## 2024-06-03 RX ADMIN — ATORVASTATIN CALCIUM 40 MG: 40 TABLET, FILM COATED ORAL at 08:13

## 2024-06-03 RX ADMIN — GUAIFENESIN 400 MG: 400 TABLET ORAL at 08:14

## 2024-06-03 RX ADMIN — APIXABAN 5 MG: 5 TABLET, FILM COATED ORAL at 20:59

## 2024-06-03 RX ADMIN — ARFORMOTEROL TARTRATE 15 MCG: 15 SOLUTION RESPIRATORY (INHALATION) at 21:16

## 2024-06-03 RX ADMIN — PIPERACILLIN AND TAZOBACTAM 4500 MG: 4; .5 INJECTION, POWDER, FOR SOLUTION INTRAVENOUS at 21:06

## 2024-06-03 RX ADMIN — FOLIC ACID 1 MG: 1 TABLET ORAL at 11:15

## 2024-06-03 RX ADMIN — VITAM B12 50 MCG: 100 TAB at 11:15

## 2024-06-03 RX ADMIN — SODIUM CHLORIDE, PRESERVATIVE FREE 10 ML: 5 INJECTION INTRAVENOUS at 08:17

## 2024-06-03 RX ADMIN — CHLORDIAZEPOXIDE HYDROCHLORIDE 25 MG: 25 CAPSULE ORAL at 13:50

## 2024-06-03 RX ADMIN — PANTOPRAZOLE SODIUM 40 MG: 40 TABLET, DELAYED RELEASE ORAL at 06:08

## 2024-06-03 RX ADMIN — QUETIAPINE FUMARATE 25 MG: 25 TABLET ORAL at 20:59

## 2024-06-03 RX ADMIN — GABAPENTIN 600 MG: 300 CAPSULE ORAL at 08:13

## 2024-06-03 RX ADMIN — APIXABAN 5 MG: 5 TABLET, FILM COATED ORAL at 08:29

## 2024-06-03 RX ADMIN — LORAZEPAM 1 MG: 2 INJECTION INTRAMUSCULAR; INTRAVENOUS at 15:31

## 2024-06-03 RX ADMIN — THIAMINE HYDROCHLORIDE 100 MG: 100 INJECTION, SOLUTION INTRAMUSCULAR; INTRAVENOUS at 08:14

## 2024-06-03 RX ADMIN — CHLORDIAZEPOXIDE HYDROCHLORIDE 25 MG: 25 CAPSULE ORAL at 08:13

## 2024-06-03 RX ADMIN — GABAPENTIN 600 MG: 300 CAPSULE ORAL at 13:50

## 2024-06-03 RX ADMIN — GUAIFENESIN 400 MG: 400 TABLET ORAL at 13:50

## 2024-06-03 RX ADMIN — BUDESONIDE INHALATION 500 MCG: 0.5 SUSPENSION RESPIRATORY (INHALATION) at 21:17

## 2024-06-03 RX ADMIN — PAROXETINE 30 MG: 10 TABLET, FILM COATED ORAL at 08:16

## 2024-06-03 RX ADMIN — CHLORDIAZEPOXIDE HYDROCHLORIDE 25 MG: 25 CAPSULE ORAL at 20:59

## 2024-06-03 RX ADMIN — PIPERACILLIN AND TAZOBACTAM 4500 MG: 4; .5 INJECTION, POWDER, FOR SOLUTION INTRAVENOUS at 04:17

## 2024-06-03 RX ADMIN — IPRATROPIUM BROMIDE 0.5 MG: 0.5 SOLUTION RESPIRATORY (INHALATION) at 21:18

## 2024-06-03 RX ADMIN — POTASSIUM BICARBONATE 20 MEQ: 782 TABLET, EFFERVESCENT ORAL at 06:15

## 2024-06-03 RX ADMIN — MAGNESIUM SULFATE HEPTAHYDRATE 2000 MG: 40 INJECTION, SOLUTION INTRAVENOUS at 06:11

## 2024-06-03 RX ADMIN — SODIUM CHLORIDE, PRESERVATIVE FREE 10 ML: 5 INJECTION INTRAVENOUS at 20:59

## 2024-06-03 RX ADMIN — POTASSIUM BICARBONATE 40 MEQ: 782 TABLET, EFFERVESCENT ORAL at 06:08

## 2024-06-03 RX ADMIN — GABAPENTIN 600 MG: 300 CAPSULE ORAL at 20:59

## 2024-06-03 RX ADMIN — HEPARIN 300 UNITS: 100 SYRINGE at 21:00

## 2024-06-03 ASSESSMENT — PAIN SCALES - GENERAL
PAINLEVEL_OUTOF10: 2
PAINLEVEL_OUTOF10: 0

## 2024-06-03 NOTE — CARE COORDINATION
Care Coordination:  LOS 18-day Picc line per IVT 6/2/24. Oriented to self only. Repeat MRI, no acute intracranial abnormality.  Neurology and pulmonary following.  HAP, Zosyn to 6/7/24., 2 ltr nc, NGT, 4 pt restraints. Met with significant other at the bedside, she is waiting for neurology to round.  She would like to take him home as long as he is not combative.  She feels this all started after “the seizure”. Continues on Librium, CIWA.     Electronically signed by Argenis Elias RN on 6/3/2024 at 9:54 AM

## 2024-06-04 ENCOUNTER — APPOINTMENT (OUTPATIENT)
Dept: GENERAL RADIOLOGY | Age: 74
DRG: 163 | End: 2024-06-04
Payer: MEDICARE

## 2024-06-04 LAB
B.E.: 0.3 MMOL/L (ref -3–3)
BASOPHILS # BLD: 0.06 K/UL (ref 0–0.2)
BASOPHILS NFR BLD: 0 % (ref 0–2)
BILIRUB UR QL STRIP: NEGATIVE
CLARITY UR: CLEAR
COHB: 0.5 % (ref 0–1.5)
COLOR UR: YELLOW
COMMENT: ABNORMAL
CRITICAL: ABNORMAL
DATE ANALYZED: ABNORMAL
DATE OF COLLECTION: ABNORMAL
EOSINOPHIL # BLD: 0.17 K/UL (ref 0.05–0.5)
EOSINOPHILS RELATIVE PERCENT: 1 % (ref 0–6)
ERYTHROCYTE [DISTWIDTH] IN BLOOD BY AUTOMATED COUNT: 20 % (ref 11.5–15)
GLUCOSE BLD-MCNC: 120 MG/DL (ref 74–99)
GLUCOSE BLD-MCNC: 84 MG/DL (ref 74–99)
GLUCOSE UR STRIP-MCNC: NEGATIVE MG/DL
HCO3: 25.1 MMOL/L (ref 22–26)
HCT VFR BLD AUTO: 31.5 % (ref 37–54)
HGB BLD-MCNC: 9.3 G/DL (ref 12.5–16.5)
HGB UR QL STRIP.AUTO: NEGATIVE
HHB: 2.2 % (ref 0–5)
IMM GRANULOCYTES # BLD AUTO: 0.1 K/UL (ref 0–0.58)
IMM GRANULOCYTES NFR BLD: 1 % (ref 0–5)
KETONES UR STRIP-MCNC: 15 MG/DL
LAB: ABNORMAL
LEUKOCYTE ESTERASE UR QL STRIP: NEGATIVE
LYMPHOCYTES NFR BLD: 0.66 K/UL (ref 1.5–4)
LYMPHOCYTES RELATIVE PERCENT: 5 % (ref 20–42)
Lab: 45
MAGNESIUM SERPL-MCNC: 1.9 MG/DL (ref 1.6–2.6)
MCH RBC QN AUTO: 26.3 PG (ref 26–35)
MCHC RBC AUTO-ENTMCNC: 29.5 G/DL (ref 32–34.5)
MCV RBC AUTO: 89 FL (ref 80–99.9)
METHB: 0.5 % (ref 0–1.5)
MICROORGANISM SPEC CULT: NORMAL
MICROORGANISM/AGENT SPEC: ABNORMAL
MODE: ABNORMAL
MONOCYTES NFR BLD: 0.74 K/UL (ref 0.1–0.95)
MONOCYTES NFR BLD: 5 % (ref 2–12)
NEUTROPHILS NFR BLD: 88 % (ref 43–80)
NEUTS SEG NFR BLD: 11.91 K/UL (ref 1.8–7.3)
O2 CONTENT: 14.5 ML/DL
O2 SATURATION: 97.8 % (ref 92–98.5)
O2HB: 96.8 % (ref 94–97)
OPERATOR ID: ABNORMAL
PATIENT TEMP: 37 C
PCO2: 41.2 MMHG (ref 35–45)
PH BLOOD GAS: 7.4 (ref 7.35–7.45)
PH UR STRIP: 5.5 [PH] (ref 5–9)
PLATELET CONFIRMATION: NORMAL
PLATELET, FLUORESCENCE: 69 K/UL (ref 130–450)
PMV BLD AUTO: 11.7 FL (ref 7–12)
PO2: 106.8 MMHG (ref 75–100)
RBC # BLD AUTO: 3.54 M/UL (ref 3.8–5.8)
SERVICE CMNT-IMP: NORMAL
SOURCE, BLOOD GAS: ABNORMAL
SP GR UR STRIP: >1.03 (ref 1–1.03)
SPECIMEN DESCRIPTION: ABNORMAL
SPECIMEN DESCRIPTION: NORMAL
THB: 10.5 G/DL (ref 11.5–16.5)
TIME ANALYZED: 49
UROBILINOGEN UR STRIP-ACNC: 0.2 EU/DL (ref 0–1)
WBC OTHER # BLD: 13.6 K/UL (ref 4.5–11.5)

## 2024-06-04 PROCEDURE — 74018 RADEX ABDOMEN 1 VIEW: CPT

## 2024-06-04 PROCEDURE — 2580000003 HC RX 258: Performed by: INTERNAL MEDICINE

## 2024-06-04 PROCEDURE — 94640 AIRWAY INHALATION TREATMENT: CPT

## 2024-06-04 PROCEDURE — 6360000002 HC RX W HCPCS: Performed by: INTERNAL MEDICINE

## 2024-06-04 PROCEDURE — 6370000000 HC RX 637 (ALT 250 FOR IP): Performed by: INTERNAL MEDICINE

## 2024-06-04 PROCEDURE — 81003 URINALYSIS AUTO W/O SCOPE: CPT

## 2024-06-04 PROCEDURE — 2500000003 HC RX 250 WO HCPCS: Performed by: INTERNAL MEDICINE

## 2024-06-04 PROCEDURE — 3E0G76Z INTRODUCTION OF NUTRITIONAL SUBSTANCE INTO UPPER GI, VIA NATURAL OR ARTIFICIAL OPENING: ICD-10-PCS | Performed by: INTERNAL MEDICINE

## 2024-06-04 PROCEDURE — 82962 GLUCOSE BLOOD TEST: CPT

## 2024-06-04 PROCEDURE — 2700000000 HC OXYGEN THERAPY PER DAY

## 2024-06-04 PROCEDURE — 82805 BLOOD GASES W/O2 SATURATION: CPT

## 2024-06-04 PROCEDURE — 0DH67UZ INSERTION OF FEEDING DEVICE INTO STOMACH, VIA NATURAL OR ARTIFICIAL OPENING: ICD-10-PCS | Performed by: INTERNAL MEDICINE

## 2024-06-04 PROCEDURE — 83735 ASSAY OF MAGNESIUM: CPT

## 2024-06-04 PROCEDURE — 2060000000 HC ICU INTERMEDIATE R&B

## 2024-06-04 PROCEDURE — 85025 COMPLETE CBC W/AUTO DIFF WBC: CPT

## 2024-06-04 PROCEDURE — 6370000000 HC RX 637 (ALT 250 FOR IP)

## 2024-06-04 PROCEDURE — 6370000000 HC RX 637 (ALT 250 FOR IP): Performed by: NURSE PRACTITIONER

## 2024-06-04 RX ORDER — MULTIVIT WITH IRON,MINERALS
15 LIQUID (ML) ORAL DAILY
Status: DISCONTINUED | OUTPATIENT
Start: 2024-06-04 | End: 2024-06-11 | Stop reason: HOSPADM

## 2024-06-04 RX ORDER — LANSOPRAZOLE 30 MG/1
30 TABLET, ORALLY DISINTEGRATING, DELAYED RELEASE ORAL
Status: DISCONTINUED | OUTPATIENT
Start: 2024-06-05 | End: 2024-06-09

## 2024-06-04 RX ORDER — GUAIFENESIN 200 MG/10ML
400 LIQUID ORAL 3 TIMES DAILY
Status: DISCONTINUED | OUTPATIENT
Start: 2024-06-04 | End: 2024-06-11 | Stop reason: HOSPADM

## 2024-06-04 RX ADMIN — IPRATROPIUM BROMIDE 0.5 MG: 0.5 SOLUTION RESPIRATORY (INHALATION) at 20:49

## 2024-06-04 RX ADMIN — HEPARIN 300 UNITS: 100 SYRINGE at 22:11

## 2024-06-04 RX ADMIN — VITAM B12 50 MCG: 100 TAB at 17:12

## 2024-06-04 RX ADMIN — PIPERACILLIN AND TAZOBACTAM 4500 MG: 4; .5 INJECTION, POWDER, FOR SOLUTION INTRAVENOUS at 22:14

## 2024-06-04 RX ADMIN — CHLORDIAZEPOXIDE HYDROCHLORIDE 25 MG: 25 CAPSULE ORAL at 17:12

## 2024-06-04 RX ADMIN — PIPERACILLIN AND TAZOBACTAM 4500 MG: 4; .5 INJECTION, POWDER, FOR SOLUTION INTRAVENOUS at 18:03

## 2024-06-04 RX ADMIN — FUROSEMIDE 40 MG: 40 TABLET ORAL at 17:12

## 2024-06-04 RX ADMIN — GABAPENTIN 600 MG: 300 CAPSULE ORAL at 17:11

## 2024-06-04 RX ADMIN — Medication 100 MG: at 17:12

## 2024-06-04 RX ADMIN — FOLIC ACID 1 MG: 1 TABLET ORAL at 17:12

## 2024-06-04 RX ADMIN — BUDESONIDE INHALATION 500 MCG: 0.5 SUSPENSION RESPIRATORY (INHALATION) at 08:38

## 2024-06-04 RX ADMIN — GUAIFENESIN 400 MG: 100 LIQUID ORAL at 17:11

## 2024-06-04 RX ADMIN — IPRATROPIUM BROMIDE 0.5 MG: 0.5 SOLUTION RESPIRATORY (INHALATION) at 08:38

## 2024-06-04 RX ADMIN — QUETIAPINE FUMARATE 25 MG: 25 TABLET ORAL at 17:12

## 2024-06-04 RX ADMIN — ARFORMOTEROL TARTRATE 15 MCG: 15 SOLUTION RESPIRATORY (INHALATION) at 08:38

## 2024-06-04 RX ADMIN — BUDESONIDE INHALATION 500 MCG: 0.5 SUSPENSION RESPIRATORY (INHALATION) at 20:49

## 2024-06-04 RX ADMIN — SODIUM CHLORIDE, PRESERVATIVE FREE 10 ML: 5 INJECTION INTRAVENOUS at 17:14

## 2024-06-04 RX ADMIN — ARFORMOTEROL TARTRATE 15 MCG: 15 SOLUTION RESPIRATORY (INHALATION) at 20:49

## 2024-06-04 RX ADMIN — PAROXETINE 10 MG: 10 TABLET, FILM COATED ORAL at 17:12

## 2024-06-04 RX ADMIN — SODIUM CHLORIDE, PRESERVATIVE FREE 10 ML: 5 INJECTION INTRAVENOUS at 22:11

## 2024-06-04 RX ADMIN — ATORVASTATIN CALCIUM 40 MG: 40 TABLET, FILM COATED ORAL at 17:12

## 2024-06-04 RX ADMIN — MULTIVIT AND MINERALS-FERROUS GLUCONATE 9 MG IRON/15 ML ORAL LIQUID 15 ML: at 17:12

## 2024-06-04 RX ADMIN — PIPERACILLIN AND TAZOBACTAM 4500 MG: 4; .5 INJECTION, POWDER, FOR SOLUTION INTRAVENOUS at 05:26

## 2024-06-04 RX ADMIN — APIXABAN 5 MG: 5 TABLET, FILM COATED ORAL at 17:11

## 2024-06-04 RX ADMIN — PANTOPRAZOLE SODIUM 40 MG: 40 TABLET, DELAYED RELEASE ORAL at 05:26

## 2024-06-04 ASSESSMENT — PAIN SCALES - GENERAL: PAINLEVEL_OUTOF10: 0

## 2024-06-04 ASSESSMENT — PAIN SCALES - WONG BAKER: WONGBAKER_NUMERICALRESPONSE: NO HURT

## 2024-06-04 NOTE — CARE COORDINATION
CM Update: Met with patient and Radha (significant other) at bedside to discuss transition of care plan. Patient Aox1 and in restraints. Discharge plan is SNF. Provided Radha with skilled nursing facility list. She will pick 3 and let me know. Patient came to hospital with SOB and weakness. Patient had a seizure and was transferred to MICU. IV Zosyn. CM/SW to follow. MT

## 2024-06-05 ENCOUNTER — APPOINTMENT (OUTPATIENT)
Dept: ULTRASOUND IMAGING | Age: 74
DRG: 163 | End: 2024-06-05
Payer: MEDICARE

## 2024-06-05 LAB
BASOPHILS # BLD: 0.05 K/UL (ref 0–0.2)
BASOPHILS NFR BLD: 1 % (ref 0–2)
EOSINOPHIL # BLD: 0.35 K/UL (ref 0.05–0.5)
EOSINOPHILS RELATIVE PERCENT: 4 % (ref 0–6)
ERYTHROCYTE [DISTWIDTH] IN BLOOD BY AUTOMATED COUNT: 19.9 % (ref 11.5–15)
GLUCOSE BLD-MCNC: 107 MG/DL (ref 74–99)
HCT VFR BLD AUTO: 31 % (ref 37–54)
HGB BLD-MCNC: 9.3 G/DL (ref 12.5–16.5)
IMM GRANULOCYTES # BLD AUTO: 0.04 K/UL (ref 0–0.58)
IMM GRANULOCYTES NFR BLD: 0 % (ref 0–5)
LYMPHOCYTES NFR BLD: 0.57 K/UL (ref 1.5–4)
LYMPHOCYTES RELATIVE PERCENT: 6 % (ref 20–42)
MAGNESIUM SERPL-MCNC: 1.7 MG/DL (ref 1.6–2.6)
MCH RBC QN AUTO: 27 PG (ref 26–35)
MCHC RBC AUTO-ENTMCNC: 30 G/DL (ref 32–34.5)
MCV RBC AUTO: 89.9 FL (ref 80–99.9)
MONOCYTES NFR BLD: 0.7 K/UL (ref 0.1–0.95)
MONOCYTES NFR BLD: 7 % (ref 2–12)
NEUTROPHILS NFR BLD: 83 % (ref 43–80)
NEUTS SEG NFR BLD: 8.39 K/UL (ref 1.8–7.3)
PLATELET CONFIRMATION: NORMAL
PLATELET, FLUORESCENCE: 66 K/UL (ref 130–450)
PMV BLD AUTO: 12.5 FL (ref 7–12)
RBC # BLD AUTO: 3.45 M/UL (ref 3.8–5.8)
WBC OTHER # BLD: 10.1 K/UL (ref 4.5–11.5)

## 2024-06-05 PROCEDURE — 85025 COMPLETE CBC W/AUTO DIFF WBC: CPT

## 2024-06-05 PROCEDURE — 6370000000 HC RX 637 (ALT 250 FOR IP): Performed by: NURSE PRACTITIONER

## 2024-06-05 PROCEDURE — 83735 ASSAY OF MAGNESIUM: CPT

## 2024-06-05 PROCEDURE — 6360000002 HC RX W HCPCS: Performed by: INTERNAL MEDICINE

## 2024-06-05 PROCEDURE — 93970 EXTREMITY STUDY: CPT

## 2024-06-05 PROCEDURE — 82962 GLUCOSE BLOOD TEST: CPT

## 2024-06-05 PROCEDURE — 6360000002 HC RX W HCPCS: Performed by: STUDENT IN AN ORGANIZED HEALTH CARE EDUCATION/TRAINING PROGRAM

## 2024-06-05 PROCEDURE — 2060000000 HC ICU INTERMEDIATE R&B

## 2024-06-05 PROCEDURE — 2500000003 HC RX 250 WO HCPCS: Performed by: INTERNAL MEDICINE

## 2024-06-05 PROCEDURE — 6370000000 HC RX 637 (ALT 250 FOR IP)

## 2024-06-05 PROCEDURE — 6370000000 HC RX 637 (ALT 250 FOR IP): Performed by: INTERNAL MEDICINE

## 2024-06-05 PROCEDURE — 2580000003 HC RX 258: Performed by: INTERNAL MEDICINE

## 2024-06-05 PROCEDURE — 2700000000 HC OXYGEN THERAPY PER DAY

## 2024-06-05 PROCEDURE — 94640 AIRWAY INHALATION TREATMENT: CPT

## 2024-06-05 RX ORDER — HALOPERIDOL 5 MG/ML
2 INJECTION INTRAMUSCULAR ONCE
Status: COMPLETED | OUTPATIENT
Start: 2024-06-05 | End: 2024-06-05

## 2024-06-05 RX ADMIN — PAROXETINE 10 MG: 10 TABLET, FILM COATED ORAL at 10:20

## 2024-06-05 RX ADMIN — LANSOPRAZOLE 30 MG: 30 TABLET, ORALLY DISINTEGRATING, DELAYED RELEASE ORAL at 05:37

## 2024-06-05 RX ADMIN — CHLORDIAZEPOXIDE HYDROCHLORIDE 25 MG: 25 CAPSULE ORAL at 10:23

## 2024-06-05 RX ADMIN — GUAIFENESIN 400 MG: 100 LIQUID ORAL at 10:19

## 2024-06-05 RX ADMIN — APIXABAN 5 MG: 5 TABLET, FILM COATED ORAL at 05:34

## 2024-06-05 RX ADMIN — QUETIAPINE FUMARATE 25 MG: 25 TABLET ORAL at 05:34

## 2024-06-05 RX ADMIN — HALOPERIDOL LACTATE 2 MG: 5 INJECTION, SOLUTION INTRAMUSCULAR at 19:34

## 2024-06-05 RX ADMIN — Medication 100 MG: at 10:21

## 2024-06-05 RX ADMIN — ARFORMOTEROL TARTRATE 15 MCG: 15 SOLUTION RESPIRATORY (INHALATION) at 10:08

## 2024-06-05 RX ADMIN — IPRATROPIUM BROMIDE 0.5 MG: 0.5 SOLUTION RESPIRATORY (INHALATION) at 10:08

## 2024-06-05 RX ADMIN — GUAIFENESIN 400 MG: 100 LIQUID ORAL at 01:01

## 2024-06-05 RX ADMIN — ARFORMOTEROL TARTRATE 15 MCG: 15 SOLUTION RESPIRATORY (INHALATION) at 19:47

## 2024-06-05 RX ADMIN — FOLIC ACID 1 MG: 1 TABLET ORAL at 10:20

## 2024-06-05 RX ADMIN — PIPERACILLIN AND TAZOBACTAM 4500 MG: 4; .5 INJECTION, POWDER, FOR SOLUTION INTRAVENOUS at 05:34

## 2024-06-05 RX ADMIN — BUDESONIDE INHALATION 500 MCG: 0.5 SUSPENSION RESPIRATORY (INHALATION) at 10:08

## 2024-06-05 RX ADMIN — BUDESONIDE INHALATION 500 MCG: 0.5 SUSPENSION RESPIRATORY (INHALATION) at 19:47

## 2024-06-05 RX ADMIN — GABAPENTIN 600 MG: 300 CAPSULE ORAL at 10:20

## 2024-06-05 RX ADMIN — MULTIVIT AND MINERALS-FERROUS GLUCONATE 9 MG IRON/15 ML ORAL LIQUID 15 ML: at 10:21

## 2024-06-05 RX ADMIN — PIPERACILLIN AND TAZOBACTAM 4500 MG: 4; .5 INJECTION, POWDER, FOR SOLUTION INTRAVENOUS at 15:29

## 2024-06-05 RX ADMIN — HEPARIN 300 UNITS: 100 SYRINGE at 20:24

## 2024-06-05 RX ADMIN — IPRATROPIUM BROMIDE 0.5 MG: 0.5 SOLUTION RESPIRATORY (INHALATION) at 19:47

## 2024-06-05 RX ADMIN — CHLORDIAZEPOXIDE HYDROCHLORIDE 25 MG: 25 CAPSULE ORAL at 01:01

## 2024-06-05 RX ADMIN — HEPARIN 300 UNITS: 100 SYRINGE at 10:19

## 2024-06-05 RX ADMIN — GABAPENTIN 600 MG: 300 CAPSULE ORAL at 01:01

## 2024-06-05 RX ADMIN — ATORVASTATIN CALCIUM 40 MG: 40 TABLET, FILM COATED ORAL at 10:20

## 2024-06-05 RX ADMIN — PIPERACILLIN AND TAZOBACTAM 4500 MG: 4; .5 INJECTION, POWDER, FOR SOLUTION INTRAVENOUS at 21:14

## 2024-06-05 RX ADMIN — FUROSEMIDE 40 MG: 40 TABLET ORAL at 10:19

## 2024-06-05 ASSESSMENT — PAIN SCALES - GENERAL
PAINLEVEL_OUTOF10: 0

## 2024-06-05 ASSESSMENT — PAIN SCALES - WONG BAKER: WONGBAKER_NUMERICALRESPONSE: NO HURT

## 2024-06-05 NOTE — CARE COORDINATION
CM Update: Met with patient at bedside to discuss transition of care plan. Patient AOX1 and in restraints. Discharge plan is SNF. Spoke with Radha (significant other). She tells me she will give me choices after 2 pm. Patient came to hospital with SOB and weakness. Patient had a seizure and was transferred to MICU. IV Zosyn. CM/SW to follow. MT

## 2024-06-06 LAB
BASOPHILS # BLD: 0.06 K/UL (ref 0–0.2)
BASOPHILS NFR BLD: 1 % (ref 0–2)
EOSINOPHIL # BLD: 0.06 K/UL (ref 0.05–0.5)
EOSINOPHILS RELATIVE PERCENT: 1 % (ref 0–6)
ERYTHROCYTE [DISTWIDTH] IN BLOOD BY AUTOMATED COUNT: 20.4 % (ref 11.5–15)
HCT VFR BLD AUTO: 29.9 % (ref 37–54)
HGB BLD-MCNC: 9 G/DL (ref 12.5–16.5)
LYMPHOCYTES NFR BLD: 0.18 K/UL (ref 1.5–4)
LYMPHOCYTES RELATIVE PERCENT: 3 % (ref 20–42)
MAGNESIUM SERPL-MCNC: 1.5 MG/DL (ref 1.6–2.6)
MCH RBC QN AUTO: 26.3 PG (ref 26–35)
MCHC RBC AUTO-ENTMCNC: 30.1 G/DL (ref 32–34.5)
MCV RBC AUTO: 87.4 FL (ref 80–99.9)
MONOCYTES NFR BLD: 0.3 K/UL (ref 0.1–0.95)
MONOCYTES NFR BLD: 4 % (ref 2–12)
NEUTROPHILS NFR BLD: 91 % (ref 43–80)
NEUTS SEG NFR BLD: 6.21 K/UL (ref 1.8–7.3)
PHOSPHATE SERPL-MCNC: 2.2 MG/DL (ref 2.5–4.5)
PLATELET CONFIRMATION: NORMAL
PLATELET, FLUORESCENCE: 65 K/UL (ref 130–450)
PMV BLD AUTO: ABNORMAL FL (ref 7–12)
RBC # BLD AUTO: 3.42 M/UL (ref 3.8–5.8)
RBC # BLD: ABNORMAL 10*6/UL
WBC OTHER # BLD: 6.8 K/UL (ref 4.5–11.5)

## 2024-06-06 PROCEDURE — 2060000000 HC ICU INTERMEDIATE R&B

## 2024-06-06 PROCEDURE — 83735 ASSAY OF MAGNESIUM: CPT

## 2024-06-06 PROCEDURE — 85025 COMPLETE CBC W/AUTO DIFF WBC: CPT

## 2024-06-06 PROCEDURE — 6370000000 HC RX 637 (ALT 250 FOR IP): Performed by: INTERNAL MEDICINE

## 2024-06-06 PROCEDURE — 6360000002 HC RX W HCPCS: Performed by: INTERNAL MEDICINE

## 2024-06-06 PROCEDURE — 6370000000 HC RX 637 (ALT 250 FOR IP)

## 2024-06-06 PROCEDURE — 2580000003 HC RX 258: Performed by: INTERNAL MEDICINE

## 2024-06-06 PROCEDURE — 2500000003 HC RX 250 WO HCPCS: Performed by: INTERNAL MEDICINE

## 2024-06-06 PROCEDURE — 84100 ASSAY OF PHOSPHORUS: CPT

## 2024-06-06 PROCEDURE — 94640 AIRWAY INHALATION TREATMENT: CPT

## 2024-06-06 PROCEDURE — 6370000000 HC RX 637 (ALT 250 FOR IP): Performed by: NURSE PRACTITIONER

## 2024-06-06 RX ORDER — MAGNESIUM SULFATE IN WATER 40 MG/ML
2000 INJECTION, SOLUTION INTRAVENOUS ONCE
Status: COMPLETED | OUTPATIENT
Start: 2024-06-06 | End: 2024-06-06

## 2024-06-06 RX ORDER — HALOPERIDOL 5 MG/ML
2.5 INJECTION INTRAMUSCULAR ONCE
Status: DISCONTINUED | OUTPATIENT
Start: 2024-06-06 | End: 2024-06-11 | Stop reason: HOSPADM

## 2024-06-06 RX ADMIN — Medication 100 MG: at 09:00

## 2024-06-06 RX ADMIN — LANSOPRAZOLE 30 MG: 30 TABLET, ORALLY DISINTEGRATING, DELAYED RELEASE ORAL at 05:32

## 2024-06-06 RX ADMIN — ATORVASTATIN CALCIUM 40 MG: 40 TABLET, FILM COATED ORAL at 09:00

## 2024-06-06 RX ADMIN — QUETIAPINE FUMARATE 25 MG: 25 TABLET ORAL at 17:27

## 2024-06-06 RX ADMIN — HEPARIN 300 UNITS: 100 SYRINGE at 08:00

## 2024-06-06 RX ADMIN — APIXABAN 5 MG: 5 TABLET, FILM COATED ORAL at 17:27

## 2024-06-06 RX ADMIN — ARFORMOTEROL TARTRATE 15 MCG: 15 SOLUTION RESPIRATORY (INHALATION) at 20:51

## 2024-06-06 RX ADMIN — FUROSEMIDE 40 MG: 40 TABLET ORAL at 09:00

## 2024-06-06 RX ADMIN — MAGNESIUM SULFATE HEPTAHYDRATE 2000 MG: 40 INJECTION, SOLUTION INTRAVENOUS at 13:27

## 2024-06-06 RX ADMIN — BUDESONIDE INHALATION 500 MCG: 0.5 SUSPENSION RESPIRATORY (INHALATION) at 20:51

## 2024-06-06 RX ADMIN — APIXABAN 5 MG: 5 TABLET, FILM COATED ORAL at 05:32

## 2024-06-06 RX ADMIN — MULTIVIT AND MINERALS-FERROUS GLUCONATE 9 MG IRON/15 ML ORAL LIQUID 15 ML: at 09:00

## 2024-06-06 RX ADMIN — HEPARIN 300 UNITS: 100 SYRINGE at 20:12

## 2024-06-06 RX ADMIN — QUETIAPINE FUMARATE 25 MG: 25 TABLET ORAL at 05:33

## 2024-06-06 RX ADMIN — PIPERACILLIN AND TAZOBACTAM 4500 MG: 4; .5 INJECTION, POWDER, FOR SOLUTION INTRAVENOUS at 05:31

## 2024-06-06 RX ADMIN — CHLORDIAZEPOXIDE HYDROCHLORIDE 25 MG: 25 CAPSULE ORAL at 09:00

## 2024-06-06 RX ADMIN — PIPERACILLIN AND TAZOBACTAM 4500 MG: 4; .5 INJECTION, POWDER, FOR SOLUTION INTRAVENOUS at 14:00

## 2024-06-06 RX ADMIN — GABAPENTIN 600 MG: 300 CAPSULE ORAL at 11:39

## 2024-06-06 RX ADMIN — FOLIC ACID 1 MG: 1 TABLET ORAL at 09:00

## 2024-06-06 RX ADMIN — ACETAMINOPHEN 650 MG: 325 TABLET ORAL at 11:34

## 2024-06-06 RX ADMIN — GABAPENTIN 600 MG: 300 CAPSULE ORAL at 17:27

## 2024-06-06 RX ADMIN — GUAIFENESIN 400 MG: 100 LIQUID ORAL at 17:27

## 2024-06-06 RX ADMIN — PIPERACILLIN AND TAZOBACTAM 4500 MG: 4; .5 INJECTION, POWDER, FOR SOLUTION INTRAVENOUS at 20:14

## 2024-06-06 RX ADMIN — PAROXETINE 10 MG: 10 TABLET, FILM COATED ORAL at 09:00

## 2024-06-06 RX ADMIN — VITAM B12 50 MCG: 100 TAB at 09:00

## 2024-06-06 RX ADMIN — SODIUM CHLORIDE, PRESERVATIVE FREE 10 ML: 5 INJECTION INTRAVENOUS at 09:00

## 2024-06-06 RX ADMIN — CHLORDIAZEPOXIDE HYDROCHLORIDE 25 MG: 25 CAPSULE ORAL at 17:27

## 2024-06-06 ASSESSMENT — PAIN SCALES - GENERAL
PAINLEVEL_OUTOF10: 0
PAINLEVEL_OUTOF10: 1
PAINLEVEL_OUTOF10: 0
PAINLEVEL_OUTOF10: 1

## 2024-06-06 ASSESSMENT — PAIN SCALES - WONG BAKER: WONGBAKER_NUMERICALRESPONSE: NO HURT

## 2024-06-06 NOTE — CARE COORDINATION
CM Update: Met with patient at bedside to discuss transition of care plan. Patient is out of restraints, pleasantly confused. Now has a regular diet. NG removed. Discharge plan is SNF Radha ayalad: 1. [facility]. 2. [facility]. 3. [facility]. Patient came to hospital with SOB and weakness. Patient had a seizure and was transferred to MICU. IV Zosyn. CM/SW to follow. MT     [facility] is tight beds but I got a preliminary acceptance for that facility. CM/SW to follow. MT

## 2024-06-07 ENCOUNTER — APPOINTMENT (OUTPATIENT)
Dept: GENERAL RADIOLOGY | Age: 74
DRG: 163 | End: 2024-06-07
Payer: MEDICARE

## 2024-06-07 LAB
AMMONIA PLAS-SCNC: 14 UMOL/L (ref 16–60)
ANION GAP SERPL CALCULATED.3IONS-SCNC: 12 MMOL/L (ref 7–16)
BASOPHILS # BLD: 0.09 K/UL (ref 0–0.2)
BASOPHILS NFR BLD: 2 % (ref 0–2)
BUN SERPL-MCNC: 5 MG/DL (ref 6–23)
CALCIUM SERPL-MCNC: 8.1 MG/DL (ref 8.6–10.2)
CHLORIDE SERPL-SCNC: 108 MMOL/L (ref 98–107)
CO2 SERPL-SCNC: 30 MMOL/L (ref 22–29)
CREAT SERPL-MCNC: 0.9 MG/DL (ref 0.7–1.2)
EOSINOPHIL # BLD: 0.14 K/UL (ref 0.05–0.5)
EOSINOPHILS RELATIVE PERCENT: 3 % (ref 0–6)
ERYTHROCYTE [DISTWIDTH] IN BLOOD BY AUTOMATED COUNT: 21.1 % (ref 11.5–15)
GFR, ESTIMATED: 89 ML/MIN/1.73M2
GLUCOSE SERPL-MCNC: 108 MG/DL (ref 74–99)
HCT VFR BLD AUTO: 30 % (ref 37–54)
HGB BLD-MCNC: 9 G/DL (ref 12.5–16.5)
LYMPHOCYTES NFR BLD: 0.33 K/UL (ref 1.5–4)
LYMPHOCYTES RELATIVE PERCENT: 6 % (ref 20–42)
MAGNESIUM SERPL-MCNC: 1.8 MG/DL (ref 1.6–2.6)
MCH RBC QN AUTO: 26.1 PG (ref 26–35)
MCHC RBC AUTO-ENTMCNC: 30 G/DL (ref 32–34.5)
MCV RBC AUTO: 87 FL (ref 80–99.9)
MONOCYTES NFR BLD: 0.24 K/UL (ref 0.1–0.95)
MONOCYTES NFR BLD: 4 % (ref 2–12)
NEUTROPHILS NFR BLD: 85 % (ref 43–80)
NEUTS SEG NFR BLD: 4.59 K/UL (ref 1.8–7.3)
PHOSPHATE SERPL-MCNC: 2.5 MG/DL (ref 2.5–4.5)
PLATELET CONFIRMATION: NORMAL
PLATELET, FLUORESCENCE: 76 K/UL (ref 130–450)
PMV BLD AUTO: 11 FL (ref 7–12)
POTASSIUM SERPL-SCNC: 2.6 MMOL/L (ref 3.5–5)
RBC # BLD AUTO: 3.45 M/UL (ref 3.8–5.8)
RBC # BLD: ABNORMAL 10*6/UL
SODIUM SERPL-SCNC: 150 MMOL/L (ref 132–146)
WBC OTHER # BLD: 5.4 K/UL (ref 4.5–11.5)

## 2024-06-07 PROCEDURE — 85025 COMPLETE CBC W/AUTO DIFF WBC: CPT

## 2024-06-07 PROCEDURE — 71045 X-RAY EXAM CHEST 1 VIEW: CPT

## 2024-06-07 PROCEDURE — 2060000000 HC ICU INTERMEDIATE R&B

## 2024-06-07 PROCEDURE — 6370000000 HC RX 637 (ALT 250 FOR IP): Performed by: NURSE PRACTITIONER

## 2024-06-07 PROCEDURE — 84100 ASSAY OF PHOSPHORUS: CPT

## 2024-06-07 PROCEDURE — 2580000003 HC RX 258: Performed by: INTERNAL MEDICINE

## 2024-06-07 PROCEDURE — 6370000000 HC RX 637 (ALT 250 FOR IP): Performed by: INTERNAL MEDICINE

## 2024-06-07 PROCEDURE — 82140 ASSAY OF AMMONIA: CPT

## 2024-06-07 PROCEDURE — 6360000002 HC RX W HCPCS: Performed by: INTERNAL MEDICINE

## 2024-06-07 PROCEDURE — 6370000000 HC RX 637 (ALT 250 FOR IP)

## 2024-06-07 PROCEDURE — 83735 ASSAY OF MAGNESIUM: CPT

## 2024-06-07 PROCEDURE — 80048 BASIC METABOLIC PNL TOTAL CA: CPT

## 2024-06-07 PROCEDURE — 94640 AIRWAY INHALATION TREATMENT: CPT

## 2024-06-07 RX ORDER — GUAIFENESIN 200 MG/10ML
400 LIQUID ORAL 3 TIMES DAILY
Qty: 300 ML | Refills: 0 | Status: ON HOLD | OUTPATIENT
Start: 2024-06-07 | End: 2024-06-12

## 2024-06-07 RX ORDER — POTASSIUM CHLORIDE 29.8 MG/ML
20 INJECTION INTRAVENOUS PRN
Status: DISCONTINUED | OUTPATIENT
Start: 2024-06-07 | End: 2024-06-11 | Stop reason: HOSPADM

## 2024-06-07 RX ORDER — QUETIAPINE FUMARATE 25 MG/1
25 TABLET, FILM COATED ORAL 2 TIMES DAILY
Qty: 60 TABLET | Refills: 3 | Status: ON HOLD | OUTPATIENT
Start: 2024-06-07

## 2024-06-07 RX ADMIN — Medication 100 MG: at 10:14

## 2024-06-07 RX ADMIN — APIXABAN 5 MG: 5 TABLET, FILM COATED ORAL at 08:00

## 2024-06-07 RX ADMIN — VITAM B12 50 MCG: 100 TAB at 10:17

## 2024-06-07 RX ADMIN — SODIUM CHLORIDE, PRESERVATIVE FREE 10 ML: 5 INJECTION INTRAVENOUS at 10:44

## 2024-06-07 RX ADMIN — ATORVASTATIN CALCIUM 40 MG: 40 TABLET, FILM COATED ORAL at 10:14

## 2024-06-07 RX ADMIN — POTASSIUM CHLORIDE 20 MEQ: 29.8 INJECTION, SOLUTION INTRAVENOUS at 10:12

## 2024-06-07 RX ADMIN — QUETIAPINE FUMARATE 25 MG: 25 TABLET ORAL at 17:19

## 2024-06-07 RX ADMIN — MULTIVIT AND MINERALS-FERROUS GLUCONATE 9 MG IRON/15 ML ORAL LIQUID 15 ML: at 09:00

## 2024-06-07 RX ADMIN — PAROXETINE 10 MG: 10 TABLET, FILM COATED ORAL at 10:14

## 2024-06-07 RX ADMIN — POTASSIUM CHLORIDE 20 MEQ: 29.8 INJECTION, SOLUTION INTRAVENOUS at 11:45

## 2024-06-07 RX ADMIN — CHLORDIAZEPOXIDE HYDROCHLORIDE 25 MG: 25 CAPSULE ORAL at 10:18

## 2024-06-07 RX ADMIN — BUDESONIDE INHALATION 500 MCG: 0.5 SUSPENSION RESPIRATORY (INHALATION) at 06:03

## 2024-06-07 RX ADMIN — GABAPENTIN 600 MG: 300 CAPSULE ORAL at 17:20

## 2024-06-07 RX ADMIN — FOLIC ACID 1 MG: 1 TABLET ORAL at 10:14

## 2024-06-07 RX ADMIN — PIPERACILLIN AND TAZOBACTAM 4500 MG: 4; .5 INJECTION, POWDER, FOR SOLUTION INTRAVENOUS at 05:25

## 2024-06-07 RX ADMIN — GABAPENTIN 600 MG: 300 CAPSULE ORAL at 10:13

## 2024-06-07 RX ADMIN — ARFORMOTEROL TARTRATE 15 MCG: 15 SOLUTION RESPIRATORY (INHALATION) at 06:03

## 2024-06-07 RX ADMIN — APIXABAN 5 MG: 5 TABLET, FILM COATED ORAL at 17:19

## 2024-06-07 RX ADMIN — CHLORDIAZEPOXIDE HYDROCHLORIDE 25 MG: 25 CAPSULE ORAL at 01:23

## 2024-06-07 RX ADMIN — CHLORDIAZEPOXIDE HYDROCHLORIDE 25 MG: 25 CAPSULE ORAL at 17:19

## 2024-06-07 RX ADMIN — FUROSEMIDE 40 MG: 40 TABLET ORAL at 10:18

## 2024-06-07 ASSESSMENT — PAIN SCALES - GENERAL: PAINLEVEL_OUTOF10: 0

## 2024-06-07 NOTE — DISCHARGE INSTR - COC
(osteoarthritis of spine) M47.9    Dyslipidemia E78.5    Chronic kidney disease (CKD) N18.9    Near syncope R55    GERD (gastroesophageal reflux disease) K21.9    Neuropathy G62.9    Stenosis of right internal carotid artery with cerebral infarction ([facility]) I63.231    Osteophyte of vertebrae M25.78    Spinal stenosis in cervical region M48.02    Cervical spine instability M53.2X2    SANDRA (generalized anxiety disorder) F41.1    Nonrheumatic mitral valve regurgitation I34.0    Thrombocytopenia (HCC) D69.6    Stage 3a chronic kidney disease (HCC) N18.31    Acute respiratory failure with hypoxia ([facility]) J96.01    COPD with acute exacerbation ([facility]) J44.1    Alcoholism ([facility]) F10.20    Type 2 diabetes mellitus with diabetic neuropathy E11.40    Carotid stenosis, asymptomatic, bilateral I65.23    Acute hypoxic respiratory failure ([facility]) J96.01    Encephalopathy G93.40    Chronic obstructive pulmonary disease (HCC) J44.9    Pleural effusion J90    Acute on chronic congestive heart failure ([facility]) I50.9    Atrial fibrillation ([facility]) I48.91    Acute blood loss anemia D62    Gastrointestinal hemorrhage K92.2    Acute on chronic hypoxic respiratory failure ([facility]) J96.21    Syncope and collapse R55    Hypoxia R09.02    Chronic diastolic (congestive) heart failure ([facility]) I50.32    Mitral valve disease I05.9    Pure hypercholesterolemia E78.00    ETOH abuse F10.10    Anemia D64.9    Moderate protein-calorie malnutrition (HCC) E44.0    Seizure ([facility]) R56.9    Hypernatremia E87.0       Isolation/Infection:   Isolation            No Isolation          Patient Infection Status       None to display                     Nurse Assessment:  Last Vital Signs: BP (!) 146/70   Pulse 52   Temp 98 °F (36.7 °C) (Temporal)   Resp 18   Ht 1.651 m (5' 5\")   Wt 75 kg (165 lb 5.5 oz)   SpO2 98%   BMI 27.51 kg/m²     Last documented pain score (0-10 scale): Pain Level: 0  Last Weight:   Wt Readings from Last 1 Encounters:   06/07/24 75 kg (165 lb 5.5 oz)

## 2024-06-07 NOTE — CARE COORDINATION
CM Update: Met with patient at bedside to discuss transition of care plan. Patient alert to self. Discharge plan is SNF. Will need precert prior to discharge. Awaiting confirmation of [facility] vs [facility]. PT/OT ordered today and added to weekend list. Patient came to hospital with SOB and weakness. Patient had a seizure and was transferred to MICU. IV Zosyn. CM/SW to follow. MT     1500-Patient accepted by [facility], Precert not started - no discharge until precert obtained. CM/SW to follow. MT

## 2024-06-08 LAB
ANION GAP SERPL CALCULATED.3IONS-SCNC: 7 MMOL/L (ref 7–16)
BASOPHILS # BLD: 0.14 K/UL (ref 0–0.2)
BASOPHILS NFR BLD: 3 % (ref 0–2)
BUN SERPL-MCNC: 4 MG/DL (ref 6–23)
CALCIUM SERPL-MCNC: 8.2 MG/DL (ref 8.6–10.2)
CHLORIDE SERPL-SCNC: 108 MMOL/L (ref 98–107)
CO2 SERPL-SCNC: 33 MMOL/L (ref 22–29)
CREAT SERPL-MCNC: 0.9 MG/DL (ref 0.7–1.2)
EOSINOPHIL # BLD: 0.1 K/UL (ref 0.05–0.5)
EOSINOPHILS RELATIVE PERCENT: 2 % (ref 0–6)
ERYTHROCYTE [DISTWIDTH] IN BLOOD BY AUTOMATED COUNT: 21.2 % (ref 11.5–15)
GFR, ESTIMATED: 87 ML/MIN/1.73M2
GLUCOSE SERPL-MCNC: 107 MG/DL (ref 74–99)
HCT VFR BLD AUTO: 30.8 % (ref 37–54)
HGB BLD-MCNC: 9.1 G/DL (ref 12.5–16.5)
LYMPHOCYTES NFR BLD: 0.19 K/UL (ref 1.5–4)
LYMPHOCYTES RELATIVE PERCENT: 4 % (ref 20–42)
MAGNESIUM SERPL-MCNC: 1.5 MG/DL (ref 1.6–2.6)
MCHC RBC AUTO-ENTMCNC: 29.5 G/DL (ref 32–34.5)
MCV RBC AUTO: 87.3 FL (ref 80–99.9)
MONOCYTES NFR BLD: 0.1 K/UL (ref 0.1–0.95)
MONOCYTES NFR BLD: 2 % (ref 2–12)
NEUTROPHILS NFR BLD: 90 % (ref 43–80)
NEUTS SEG NFR BLD: 4.97 K/UL (ref 1.8–7.3)
PLATELET CONFIRMATION: NORMAL
PLATELET, FLUORESCENCE: 76 K/UL (ref 130–450)
PMV BLD AUTO: 11.7 FL (ref 7–12)
POTASSIUM SERPL-SCNC: 2.8 MMOL/L (ref 3.5–5)
RBC # BLD AUTO: 3.53 M/UL (ref 3.8–5.8)
RBC # BLD: ABNORMAL 10*6/UL
SODIUM SERPL-SCNC: 148 MMOL/L (ref 132–146)
WBC OTHER # BLD: 5.5 K/UL (ref 4.5–11.5)

## 2024-06-08 PROCEDURE — 6360000002 HC RX W HCPCS: Performed by: INTERNAL MEDICINE

## 2024-06-08 PROCEDURE — 6370000000 HC RX 637 (ALT 250 FOR IP)

## 2024-06-08 PROCEDURE — 1200000000 HC SEMI PRIVATE

## 2024-06-08 PROCEDURE — 6370000000 HC RX 637 (ALT 250 FOR IP): Performed by: INTERNAL MEDICINE

## 2024-06-08 PROCEDURE — 83735 ASSAY OF MAGNESIUM: CPT

## 2024-06-08 PROCEDURE — 94640 AIRWAY INHALATION TREATMENT: CPT

## 2024-06-08 PROCEDURE — 85025 COMPLETE CBC W/AUTO DIFF WBC: CPT

## 2024-06-08 PROCEDURE — 2580000003 HC RX 258: Performed by: INTERNAL MEDICINE

## 2024-06-08 PROCEDURE — 2500000003 HC RX 250 WO HCPCS: Performed by: INTERNAL MEDICINE

## 2024-06-08 PROCEDURE — 80048 BASIC METABOLIC PNL TOTAL CA: CPT

## 2024-06-08 PROCEDURE — 6370000000 HC RX 637 (ALT 250 FOR IP): Performed by: NURSE PRACTITIONER

## 2024-06-08 RX ORDER — POTASSIUM CHLORIDE 20 MEQ/1
40 TABLET, EXTENDED RELEASE ORAL ONCE
Status: COMPLETED | OUTPATIENT
Start: 2024-06-08 | End: 2024-06-08

## 2024-06-08 RX ORDER — MAGNESIUM SULFATE IN WATER 40 MG/ML
2000 INJECTION, SOLUTION INTRAVENOUS ONCE
Status: COMPLETED | OUTPATIENT
Start: 2024-06-08 | End: 2024-06-08

## 2024-06-08 RX ORDER — POTASSIUM CHLORIDE 29.8 MG/ML
20 INJECTION INTRAVENOUS
Status: COMPLETED | OUTPATIENT
Start: 2024-06-08 | End: 2024-06-08

## 2024-06-08 RX ORDER — DEXTROSE MONOHYDRATE 50 MG/ML
INJECTION, SOLUTION INTRAVENOUS CONTINUOUS
Status: ACTIVE | OUTPATIENT
Start: 2024-06-08 | End: 2024-06-08

## 2024-06-08 RX ADMIN — POTASSIUM CHLORIDE 20 MEQ: 29.8 INJECTION, SOLUTION INTRAVENOUS at 10:44

## 2024-06-08 RX ADMIN — GUAIFENESIN 400 MG: 100 LIQUID ORAL at 12:14

## 2024-06-08 RX ADMIN — MAGNESIUM SULFATE HEPTAHYDRATE 2000 MG: 40 INJECTION, SOLUTION INTRAVENOUS at 09:40

## 2024-06-08 RX ADMIN — CHLORDIAZEPOXIDE HYDROCHLORIDE 25 MG: 25 CAPSULE ORAL at 20:28

## 2024-06-08 RX ADMIN — POTASSIUM CHLORIDE 40 MEQ: 1500 TABLET, EXTENDED RELEASE ORAL at 12:12

## 2024-06-08 RX ADMIN — QUETIAPINE FUMARATE 25 MG: 25 TABLET ORAL at 05:20

## 2024-06-08 RX ADMIN — BUDESONIDE INHALATION 500 MCG: 0.5 SUSPENSION RESPIRATORY (INHALATION) at 09:13

## 2024-06-08 RX ADMIN — Medication 100 MG: at 12:13

## 2024-06-08 RX ADMIN — PAROXETINE 10 MG: 10 TABLET, FILM COATED ORAL at 12:12

## 2024-06-08 RX ADMIN — CHLORDIAZEPOXIDE HYDROCHLORIDE 25 MG: 25 CAPSULE ORAL at 15:50

## 2024-06-08 RX ADMIN — POTASSIUM CHLORIDE 20 MEQ: 29.8 INJECTION, SOLUTION INTRAVENOUS at 09:35

## 2024-06-08 RX ADMIN — ARFORMOTEROL TARTRATE 15 MCG: 15 SOLUTION RESPIRATORY (INHALATION) at 09:13

## 2024-06-08 RX ADMIN — METOPROLOL SUCCINATE 75 MG: 25 TABLET, EXTENDED RELEASE ORAL at 12:17

## 2024-06-08 RX ADMIN — ATORVASTATIN CALCIUM 40 MG: 40 TABLET, FILM COATED ORAL at 12:13

## 2024-06-08 RX ADMIN — FOLIC ACID 1 MG: 1 TABLET ORAL at 12:13

## 2024-06-08 RX ADMIN — MULTIVIT AND MINERALS-FERROUS GLUCONATE 9 MG IRON/15 ML ORAL LIQUID 15 ML: at 12:14

## 2024-06-08 RX ADMIN — FUROSEMIDE 40 MG: 40 TABLET ORAL at 12:12

## 2024-06-08 RX ADMIN — VITAM B12 50 MCG: 100 TAB at 12:14

## 2024-06-08 RX ADMIN — GABAPENTIN 600 MG: 300 CAPSULE ORAL at 12:13

## 2024-06-08 RX ADMIN — METOPROLOL SUCCINATE 75 MG: 25 TABLET, EXTENDED RELEASE ORAL at 20:29

## 2024-06-08 RX ADMIN — APIXABAN 5 MG: 5 TABLET, FILM COATED ORAL at 20:28

## 2024-06-08 RX ADMIN — HEPARIN 300 UNITS: 100 SYRINGE at 09:29

## 2024-06-08 RX ADMIN — SODIUM CHLORIDE, PRESERVATIVE FREE 10 ML: 5 INJECTION INTRAVENOUS at 09:29

## 2024-06-08 RX ADMIN — DEXTROSE MONOHYDRATE: 50 INJECTION, SOLUTION INTRAVENOUS at 11:53

## 2024-06-08 RX ADMIN — CHLORDIAZEPOXIDE HYDROCHLORIDE 25 MG: 25 CAPSULE ORAL at 05:25

## 2024-06-08 RX ADMIN — APIXABAN 5 MG: 5 TABLET, FILM COATED ORAL at 12:13

## 2024-06-08 ASSESSMENT — PAIN SCALES - WONG BAKER: WONGBAKER_NUMERICALRESPONSE: NO HURT

## 2024-06-08 ASSESSMENT — PAIN SCALES - GENERAL
PAINLEVEL_OUTOF10: 0

## 2024-06-09 ENCOUNTER — APPOINTMENT (OUTPATIENT)
Dept: GENERAL RADIOLOGY | Age: 74
DRG: 163 | End: 2024-06-09
Payer: MEDICARE

## 2024-06-09 LAB
ALBUMIN SERPL-MCNC: 2.5 G/DL (ref 3.5–5.2)
ALP SERPL-CCNC: 111 U/L (ref 40–129)
ALT SERPL-CCNC: 10 U/L (ref 0–40)
AMMONIA PLAS-SCNC: 14 UMOL/L (ref 16–60)
ANION GAP SERPL CALCULATED.3IONS-SCNC: 10 MMOL/L (ref 7–16)
ANION GAP SERPL CALCULATED.3IONS-SCNC: 9 MMOL/L (ref 7–16)
AST SERPL-CCNC: 15 U/L (ref 0–39)
BASOPHILS # BLD: 0 K/UL (ref 0–0.2)
BASOPHILS # BLD: 0.08 K/UL (ref 0–0.2)
BASOPHILS # BLD: 0.08 K/UL (ref 0–0.2)
BASOPHILS NFR BLD: 0 % (ref 0–2)
BASOPHILS NFR BLD: 1 % (ref 0–2)
BASOPHILS NFR BLD: 2 % (ref 0–2)
BILIRUB DIRECT SERPL-MCNC: 0.3 MG/DL (ref 0–0.3)
BILIRUB INDIRECT SERPL-MCNC: 0.3 MG/DL (ref 0–1)
BILIRUB SERPL-MCNC: 0.6 MG/DL (ref 0–1.2)
BNP SERPL-MCNC: 7418 PG/ML (ref 0–450)
BUN SERPL-MCNC: 6 MG/DL (ref 6–23)
BUN SERPL-MCNC: 7 MG/DL (ref 6–23)
CA-I BLD-SCNC: 1.14 MMOL/L (ref 1.15–1.33)
CALCIUM SERPL-MCNC: 8 MG/DL (ref 8.6–10.2)
CALCIUM SERPL-MCNC: 8.3 MG/DL (ref 8.6–10.2)
CHLORIDE SERPL-SCNC: 103 MMOL/L (ref 98–107)
CHLORIDE SERPL-SCNC: 106 MMOL/L (ref 98–107)
CO2 SERPL-SCNC: 28 MMOL/L (ref 22–29)
CO2 SERPL-SCNC: 28 MMOL/L (ref 22–29)
CREAT SERPL-MCNC: 0.9 MG/DL (ref 0.7–1.2)
CREAT SERPL-MCNC: 1 MG/DL (ref 0.7–1.2)
CRITICAL NOTIFICATION DATE/TIME: NORMAL
EOSINOPHIL # BLD: 0.04 K/UL (ref 0.05–0.5)
EOSINOPHIL # BLD: 0.16 K/UL (ref 0.05–0.5)
EOSINOPHIL # BLD: 0.54 K/UL (ref 0.05–0.5)
EOSINOPHILS RELATIVE PERCENT: 1 % (ref 0–6)
EOSINOPHILS RELATIVE PERCENT: 2 % (ref 0–6)
EOSINOPHILS RELATIVE PERCENT: 6 % (ref 0–6)
ERYTHROCYTE [DISTWIDTH] IN BLOOD BY AUTOMATED COUNT: 20.8 % (ref 11.5–15)
ERYTHROCYTE [DISTWIDTH] IN BLOOD BY AUTOMATED COUNT: 20.8 % (ref 11.5–15)
ERYTHROCYTE [DISTWIDTH] IN BLOOD BY AUTOMATED COUNT: 21.2 % (ref 11.5–15)
GFR, ESTIMATED: 77 ML/MIN/1.73M2
GFR, ESTIMATED: 89 ML/MIN/1.73M2
GLUCOSE BLD-MCNC: 108 MG/DL (ref 74–99)
GLUCOSE BLD-MCNC: 159 MG/DL (ref 74–99)
GLUCOSE BLD-MCNC: 98 MG/DL (ref 74–99)
GLUCOSE SERPL-MCNC: 124 MG/DL (ref 74–99)
GLUCOSE SERPL-MCNC: 142 MG/DL (ref 74–99)
HCT VFR BLD AUTO: 30.3 % (ref 37–54)
HCT VFR BLD AUTO: 30.3 % (ref 37–54)
HCT VFR BLD AUTO: 32.8 % (ref 37–54)
HGB BLD-MCNC: 8.8 G/DL (ref 12.5–16.5)
HGB BLD-MCNC: 9.1 G/DL (ref 12.5–16.5)
HGB BLD-MCNC: 9.8 G/DL (ref 12.5–16.5)
INR PPP: 2.7
LACTATE BLDV-SCNC: 2 MMOL/L (ref 0.5–2.2)
LACTATE BLDV-SCNC: 2.7 MMOL/L (ref 0.5–2.2)
LYMPHOCYTES NFR BLD: 0.16 K/UL (ref 1.5–4)
LYMPHOCYTES NFR BLD: 0.65 K/UL (ref 1.5–4)
LYMPHOCYTES NFR BLD: 0.85 K/UL (ref 1.5–4)
LYMPHOCYTES RELATIVE PERCENT: 10 % (ref 20–42)
LYMPHOCYTES RELATIVE PERCENT: 15 % (ref 20–42)
LYMPHOCYTES RELATIVE PERCENT: 2 % (ref 20–42)
MAGNESIUM SERPL-MCNC: 1.8 MG/DL (ref 1.6–2.6)
MCH RBC QN AUTO: 25.5 PG (ref 26–35)
MCH RBC QN AUTO: 26.5 PG (ref 26–35)
MCH RBC QN AUTO: 26.5 PG (ref 26–35)
MCHC RBC AUTO-ENTMCNC: 29 G/DL (ref 32–34.5)
MCHC RBC AUTO-ENTMCNC: 29.9 G/DL (ref 32–34.5)
MCHC RBC AUTO-ENTMCNC: 30 G/DL (ref 32–34.5)
MCV RBC AUTO: 87.8 FL (ref 80–99.9)
MCV RBC AUTO: 88.3 FL (ref 80–99.9)
MCV RBC AUTO: 88.6 FL (ref 80–99.9)
MONOCYTES NFR BLD: 0.11 K/UL (ref 0.1–0.95)
MONOCYTES NFR BLD: 0.16 K/UL (ref 0.1–0.95)
MONOCYTES NFR BLD: 0.23 K/UL (ref 0.1–0.95)
MONOCYTES NFR BLD: 2 % (ref 2–12)
MONOCYTES NFR BLD: 3 % (ref 2–12)
MONOCYTES NFR BLD: 3 % (ref 2–12)
NEUTROPHILS NFR BLD: 80 % (ref 43–80)
NEUTROPHILS NFR BLD: 81 % (ref 43–80)
NEUTROPHILS NFR BLD: 95 % (ref 43–80)
NEUTS SEG NFR BLD: 3.52 K/UL (ref 1.8–7.3)
NEUTS SEG NFR BLD: 7.01 K/UL (ref 1.8–7.3)
NEUTS SEG NFR BLD: 8.72 K/UL (ref 1.8–7.3)
PARTIAL THROMBOPLASTIN TIME: 40 SEC (ref 24.5–35.1)
PHOSPHATE SERPL-MCNC: 2.7 MG/DL (ref 2.5–4.5)
PLATELET CONFIRMATION: NORMAL
PLATELET CONFIRMATION: NORMAL
PLATELET, FLUORESCENCE: 100 K/UL (ref 130–450)
PLATELET, FLUORESCENCE: 91 K/UL (ref 130–450)
PLATELET, FLUORESCENCE: 92 K/UL (ref 130–450)
PMV BLD AUTO: 11.7 FL (ref 7–12)
PMV BLD AUTO: 12 FL (ref 7–12)
PMV BLD AUTO: ABNORMAL FL (ref 7–12)
POC HCO3: 28.3 MMOL/L (ref 22–26)
POC O2 SATURATION: 90.6 % (ref 92–98.5)
POC PCO2: 45.8 MM HG (ref 35–45)
POC PH: 7.4 (ref 7.35–7.45)
POC PO2: 60.8 MM HG (ref 60–80)
POSITIVE BASE EXCESS, ART: 3 MMOL/L (ref 0–3)
POTASSIUM SERPL-SCNC: 3.9 MMOL/L (ref 3.5–5)
POTASSIUM SERPL-SCNC: 4 MMOL/L (ref 3.5–5)
PROCALCITONIN SERPL-MCNC: 0.17 NG/ML (ref 0–0.08)
PROTHROMBIN TIME: 29.4 SEC (ref 9.3–12.4)
RBC # BLD AUTO: 3.43 M/UL (ref 3.8–5.8)
RBC # BLD AUTO: 3.45 M/UL (ref 3.8–5.8)
RBC # BLD AUTO: 3.7 M/UL (ref 3.8–5.8)
RBC # BLD: ABNORMAL 10*6/UL
SODIUM SERPL-SCNC: 141 MMOL/L (ref 132–146)
SODIUM SERPL-SCNC: 143 MMOL/L (ref 132–146)
TSH SERPL DL<=0.05 MIU/L-ACNC: 2.54 UIU/ML (ref 0.27–4.2)
VIT B12 SERPL-MCNC: 1845 PG/ML (ref 211–946)
WBC OTHER # BLD: 4.4 K/UL (ref 4.5–11.5)
WBC OTHER # BLD: 8.7 K/UL (ref 4.5–11.5)
WBC OTHER # BLD: 9.2 K/UL (ref 4.5–11.5)

## 2024-06-09 PROCEDURE — 99291 CRITICAL CARE FIRST HOUR: CPT | Performed by: INTERNAL MEDICINE

## 2024-06-09 PROCEDURE — 2000000000 HC ICU R&B

## 2024-06-09 PROCEDURE — 6370000000 HC RX 637 (ALT 250 FOR IP)

## 2024-06-09 PROCEDURE — 83735 ASSAY OF MAGNESIUM: CPT

## 2024-06-09 PROCEDURE — 94640 AIRWAY INHALATION TREATMENT: CPT

## 2024-06-09 PROCEDURE — 82803 BLOOD GASES ANY COMBINATION: CPT

## 2024-06-09 PROCEDURE — 0BH17EZ INSERTION OF ENDOTRACHEAL AIRWAY INTO TRACHEA, VIA NATURAL OR ARTIFICIAL OPENING: ICD-10-PCS | Performed by: INTERNAL MEDICINE

## 2024-06-09 PROCEDURE — 71045 X-RAY EXAM CHEST 1 VIEW: CPT

## 2024-06-09 PROCEDURE — 74018 RADEX ABDOMEN 1 VIEW: CPT

## 2024-06-09 PROCEDURE — 94003 VENT MGMT INPAT SUBQ DAY: CPT

## 2024-06-09 PROCEDURE — 84100 ASSAY OF PHOSPHORUS: CPT

## 2024-06-09 PROCEDURE — 5A1945Z RESPIRATORY VENTILATION, 24-96 CONSECUTIVE HOURS: ICD-10-PCS | Performed by: INTERNAL MEDICINE

## 2024-06-09 PROCEDURE — 6370000000 HC RX 637 (ALT 250 FOR IP): Performed by: INTERNAL MEDICINE

## 2024-06-09 PROCEDURE — 87040 BLOOD CULTURE FOR BACTERIA: CPT

## 2024-06-09 PROCEDURE — 31500 INSERT EMERGENCY AIRWAY: CPT

## 2024-06-09 PROCEDURE — 82330 ASSAY OF CALCIUM: CPT

## 2024-06-09 PROCEDURE — 31624 DX BRONCHOSCOPE/LAVAGE: CPT

## 2024-06-09 PROCEDURE — 80048 BASIC METABOLIC PNL TOTAL CA: CPT

## 2024-06-09 PROCEDURE — 87070 CULTURE OTHR SPECIMN AEROBIC: CPT

## 2024-06-09 PROCEDURE — 36415 COLL VENOUS BLD VENIPUNCTURE: CPT

## 2024-06-09 PROCEDURE — 2500000003 HC RX 250 WO HCPCS: Performed by: INTERNAL MEDICINE

## 2024-06-09 PROCEDURE — 84145 PROCALCITONIN (PCT): CPT

## 2024-06-09 PROCEDURE — 82140 ASSAY OF AMMONIA: CPT

## 2024-06-09 PROCEDURE — 0BCM8ZZ EXTIRPATION OF MATTER FROM BILATERAL LUNGS, VIA NATURAL OR ARTIFICIAL OPENING ENDOSCOPIC: ICD-10-PCS | Performed by: INTERNAL MEDICINE

## 2024-06-09 PROCEDURE — 31646 BRNCHSC W/THER ASPIR SBSQ: CPT

## 2024-06-09 PROCEDURE — 85025 COMPLETE CBC W/AUTO DIFF WBC: CPT

## 2024-06-09 PROCEDURE — 84443 ASSAY THYROID STIM HORMONE: CPT

## 2024-06-09 PROCEDURE — 82962 GLUCOSE BLOOD TEST: CPT

## 2024-06-09 PROCEDURE — 6360000002 HC RX W HCPCS: Performed by: INTERNAL MEDICINE

## 2024-06-09 PROCEDURE — 82248 BILIRUBIN DIRECT: CPT

## 2024-06-09 PROCEDURE — 83880 ASSAY OF NATRIURETIC PEPTIDE: CPT

## 2024-06-09 PROCEDURE — 85610 PROTHROMBIN TIME: CPT

## 2024-06-09 PROCEDURE — 82607 VITAMIN B-12: CPT

## 2024-06-09 PROCEDURE — 2500000003 HC RX 250 WO HCPCS

## 2024-06-09 PROCEDURE — 31500 INSERT EMERGENCY AIRWAY: CPT | Performed by: INTERNAL MEDICINE

## 2024-06-09 PROCEDURE — 87077 CULTURE AEROBIC IDENTIFY: CPT

## 2024-06-09 PROCEDURE — 6370000000 HC RX 637 (ALT 250 FOR IP): Performed by: NURSE PRACTITIONER

## 2024-06-09 PROCEDURE — 80053 COMPREHEN METABOLIC PANEL: CPT

## 2024-06-09 PROCEDURE — 83605 ASSAY OF LACTIC ACID: CPT

## 2024-06-09 PROCEDURE — 2720000010 HC SURG SUPPLY STERILE

## 2024-06-09 PROCEDURE — 87205 SMEAR GRAM STAIN: CPT

## 2024-06-09 PROCEDURE — 2580000003 HC RX 258

## 2024-06-09 PROCEDURE — 3E043XZ INTRODUCTION OF VASOPRESSOR INTO CENTRAL VEIN, PERCUTANEOUS APPROACH: ICD-10-PCS | Performed by: INTERNAL MEDICINE

## 2024-06-09 PROCEDURE — 85730 THROMBOPLASTIN TIME PARTIAL: CPT

## 2024-06-09 PROCEDURE — 2580000003 HC RX 258: Performed by: INTERNAL MEDICINE

## 2024-06-09 PROCEDURE — 6360000002 HC RX W HCPCS

## 2024-06-09 RX ORDER — CHLORHEXIDINE GLUCONATE ORAL RINSE 1.2 MG/ML
15 SOLUTION DENTAL 2 TIMES DAILY
Status: DISCONTINUED | OUTPATIENT
Start: 2024-06-09 | End: 2024-06-11 | Stop reason: HOSPADM

## 2024-06-09 RX ORDER — CHLORDIAZEPOXIDE HYDROCHLORIDE 25 MG/1
25 CAPSULE, GELATIN COATED ORAL 3 TIMES DAILY
Status: CANCELLED | OUTPATIENT
Start: 2024-06-09

## 2024-06-09 RX ORDER — GABAPENTIN 300 MG/1
600 CAPSULE ORAL 3 TIMES DAILY
Status: CANCELLED | OUTPATIENT
Start: 2024-06-09

## 2024-06-09 RX ORDER — MIDAZOLAM HYDROCHLORIDE 1 MG/ML
1-10 INJECTION, SOLUTION INTRAVENOUS CONTINUOUS
Status: DISCONTINUED | OUTPATIENT
Start: 2024-06-09 | End: 2024-06-11 | Stop reason: HOSPADM

## 2024-06-09 RX ORDER — FENTANYL CITRATE 50 UG/ML
100 INJECTION, SOLUTION INTRAMUSCULAR; INTRAVENOUS ONCE
Status: COMPLETED | OUTPATIENT
Start: 2024-06-09 | End: 2024-06-09

## 2024-06-09 RX ORDER — 0.9 % SODIUM CHLORIDE 0.9 %
1000 INTRAVENOUS SOLUTION INTRAVENOUS ONCE
Status: COMPLETED | OUTPATIENT
Start: 2024-06-09 | End: 2024-06-09

## 2024-06-09 RX ORDER — PROPOFOL 10 MG/ML
INJECTION, EMULSION INTRAVENOUS
Status: DISPENSED
Start: 2024-06-09 | End: 2024-06-09

## 2024-06-09 RX ORDER — NOREPINEPHRINE BITARTRATE 0.06 MG/ML
1-100 INJECTION, SOLUTION INTRAVENOUS CONTINUOUS
Status: DISCONTINUED | OUTPATIENT
Start: 2024-06-09 | End: 2024-06-10

## 2024-06-09 RX ORDER — QUETIAPINE FUMARATE 25 MG/1
25 TABLET, FILM COATED ORAL 2 TIMES DAILY
Status: CANCELLED | OUTPATIENT
Start: 2024-06-09

## 2024-06-09 RX ORDER — SODIUM CHLORIDE 9 MG/ML
INJECTION, SOLUTION INTRAVENOUS CONTINUOUS
Status: DISCONTINUED | OUTPATIENT
Start: 2024-06-09 | End: 2024-06-10

## 2024-06-09 RX ORDER — PAROXETINE 10 MG/1
10 TABLET, FILM COATED ORAL EVERY MORNING
Status: CANCELLED | OUTPATIENT
Start: 2024-06-10

## 2024-06-09 RX ADMIN — PAROXETINE 10 MG: 10 TABLET, FILM COATED ORAL at 07:58

## 2024-06-09 RX ADMIN — GABAPENTIN 600 MG: 300 CAPSULE ORAL at 07:58

## 2024-06-09 RX ADMIN — BUDESONIDE INHALATION 500 MCG: 0.5 SUSPENSION RESPIRATORY (INHALATION) at 19:45

## 2024-06-09 RX ADMIN — HEPARIN 300 UNITS: 100 SYRINGE at 07:59

## 2024-06-09 RX ADMIN — Medication 2 MCG/MIN: at 11:00

## 2024-06-09 RX ADMIN — QUETIAPINE FUMARATE 25 MG: 25 TABLET ORAL at 04:43

## 2024-06-09 RX ADMIN — CHLORHEXIDINE GLUCONATE, 0.12% ORAL RINSE 15 ML: 1.2 SOLUTION DENTAL at 19:38

## 2024-06-09 RX ADMIN — SODIUM CHLORIDE 1000 ML: 9 INJECTION, SOLUTION INTRAVENOUS at 12:59

## 2024-06-09 RX ADMIN — LANSOPRAZOLE 30 MG: 30 TABLET, ORALLY DISINTEGRATING, DELAYED RELEASE ORAL at 07:05

## 2024-06-09 RX ADMIN — METOPROLOL SUCCINATE 75 MG: 25 TABLET, EXTENDED RELEASE ORAL at 07:58

## 2024-06-09 RX ADMIN — SODIUM CHLORIDE: 9 INJECTION, SOLUTION INTRAVENOUS at 12:56

## 2024-06-09 RX ADMIN — ARFORMOTEROL TARTRATE 15 MCG: 15 SOLUTION RESPIRATORY (INHALATION) at 19:45

## 2024-06-09 RX ADMIN — ANTI-FUNGAL POWDER MICONAZOLE NITRATE TALC FREE: 1.42 POWDER TOPICAL at 13:01

## 2024-06-09 RX ADMIN — AMPICILLIN SODIUM AND SULBACTAM SODIUM 3000 MG: 2; 1 INJECTION, POWDER, FOR SOLUTION INTRAMUSCULAR; INTRAVENOUS at 15:16

## 2024-06-09 RX ADMIN — FENTANYL CITRATE 100 MCG: 50 INJECTION INTRAMUSCULAR; INTRAVENOUS at 10:31

## 2024-06-09 RX ADMIN — ATORVASTATIN CALCIUM 40 MG: 40 TABLET, FILM COATED ORAL at 07:58

## 2024-06-09 RX ADMIN — FUROSEMIDE 40 MG: 40 TABLET ORAL at 07:57

## 2024-06-09 RX ADMIN — APIXABAN 5 MG: 5 TABLET, FILM COATED ORAL at 07:57

## 2024-06-09 RX ADMIN — SODIUM CHLORIDE, PRESERVATIVE FREE 10 ML: 5 INJECTION INTRAVENOUS at 07:59

## 2024-06-09 RX ADMIN — FOLIC ACID 1 MG: 1 TABLET ORAL at 07:57

## 2024-06-09 RX ADMIN — Medication 100 MG: at 07:57

## 2024-06-09 RX ADMIN — Medication 2 MG/HR: at 11:08

## 2024-06-09 RX ADMIN — AMPICILLIN SODIUM AND SULBACTAM SODIUM 3000 MG: 2; 1 INJECTION, POWDER, FOR SOLUTION INTRAMUSCULAR; INTRAVENOUS at 18:40

## 2024-06-09 RX ADMIN — CHLORHEXIDINE GLUCONATE, 0.12% ORAL RINSE 15 ML: 1.2 SOLUTION DENTAL at 15:17

## 2024-06-09 RX ADMIN — APIXABAN 5 MG: 5 TABLET, FILM COATED ORAL at 18:42

## 2024-06-09 RX ADMIN — ANTI-FUNGAL POWDER MICONAZOLE NITRATE TALC FREE: 1.42 POWDER TOPICAL at 19:38

## 2024-06-09 RX ADMIN — VITAM B12 50 MCG: 100 TAB at 07:58

## 2024-06-09 RX ADMIN — CHLORDIAZEPOXIDE HYDROCHLORIDE 25 MG: 25 CAPSULE ORAL at 04:43

## 2024-06-09 ASSESSMENT — PULMONARY FUNCTION TESTS
PIF_VALUE: 38
PIF_VALUE: 24
PIF_VALUE: 22
PIF_VALUE: 22
PIF_VALUE: 44
PIF_VALUE: 27
PIF_VALUE: 31
PIF_VALUE: 22
PIF_VALUE: 23
PIF_VALUE: 34
PIF_VALUE: 23
PIF_VALUE: 28
PIF_VALUE: 22
PIF_VALUE: 30
PIF_VALUE: 36
PIF_VALUE: 22
PIF_VALUE: 36
PIF_VALUE: 28
PIF_VALUE: 29

## 2024-06-09 ASSESSMENT — PAIN SCALES - GENERAL
PAINLEVEL_OUTOF10: 0
PAINLEVEL_OUTOF10: 0

## 2024-06-09 NOTE — PROCEDURES
PROCEDURE NOTE  Date: 6/9/2024   Name: Catarino Bolanos  YOB: 1950    Procedures          Intubation Procedure Note    Indication:  Respiratory Failure    Time Out:  Not able to be completed due to emergent nature of procedure.    Consent:  Consent was not able to be obtained because the procedure was emergent or the patient was unable to give consent and a surrogate decision maker was not available.     Procedure being performed:   Orotracheal intubation    Pre-oxygenation:  2 minutes of 100% FiO2 administered via bag mask .    Medications:   None     Description/Findings:  Visualization: GlideScope was utilized to intubate the patient. Cormack-Lehane: Grade 2: partial view of the glottis. A 8.0mm endotracheal tube was passed through the cords on the first attempt. The tube was secured at 24 cm at the lip. Tracheal position was confirmed using end-tidal CO2 detector, absence of breath sounds over epigastric region, and bilateral breath sounds in lungs. Respiratory therapy was at the bedside and assisted with ventilator management.      Estimated Blood Loss:  None    Complications:   No apparent complications    Follow up Chest X-Ray:   Ordered.     Proceduralist:   I personally performed the procedure documented as signed by this procedure note.     Assistant(s):  Not applicable    Supervision:  No supervision required    Electronically signed by Latrell Landaverde MD on 6/9/2024 at 12:13 PM

## 2024-06-09 NOTE — SIGNIFICANT EVENT
Rapid Response Team Note  Date of event: 6/9/2024   Time of event: 9:57 AM  Catarino Bolanos 74 y.o. year old male   YOB: 1950   Admit date:  5/16/2024   Location: [address] [place] [address]-A   Witnessed? : [x]Yes  [] No  Monitored? : [x]Yes  [] No  Code status: [] Full  [] DNR-CCA  [x]limited  ______________________________________________________________________  Reason for RRT:    [] RR < 8     [] RR > 28   [x] SpO2 <90%   [] HR < 40 bpm   [] HR > 130 bpm  [] SBP < 90 mmHg    [] SpO2 <90%   [] LOC   [] Seizures    [] Significant Bleeding Event    [] Other:     Subjective:   CTSP regarding the above event, RRT was called for acute hypoxic respiratory failure. On arrival, patient was saturating at 86% on HHFNC. He was drowsy, not following any commands . He appeared to be in respiratory distress with use of accessory muscles. Stat xray, ABG, lactate, BMP, mag and phos were ordered. NTS was done to clear airway which suctioned thick secretions from airway. With his altered mentation, patient was transferred to ICU for intubation.      Objective:   Vital signs: Temp: 97/BP: 77/43 /RR: 16/HR: 77  Initial Condition:  Conscious   [x] Yes  [] No     Breathing [x] Yes  [] No     Pulse  [x] Yes  [] No    Airway:   [] Open/ Clear     Intervention: [] None  [x] Pooled secretions     [x] Suctioned  [] Stridor      [] Intubation    Lungs:   [] Symmetrical chest rise/ CTABL Intervention: [] None  [x] Use of accessory muscles    [] NIV (CPAP/BiPAP)  [] Cyanosis      [] Nasal Oxygen/Mask  [] Wheezing       [x] ABG             [x] CXR  [] Other:     Circulation:   Rhythm:  [x] Sinus [] Other:   Intervention: [] None            [] IV Access  [] Peripheral              [] Central            [] EKG            [] Cardioversion            [] Defibrillation     Capillary Refill:  [] > 2 seconds [x] < 2 seconds    Neurologic:   [] NIHSS      [] Pupillary Response:     Response to pain:   [x] Yes  [x] No  Follow commands:  [] Yes  [x]

## 2024-06-09 NOTE — PROCEDURES
PROCEDURE NOTE  Date: 6/9/2024   Name: Catarino Bolanos  YOB: 1950    Procedures          Therapeutic + Diagnostic Bronchoscopy Procedure Note    Indication:  Other: Aspiration , respiratory failure     Time Out:  Not able to be completed due to emergent nature of procedure.    Consent:  Consent was not able to be obtained because the procedure was emergent or the patient was unable to give consent and a surrogate decision maker was not available.    Sedation:   The patient was already adequately sedated and received no additional medication.    Procedure in detail:   Therapeutic bronchoscope was used with the inner working channel of 2.8 mm  Several areas of particles of pancakes were found which were suctioned out with the help of normal saline.  The pancakes with based on the history after talking to the wife that patient was eating pancakes when he suddenly started coughing, aspirated and went into respiratory failure  BAL from right middle lobe was obtained    Findings:   Normal-looking airways, particles of aspirated pancakes on bilateral lung fields right more than left which were suctioned out      Specimens:   Specimen samples were obtained and are being sent to lab for further analysis.    Estimated Blood Loss:  None    Complications:  No apparent complications    Proceduralist:   I personally performed the procedure documented as signed by this procedure note.     Assistant(s):  Not applicable    Supervision:  No supervision required    Electronically signed by Latrell Landaverde MD on 6/9/2024 at 12:16 PM

## 2024-06-10 ENCOUNTER — APPOINTMENT (OUTPATIENT)
Dept: GENERAL RADIOLOGY | Age: 74
DRG: 163 | End: 2024-06-10
Payer: MEDICARE

## 2024-06-10 ENCOUNTER — APPOINTMENT (OUTPATIENT)
Dept: NEUROLOGY | Age: 74
DRG: 163 | End: 2024-06-10
Payer: MEDICARE

## 2024-06-10 ENCOUNTER — APPOINTMENT (OUTPATIENT)
Age: 74
DRG: 163 | End: 2024-06-10
Attending: INTERNAL MEDICINE
Payer: MEDICARE

## 2024-06-10 LAB
AADO2: 270.8 MMHG
ALBUMIN SERPL-MCNC: 2.5 G/DL (ref 3.5–5.2)
ALP SERPL-CCNC: 113 U/L (ref 40–129)
ALT SERPL-CCNC: 8 U/L (ref 0–40)
ANION GAP SERPL CALCULATED.3IONS-SCNC: 11 MMOL/L (ref 7–16)
AST SERPL-CCNC: 14 U/L (ref 0–39)
B.E.: 1.1 MMOL/L (ref -3–3)
BASOPHILS # BLD: 0.05 K/UL (ref 0–0.2)
BASOPHILS NFR BLD: 0 % (ref 0–2)
BILIRUB SERPL-MCNC: 0.5 MG/DL (ref 0–1.2)
BNP SERPL-MCNC: 9352 PG/ML (ref 0–450)
BUN SERPL-MCNC: 7 MG/DL (ref 6–23)
CALCIUM SERPL-MCNC: 7.8 MG/DL (ref 8.6–10.2)
CHLORIDE SERPL-SCNC: 108 MMOL/L (ref 98–107)
CO2 SERPL-SCNC: 24 MMOL/L (ref 22–29)
COHB: 0.7 % (ref 0–1.5)
CREAT SERPL-MCNC: 1 MG/DL (ref 0.7–1.2)
CRITICAL: ABNORMAL
DATE ANALYZED: ABNORMAL
DATE OF COLLECTION: ABNORMAL
ECHO AO ASC DIAM: 2.4 CM
ECHO AO ASCENDING AORTA INDEX: 1.29 CM/M2
ECHO AV AREA PEAK VELOCITY: 2 CM2
ECHO AV AREA VTI: 1.7 CM2
ECHO AV AREA/BSA PEAK VELOCITY: 1.1 CM2/M2
ECHO AV AREA/BSA VTI: 0.9 CM2/M2
ECHO AV CUSP MM: 1.8 CM
ECHO AV MEAN GRADIENT: 1 MMHG
ECHO AV MEAN VELOCITY: 0.5 M/S
ECHO AV PEAK GRADIENT: 3 MMHG
ECHO AV PEAK VELOCITY: 0.8 M/S
ECHO AV VELOCITY RATIO: 0.63
ECHO AV VTI: 16.1 CM
ECHO BSA: 1.82 M2
ECHO EST RA PRESSURE: 3 MMHG
ECHO LA DIAMETER INDEX: 2.2 CM/M2
ECHO LA DIAMETER: 4.1 CM
ECHO LA VOL A-L A2C: 52 ML (ref 18–58)
ECHO LA VOL A-L A4C: 108 ML (ref 18–58)
ECHO LA VOL BP: 77 ML (ref 18–58)
ECHO LA VOL MOD A2C: 51 ML (ref 18–58)
ECHO LA VOL MOD A4C: 101 ML (ref 18–58)
ECHO LA VOL/BSA BIPLANE: 41 ML/M2 (ref 16–34)
ECHO LA VOLUME AREA LENGTH: 80 ML
ECHO LA VOLUME INDEX A-L A2C: 28 ML/M2 (ref 16–34)
ECHO LA VOLUME INDEX A-L A4C: 58 ML/M2 (ref 16–34)
ECHO LA VOLUME INDEX AREA LENGTH: 43 ML/M2 (ref 16–34)
ECHO LA VOLUME INDEX MOD A2C: 27 ML/M2 (ref 16–34)
ECHO LA VOLUME INDEX MOD A4C: 54 ML/M2 (ref 16–34)
ECHO LV EDV A2C: 71 ML
ECHO LV EDV A4C: 64 ML
ECHO LV EDV BP: 70 ML (ref 67–155)
ECHO LV EDV INDEX A4C: 34 ML/M2
ECHO LV EDV INDEX BP: 38 ML/M2
ECHO LV EDV NDEX A2C: 38 ML/M2
ECHO LV EJECTION FRACTION A2C: 39 %
ECHO LV EJECTION FRACTION A4C: 45 %
ECHO LV EJECTION FRACTION BIPLANE: 43 % (ref 55–100)
ECHO LV ESV A2C: 43 ML
ECHO LV ESV A4C: 35 ML
ECHO LV ESV BP: 39 ML (ref 22–58)
ECHO LV ESV INDEX A2C: 23 ML/M2
ECHO LV ESV INDEX A4C: 19 ML/M2
ECHO LV ESV INDEX BP: 21 ML/M2
ECHO LV FRACTIONAL SHORTENING: 22 % (ref 28–44)
ECHO LV INTERNAL DIMENSION DIASTOLE INDEX: 2.74 CM/M2
ECHO LV INTERNAL DIMENSION DIASTOLIC: 5.1 CM (ref 4.2–5.9)
ECHO LV INTERNAL DIMENSION SYSTOLIC INDEX: 2.15 CM/M2
ECHO LV INTERNAL DIMENSION SYSTOLIC: 4 CM
ECHO LV IVSD: 0.6 CM (ref 0.6–1)
ECHO LV IVSS: 0.7 CM
ECHO LV MASS 2D: 118.7 G (ref 88–224)
ECHO LV MASS INDEX 2D: 63.8 G/M2 (ref 49–115)
ECHO LV POSTERIOR WALL DIASTOLIC: 0.8 CM (ref 0.6–1)
ECHO LV POSTERIOR WALL SYSTOLIC: 0.9 CM
ECHO LV RELATIVE WALL THICKNESS RATIO: 0.31
ECHO LVOT AREA: 3.1 CM2
ECHO LVOT AV VTI INDEX: 0.57
ECHO LVOT DIAM: 2 CM
ECHO LVOT MEAN GRADIENT: 0 MMHG
ECHO LVOT PEAK GRADIENT: 1 MMHG
ECHO LVOT PEAK VELOCITY: 0.5 M/S
ECHO LVOT STROKE VOLUME INDEX: 15.5 ML/M2
ECHO LVOT SV: 28.9 ML
ECHO LVOT VTI: 9.2 CM
ECHO MV "A" WAVE DURATION: 137 MSEC
ECHO MV A VELOCITY: 0.27 M/S
ECHO MV AREA PHT: 3.9 CM2
ECHO MV AREA VTI: 1.4 CM2
ECHO MV E DECELERATION TIME (DT): 134 MS
ECHO MV E VELOCITY: 0.54 M/S
ECHO MV E/A RATIO: 2
ECHO MV LVOT VTI INDEX: 2.29
ECHO MV MAX VELOCITY: 0.8 M/S
ECHO MV MEAN GRADIENT: 1 MMHG
ECHO MV PEAK GRADIENT: 2 MMHG
ECHO MV PRESSURE HALF TIME (PHT): 56.2 MS
ECHO MV VTI: 21.1 CM
ECHO PULMONARY ARTERY END DIASTOLIC PRESSURE: 12 MMHG
ECHO PV MAX VELOCITY: 0.6 M/S
ECHO PV MEAN GRADIENT: 1 MMHG
ECHO PV MEAN VELOCITY: 0.4 M/S
ECHO PV PEAK GRADIENT: 2 MMHG
ECHO PV REGURGITANT MAX VELOCITY: 1.7 M/S
ECHO PV VTI: 11 CM
ECHO RIGHT VENTRICULAR SYSTOLIC PRESSURE (RVSP): 65 MMHG
ECHO RV INTERNAL DIMENSION: 4.7 CM
ECHO TV REGURGITANT MAX VELOCITY: 3.94 M/S
ECHO TV REGURGITANT PEAK GRADIENT: 62 MMHG
EOSINOPHIL # BLD: 0.19 K/UL (ref 0.05–0.5)
EOSINOPHILS RELATIVE PERCENT: 1 % (ref 0–6)
ERYTHROCYTE [DISTWIDTH] IN BLOOD BY AUTOMATED COUNT: 21.2 % (ref 11.5–15)
FIO2: 65 %
FIO2: 85
GFR, ESTIMATED: 83 ML/MIN/1.73M2
GLUCOSE BLD-MCNC: 114 MG/DL (ref 74–99)
GLUCOSE BLD-MCNC: 151 MG/DL (ref 74–99)
GLUCOSE SERPL-MCNC: 119 MG/DL (ref 74–99)
HCO3: 24.1 MMOL/L (ref 22–26)
HCT VFR BLD AUTO: 27.9 % (ref 37–54)
HCT VFR BLD AUTO: 29.7 % (ref 37–54)
HGB BLD-MCNC: 8.4 G/DL (ref 12.5–16.5)
HGB BLD-MCNC: 9.2 G/DL (ref 12.5–16.5)
HHB: 0.7 % (ref 0–5)
IMM GRANULOCYTES # BLD AUTO: 0.06 K/UL (ref 0–0.58)
IMM GRANULOCYTES NFR BLD: 0 % (ref 0–5)
LAB: ABNORMAL
LACTATE BLDV-SCNC: 1.4 MMOL/L (ref 0.5–2.2)
LYMPHOCYTES NFR BLD: 0.55 K/UL (ref 1.5–4)
LYMPHOCYTES RELATIVE PERCENT: 4 % (ref 20–42)
Lab: 424
MAGNESIUM SERPL-MCNC: 1.4 MG/DL (ref 1.6–2.6)
MCH RBC QN AUTO: 26.9 PG (ref 26–35)
MCHC RBC AUTO-ENTMCNC: 31 G/DL (ref 32–34.5)
MCV RBC AUTO: 86.8 FL (ref 80–99.9)
METHB: 0.5 % (ref 0–1.5)
MODE: AC
MODE: AC
MONOCYTES NFR BLD: 0.79 K/UL (ref 0.1–0.95)
MONOCYTES NFR BLD: 5 % (ref 2–12)
NEUTS SEG NFR BLD: 12.9 K/UL (ref 1.8–7.3)
O2 SATURATION: 99.2 % (ref 92–98.5)
O2HB: 98.1 % (ref 94–97)
OPERATOR ID: ABNORMAL
PATIENT TEMP: 37 C
PCO2: 32.7 MMHG (ref 35–45)
PEEP/CPAP: 8 CMH2O
PEEP: 8
PFO2: 2.42 MMHG/%
PH BLOOD GAS: 7.49 (ref 7.35–7.45)
PHOSPHATE SERPL-MCNC: 3 MG/DL (ref 2.5–4.5)
PLATELET, FLUORESCENCE: 107 K/UL (ref 130–450)
PMV BLD AUTO: 12.1 FL (ref 7–12)
PO2: 157.1 MMHG (ref 75–100)
POC HCO3: 28.5 MMOL/L (ref 22–26)
POC O2 SATURATION: 99.2 % (ref 92–98.5)
POC PCO2: 41.2 MM HG (ref 35–45)
POC PH: 7.45 (ref 7.35–7.45)
POC PO2: 136.9 MM HG (ref 60–80)
POSITIVE BASE EXCESS, ART: 4.1 MMOL/L (ref 0–3)
POTASSIUM SERPL-SCNC: 3.5 MMOL/L (ref 3.5–5)
PROT SERPL-MCNC: 4.8 G/DL (ref 6.4–8.3)
RBC # BLD AUTO: 3.42 M/UL (ref 3.8–5.8)
RBC # BLD: ABNORMAL 10*6/UL
RI(T): 1.72
RR MECHANICAL: 16 B/MIN
SET RATE, POC: 16
SODIUM SERPL-SCNC: 143 MMOL/L (ref 132–146)
SOURCE, BLOOD GAS: ABNORMAL
THB: 10.3 G/DL (ref 11.5–16.5)
TIDAL VOLUME: 375
TIME ANALYZED: 434
TROPONIN I SERPL HS-MCNC: 33 NG/L (ref 0–11)
VT MECHANICAL: 375 ML
WBC OTHER # BLD: 14.5 K/UL (ref 4.5–11.5)

## 2024-06-10 PROCEDURE — 93306 TTE W/DOPPLER COMPLETE: CPT

## 2024-06-10 PROCEDURE — 82805 BLOOD GASES W/O2 SATURATION: CPT

## 2024-06-10 PROCEDURE — 2500000003 HC RX 250 WO HCPCS: Performed by: INTERNAL MEDICINE

## 2024-06-10 PROCEDURE — 6370000000 HC RX 637 (ALT 250 FOR IP): Performed by: INTERNAL MEDICINE

## 2024-06-10 PROCEDURE — 6360000002 HC RX W HCPCS: Performed by: INTERNAL MEDICINE

## 2024-06-10 PROCEDURE — 83735 ASSAY OF MAGNESIUM: CPT

## 2024-06-10 PROCEDURE — 85014 HEMATOCRIT: CPT

## 2024-06-10 PROCEDURE — 31500 INSERT EMERGENCY AIRWAY: CPT

## 2024-06-10 PROCEDURE — 2580000003 HC RX 258: Performed by: INTERNAL MEDICINE

## 2024-06-10 PROCEDURE — 99291 CRITICAL CARE FIRST HOUR: CPT | Performed by: INTERNAL MEDICINE

## 2024-06-10 PROCEDURE — 6370000000 HC RX 637 (ALT 250 FOR IP)

## 2024-06-10 PROCEDURE — 84484 ASSAY OF TROPONIN QUANT: CPT

## 2024-06-10 PROCEDURE — 2580000003 HC RX 258

## 2024-06-10 PROCEDURE — 51702 INSERT TEMP BLADDER CATH: CPT

## 2024-06-10 PROCEDURE — 82962 GLUCOSE BLOOD TEST: CPT

## 2024-06-10 PROCEDURE — 94640 AIRWAY INHALATION TREATMENT: CPT

## 2024-06-10 PROCEDURE — 85025 COMPLETE CBC W/AUTO DIFF WBC: CPT

## 2024-06-10 PROCEDURE — 93005 ELECTROCARDIOGRAM TRACING: CPT | Performed by: INTERNAL MEDICINE

## 2024-06-10 PROCEDURE — 6360000002 HC RX W HCPCS

## 2024-06-10 PROCEDURE — P9047 ALBUMIN (HUMAN), 25%, 50ML: HCPCS | Performed by: INTERNAL MEDICINE

## 2024-06-10 PROCEDURE — 99223 1ST HOSP IP/OBS HIGH 75: CPT | Performed by: NURSE PRACTITIONER

## 2024-06-10 PROCEDURE — C9113 INJ PANTOPRAZOLE SODIUM, VIA: HCPCS

## 2024-06-10 PROCEDURE — 71045 X-RAY EXAM CHEST 1 VIEW: CPT

## 2024-06-10 PROCEDURE — 83880 ASSAY OF NATRIURETIC PEPTIDE: CPT

## 2024-06-10 PROCEDURE — 83605 ASSAY OF LACTIC ACID: CPT

## 2024-06-10 PROCEDURE — 84100 ASSAY OF PHOSPHORUS: CPT

## 2024-06-10 PROCEDURE — 95822 EEG COMA OR SLEEP ONLY: CPT

## 2024-06-10 PROCEDURE — A4216 STERILE WATER/SALINE, 10 ML: HCPCS

## 2024-06-10 PROCEDURE — 2000000000 HC ICU R&B

## 2024-06-10 PROCEDURE — 80053 COMPREHEN METABOLIC PANEL: CPT

## 2024-06-10 PROCEDURE — 51798 US URINE CAPACITY MEASURE: CPT

## 2024-06-10 PROCEDURE — 94003 VENT MGMT INPAT SUBQ DAY: CPT

## 2024-06-10 PROCEDURE — 95822 EEG COMA OR SLEEP ONLY: CPT | Performed by: PSYCHIATRY & NEUROLOGY

## 2024-06-10 PROCEDURE — 85018 HEMOGLOBIN: CPT

## 2024-06-10 RX ORDER — MAGNESIUM SULFATE IN WATER 40 MG/ML
2000 INJECTION, SOLUTION INTRAVENOUS PRN
Status: DISCONTINUED | OUTPATIENT
Start: 2024-06-10 | End: 2024-06-11 | Stop reason: HOSPADM

## 2024-06-10 RX ORDER — ALBUMIN (HUMAN) 12.5 G/50ML
25 SOLUTION INTRAVENOUS EVERY 8 HOURS
Status: DISCONTINUED | OUTPATIENT
Start: 2024-06-10 | End: 2024-06-11 | Stop reason: HOSPADM

## 2024-06-10 RX ORDER — BUMETANIDE 0.25 MG/ML
1 INJECTION INTRAMUSCULAR; INTRAVENOUS 2 TIMES DAILY
Status: DISCONTINUED | OUTPATIENT
Start: 2024-06-10 | End: 2024-06-10

## 2024-06-10 RX ORDER — NOREPINEPHRINE BITARTRATE 0.06 MG/ML
1-100 INJECTION, SOLUTION INTRAVENOUS CONTINUOUS
Status: DISCONTINUED | OUTPATIENT
Start: 2024-06-10 | End: 2024-06-11 | Stop reason: HOSPADM

## 2024-06-10 RX ADMIN — APIXABAN 5 MG: 5 TABLET, FILM COATED ORAL at 18:50

## 2024-06-10 RX ADMIN — CHLORHEXIDINE GLUCONATE, 0.12% ORAL RINSE 15 ML: 1.2 SOLUTION DENTAL at 08:23

## 2024-06-10 RX ADMIN — MULTIVIT AND MINERALS-FERROUS GLUCONATE 9 MG IRON/15 ML ORAL LIQUID 15 ML: at 08:23

## 2024-06-10 RX ADMIN — ALBUMIN (HUMAN) 25 G: 0.25 INJECTION, SOLUTION INTRAVENOUS at 18:41

## 2024-06-10 RX ADMIN — SODIUM CHLORIDE, PRESERVATIVE FREE 10 ML: 5 INJECTION INTRAVENOUS at 08:24

## 2024-06-10 RX ADMIN — ARFORMOTEROL TARTRATE 15 MCG: 15 SOLUTION RESPIRATORY (INHALATION) at 19:51

## 2024-06-10 RX ADMIN — ANORECTAL OINTMENT: 15.7; .44; 24; 20.6 OINTMENT TOPICAL at 19:49

## 2024-06-10 RX ADMIN — PIPERACILLIN AND TAZOBACTAM 4500 MG: 4; .5 INJECTION, POWDER, LYOPHILIZED, FOR SOLUTION INTRAVENOUS at 16:41

## 2024-06-10 RX ADMIN — HEPARIN 300 UNITS: 100 SYRINGE at 08:24

## 2024-06-10 RX ADMIN — POLYVINYL ALCOHOL, POVIDONE 1 DROP: 14; 6 SOLUTION/ DROPS OPHTHALMIC at 19:49

## 2024-06-10 RX ADMIN — IPRATROPIUM BROMIDE 0.5 MG: 0.5 SOLUTION RESPIRATORY (INHALATION) at 08:16

## 2024-06-10 RX ADMIN — ANTI-FUNGAL POWDER MICONAZOLE NITRATE TALC FREE: 1.42 POWDER TOPICAL at 19:49

## 2024-06-10 RX ADMIN — GUAIFENESIN 400 MG: 100 LIQUID ORAL at 16:41

## 2024-06-10 RX ADMIN — FOLIC ACID 1 MG: 1 TABLET ORAL at 08:23

## 2024-06-10 RX ADMIN — PANTOPRAZOLE SODIUM 40 MG: 40 INJECTION, POWDER, FOR SOLUTION INTRAVENOUS at 08:23

## 2024-06-10 RX ADMIN — BUMETANIDE 1 MG: 0.25 INJECTION INTRAMUSCULAR; INTRAVENOUS at 16:43

## 2024-06-10 RX ADMIN — Medication 5 MCG/MIN: at 11:43

## 2024-06-10 RX ADMIN — SODIUM CHLORIDE, PRESERVATIVE FREE 10 ML: 5 INJECTION INTRAVENOUS at 19:46

## 2024-06-10 RX ADMIN — AMPICILLIN SODIUM AND SULBACTAM SODIUM 3000 MG: 2; 1 INJECTION, POWDER, FOR SOLUTION INTRAMUSCULAR; INTRAVENOUS at 06:25

## 2024-06-10 RX ADMIN — POLYVINYL ALCOHOL, POVIDONE 1 DROP: 14; 6 SOLUTION/ DROPS OPHTHALMIC at 09:49

## 2024-06-10 RX ADMIN — Medication 100 MG: at 08:23

## 2024-06-10 RX ADMIN — ARFORMOTEROL TARTRATE 15 MCG: 15 SOLUTION RESPIRATORY (INHALATION) at 08:16

## 2024-06-10 RX ADMIN — BUDESONIDE INHALATION 500 MCG: 0.5 SUSPENSION RESPIRATORY (INHALATION) at 08:16

## 2024-06-10 RX ADMIN — SODIUM CHLORIDE 125 MG: 9 INJECTION, SOLUTION INTRAVENOUS at 19:47

## 2024-06-10 RX ADMIN — POTASSIUM CHLORIDE 20 MEQ: 29.8 INJECTION, SOLUTION INTRAVENOUS at 08:32

## 2024-06-10 RX ADMIN — CHLORHEXIDINE GLUCONATE, 0.12% ORAL RINSE 15 ML: 1.2 SOLUTION DENTAL at 19:49

## 2024-06-10 RX ADMIN — BUMETANIDE 0.2 MG/HR: 0.25 INJECTION INTRAMUSCULAR; INTRAVENOUS at 19:21

## 2024-06-10 RX ADMIN — DEXMEDETOMIDINE 0.4 MCG/KG/HR: 100 INJECTION, SOLUTION INTRAVENOUS at 10:21

## 2024-06-10 RX ADMIN — ANTI-FUNGAL POWDER MICONAZOLE NITRATE TALC FREE: 1.42 POWDER TOPICAL at 08:25

## 2024-06-10 RX ADMIN — SODIUM CHLORIDE: 9 INJECTION, SOLUTION INTRAVENOUS at 00:51

## 2024-06-10 RX ADMIN — VITAM B12 50 MCG: 100 TAB at 08:23

## 2024-06-10 RX ADMIN — APIXABAN 5 MG: 5 TABLET, FILM COATED ORAL at 08:23

## 2024-06-10 RX ADMIN — MAGNESIUM SULFATE HEPTAHYDRATE 2000 MG: 40 INJECTION, SOLUTION INTRAVENOUS at 07:02

## 2024-06-10 RX ADMIN — BUDESONIDE INHALATION 500 MCG: 0.5 SUSPENSION RESPIRATORY (INHALATION) at 19:52

## 2024-06-10 RX ADMIN — AMPICILLIN SODIUM AND SULBACTAM SODIUM 3000 MG: 2; 1 INJECTION, POWDER, FOR SOLUTION INTRAMUSCULAR; INTRAVENOUS at 00:50

## 2024-06-10 RX ADMIN — GUAIFENESIN 400 MG: 100 LIQUID ORAL at 08:23

## 2024-06-10 RX ADMIN — BUMETANIDE 1 MG: 0.25 INJECTION INTRAMUSCULAR; INTRAVENOUS at 11:12

## 2024-06-10 RX ADMIN — GUAIFENESIN 400 MG: 100 LIQUID ORAL at 00:53

## 2024-06-10 RX ADMIN — POLYVINYL ALCOHOL, POVIDONE 1 DROP: 14; 6 SOLUTION/ DROPS OPHTHALMIC at 11:44

## 2024-06-10 RX ADMIN — POTASSIUM CHLORIDE 20 MEQ: 29.8 INJECTION, SOLUTION INTRAVENOUS at 06:59

## 2024-06-10 RX ADMIN — POLYVINYL ALCOHOL, POVIDONE 1 DROP: 14; 6 SOLUTION/ DROPS OPHTHALMIC at 16:43

## 2024-06-10 RX ADMIN — ANORECTAL OINTMENT: 15.7; .44; 24; 20.6 OINTMENT TOPICAL at 13:16

## 2024-06-10 RX ADMIN — PIPERACILLIN AND TAZOBACTAM 4500 MG: 4; .5 INJECTION, POWDER, LYOPHILIZED, FOR SOLUTION INTRAVENOUS at 10:18

## 2024-06-10 RX ADMIN — ATORVASTATIN CALCIUM 40 MG: 40 TABLET, FILM COATED ORAL at 08:23

## 2024-06-10 ASSESSMENT — PULMONARY FUNCTION TESTS
PIF_VALUE: 21
PIF_VALUE: 26
PIF_VALUE: 22
PIF_VALUE: 25
PIF_VALUE: 21
PIF_VALUE: 25
PIF_VALUE: 25
PIF_VALUE: 22
PIF_VALUE: 28
PIF_VALUE: 24
PIF_VALUE: 22
PIF_VALUE: 27
PIF_VALUE: 21
PIF_VALUE: 26
PIF_VALUE: 20
PIF_VALUE: 23
PIF_VALUE: 22
PIF_VALUE: 25
PIF_VALUE: 27
PIF_VALUE: 17
PIF_VALUE: 23
PIF_VALUE: 34
PIF_VALUE: 22
PIF_VALUE: 21
PIF_VALUE: 27
PIF_VALUE: 22
PIF_VALUE: 25
PIF_VALUE: 25

## 2024-06-10 ASSESSMENT — PAIN SCALES - GENERAL
PAINLEVEL_OUTOF10: 0
PAINLEVEL_OUTOF10: 0

## 2024-06-10 NOTE — FLOWSHEET NOTE
Restraints released and patient pulls at ETT. Unable to redirect patient. Bilateral soft wrist restraints continued to maintain patient safety.

## 2024-06-10 NOTE — PROCEDURES
[facility] Neurodiagnostic Report    MRN: [id number]  PATIENT NAME: Catarino Bolanos  DATE OF REPORT: 6/10/2024    DATE OF SERVICE: 6/10/2024  PHYSICIAN NAME: Deven Bermeo DO  STUDY ORDERED BY: Simi Lara      Patient's : 1950  Patient's Age: 74 y.o.  Gender: male    PROCEDURE: Routine EEG with video      Clinical Interpretation:   This abnormal study showed evidence of:    A severe nonspecific encephalopathy    No seizures or epileptiform discharges were noted during this study.      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 Scalp electrodes were applied in the international 10/20 system. Multiple digital montage arrangements were utilized for evaluation. EKG and video were recorded.     Background:      Occipital rhythm (posterior dominant rhythm or PDR): Absent   Voltage: Low  Organization: poor  Reactivity to eye opening/closure: Not tested    Drowsiness: Absent  Sleep: Absent    Comments: The background is composed predominantly of generalized low amplitude irregular delta activity with some superimposed low amplitude beta activity noted consistent with medication effect.    Technical and Activation Procedures:  Hyperventilation: Not done  Photic stimulation: Not

## 2024-06-11 ENCOUNTER — APPOINTMENT (OUTPATIENT)
Dept: GENERAL RADIOLOGY | Age: 74
DRG: 163 | End: 2024-06-11
Payer: MEDICARE

## 2024-06-11 VITALS
HEIGHT: 65 IN | HEART RATE: 92 BPM | BODY MASS INDEX: 29.38 KG/M2 | SYSTOLIC BLOOD PRESSURE: 111 MMHG | RESPIRATION RATE: 22 BRPM | DIASTOLIC BLOOD PRESSURE: 63 MMHG | WEIGHT: 176.37 LBS | TEMPERATURE: 99.7 F | OXYGEN SATURATION: 100 %

## 2024-06-11 PROBLEM — G93.41 METABOLIC ENCEPHALOPATHY: Status: ACTIVE | Noted: 2024-06-11

## 2024-06-11 LAB
25(OH)D3 SERPL-MCNC: 35.4 NG/ML (ref 30–100)
AADO2: 227.7 MMHG
ALBUMIN SERPL-MCNC: 3.1 G/DL (ref 3.5–5.2)
ALP SERPL-CCNC: 103 U/L (ref 40–129)
ALT SERPL-CCNC: 7 U/L (ref 0–40)
ANION GAP SERPL CALCULATED.3IONS-SCNC: 12 MMOL/L (ref 7–16)
AST SERPL-CCNC: 14 U/L (ref 0–39)
B.E.: 3.2 MMOL/L (ref -3–3)
BASOPHILS # BLD: 0.07 K/UL (ref 0–0.2)
BASOPHILS NFR BLD: 1 % (ref 0–2)
BILIRUB SERPL-MCNC: 0.6 MG/DL (ref 0–1.2)
BUN SERPL-MCNC: 9 MG/DL (ref 6–23)
CA-I BLD-SCNC: 1.11 MMOL/L (ref 1.15–1.33)
CALCIUM SERPL-MCNC: 8 MG/DL (ref 8.6–10.2)
CHLORIDE SERPL-SCNC: 107 MMOL/L (ref 98–107)
CO2 SERPL-SCNC: 26 MMOL/L (ref 22–29)
COHB: 1 % (ref 0–1.5)
CREAT SERPL-MCNC: 1.2 MG/DL (ref 0.7–1.2)
CRITICAL: ABNORMAL
DATE ANALYZED: ABNORMAL
DATE OF COLLECTION: ABNORMAL
EKG ATRIAL RATE: 89 BPM
EKG Q-T INTERVAL: 450 MS
EKG QRS DURATION: 90 MS
EKG QTC CALCULATION (BAZETT): 506 MS
EKG R AXIS: -25 DEGREES
EKG T AXIS: -38 DEGREES
EKG VENTRICULAR RATE: 76 BPM
EOSINOPHIL # BLD: 0.07 K/UL (ref 0.05–0.5)
EOSINOPHILS RELATIVE PERCENT: 1 % (ref 0–6)
ERYTHROCYTE [DISTWIDTH] IN BLOOD BY AUTOMATED COUNT: 21.1 % (ref 11.5–15)
FIO2: 55 %
GFR, ESTIMATED: 65 ML/MIN/1.73M2
GLUCOSE SERPL-MCNC: 116 MG/DL (ref 74–99)
HCO3: 25.9 MMOL/L (ref 22–26)
HCT VFR BLD AUTO: 25.2 % (ref 37–54)
HGB BLD-MCNC: 7.7 G/DL (ref 12.5–16.5)
HHB: 1.2 % (ref 0–5)
INR PPP: 4.1
LAB: ABNORMAL
LYMPHOCYTES NFR BLD: 0.27 K/UL (ref 1.5–4)
LYMPHOCYTES RELATIVE PERCENT: 4 % (ref 20–42)
Lab: 451
MAGNESIUM SERPL-MCNC: 1.7 MG/DL (ref 1.6–2.6)
MCH RBC QN AUTO: 26.6 PG (ref 26–35)
MCHC RBC AUTO-ENTMCNC: 30.6 G/DL (ref 32–34.5)
MCV RBC AUTO: 86.9 FL (ref 80–99.9)
METHB: 0.6 % (ref 0–1.5)
MICROORGANISM SPEC CULT: ABNORMAL
MICROORGANISM SPEC CULT: ABNORMAL
MICROORGANISM/AGENT SPEC: ABNORMAL
MODE: AC
MONOCYTES NFR BLD: 0.27 K/UL (ref 0.1–0.95)
MONOCYTES NFR BLD: 4 % (ref 2–12)
NEUTROPHILS NFR BLD: 91 % (ref 43–80)
NEUTS SEG NFR BLD: 7.02 K/UL (ref 1.8–7.3)
NIACIN SERPL-MCNC: NORMAL NG/ML
NICOTINAMIDE SERPL-MCNC: NORMAL NG/ML
NICOTINURATE SERPL-MCNC: NORMAL NG/ML
O2 SATURATION: 98.9 % (ref 92–98.5)
O2HB: 97.2 % (ref 94–97)
OPERATOR ID: 2593
PARTIAL THROMBOPLASTIN TIME: 50.6 SEC (ref 24.5–35.1)
PATIENT TEMP: 37 C
PCO2: 31.6 MMHG (ref 35–45)
PEEP/CPAP: 8 CMH2O
PFO2: 2.35 MMHG/%
PH BLOOD GAS: 7.53 (ref 7.35–7.45)
PHOSPHATE SERPL-MCNC: 3.3 MG/DL (ref 2.5–4.5)
PLATELET # BLD AUTO: 98 K/UL (ref 130–450)
PLATELET CONFIRMATION: NORMAL
PMV BLD AUTO: 11.8 FL (ref 7–12)
PO2: 129.3 MMHG (ref 75–100)
POTASSIUM SERPL-SCNC: 3.1 MMOL/L (ref 3.5–5)
PROT SERPL-MCNC: 5 G/DL (ref 6.4–8.3)
PROTHROMBIN TIME: 44.2 SEC (ref 9.3–12.4)
RBC # BLD AUTO: 2.9 M/UL (ref 3.8–5.8)
RBC # BLD: ABNORMAL 10*6/UL
RI(T): 1.76
RR MECHANICAL: 14 B/MIN
SODIUM SERPL-SCNC: 145 MMOL/L (ref 132–146)
SOURCE, BLOOD GAS: ABNORMAL
SPECIMEN DESCRIPTION: ABNORMAL
THB: 8.5 G/DL (ref 11.5–16.5)
TIME ANALYZED: 456
VT MECHANICAL: 375 ML
WBC OTHER # BLD: 7.7 K/UL (ref 4.5–11.5)

## 2024-06-11 PROCEDURE — 71045 X-RAY EXAM CHEST 1 VIEW: CPT

## 2024-06-11 PROCEDURE — 80053 COMPREHEN METABOLIC PANEL: CPT

## 2024-06-11 PROCEDURE — 85730 THROMBOPLASTIN TIME PARTIAL: CPT

## 2024-06-11 PROCEDURE — 6360000002 HC RX W HCPCS: Performed by: NURSE PRACTITIONER

## 2024-06-11 PROCEDURE — 6360000002 HC RX W HCPCS: Performed by: INTERNAL MEDICINE

## 2024-06-11 PROCEDURE — 6360000002 HC RX W HCPCS

## 2024-06-11 PROCEDURE — 85025 COMPLETE CBC W/AUTO DIFF WBC: CPT

## 2024-06-11 PROCEDURE — 6370000000 HC RX 637 (ALT 250 FOR IP)

## 2024-06-11 PROCEDURE — 2500000003 HC RX 250 WO HCPCS: Performed by: INTERNAL MEDICINE

## 2024-06-11 PROCEDURE — 2580000003 HC RX 258: Performed by: INTERNAL MEDICINE

## 2024-06-11 PROCEDURE — 82306 VITAMIN D 25 HYDROXY: CPT

## 2024-06-11 PROCEDURE — 82805 BLOOD GASES W/O2 SATURATION: CPT

## 2024-06-11 PROCEDURE — 94003 VENT MGMT INPAT SUBQ DAY: CPT

## 2024-06-11 PROCEDURE — 85610 PROTHROMBIN TIME: CPT

## 2024-06-11 PROCEDURE — 83735 ASSAY OF MAGNESIUM: CPT

## 2024-06-11 PROCEDURE — 82330 ASSAY OF CALCIUM: CPT

## 2024-06-11 PROCEDURE — C9113 INJ PANTOPRAZOLE SODIUM, VIA: HCPCS

## 2024-06-11 PROCEDURE — P9047 ALBUMIN (HUMAN), 25%, 50ML: HCPCS | Performed by: INTERNAL MEDICINE

## 2024-06-11 PROCEDURE — 6370000000 HC RX 637 (ALT 250 FOR IP): Performed by: NURSE PRACTITIONER

## 2024-06-11 PROCEDURE — 6370000000 HC RX 637 (ALT 250 FOR IP): Performed by: INTERNAL MEDICINE

## 2024-06-11 PROCEDURE — 94640 AIRWAY INHALATION TREATMENT: CPT

## 2024-06-11 PROCEDURE — 99291 CRITICAL CARE FIRST HOUR: CPT | Performed by: INTERNAL MEDICINE

## 2024-06-11 PROCEDURE — 84100 ASSAY OF PHOSPHORUS: CPT

## 2024-06-11 PROCEDURE — 2580000003 HC RX 258

## 2024-06-11 RX ORDER — FENTANYL CITRATE 50 UG/ML
50 INJECTION, SOLUTION INTRAMUSCULAR; INTRAVENOUS ONCE
Status: COMPLETED | OUTPATIENT
Start: 2024-06-11 | End: 2024-06-11

## 2024-06-11 RX ORDER — LOPERAMIDE HYDROCHLORIDE 2 MG/1
2 CAPSULE ORAL ONCE
Status: COMPLETED | OUTPATIENT
Start: 2024-06-11 | End: 2024-06-11

## 2024-06-11 RX ORDER — MORPHINE SULFATE 2 MG/ML
2 INJECTION, SOLUTION INTRAMUSCULAR; INTRAVENOUS ONCE
Status: COMPLETED | OUTPATIENT
Start: 2024-06-11 | End: 2024-06-11

## 2024-06-11 RX ORDER — POTASSIUM CHLORIDE 29.8 MG/ML
40 INJECTION INTRAVENOUS ONCE
Status: COMPLETED | OUTPATIENT
Start: 2024-06-11 | End: 2024-06-11

## 2024-06-11 RX ORDER — MIDAZOLAM HYDROCHLORIDE 2 MG/2ML
2 INJECTION, SOLUTION INTRAMUSCULAR; INTRAVENOUS ONCE
Status: COMPLETED | OUTPATIENT
Start: 2024-06-11 | End: 2024-06-11

## 2024-06-11 RX ADMIN — MIDAZOLAM 2 MG: 1 INJECTION INTRAMUSCULAR; INTRAVENOUS at 08:41

## 2024-06-11 RX ADMIN — POLYVINYL ALCOHOL, POVIDONE 1 DROP: 14; 6 SOLUTION/ DROPS OPHTHALMIC at 08:24

## 2024-06-11 RX ADMIN — PIPERACILLIN AND TAZOBACTAM 4500 MG: 4; .5 INJECTION, POWDER, LYOPHILIZED, FOR SOLUTION INTRAVENOUS at 01:04

## 2024-06-11 RX ADMIN — GUAIFENESIN 400 MG: 100 LIQUID ORAL at 01:07

## 2024-06-11 RX ADMIN — ALBUMIN (HUMAN) 25 G: 0.25 INJECTION, SOLUTION INTRAVENOUS at 08:15

## 2024-06-11 RX ADMIN — ALBUMIN (HUMAN) 25 G: 0.25 INJECTION, SOLUTION INTRAVENOUS at 01:00

## 2024-06-11 RX ADMIN — VITAM B12 50 MCG: 100 TAB at 08:19

## 2024-06-11 RX ADMIN — ANORECTAL OINTMENT: 15.7; .44; 24; 20.6 OINTMENT TOPICAL at 08:24

## 2024-06-11 RX ADMIN — CHLORHEXIDINE GLUCONATE, 0.12% ORAL RINSE 15 ML: 1.2 SOLUTION DENTAL at 08:22

## 2024-06-11 RX ADMIN — SODIUM CHLORIDE, PRESERVATIVE FREE 10 ML: 5 INJECTION INTRAVENOUS at 08:24

## 2024-06-11 RX ADMIN — POLYVINYL ALCOHOL, POVIDONE 1 DROP: 14; 6 SOLUTION/ DROPS OPHTHALMIC at 01:10

## 2024-06-11 RX ADMIN — FOLIC ACID 1 MG: 1 TABLET ORAL at 08:21

## 2024-06-11 RX ADMIN — POLYVINYL ALCOHOL, POVIDONE 1 DROP: 14; 6 SOLUTION/ DROPS OPHTHALMIC at 04:37

## 2024-06-11 RX ADMIN — LOPERAMIDE HYDROCHLORIDE 2 MG: 2 CAPSULE ORAL at 05:34

## 2024-06-11 RX ADMIN — Medication 4 MG/HR: at 08:14

## 2024-06-11 RX ADMIN — GUAIFENESIN 400 MG: 100 LIQUID ORAL at 08:23

## 2024-06-11 RX ADMIN — PANTOPRAZOLE SODIUM 40 MG: 40 INJECTION, POWDER, FOR SOLUTION INTRAVENOUS at 08:21

## 2024-06-11 RX ADMIN — FENTANYL CITRATE 50 MCG: 50 INJECTION INTRAMUSCULAR; INTRAVENOUS at 08:41

## 2024-06-11 RX ADMIN — ANTI-FUNGAL POWDER MICONAZOLE NITRATE TALC FREE: 1.42 POWDER TOPICAL at 08:24

## 2024-06-11 RX ADMIN — ATORVASTATIN CALCIUM 40 MG: 40 TABLET, FILM COATED ORAL at 08:20

## 2024-06-11 RX ADMIN — BUDESONIDE INHALATION 500 MCG: 0.5 SUSPENSION RESPIRATORY (INHALATION) at 09:25

## 2024-06-11 RX ADMIN — MORPHINE SULFATE 2 MG: 2 INJECTION, SOLUTION INTRAMUSCULAR; INTRAVENOUS at 12:09

## 2024-06-11 RX ADMIN — PIPERACILLIN AND TAZOBACTAM 4500 MG: 4; .5 INJECTION, POWDER, LYOPHILIZED, FOR SOLUTION INTRAVENOUS at 10:22

## 2024-06-11 RX ADMIN — ARFORMOTEROL TARTRATE 15 MCG: 15 SOLUTION RESPIRATORY (INHALATION) at 09:25

## 2024-06-11 RX ADMIN — Medication 100 MG: at 08:20

## 2024-06-11 RX ADMIN — POTASSIUM CHLORIDE 40 MEQ: 29.8 INJECTION, SOLUTION INTRAVENOUS at 05:34

## 2024-06-11 ASSESSMENT — PULMONARY FUNCTION TESTS
PIF_VALUE: 19
PIF_VALUE: 21
PIF_VALUE: 20
PIF_VALUE: 15
PIF_VALUE: 19
PIF_VALUE: 21
PIF_VALUE: 19
PIF_VALUE: 25
PIF_VALUE: 20
PIF_VALUE: 20
PIF_VALUE: 25
PIF_VALUE: 20
PIF_VALUE: 19
PIF_VALUE: 20

## 2024-06-11 ASSESSMENT — PAIN SCALES - GENERAL: PAINLEVEL_OUTOF10: 0

## 2024-06-11 NOTE — FLOWSHEET NOTE
Restraints released and and patient pulls at ETT. Unable to redirect patient. Bilateral soft wrist restraints continued to maintain patient safety.

## 2024-06-11 NOTE — PROGRESS NOTES
[facility] Hospitalist Progress Note    Admitting Date and Time: 5/16/2024  5:54 PM  Synopsis:  Patient is a 74-year-old gentleman with past medical history of COPD, atrial fibrillation, prior history of right VATS/Pleurx catheter placement for trapped lung in 2018,He presented to ER with chief complaint of weakness and difficulty breathing. He had recent admission to hospital secondary to anemia in setting of possible GI bleed, no endoscopy was completed as patient remained stable and H&H remained stable. He remains on Eliquis. And He drinks alcohol on a daily basis , he fell in room, CT head with no acute findings.  5/21-significant event in the morning, RRT called for altered mental status, possible seizure, he was intubated for impending respiratory failure and transferred to ICU. Shortly, extubated on 5/21, remains altered, fluctuating mental status.  MRI brain WO 5/22/2024 does not show any acute findings  EEG on 5/24/2024 consistent with metabolic/toxi encephalopathy, no epileptiform or seizure recorded.  Started patient on vancomycin and Zosyn to cover right-sided consolidation pneumonia. Remains on Zosyn. Patient remains altered post extubation. He was started on librium for alcohol withdrawal. Neurology consulted. MRI brain showed old small infarct in the posterior superior division of right MCA and old lacunar infarct in the midportion of the left putamen. Patient remains confused. MRI brain repeated 6/2 with no acute findings. Psych consulted. Transferred out of ICU on 6/3. Awaiting precert to [facility]     Subjective:  Patient seen and examined at bedside.   Confused but oriented to self and place.     ROS: denies fever, chills, cp, sob, n/v, HA unless stated above.      potassium chloride  40 mEq Oral Once    And    potassium chloride  20 mEq IntraVENous Q1H    magnesium sulfate  2,000 mg IntraVENous Once    haloperidol lactate  2.5 mg IntraMUSCular Once    multivitamin with 
       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  He has continued to remain on Eliquis.  He drinks alcohol on a daily basis, prior history of alcohol withdrawals while hospitalized.  He presented to ER with chief complaint of weakness and difficulty breathing, breathing is now improving.  Last night fell in room, CT head with no acute findings.  Being monitored on CIWA , needed Ativan overnight    Subjective:  Patient is being followed for Syncope and collapse [R55]  Pleural effusion [J90]  Hypoxia [R09.02]  Acute respiratory failure with hypoxia (HCC) [J96.01]     He is reporting being tired and weak today.  Did receive Ativan overnight for high CIWA scores.  He states breathing has not improved, chest x-ray is showing improvement in pleural effusion.      ROS: denies fever, chills, cp, sob, n/v, HA unless stated above.      metoprolol succinate  75 mg Oral BID    [Held by provider] apixaban  5 mg Oral BID    budesonide  500 mcg Nebulization BID    atorvastatin  40 mg Oral Daily    folic acid  1 mg Oral Daily    gabapentin  600 mg Oral TID    therapeutic multivitamin-minerals  1 tablet Oral Daily    pantoprazole  40 mg Oral BID AC    thiamine  100 mg Oral Daily    PARoxetine  30 mg Oral QAM    ipratropium 0.5 mg-albuterol 2.5 mg  1 Dose Inhalation Q4H WA RT    arformoterol tartrate  15 mcg Nebulization BID RT    [Held by provider] furosemide  20 mg Oral Daily    cefTRIAXone (ROCEPHIN) IV  1,000 mg 
       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  He has continued to remain on Eliquis.  He drinks alcohol on a daily basis, prior history of alcohol withdrawals while hospitalized.  He presented to ER with chief complaint of weakness and difficulty breathing, breathing is now improving.   fell in room, CT head with no acute findings.  5/21-significant event in the morning, RRT called for altered mental status, possible seizure, he was intubated for impending respiratory failure and transferred to ICU.    Subjective:  Patient is being followed for Syncope and collapse [R55]  Pleural effusion [J90]  Hypoxia [R09.02]  Acute respiratory failure with hypoxia (HCC) [J96.01]     Transferred to ICU overnight,possible seizure.  Was placed on soft restraints last night since he was flinging hands and trying to hit nurses and pulling lines out.  Continued Ativan administration as per RYLIE    Wife at bedside, patient remains minimally responsive, makes noises to sternal rub, Precedex has been stopped.      ROS: denies fever, chills, cp, sob, n/v, HA unless stated above.      thiamine (B-1) 500 mg in sodium chloride 0.9 % 100 mL IVPB  500 mg IntraVENous Q8H    Followed by    [START ON 5/23/2024] thiamine (B-1) 250 mg in sodium chloride 0.9 % 100 mL IVPB  250 mg IntraVENous Q24H    Followed by    [START ON 5/28/2024] thiamine  100 mg Oral Daily    chlorhexidine  15 mL Mouth/Throat BID    Polyvinyl Alcohol-Povidone 
       [facility] Hospitalist Progress Note    Admitting Date and Time: 5/16/2024  5:54 PM  Admit Dx: Acute respiratory failure with hypoxia (HCC) [J96.01]    Subjective:  Patient is being followed for Acute respiratory failure with hypoxia (HCC) [J96.01]     Patient seen and examined.  He states he is starting to feel better.  Breathing is improved.  No chest pain.  He denies smoking, states he drinks heavy alcohol    ROS: denies fever, chills, cp, sob, n/v, HA unless stated above.      aspirin  81 mg Oral QAM    apixaban  5 mg Oral BID    budesonide  500 mcg Nebulization BID    atorvastatin  40 mg Oral Daily    folic acid  1 mg Oral Daily    [Held by provider] furosemide  20 mg Oral BID    gabapentin  600 mg Oral TID    hydrALAZINE  25 mg Oral BID    metoprolol succinate  50 mg Oral BID    therapeutic multivitamin-minerals  1 tablet Oral Daily    pantoprazole  40 mg Oral BID AC    thiamine  100 mg Oral Daily    PARoxetine  30 mg Oral QAM    ipratropium 0.5 mg-albuterol 2.5 mg  1 Dose Inhalation Q4H WA RT    arformoterol tartrate  15 mcg Nebulization BID RT    cefTRIAXone (ROCEPHIN) IV  1,000 mg IntraVENous Q24H    doxycycline (VIBRAMYCIN) IV  100 mg IntraVENous Q12H    sodium chloride flush  5-40 mL IntraVENous 2 times per day    sodium chloride flush  5-40 mL IntraVENous 2 times per day     acetaminophen, 650 mg, Q6H PRN  albuterol, 2.5 mg, Q6H PRN  sodium chloride flush, 5-40 mL, PRN  sodium chloride, , PRN  potassium chloride, 40 mEq, PRN   Or  potassium alternative oral replacement, 40 mEq, PRN   Or  potassium chloride, 10 mEq, PRN  magnesium sulfate, 2,000 mg, PRN  ondansetron, 4 mg, Q8H PRN   Or  ondansetron, 4 mg, Q6H PRN  polyethylene glycol, 17 g, Daily PRN  acetaminophen, 650 mg, Q6H PRN   Or  acetaminophen, 650 mg, Q6H PRN  sodium chloride flush, 5-40 mL, PRN  sodium chloride, , PRN  LORazepam, 1 mg, Q1H PRN   Or  LORazepam, 1 mg, Q1H PRN   Or  LORazepam, 2 mg, Q1H PRN   Or  LORazepam, 2 mg, Q1H PRN   
       OT consult received and appreciated. Chart reviewed. Pt discussed at rounds and consulted RN. Pt is not appropriate for occupational therapy services d/t medical status. Will d/c from caseload- please re-consult if needed. Thank you. Jennifer Mayer, OTR/L # [id number]     
       [facility]             Pulmonary & Critical Care Medicine                MICU Progress Note                 Written by: Stefan Carver MD  Name: Catarino Bolanos : 1950       Age: 74 y.o. MR/Act #    : [id number],  Billing  #    : [id number]   Admit Date: 2024  5:54 PM LOS: 25,   Hospital Day: 26 Room #      : 4401/4401-A   PCP            : Colt Goldman DO,   Referred by: Kolton Rivera MD ICU Attending: MD Jd Garcia date: 6/10/2024 1:11 PM   ICU Days:       2 Vent Days:     2 LOS: 25,                          Reason for ICU admission           Chief Complaint   Patient presents with    Gait Problem     Patient states he is having difficulty ambulating, keeps falling states \"I think I keep having small strokes\" Hx TIAs     Shortness of Breath     Patient states \"my heart is out of rhythm Hx A fib                          Brief HPI, Presentation & Synopsis                        74-year-old male who was initially admitted to the hospital almost a month ago for recurrent syncope.  At that time he was found to be hypoxic to 86%.  He is also an alcohol enthusiast and hence was admitted for withdrawal syndrome.  Has been intubated in the past recently for seizure disorders and airway compromise.  MRI brain was normal.  EEG did not reveal seizures.  This is a bounce back to the ICU after about 5 days of transfer out after RRT was called for respiratory distress following breakfast and an aspiration event.     Active problem list of yesterday:  Active Hospital Problems    Diagnosis Date Noted    Acute respiratory failure with hypoxia (HCC) [J96.01] 2022     Priority: Medium    Hypernatremia [E87.0] 2024    Moderate protein-calorie malnutrition (HCC) [E44.0] 2024    Seizure (HCC) [R56.9] 2024    Acute on chronic hypoxic respiratory failure (HCC) [J96.21] 2024    Syncope and collapse [R55] 2024    Hypoxia [R09.02] 
       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  He has continued to remain on Eliquis.  He drinks alcohol on a daily basis, prior history of alcohol withdrawals while hospitalized.  He presented to ER with chief complaint of weakness and difficulty breathing, breathing is now improving.  Last night fell in room, CT head with no acute findings.  Being monitored on CIWA , needed Ativan overnight    Subjective:  Patient is being followed for Syncope and collapse [R55]  Pleural effusion [J90]  Hypoxia [R09.02]  Acute respiratory failure with hypoxia (HCC) [J96.01]       Was placed on soft restraints last night since he was flinging hands and trying to hit nurses and pulling lines out.  Continued Ativan administration as per CIWA      ROS: denies fever, chills, cp, sob, n/v, HA unless stated above.      metoprolol succinate  75 mg Oral BID    apixaban  5 mg Oral BID    budesonide  500 mcg Nebulization BID    atorvastatin  40 mg Oral Daily    folic acid  1 mg Oral Daily    gabapentin  600 mg Oral TID    therapeutic multivitamin-minerals  1 tablet Oral Daily    pantoprazole  40 mg Oral BID AC    thiamine  100 mg Oral Daily    PARoxetine  30 mg Oral QAM    ipratropium 0.5 mg-albuterol 2.5 mg  1 Dose Inhalation Q4H WA RT    arformoterol tartrate  15 mcg Nebulization BID RT    [Held by provider] furosemide  20 mg Oral Daily    [Held by provider] cefTRIAXone (ROCEPHIN) IV  1,000 mg IntraVENous Q24H    [Held by 
      HOSPITALIST PROGRESS NOTE    Patient's name: Catarino Bolanos  : 1950  Admission date: 2024  Date of service: 6/10/2024   Primary care physician: Colt Goldman, DO    Assessment   Patient admitted on 2024     Catarino Bolanos is a 74 y.o. 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 He had recent admission to hospital secondary to anemia in setting of possible GI bleed, no endoscopy was completed as patient remained stable and H&H remained stable. He remains on Eliquis. And He drinks alcohol on a daily basis , he fell in room, CT head with no acute findings.  -significant event in the morning, RRT called for altered mental status, possible seizure, he was intubated for impending respiratory failure and transferred to ICU. Shortly, extubated on , remains altered, fluctuating mental status.  MRI brain WO 2024 does not show any acute findings  EEG on 2024 consistent with metabolic/toxi encephalopathy, no epileptiform or seizure recorded.  Started patient on vancomycin and Zosyn to cover right-sided consolidation pneumonia. Remains on Zosyn. He was started on librium for alcohol withdrawal. Neurology consulted. MRI brain showed old small infarct in the posterior superior division of right MCA and old lacunar infarct in the midportion of the left putamen. Patient remains confused. MRI brain repeated  with no acute findings. Psych consulted. Transferred out of ICU on 6/3.  Again patient became more lethargic and hypoxic on 2024 and rapid response was called, patient was emergently reintubated and transferred to ICU and remains in ICU currently.    Acute hypoxic respiratory failure s/p reintubation on 2024  Possible aspiration pneumonia  COPD  Acute metabolic 
    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 He had recent admission to hospital secondary to anemia in setting of possible GI bleed, no endoscopy was completed as patient remained stable and H&H remained stable. He remains on Eliquis. And He drinks alcohol on a daily basis , he fell in room, CT head with no acute findings.  -significant event in the morning, RRT called for altered mental status, possible seizure, he was intubated for impending respiratory failure and transferred to ICU. Shortly, extubated on , remains altered, fluctuating mental status.  MRI brain WO 2024 does not show any acute findings  EEG on 2024 consistent with metabolic/toxi encephalopathy, no epileptiform or seizure recorded.  Started patient on vancomycin and Zosyn to cover right-sided consolidation pneumonia. Remains on Zosyn. Patient remains altered post extubation. He was started on librium for alcohol withdrawal. Neurology consulted. MRI brain showed old small infarct in the posterior superior division of right MCA and old lacunar infarct in the midportion of the left putamen.     SUBJECTIVE:  No acute events over night.     Temp (24hrs), Av.3 °F (36.3 °C), Min:96.4 °F (35.8 °C), Max:97.7 °F (36.5 °C)    DIET: ADULT ORAL NUTRITION SUPPLEMENT; Breakfast, Lunch, Dinner; Diabetic Oral Supplement  ADULT DIET; Regular  CODE: Limited    Intake/Output Summary (Last 24 hours) at 2024 1349  Last data filed at 2024 1250  Gross per 24 hour   Intake 816.45 ml   Output 700 ml   Net 116.45 ml         Review of Systems  Could not be assessed properly      OBJECTIVE:    BP (!) 100/59   Pulse 66   
    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 He had recent admission to hospital secondary to anemia in setting of possible GI bleed, no endoscopy was completed as patient remained stable and H&H remained stable. He remains on Eliquis. And He drinks alcohol on a daily basis , he fell in room, CT head with no acute findings.  -significant event in the morning, RRT called for altered mental status, possible seizure, he was intubated for impending respiratory failure and transferred to ICU. Shortly, extubated on , remains altered, fluctuating mental status.  MRI brain WO 2024 does not show any acute findings  EEG on 2024 consistent with metabolic/toxi encephalopathy, no epileptiform or seizure recorded.  Started patient on vancomycin and Zosyn to cover right-sided consolidation pneumonia. Remains on Zosyn. Patient remains altered post extubation. He was started on librium for alcohol withdrawal. Neurology consulted. MRI brain showed old small infarct in the posterior superior division of right MCA and old lacunar infarct in the midportion of the left putamen. Patient remains confused. MRI brain repeated  with no acute findings. Psych consulted. Transferred out of ICU on 6/3.     SUBJECTIVE:  No acute events over night.   Patient  remains confused and agitated.       Temp (24hrs), Av °F (36.1 °C), Min:97 °F (36.1 °C), Max:97 °F (36.1 °C)    DIET: ADULT DIET; Regular  ADULT TUBE FEEDING; Nasogastric; Diabetic; Continuous; 10; Yes; 10; Q 6 hours; 50; 30; Q 4 hours  CODE: Limited    Intake/Output Summary (Last 24 hours) at 2024 1114  Last data filed at 2024 1806  Gross 
    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 He had recent admission to hospital secondary to anemia in setting of possible GI bleed, no endoscopy was completed as patient remained stable and H&H remained stable. He remains on Eliquis. And He drinks alcohol on a daily basis , he fell in room, CT head with no acute findings.  -significant event in the morning, RRT called for altered mental status, possible seizure, he was intubated for impending respiratory failure and transferred to ICU. Shortly, extubated on , remains altered, fluctuating mental status.  MRI brain WO 2024 does not show any acute findings  EEG on 2024 consistent with metabolic/toxi encephalopathy, no epileptiform or seizure recorded.  Started patient on vancomycin and Zosyn to cover right-sided consolidation pneumonia. Remains on Zosyn. Patient remains altered post extubation. He was started on librium for alcohol withdrawal. Neurology consulted. MRI brain showed old small infarct in the posterior superior division of right MCA and old lacunar infarct in the midportion of the left putamen. Patient remains confused. MRI brain repeated  with no acute findings. Psych consulted. Transferred out of ICU on 6/3.     SUBJECTIVE:  No acute events over night.   Patient  remains confused and agitated.   Discussed with RN. Pulled out NGT today, will replace and start tube feeds    Temp (24hrs), Av.9 °F (36.6 °C), Min:97.2 °F (36.2 °C), Max:98.8 °F (37.1 °C)    DIET: ADULT ORAL NUTRITION SUPPLEMENT; Breakfast, Lunch, Dinner; Diabetic Oral Supplement  ADULT DIET; Regular  CODE: Limited    Intake/Output Summary (Last 
   05/20/24 2122   Treatment   Treatment Type HHN   $Treatment Type $Inhaled Therapy/Meds   Medications Arformoterol   Pre-Tx Pulse 61   Pre-Tx Resps 18   Breath Sounds Pre-Tx [place] Diminished;Stridorous   Breath Sounds Pre-Tx LLL Diminished;Stridorous   Breath Sounds Pre-Tx RUL Diminished;Stridorous   Breath Sounds Pre-Tx RML Diminished;Stridorous   Breath Sounds Pre-Tx RLL Diminished;Stridorous   Breath Sounds Post-Tx [place] Diminished;Stridorous   Breath Sounds Post-Tx LLL Diminished;Stridorous   Breath Sounds Post-Tx RUL Diminished;Stridorous   Breath Sounds Post-Tx RML Diminished;Stridorous   Breath Sounds Post-Tx RLL Diminished;Stridorous   Post-Tx Pulse 61   Post-Tx Resps 18   Delivery Source Mask;Oxygen   Position Semi-Tejeda's   Treatment Tolerance Well   Duration 10   Is patient on O2? Y   Breath Sounds   Breath Sounds Bilateral Diminished;Stridorous   Right Upper Lobe Diminished;Stridorous   Right Middle Lobe Diminished;Stridorous   Right Lower Lobe Diminished;Stridorous   Left Upper Lobe Diminished;Stridorous   Left Lower Lobe Diminished;Stridorous   Oxygen Therapy/Pulse Ox   O2 Therapy Oxygen   O2 Device Nasal cannula   O2 Flow Rate (L/min) 2 L/min   Pulse 61   Respirations 16   SpO2 100 %   Pulse Oximeter Device Mode Continuous   Pulse Oximeter Device Location Finger     Patient was lying down in bed and was using accessory muscles had upper airway stridor and lung sounds were very decreased notified RN repositioned patient to a semi fowlers position and adjusted the head up a bit. Patient started to breathe easier and the stridor seemed to dissipate afterwards   
   06/10/24 0820   [facility] Vent Information   Ventilator ID -[id number]   Vent Type 980   Vent Mode AC/VC   Vt (Set, mL) 375 mL   Vt (Measured) 378 mL   Resp Rate (Set) (S)  14 bpm   Rate Measured 15 br/min   Minute Volume (L/min) 5.8 Liters   Peak Flow 50 L/min   FiO2  65 %   Peak Inspiratory Pressure (cmH2O) 25 cmH2O   I:E Ratio 1:4.20   Sensitivity 3   PEEP/CPAP (cmH2O) 8   Mean Airway Pressure (cmH2O) 11 cmH20   Plateau Pressure 14 cmH20   Static Compliance 59 mL/cmH2O   Additional Respiratory Assessments   Pulse 90   Respirations 16   SpO2 100 %   Humidification Source Heated wire   Humidification Temp 37   Vent Alarm Settings   High Pressure (cmH2O) 50 cmH2O   Low Minute Volume (lpm) 4.5 L/min   High Minute Volume (lpm) 15 L/min   Low Exhaled Vt (ml) 275 mL   High Exhaled Vt (ml) 800 mL   RR High (bpm) 35 br/min   Apnea (secs) 20 secs   Patient Observation   Observations (S)  Vent changes per order       
  [facility]  [address], [address]  [place] [address]      Pulmonary Progress Note      SUBJECTIVE:  currently on 1 li NC, slightly more awake and verbal though not making much sense, looking comfortable with breathing, not much cough/congestion, being fed by spouse    OBJECTIVE    Medications    Continuous Infusions:   sodium chloride         Scheduled Meds:   multivitamin with iron-minerals  15 mL Oral Daily    guaiFENesin  400 mg Oral TID    lansoprazole  30 mg Oral QAM AC    thiamine  100 mg Oral Daily    vitamin B-12  50 mcg Oral Daily    folic acid  1 mg Oral Daily    chlordiazePOXIDE  25 mg Oral TID    PARoxetine  10 mg Oral QAM    piperacillin-tazobactam  4,500 mg IntraVENous Q8H    sodium chloride flush  5-40 mL IntraVENous 2 times per day    heparin flush  3 mL IntraVENous 2 times per day    furosemide  40 mg Oral Daily    QUEtiapine  25 mg Oral BID    [Held by provider] droPERidol  0.625 mg IntraVENous Once    [Held by provider] metoprolol succinate  75 mg Oral BID    apixaban  5 mg Oral BID    ipratropium  0.5 mg Nebulization BID RT    budesonide  500 mcg Nebulization BID    atorvastatin  40 mg Oral Daily    gabapentin  600 mg Oral TID    arformoterol tartrate  15 mcg Nebulization BID RT       PRN Meds:menthol-zinc oxide, heparin flush, medicated lip balm, sodium chloride, polyethylene glycol, glucose, dextrose bolus **OR** dextrose bolus, glucagon (rDNA), acetaminophen, sodium chloride flush, acetaminophen **OR** acetaminophen, [DISCONTINUED] LORazepam **OR** [DISCONTINUED] LORazepam **OR** [DISCONTINUED] LORazepam **OR** [DISCONTINUED] LORazepam **OR** [DISCONTINUED] LORazepam **OR** [DISCONTINUED] LORazepam **OR** [Held by provider] LORazepam **OR** [DISCONTINUED] LORazepam    Physical    VITALS:  BP (!) 147/88   Pulse (!) 101   Temp 98 °F (36.7 °C) (Axillary)   Resp 18   Ht 1.651 m (5' 5\")   Wt 75 kg (165 lb 5.5 oz)   
  [facility]  [address], [address]  [place] [address]      Pulmonary Progress Note      SUBJECTIVE:  diff to arouse, mumbling, apparently got Ativan due to confusion/combativeness, was found on floor earlier this morning (has hx of ETOH abuse)    OBJECTIVE    Medications    Continuous Infusions:   sodium chloride      sodium chloride         Scheduled Meds:   metoprolol succinate  75 mg Oral BID    [Held by provider] apixaban  5 mg Oral BID    budesonide  500 mcg Nebulization BID    atorvastatin  40 mg Oral Daily    folic acid  1 mg Oral Daily    gabapentin  600 mg Oral TID    therapeutic multivitamin-minerals  1 tablet Oral Daily    pantoprazole  40 mg Oral BID AC    thiamine  100 mg Oral Daily    PARoxetine  30 mg Oral QAM    ipratropium 0.5 mg-albuterol 2.5 mg  1 Dose Inhalation Q4H WA RT    arformoterol tartrate  15 mcg Nebulization BID RT    [Held by provider] furosemide  20 mg Oral Daily    cefTRIAXone (ROCEPHIN) IV  1,000 mg IntraVENous Q24H    doxycycline (VIBRAMYCIN) IV  100 mg IntraVENous Q12H    sodium chloride flush  5-40 mL IntraVENous 2 times per day    sodium chloride flush  5-40 mL IntraVENous 2 times per day       PRN Meds:acetaminophen, albuterol, sodium chloride flush, sodium chloride, potassium chloride **OR** potassium alternative oral replacement **OR** potassium chloride, magnesium sulfate, ondansetron **OR** ondansetron, polyethylene glycol, acetaminophen **OR** acetaminophen, sodium chloride flush, sodium chloride, LORazepam **OR** LORazepam **OR** LORazepam **OR** LORazepam **OR** LORazepam **OR** LORazepam **OR** LORazepam **OR** LORazepam    Physical    VITALS:  /70   Pulse 74   Temp 97.5 °F (36.4 °C) (Oral)   Resp 18   Ht 1.651 m (5' 5\")   Wt 72.6 kg (160 lb)   SpO2 96%   BMI 26.63 kg/m²     24HR INTAKE/OUTPUT:    No intake or output data in the 24 hours ending 05/18/24 1714    24HR PULSE OXIMETRY 
  [facility]  [address], [address]  [place] [address]      Pulmonary Progress Note      SUBJECTIVE:  diff to arouse, mumbling, ativan given last night, was slightly more awake this morning though still confused (has hx of ETOH abuse)    OBJECTIVE    Medications    Continuous Infusions:   sodium chloride      sodium chloride         Scheduled Meds:   metoprolol succinate  75 mg Oral BID    [Held by provider] apixaban  5 mg Oral BID    budesonide  500 mcg Nebulization BID    atorvastatin  40 mg Oral Daily    folic acid  1 mg Oral Daily    gabapentin  600 mg Oral TID    therapeutic multivitamin-minerals  1 tablet Oral Daily    pantoprazole  40 mg Oral BID AC    thiamine  100 mg Oral Daily    PARoxetine  30 mg Oral QAM    ipratropium 0.5 mg-albuterol 2.5 mg  1 Dose Inhalation Q4H WA RT    arformoterol tartrate  15 mcg Nebulization BID RT    [Held by provider] furosemide  20 mg Oral Daily    [Held by provider] cefTRIAXone (ROCEPHIN) IV  1,000 mg IntraVENous Q24H    [Held by provider] doxycycline (VIBRAMYCIN) IV  100 mg IntraVENous Q12H    sodium chloride flush  5-40 mL IntraVENous 2 times per day    sodium chloride flush  5-40 mL IntraVENous 2 times per day       PRN Meds:acetaminophen, albuterol, sodium chloride flush, sodium chloride, potassium chloride **OR** potassium alternative oral replacement **OR** potassium chloride, magnesium sulfate, ondansetron **OR** ondansetron, polyethylene glycol, acetaminophen **OR** acetaminophen, sodium chloride flush, sodium chloride, LORazepam **OR** LORazepam **OR** LORazepam **OR** LORazepam **OR** LORazepam **OR** LORazepam **OR** LORazepam **OR** LORazepam    Physical    VITALS:  /71   Pulse 95   Temp 98.2 °F (36.8 °C) (Oral)   Resp 19   Ht 1.651 m (5' 5\")   Wt 72.6 kg (160 lb)   SpO2 97%   BMI 26.63 kg/m²     24HR INTAKE/OUTPUT:      Intake/Output Summary (Last 24 hours) at 5/19/2024 
  [facility]  [address], [address]  [place] [address]      Pulmonary Progress Note      SUBJECTIVE:  currently on RA, asleep but arousable, looking comfortable with breathing, not much cough/congestion, still confused and occ combative but currently off restraints. Mention of some issues with fighting placement of mask for neb treatments, hx of COPD and usually on trelegy as out-pt    OBJECTIVE    Medications    Continuous Infusions:   sodium chloride         Scheduled Meds:   multivitamin with iron-minerals  15 mL Oral Daily    guaiFENesin  400 mg Oral TID    lansoprazole  30 mg Oral QAM AC    thiamine  100 mg Oral Daily    vitamin B-12  50 mcg Oral Daily    folic acid  1 mg Oral Daily    chlordiazePOXIDE  25 mg Oral TID    PARoxetine  10 mg Oral QAM    piperacillin-tazobactam  4,500 mg IntraVENous Q8H    sodium chloride flush  5-40 mL IntraVENous 2 times per day    heparin flush  3 mL IntraVENous 2 times per day    furosemide  40 mg Oral Daily    QUEtiapine  25 mg Oral BID    [Held by provider] droPERidol  0.625 mg IntraVENous Once    [Held by provider] metoprolol succinate  75 mg Oral BID    apixaban  5 mg Oral BID    ipratropium  0.5 mg Nebulization BID RT    budesonide  500 mcg Nebulization BID    atorvastatin  40 mg Oral Daily    gabapentin  600 mg Oral TID    arformoterol tartrate  15 mcg Nebulization BID RT       PRN Meds:menthol-zinc oxide, heparin flush, medicated lip balm, sodium chloride, polyethylene glycol, glucose, dextrose bolus **OR** dextrose bolus, glucagon (rDNA), acetaminophen, sodium chloride flush, acetaminophen **OR** acetaminophen, [DISCONTINUED] LORazepam **OR** [DISCONTINUED] LORazepam **OR** [DISCONTINUED] LORazepam **OR** [DISCONTINUED] LORazepam **OR** [DISCONTINUED] LORazepam **OR** [DISCONTINUED] LORazepam **OR** [Held by provider] LORazepam **OR** [DISCONTINUED] LORazepam    Physical    VITALS:  /64   
  [facility]  [address], [address]  [place] [address]      Pulmonary Progress Note      SUBJECTIVE:  currently on 2 li NC, sedated due to agitation, looking comfortable with breathing, not much cough/congestion    OBJECTIVE    Medications    Continuous Infusions:   sodium chloride      sodium chloride         Scheduled Meds:   thiamine  100 mg IntraVENous Daily    vancomycin  1,500 mg IntraVENous Q24H    [Held by provider] droPERidol  0.625 mg IntraVENous Once    piperacillin-tazobactam  4,500 mg IntraVENous Q8H    metoprolol succinate  75 mg Oral BID    pantoprazole  40 mg Oral QAM AC    apixaban  5 mg Oral BID    chlordiazePOXIDE  25 mg Oral 4x daily    sodium chloride flush  5-40 mL IntraVENous 2 times per day    ipratropium  0.5 mg Nebulization BID RT    budesonide  500 mcg Nebulization BID    atorvastatin  40 mg Oral Daily    gabapentin  600 mg Oral TID    therapeutic multivitamin-minerals  1 tablet Oral Daily    PARoxetine  30 mg Oral QAM    arformoterol tartrate  15 mcg Nebulization BID RT    furosemide  20 mg Oral Daily       PRN Meds:sodium chloride flush, sodium chloride, medicated lip balm, sodium chloride flush, sodium chloride, polyethylene glycol, glucose, dextrose bolus **OR** dextrose bolus, glucagon (rDNA), acetaminophen, sodium chloride flush, acetaminophen **OR** acetaminophen, [DISCONTINUED] LORazepam **OR** [DISCONTINUED] LORazepam **OR** [DISCONTINUED] LORazepam **OR** [DISCONTINUED] LORazepam **OR** [DISCONTINUED] LORazepam **OR** [DISCONTINUED] LORazepam **OR** LORazepam **OR** [DISCONTINUED] LORazepam    Physical    VITALS:  BP (!) 150/88   Pulse 79   Temp 96.9 °F (36.1 °C) (Temporal)   Resp 14   Ht 1.651 m (5' 5\")   Wt 78.1 kg (172 lb 2.9 oz)   SpO2 100%   BMI 28.65 kg/m²     24HR INTAKE/OUTPUT:      Intake/Output Summary (Last 24 hours) at 5/31/2024 2441  Last data filed at 5/31/2024 1200  Gross per 24 hour 
  [facility]  [address], [address]  [place] [address]      Pulmonary Progress Note      SUBJECTIVE:  currently on RA, doesn't really open eyes but occ answering questions somewhat appropriately, confused, moving extremities, lying down in bed getting EEG.    OBJECTIVE    Medications    Continuous Infusions:   dextrose 5 % and 0.9 % NaCl 75 mL/hr at 05/23/24 1427    sodium chloride         Scheduled Meds:   ipratropium  0.5 mg Nebulization Q4H    methylPREDNISolone  40 mg IntraVENous Daily    enoxaparin  40 mg SubCUTAneous Daily    thiamine (B-1) 250 mg in sodium chloride 0.9 % 100 mL IVPB  250 mg IntraVENous Q24H    Followed by    [START ON 5/28/2024] thiamine  100 mg Oral Daily    pantoprazole  40 mg IntraVENous Daily    cefTRIAXone (ROCEPHIN) IV  1,000 mg IntraVENous Q24H    [Held by provider] metoprolol succinate  75 mg Oral BID    [Held by provider] apixaban  5 mg Oral BID    budesonide  500 mcg Nebulization BID    [Held by provider] atorvastatin  40 mg Oral Daily    [Held by provider] gabapentin  600 mg Oral TID    therapeutic multivitamin-minerals  1 tablet Oral Daily    [Held by provider] PARoxetine  30 mg Oral QAM    arformoterol tartrate  15 mcg Nebulization BID RT    [Held by provider] furosemide  20 mg Oral Daily       PRN Meds:medicated lip balm, sodium chloride flush, sodium chloride, polyethylene glycol, glucose, dextrose bolus **OR** dextrose bolus, glucagon (rDNA), acetaminophen, sodium chloride flush, acetaminophen **OR** acetaminophen, LORazepam **OR** LORazepam **OR** LORazepam **OR** LORazepam **OR** LORazepam **OR** LORazepam **OR** LORazepam **OR** LORazepam    Physical    VITALS:  BP (!) 151/88   Pulse 92   Temp 97.6 °F (36.4 °C) (Temporal)   Resp 17   Ht 1.651 m (5' 5\")   Wt 75.5 kg (166 lb 7.2 oz)   SpO2 94%   BMI 27.70 kg/m²     24HR INTAKE/OUTPUT:      Intake/Output Summary (Last 24 hours) at 5/23/2024 
  [facility]  [address], [address]  [place] [address]      Pulmonary Progress Note      SUBJECTIVE:  currently on RA, awake, conversant, confused to year but got month and place right, looking comfortable with breathing, not much cough/congestion    OBJECTIVE    Medications    Continuous Infusions:   sodium chloride      sodium chloride         Scheduled Meds:   metoprolol succinate  75 mg Oral BID    apixaban  5 mg Oral BID    chlordiazePOXIDE  25 mg Oral 4x daily    sodium chloride flush  5-40 mL IntraVENous 2 times per day    thiamine  100 mg Oral Daily    ipratropium  0.5 mg Nebulization BID RT    PHENobarbital  16.2 mg IntraVENous BID    pantoprazole  40 mg IntraVENous Daily    budesonide  500 mcg Nebulization BID    atorvastatin  40 mg Oral Daily    gabapentin  600 mg Oral TID    therapeutic multivitamin-minerals  1 tablet Oral Daily    PARoxetine  30 mg Oral QAM    arformoterol tartrate  15 mcg Nebulization BID RT    furosemide  20 mg Oral Daily       PRN Meds:sodium chloride flush, sodium chloride, medicated lip balm, sodium chloride flush, sodium chloride, polyethylene glycol, glucose, dextrose bolus **OR** dextrose bolus, glucagon (rDNA), acetaminophen, sodium chloride flush, acetaminophen **OR** acetaminophen, [DISCONTINUED] LORazepam **OR** [DISCONTINUED] LORazepam **OR** [DISCONTINUED] LORazepam **OR** [DISCONTINUED] LORazepam **OR** [DISCONTINUED] LORazepam **OR** [DISCONTINUED] LORazepam **OR** LORazepam **OR** [DISCONTINUED] LORazepam    Physical    VITALS:  BP (!) 142/78   Pulse 81   Temp 98.2 °F (36.8 °C) (Temporal)   Resp 26   Ht 1.651 m (5' 5\")   Wt 76.1 kg (167 lb 12.7 oz)   SpO2 (!) 80%   BMI 27.92 kg/m²     24HR INTAKE/OUTPUT:      Intake/Output Summary (Last 24 hours) at 5/28/2024 1508  Last data filed at 5/28/2024 0737  Gross per 24 hour   Intake 6 ml   Output 900 ml   Net -894 ml         24HR PULSE OXIMETRY 
  [facility]  [address], [address]  [place] [address]      Pulmonary Progress Note      SUBJECTIVE:  currently on 3 li NC, arousable and verbal though not making sense, looking comfortable with breathing, not much cough/congestion, apparently pulled out NGT and new one place earlier this afternoon, some blood seen at side of nostril and pillow    OBJECTIVE    Medications    Continuous Infusions:   sodium chloride         Scheduled Meds:   multivitamin with iron-minerals  15 mL Oral Daily    guaiFENesin  400 mg Oral TID    [START ON 6/5/2024] lansoprazole  30 mg Oral QAM AC    thiamine  100 mg Oral Daily    vitamin B-12  50 mcg Oral Daily    folic acid  1 mg Oral Daily    chlordiazePOXIDE  25 mg Oral TID    PARoxetine  10 mg Oral QAM    piperacillin-tazobactam  4,500 mg IntraVENous Q8H    sodium chloride flush  5-40 mL IntraVENous 2 times per day    heparin flush  3 mL IntraVENous 2 times per day    furosemide  40 mg Oral Daily    QUEtiapine  25 mg Oral BID    [Held by provider] droPERidol  0.625 mg IntraVENous Once    [Held by provider] metoprolol succinate  75 mg Oral BID    apixaban  5 mg Oral BID    ipratropium  0.5 mg Nebulization BID RT    budesonide  500 mcg Nebulization BID    atorvastatin  40 mg Oral Daily    gabapentin  600 mg Oral TID    arformoterol tartrate  15 mcg Nebulization BID RT       PRN Meds:heparin flush, medicated lip balm, sodium chloride, polyethylene glycol, glucose, dextrose bolus **OR** dextrose bolus, glucagon (rDNA), acetaminophen, sodium chloride flush, acetaminophen **OR** acetaminophen, [DISCONTINUED] LORazepam **OR** [DISCONTINUED] LORazepam **OR** [DISCONTINUED] LORazepam **OR** [DISCONTINUED] LORazepam **OR** [DISCONTINUED] LORazepam **OR** [DISCONTINUED] LORazepam **OR** [Held by provider] LORazepam **OR** [DISCONTINUED] LORazepam    Physical    VITALS:  BP (!) 146/65   Pulse (!) 106   Temp 97 °F (36.1 °C) 
  [facility]  [address], [address]  [place] [address]      Pulmonary Progress Note      SUBJECTIVE:  a bit more arousable today compared to yesterday, occ mumbling but some answers a bit clearer, ativan given last night due to agitation and confusion (5 mg total yesterday evening and 12 mg today day before yesterday), none this morning yet, on 2 li NC lying down in bed restrained    OBJECTIVE    Medications    Continuous Infusions:   sodium chloride      sodium chloride         Scheduled Meds:   metoprolol succinate  75 mg Oral BID    [Held by provider] apixaban  5 mg Oral BID    budesonide  500 mcg Nebulization BID    atorvastatin  40 mg Oral Daily    folic acid  1 mg Oral Daily    gabapentin  600 mg Oral TID    therapeutic multivitamin-minerals  1 tablet Oral Daily    pantoprazole  40 mg Oral BID AC    thiamine  100 mg Oral Daily    PARoxetine  30 mg Oral QAM    ipratropium 0.5 mg-albuterol 2.5 mg  1 Dose Inhalation Q4H WA RT    arformoterol tartrate  15 mcg Nebulization BID RT    [Held by provider] furosemide  20 mg Oral Daily    [Held by provider] cefTRIAXone (ROCEPHIN) IV  1,000 mg IntraVENous Q24H    [Held by provider] doxycycline (VIBRAMYCIN) IV  100 mg IntraVENous Q12H    sodium chloride flush  5-40 mL IntraVENous 2 times per day    sodium chloride flush  5-40 mL IntraVENous 2 times per day       PRN Meds:sodium chloride flush, acetaminophen, albuterol, sodium chloride flush, sodium chloride, potassium chloride **OR** potassium alternative oral replacement **OR** potassium chloride, magnesium sulfate, ondansetron **OR** ondansetron, polyethylene glycol, acetaminophen **OR** acetaminophen, sodium chloride flush, sodium chloride, LORazepam **OR** LORazepam **OR** LORazepam **OR** LORazepam **OR** LORazepam **OR** LORazepam **OR** LORazepam **OR** LORazepam    Physical    VITALS:  /70   Pulse 77   Temp 97.7 °F (36.5 °C) (Oral)   
  [facility]  [facility]  [address], [address]  [place] [address]      Pulmonary Progress Note      SUBJECTIVE:  currently intubated on AC16 EY=603 Fio2=80% PEEP=8, RRT called for respiratory arrest and hypoxia, bronchoscopy done after intubation showed food particles (was eating pancakes earlier) in airways which were suctioned out. On Norepi also.    OBJECTIVE    Medications    Continuous Infusions:   dexmedeTOMIDine (PRECEDEX) 1,000 mcg in sodium chloride 0.9 % 250 mL infusion Stopped (06/09/24 1147)    midazolam 2 mg/hr (06/09/24 1108)    norepinephrine 4 mcg/min (06/09/24 1107)    sodium chloride 100 mL/hr at 06/09/24 1256    sodium chloride         Scheduled Meds:   propofol  100 mg IntraVENous Once    [START ON 6/10/2024] pantoprazole (PROTONIX) 40 mg in sodium chloride (PF) 0.9 % 10 mL injection  40 mg IntraVENous Daily    miconazole   Topical BID    chlorhexidine  15 mL Mouth/Throat BID    ampicillin-sulbactam  3,000 mg IntraVENous Q6H    haloperidol lactate  2.5 mg IntraMUSCular Once    multivitamin with iron-minerals  15 mL Oral Daily    guaiFENesin  400 mg Oral TID    thiamine  100 mg Oral Daily    vitamin B-12  50 mcg Oral Daily    folic acid  1 mg Oral Daily    [Held by provider] chlordiazePOXIDE  25 mg Oral TID    [Held by provider] PARoxetine  10 mg Oral QAM    sodium chloride flush  5-40 mL IntraVENous 2 times per day    heparin flush  3 mL IntraVENous 2 times per day    [Held by provider] furosemide  40 mg Oral Daily    [Held by provider] QUEtiapine  25 mg Oral BID    [Held by provider] droPERidol  0.625 mg IntraVENous Once    [Held by provider] metoprolol succinate  75 mg Oral BID    apixaban  5 mg Oral BID    budesonide  500 mcg Nebulization BID    atorvastatin  40 mg Oral Daily    [Held by provider] gabapentin  600 mg Oral TID    arformoterol tartrate  15 mcg Nebulization BID RT       PRN Meds:white petrolatum, potassium chloride, 
  [facility]  [facility]  [address], [address]  [place] [address]      Pulmonary Progress Note      SUBJECTIVE:  transferred to ICU with worse agitation/confusion, became lethargic (?) after ativan, (?) wheezing/stridor, desaturation, (?) seizure, intubated early this morning, extubated later in the morning. Currently on 3 li NC, arousable but not interacting appropriately, minimally verbal, moving extremities, on precedex.    OBJECTIVE    Medications    Continuous Infusions:   sodium chloride      fentaNYL      midazolam Stopped (05/21/24 0817)    dexmedeTOMIDine (PRECEDEX) 1,000 mcg in sodium chloride 0.9 % 250 mL infusion 0.6 mcg/kg/hr (05/21/24 1033)       Scheduled Meds:   thiamine (B-1) 500 mg in sodium chloride 0.9 % 100 mL IVPB  500 mg IntraVENous Q8H    Followed by    [START ON 5/23/2024] thiamine (B-1) 250 mg in sodium chloride 0.9 % 100 mL IVPB  250 mg IntraVENous Q24H    Followed by    [START ON 5/28/2024] thiamine  100 mg Oral Daily    pantoprazole  40 mg IntraVENous Daily    cefTRIAXone (ROCEPHIN) IV  1,000 mg IntraVENous Q24H    predniSONE  40 mg Oral Daily    metoprolol succinate  75 mg Oral BID    apixaban  5 mg Oral BID    budesonide  500 mcg Nebulization BID    atorvastatin  40 mg Oral Daily    [Held by provider] gabapentin  600 mg Oral TID    therapeutic multivitamin-minerals  1 tablet Oral Daily    PARoxetine  30 mg Oral QAM    ipratropium 0.5 mg-albuterol 2.5 mg  1 Dose Inhalation Q4H WA RT    arformoterol tartrate  15 mcg Nebulization BID RT    [Held by provider] furosemide  20 mg Oral Daily    [Held by provider] cefTRIAXone (ROCEPHIN) IV  1,000 mg IntraVENous Q24H    doxycycline (VIBRAMYCIN) IV  100 mg IntraVENous Q12H       PRN Meds:sodium chloride flush, sodium chloride, polyethylene glycol, glucose, dextrose bolus **OR** dextrose bolus, glucagon (rDNA), acetaminophen, sodium chloride flush, acetaminophen **OR** acetaminophen, 
  Chart reviewed, discussed at AM rounds and consulted RN. Pt is not appropriate for participation in occupational therapy this date d/t increased agitation. Will re-attempt at a later time. Thank you. Jennifer Mayer, OTR/L # [id number]     
  Chart reviewed, discussed at AM rounds and consulted RN. Pt is not appropriate for participation in occupational therapy this date d/t increased agitation. Will re-attempt at a later time. Thank you. Jennifer Mayer, OTR/L # [id number]     
  OCCUPATIONAL THERAPY INITIAL EVALUATION    [facility]  [address] [place], [place]       Date:2024                                                               Patient Name: Catarino Bolanos  MRN: [id number]  : 1950  Room: [address] [place] [address]    Evaluating OT: Jennifer Mayer, RODD,  OTR/L; [id number]    Referring Provider: Delmer Bradley MD    Specific Provider Orders/Date: OT eval and treat (24)       Diagnosis: Syncope and collapse [R55]  Pleural effusion [J90]  Hypoxia [R09.02]  Acute respiratory failure with hypoxia (HCC) [J96.01]     Reason for admission: Pt admitted with ARF, syncope and gait issues.    Surgery/Procedures: : extubated     Pertinent Medical History:    Past Medical History:   Diagnosis Date    Anxiety     Atrial fibrillation and flutter (HCC)     Depression     GERD (gastroesophageal reflux disease)     GI bleed 2024    Heart palpitations     HTN (hypertension) 10/21/2010    Hyperlipidemia     Lightheadedness     OA (osteoarthritis)     Parathyroid adenoma     s/p surgery     Pre-diabetes     Recurrent left pleural effusion 2018    Stenosis of right internal carotid artery with cerebral infarction (HCC)         *Precautions:  Fall Risk, hx orthostatics, cognition/delirium, alarms+    Assessment of current deficits   [x] Functional mobility  [x]ADLs  [x] Strength               [x]Cognition   [x] Functional transfers   [x] IADLs         [x] Safety Awareness   [x]Endurance   [x] Fine Coordination        [] ROM     [] Vision/perception   []Sensation    [x]Gross Motor Coordination [x] Balance   [x] Delirium                  []Motor Control     [x] Communication    OT PLAN OF CARE   OT POC based on physician orders, patient diagnosis and results of clinical assessment.       Frequency/Duration: 1-3 days/wk for 1-2 weeks PRN    Specific OT Treatment Interventions to include:   * Instruction/training on adapted ADL 
  OT consult received and appreciated. Chart reviewed, discussed at AM rounds and consulted RN. Pt is not appropriate d/t medical status. Will re-attempt at a later time. Thank you. Jennifer Mayer, OTR/L # [id number]     
  OT consult received and appreciated. Chart reviewed, discussed at AM rounds and consulted RN. Pt is not appropriate for OT this date d/t increased agitation, 4 pt restraints. Will re-attempt at a later time. Thank you. Jennifer Mayer, OTR/L # [id number]     
  OT consult received and appreciated. Chart reviewed, discussed at AM rounds and consulted RN. Pt is not appropriate for occupational therapy at this time. Will re-attempt as appropriate. Thank you. Jennifer Mayer, OTR/L # [id number]     
  OT consult received and appreciated. Chart reviewed, discussed at AM rounds and consulted RN. Pt is not appropriate to participate at this time d/t agitation. Will evaluate at a later time. Thank you. Jennifer Mayer, OTR/L # [id number]     
  OT consult received and appreciated. Chart reviewed, discussed at AM rounds and consulted RN. Pt remains inappropriate for occupational therapy services after multiple attempts. Will d/c orders- please re-consult as indicated. Thank you. Jennifer Mayer, OTR/L # [id number]     
 Catarino Bolanos is a 74 y.o. right handed male     Neurology following for altered mental status    PMH of A-fib on Eliquis, hypertension, hyperlipidemia, OA, prediabetes, right ICA stenosis, anxiety, depression     Assessment:     Metabolic/toxic encephalopathy versus sedation versus postictal state  Patient presented initially with complaint of shortness of breath, gait difficulty and recurrent syncope  MRI brain negative acute intracranial abnormality; multiple old strokes noted as well as moderate burden of atrophy and   EEG showed metabolic/toxic encephalopathy versus sedation versus postictal state   Fluctuations in mentation likely due to severe alcohol withdrawal in the setting of acute respiratory failure with hypoxia, pleural effusion, syncope, hypertensive urgency, possible UTI, anemia and hyponatremia   WBCs remain elevated today at 14.5    Plan:     Repeat MRI brain  EEG  Continue thiamine  Wean Versed as able  Wean Precedex as able  Continue to treat metabolic derangements  Neurology will follow    History of Present Illness:     Patient was discharged from the hospital on 5/3/2024 after experiencing a GI bleed.  Patient was on Eliquis at home for A-fib.  His CT head was negative for abnormalities, chest x-ray was concerning for possible pneumonia.    Patient initially presented to the ER on 2024 after experiencing syncope and gait issues that were ongoing for several weeks.  He was also complaining of shortness of breath and bilateral leg weakness.  Patient's wife reported he had frequent episodes of eye rolling backwards and then going limp, he was eased to the ground by friends without hitting his head.  Patient's wife stated he drinks 5-6 large gin and tonics daily for the past 42 years.    On 2024 and RRT was called due to change in mental status and possible seizure.  He was unresponsive to painful stimuli.  Patient also became stiff with questionable posturing.  He was intubated for 
4 Eyes Skin Assessment     NAME:  Catarino Bolanos  YOB: 1950  MEDICAL RECORD NUMBER:  [id number]    The patient is being assessed for  Admission    I agree that at least one RN has performed a thorough Head to Toe Skin Assessment on the patient. ALL assessment sites listed below have been assessed.      Areas assessed by both nurses:    Head, Face, Ears, Shoulders, Back, Chest, Arms, Elbows, Hands, Sacrum. Buttock, Coccyx, Ischium, and Legs. Feet and Heels        Does the Patient have a Wound? No noted wound(s)       Florian Prevention initiated by RN: Yes  Wound Care Orders initiated by RN: No    Pressure Injury (Stage 3,4, Unstageable, DTI, NWPT, and Complex wounds) if present, place Wound referral order by RN under : No    New Ostomies, if present place, Ostomy referral order under : No     Nurse 1 eSignature: Electronically signed by Frieda German RN on 5/17/24 at 6:30 PM EDT    **SHARE this note so that the co-signing nurse can place an eSignature**    Nurse 2 eSignature: Electronically signed by Manjula Teague RN on 5/17/24 at 6:31 PM EDT   
4 Eyes Skin Assessment     NAME:  Catarino Bolanos  YOB: 1950  MEDICAL RECORD NUMBER:  [id number]    The patient is being assessed for  Admission    I agree that at least one RN has performed a thorough Head to Toe Skin Assessment on the patient. ALL assessment sites listed below have been assessed.      Areas assessed by both nurses:    Head, Face, Ears, Shoulders, Back, Chest, Arms, Elbows, Hands, Sacrum. Buttock, Coccyx, Ischium, Legs. Feet and Heels, Under Medical Devices , and Other ..        Does the Patient have a Wound? No noted wound(s)       Florian Prevention initiated by RN: Yes  Wound Care Orders initiated by RN: No    Pressure Injury (Stage 3,4, Unstageable, DTI, NWPT, and Complex wounds) if present, place Wound referral order by RN under : No    New Ostomies, if present place, Ostomy referral order under : No     Nurse 1 eSignature: Electronically signed by Giuseppe Elder RN on 6/8/24 at 6:26 PM EDT    **SHARE this note so that the co-signing nurse can place an eSignature**    Nurse 2 eSignature: Electronically signed by Tran Jackson RN on 6/8/24 at 7:23 PM EDT  
4 Eyes Skin Assessment     NAME:  Catarino Bolanos  YOB: 1950  MEDICAL RECORD NUMBER:  [id number]    The patient is being assessed for  Admission    Transfer from  for RRT (AMS)    I agree that at least one RN has performed a thorough Head to Toe Skin Assessment on the patient. ALL assessment sites listed below have been assessed.      Areas assessed by both nurses:    Head, Face, Ears, Shoulders, Back, Chest, Arms, Elbows, Hands, Sacrum. Buttock, Coccyx, Ischium, Legs. Feet and Heels, and Under Medical Devices         Does the Patient have a Wound? No noted wound(s)       Florian Prevention initiated by RN: Yes  Wound Care Orders initiated by RN: No    Pressure Injury (Stage 3,4, Unstageable, DTI, NWPT, and Complex wounds) if present, place Wound referral order by RN under : No    New Ostomies, if present place, Ostomy referral order under : No     Nurse 1 eSignature: Electronically signed by Sylvia Martinez RN on 5/21/24 at 4:30 AM EDT    **SHARE this note so that the co-signing nurse can place an eSignature**    Nurse 2 eSignature: Electronically signed by Nalini Rodríguez RN on 5/21/24 at 12:11 PM EDT   
Assessed pt's bilateral arms. Rn states picc line is flushing and drawing as it should. Bilateral arms swollen. Ecchymotic area around picc line insertion site. Dressing is clean and dry at this time. Rn reported copious bleeding at the site this morning. Quick clot was placed on insertion site and seems to be working. Recommend ultrasound bilaterally to check for clots.   
At bedside report patient was throwing legs over side of bed trying to aggressively kick staff and was yelling at screaming. Patient was currently in bilateral wrist restraints. Would not take any of his medications ordered from 1800 that where not given. Called attending spoke with MD Balwinder Carrillo received order for 1x dose of Haldol IM and to place patient in 4 point restraints (bilateral wrists and bilateral ankles). Orders placed by provider.   
Attempted to perform range of motion.  Patient attempting to hit, bite staff and pull at lines.  Patient not redirectable at this time.  Patient placed back into soft restraints.      
Attempted to release bilateral soft wrist and ankle restraints when patient attempts to hit and kick nurse despite 1:1 and pull out ngt.  Bilateral soft wrist restraints reapplied.  
Attempted to take patient off restraints to perform ROM/ADLs but level of agitation and agression increased and patient was trying to pull lines. Restraints re applied.   
Bladder scanned showed patient retaining 350mL of urine. Patient straight cath for 400 mL of urine.   
Blood culture order called in to lab.  
Call placed to pt's partner Radha, explained that an RRT was called due to a change in pt's mental status and that the pt was moved to a higher level of care.    Electronically signed by Ayo Perry RN on 5/21/24 at 4:27 AM EDT    
Called MRI to schedule MRI of brain, afternoon shift MRI staff will call back to schedule a time as they are full at this time.   
[facility]  Department of Internal Medicine   Internal Medicine Residency   MICU Progress Note    Patient:  Catarino Bolanos 74 y.o. male  MRN: [id number]     Date of Service: 5/29/2024    Allergy: Flexeril [cyclobenzaprine], Lisinopril, Influenza virus vaccine, Metformin and related, and Zocor [simvastatin]    Subjective     Patient was seen during in the morning during rounds.  He was drowsy and confused, was not following any commands. He is hemodynamically stable.    Objective     VS: /73   Pulse 84   Temp 98.5 °F (36.9 °C) (Axillary)   Resp 21   Ht 1.651 m (5' 5\")   Wt 76 kg (167 lb 8.8 oz)   SpO2 96%   BMI 27.88 kg/m²           I & O - 24hr:   Intake/Output Summary (Last 24 hours) at 5/29/2024 1907  Last data filed at 5/29/2024 1639  Gross per 24 hour   Intake 928.99 ml   Output 255 ml   Net 673.99 ml         Physical Exam:  General Appearance:  spontaneous eye opening, doesn't follows command, drowsy.  Neck: no adenopathy, no carotid bruit, no JVD, supple, symmetrical, trachea midline, and thyroid not enlarged, symmetric, no tenderness/mass/nodules  Lung: clear to auscultation bilaterally  Heart: regular rate and rhythm, S1, S2 normal, no murmur, click, rub or gallop  Abdomen: soft, non-tender; bowel sounds normal; no masses,  no organomegaly  Extremities:  extremities normal, atraumatic, no cyanosis or edema  Musculoskeletal: No joint swelling, no muscle tenderness. ROM normal in all joints of extremities.   Neurologic: Mental status: spontaneous eye opening, doesn't follows command, drowsy    Lines     site day    Art line   None    TLC None    PICC None    Hemoaccess None      ABG:     Lab Results   Component Value Date/Time    PH 7.347 05/21/2024 02:34 PM    GDD2WBX 35.8 09/07/2018 11:13 PM    PCO2 42.1 05/21/2024 02:34 PM    PO2ART 78.4 09/07/2018 11:13 PM    PO2 121.2 05/21/2024 02:34 PM    TJU1ZRJ 26.2 09/07/2018 11:13 PM    HCO3 22.6 05/21/2024 02:34 PM    BE -2.9 05/21/2024 
[facility]   Department of Internal Medicine   Internal Medicine Residency  MICU Progress Note    Patient:  Catarino Bolanos 74 y.o. male   MRN: [id number]       Date of Service: 2024   Admission date: 2024  5:54 PM ; Hospital day: 8   ICU day: 3d 5h    Allergy: Flexeril [cyclobenzaprine], Lisinopril, Influenza virus vaccine, Metformin and related, and Zocor [simvastatin]    Subjective   Overnight:  UA bloody.   No access for D5NS ggt. Ok to hold for now, monitor BG.   EEG back negative, encephalopathy metabolic/sedatives.   Morning labs ok.      Today:    Catarino Bolanos was seen and examined at bedside today morning, he is on precedex and follows commands intermittently   Objective   PHYSICAL EXAM  General Appearance:  agitated, on precedex  HEENT: Normocephalic, atraumatic  Neck: midline trachea  Lung: clear to auscultation bilaterally  Heart: regular rate and rhythm, no murmur  Abdomen: soft, bowel sounds normal; no masses  Extremities: no cyanosis or edema  Musculokeletal: No joint swelling  Neurologic: Mental status: not alert, and oriented,he is not cooperative     CARDIOVASCULAR  Pulse rate range      : Pulse  Av.8  Min: 70  Max: 124  BP range                  : Systolic (24hrs), Av , Min:114 , Max:157   ; Diastolic (24hrs), Av, Min:59, Max:117    Arterial BP       Systolic BP/Diastolic BP & MAP    PULMONARY  Respiration range   : Resp  Avg: 15.8  Min: 5  Max: 29  Pulse Ox range : SpO2  Av.1 %  Min: 89 %  Max: 100 %  Recent Labs     24  1434   PH 7.347*   PCO2 42.1   PO2 121.2*   HCO3 22.6   BE -2.9   O2SAT 98.2   THB 8.8*     Vent Settings  FiO2 : 50 %  Resp Rate (Set): 16 bpm  Vt (Set, mL): 400 mL  PEEP/CPAP (cmH2O): 5    NEPHROLOGY (FLUIDS/ELECTROLYTES & NUTRITION)  Urine: 175 mL   I & O - 24hr:    Intake/Output Summary (Last 24 hours) at 2024 0956  Last data filed at 2024 0619  Gross per 24 hour   Intake 1148.77 ml   Output 70 ml   Net 1078.77 ml 
Chart reviewed full consultation to follow.    Briefly Mr. 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, persistent AF/a flutter, syncope with positive TTT for vasopressor syncope, right ICA stenosis status post endarterectomy, COPD, hyperlipidemia, cervical fusion and laminectomy, thrombocytopenia, GERD, alcohol abuse recently admitted with GIB, EGD was negative, colonoscopy was canceled due to patient having alcohol withdrawal; who was readmitted on May 16, 2024 with shortness of breath,and syncopal episode, during his hospital stay he RRT due to altered mental status and possibly seizures and he was intubated and admitted to MICU on 5/21, he was treated as well for alcohol withdrawal, MRI of the brain showed old small infarct in the posterior superior division of the right MCA and old lacunar infarct in the midportion of the left putamen but there were no acute findings, eventually he was extubated and transferred out of the ICU on 6/3, he was treated as well for possible right-sided pneumonia.  Patient RRT on June 9 after he was found to be unresponsive with hypoxic respiratory failure and he was transferred back to MICU.  Patient was reintubated.  Patient has become oliguric, his chest x-ray showed trace bilateral pleural effusions and he has developed large edema, were consulted for oliguria and generalized edema.    Edematous state, multifactorial 2/2 HFpEF, IV fluid resuscitation, hypoalbuminemia.  His proBNP is elevated and he has large edema.  Patient will need diuresis, to start Bumex drip at 0.2 mg/hour    HFpEF 55-60%, pro-BNP 9,352, to start Bumex drip  Hypotension/hemodynamic shock, on vasopressor support  Hypomagnesemia, 2/2 alcohol abuse, poor intake  Hypocalcemia, rule out vitamin D deficiency  Normocytic anemia, with iron deficiency ferritin 42, iron saturation 9%, B12 1414, folate 
Chart reviewed. Case discussed with Dr. Carver. Update received from hospice plan is for patient to be transferred to hospice house for compassionate extubation. At this time I will change CODE STATUS to DNR CC.    HAYLEY Rene - CNP  Palliative Medicine   
Chg double lumen picc Placement 6/2/2024    Product number: [id number]   Lot Number: [id number]      Ultrasound: yes   Left Basilic vein:                Upper Arm Circumference: (CM) 38cm    Size:(FR)/GUAGE 5.5fr/40cm    Exposed Length: (CM) 5cm    Internal Length: (CM) 35cm   Cut: (CM) 15cm   Vein Measurement: 0.58cm              Lidocaine Given: yes-given in picc kit  Angeline Sung RN  6/2/2024  12:49 PM     Chest xray                      
Comprehensive Nutrition Assessment    Type and Reason for Visit:  Initial, RD Nutrition Re-Screen/LOS (ICU LOS)    Nutrition Recommendations/Plan:   Continue NPO   Advance nutrition as medically appropriate  Please consult for TF recs if needed      Malnutrition Assessment:  Malnutrition Status:  Moderate malnutrition (05/21/24 1408)    Context:  Chronic Illness     Findings of the 6 clinical characteristics of malnutrition:  Energy Intake:  75% or less estimated energy requirements for 1 month or longer  Weight Loss:  No significant weight loss     Body Fat Loss:  Mild body fat loss Orbital, Triceps   Muscle Mass Loss:   (moderate) Temples (temporalis), Clavicles (pectoralis & deltoids)  Fluid Accumulation:  No significant fluid accumulation     Strength:  Not Performed    Nutrition Assessment:    Pt admit w/ acute encephalopathy 2/2 wernickes vs new onset seizure vs CVA. Noted syncope/collapse, pleural effusion. Pt s/p RRT/transfer to MICU intubated 5/21. Pt now s/p extubated. Noted recent d/c from hospital 5/3 w/ concern for GIB & noted ETOH withdrawal. PMHx COPD, CHF, DM, ETOH abuse. Pt meets critria for moderate malnutrition. Will monitor for nutrition progression.    Nutrition Related Findings:    pt extubated, lethargic/disoriented, hypoactive BS, no edema, +I/Os 2.1L, ammonia 15 L Wound Type: None       Current Nutrition Intake & Therapies:    Average Meal Intake: NPO     Diet NPO    Anthropometric Measures:  Height: 165.1 cm (5' 5\")  Ideal Body Weight (IBW): 136 lbs (62 kg)    Admission Body Weight: 74.8 kg (165 lb) (5/21 bed scale)  Current Body Weight: 74.8 kg (165 lb) (5/21 bed scale), 121.3 % IBW.    Current BMI (kg/m2): 27.5  Usual Body Weight: 68.9 kg (152 lb) (11/2023 bed scale, per EMR)  % Weight Change (Calculated): 8.6                    BMI Categories: Overweight (BMI 25.0-29.9)    Estimated Daily Nutrient Needs:  Energy Requirements Based On: Formula  Weight Used for Energy Requirements: 
Comprehensive Nutrition Assessment    Type and Reason for Visit:  Reassess    Nutrition Recommendations/Plan:   Continue NPO.   Will Modify Current EN to appropriately meet current estimated needs & monitor.    TF Recommendations:  Peptide Based (Vital AF 1.2) @ 55ml/hr (Goal) Continuous x 24hr/d= 1320ml TV, 1584kcal, 99gm Pro, 1071ml freewater.     Flush per critical care mgmt.    Monitor for Refeeding Syndrome, correct lytes PRN.      Malnutrition Assessment:  Malnutrition Status:  Moderate malnutrition (05/21/24 1408)    Context:  Chronic Illness     Findings of the 6 clinical characteristics of malnutrition:  Energy Intake:  75% or less estimated energy requirements for 1 month or longer  Weight Loss:  No significant weight loss     Body Fat Loss:  Mild body fat loss Orbital, Triceps   Muscle Mass Loss:   (moderate) Temples (temporalis), Clavicles (pectoralis & deltoids)  Fluid Accumulation:  No significant fluid accumulation     Strength:  Not Performed    Nutrition Assessment:    Pt adm for re-current Syncope/collapse complicated by Acute Encephalopathy 2/2 wernickes vs new onset sz vs CVA s/p RRT/MICU tx/intubation 5/21, extubated 5/21. Noted new onset Afib RVR. Noted recent d/c from hospital 5/3 w/ concern for GIB & noted ETOH withdrawal. PMHx COPD, B/L Pleural effusions, VATS/ Pleurx cath, CHF, CVA, DM, ETOH abuse. Now w/ worsening mental status/agitation/combativeness x past few days; repeat MRI w/ old infarcts only, EEG suggestive of metabolic/toxic enceph.  NG placed 6/1, has been tolerating EN support however now s/p RRT for AHRF/resp distress/unresponsive resulting in intubation/OG insert/bronch/ICU tx 6/9 s/p suspected aspiration after breakfast meal.  Noted current Moderate Malnutrition; remains at risk for ReFeeding Syndrome w/ poor PO and current hypomagnesemia; monitor/replace lytes PRN.  Will provide goal EN rec's to meet current estimated needs and monitor.    Nutrition Related Findings:  
Comprehensive Nutrition Assessment    Type and Reason for Visit:  Reassess, Consult (TF O&M)    Nutrition Recommendations/Plan:   Continue Diet as tolerated/appropriate; will d/c ONS at this time; restart once off EN support.  Will Start trickle EN and monitor.    TF Recommendations:  Diabetic (Glucerna 1.5) @ 50ml/hr (Goal) Continuous x 24hrs/d= 1200ml TV, 1800kcal, 99gm Pro, 911ml free water.     Flush per physician mgmt; please consult for flush rec's PRN.    Pt at high risk for Re-Feeding Syndrome d/t minimal intake x >7 days w/ current hypokalemia - Highly recommend starting at no more than half of goal rate x first 24hrs and increase slowly as tolerated to goal w/ close monitoring of BGL/Lytes Labs (x first ~3-5d post-nutrition initiation) and correct PRN both prior to and during initiation of nutrition support.      Malnutrition Assessment:  Malnutrition Status:  Moderate malnutrition (05/21/24 1408)    Context:  Chronic Illness     Findings of the 6 clinical characteristics of malnutrition:  Energy Intake:  75% or less estimated energy requirements for 1 month or longer  Weight Loss:  No significant weight loss     Body Fat Loss:  Mild body fat loss Orbital, Triceps   Muscle Mass Loss:   (moderate) Temples (temporalis), Clavicles (pectoralis & deltoids)  Fluid Accumulation:  No significant fluid accumulation     Strength:  Not Performed    Nutrition Assessment:    Pt adm for re-current Syncope/collapse complicated by Acute Encephalopathy 2/2 wernickes vs new onset sz vs CVA s/p RRT/MICU tx/intubation 5/21, extubated 5/21. Noted new onset Afib RVR. Noted recent d/c from hospital 5/3 w/ concern for GIB & noted ETOH withdrawal. PMHx COPD, B/L Pleural effusions, VATS/ Pleurx cath, CHF, CVA, DM, ETOH abuse. Now w/ worsening mental status/agitation/combativeness x past few days; repeat MRI w/ old infarcts only, EEG suggestive of metabolic/toxic enceph.  NG placed 6/1, self removed this AM; plan for NG 
Consult received and chart reviewed. Visit made to the bedside. Significant other Radha, and 2 brother-in-laws at the bedside. Discussed hospice philosophy, services and compassionate extubation discussed. Family would like to move forward with compassionate extubation at the hospice house. Call placed to REGINA Vivar, she has accepted the pt for compassionate extubation at the hospice [place]. Please leave all IV access and tamayo intact. Please send pt on versed gtt. Please hang a new bag of versed if possible just prior to d/c. N2N provided by [facility] liaison. Transport docs will be in soft chart. Pt will transport via [facility] at 12:30pm . Updates provided to bedside nurse and CM.     Electronically signed by Raisa Acevedo RN on 6/11/2024 at 11:23 AM  [phone]  
Dr. Ramirez notified of magnesium of 1.4 Electronically signed by Lissette Rivera RN on 5/18/2024 at 9:22 AM    
EEG completed.  Report to follow.  Zenaida Colon    
EEG completed. Report to follow.  Samantha Keith 6/10/2024     
I have stopped by to visit the patient on several occasions. He has been resting, unable to communicate. I give him presence, a moment just to be in the room.    I reached out by phone to the contact list, to a significant other. She did not answer. I will continue to serve the patient and supporting members of his [place].    If you need additional support, you may reach out to us at [facility], x [phone].     Matt Izquierdo; MATEO Watt       I was back in the unit making rounds and met the family. I offered support and connection.  They were grateful.   
Internal Medicine Progress Note    Patient's name: Catarino Bolanos  : 1950  Admission date: 2024  Date of service: 2024   Room: [address]  Primary care physician: Colt Goldman DO  Synopsis: Patient is a 74-year-old gentleman with past medical history of COPD, atrial fibrillation, prior history of right VATS/Pleurx catheter placement for trapped lung in 2018,He presented to ER with chief complaint of weakness and difficulty breathing. He had recent admission to hospital secondary to anemia in setting of possible GI bleed, no endoscopy was completed as patient remained stable and H&H remained stable. He remains on Eliquis. And He drinks alcohol on a daily basis , he fell in room, CT head with no acute findings.  -significant event in the morning, RRT called for altered mental status, possible seizure, he was intubated for impending respiratory failure and transferred to ICU. Shortly, extubated on , remains altered, fluctuating mental status.  MRI brain WO 2024 does not show any acute findings  EEG on 2024 consistent with metabolic/toxi encephalopathy, no epileptiform or seizure recorded.    Subjective  AF with RVR this AM. Patient otherwise asymptomatic.     Review of Systems  10 point ROS negative     Hospital Medications  Current Facility-Administered Medications   Medication Dose Route Frequency Provider Last Rate Last Admin    metoprolol succinate (TOPROL XL) extended release tablet 75 mg  75 mg Oral BID Simi Blanco MD   75 mg at 24 0737    apixaban (ELIQUIS) tablet 5 mg  5 mg Oral BID Delmer Bradley MD   5 mg at 24 0737    chlordiazePOXIDE (LIBRIUM) capsule 25 mg  25 mg Oral 4x daily Maggie Mason MD   25 mg at 24 1259    sodium chloride flush 0.9 % injection 5-40 mL  5-40 mL IntraVENous 2 times per day Maggie Mason MD   10 mL at 24 0738    sodium chloride flush 0.9 % injection 5-40 mL  5-40 mL IntraVENous PRN Maggie Mason MD        
Internal Medicine Progress Note    Patient's name: Catarino Bolanos  : 1950  Admission date: 2024  Date of service: 2024   Room: [address] MICU  Primary care physician: Colt Goldman DO  Synopsis: Patient is a 74-year-old gentleman with past medical history of COPD, atrial fibrillation, prior history of right VATS/Pleurx catheter placement for trapped lung in 2018,He presented to ER with chief complaint of weakness and difficulty breathing. He had recent admission to hospital secondary to anemia in setting of possible GI bleed, no endoscopy was completed as patient remained stable and H&H remained stable. He remains on Eliquis. And He drinks alcohol on a daily basis , he fell in room, CT head with no acute findings.  -significant event in the morning, RRT called for altered mental status, possible seizure, he was intubated for impending respiratory failure and transferred to ICU. Shortly, extubated on , remains altered, fluctuating mental status.  MRI brain WO 2024 does not show any acute findings  EEG on 2024 consistent with metabolic/toxi encephalopathy, no epileptiform or seizure recorded.        Subjective  Catarino was seen and examined at bedside. Pt was seen in MICU bed, on room air, awake, still gets confused and agitated often requiring ativan and precedex in ICU.     Updated wife at bedside. Discussed with ICU team.     I have personally reviewed and interpreted the most recent labs and imaging studies as summarized below:     WBC 3.8k, H/H STABLE at 7.6/24.8, platelet stable at 108K  Renal function STABLE ckd2  Hepatic function   STABLE, WITH LOW albumin 3.3 and low total protein n5.3.     CXR ON  reviewed Loculated fluid consolidation along the minor fissure is redemonstrated.    EKG reviewed A FIB    Last ECHO WAS 2023 with LVEF 55 - 60%, SMALL pericardial effusion and moderate MR.       Review of Systems  There are no new complaints of chest pain, chest 
Internal Medicine Progress Note    Patient's name: Catarino Bolanos  : 1950  Admission date: 2024  Date of service: 2024   Room: [address] MICU  Primary care physician: Colt Goldman DO  Synopsis: Patient is a 74-year-old gentleman with past medical history of COPD, atrial fibrillation, prior history of right VATS/Pleurx catheter placement for trapped lung in 2018,He presented to ER with chief complaint of weakness and difficulty breathing. He had recent admission to hospital secondary to anemia in setting of possible GI bleed, no endoscopy was completed as patient remained stable and H&H remained stable. He remains on Eliquis. And He drinks alcohol on a daily basis , he fell in room, CT head with no acute findings.  -significant event in the morning, RRT called for altered mental status, possible seizure, he was intubated for impending respiratory failure and transferred to ICU. Shortly, extubated on , remains altered, fluctuating mental status.  MRI brain WO 2024 does not show any acute findings  EEG on 2024 consistent with metabolic/toxi encephalopathy, no epileptiform or seizure recorded.    Subjective  Catarino was seen and examined at bedside. Pt was seen in MICU bed, saturation remains good on room air, mental status seems to be better today, wife at bedside, feedng him, not agitated this morning, wife also feels he is better today    I have personally reviewed and interpreted the most recent labs and imaging studies as summarized below:     WBC 4.3k, H/H STABLE between 7 -/-26, platelet stable slightly lower at 97K than baseline 105-110  Renal function STABLE ckd2, K 3.5, Na normalized.   Hepatic function STABLE, WITH LOW albumin 3.3 and low total protein 5.3.     CXR ON  reviewed Loculated fluid consolidation along the minor fissure is redemonstrated.    EKG reviewed A FIB    Last ECHO WAS 2023 with LVEF 55 - 60%, SMALL pericardial effusion and moderate MR. 
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 He had recent admission to hospital secondary to anemia in setting of possible GI bleed, no endoscopy was completed as patient remained stable and H&H remained stable. He remains on Eliquis. And He drinks alcohol on a daily basis , he fell in room, CT head with no acute findings.  -significant event in the morning, RRT called for altered mental status, possible seizure, he was intubated for impending respiratory failure and transferred to ICU. Shortly, extubated on , remains altered, fluctuating mental status.  MRI brain WO 2024 does not show any acute findings  EEG on 2024 consistent with metabolic/toxi encephalopathy, no epileptiform or seizure recorded.    Subjective  Catarino was seen and examined at bedside. Pt was seen in MICU bed, saturation is good on room air, mental status seems to be better today, not agitated this morning, wife also feels he is better today, discussed with ICU team at bedside, pt is being weaned off on precedex gtt.     I have personally reviewed and interpreted the most recent labs and imaging studies as summarized below:     WBC 3.5k, H/H STABLE between  --, platelet stable above 100K  Renal function STABLE ckd2, K 3.3, Na normalized.   Hepatic function   STABLE, WITH LOW albumin 3.3 and low total protein 5.3.     CXR ON  reviewed Loculated fluid consolidation along the minor fissure is redemonstrated.    EKG reviewed A FIB    Last ECHO WAS 2023 with LVEF 55 - 60%, SMALL pericardial effusion and 
Intubation Record    Date: 5/21/24  Time: 445  Patient identity confirmed: Yes  Indications: respiratory failure   Preoxygenation: BVM with 100% O2   Laryngoscope size and type: cmac  Airway introducer used: Yes  Evac: Yes  Tube size: 8  Number of attempts :1  Cords visualized:  [x]Clearly [] Poorly   Breath sounds present bilaterally: Yes  ETCO2 41  ETT to lip: 26  Tube secured with: tube holister  Chest x-ray ordered: Yes  Difficulties encountered:       Difficult Airway: No. No trauma to airway or teeth.          Say Khan, DALTON, RRT  
Keeps taking off pulse ox despite nurse placing it back on.  
[facility]  Neuro Inpatient Follow Up       This 74-year-old right-handed male was referred for possible seizures.    He was now awakening and participating in the exam, slowly following commands. He provided a minimal history.  There were no episodes overnight send my: He was not agitated.  He was experiencing visual hallucinations consisting of bugs and wires on the ceiling.  All questions were answered.  The patient and family verbalized understanding.      Objective:     /73   Pulse 88   Temp 97.7 °F (36.5 °C) (Temporal)   Resp 18   Ht 1.651 m (5' 5\")   Wt 75.5 kg (166 lb 7.2 oz)   SpO2 94%   BMI 27.70 kg/m²     General appearance: alert, cooperative and in no distress  Lungs: nonlabored, speaking without shortness of breath    Mental Status: Alert, was able to state month, year, president. He was aware of family present in room.  Slow to follow commands, but he was able to follow 2-3 step commands    Speech: mild dysarthria, no dentures in place    Cranial Nerves:  I: smell NA   II: visual acuity  NA   II: visual fields Full to confrontation   II: pupils MARK   III,VII: ptosis None   III,IV,VI: extraocular muscles  Full ROM   V: mastication Normal   V: facial light touch sensation  Normal   V,VII: corneal reflex     VII: facial muscle function - upper  Normal   VII: facial muscle function - lower Normal   VIII: hearing Normal   IX: soft palate elevation  Normal   IX,X: gag reflex    XI: trapezius strength  5/5   XI: sternocleidomastoid strength 5/5   XI: neck extension strength  5/5   XII: tongue strength  Normal     Motor:  Right   4/5              Left   4/5               Right Bicep  4/5           Left Bicep  4/5              Right Triceps   4/5       Left Triceps  4/5          Right Deltoid  4-/5     Left Deltoid  4-/5                    Right Quadriceps  4+/5          Left Quadriceps    4+/5           Right Gastrocnemius    5/5    Left Gastrocnemius   
Lab here to draw second set of blood cultures patient refusing.   
[facility]   Department of Internal Medicine   Internal Medicine Residency  MICU Progress Note    Patient:  Catarino Bolanos 74 y.o. male   MRN: [id number]       Date of Service: 2024   Admission date: 2024  5:54 PM ; Hospital day: 6   ICU day: 1d 15h    Allergy: Flexeril [cyclobenzaprine], Lisinopril, Influenza virus vaccine, Metformin and related, and Zocor [simvastatin]    Subjective   Overnight:    Bladder scan 350cc, straight cath 400cc.   Episode of somnolence, no concern for seizure   2 of ativan. Agitation Persisted, restarted precedex lowest rate 0159, up at 230, another 1 mg of ativan 0214.   Mg replaced    Today:    Catarino Bolanos was seen and examined at bedside today morning, was having EEG. On evaluation, he was still somnolent, hemodynamically stable, on Precedex drip, not following commands, was doing non-purposeful movements. Wife at bedside, updated about patients.     Objective   PHYSICAL EXAM  General Appearance:  agitated, on precedex  HEENT: Normocephalic, atraumatic  Neck: midline trachea  Lung: clear to auscultation bilaterally  Heart: regular rate and rhythm, no murmur  Abdomen: soft, bowel sounds normal; no masses  Extremities: no cyanosis or edema  Musculokeletal: No joint swelling  Neurologic: Mental status: not alert, and oriented, does not follow commands, non-purposeful movements and non-meaningful words.    CARDIOVASCULAR  Pulse rate range      : Pulse  Av.3  Min: 63  Max: 114  BP range                  : Systolic (24hrs), Av , Min:93 , Max:147   ; Diastolic (24hrs), Av, Min:49, Max:88    Arterial BP       Systolic BP/Diastolic BP & MAP    PULMONARY  Respiration range   : Resp  Av.8  Min: 10  Max: 23  Pulse Ox range : SpO2  Av.6 %  Min: 65 %  Max: 100 %  Recent Labs     24  0328 24  0655 24  1434   PH 7.413 7.470* 7.347*   PCO2 39.1 33.9* 42.1   PO2 109.1* 237.6* 121.2*   HCO3 24.4 24.1 22.6   BE -0.1 0.6 -2.9 
[facility]   Department of Internal Medicine   Internal Medicine Residency  MICU Progress Note    Patient:  Catarino Bolanos 74 y.o. male   MRN: [id number]       Date of Service: 2024   Admission date: 2024  5:54 PM ; Hospital day: 10   ICU day: 5d 5h    Allergy: Flexeril [cyclobenzaprine], Lisinopril, Influenza virus vaccine, Metformin and related, and Zocor [simvastatin]    Subjective   Overnight:  K , mg replaced    Today:    Catarino Bolanos was seen and examined at bedside today morning, he is more oriented and less agitated , follows commands   Objective   PHYSICAL EXAM  General Appearance:  agitated, on precedex  HEENT: Normocephalic, atraumatic  Neck: midline trachea  Lung: clear to auscultation bilaterally  Heart: regular rate and rhythm, no murmur  Abdomen: soft, bowel sounds normal; no masses  Extremities: no cyanosis or edema  Musculokeletal: No joint swelling  Neurologic: Mental status: not alert, and oriented,he is not cooperative     CARDIOVASCULAR  Pulse rate range      : Pulse  Av.2  Min: 63  Max: 115  BP range                  : Systolic (24hrs), Av , Min:106 , Max:159   ; Diastolic (24hrs), Av, Min:70, Max:119    Arterial BP       Systolic BP/Diastolic BP & MAP    PULMONARY  Respiration range   : Resp  Av  Min: 3  Max: 24  Pulse Ox range : SpO2  Av.1 %  Min: 86 %  Max: 100 %  No results for input(s): \"PH\", \"PCO2\", \"PO2\", \"HCO3\", \"BE\", \"O2SAT\", \"THB\" in the last 72 hours.    Invalid input(s): \"PFRATIO\"    Vent Settings  FiO2 : 50 %  Resp Rate (Set): 16 bpm  Vt (Set, mL): 400 mL  PEEP/CPAP (cmH2O): 5    NEPHROLOGY (FLUIDS/ELECTROLYTES & NUTRITION)  Urine: 175 mL   I & O - 24hr:    Intake/Output Summary (Last 24 hours) at 2024 0945  Last data filed at 2024 0600  Gross per 24 hour   Intake 2152.8 ml   Output 1650 ml   Net 502.8 ml     Net IO Since Admission: 4,239.42 mL [24 0945]  Recent Labs     24  1408 24  0400 24  7503 
MRI screening needs completed, thank you.  
[facility]  Neuro Inpatient Follow Up        Catarino Bolanos is a 74-year-old right-handed man with again follow-up for possible seizures.    The patient was now waking, following few simple commands.  He still could provide no meaningful history.    His wife noted erratic eating habits with his drinking.    He remained lethargic, but more arousable; he was extubated approximately 30 minutes prior to exam.     His wife denied other pertinent issues.    The nursing staff reported he was stable    Objective:     /78   Pulse 74   Temp 97.2 °F (36.2 °C) (Temporal)   Resp 10   Ht 1.651 m (5' 5\")   Wt 75.5 kg (166 lb 7.2 oz)   SpO2 95%   BMI 27.70 kg/m²     His skin remained unchanged.  He displayed a barrel chest, slightly decreased breath sounds throughout    Mental Status: lethargic, following a few simple commands and speaking briefly.    There for no cranial nerve abnormalities.  He was moving all limbs well without abnormal movements.  There were no ataxic movements.  He appreciated light touch and pinprick commands to squeeze fingers,       Laboratory/Radiology:     MRI and EEG are still pending      Assessment:       The patient is clinically improving, awakening now responding appropriately although only minimally.  He still must rule out hypoxic insult and postictal status.  Hopefully, his MRI EEG will be performed today.    He appears improved from his alcohol withdrawal as well.    Plan:     -MRI brain wo contrast, EEG pending   -DVT prophylaxis  -continue [facility] protocol      Neuro to follow    Again I discussed in detail with his wife and the PA the findings as noted above.    Kaur Frank PA-C,   12:07 PM  5/22/2024    I spent 30 minutes with the patient with over 50 % spent in counseling and disease management.  All patient issues were addressed and all questions were answered.  
[facility]  Department of Internal Medicine   Internal Medicine Residency   MICU Progress Note    Patient:  Catarino Bolanos 74 y.o. male  MRN: [id number]     Date of Service: 6/2/2024    Allergy: Flexeril [cyclobenzaprine], Lisinopril, Influenza virus vaccine, Metformin and related, and Zocor [simvastatin]    Subjective     Patient was seen during in the morning during rounds.  He was oriented to himself, but not time and place. He is hemodynamically stable. Hb was 6.8, 1 units pRBC was given. Awaiting repeat MRI.    Objective     VS: /70   Pulse 82   Temp 98.8 °F (37.1 °C) (Axillary)   Resp 25   Ht 1.651 m (5' 5\")   Wt 74.3 kg (163 lb 12.8 oz)   SpO2 99%   BMI 27.26 kg/m²           I & O - 24hr:   Intake/Output Summary (Last 24 hours) at 6/2/2024 2308  Last data filed at 6/2/2024 2000  Gross per 24 hour   Intake 866.45 ml   Output 1330 ml   Net -463.55 ml         Physical Exam:  General Appearance:  spontaneous eye opening, oriented to himself.  Neck: no adenopathy, no carotid bruit, no JVD, supple, symmetrical, trachea midline, and thyroid not enlarged, symmetric, no tenderness/mass/nodules  Lung: clear to auscultation bilaterally  Heart: regular rate and rhythm, S1, S2 normal, no murmur, click, rub or gallop  Abdomen: soft, non-tender; bowel sounds normal; no masses,  no organomegaly  Extremities:  extremities normal, atraumatic, no cyanosis or edema  Musculoskeletal: No joint swelling, no muscle tenderness. ROM normal in all joints of extremities.   Neurologic: Mental status: spontaneous eye opening, doesn't follows command.    Lines     site day    Art line   None    TLC None    PICC None    Hemoaccess None      ABG:     Lab Results   Component Value Date/Time    PH 7.347 05/21/2024 02:34 PM    OQE8SJH 35.8 09/07/2018 11:13 PM    PCO2 42.1 05/21/2024 02:34 PM    PO2ART 78.4 09/07/2018 11:13 PM    PO2 121.2 05/21/2024 02:34 PM    QFF1UNX 26.2 09/07/2018 11:13 PM    HCO3 22.6 05/21/2024 
[facility]  Neuro Inpatient Follow Up        Catarino Bolanos is a 74 y.o. right handed male     Neuro is following for possible seizures    He is lethargic today and only minimally participating in exam. The patient remains in ICU.     Nursing reported that yesterday afternoon, he once again became combative and agitated. Precedex was restarted and patient has been placed in restraints.     Family was at bedside and denied pertinent negatives.     Objective:     BP (!) 114/59   Pulse 82   Temp 98.2 °F (36.8 °C) (Temporal)   Resp 21   Ht 1.651 m (5' 5\")   Wt 75.5 kg (166 lb 7.2 oz)   SpO2 98%   BMI 27.70 kg/m²     General appearance: alert, appears stated age, cooperative and no distress  Head: normocephalic, without obvious abnormality, atraumatic  Eyes: conjunctivae/corneas clear. .  Lungs: nonlabored  Skin: color, texture, turgor normal---no rashes or lesions      Mental Status: lethargic, will intermittently follow commands and shake head yes or no when questioned. Currently on precedex    Cranial Nerves:  I: smell NA   II: visual acuity  NA   II: visual fields Bilateral threat   II: pupils MARK   III,VII: ptosis    III,IV,VI: extraocular muscles  Will look around room   V: mastication    V: facial light touch sensation     V,VII: corneal reflex  Present   VII: facial muscle function - upper     VII: facial muscle function - lower    VIII: hearing    IX: soft palate elevation     IX,X: gag reflex Present   XI: trapezius strength     XI: sternocleidomastoid strength    XI: neck extension strength     XII: tongue strength       Motor:   3+/5  Normal bulk, spasticity to B/L LE  No abnormal movements     Sensory:  Reacts appropriately to noxious stimuli in all extremities    DTR:   Right Brachioradialis reflex 2+  Left Brachioradialis reflex 2+  Right Biceps reflex 2+  Left Biceps reflex 2+  Right Triceps reflex 2+  Left Triceps reflex 2+  Right Quadriceps reflex 3+  Left 
NG insertion attempted by 4 different RNs all unsuccessful.   
Nurse to nurse called to [facility]   
Occupational Therapy  Attempted OT tx session at b/s, however, pt remains agitated, aggressive, and continues to pull at lines - not appropriate to participate in OT services at this time.  Will attempt tx session on a later date.  Thank you for this referral. Masha Ulloa, MOT, OTR/L  # [id number]     
Occupational Therapy  OT BEDSIDE TREATMENT NOTE   [facility]  [address] [place], [place]        Date:2024  Patient Name: Catarino Bolanos  MRN: [id number]  : 1950  Room: [address] [place] [address]-A     Attempted to see pt this AM, nursing placing of hold on pt at this time, stating not appropriate for therapy currently, possible need for RRT. Will attempt at a later date/time.     Milana MIRZA/DINESH [id number]    
Occupational Therapy  Received OT order, Reviewed Chart.  Noted pt aggressive w/ staff when taken out of restraints earlier this date.  Not appropriate to participate in assessment at this time.  Will attempt OT assessment at a later time.  Thank you for this referral.  Masha Ulloa, TWIN, OTR/L  # [id number]    
Orthostatic Vitals:      5/18/2024     6:14 AM   Orthostatic Vitals   Orthostatic B/P and Pulse? Yes   Blood Pressure Lying 137/80   Pulse Lying 80 PER MINUTE   Blood Pressure Sitting 98/59   Pulse Sitting 115 PER MINUTE   Blood Pressure Standing 117/78   Pulse Standing 90 PER MINUTE       
Patient became very agitated and kicking legs over side rail and attempting to get out of bed. Medical residents made aware, transfer order cancelled, and bilateral ankle restraints placed while bilateral soft wrist restraints remain in place for patient safety. Geodon administered per MAR as well as ativan, please see MAR for details. Patient is still yelling out and agitated but not flailing legs as much.   
Patient continues to pull at lines and tube and necessary medical devices when unrestrained despite verbal attempts to deter. Bilateral soft wrist restraints continued for patient safety.   
Patient continues to pulled at tubes and lines when not restrained as well as trying to get out of bed to leave the hospital. Restraints continued at this time.  
[facility]. Patient transported via [facility]. Patient transferred with versed gt infusing. Dentures sent with patient.   
Patient found by nursing staff hitting on the floor in his room. States he was getting up and got tangled in his wires so he sat down. Denies hitting his head however patient is confused at times and has a hx of alcohol abuse with a current CIWA for withdrawal. Is an Eliquis patient with it currently being on hold.    R. Marrow NP notified. New orders at this time.   Supervisor notified at this time.   Partner Radha notified at this time. All questions answered.     
Patient found pulling off clothing/telemetry monitor, nasal canula that is currently supplying 3 L of supplemental oxygen-IV catheter is lying in the bed with other medical equipment-monitor/leads/gown. Patient is severely agitated.RN attempted to talk to patient-patient responding that he is leaving now. RN again attempts to educate but patient is not heeding suggestions to stay in bed and allow RN to take vitals. Patient escalates quickly to swinging and attempting to hit nurse. RN sought help from other nursing personnel-2 point soft wrist restraints applied with the help of several RNs. Charge obtained order for restraints. At time of applying restraints, patient is attempting to kick as well as bite anyone helping in the process of restraining patient. Vitals obtained-CIWA scale evaluated. IV access acquired. Ativan given via IV access according to CIWA scale.Patient is lying quietly with bilat soft wrist restraints not showing any agitation until nursing speaks to him. Verbally threatening to  physically hurt nurse and to damage equipment. Patient's primary decision maker, Radha, was notified via phone conversation of need to restrain pt at this time.  
Patient has not voided since start of shift. Patient bladder scanned x 3 but not sufficient volume to straight cath. Patient received bumex this as but has not voided. Dr. Carver notified and ordered tamayo catheter insertion and nephrology consult.   
Patient having periods of agitation, pulling off gown, telemetry, & oxygen.  Attempting to get out of bed.  Patient gets verbally aggressive when attempting to re-direct.  Bilateral wrist restraints maintained for patient safety.  Will continue to monitor.    
Patient noted to be pulling at IV tubing while rounding. Asked patient to refrain from doing so. Patient became combative, punching, kicking, spitting at, and attempting to bite staff. Patient placed back in 4-point restraints at this time, Precedex drip reordered and ativan administered per CIWA scale. Patient remains agitated but stable at this time.   
Patient pulled out NG tube. No route for medication administration until 1400. Reviewed medication list with pharmacist. Times have been adjusted and type appropriate for NG feeding.   
Patient reaches for tube and lines and necessary medical equipment when unrestrained despite verbal attempts to deter. Soft bilateral wrist restraints continued for patient safety.   
Patient refusing to take medications, after multiple attempts. Yelling out and states he won't take anything until he gets home.   
Patient rrt for  r/o seizure.  Patient is unresponsive.  Does not respond to pain. Vs wnl, afebrile.  Patient became stiff and appeared to be posturing.  Patient then intubated for airway protection. 8/26 at lip.  Unable to pass ogt, ngt.at this time with several attempts.  Will await iv team in am for possible corpack placement.  Labs drawn Will continue to monitor status.  
Patient still attempting to pull out NG tube and get out of bed.  Patient is not redirectable at this time.  Bilateral soft wrist restraints remain in place.  
Patient transferred out of [facility].  Upon arrival to unit, patient very loud, & calling out.  Patient confused & uncooperative.  Continuously pulling off sheet & laying naked in bed.  Unable to verbally redirect patient.  Patient attempting to bite staff, & swing at staff.  Four point soft restraints maintained for patient safety.  Order obtained for Ativan (IV) 1 mg x1.  Will continue to monitor.  
Patient transferred to [facility]. Nurse to nurse report given.  
Patient will reach at lines and tubes needing to be redirected often. Attempting to provide calm environment and reorientation, but patient still assessed to be a risk of harm to self. Will continue to monitor patient and assess for earliest removal capability.  
Pharmacy Consultation Note  (Antibiotic Dosing and Monitoring)    Initial consult date: 5/30  Consulting physician/provider: Maggie Mason  Drug: Vancomycin  Indication: HAP    Age/  Gender Height Weight IBW  Allergy Information   74 y.o./male @FLOW(11:first:1)@ @FLOW[14:FIRST:1@     Ideal body weight: 61.5 kg (135 lb 9.3 oz)  Adjusted ideal body weight: 67.9 kg (149 lb 9.7 oz)   Flexeril [cyclobenzaprine], Lisinopril, Influenza virus vaccine, Metformin and related, and Zocor [simvastatin]      Renal Function:  Recent Labs     05/28/24  0041 05/28/24  0448 05/29/24  0508   BUN 7 8 12   CREATININE 0.8 0.9 1.1       Intake/Output Summary (Last 24 hours) at 5/30/2024 1621  Last data filed at 5/29/2024 1639  Gross per 24 hour   Intake 418.99 ml   Output --   Net 418.99 ml       Vancomycin Monitoring:  Trough:  No results for input(s): \"VANCOTROUGH\" in the last 72 hours.  Random:  No results for input(s): \"VANCORANDOM\" in the last 72 hours.    Vancomycin Administration Times:  Recent vancomycin administrations        No vancomycin IV orders with administrations found.            Orders not given:            vancomycin (VANCOCIN) 2,000 mg in sodium chloride 0.9 % 500 mL IVPB    vancomycin (VANCOCIN) 1500 mg in sodium chloride 0.9 % 250 mL IVPB                    Assessment:  Patient is a 74 y.o. male who has been initiated on vancomycin  Estimated Creatinine Clearance: 56 mL/min (based on SCr of 1.1 mg/dL).  To dose vancomycin, pharmacy will be utilizing [employer] calculation software for goal AUC/ZAHIDA 400-600 mg/L-hr (predicted AUC/ZAHIDA = 555, Tr =14.2 mcg/mL)    Plan:  1x loading dose of Vancomycin 2,000mg  Will continue vancomycin 1500 mg IV every 24 hours  Will check vancomycin levels Sat 6/1 @0600  Will continue to monitor renal function   Pharmacy to follow      Kannan Teran RPH 5/30/2024 4:21 PM    TELLO: [phone]  SEY: [phone]  SJW: [phone]    
Pharmacy Consultation Note  (Antibiotic Dosing and Monitoring)    Initial consult date: 5/30  Consulting physician/provider: Maggie Mason  Drug: Vancomycin  Indication: HAP    Age/  Gender Height Weight IBW  Allergy Information   74 y.o./male @FLOW(11:first:1)@ @FLOW[14:FIRST:1@     Ideal body weight: 61.5 kg (135 lb 9.3 oz)  Adjusted ideal body weight: 68.1 kg (150 lb 3.5 oz)   Flexeril [cyclobenzaprine], Lisinopril, Influenza virus vaccine, Metformin and related, and Zocor [simvastatin]      Renal Function:  Recent Labs     05/29/24  0508 05/31/24  0315   BUN 12 13   CREATININE 1.1 1.1         Intake/Output Summary (Last 24 hours) at 5/31/2024 0948  Last data filed at 5/31/2024 0456  Gross per 24 hour   Intake 1059.53 ml   Output 400 ml   Net 659.53 ml         Vancomycin Monitoring:  Trough:  No results for input(s): \"VANCOTROUGH\" in the last 72 hours.  Random:  No results for input(s): \"VANCORANDOM\" in the last 72 hours.    Vancomycin Administration Times:    Recent vancomycin administrations                     vancomycin (VANCOCIN) 1500 mg in sodium chloride 0.9 % 250 mL IVPB (mg) 1,500 mg New Bag 05/31/24 0930    vancomycin (VANCOCIN) 2,000 mg in sodium chloride 0.9 % 500 mL IVPB (mg) 2,000 mg New Bag 05/30/24 1754                  Assessment:  Patient is a 74 y.o. male who has been initiated on vancomycin  Estimated Creatinine Clearance: 56 mL/min (based on SCr of 1.1 mg/dL).  To dose vancomycin, pharmacy will be utilizing [employer] calculation software for goal AUC/ZAHIDA 400-600 mg/L-hr (predicted AUC/ZAHIDA = 555, Tr =14.2 mcg/mL)  5/31: Scr 1.1    Plan:  Will continue vancomycin 1500 mg IV every 24 hours  Will check vancomycin levels Sat 6/1 @0600  Will continue to monitor renal function   Pharmacy to follow      Thank you for the consult,     Margarito Nuñez, PharmD, BCPS, BCCCP 5/31/2024 9:49 AM      
Physical Therapy    Medical chart reviewed for PT treatment on 6/4/24. Pt remains agitated, aggressive, and has been pulling at lines. Will re-attempt as able when pt is appropriate for therapy treatment. Thank you.    Raven Hamilton, PT, DPT  [id number]      
Physical Therapy    Medical chart reviewed for PT treatment on 6/5/24. Pt remains agitated, aggressive, has been pulling at lines, and is currently in restraints. Will re-attempt as able when pt is appropriate for therapy treatment. Thank you.    Raven Hamilton, PT, DPT  [id number]      
Physical Therapy    PT consult to evaluate/treat received and appreciated.  Pt chart reviewed and evaluation attempted.  Pt is currently on hold d/t agitation and combativeness.  Will check back as able.  Thank you.        Hunter Romero, PT, DPT   [id number]       
Physical Therapy    PT consult to evaluate/treat received and appreciated.  Pt chart reviewed and evaluation attempted.  Pt on hold after multiple attempts d/t agitation, combativeness, and no command following.  PT will sign of for now, please re-consult when pt able to participate in PT evaluation and OOB mobility.  Thank you.        Hunter Romero, PT, DPT   [id number]       
Physical Therapy    PT order received and medical chart reviewed on 5/20/24. Pt in restraints and agitated at this time. Will hold PT evaluation this date. Will re-attempt as able. Thank you.    Raven Hamilton, PT, DPT  [id number]      
Physical Therapy    Physical Therapy Daily Treatment Note      Name: Catarino Bolanos  : 1950  MRN: [id number]      Date of Service: 2024    Evaluating PT:  Hunter Romero, PT, DPT  [id number]     Room #:  4423/4423-A  Diagnosis:  Syncope and collapse [R55]  Pleural effusion [J90]  Hypoxia [R09.02]  Acute respiratory failure with hypoxia (HCC) [J96.01]  PMHx/PSHx:   has a past medical history of Anxiety, Atrial fibrillation and flutter (HCC), Depression, GERD (gastroesophageal reflux disease), GI bleed, Heart palpitations, HTN (hypertension), Hyperlipidemia, Lightheadedness, OA (osteoarthritis), Parathyroid adenoma, Pre-diabetes, Recurrent left pleural effusion, and 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   Procedure/Surgery:  Intubated ;  Extubated   Precautions:  Falls, Cognition, Seizure precautions, 2 pt soft restraint   Equipment Needs:  TBD    SUBJECTIVE:    Pt lives with his wife in a 1 story home with 6 stairs and 1 rail to enter.  Bedroom and bathroom are on the 1st level.  Pt ambulated with FWW PRN PTA.    OBJECTIVE:   Initial Evaluation  Date: 24 Treatment  24 Short Term/ Long Term   Goals   AM-PAC 6 Clicks 10/24 9/24    Was pt agreeable to Eval/treatment? Yes  Yes     Does pt have pain? No c/o pain No c/o pain    Bed Mobility  Rolling: NT  Supine to sit: Mod A  Sit to supine: Mod A  Scooting: Mod A Rolling: NT  Supine to sit: Mod A  Sit to supine: Mod A  Scooting: Mod A Rolling: SBA  Supine to sit: SBA  Sit to supine: SBA  Scooting: SBA   Transfers Sit to stand: Mod A x2  Stand to sit: Mod A x2   Stand pivot: NT Sit to stand: Mod A x2  Stand to sit: Mod A x2   Stand pivot: NT Sit to stand: SBA  Stand to sit: SBA  Stand pivot: SBA with AAD   Ambulation    Few side steps with FWW Mod A Unable  >100 feet with AAD Min A   Stair negotiation: ascended and descended  NT NT >4 steps with 1 rail Min A   ROM BUE:  Per OT eval   BLE:  WFL     Strength BUE:  
Physical Therapy    Physical Therapy Initial Assessment     Name: Catarino Bolanos  : 1950  MRN: [id number]      Date of Service: 2024    Evaluating PT:  Hunter Romero, PT, DPT  [id number]     Room #:  4423/4423-A  Diagnosis:  Syncope and collapse [R55]  Pleural effusion [J90]  Hypoxia [R09.02]  Acute respiratory failure with hypoxia (HCC) [J96.01]  PMHx/PSHx:   has a past medical history of Anxiety, Atrial fibrillation and flutter (HCC), Depression, GERD (gastroesophageal reflux disease), GI bleed, Heart palpitations, HTN (hypertension), Hyperlipidemia, Lightheadedness, OA (osteoarthritis), Parathyroid adenoma, Pre-diabetes, Recurrent left pleural effusion, and 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   Procedure/Surgery:  Intubated ;  Extubated   Precautions:  Falls, Cognition, Seizure precautions   Equipment Needs:  TBD    SUBJECTIVE:    Pt lives with his wife in a 1 story home with 6 stairs and 1 rail to enter.  Bedroom and bathroom are on the 1st level.  Pt ambulated with FWW PRN PTA.    OBJECTIVE:   Initial Evaluation  Date: 24 Treatment Short Term/ Long Term   Goals   AM-PAC 6 Clicks 10/24     Was pt agreeable to Eval/treatment? Yes      Does pt have pain? No c/o pain     Bed Mobility  Rolling: NT  Supine to sit: Mod A  Sit to supine: Mod A  Scooting: Mod A  Rolling: SBA  Supine to sit: SBA  Sit to supine: SBA  Scooting: SBA   Transfers Sit to stand: Mod A x2  Stand to sit: Mod A x2   Stand pivot: NT  Sit to stand: SBA  Stand to sit: SBA  Stand pivot: SBA with AAD   Ambulation    Few side steps with FWW Mod A  >100 feet with AAD Min A   Stair negotiation: ascended and descended  NT  >4 steps with 1 rail Min A   ROM BUE:  Per OT eval   BLE:  WFL     Strength BUE:  Per OT eval   BLE:  WFL     Balance Sitting EOB:  SBA  Dynamic Standing:  Mod A with FWW  Sitting EOB:  Supervision  Dynamic Standing:  SBA with AAD     Pt is A & O x 3  RASS:  0  CAM-ICU:  
Physical Therapy    Pt in need of PT re-evaluation.  Pt on hold d/t change in status requiring transfer back to intensive care with intubation.  Will attempt back as able.    Hunter Romero, PT, DPT  [id number]     
Physical Therapy    Pt on PT caseload for Sunday treatment. RN reported that pt is not medically stable for PT treatment at this time. Will hold for medical stability.     Amber Ferguson PT, DPT  [id number]     
Physical Therapy    Pt on PT caseload.  Pt on hold d/t increased agitation this AM.  Will attempt back as able.    Hunter Romero, PT, DPT  [id number]     
Pt bradying down into the 30s Resident notified. Resident instructed this nurse to get EKG. Will continue to monitor.  
Pt bradying down into the 40s. Precedex turned off. Resident notified. Will continue to monitor.  
Pt confused and on CIWA. Pt pulling out IV and taking off nasal cannula, immediately desating. Unable to be redirected. Order obtained for soft bilateral wrist restraints for safety of patient. Pt placed in soft bilateral wrist restraints. Electronically signed by Kika Wood RN on 5/19/2024 at 6:47 PM    
Pt continues to reach for lines and tubes despite attempts to deter.  Restraints continued for pt safety. Lisa Elias  
Pt stood at bedside to use urinal. Pt is a strong 1 assist / 2 assist due to unsteadiness on feet.   
[facility]  Department of Internal Medicine   Internal Medicine Residency   MICU Progress Note    Patient:  Catarino Bolanos 74 y.o. male  MRN: [id number]     Date of Service: 5/30/2024    Allergy: Flexeril [cyclobenzaprine], Lisinopril, Influenza virus vaccine, Metformin and related, and Zocor [simvastatin]    Subjective     Patient was seen during in the morning during rounds.  He was not oriented ands confused, was not following any commands. He is hemodynamically stable.    Objective     VS: BP (!) 144/71   Pulse 94   Temp 98.5 °F (36.9 °C) (Temporal)   Resp 20   Ht 1.651 m (5' 5\")   Wt 77.4 kg (170 lb 10.2 oz)   SpO2 96%   BMI 28.40 kg/m²           I & O - 24hr:   Intake/Output Summary (Last 24 hours) at 5/30/2024 2013  Last data filed at 5/30/2024 1853  Gross per 24 hour   Intake 506.64 ml   Output --   Net 506.64 ml         Physical Exam:  General Appearance:  spontaneous eye opening, doesn't follows command, confused and not oriented to time, place or person..  Neck: no adenopathy, no carotid bruit, no JVD, supple, symmetrical, trachea midline, and thyroid not enlarged, symmetric, no tenderness/mass/nodules  Lung: clear to auscultation bilaterally  Heart: regular rate and rhythm, S1, S2 normal, no murmur, click, rub or gallop  Abdomen: soft, non-tender; bowel sounds normal; no masses,  no organomegaly  Extremities:  extremities normal, atraumatic, no cyanosis or edema  Musculoskeletal: No joint swelling, no muscle tenderness. ROM normal in all joints of extremities.   Neurologic: Mental status: spontaneous eye opening, doesn't follows command.    Lines     site day    Art line   None    TLC None    PICC None    Hemoaccess None      ABG:     Lab Results   Component Value Date/Time    PH 7.347 05/21/2024 02:34 PM    WCP6ZOW 35.8 09/07/2018 11:13 PM    PCO2 42.1 05/21/2024 02:34 PM    PO2ART 78.4 09/07/2018 11:13 PM    PO2 121.2 05/21/2024 02:34 PM    CNQ2WHU 26.2 09/07/2018 11:13 PM    HCO3 
Received pt from picu without ngt and reported on reg diet. Pt has the ff skin issues: lalo hematoma with a tear; perineum, buttocks, sacrum, and scrotal rk with tear; healed scar/scab in rle, anterior shin/ft  
[facility]   Department of Internal Medicine   Internal Medicine Residency  MICU Progress Note    Patient:  Catarino Bolanos 74 y.o. male   MRN: [id number]       Date of Service: 2024   Admission date: 2024  5:54 PM ; Hospital day: 9   ICU day: 4d 5h    Allergy: Flexeril [cyclobenzaprine], Lisinopril, Influenza virus vaccine, Metformin and related, and Zocor [simvastatin]    Subjective   Overnight:  X4 unable to place NG.   STAT , nursing was not doing Q6 during day shift. Remains NPO day 2. Did well with bedside swallow. Consider SLP? +full liquid diet with advance as tolerated.  MRI read back.   D5NS @75cc per Dr. Enciso.   Only one line access, consider midline?   Asking to talk with Dr B[place], able to follow and respond appropriately for most of conversation, agrees to rest and wants to eat in the morning, doesn't remember the past 2 days or his wife visiting, seeing bugs crawling on his skin/in the room.   EKG    Ca replaced.   Straight cathed then put on external cath > per nurse \"pure blood\" coming out > removed cath.    Today:    Catarino Bolanos was seen and examined at bedside today morning, was having EEG. On evaluation, he was still somnolent, hemodynamically stable, on Precedex drip, not following commands, was doing non-purposeful movements. Wife at bedside, updated about patients.     Objective   PHYSICAL EXAM  General Appearance:  agitated, on precedex  HEENT: Normocephalic, atraumatic  Neck: midline trachea  Lung: clear to auscultation bilaterally  Heart: regular rate and rhythm, no murmur  Abdomen: soft, bowel sounds normal; no masses  Extremities: no cyanosis or edema  Musculokeletal: No joint swelling  Neurologic: Mental status: not alert, and oriented,he is not cooperative     CARDIOVASCULAR  Pulse rate range      : Pulse  Av.2  Min: 61  Max: 98  BP range                  : Systolic (24hrs), Av , Min:121 , Max:162   ; Diastolic (24hrs), Av, Min:70, 
Removed restraints to perform ROM exercises with pt. Pt immediately tried to climb out of bed and kick this RN. Finished exercises and placed pt back in restraints.    Electronically signed by Ayo Perry RN on 5/20/24 at 5:19 AM EDT     
Report called to Lali POLANCO.   
Spoke with MD Balwinder Carrillo unable to give any medications by mouth at this time. Patient is spitting and trying to bite and cannot redirect or reorient. MD aware of medications not given.     Attempted to remove restraints to perform ROM exercises and ADL's but patient became more agitated and tried to pull out lines and hit staff. Restraints reapplied. Safety maintained.   
Spontaneous Parameters performed    VT = 506 ml  f = 17  B/M  Ve = 7.0 L/M  NIF = -31  cmH2O  VC = unable to do L  RSBI = 43    Audible leak. Followed commands ok.  Dr Fernandez present Done on PSV 5  Performed by Kayce Robbins RCP  
Unable to complete four eyes skin assessment at this time, due to patient's agitation, & physical aggressiveness.  
Vancomycin has been discontinued   Clinical Pharmacy to sign-off  Physician to re-consult pharmacy if future dosing is needed    Thank you for the consult,  Liane Carter PharmD, [facility], BCPS 5/31/2024 5:34 PM    
Visited the patient and indicted that  he is doing better.. he was happy for the visit.    VALENTINE Bajwa.PH,B.TH,[id number]  
Went into patient's room. Wife at bedside. Patient with eyes closed. Responding very little to wife.Oriented to self only. Attempted to give morning meds. Patient wouldn't take them. Wife stated that she would try to give them.  Patient still wouldn't take them. Patient sounded vey rhonchorous. Throat suctioned for small amount tan sputum. Patient remained rhonchorus. Vital signs checked. SpO2 69. Respiratory on Floor. NT suctioned patient. Non-rebreather mask placed. RRT called.    
When patient is unrestrained he reaches for lines and tubes that necessary for treatment, despite verbal redirection. For patients safety, bilateral soft wrist restraints remain in place.    
     BMI Categories: Overweight (BMI 25.0-29.9)    Estimated Daily Nutrient Needs:  Energy Requirements Based On: Formula  Weight Used for Energy Requirements: Current  Energy (kcal/day): MSJ 1423 x 1.3 SF= 4259-5254  Weight Used for Protein Requirements: Ideal  Protein (g/day): 1.3-1.5 g/kg IBW; 80-95  Fluid (ml/day): per critical care    Nutrition Diagnosis:   Moderate malnutrition, In context of chronic illness related to catabolic illness (COPD/ETOH abuse) as evidenced by poor intake prior to admission, mild loss of subcutaneous fat, moderate muscle loss    Nutrition Interventions:   Nutrition Education/Counseling: Education not appropriate  Coordination of Nutrition Care: Continue to monitor while inpatient      Goals:  Previous Goal Met: Progressing toward Goal(s)  Goals: PO intake 50% or greater, by next RD assessment    Nutrition Monitoring and Evaluation:   Food/Nutrient Intake Outcomes: Food and Nutrient Intake, Supplement Intake  Physical Signs/Symptoms Outcomes: Biochemical Data, GI Status, Fluid Status or Edema, Hemodynamic Status, Nutrition Focused Physical Findings, Skin, Weight    Discharge Planning:    Too soon to determine     Jyoti Hameed RD, LD  Contact: Ext [phone]     
(5' 5\")   Wt 78.1 kg (172 lb 2.9 oz)   SpO2 100%   BMI 28.65 kg/m²     General appearance: Lethargic, drowsy   HEENT:  Conjunctivae/corneas clear.   Neck: Supple. No jugular venous distention.   Respiratory: symmetrical; clear to auscultation bilaterally; no wheezes; no rhonchi; no rales  Cardiovascular: rhythm regular; rate controlled; no murmurs  Abdomen: Soft, nontender, nondistended  Extremities:  peripheral pulses present; no peripheral edema; no ulcers  Musculoskeletal: No clubbing, cyanosis, no bilateral lower extremity edema. Brisk capillary refill.   Skin:  No rashes  on visible skin  Neurologic: Lethargic    ASSESSMENT and PLAN:  Metabolic encephalopathy  Alcohol withdraw  Seizure precaution  - on thiamine supplement,still confused and lethargic  - [facility] protocol  - requires IV precedex for aggressive behavior/ agitation in ICU, mental status improved, added phenobarbital per ICU team, off Precedex drip  - HIV and lyme panel negative   - neurology and critical care following.  Will need further recommendation from neurology    Acute respiratory failure with hypoxia multifactorial (hx of COPD, tobacco and alcohol use disorder, anemia, pleural effusion and HFpEF)  Right-sided pneumonia  - Intubated and extubated on 5/21/2024  - CTA pulm on 5/20: Stable bilateral pleural effusions, left greater than right with bibasilar  compressive atelectasis.  - CXR on 5/23:  loculated fluid consolidation along the minor fissure  - Current breathing regimen:  Brovana neb bid, pulmicort neb bid, atrovent neb qid, solu-meraol 40mg IV daily,   - currently on room air and saturates fine, started on vancomycin and Zosyn 05/31     Hypernatremia improving  - Na was 146 5/31  - pt was on D5 NS 100cc/hr, currently off IV fluid, on diet.         HFpEF  A fib with RVR- resolved  MR, moderate  HTN,HLD  - proBNP 7075--4228,   - ECHO in 12/2023 with EF 55-60% and small pericardial effusion, moderate MR  - eliquis resumed  - lasix 20 
-784.15 ml         Review of Systems  Could not be assessed properly      OBJECTIVE:    /65   Pulse (!) 109   Temp 98.1 °F (36.7 °C) (Oral)   Resp 19   Ht 1.651 m (5' 5\")   Wt 75 kg (165 lb 5.5 oz)   SpO2 (!) 81%   BMI 27.51 kg/m²     General appearance: no acute distress  HEENT:  Conjunctivae/corneas clear.   Neck: Supple. No jugular venous distention.   Respiratory: symmetrical; clear to auscultation bilaterally; no wheezes; no rhonchi; no rales  Cardiovascular: rhythm regular; rate controlled; no murmurs  Abdomen: Soft, nontender, nondistended  Extremities:  peripheral pulses present; no peripheral edema; no ulcers  Musculoskeletal: No clubbing, cyanosis, no bilateral lower extremity edema. Brisk capillary refill.   Skin:  No rashes  on visible skin  Neurologic: no focal deficits     ASSESSMENT and PLAN:  Metabolic encephalopathy  Alcohol withdrawal  - CIWA protocol  - required IV precedex for aggressive behavior/ agitation in ICU, mental status improved, added phenobarbital and librium per ICU team, off Precedex drip  - HIV and lyme panel negative   - neurology and critical care following.  MRI brain showing old infarcts   MRI brain repeated on 6/2 with no acute findings.   Patient remains confused and agitated. Psych consulted    Acute respiratory failure with hypoxia multifactorial (hx of COPD, tobacco and alcohol use disorder, anemia, pleural effusion and HFpEF)  Right-sided pneumonia  - Intubated and extubated on 5/21/2024  - CTA pulm on 5/20: Stable bilateral pleural effusions, left greater than right with bibasilar  compressive atelectasis.  - CXR on 5/23:  loculated fluid consolidation along the minor fissure  - Current breathing regimen:  Brovana neb bid, pulmicort neb bid, atrovent neb qid, solu-meraol 40mg IV daily,   - currently on room air and saturates fine, started on vancomycin and Zosyn 05/3. Currently on zosyn alone.      Hypernatremia resolved  - Na was 146 5/31  BMP stable       
02:34 PM    IEK3LLK 26.2 09/07/2018 11:13 PM    HCO3 22.6 05/21/2024 02:34 PM    BE -2.9 05/21/2024 02:34 PM    THB 8.8 05/21/2024 02:34 PM    O2SAT 98.2 05/21/2024 02:34 PM        Medications       ATB:   Antibiotics  Days   none                Labs   CBC with Differential:    Lab Results   Component Value Date/Time    WBC 4.3 05/27/2024 04:45 AM    RBC 2.81 05/27/2024 04:45 AM    RBC 4.62 07/27/2022 11:26 AM    HGB 7.5 05/27/2024 04:45 AM    HCT 25.2 05/27/2024 04:45 AM    PLT 97 05/27/2024 04:45 AM    MCV 89.7 05/27/2024 04:45 AM    MCH 26.7 05/27/2024 04:45 AM    MCHC 29.8 05/27/2024 04:45 AM    RDW 19.4 05/27/2024 04:45 AM    NRBC 3 05/27/2024 04:45 AM    METASPCT 1 05/19/2024 05:13 AM    LYMPHOPCT 5 05/27/2024 04:45 AM    LYMPHOPCT 12.2 07/27/2022 11:26 AM    MONOPCT 2 05/27/2024 04:45 AM    EOSPCT 2 05/27/2024 04:45 AM    BASOPCT 0 05/27/2024 04:45 AM    MONOSABS 0.07 05/27/2024 04:45 AM    LYMPHSABS 0.71 07/27/2022 11:26 AM    EOSABS 0.07 05/27/2024 04:45 AM    BASOSABS 0.00 05/27/2024 04:45 AM     BMP:    Lab Results   Component Value Date/Time     05/27/2024 04:45 AM    K 3.5 05/27/2024 04:45 AM    K 3.6 06/18/2022 04:15 AM     05/27/2024 04:45 AM    CO2 20 05/27/2024 04:45 AM    BUN 9 05/27/2024 04:45 AM    CREATININE 0.8 05/27/2024 04:45 AM    CALCIUM 7.6 05/27/2024 04:45 AM    GFRAA >60 06/18/2022 04:15 AM    LABGLOM >90 05/27/2024 04:45 AM    LABGLOM 53 04/30/2024 06:33 AM    GLUCOSE 146 05/27/2024 04:45 AM    GLUCOSE 126 07/27/2022 11:26 AM     Magnesium:    Lab Results   Component Value Date/Time    MG 1.8 05/27/2024 04:45 AM     Phosphorus:    Lab Results   Component Value Date/Time    PHOS 3.3 05/23/2024 02:08 PM         Resident's Assessment and Plan     Acute encephalopathy 2/2 wernicke vs new onset seizure vs CVA  S/p extubation 5/21  Syncope and collapse, multiple episodes at home, 5/18 in hospital, negative CT head; prior vasovagal/orthostatic episodes, positive TTT vasodepressor 
05/21/2024 02:34 PM    O2SAT 98.2 05/21/2024 02:34 PM        Medications       ATB:   Antibiotics  Days   none                Labs   CBC with Differential:    Lab Results   Component Value Date/Time    WBC 10.4 05/31/2024 03:15 AM    RBC 3.12 05/31/2024 03:15 AM    RBC 4.62 07/27/2022 11:26 AM    HGB 8.1 05/31/2024 03:15 AM    HCT 27.7 05/31/2024 03:15 AM     05/31/2024 03:15 AM    MCV 88.8 05/31/2024 03:15 AM    MCH 26.0 05/31/2024 03:15 AM    MCHC 29.2 05/31/2024 03:15 AM    RDW 20.0 05/31/2024 03:15 AM    NRBC 3 05/27/2024 04:45 AM    METASPCT 1 05/31/2024 03:15 AM    LYMPHOPCT 1 05/31/2024 03:15 AM    LYMPHOPCT 12.2 07/27/2022 11:26 AM    MONOPCT 0 05/31/2024 03:15 AM    EOSPCT 7 05/31/2024 03:15 AM    BASOPCT 1 05/31/2024 03:15 AM    MONOSABS 0.00 05/31/2024 03:15 AM    LYMPHSABS 0.71 07/27/2022 11:26 AM    EOSABS 0.75 05/31/2024 03:15 AM    BASOSABS 0.09 05/31/2024 03:15 AM     BMP:    Lab Results   Component Value Date/Time     05/31/2024 03:15 AM    K 3.6 05/31/2024 03:15 AM    K 3.6 06/18/2022 04:15 AM     05/31/2024 03:15 AM    CO2 20 05/31/2024 03:15 AM    BUN 13 05/31/2024 03:15 AM    CREATININE 1.1 05/31/2024 03:15 AM    CALCIUM 8.6 05/31/2024 03:15 AM    GFRAA >60 06/18/2022 04:15 AM    LABGLOM 71 05/31/2024 03:15 AM    LABGLOM 53 04/30/2024 06:33 AM    GLUCOSE 73 05/31/2024 03:15 AM    GLUCOSE 126 07/27/2022 11:26 AM     Magnesium:    Lab Results   Component Value Date/Time    MG 1.7 05/31/2024 03:15 AM     Phosphorus:    Lab Results   Component Value Date/Time    PHOS 3.4 05/31/2024 03:15 AM         Resident's Assessment and Plan     Acute encephalopathy 2/2 metabolic cause and pneumonia  S/p extubation 5/21  Syncope and collapse, multiple episodes at home, 5/18 in hospital, negative CT head; prior vasovagal/orthostatic episodes, positive TTT vasodepressor syncope 7/31/14  Remote R parietal CVA, s/p R CEA 2014  HFpEF, EF 55%, tricuspid and mitral regurgitation   Persistent AF/AFL, 
Oral Daily    [Held by provider] gabapentin  600 mg Oral TID    therapeutic multivitamin-minerals  1 tablet Oral Daily    PARoxetine  30 mg Oral QAM    ipratropium 0.5 mg-albuterol 2.5 mg  1 Dose Inhalation Q4H WA RT    arformoterol tartrate  15 mcg Nebulization BID RT    [Held by provider] furosemide  20 mg Oral Daily     sodium chloride flush, 5-40 mL, PRN  sodium chloride, , PRN  polyethylene glycol, 17 g, Daily PRN  glucose, 4 tablet, PRN  dextrose bolus, 125 mL, PRN   Or  dextrose bolus, 250 mL, PRN  glucagon (rDNA), 1 mg, PRN  acetaminophen, 650 mg, Q6H PRN  sodium chloride flush, 5-40 mL, PRN  acetaminophen, 650 mg, Q6H PRN   Or  acetaminophen, 650 mg, Q6H PRN  LORazepam, 1 mg, Q1H PRN   Or  LORazepam, 1 mg, Q1H PRN   Or  LORazepam, 2 mg, Q1H PRN   Or  LORazepam, 2 mg, Q1H PRN   Or  LORazepam, 3 mg, Q1H PRN   Or  LORazepam, 3 mg, Q1H PRN   Or  LORazepam, 4 mg, Q1H PRN   Or  LORazepam, 4 mg, Q1H PRN         Objective:    BP 98/72   Pulse 72   Temp 97.1 °F (36.2 °C) (Temporal)   Resp 10   Ht 1.651 m (5' 5\")   Wt 75.5 kg (166 lb 7.2 oz)   SpO2 99%   BMI 27.70 kg/m²     General Appearance: Soft restraint, opens eyes to command, unable to answer orientation questions but is looking for his glasses, able to move all extremities.  Skin: warm and dry  Head: normocephalic and atraumatic  Neck: neck supple   Pulmonary/Chest: Diminished air entry bilateral lower lung crackles.  Cardiovascular: normal rate, normal S1 and S2 and no carotid bruits  Abdomen: soft, non-tender, non-distended, normal bowel sounds, no masses or organomegaly  Extremities: no cyanosis, no clubbing and no edema          Recent Labs     05/20/24  0450 05/21/24  0328 05/21/24  0530 05/22/24  0406     --  140 139   K 4.1 3.90 4.2 4.6   CL 98  --  103 103   CO2 25  --  24 18*   BUN 17  --  14 18   CREATININE 1.4*  --  1.2 1.3*   GLUCOSE 85  --  99 119*   CALCIUM 8.3*  --  8.6 8.2*         Recent Labs     05/20/24  7400 05/21/24  8739 
Temp 98 °F (36.7 °C) (Temporal)   Resp 18   Ht 1.651 m (5' 5\")   Wt 75 kg (165 lb 5.5 oz)   SpO2 98%   BMI 27.51 kg/m²     General Appearance: alert  Skin: warm and dry  Head: normocephalic and atraumatic  Eyes: pupils equal, round, extraocular eye movements intact, conjunctivae normal  Pulmonary/Chest: clear to auscultation bilaterally- no wheezes, rales or rhonchi, normal air movement, no respiratory distress  Cardiovascular: normal rate, normal S1 and S2   Abdomen: soft, non-tender, non-distended, normal bowel sounds,   Extremities: no cyanosis, no clubbing and no edema  Neurologic: did not participate in exam      Recent Labs     06/07/24  0500   *   K 2.6*   *   CO2 30*   BUN 5*   CREATININE 0.9   GLUCOSE 108*   CALCIUM 8.1*         Assessment:    Principal Problem:    Acute respiratory failure with hypoxia (HCC)  Active Problems:    Primary hypertension    Acute hypoxic respiratory failure (HCC)    Chronic obstructive pulmonary disease (HCC)    Acute on chronic hypoxic respiratory failure (HCC)    Syncope and collapse    Hypoxia    Chronic diastolic (congestive) heart failure (HCC)    Mitral valve disease    Pure hypercholesterolemia    ETOH abuse    Anemia    Moderate protein-calorie malnutrition (HCC)    Seizure (HCC)    Hypernatremia  Resolved Problems:    * No resolved hospital problems. *      Plan:    This is a 74 year old male who is being treated for acute alcohol withdrawal with ciwa protocol. Psych and neurology have been consulted and will continue to follow recommendations. Still waiting on neuro      NOTE: This report was transcribed using voice recognition software. Every effort was made to ensure accuracy; however, inadvertent computerized transcription errors may be present.  Electronically signed by Roro Mckenna DO on 6/7/2024 at 11:23 AM     
intermittently hypertensive    Objective:     /77   Pulse 84   Temp 98.5 °F (36.9 °C) (Temporal)   Resp 17   Ht 1.651 m (5' 5\")   Wt 75.3 kg (166 lb 1.3 oz)   SpO2 (!) 88%   BMI 27.64 kg/m²     General appearance: alert, appears stated age, no participation, and no distress  Head: normocephalic, without obvious abnormality, atraumatic  Eyes: conjunctivae/corneas clear. .  Lungs: nonlabored  Skin: color, texture, turgor normal---no rashes or lesions      Mental Status: lethargic, states he is tired, keeps eyes closed, no commands; 4 point restraints in place for combative behavior; remains on Precedex     Speech/language: Limited although no dysarthria appreciated; although minimal speech is appropriate    Cranial Nerves: Limited as patient is not cooperative  I: smell NA   II: visual acuity  NA   II: visual fields Bilateral threat   II: pupils MARK   III,VII: ptosis None   III,IV,VI: extraocular muscles  Keeps eyes closed   V: mastication    V: facial light touch sensation  Appears to appreciate LT   V,VII: corneal reflex  Present   VII: facial muscle function - upper  Appears normal   VII: facial muscle function - lower ?  Right-sided facial droop although I think it is a positional   VIII: hearing Normal   IX: soft palate elevation     IX,X: gag reflex    XI: trapezius strength     XI: sternocleidomastoid strength    XI: neck extension strength  Turns neck minimally   XII: tongue strength       Motor:  No handgrip  Does not participate for motor exam  Normal bulk, spasticity to BLE  No abnormal movements     Sensory:  Reacts appropriately to noxious stimuli in all extremities    DTR:   Right Brachioradialis reflex 2+  Left Brachioradialis reflex 2+  Right Biceps reflex 2+  Left Biceps reflex 2+  Right Triceps reflex 2+  Left Triceps reflex 2+  Right Quadriceps reflex 3+  Left Quadriceps reflex 3+  Right Achilles reflex 0  Left Achilles reflex 0    No Babinskis  No Meyer's    No pathological 
techniques and AE recommendations to increase functional independence within precautions       * Training on energy conservation strategies, correct breathing pattern and techniques to improve independence/tolerance for self-care routine  * Functional transfer/mobility training/DME recommendations for increased independence, safety, and fall prevention  * Patient/Family education to increase follow through with safety techniques and functional independence  * Recommendation of environmental modifications for increased safety with functional transfers/mobility and ADLs  * Cognitive retraining/development of therapeutic activities to improve problem solving, judgement, memory, and attention for increased safety/participation in ADL/IADL tasks  * Sensory re-education to improve body/limb awareness, maintain/improve skin integrity, and improve hand/UE motor function  * Visual-perceptual training to improve environmental scanning, visual attention/focus, and oculomotor skills for increased safety/independence with functional transfers/mobility and ADLs  * Splinting/positioning for increased function, prevention of contractures, and improve skin integrity  * Therapeutic exercise to improve motor endurance, ROM, and functional strength for ADLs/functional transfers  * Therapeutic activities to facilitate/challenge dynamic balance, stand tolerance for increased safety and independence with ADLs  * Therapeutic activities to facilitate gross/fine motor skills for increased independence with ADLs  * Neuro-muscular re-education: facilitation of righting/equilibrium reactions, midline orientation, scapular stability/mobility, normalization of muscle tone, and facilitation of volitional active controled movement  * Positioning to improve skin integrity, interaction with environment and functional independence  * Delirium prevention/treatment  * Manual techniques for edema management    Recommended Adaptive Equipment: TBD     Home 
(LASIX) tablet 20 mg  20 mg Oral Daily Chon Ryan MD   20 mg at 05/28/24 0738    sodium chloride flush 0.9 % injection 5-40 mL  5-40 mL IntraVENous PRN Chon Ryan MD   10 mL at 05/22/24 0747    acetaminophen (TYLENOL) tablet 650 mg  650 mg Oral Q6H PRN Chon Ryan MD   650 mg at 05/17/24 2021    Or    acetaminophen (TYLENOL) suppository 650 mg  650 mg Rectal Q6H PRN Chon Ryan MD        LORazepam (ATIVAN) tablet 4 mg  4 mg Oral Q1H PRN Chon Ryan MD           PRN Medications  sodium chloride flush, sodium chloride, medicated lip balm, sodium chloride flush, sodium chloride, polyethylene glycol, glucose, dextrose bolus **OR** dextrose bolus, glucagon (rDNA), acetaminophen, sodium chloride flush, acetaminophen **OR** acetaminophen, [DISCONTINUED] LORazepam **OR** [DISCONTINUED] LORazepam **OR** [DISCONTINUED] LORazepam **OR** [DISCONTINUED] LORazepam **OR** [DISCONTINUED] LORazepam **OR** [DISCONTINUED] LORazepam **OR** LORazepam **OR** [DISCONTINUED] LORazepam    Objective  Most Recent Recorded Vitals  BP (!) 145/82   Pulse 77   Temp 98.2 °F (36.8 °C) (Temporal)   Resp 21   Ht 1.651 m (5' 5\")   Wt 76 kg (167 lb 8.8 oz)   SpO2 95%   BMI 27.88 kg/m²   I/O last 3 completed shifts:  In: 66 [P.O.:60; I.V.:6]  Out: 900 [Urine:900]  No intake/output data recorded.    Physical Exam:  General:in no acute distress   Eyes: pupils round, equal , reactive to light, EOMI  Skin: no rashes or lesions  Lungs: no retractions/use of accessory muscles, mid wheezing bibasilar crackles  Heart: regular rhythm,  heart rate is in acceptable range, no murmur  Abdomen: soft, non-tender; bowel sounds normal; no masses and no organomegaly appreciated  Extremities: on soft restraint, restless  Neurologic: nonfocal    Most Recent Labs  Lab Results   Component Value Date    WBC 13.7 (H) 05/29/2024    HGB 7.9 (L) 05/29/2024    HCT 26.3 (L) 05/29/2024     (L) 05/29/2024     05/29/2024    K 4.0 
ETOH abuse    Anemia    Moderate protein-calorie malnutrition (HCC)    Seizure (HCC)    Hypernatremia  Resolved Problems:    * No resolved hospital problems. *      Plan:       Watch fluid balance, diurese as per Cardiology and PCP  On RA, watch oxygenation  Cont with nebs, observe tolerance and respiratory function  OOB to chair if tolerated, PT/OT  Can be discharged from pulmonary pov      Time at the bedside, reviewing labs and radiographs, reviewing notes and consultations, discussing with staff and family was more than 50 minutes.      Thanks for letting us see this patient in consultation.  Please contact us with any questions. Office [phone] or after hours through [employer], x [phone].    
Eliquis OP   Prolonged QTC   Hypertension  Hyperlipidemia  B/l pleural effusions   COPD, 3L NC baseline OP   VATS/pleurx catheter placement for entrapped lung, 2018   CKD Stage 3a, baseline 1.2   DMT2 w neuropathy   Anemia 2/2 recent GI bleed?, alcohol use disorder, malnutrition   Thrombocytopenia likely 2/2 alcohol use disorder, cirrhosis   Protein malnutrition   Alcohol use disorder, hx of withdrawal without seizure, 5-7 doubles of gin daily ~40 years    Plan:    Chest xray showed right sided consolidation, patient placed on  zosyn. Awaiting respiratory cultures.  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   Old lacunar infarct in the midportion of the left putamen.   Prominent bilateral mastoiditis.   Neurology following, will follow recommendation  Continue pulmonary hygiene  Follow mentation  Follow daily labs  On CIWA assessment  Continue breathing treatments      Tobacco use:  reports that he quit smoking about 40 years ago. His smoking use included cigarettes. He started smoking about 55 years ago. He has a 44.1 pack-year smoking history. He has been exposed to tobacco smoke. He has never used smokeless tobacco.  Alcohol use:  reports current alcohol use of about 5.0 standard drinks of alcohol per week.  Lines: Peripheral IV  PT/OT consulted: consulted  DVT prophylaxis: Lovenox 40 mg daily  GI prophylaxis: Protonix 40 mg Inj  Bowel regimen: as needed  Diet:   ADULT DIET; Full Liquid  ADULT ORAL NUTRITION SUPPLEMENT; Breakfast, Lunch, Dinner; Diabetic Oral Supplement  Pain management: as needed  Code status: Limited  Disposition: Admitted to ICU  Family: updated as available  Maggie Mason MD, PGY-1    Attending physician: Dr. corrigan     [facility]  Department of Pulmonary, Critical Care and Sleep 
be present.    Electronically signed by Roro Mckenna DO on 6/6/2024 at 11:43 AM     
protein-calorie malnutrition (HCC)    Seizure (HCC)    Hypernatremia  Resolved Problems:    * No resolved hospital problems. *      Plan:       Watch fluid balance, diurese as per Cardiology and PCP  Cont with oxygen, taper as tolerated  Cont with nebs, observe respiratory function  ETOH abuse/withdrawal as per MICU team  Other issues as per MICU team      Time at the bedside, reviewing labs and radiographs, reviewing notes and consultations, discussing with staff and family was more than 50 minutes.      Thanks for letting us see this patient in consultation.  Please contact us with any questions. Office [phone] or after hours through [employer], x [phone].    
upper  Normal   VII: facial muscle function - lower Normal   VIII: hearing Normal   IX: soft palate elevation     IX,X: gag reflex Present   XI: trapezius strength     XI: sternocleidomastoid strength    XI: neck extension strength     XII: tongue strength  Normal     Motor:  Withdraws to painful stimuli bilateral lower extremities  Normal bulk and tone  Bilateral wrist restraints  No abnormal movements    Sensory:  Withdraws to pain all 4 extremities    Coordination:   No resting tremors observed    Gait:  Deferred for patient safety/fall consideration    DTR:   1+ throughout    No Babinskis  No Meyer's    No other pathological reflexes    Laboratory/Radiology:     CBC with Differential:    Lab Results   Component Value Date/Time    WBC 7.7 06/11/2024 04:35 AM    RBC 2.90 06/11/2024 04:35 AM    RBC 4.62 07/27/2022 11:26 AM    HGB 7.7 06/11/2024 04:35 AM    HCT 25.2 06/11/2024 04:35 AM    PLT 98 06/11/2024 04:35 AM    MCV 86.9 06/11/2024 04:35 AM    MCH 26.6 06/11/2024 04:35 AM    MCHC 30.6 06/11/2024 04:35 AM    RDW 21.1 06/11/2024 04:35 AM    NRBC 3 05/27/2024 04:45 AM    METASPCT 1 05/31/2024 03:15 AM    LYMPHOPCT 4 06/11/2024 04:35 AM    LYMPHOPCT 12.2 07/27/2022 11:26 AM    MONOPCT 4 06/11/2024 04:35 AM    EOSPCT 1 06/11/2024 04:35 AM    BASOPCT 1 06/11/2024 04:35 AM    MONOSABS 0.27 06/11/2024 04:35 AM    LYMPHSABS 0.71 07/27/2022 11:26 AM    EOSABS 0.07 06/11/2024 04:35 AM    BASOSABS 0.07 06/11/2024 04:35 AM     CMP:    Lab Results   Component Value Date/Time     06/11/2024 04:35 AM    K 3.1 06/11/2024 04:35 AM    K 3.6 06/18/2022 04:15 AM     06/11/2024 04:35 AM    CO2 26 06/11/2024 04:35 AM    BUN 9 06/11/2024 04:35 AM    CREATININE 1.2 06/11/2024 04:35 AM    GFRAA >60 06/18/2022 04:15 AM    LABGLOM 65 06/11/2024 04:35 AM    LABGLOM 53 04/30/2024 06:33 AM    GLUCOSE 116 06/11/2024 04:35 AM    GLUCOSE 126 07/27/2022 11:26 AM    CALCIUM 8.0 06/11/2024 04:35 AM    BILITOT 0.6 06/11/2024 04:35 AM 
  MRI BRAIN WO CONTRAST   Final Result   1. No acute intracranial abnormality. No sign of acute infarct.   2. No change in moderate age-appropriate atrophy and mild small vessel   ischemia.   3. 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   4. Old lacunar infarct in the midportion of the left putamen.   5. Prominent bilateral mastoiditis.   6. 2.2 cm marrow replacing lesion in the right high mid-anterior parietal   calvarium, extending from the inner table through the outer table. This is   probably focal hypertrophy of normal marrow, and is not suggestive of   metastatic disease.         XR CHEST PORTABLE   Final Result   Placement of an endotracheal tube within 1 cm of the erica which slight   retraction is recommended.  The appearance of the chest is otherwise stable.         CT HEAD WO CONTRAST   Final Result   No acute intracranial abnormality.      Bilateral mastoid opacification correlate for mastoiditis.         XR CHEST PORTABLE   Final Result   Stable chest with increased perihilar markings and small bilateral pleural   effusions.  Atelectatic changes seen in the right mid lung.  No new focal   parenchymal opacification present.         CTA PULMONARY W CONTRAST   Final Result   There is no evidence of pulmonary arterial embolization.      Stable bilateral pleural effusions, left greater than right with bibasilar   compressive atelectasis.         XR CHEST PORTABLE   Final Result   1. Cardiomegaly with increased perihilar lung markings stable to slightly   increased from prior   2. Decreased evidence of probable persistent but decreased small left pleural   effusion.         CT HEAD WO CONTRAST   Final Result   No acute intracranial abnormality.         CT Head W/O Contrast   Final Result   1. No acute intracranial abnormality.   2. Moderate atrophy with moderately severe periventricular white matter   changes.   3. Chronic microvascular ischemic changes.         CTA PULMONARY W 
[DISCONTINUED] LORazepam **OR** [DISCONTINUED] LORazepam **OR** [DISCONTINUED] LORazepam **OR** LORazepam **OR** [DISCONTINUED] LORazepam    Labs:     Recent Labs     05/31/24 0315   WBC 10.4   HGB 8.1*   HCT 27.7*   *         Recent Labs     05/31/24 0315 06/01/24  0445    143   K 3.6 4.9   * 109*   CO2 20* 19*   BUN 13 10   CREATININE 1.1 1.0   CALCIUM 8.6 7.7*   PHOS 3.4  --          No results for input(s): \"PROT\", \"ALKPHOS\", \"ALT\", \"AST\", \"BILITOT\", \"AMYLASE\", \"LIPASE\" in the last 72 hours.    Invalid input(s): \"ALB\"    No results for input(s): \"INR\" in the last 72 hours.    No results for input(s): \"CKTOTAL\", \"TROPONINI\" in the last 72 hours.    Chronic labs:    Lab Results   Component Value Date    CHOL 77 04/28/2024    TRIG 72 04/28/2024    HDL 45 04/28/2024    TSH 1.57 04/28/2024    PSA 6.84 (H) 02/05/2024    INR 2.1 05/21/2024    LABA1C 4.8 04/28/2024       Radiology: REVIEWED DAILY    +++++++++++++++++++++++++++++++++++++++++++++++++  Josi Madrigal MD   Hospitalist  [place], [place]  +++++++++++++++++++++++++++++++++++++++++++++++++  NOTE: This report was transcribed using voice recognition software. Every effort was made to ensure accuracy; however, inadvertent computerized transcription errors may be present.       
cm marrow replacing lesion in the right high mid-anterior parietal   calvarium, extending from the inner table through the outer table. This is   probably focal hypertrophy of normal marrow, and is not suggestive of   metastatic disease.         XR CHEST PORTABLE   Final Result   Placement of an endotracheal tube within 1 cm of the erica which slight   retraction is recommended.  The appearance of the chest is otherwise stable.         CT HEAD WO CONTRAST   Final Result   No acute intracranial abnormality.      Bilateral mastoid opacification correlate for mastoiditis.         XR CHEST PORTABLE   Final Result   Stable chest with increased perihilar markings and small bilateral pleural   effusions.  Atelectatic changes seen in the right mid lung.  No new focal   parenchymal opacification present.         CTA PULMONARY W CONTRAST   Final Result   There is no evidence of pulmonary arterial embolization.      Stable bilateral pleural effusions, left greater than right with bibasilar   compressive atelectasis.         XR CHEST PORTABLE   Final Result   1. Cardiomegaly with increased perihilar lung markings stable to slightly   increased from prior   2. Decreased evidence of probable persistent but decreased small left pleural   effusion.         CT HEAD WO CONTRAST   Final Result   No acute intracranial abnormality.         CT Head W/O Contrast   Final Result   1. No acute intracranial abnormality.   2. Moderate atrophy with moderately severe periventricular white matter   changes.   3. Chronic microvascular ischemic changes.         CTA PULMONARY W CONTRAST   Final Result   1. No definitive pulmonary embolism.  Sub optimal evaluation of the distal   left lower lobe secondary to decreased opacification of the subsegmental   pulmonary arteries.  This is likely physiologic however small PE cannot be   fully excluded in this location due to unopacified arteries.   2. Left greater than right pleural effusions with prominent 
magnesium sulfate 2000 mg in 50 mL IVPB premix  2,000 mg IntraVENous PRN Jim Saul MD        ondansetron (ZOFRAN-ODT) disintegrating tablet 4 mg  4 mg Oral Q8H PRN Jim Saul MD        Or    ondansetron (ZOFRAN) injection 4 mg  4 mg IntraVENous Q6H PRN Jim Saul MD        polyethylene glycol (GLYCOLAX) packet 17 g  17 g Oral Daily PRN Jim Saul MD        acetaminophen (TYLENOL) tablet 650 mg  650 mg Oral Q6H PRN Jim Saul MD   650 mg at 05/17/24 2021    Or    acetaminophen (TYLENOL) suppository 650 mg  650 mg Rectal Q6H PRN Jim Saul MD        sodium chloride flush 0.9 % injection 5-40 mL  5-40 mL IntraVENous 2 times per day Jim Saul MD   10 mL at 05/17/24 2022    sodium chloride flush 0.9 % injection 5-40 mL  5-40 mL IntraVENous PRN Jim Saul MD        0.9 % sodium chloride infusion   IntraVENous PRN Jim Saul MD        LORazepam (ATIVAN) tablet 1 mg  1 mg Oral Q1H PRN Jim Saul MD   1 mg at 05/17/24 2302    Or    LORazepam (ATIVAN) injection 1 mg  1 mg IntraVENous Q1H PRN Jim Saul MD   1 mg at 05/17/24 1416    Or    LORazepam (ATIVAN) tablet 2 mg  2 mg Oral Q1H PRN Jim Saul MD        Or    LORazepam (ATIVAN) injection 2 mg  2 mg IntraVENous Q1H PRN Jim Saul MD        Or    LORazepam (ATIVAN) tablet 3 mg  3 mg Oral Q1H PRN Jim Saul MD        Or    LORazepam (ATIVAN) injection 3 mg  3 mg IntraVENous Q1H PRN Jim Saul MD        Or    LORazepam (ATIVAN) tablet 4 mg  4 mg Oral Q1H PRN Jim Saul MD        Or    LORazepam (ATIVAN) injection 4 mg  4 mg IntraVENous Q1H PRN Jim Saul MD          sodium chloride      sodium chloride         Physical Exam:  /83   Pulse 74   Temp 97.3 °F (36.3 °C) (Axillary)   Resp 20   Ht 1.651 m (5' 5\")   Wt 72.6 kg (160 lb)   SpO2 99%   BMI 26.63 kg/m²   Weight change: 0 kg (0 lb)  Wt 
now.  Antibiotics and steroids as per ICU team.  On Eliquis for prior history of A-fib, possible thoracentesis, DOAC restarted as per pulmonology, continue to monitor for thoracentesis needs.    Prior history of VATS/Pleurx catheter placement for entrapped lung in 2018.  Repeat CTA completed 5 /19 - no evidence of PE, stable bilateral pleural effusion, left greater than right with bibasilar compressive atelectasis.  Was intubated during RRT.  Extubated 5/21      Anemia  Thrombocytopenia  Hemoglobin stable now.  Recent hospitalization due to anemia, was seen by general surgery.  EGD 4/29-negative for source of bleed.  Subsequently colonoscopy was canceled due to stabilization of hemoglobin  Continue to monitor H&H and reconsult general surgery as needed.  Currently patient is denying GI bleed  Check Hemoccult.  H&H 7.7 today.  Thrombocytopenia stable    HFpEF  Known history of atrial fibrillation, mitral regurgitation  proBNP is elevated.  Transitioned to IV Lasix.  Cardiology consult, diuretics on hold as per cardiology.  Echocardiogram from December reviewed.    Alcohol dependence  [facility] protocol    DVT prophylaxis.  Eliquis        NOTE: This report was transcribed using voice recognition software. Every effort was made to ensure accuracy; however, inadvertent computerized transcription errors may be present.  Electronically signed by Lizzeth Ramirez MD on 5/23/2024 at 11:59 AM    
transcription errors may be present.  Electronically signed by Lizzeth Ramirez MD on 5/18/2024 at 7:50 AM    
I/O    Time at the bedside, reviewing labs and radiographs, reviewing updated notes and consultations, discussing with staff and family was more than 50 minutes.    Please note that voice recognition technology was used in the preparation of this note and make therefore it may contain inadvertent transcription errors.  If the patient is a COVID 19 isolation patient, the above physical exam reflects that of the examining physician for the day.        HAYLEY Greco - NP  Attestation        I have discussed the case, including pertinent history and exam findings with the nurse practitioner.  I have reviewed meds and pertinent labs and the key elements of the encounter have been performed by me.  I participated in the formulation and agree with the assessment, plan and orders as documented by the nurse practitioner.      Additions and corrections are reflected in the signed note.    Timmy Javier M.D., F.C.C.P.  [facility]    [facility], [address] [place] [address], [address], [place] [address]  Office visits:  [address] [place] [address]  
[facility]  Department of Internal Medicine  Attending Statement    This patient has a high probability of sudden clinically significant deterioration, which requires the highest level of physician preparedness to intervene urgently. I managed/supervised life or organ supporting interventions that required frequent physician assessment. I devoted my full attention to the direct care of this patient for the period of time indicated below. Time I spent with family of surrogate(s) is included only if the patient was incapable of providing the necessary information or participating in medical decision making. In addition to time devoted to teaching and to any procedure. CCT 32 minutes    Jitendra Cardenas DO, MACOI, FACP, FCCP    
with increased perihilar lung markings stable to slightly   increased from prior   2. Decreased evidence of probable persistent but decreased small left pleural   effusion.         CT HEAD WO CONTRAST   Final Result   No acute intracranial abnormality.         CT Head W/O Contrast   Final Result   1. No acute intracranial abnormality.   2. Moderate atrophy with moderately severe periventricular white matter   changes.   3. Chronic microvascular ischemic changes.         CTA PULMONARY W CONTRAST   Final Result   1. No definitive pulmonary embolism.  Sub optimal evaluation of the distal   left lower lobe secondary to decreased opacification of the subsegmental   pulmonary arteries.  This is likely physiologic however small PE cannot be   fully excluded in this location due to unopacified arteries.   2. Left greater than right pleural effusions with prominent left lower lung   atelectasis.         XR CHEST (2 VW)   Final Result   New opacities are present in left lung base which could indicate pneumonia,   atelectasis, or pleural effusion.              Assessment:    Principal Problem:    Acute respiratory failure with hypoxia (HCC)  Active Problems:    Primary hypertension    Acute hypoxic respiratory failure (HCC)    Chronic obstructive pulmonary disease (HCC)    Acute on chronic hypoxic respiratory failure (HCC)    Syncope and collapse    Hypoxia    Chronic diastolic (congestive) heart failure (HCC)    Mitral valve disease    Pure hypercholesterolemia    ETOH abuse    Anemia    Moderate protein-calorie malnutrition (HCC)    Seizure (HCC)    Hypernatremia  Resolved Problems:    * No resolved hospital problems. *      Plan:  Acute metabolic encephalopathy, concern for alcohol withdrawal -patient previously on IV Precedex for aggressive behavior in the ICU.  Patient currently on phenobarbital and Librium.,  Neurology was consulted MRI showing old infarcts repeat on 6/2 with no acute findings.  Neurology has since 
if indicated that he would be compensated from a cardiovascular standpoint to undergo further gastrointestinal assessment and with potential future needs of consideration of a Watchman device to reduce risk of embolic events.  Additional management during hospitalization will be deferred to primary care and the pulmonary service and we will further evaluate him should additional cardiovascular difficulties or concerns arise with arrangements made for follow-up with his primary cardiologist, Wali Souza following hospital discharge.      Note: This report was completed utilizing computer voice recognition software. Every effort has been made to ensure accuracy, however; inadvertent computerized transcription errors may be present.    Hunter Glass MD  [facility]  
normal    CKD3a  - renal stable.     DVT Prophylaxis: []Lovenox []Heparin []PCD [x] Warfarin/NOAC []Encouraged ambulation    Diet: ADULT ORAL NUTRITION SUPPLEMENT; Breakfast, Lunch, Dinner; Diabetic Oral Supplement  ADULT DIET; Regular  Code Status: Limited  Surrogate decision maker confirmed with patient:   Extended Emergency Contact Information  Primary Emergency Contact: Radha Garcia  Address: [address]  Home Phone: [phone]  Mobile Phone: [phone]  Relation: Domestic Partner    Admit status: [] Observation [x] Inpatient   Disposition/reason for continued hospitalization:  remains in ICU    +++++++++++++++++++++++++++++++++++++++++++++++++  Josi Madrigal MD  [place], [place]  +++++++++++++++++++++++++++++++++++++++++++++++++  NOTE: Report could be transcribed using voice recognition software. Every effort was made to ensure accuracy; however, inadvertent computerized transcription errors may be present.    Electronically signed by Josi Madrigal MD on 5/30/2024 at 12:18 PM    I can be reached through [employer].  
06/11/24  0435    143 145   K 4.0 3.5 3.1*    108* 107   CO2 28 24 26   BUN 7 7 9   CREATININE 1.0 1.0 1.2   GLUCOSE 142* 119* 116*   CALCIUM 8.0* 7.8* 8.0*   BILITOT 0.6 0.5 0.6   ALKPHOS 111 113 103   AST 15 14 14   ALT 10 8 7     Cardiac :   Lab Results   Component Value Date    CKTOTAL 34 08/27/2018    CKTOTAL 23 05/10/2014    CKTOTAL 23 05/10/2014    CKMB <1.0 08/27/2018    CKMB 2.3 05/10/2014    CKMB 2.3 05/10/2014    TROPONINI <0.01 10/13/2019    TROPONINI <0.01 09/07/2018    TROPONINI <0.01 09/06/2018     Lab Results   Component Value Date    PROBNP 9,352 (H) 06/10/2024    PROBNP 7,418 (H) 06/09/2024     PRO-BNP : No results for input(s): \"BNP\" in the last 72 hours.   BNP: No results for input(s): \"BNP\" in the last 72 hours.  Lactic Acid : @BRIEFLAB(LACTA:3)    Lipase  : No results for input(s): \"LIPASE\" in the last 72 hours.    Sed rate :   Sed Rate, Automated   Date Value Ref Range Status   01/03/2020 5 0 - 15 mm/Hr Final   06/21/2013 5 0 - 15 mm/hr Final     CReactive Protein:   CRP   Date Value Ref Range Status   05/21/2024 15.0 (H) 0 - 5 mg/L Final     Globulins :   No results for input(s): \"IGG\" in the last 72 hours.  No results for input(s): \"IGM\" in the last 72 hours.  No results for input(s): \"IGA\" in the last 72 hours.  No results for input(s): \"IGG1\" in the last 72 hours.  No results for input(s): \"IGG2\" in the last 72 hours.  No results for input(s): \"IGG3\" in the last 72 hours.  No results for input(s): \"IGG4\" in the last 72 hours.       Magnesium  Lab Results   Component Value Date/Time    MG 1.7 06/11/2024 04:35 AM     Phosphorus  Lab Results   Component Value Date/Time    PHOS 3.3 06/11/2024 04:35 AM     Ionized Calcium  Lab Results   Component Value Date/Time    CAION 1.11 06/11/2024 04:35 AM        COVID-19 Labs:  Lab Results   Component Value Date/Time    COVID19 Not Detected 05/21/2024 10:25 AM       Notable Cultures:    Blood cultures   Blood Culture, Routine   Date Value Ref 
atelectasis.         XR CHEST PORTABLE   Final Result   1. Cardiomegaly with increased perihilar lung markings stable to slightly   increased from prior   2. Decreased evidence of probable persistent but decreased small left pleural   effusion.         CT HEAD WO CONTRAST   Final Result   No acute intracranial abnormality.         CT Head W/O Contrast   Final Result   1. No acute intracranial abnormality.   2. Moderate atrophy with moderately severe periventricular white matter   changes.   3. Chronic microvascular ischemic changes.         CTA PULMONARY W CONTRAST   Final Result   1. No definitive pulmonary embolism.  Sub optimal evaluation of the distal   left lower lobe secondary to decreased opacification of the subsegmental   pulmonary arteries.  This is likely physiologic however small PE cannot be   fully excluded in this location due to unopacified arteries.   2. Left greater than right pleural effusions with prominent left lower lung   atelectasis.         XR CHEST (2 VW)   Final Result   New opacities are present in left lung base which could indicate pneumonia,   atelectasis, or pleural effusion.              Resident's Assessment and Plan     Assessment and Plan:    Assessment:  Growth hematuria after straight cath possibly traumatic, follow u/a    Acute encephalopathy 2/2 wernicke vs new onset seizure vs CVA  S/p extubation 5/21  Syncope and collapse, multiple episodes at home, 5/18 in hospital, negative CT head; prior vasovagal/orthostatic episodes, positive TTT vasodepressor syncope 7/31/14  Remote R parietal CVA, s/p R CEA 2014  HFpEF, EF 55%, tricuspid and mitral regurgitation   Persistent AF/AFL, Eliquis OP   Prolonged QTC   Hypertension  Hyperlipidemia  B/l pleural effusions   COPD, 3L NC baseline OP   VATS/pleurx catheter placement for entrapped lung, 2018   CKD Stage 3a, baseline 1.2   DMT2 w neuropathy   Anemia 2/2 recent GI bleed?, alcohol use disorder, malnutrition   Thrombocytopenia likely 
glucose controlled 110-180 range.   - TSH was normal    CKD3a  - renal stable.     Please be aware the attending physician normally change shift at 7am and 7pm, and name could not be updated timely, please contact [facility] if staff has difficulty getting in touch with the attending name listed.     DVT Prophylaxis: [x]Lovenox []Heparin []PCD [] Warfarin/NOAC []Encouraged ambulation    Diet: ADULT DIET; Full Liquid  ADULT ORAL NUTRITION SUPPLEMENT; Breakfast, Lunch, Dinner; Diabetic Oral Supplement  Code Status: Limited  Surrogate decision maker confirmed with patient:   Extended Emergency Contact Information  Primary Emergency Contact: JoseRadha  Address: [facility] [address] [place], [place] [id number] [place] [address] of [facility]  Home Phone: [phone]  Mobile Phone: [phone]  Relation: Domestic Partner    Admit status: [] Observation [x] Inpatient   Disposition/reason for continued hospitalization:  not medically stable.     +++++++++++++++++++++++++++++++++++++++++++++++++  Kamlesh Ryan MD PhD  Hospital Medicine  [place], [place]  +++++++++++++++++++++++++++++++++++++++++++++++++  NOTE: Report could be transcribed using voice recognition software. Every effort was made to ensure accuracy; however, inadvertent computerized transcription errors may be present.    Electronically signed by Kamlesh yRan MD on 5/25/2024 at 1:19 PM    I can be reached through [employer].

## 2024-06-11 NOTE — CARE COORDINATION
Care Coordination: LOS 26 day, spoke to POA at bedside, she would like to make him comfortable and compassionately extubate, she would like hospice house.  Consult called to [facility]    Electronically signed by Argenis Elias RN on 6/11/2024 at 8:42 AM

## 2024-06-11 NOTE — DISCHARGE SUMMARY
[facility] Hospitalist Discharge Summary         Catarino Boalnos  MRN: [id number]  Account Number: [id number]  Admitted: 5/16/2024  Discharge Date: 06/11/24    PCP Handoff    Recommended Outpatient Testing  None    Results Pending At Discharge  None        Clinical Summary  Patient admitted on 5/16/2024      Catarino Bolanos is a 74 y.o. 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 He had recent admission to hospital secondary to anemia in setting of possible GI bleed, no endoscopy was completed as patient remained stable and H&H remained stable. He remains on Eliquis. And He drinks alcohol on a daily basis , he fell in room, CT head with no acute findings.  5/21-significant event in the morning, RRT called for altered mental status, possible seizure, he was intubated for impending respiratory failure and transferred to ICU. Shortly, extubated on 5/21, remains altered, fluctuating mental status.  MRI brain WO 5/22/2024 does not show any acute findings  EEG on 5/24/2024 consistent with metabolic/toxi encephalopathy, no epileptiform or seizure recorded.  Started patient on vancomycin and Zosyn to cover right-sided consolidation pneumonia. Remains on Zosyn. He was started on librium for alcohol withdrawal. Neurology consulted. MRI brain showed old small infarct in the posterior superior division of right MCA and old lacunar infarct in the midportion of the left putamen. Patient remains confused. MRI brain repeated 6/2 with no acute findings. Psych consulted. Transferred out of ICU on 6/3.  Again patient became more lethargic and hypoxic on 6/9/2024 and rapid response was called, patient was emergently reintubated and transferred to ICU while in the ICU goals of care were readdressed with the family and palliative care 
mA/kV was utilized to reduce the radiation dose to as low as reasonably achievable. COMPARISON: CT chest 04/04/2024 HISTORY: ORDERING SYSTEM PROVIDED HISTORY: ro pe TECHNOLOGIST PROVIDED HISTORY: Reason for exam:->ro pe Additional Contrast?->1 What reading provider will be dictating this exam?->CRC FINDINGS: Pulmonary Arteries: Pulmonary arteries are adequately opacified for evaluation.  No evidence of intraluminal filling defect to suggest pulmonary embolism.  In the left lower lung there is un opacifications of the pulmonary arteries which may represent small pulmonary embolism however this more likely represents normal vascular shunting given the pleural effusion and atelectasis.. Mediastinum: Multiple mediastinal lymph nodes not pathologically enlarged. Coronary artery calcifications.  Heart is normal in size.  No pericardial effusion.  Atherosclerotic vascular disease of the aorta.  Esophagus is unremarkable. Lungs/pleura: Moderate left and small right pleural effusions with adjacent consolidations likely atelectasis.  Marked volume loss in the left lower lobe.  No pneumothorax.  Trachea and main bronchi are unremarkable. Upper Abdomen: Limited images of the upper abdomen are unremarkable. Soft Tissues/Bones: Postsurgical changes of the cervical spine.  No acute osseous abnormality.  Degenerative change of the lower thoracic spine.  Chest wall is unremarkable.     1. No definitive pulmonary embolism.  Sub optimal evaluation of the distal left lower lobe secondary to decreased opacification of the subsegmental pulmonary arteries.  This is likely physiologic however small PE cannot be fully excluded in this location due to unopacified arteries. 2. Left greater than right pleural effusions with prominent left lower lung atelectasis.     CT Head W/O Contrast    Result Date: 5/16/2024  EXAMINATION: CT OF THE HEAD WITHOUT CONTRAST  5/16/2024 8:10 pm TECHNIQUE: CT of the head was performed without the administration

## 2024-06-11 NOTE — PLAN OF CARE
Problem: ABCDS Injury Assessment  Goal: Absence of physical injury  5/18/2024 1930 by Lissette Rivera RN  Outcome: Progressing  5/18/2024 0619 by Ayo Perry RN  Outcome: Progressing     Problem: Skin/Tissue Integrity  Goal: Absence of new skin breakdown  Description: 1.  Monitor for areas of redness and/or skin breakdown  2.  Assess vascular access sites hourly  3.  Every 4-6 hours minimum:  Change oxygen saturation probe site  4.  Every 4-6 hours:  If on nasal continuous positive airway pressure, respiratory therapy assess nares and determine need for appliance change or resting period.  5/18/2024 1930 by Lissette Rivera RN  Outcome: Progressing  5/18/2024 0619 by Ayo Perry RN  Outcome: Progressing     Problem: Safety - Adult  Goal: Free from fall injury  5/18/2024 1930 by Lissette Rivera RN  Outcome: Progressing  5/18/2024 0619 by Ayo Perry RN  Outcome: Progressing     Problem: Discharge Planning  Goal: Discharge to home or other facility with appropriate resources  5/18/2024 1930 by Lissette Rivera RN  Outcome: Progressing  5/18/2024 0619 by Ayo Perry RN  Outcome: Progressing     Problem: Neurosensory - Adult  Goal: Achieves stable or improved neurological status  5/18/2024 1930 by Lissette Rivera RN  Outcome: Progressing  5/18/2024 0619 by Ayo Perry RN  Reactivated  Goal: Absence of seizures  5/18/2024 0619 by Ayo Perry RN  Reactivated  Goal: Remains free of injury related to seizures activity  5/18/2024 0619 by Ayo Perry RN  Reactivated  Goal: Achieves maximal functionality and self care  5/18/2024 0619 by Ayo Perry RN  Reactivated     
  Problem: ABCDS Injury Assessment  Goal: Absence of physical injury  5/18/2024 2135 by Ayo Perry RN  Outcome: Progressing  5/18/2024 1930 by Lissette Rivera RN  Outcome: Progressing     Problem: Skin/Tissue Integrity  Goal: Absence of new skin breakdown  Description: 1.  Monitor for areas of redness and/or skin breakdown  2.  Assess vascular access sites hourly  3.  Every 4-6 hours minimum:  Change oxygen saturation probe site  4.  Every 4-6 hours:  If on nasal continuous positive airway pressure, respiratory therapy assess nares and determine need for appliance change or resting period.  5/18/2024 2135 by Ayo Perry RN  Outcome: Progressing  5/18/2024 1930 by Lissette Rivera RN  Outcome: Progressing     Problem: Safety - Adult  Goal: Free from fall injury  5/18/2024 2135 by Ayo Perry RN  Outcome: Progressing  5/18/2024 1930 by Lissette Rivera RN  Outcome: Progressing     Problem: Discharge Planning  Goal: Discharge to home or other facility with appropriate resources  5/18/2024 2135 by Ayo Perry RN  Outcome: Progressing  5/18/2024 1930 by Lissette Rivera RN  Outcome: Progressing  Flowsheets (Taken 5/18/2024 0743)  Discharge to home or other facility with appropriate resources: Identify barriers to discharge with patient and caregiver     Problem: Neurosensory - Adult  Goal: Achieves stable or improved neurological status  5/18/2024 2135 by Ayo Perry RN  Outcome: Progressing  5/18/2024 1930 by Lissette Rivera RN  Outcome: Progressing  Goal: Absence of seizures  Outcome: Progressing  Goal: Remains free of injury related to seizures activity  Outcome: Progressing  Goal: Achieves maximal functionality and self care  Outcome: Progressing     Problem: Metabolic/Fluid and Electrolytes - Adult  Goal: Electrolytes maintained within normal limits  Outcome: Progressing  Goal: Hemodynamic stability and optimal renal function maintained  Outcome: Progressing     Problem: Respiratory - 
  Problem: ABCDS Injury Assessment  Goal: Absence of physical injury  5/19/2024 2200 by Ayo Perry, RN  Outcome: Progressing  5/19/2024 1948 by Nikky Inman RN  Outcome: Progressing     Problem: Skin/Tissue Integrity  Goal: Absence of new skin breakdown  Description: 1.  Monitor for areas of redness and/or skin breakdown  2.  Assess vascular access sites hourly  3.  Every 4-6 hours minimum:  Change oxygen saturation probe site  4.  Every 4-6 hours:  If on nasal continuous positive airway pressure, respiratory therapy assess nares and determine need for appliance change or resting period.  Outcome: Progressing     Problem: Safety - Adult  Goal: Free from fall injury  Outcome: Progressing     Problem: Discharge Planning  Goal: Discharge to home or other facility with appropriate resources  Outcome: Progressing     Problem: Neurosensory - Adult  Goal: Achieves stable or improved neurological status  Outcome: Progressing  Goal: Absence of seizures  Outcome: Progressing  Goal: Remains free of injury related to seizures activity  Outcome: Progressing  Goal: Achieves maximal functionality and self care  Outcome: Progressing     Problem: Metabolic/Fluid and Electrolytes - Adult  Goal: Electrolytes maintained within normal limits  Outcome: Progressing  Goal: Hemodynamic stability and optimal renal function maintained  Outcome: Progressing     Problem: Respiratory - Adult  Goal: Achieves optimal ventilation and oxygenation  Outcome: Progressing     Problem: Cardiovascular - Adult  Goal: Maintains optimal cardiac output and hemodynamic stability  Outcome: Progressing  Goal: Absence of cardiac dysrhythmias or at baseline  Outcome: Progressing     Problem: Skin/Tissue Integrity - Adult  Goal: Skin integrity remains intact  Outcome: Progressing  Goal: Incisions, wounds, or drain sites healing without S/S of infection  Outcome: Progressing     Problem: Musculoskeletal - Adult  Goal: Return mobility to safest level 
  Problem: ABCDS Injury Assessment  Goal: Absence of physical injury  5/21/2024 0606 by Iva Tavera RN  Outcome: Progressing     Problem: Skin/Tissue Integrity  Goal: Absence of new skin breakdown  Description: 1.  Monitor for areas of redness and/or skin breakdown  2.  Assess vascular access sites hourly  3.  Every 4-6 hours minimum:  Change oxygen saturation probe site  4.  Every 4-6 hours:  If on nasal continuous positive airway pressure, respiratory therapy assess nares and determine need for appliance change or resting period.  5/21/2024 0606 by Iva Tavera RN  Outcome: Progressing     Problem: Safety - Adult  Goal: Free from fall injury  5/21/2024 0606 by Iva Tavera RN  Outcome: Progressing     Problem: Discharge Planning  Goal: Discharge to home or other facility with appropriate resources  5/21/2024 0606 by Iva Tavera RN  Outcome: Progressing     Problem: Neurosensory - Adult  Goal: Achieves stable or improved neurological status  5/21/2024 0606 by Iva Tavera RN  Outcome: Progressing     Problem: Neurosensory - Adult  Goal: Absence of seizures  5/21/2024 0606 by Iva Tavera RN  Outcome: Not Progressing     Problem: Neurosensory - Adult  Goal: Remains free of injury related to seizures activity  5/21/2024 0606 by Iva Tavera RN  Outcome: Progressing     Problem: Neurosensory - Adult  Goal: Achieves maximal functionality and self care  5/21/2024 0606 by Iva Tavera RN  Outcome: Not Progressing     Problem: Neurosensory - Adult  Goal: Absence of seizures  5/21/2024 0606 by Iva Tavera RN  Outcome: Not Progressing  5/20/2024 2211 by Ayo Perry RN  Outcome: Progressing  Goal: Achieves maximal functionality and self care  5/21/2024 0606 by Iva Tavera RN  Outcome: Not Progressing  5/20/2024 2211 by Ayo Perry RN  Outcome: Progressing     
  Problem: ABCDS Injury Assessment  Goal: Absence of physical injury  5/31/2024 0808 by Enrrique Gates RN  Outcome: Progressing  Flowsheets (Taken 5/31/2024 0800)  Absence of Physical Injury: Implement safety measures based on patient assessment     Problem: Skin/Tissue Integrity  Goal: Absence of new skin breakdown  Description: 1.  Monitor for areas of redness and/or skin breakdown  2.  Assess vascular access sites hourly  3.  Every 4-6 hours minimum:  Change oxygen saturation probe site  4.  Every 4-6 hours:  If on nasal continuous positive airway pressure, respiratory therapy assess nares and determine need for appliance change or resting period.  5/31/2024 0808 by Enrrique Gates RN  Outcome: Progressing     Problem: Safety - Adult  Goal: Free from fall injury  5/31/2024 0808 by Enrrique Gates RN  Outcome: Progressing  Flowsheets (Taken 5/31/2024 0800)  Free From Fall Injury:   Instruct family/caregiver on patient safety   Based on caregiver fall risk screen, instruct family/caregiver to ask for assistance with transferring infant if caregiver noted to have fall risk factors     Problem: Discharge Planning  Goal: Discharge to home or other facility with appropriate resources  5/31/2024 0808 by Enrrique Gates RN  Outcome: Progressing     Problem: Neurosensory - Adult  Goal: Achieves stable or improved neurological status  5/31/2024 0808 by Enrrique Gates RN  Outcome: Progressing     Problem: Neurosensory - Adult  Goal: Absence of seizures  5/31/2024 0808 by Enrrique Gates RN  Outcome: Progressing     Problem: Neurosensory - Adult  Goal: Remains free of injury related to seizures activity  5/31/2024 0808 by Enrrique Gates RN  Outcome: Progressing     Problem: Metabolic/Fluid and Electrolytes - Adult  Goal: Electrolytes maintained within normal limits  5/31/2024 0808 by Enrrique Gates RN  Outcome: Progressing     Problem: Metabolic/Fluid and Electrolytes - Adult  Goal: Hemodynamic stability and 
  Problem: ABCDS Injury Assessment  Goal: Absence of physical injury  5/31/2024 1716 by Enrrique Gates RN  Outcome: Progressing     Problem: Skin/Tissue Integrity  Goal: Absence of new skin breakdown  Description: 1.  Monitor for areas of redness and/or skin breakdown  2.  Assess vascular access sites hourly  3.  Every 4-6 hours minimum:  Change oxygen saturation probe site  4.  Every 4-6 hours:  If on nasal continuous positive airway pressure, respiratory therapy assess nares and determine need for appliance change or resting period.  5/31/2024 1716 by Enrrique Gates RN  Outcome: Progressing     Problem: Safety - Adult  Goal: Free from fall injury  5/31/2024 1716 by Enrrique Gates RN  Outcome: Progressing     Problem: Discharge Planning  Goal: Discharge to home or other facility with appropriate resources  5/31/2024 1716 by Enrrique Gates RN  Outcome: Progressing     Problem: Neurosensory - Adult  Goal: Achieves stable or improved neurological status  5/31/2024 1716 by Enrrique Gates RN  Outcome: Progressing     Problem: Neurosensory - Adult  Goal: Absence of seizures  5/31/2024 1716 by Enrrique Gates RN  Outcome: Progressing     Problem: Neurosensory - Adult  Goal: Remains free of injury related to seizures activity  5/31/2024 1716 by Enrrique Gates RN  Outcome: Progressing     Problem: Neurosensory - Adult  Goal: Achieves maximal functionality and self care  5/31/2024 1716 by Enrrique Gates RN  Outcome: Progressing     Problem: Metabolic/Fluid and Electrolytes - Adult  Goal: Electrolytes maintained within normal limits  5/31/2024 1716 by Enrrique Gates RN  Outcome: Progressing     Problem: Metabolic/Fluid and Electrolytes - Adult  Goal: Hemodynamic stability and optimal renal function maintained  5/31/2024 1716 by Enrrique Gates RN  Outcome: Progressing     Problem: Respiratory - Adult  Goal: Achieves optimal ventilation and oxygenation  5/31/2024 1716 by Enrrique Gates RN  Outcome: 
  Problem: ABCDS Injury Assessment  Goal: Absence of physical injury  6/1/2024 2224 by Bre Nance RN  Outcome: Progressing  Flowsheets (Taken 6/1/2024 2223)  Absence of Physical Injury: Implement safety measures based on patient assessment     Problem: Skin/Tissue Integrity  Goal: Absence of new skin breakdown  Description: 1.  Monitor for areas of redness and/or skin breakdown  2.  Assess vascular access sites hourly  3.  Every 4-6 hours minimum:  Change oxygen saturation probe site  4.  Every 4-6 hours:  If on nasal continuous positive airway pressure, respiratory therapy assess nares and determine need for appliance change or resting period.  6/1/2024 2224 by Bre Nance RN  Outcome: Progressing     Problem: Safety - Adult  Goal: Free from fall injury  6/1/2024 2224 by Bre Nance RN  Outcome: Progressing     Problem: Discharge Planning  Goal: Discharge to home or other facility with appropriate resources  6/1/2024 2224 by Bre Nance RN  Outcome: Progressing     Problem: Metabolic/Fluid and Electrolytes - Adult  Goal: Electrolytes maintained within normal limits  6/1/2024 2224 by Bre Nance RN  Outcome: Progressing     Problem: Metabolic/Fluid and Electrolytes - Adult  Goal: Hemodynamic stability and optimal renal function maintained  6/1/2024 2224 by Bre Nance RN  Outcome: Progressing     Problem: Respiratory - Adult  Goal: Achieves optimal ventilation and oxygenation  6/1/2024 2224 by Bre Nance RN  Outcome: Progressing     Problem: Cardiovascular - Adult  Goal: Maintains optimal cardiac output and hemodynamic stability  6/1/2024 2224 by Bre Nance RN  Outcome: Progressing     Problem: Cardiovascular - Adult  Goal: Absence of cardiac dysrhythmias or at baseline  6/1/2024 2224 by Bre Nance RN  Outcome: Progressing     Problem: Skin/Tissue Integrity - Adult  Goal: Skin integrity remains intact  6/1/2024 2224 by Bre Nance RN  Outcome: Progressing  Flowsheets 
  Problem: ABCDS Injury Assessment  Goal: Absence of physical injury  6/2/2024 1919 by Adair Jasso RN  Outcome: Progressing     Problem: Skin/Tissue Integrity  Goal: Absence of new skin breakdown  Description: 1.  Monitor for areas of redness and/or skin breakdown  2.  Assess vascular access sites hourly  3.  Every 4-6 hours minimum:  Change oxygen saturation probe site  4.  Every 4-6 hours:  If on nasal continuous positive airway pressure, respiratory therapy assess nares and determine need for appliance change or resting period.  6/2/2024 1919 by Adair Jasso RN  Outcome: Progressing     Problem: Safety - Adult  Goal: Free from fall injury  6/2/2024 1919 by Adair Jasso RN  Outcome: Progressing     Problem: Neurosensory - Adult  Goal: Achieves stable or improved neurological status  6/2/2024 1919 by Adair Jasso RN  Outcome: Progressing     Problem: Safety - Medical Restraint  Goal: Remains free of injury from restraints (Restraint for Interference with Medical Device)  Description: INTERVENTIONS:  1. Determine that other, less restrictive measures have been tried or would not be effective before applying the restraint  2. Evaluate the patient's condition at the time of restraint application  3. Inform patient/family regarding the reason for restraint  4. Q2H: Monitor safety, psychosocial status, comfort, nutrition and hydration  6/2/2024 1919 by Adair Jasso RN  Outcome: Progressing     Problem: Chronic Conditions and Co-morbidities  Goal: Patient's chronic conditions and co-morbidity symptoms are monitored and maintained or improved  6/2/2024 1919 by Adair Jasso RN  Outcome: Progressing     
  Problem: ABCDS Injury Assessment  Goal: Absence of physical injury  6/4/2024 1106 by Emily Benedict RN  Outcome: Progressing  6/3/2024 2221 by Chun Hillman RN  Outcome: Progressing     Problem: Skin/Tissue Integrity  Goal: Absence of new skin breakdown  Description: 1.  Monitor for areas of redness and/or skin breakdown  2.  Assess vascular access sites hourly  3.  Every 4-6 hours minimum:  Change oxygen saturation probe site  4.  Every 4-6 hours:  If on nasal continuous positive airway pressure, respiratory therapy assess nares and determine need for appliance change or resting period.  6/4/2024 1106 by Emily Benedict RN  Outcome: Progressing  6/3/2024 2221 by Chun Hillman RN  Outcome: Progressing     Problem: Safety - Adult  Goal: Free from fall injury  6/3/2024 2221 by Chun Hillman RN  Outcome: Progressing     Problem: Discharge Planning  Goal: Discharge to home or other facility with appropriate resources  Recent Flowsheet Documentation  Taken 6/4/2024 0830 by Emily Benedict RN  Discharge to home or other facility with appropriate resources: Identify barriers to discharge with patient and caregiver  6/3/2024 2221 by Chun Hillman RN  Outcome: Progressing     Problem: Neurosensory - Adult  Goal: Achieves stable or improved neurological status  Recent Flowsheet Documentation  Taken 6/4/2024 0830 by Emily Benedict RN  Achieves stable or improved neurological status: Initiate measures to prevent increased intracranial pressure  6/3/2024 2221 by Chun Hillman RN  Outcome: Progressing  Goal: Absence of seizures  Recent Flowsheet Documentation  Taken 6/4/2024 0830 by Emily Benedict RN  Absence of seizures: If seizure occurs, turn head to side and suction secretions as needed  6/3/2024 2221 by Chun Hillman RN  Outcome: Progressing  Goal: Remains free of injury related to seizures activity  Recent Flowsheet Documentation  Taken 6/4/2024 0830 by Emily Benedict RN  Remains 
  Problem: ABCDS Injury Assessment  Goal: Absence of physical injury  6/5/2024 1302 by Emily Benedict RN  Outcome: Progressing  6/5/2024 0210 by Chun Hillman RN  Outcome: Progressing     Problem: Skin/Tissue Integrity  Goal: Absence of new skin breakdown  Description: 1.  Monitor for areas of redness and/or skin breakdown  2.  Assess vascular access sites hourly  3.  Every 4-6 hours minimum:  Change oxygen saturation probe site  4.  Every 4-6 hours:  If on nasal continuous positive airway pressure, respiratory therapy assess nares and determine need for appliance change or resting period.  6/5/2024 0210 by Chun Hillman RN  Outcome: Progressing     Problem: Safety - Adult  Goal: Free from fall injury  6/5/2024 0210 by Chun Hillman RN  Outcome: Progressing     Problem: Discharge Planning  Goal: Discharge to home or other facility with appropriate resources  Recent Flowsheet Documentation  Taken 6/5/2024 0800 by Emily Benedict RN  Discharge to home or other facility with appropriate resources: Identify barriers to discharge with patient and caregiver  6/5/2024 0210 by Chun Hillman RN  Outcome: Progressing     Problem: Neurosensory - Adult  Goal: Achieves stable or improved neurological status  Recent Flowsheet Documentation  Taken 6/5/2024 0800 by Emily Benedict RN  Achieves stable or improved neurological status: Assess for and report changes in neurological status  6/5/2024 0210 by Chun Hillman RN  Outcome: Progressing  Goal: Absence of seizures  Recent Flowsheet Documentation  Taken 6/5/2024 0800 by Emily Benedict RN  Absence of seizures: Monitor for seizure activity.  If seizure occurs, document type and location of movements and any associated apnea  6/5/2024 0210 by Chun Hillman RN  Outcome: Progressing  Goal: Remains free of injury related to seizures activity  Recent Flowsheet Documentation  Taken 6/5/2024 0800 by Emily Benedict RN  Remains free of injury related to 
  Problem: ABCDS Injury Assessment  Goal: Absence of physical injury  6/5/2024 2128 by Nai Ly, RN  Outcome: Progressing  6/5/2024 1302 by Emily Benedict, RN  Outcome: Progressing     Problem: Skin/Tissue Integrity  Goal: Absence of new skin breakdown  Description: 1.  Monitor for areas of redness and/or skin breakdown  2.  Assess vascular access sites hourly  3.  Every 4-6 hours minimum:  Change oxygen saturation probe site  4.  Every 4-6 hours:  If on nasal continuous positive airway pressure, respiratory therapy assess nares and determine need for appliance change or resting period.  Outcome: Progressing     Problem: Safety - Adult  Goal: Free from fall injury  Outcome: Progressing     Problem: Discharge Planning  Goal: Discharge to home or other facility with appropriate resources  Recent Flowsheet Documentation  Taken 6/5/2024 0800 by Emily Benedict, RN  Discharge to home or other facility with appropriate resources: Identify barriers to discharge with patient and caregiver     
  Problem: ABCDS Injury Assessment  Goal: Absence of physical injury  Outcome: Not Progressing     Problem: Skin/Tissue Integrity  Goal: Absence of new skin breakdown  Description: 1.  Monitor for areas of redness and/or skin breakdown  2.  Assess vascular access sites hourly  3.  Every 4-6 hours minimum:  Change oxygen saturation probe site  4.  Every 4-6 hours:  If on nasal continuous positive airway pressure, respiratory therapy assess nares and determine need for appliance change or resting period.  Outcome: Not Progressing     Problem: Safety - Adult  Goal: Free from fall injury  Outcome: Not Progressing     Problem: Discharge Planning  Goal: Discharge to home or other facility with appropriate resources  Outcome: Not Progressing     Problem: Neurosensory - Adult  Goal: Achieves stable or improved neurological status  Outcome: Not Progressing  Goal: Achieves maximal functionality and self care  Outcome: Not Progressing     Problem: Metabolic/Fluid and Electrolytes - Adult  Goal: Electrolytes maintained within normal limits  Outcome: Not Progressing  Goal: Hemodynamic stability and optimal renal function maintained  Outcome: Not Progressing     Problem: Respiratory - Adult  Goal: Achieves optimal ventilation and oxygenation  Outcome: Not Progressing     Problem: Cardiovascular - Adult  Goal: Absence of cardiac dysrhythmias or at baseline  Outcome: Not Progressing     Problem: Skin/Tissue Integrity - Adult  Goal: Skin integrity remains intact  Outcome: Not Progressing     Problem: Musculoskeletal - Adult  Goal: Return mobility to safest level of function  Outcome: Not Progressing  Goal: Return ADL status to a safe level of function  Outcome: Not Progressing     Problem: Pain  Goal: Verbalizes/displays adequate comfort level or baseline comfort level  Outcome: Not Progressing     Problem: Chronic Conditions and Co-morbidities  Goal: Patient's chronic conditions and co-morbidity symptoms are monitored and 
  Problem: ABCDS Injury Assessment  Goal: Absence of physical injury  Outcome: Progressing     Problem: Safety - Medical Restraint  Goal: Remains free of injury from restraints (Restraint for Interference with Medical Device)  Description: INTERVENTIONS:  1. Determine that other, less restrictive measures have been tried or would not be effective before applying the restraint  2. Evaluate the patient's condition at the time of restraint application  3. Inform patient/family regarding the reason for restraint  4. Q2H: Monitor safety, psychosocial status, comfort, nutrition and hydration  Outcome: Progressing  Flowsheets (Taken 5/19/2024 7986)  Remains free of injury from restraints (restraint for interference with medical device):   Inform patient/family regarding the reason for restraint   Evaluate the patient's condition at the time of restraint application   Determine that other, less restrictive measures have been tried or would not be effective before applying the restraint     
  Problem: ABCDS Injury Assessment  Goal: Absence of physical injury  Outcome: Progressing     Problem: Skin/Tissue Integrity  Goal: Absence of new skin breakdown  Description: 1.  Monitor for areas of redness and/or skin breakdown  2.  Assess vascular access sites hourly  3.  Every 4-6 hours minimum:  Change oxygen saturation probe site  4.  Every 4-6 hours:  If on nasal continuous positive airway pressure, respiratory therapy assess nares and determine need for appliance change or resting period.  Outcome: Progressing     Problem: Safety - Adult  Goal: Free from fall injury  5/18/2024 0619 by Ayo Perry, RN  Outcome: Progressing  5/17/2024 2041 by Jennifer Carter, RN  Outcome: Not Progressing     Problem: Discharge Planning  Goal: Discharge to home or other facility with appropriate resources  Outcome: Progressing     Problem: Safety - Adult  Goal: Free from fall injury  5/18/2024 0619 by Ayo Perry, RN  Outcome: Progressing  5/17/2024 2041 by Jennifer Carter, RN  Outcome: Not Progressing     
  Problem: ABCDS Injury Assessment  Goal: Absence of physical injury  Outcome: Progressing     Problem: Skin/Tissue Integrity  Goal: Absence of new skin breakdown  Description: 1.  Monitor for areas of redness and/or skin breakdown  2.  Assess vascular access sites hourly  3.  Every 4-6 hours minimum:  Change oxygen saturation probe site  4.  Every 4-6 hours:  If on nasal continuous positive airway pressure, respiratory therapy assess nares and determine need for appliance change or resting period.  Outcome: Progressing     Problem: Safety - Adult  Goal: Free from fall injury  Outcome: Progressing     Problem: Discharge Planning  Goal: Discharge to home or other facility with appropriate resources  Outcome: Progressing     Problem: Neurosensory - Adult  Goal: Achieves stable or improved neurological status  Outcome: Progressing     Problem: Neurosensory - Adult  Goal: Absence of seizures  Outcome: Progressing     Problem: Neurosensory - Adult  Goal: Remains free of injury related to seizures activity  Outcome: Progressing     
  Problem: ABCDS Injury Assessment  Goal: Absence of physical injury  Outcome: Progressing     Problem: Skin/Tissue Integrity  Goal: Absence of new skin breakdown  Description: 1.  Monitor for areas of redness and/or skin breakdown  2.  Assess vascular access sites hourly  3.  Every 4-6 hours minimum:  Change oxygen saturation probe site  4.  Every 4-6 hours:  If on nasal continuous positive airway pressure, respiratory therapy assess nares and determine need for appliance change or resting period.  Outcome: Progressing     Problem: Safety - Adult  Goal: Free from fall injury  Outcome: Progressing     Problem: Discharge Planning  Goal: Discharge to home or other facility with appropriate resources  Outcome: Progressing     Problem: Neurosensory - Adult  Goal: Achieves stable or improved neurological status  Outcome: Progressing     Problem: Neurosensory - Adult  Goal: Absence of seizures  Outcome: Progressing     Problem: Neurosensory - Adult  Goal: Remains free of injury related to seizures activity  Outcome: Progressing     Problem: Neurosensory - Adult  Goal: Achieves maximal functionality and self care  Outcome: Progressing     Problem: Metabolic/Fluid and Electrolytes - Adult  Goal: Electrolytes maintained within normal limits  Outcome: Progressing     Problem: Metabolic/Fluid and Electrolytes - Adult  Goal: Hemodynamic stability and optimal renal function maintained  Outcome: Progressing     Problem: Respiratory - Adult  Goal: Achieves optimal ventilation and oxygenation  Outcome: Progressing     Problem: Cardiovascular - Adult  Goal: Maintains optimal cardiac output and hemodynamic stability  Outcome: Progressing     Problem: Cardiovascular - Adult  Goal: Absence of cardiac dysrhythmias or at baseline  Outcome: Progressing     Problem: Skin/Tissue Integrity - Adult  Goal: Skin integrity remains intact  Outcome: Progressing     Problem: Skin/Tissue Integrity - Adult  Goal: Incisions, wounds, or drain sites 
  Problem: ABCDS Injury Assessment  Goal: Absence of physical injury  Outcome: Progressing     Problem: Skin/Tissue Integrity  Goal: Absence of new skin breakdown  Description: 1.  Monitor for areas of redness and/or skin breakdown  2.  Assess vascular access sites hourly  3.  Every 4-6 hours minimum:  Change oxygen saturation probe site  4.  Every 4-6 hours:  If on nasal continuous positive airway pressure, respiratory therapy assess nares and determine need for appliance change or resting period.  Outcome: Progressing     Problem: Safety - Adult  Goal: Free from fall injury  Outcome: Progressing     Problem: Discharge Planning  Goal: Discharge to home or other facility with appropriate resources  Outcome: Progressing     Problem: Neurosensory - Adult  Goal: Achieves stable or improved neurological status  Outcome: Progressing     Problem: Neurosensory - Adult  Goal: Absence of seizures  Outcome: Progressing     Problem: Neurosensory - Adult  Goal: Remains free of injury related to seizures activity  Outcome: Progressing     Problem: Neurosensory - Adult  Goal: Achieves maximal functionality and self care  Outcome: Progressing     Problem: Metabolic/Fluid and Electrolytes - Adult  Goal: Electrolytes maintained within normal limits  Outcome: Progressing     Problem: Metabolic/Fluid and Electrolytes - Adult  Goal: Hemodynamic stability and optimal renal function maintained  Outcome: Progressing     Problem: Respiratory - Adult  Goal: Achieves optimal ventilation and oxygenation  Outcome: Progressing     Problem: Cardiovascular - Adult  Goal: Maintains optimal cardiac output and hemodynamic stability  Outcome: Progressing     Problem: Skin/Tissue Integrity - Adult  Goal: Skin integrity remains intact  Outcome: Progressing     Problem: Skin/Tissue Integrity - Adult  Goal: Incisions, wounds, or drain sites healing without S/S of infection  Outcome: Progressing     Problem: Musculoskeletal - Adult  Goal: Return mobility 
  Problem: ABCDS Injury Assessment  Goal: Absence of physical injury  Outcome: Progressing     Problem: Skin/Tissue Integrity  Goal: Absence of new skin breakdown  Description: 1.  Monitor for areas of redness and/or skin breakdown  2.  Assess vascular access sites hourly  3.  Every 4-6 hours minimum:  Change oxygen saturation probe site  4.  Every 4-6 hours:  If on nasal continuous positive airway pressure, respiratory therapy assess nares and determine need for appliance change or resting period.  Outcome: Progressing     Problem: Safety - Adult  Goal: Free from fall injury  Outcome: Progressing     Problem: Discharge Planning  Goal: Discharge to home or other facility with appropriate resources  Outcome: Progressing     Problem: Neurosensory - Adult  Goal: Achieves stable or improved neurological status  Outcome: Progressing     Problem: Skin/Tissue Integrity - Adult  Goal: Skin integrity remains intact  Outcome: Progressing     
  Problem: ABCDS Injury Assessment  Goal: Absence of physical injury  Outcome: Progressing     Problem: Skin/Tissue Integrity  Goal: Absence of new skin breakdown  Description: 1.  Monitor for areas of redness and/or skin breakdown  2.  Assess vascular access sites hourly  3.  Every 4-6 hours minimum:  Change oxygen saturation probe site  4.  Every 4-6 hours:  If on nasal continuous positive airway pressure, respiratory therapy assess nares and determine need for appliance change or resting period.  Outcome: Progressing     Problem: Safety - Adult  Goal: Free from fall injury  Outcome: Progressing     Problem: Discharge Planning  Goal: Discharge to home or other facility with appropriate resources  Outcome: Progressing     Problem: Neurosensory - Adult  Goal: Achieves stable or improved neurological status  Outcome: Progressing  Goal: Absence of seizures  Outcome: Progressing  Goal: Remains free of injury related to seizures activity  Outcome: Progressing  Goal: Achieves maximal functionality and self care  Outcome: Progressing     Problem: Metabolic/Fluid and Electrolytes - Adult  Goal: Electrolytes maintained within normal limits  Outcome: Progressing  Goal: Hemodynamic stability and optimal renal function maintained  Outcome: Progressing     Problem: Respiratory - Adult  Goal: Achieves optimal ventilation and oxygenation  Outcome: Progressing     Problem: Cardiovascular - Adult  Goal: Maintains optimal cardiac output and hemodynamic stability  Outcome: Progressing  Goal: Absence of cardiac dysrhythmias or at baseline  Outcome: Progressing     Problem: Skin/Tissue Integrity - Adult  Goal: Skin integrity remains intact  Outcome: Progressing  Flowsheets (Taken 5/20/2024 3040 by Frieda German RN)  Skin Integrity Remains Intact: Monitor for areas of redness and/or skin breakdown  Goal: Incisions, wounds, or drain sites healing without S/S of infection  Outcome: Progressing     Problem: Musculoskeletal - 
  Problem: ABCDS Injury Assessment  Goal: Absence of physical injury  Outcome: Progressing     Problem: Skin/Tissue Integrity  Goal: Absence of new skin breakdown  Description: 1.  Monitor for areas of redness and/or skin breakdown  2.  Assess vascular access sites hourly  3.  Every 4-6 hours minimum:  Change oxygen saturation probe site  4.  Every 4-6 hours:  If on nasal continuous positive airway pressure, respiratory therapy assess nares and determine need for appliance change or resting period.  Outcome: Progressing     Problem: Safety - Adult  Goal: Free from fall injury  Outcome: Progressing     Problem: Discharge Planning  Goal: Discharge to home or other facility with appropriate resources  Outcome: Progressing  Flowsheets (Taken 5/17/2024 0630 by Masha Purdy, RN)  Discharge to home or other facility with appropriate resources: Identify barriers to discharge with patient and caregiver     
  Problem: ABCDS Injury Assessment  Goal: Absence of physical injury  Outcome: Progressing     Problem: Skin/Tissue Integrity  Goal: Absence of new skin breakdown  Description: 1.  Monitor for areas of redness and/or skin breakdown  2.  Assess vascular access sites hourly  3.  Every 4-6 hours minimum:  Change oxygen saturation probe site  4.  Every 4-6 hours:  If on nasal continuous positive airway pressure, respiratory therapy assess nares and determine need for appliance change or resting period.  Outcome: Progressing     Problem: Safety - Adult  Goal: Free from fall injury  Outcome: Progressing     Problem: Neurosensory - Adult  Goal: Achieves stable or improved neurological status  Outcome: Progressing  Goal: Absence of seizures  Outcome: Progressing  Goal: Remains free of injury related to seizures activity  Outcome: Progressing  Goal: Achieves maximal functionality and self care  Outcome: Progressing     Problem: Metabolic/Fluid and Electrolytes - Adult  Goal: Electrolytes maintained within normal limits  Outcome: Progressing  Goal: Hemodynamic stability and optimal renal function maintained  Outcome: Progressing     Problem: Respiratory - Adult  Goal: Achieves optimal ventilation and oxygenation  Outcome: Progressing     Problem: Cardiovascular - Adult  Goal: Maintains optimal cardiac output and hemodynamic stability  Outcome: Progressing  Goal: Absence of cardiac dysrhythmias or at baseline  Outcome: Progressing     Problem: Skin/Tissue Integrity - Adult  Goal: Skin integrity remains intact  Outcome: Progressing  Goal: Incisions, wounds, or drain sites healing without S/S of infection  Outcome: Progressing     Problem: Musculoskeletal - Adult  Goal: Return mobility to safest level of function  Outcome: Progressing  Goal: Return ADL status to a safe level of function  Outcome: Not Progressing     Problem: Genitourinary - Adult  Goal: Absence of urinary retention  Outcome: Progressing     Problem: Hematologic 
  Problem: ABCDS Injury Assessment  Goal: Absence of physical injury  Outcome: Progressing     Problem: Skin/Tissue Integrity  Goal: Absence of new skin breakdown  Description: 1.  Monitor for areas of redness and/or skin breakdown  2.  Assess vascular access sites hourly  3.  Every 4-6 hours minimum:  Change oxygen saturation probe site  4.  Every 4-6 hours:  If on nasal continuous positive airway pressure, respiratory therapy assess nares and determine need for appliance change or resting period.  Outcome: Progressing     Problem: Safety - Adult  Goal: Free from fall injury  Outcome: Progressing     Problem: Safety - Medical Restraint  Goal: Remains free of injury from restraints (Restraint for Interference with Medical Device)  Description: INTERVENTIONS:  1. Determine that other, less restrictive measures have been tried or would not be effective before applying the restraint  2. Evaluate the patient's condition at the time of restraint application  3. Inform patient/family regarding the reason for restraint  4. Q2H: Monitor safety, psychosocial status, comfort, nutrition and hydration  6/6/2024 1252 by Kika Wood RN  Outcome: Progressing  Flowsheets  Taken 6/6/2024 0700 by Nai Ly RN  Remains free of injury from restraints (restraint for interference with medical device):   Determine that other, less restrictive measures have been tried or would not be effective before applying the restraint   Evaluate the patient's condition at the time of restraint application   Inform patient/family regarding the reason for restraint   Every 2 hours: Monitor safety, psychosocial status, comfort, nutrition and hydration  Taken 6/6/2024 0500 by Nai Ly RN  Remains free of injury from restraints (restraint for interference with medical device):   Determine that other, less restrictive measures have been tried or would not be effective before applying the restraint   Inform patient/family regarding the 
  Problem: ABCDS Injury Assessment  Goal: Absence of physical injury  Outcome: Progressing  Flowsheets (Taken 6/2/2024 0800)  Absence of Physical Injury: Implement safety measures based on patient assessment     Problem: Skin/Tissue Integrity  Goal: Absence of new skin breakdown  Description: 1.  Monitor for areas of redness and/or skin breakdown  2.  Assess vascular access sites hourly  3.  Every 4-6 hours minimum:  Change oxygen saturation probe site  4.  Every 4-6 hours:  If on nasal continuous positive airway pressure, respiratory therapy assess nares and determine need for appliance change or resting period.  Outcome: Progressing     Problem: Safety - Adult  Goal: Free from fall injury  Outcome: Progressing  Flowsheets (Taken 6/2/2024 0800)  Free From Fall Injury:   Instruct family/caregiver on patient safety   Based on caregiver fall risk screen, instruct family/caregiver to ask for assistance with transferring infant if caregiver noted to have fall risk factors     Problem: Discharge Planning  Goal: Discharge to home or other facility with appropriate resources  Outcome: Progressing     Problem: Neurosensory - Adult  Goal: Achieves stable or improved neurological status  Outcome: Not Progressing     Problem: Neurosensory - Adult  Goal: Absence of seizures  Outcome: Progressing     Problem: Neurosensory - Adult  Goal: Remains free of injury related to seizures activity  Outcome: Progressing     Problem: Neurosensory - Adult  Goal: Achieves maximal functionality and self care  Outcome: Progressing     Problem: Metabolic/Fluid and Electrolytes - Adult  Goal: Electrolytes maintained within normal limits  Outcome: Progressing     Problem: Metabolic/Fluid and Electrolytes - Adult  Goal: Hemodynamic stability and optimal renal function maintained  Outcome: Progressing     Problem: Respiratory - Adult  Goal: Achieves optimal ventilation and oxygenation  Outcome: Progressing     Problem: Cardiovascular - Adult  Goal: 
  Problem: ABCDS Injury Assessment  Goal: Absence of physical injury  Outcome: Progressing  Flowsheets (Taken 6/9/2024 2019)  Absence of Physical Injury: Implement safety measures based on patient assessment     Problem: Skin/Tissue Integrity  Goal: Absence of new skin breakdown  Description: 1.  Monitor for areas of redness and/or skin breakdown  2.  Assess vascular access sites hourly  3.  Every 4-6 hours minimum:  Change oxygen saturation probe site  4.  Every 4-6 hours:  If on nasal continuous positive airway pressure, respiratory therapy assess nares and determine need for appliance change or resting period.  Outcome: Progressing     Problem: Safety - Adult  Goal: Free from fall injury  Outcome: Progressing  Flowsheets (Taken 6/9/2024 2019)  Free From Fall Injury: Instruct family/caregiver on patient safety     Problem: Discharge Planning  Goal: Discharge to home or other facility with appropriate resources  Outcome: Progressing     Problem: Neurosensory - Adult  Goal: Achieves stable or improved neurological status  Outcome: Progressing     Problem: Neurosensory - Adult  Goal: Absence of seizures  Outcome: Progressing     Problem: Neurosensory - Adult  Goal: Remains free of injury related to seizures activity  Outcome: Progressing     Problem: Neurosensory - Adult  Goal: Achieves maximal functionality and self care  Outcome: Progressing     Problem: Metabolic/Fluid and Electrolytes - Adult  Goal: Electrolytes maintained within normal limits  Outcome: Progressing     Problem: Metabolic/Fluid and Electrolytes - Adult  Goal: Hemodynamic stability and optimal renal function maintained  Outcome: Progressing     Problem: Respiratory - Adult  Goal: Achieves optimal ventilation and oxygenation  Outcome: Progressing     Problem: Cardiovascular - Adult  Goal: Maintains optimal cardiac output and hemodynamic stability  Outcome: Progressing     Problem: Cardiovascular - Adult  Goal: Absence of cardiac dysrhythmias or at 
  Problem: Discharge Planning  Goal: Discharge to home or other facility with appropriate resources  6/10/2024 0917 by Jillian Jeffery RN  Outcome: Not Progressing  6/9/2024 2021 by Edis Hammer RN  Outcome: Progressing     Problem: Neurosensory - Adult  Goal: Achieves stable or improved neurological status  6/10/2024 0917 by Jillian Jeffery RN  Outcome: Not Progressing  6/9/2024 2021 by Edis Hammer RN  Outcome: Progressing  Goal: Achieves maximal functionality and self care  6/10/2024 0917 by Jillian Jeffery RN  Outcome: Not Progressing  6/9/2024 2021 by Edis Hammer RN  Outcome: Progressing     Problem: Metabolic/Fluid and Electrolytes - Adult  Goal: Electrolytes maintained within normal limits  6/10/2024 0917 by Jillian Jeffery RN  Outcome: Not Progressing  6/9/2024 2021 by Edis Hammer RN  Outcome: Progressing  Goal: Hemodynamic stability and optimal renal function maintained  6/10/2024 0917 by Jillian Jeffery RN  Outcome: Not Progressing  6/9/2024 2021 by Edis Hammer RN  Outcome: Progressing     Problem: Respiratory - Adult  Goal: Achieves optimal ventilation and oxygenation  6/10/2024 0917 by Jillian Jeffery RN  Outcome: Not Progressing  6/9/2024 2021 by Edis Hammer RN  Outcome: Progressing     Problem: Cardiovascular - Adult  Goal: Maintains optimal cardiac output and hemodynamic stability  6/10/2024 0917 by Jillian Jeffery RN  Outcome: Not Progressing  6/9/2024 2021 by Edis Hammer RN  Outcome: Progressing     Problem: Musculoskeletal - Adult  Goal: Return mobility to safest level of function  6/10/2024 0917 by Jillian Jeffery RN  Outcome: Not Progressing  6/9/2024 2021 by Edis Hammer RN  Outcome: Progressing  Goal: Return ADL status to a safe level of function  6/10/2024 0917 by Jillian Jeffery RN  Outcome: Not Progressing  6/9/2024 2021 by Edis Hammer RN  Outcome: Progressing     Problem: Chronic Conditions and Co-morbidities  Goal: Patient's chronic conditions and 
  Problem: Discharge Planning  Goal: Discharge to home or other facility with appropriate resources  6/10/2024 2124 by Edis Hammer RN  Outcome: Not Progressing     Problem: Neurosensory - Adult  Goal: Achieves stable or improved neurological status  6/10/2024 2124 by Edis Hammer RN  Outcome: Not Progressing     Problem: Neurosensory - Adult  Goal: Achieves maximal functionality and self care  6/10/2024 2124 by Edis Hammer RN  Outcome: Not Progressing     Problem: Metabolic/Fluid and Electrolytes - Adult  Goal: Electrolytes maintained within normal limits  6/10/2024 2124 by Edis Hammer RN  Outcome: Not Progressing     Problem: Metabolic/Fluid and Electrolytes - Adult  Goal: Hemodynamic stability and optimal renal function maintained  6/10/2024 2124 by Edis Hammer RN  Outcome: Not Progressing     Problem: Musculoskeletal - Adult  Goal: Return mobility to safest level of function  6/10/2024 2124 by Edis Hammer RN  Outcome: Not Progressing     Problem: Musculoskeletal - Adult  Goal: Return ADL status to a safe level of function  6/10/2024 2124 by Edis Hammer RN  Outcome: Not Progressing     Problem: Chronic Conditions and Co-morbidities  Goal: Patient's chronic conditions and co-morbidity symptoms are monitored and maintained or improved  6/10/2024 2124 by Edis Hammer RN  Outcome: Not Progressing     Problem: ABCDS Injury Assessment  Goal: Absence of physical injury  6/10/2024 2124 by Edis Hammer RN  Outcome: Progressing  Flowsheets (Taken 6/10/2024 2123)  Absence of Physical Injury: Implement safety measures based on patient assessment     Problem: Skin/Tissue Integrity  Goal: Absence of new skin breakdown  Description: 1.  Monitor for areas of redness and/or skin breakdown  2.  Assess vascular access sites hourly  3.  Every 4-6 hours minimum:  Change oxygen saturation probe site  4.  Every 4-6 hours:  If on nasal continuous positive airway pressure, respiratory therapy assess nares 
  Problem: Neurosensory - Adult  Goal: Absence of seizures  Outcome: Progressing  Goal: Remains free of injury related to seizures activity  Outcome: Progressing     Problem: Metabolic/Fluid and Electrolytes - Adult  Goal: Electrolytes maintained within normal limits  Outcome: Progressing  Goal: Hemodynamic stability and optimal renal function maintained  Outcome: Progressing     Problem: Cardiovascular - Adult  Goal: Maintains optimal cardiac output and hemodynamic stability  Outcome: Progressing     Problem: Discharge Planning  Goal: Discharge to home or other facility with appropriate resources  Outcome: Not Progressing     Problem: Neurosensory - Adult  Goal: Achieves stable or improved neurological status  Outcome: Not Progressing  Goal: Achieves maximal functionality and self care  Outcome: Not Progressing     Problem: Cardiovascular - Adult  Goal: Absence of cardiac dysrhythmias or at baseline  Outcome: Not Progressing     Problem: Musculoskeletal - Adult  Goal: Return ADL status to a safe level of function  Outcome: Not Progressing     Problem: Chronic Conditions and Co-morbidities  Goal: Patient's chronic conditions and co-morbidity symptoms are monitored and maintained or improved  Outcome: Not Progressing     Problem: Nutrition Deficit:  Goal: Optimize nutritional status  Outcome: Not Progressing     
  Problem: Neurosensory - Adult  Goal: Achieves stable or improved neurological status  5/21/2024 0935 by Nalini Rodríguez RN  Outcome: Progressing  Flowsheets (Taken 5/21/2024 0935)  Achieves stable or improved neurological status:   Assess for and report changes in neurological status   Initiate measures to prevent increased intracranial pressure   Maintain blood pressure and fluid volume within ordered parameters to optimize cerebral perfusion and minimize risk of hemorrhage   Monitor temperature, glucose, and sodium. Initiate appropriate interventions as ordered     Problem: Neurosensory - Adult  Goal: Absence of seizures  5/21/2024 0935 by Nalini Rodríguez RN  Outcome: Progressing  Flowsheets (Taken 5/21/2024 0935)  Absence of seizures:   Monitor for seizure activity.  If seizure occurs, document type and location of movements and any associated apnea   Administer anticonvulsants as ordered   Diagnostic studies as ordered   If seizure occurs, turn head to side and suction secretions as needed   Support airway/breathing, administer oxygen as needed     Problem: Neurosensory - Adult  Goal: Remains free of injury related to seizures activity  5/21/2024 0935 by Nalini Rodríguez RN  Outcome: Progressing  Flowsheets (Taken 5/21/2024 0935)  Remains free of injury related to seizure activity:   Maintain airway, patient safety  and administer oxygen as ordered   Monitor patient for seizure activity, document and report duration and description of seizure to Licensed Independent Practitioner   If seizure occurs, turn patient to side and suction secretions as needed   Reorient patient post seizure   Seizure pads on all 4 side rails   Instruct patient/family to notify RN of any seizure activity   Instruct patient/family to call for assistance with activity based on assessment     Problem: Respiratory - Adult  Goal: Achieves optimal ventilation and oxygenation  5/21/2024 0935 by Nalini Rodríguez RN  Outcome: 
  Problem: Neurosensory - Adult  Goal: Achieves stable or improved neurological status  5/22/2024 0814 by Ann Samuel RN  Outcome: Not Progressing  Flowsheets (Taken 5/22/2024 0800)  Achieves stable or improved neurological status:   Assess for and report changes in neurological status   Initiate measures to prevent increased intracranial pressure   Maintain blood pressure and fluid volume within ordered parameters to optimize cerebral perfusion and minimize risk of hemorrhage   Monitor temperature, glucose, and sodium. Initiate appropriate interventions as ordered  5/22/2024 0032 by Ro Donald RN  Outcome: Not Progressing  Goal: Absence of seizures  5/22/2024 0814 by Ann Samuel RN  Outcome: Not Progressing  Flowsheets (Taken 5/22/2024 0800)  Absence of seizures:   Monitor for seizure activity.  If seizure occurs, document type and location of movements and any associated apnea   If seizure occurs, turn head to side and suction secretions as needed   Administer anticonvulsants as ordered   Support airway/breathing, administer oxygen as needed  5/22/2024 0032 by Ro Donald RN  Outcome: Not Progressing  Goal: Remains free of injury related to seizures activity  5/22/2024 0814 by Ann Samuel RN  Outcome: Not Progressing  Flowsheets (Taken 5/22/2024 0800)  Remains free of injury related to seizure activity:   Maintain airway, patient safety  and administer oxygen as ordered   If seizure occurs, turn patient to side and suction secretions as needed   Monitor patient for seizure activity, document and report duration and description of seizure to Licensed Independent Practitioner   Reorient patient post seizure   Seizure pads on all 4 side rails  5/22/2024 0032 by Ro Donald RN  Outcome: Progressing  Goal: Achieves maximal functionality and self care  5/22/2024 0814 by Ann Samuel RN  Outcome: Not Progressing  Flowsheets (Taken 5/22/2024 0800)  Achieves maximal functionality and self 
  Problem: Neurosensory - Adult  Goal: Achieves stable or improved neurological status  Outcome: Not Progressing  Goal: Absence of seizures  Outcome: Not Progressing  Goal: Achieves maximal functionality and self care  Outcome: Not Progressing     Problem: Chronic Conditions and Co-morbidities  Goal: Patient's chronic conditions and co-morbidity symptoms are monitored and maintained or improved  Outcome: Not Progressing     Problem: Safety - Medical Restraint  Goal: Remains free of injury from restraints (Restraint for Interference with Medical Device)  Description: INTERVENTIONS:  1. Determine that other, less restrictive measures have been tried or would not be effective before applying the restraint  2. Evaluate the patient's condition at the time of restraint application  3. Inform patient/family regarding the reason for restraint  4. Q2H: Monitor safety, psychosocial status, comfort, nutrition and hydration  Outcome: Not Progressing     Problem: ABCDS Injury Assessment  Goal: Absence of physical injury  Outcome: Progressing     Problem: Skin/Tissue Integrity  Goal: Absence of new skin breakdown  Description: 1.  Monitor for areas of redness and/or skin breakdown  2.  Assess vascular access sites hourly  3.  Every 4-6 hours minimum:  Change oxygen saturation probe site  4.  Every 4-6 hours:  If on nasal continuous positive airway pressure, respiratory therapy assess nares and determine need for appliance change or resting period.  Outcome: Progressing     Problem: Safety - Adult  Goal: Free from fall injury  Outcome: Progressing     Problem: Neurosensory - Adult  Goal: Remains free of injury related to seizures activity  Outcome: Progressing        
  Problem: Safety - Adult  Goal: Free from fall injury  5/17/2024 2041 by Jennifer Carter, RN  Outcome: Not Progressing     Problem: Safety - Adult  Goal: Free from fall injury  5/17/2024 2041 by Jennifer Carter, RN  Outcome: Not Progressing  5/17/2024 1511 by Bill Marsh, RN  Outcome: Progressing     
  Problem: Safety - Medical Restraint  Goal: Remains free of injury from restraints (Restraint for Interference with Medical Device)  Description: INTERVENTIONS:  1. Determine that other, less restrictive measures have been tried or would not be effective before applying the restraint  2. Evaluate the patient's condition at the time of restraint application  3. Inform patient/family regarding the reason for restraint  4. Q2H: Monitor safety, psychosocial status, comfort, nutrition and hydration  5/20/2024 0520 by Ayo Perry RN  Outcome: Progressing  5/19/2024 2200 by Ayo Perry RN  Outcome: Progressing  5/19/2024 1948 by Nikky Inman RN  Outcome: Progressing  Flowsheets (Taken 5/19/2024 1843)  Remains free of injury from restraints (restraint for interference with medical device):   Inform patient/family regarding the reason for restraint   Evaluate the patient's condition at the time of restraint application   Determine that other, less restrictive measures have been tried or would not be effective before applying the restraint     
  Problem: Safety - Medical Restraint  Goal: Remains free of injury from restraints (Restraint for Interference with Medical Device)  Description: INTERVENTIONS:  1. Determine that other, less restrictive measures have been tried or would not be effective before applying the restraint  2. Evaluate the patient's condition at the time of restraint application  3. Inform patient/family regarding the reason for restraint  4. Q2H: Monitor safety, psychosocial status, comfort, nutrition and hydration  5/20/2024 2220 by Ayo Perry, RN  Outcome: Completed  5/20/2024 2211 by yAo Perry, RN  Outcome: Progressing     
  Problem: Safety - Medical Restraint  Goal: Remains free of injury from restraints (Restraint for Interference with Medical Device)  Description: INTERVENTIONS:  1. Determine that other, less restrictive measures have been tried or would not be effective before applying the restraint  2. Evaluate the patient's condition at the time of restraint application  3. Inform patient/family regarding the reason for restraint  4. Q2H: Monitor safety, psychosocial status, comfort, nutrition and hydration  5/26/2024 2134 by Adam Guzman, RN  Outcome: Not Progressing  Flowsheets  Taken 5/26/2024 2000 by Adam Guzman, RN  Remains free of injury from restraints (restraint for interference with medical device):   Every 2 hours: Monitor safety, psychosocial status, comfort, nutrition and hydration   Inform patient/family regarding the reason for restraint   Evaluate the patient's condition at the time of restraint application   Determine that other, less restrictive measures have been tried or would not be effective before applying the restraint      Problem: ABCDS Injury Assessment  Goal: Absence of physical injury  Outcome: Progressing     Problem: Skin/Tissue Integrity  Goal: Absence of new skin breakdown  Description: 1.  Monitor for areas of redness and/or skin breakdown  2.  Assess vascular access sites hourly  3.  Every 4-6 hours minimum:  Change oxygen saturation probe site  4.  Every 4-6 hours:  If on nasal continuous positive airway pressure, respiratory therapy assess nares and determine need for appliance change or resting period.  Outcome: Progressing     
  Problem: Safety - Medical Restraint  Goal: Remains free of injury from restraints (Restraint for Interference with Medical Device)  Description: INTERVENTIONS:  1. Determine that other, less restrictive measures have been tried or would not be effective before applying the restraint  2. Evaluate the patient's condition at the time of restraint application  3. Inform patient/family regarding the reason for restraint  4. Q2H: Monitor safety, psychosocial status, comfort, nutrition and hydration  6/3/2024 2221 by Chun Hillman RN  Outcome: Progressing  Flowsheets  Taken 6/3/2024 1800 by Frieda German RN  Remains free of injury from restraints (restraint for interference with medical device): Determine that other, less restrictive measures have been tried or would not be effective before applying the restraint  Taken 6/3/2024 1600 by Frieda German RN  Remains free of injury from restraints (restraint for interference with medical device): Determine that other, less restrictive measures have been tried or would not be effective before applying the restraint  Taken 6/3/2024 1400 by Simi Porras RN  Remains free of injury from restraints (restraint for interference with medical device): Determine that other, less restrictive measures have been tried or would not be effective before applying the restraint  Taken 6/3/2024 1200 by Simi Porras RN  Remains free of injury from restraints (restraint for interference with medical device): Determine that other, less restrictive measures have been tried or would not be effective before applying the restraint  6/3/2024 1035 by Simi Porras RN  Outcome: Progressing  Flowsheets  Taken 6/3/2024 1000  Remains free of injury from restraints (restraint for interference with medical device): Determine that other, less restrictive measures have been tried or would not be effective before applying the restraint  Taken 6/3/2024 0800  Remains free of injury from 
  Problem: Safety - Medical Restraint  Goal: Remains free of injury from restraints (Restraint for Interference with Medical Device)  Description: INTERVENTIONS:  1. Determine that other, less restrictive measures have been tried or would not be effective before applying the restraint  2. Evaluate the patient's condition at the time of restraint application  3. Inform patient/family regarding the reason for restraint  4. Q2H: Monitor safety, psychosocial status, comfort, nutrition and hydration  Outcome: Not Progressing    Problem: Pain  Goal: Verbalizes/displays adequate comfort level or baseline comfort level  Outcome: Progressing     Problem: Chronic Conditions and Co-morbidities  Goal: Patient's chronic conditions and co-morbidity symptoms are monitored and maintained or improved  Outcome: Progressing       
  Problem: Safety - Medical Restraint  Goal: Remains free of injury from restraints (Restraint for Interference with Medical Device)  Description: INTERVENTIONS:  1. Determine that other, less restrictive measures have been tried or would not be effective before applying the restraint  2. Evaluate the patient's condition at the time of restraint application  3. Inform patient/family regarding the reason for restraint  4. Q2H: Monitor safety, psychosocial status, comfort, nutrition and hydration  Outcome: Progressing    
  Problem: Safety - Medical Restraint  Goal: Remains free of injury from restraints (Restraint for Interference with Medical Device)  Description: INTERVENTIONS:  1. Determine that other, less restrictive measures have been tried or would not be effective before applying the restraint  2. Evaluate the patient's condition at the time of restraint application  3. Inform patient/family regarding the reason for restraint  4. Q2H: Monitor safety, psychosocial status, comfort, nutrition and hydration  Outcome: Progressing        
  Problem: Safety - Medical Restraint  Goal: Remains free of injury from restraints (Restraint for Interference with Medical Device)  Description: INTERVENTIONS:  1. Determine that other, less restrictive measures have been tried or would not be effective before applying the restraint  2. Evaluate the patient's condition at the time of restraint application  3. Inform patient/family regarding the reason for restraint  4. Q2H: Monitor safety, psychosocial status, comfort, nutrition and hydration  Outcome: Progressing  Flowsheets  Taken 6/4/2024 2000 by Chun Hillman RN  Remains free of injury from restraints (restraint for interference with medical device):   Determine that other, less restrictive measures have been tried or would not be effective before applying the restraint   Evaluate the patient's condition at the time of restraint application   Every 2 hours: Monitor safety, psychosocial status, comfort, nutrition and hydration  Taken 6/4/2024 1400 by Emily Benedict RN  Remains free of injury from restraints (restraint for interference with medical device): Determine that other, less restrictive measures have been tried or would not be effective before applying the restraint     
  Problem: Safety - Medical Restraint  Goal: Remains free of injury from restraints (Restraint for Interference with Medical Device)  Description: INTERVENTIONS:  1. Determine that other, less restrictive measures have been tried or would not be effective before applying the restraint  2. Evaluate the patient's condition at the time of restraint application  3. Inform patient/family regarding the reason for restraint  4. Q2H: Monitor safety, psychosocial status, comfort, nutrition and hydration  Recent Flowsheet Documentation  Taken 5/21/2024 0800 by Nalini Rodríguez RN  Remains free of injury from restraints (restraint for interference with medical device):   Determine that other, less restrictive measures have been tried or would not be effective before applying the restraint   Evaluate the patient's condition at the time of restraint application   Inform patient/family regarding the reason for restraint   Every 2 hours: Monitor safety, psychosocial status, comfort, nutrition and hydration  5/20/2024 2220 by Ayo Perry RN  Outcome: Completed       
  Problem: Safety - Medical Restraint  Goal: Remains free of injury from restraints (Restraint for Interference with Medical Device)  Description: INTERVENTIONS:  1. Determine that other, less restrictive measures have been tried or would not be effective before applying the restraint  2. Evaluate the patient's condition at the time of restraint application  3. Inform patient/family regarding the reason for restraint  4. Q2H: Monitor safety, psychosocial status, comfort, nutrition and hydration  Recent Flowsheet Documentation  Taken 6/5/2024 1952 by Nai Ly RN  Remains free of injury from restraints (restraint for interference with medical device):   Determine that other, less restrictive measures have been tried or would not be effective before applying the restraint   Evaluate the patient's condition at the time of restraint application   Every 2 hours: Monitor safety, psychosocial status, comfort, nutrition and hydration   Inform patient/family regarding the reason for restraint     
  Problem: Safety - Medical Restraint  Goal: Remains free of injury from restraints (Restraint for Interference with Medical Device)  Description: INTERVENTIONS:  1. Determine that other, less restrictive measures have been tried or would not be effective before applying the restraint  2. Evaluate the patient's condition at the time of restraint application  3. Inform patient/family regarding the reason for restraint  4. Q2H: Monitor safety, psychosocial status, comfort, nutrition and hydration  Remains free of injury from restraints (restraint for interference with medical device):   Every 2 hours: Monitor safety, psychosocial status, comfort, nutrition and hydration   Inform patient/family regarding the reason for restraint   Determine that other, less restrictive measures have been tried or would not be effective before applying the restraint   Evaluate the patient's condition at the time of restraint application       Problem: Safety - Adult  Goal: Free from fall injury  Outcome: Progressing     Problem: Skin/Tissue Integrity  Goal: Absence of new skin breakdown  Description: 1.  Monitor for areas of redness and/or skin breakdown  2.  Assess vascular access sites hourly  3.  Every 4-6 hours minimum:  Change oxygen saturation probe site  4.  Every 4-6 hours:  If on nasal continuous positive airway pressure, respiratory therapy assess nares and determine need for appliance change or resting period.  Outcome: Progressing     
  Problem: Skin/Tissue Integrity  Goal: Absence of new skin breakdown  Description: 1.  Monitor for areas of redness and/or skin breakdown  2.  Assess vascular access sites hourly  3.  Every 4-6 hours minimum:  Change oxygen saturation probe site  4.  Every 4-6 hours:  If on nasal continuous positive airway pressure, respiratory therapy assess nares and determine need for appliance change or resting period.  Outcome: Progressing     Problem: Neurosensory - Adult  Goal: Absence of seizures  Outcome: Progressing  Goal: Remains free of injury related to seizures activity  Outcome: Progressing     Problem: Metabolic/Fluid and Electrolytes - Adult  Goal: Electrolytes maintained within normal limits  Outcome: Progressing     Problem: Genitourinary - Adult  Goal: Absence of urinary retention  Outcome: Progressing     Problem: Safety - Medical Restraint  Goal: Remains free of injury from restraints (Restraint for Interference with Medical Device)  Description: INTERVENTIONS:  1. Determine that other, less restrictive measures have been tried or would not be effective before applying the restraint  2. Evaluate the patient's condition at the time of restraint application  3. Inform patient/family regarding the reason for restraint  4. Q2H: Monitor safety, psychosocial status, comfort, nutrition and hydration  Outcome: Progressing     Problem: Discharge Planning  Goal: Discharge to home or other facility with appropriate resources  Outcome: Not Progressing     Problem: Neurosensory - Adult  Goal: Achieves stable or improved neurological status  Outcome: Not Progressing  Goal: Achieves maximal functionality and self care  Outcome: Not Progressing     Problem: Metabolic/Fluid and Electrolytes - Adult  Goal: Hemodynamic stability and optimal renal function maintained  Outcome: Not Progressing     Problem: Chronic Conditions and Co-morbidities  Goal: Patient's chronic conditions and co-morbidity symptoms are monitored and 
Informed by nurse that patient was having HR of around 130 with patient appearing to be in Afib with RVR on the monitor. On assessment patient stated that he was feeling fine and was asymptomatic. Denies CP, SOB lightheaded.   Vitals:    05/27/24 2200 05/27/24 2300 05/28/24 0000 05/28/24 0055   BP: (!) 160/106 (!) 171/89 (!) 171/133 (!) 178/99   Pulse: (!) 111 (!) 120 (!) 140 (!) 128   Resp: 18 17 18    Temp:   98.9 °F (37.2 °C)    TempSrc:   Temporal    SpO2: 98% 98% 99%    Weight:       Height:            General: Patient not in any acute distress. On room air   CVS: Irregular rate and rhythm   Lungs: CTAB   Abdomen:SNTND  Extremities: Soft restraints in place, no edema or cyanosis. +DP pulses     Assessment  Afib with RVR     Plan  STAT EKG ordered  STAT BMP, Mg, Phos ordered  Resume patient Toprol-XL 75mg with first dose to start now  Will await lab results and continue to monitor patient HR and BP    Electronically signed by Simi Blanco MD on 5/28/2024 at 1:09 AM         
0057 by Ro Donald RN  Outcome: Not Progressing     Problem: Metabolic/Fluid and Electrolytes - Adult  Goal: Hemodynamic stability and optimal renal function maintained  5/24/2024 0817 by Ann Samuel RN  Outcome: Progressing  Flowsheets (Taken 5/24/2024 0800)  Hemodynamic stability and optimal renal function maintained:   Monitor labs and assess for signs and symptoms of volume excess or deficit   Monitor intake, output and patient weight   Monitor urine specific gravity, serum osmolarity and serum sodium as indicated or ordered   Monitor response to interventions for patient's volume status, including labs, urine output, blood pressure (other measures as available)   Encourage oral intake as appropriate   Instruct patient on fluid and nutrition restrictions as appropriate  5/24/2024 0057 by Ro Donald RN  Outcome: Not Progressing     Problem: Chronic Conditions and Co-morbidities  Goal: Patient's chronic conditions and co-morbidity symptoms are monitored and maintained or improved  5/24/2024 0817 by Ann Samuel RN  Outcome: Not Progressing  Flowsheets (Taken 5/24/2024 0800)  Care Plan - Patient's Chronic Conditions and Co-Morbidity Symptoms are Monitored and Maintained or Improved:   Monitor and assess patient's chronic conditions and comorbid symptoms for stability, deterioration, or improvement   Collaborate with multidisciplinary team to address chronic and comorbid conditions and prevent exacerbation or deterioration   Update acute care plan with appropriate goals if chronic or comorbid symptoms are exacerbated and prevent overall improvement and discharge  5/24/2024 0057 by Ro Donald RN  Outcome: Not Progressing     Problem: Nutrition Deficit:  Goal: Optimize nutritional status  5/24/2024 0817 by Ann Samuel RN  Outcome: Not Progressing  5/24/2024 0057 by Ro Donald RN  Outcome: Not Progressing     
Maintains hematologic stability  Outcome: Progressing     Problem: Nutrition Deficit:  Goal: Optimize nutritional status  Outcome: Progressing     Problem: Safety - Medical Restraint  Goal: Remains free of injury from restraints (Restraint for Interference with Medical Device)  Description: INTERVENTIONS:  1. Determine that other, less restrictive measures have been tried or would not be effective before applying the restraint  2. Evaluate the patient's condition at the time of restraint application  3. Inform patient/family regarding the reason for restraint  4. Q2H: Monitor safety, psychosocial status, comfort, nutrition and hydration  Outcome: Progressing     
Maintains optimal cardiac output and hemodynamic stability  Outcome: Progressing     Problem: Cardiovascular - Adult  Goal: Absence of cardiac dysrhythmias or at baseline  Outcome: Progressing     Problem: Skin/Tissue Integrity - Adult  Goal: Skin integrity remains intact  Outcome: Progressing  Flowsheets (Taken 6/1/2024 0800)  Skin Integrity Remains Intact:   Monitor for areas of redness and/or skin breakdown   Assess vascular access sites hourly   Every 4-6 hours minimum: Change oxygen saturation probe site   Every 4-6 hours: If on nasal continuous positive airway pressure, respiratory therapy assesses nares and determine need for appliance change or resting period     Problem: Skin/Tissue Integrity - Adult  Goal: Incisions, wounds, or drain sites healing without S/S of infection  Outcome: Progressing     Problem: Musculoskeletal - Adult  Goal: Return mobility to safest level of function  Outcome: Progressing     Problem: Musculoskeletal - Adult  Goal: Return ADL status to a safe level of function  Outcome: Progressing     Problem: Genitourinary - Adult  Goal: Absence of urinary retention  Outcome: Progressing     Problem: Hematologic - Adult  Goal: Maintains hematologic stability  Outcome: Progressing     Problem: Pain  Goal: Verbalizes/displays adequate comfort level or baseline comfort level  Outcome: Progressing     Problem: Safety - Medical Restraint  Goal: Remains free of injury from restraints (Restraint for Interference with Medical Device)  Description: INTERVENTIONS:  1. Determine that other, less restrictive measures have been tried or would not be effective before applying the restraint  2. Evaluate the patient's condition at the time of restraint application  3. Inform patient/family regarding the reason for restraint  4. Q2H: Monitor safety, psychosocial status, comfort, nutrition and hydration  Outcome: Progressing  Flowsheets (Taken 6/1/2024 0800)  Remains free of injury from restraints (restraint 
Monitor cardiac rate and rhythm     Problem: Skin/Tissue Integrity - Adult  Goal: Skin integrity remains intact  Outcome: Progressing  Goal: Incisions, wounds, or drain sites healing without S/S of infection  Outcome: Progressing     Problem: Musculoskeletal - Adult  Goal: Return mobility to safest level of function  Outcome: Progressing  Goal: Return ADL status to a safe level of function  Outcome: Progressing     Problem: Pain  Goal: Verbalizes/displays adequate comfort level or baseline comfort level  Outcome: Progressing     Problem: Chronic Conditions and Co-morbidities  Goal: Patient's chronic conditions and co-morbidity symptoms are monitored and maintained or improved  Outcome: Progressing     Problem: Genitourinary - Adult  Goal: Absence of urinary retention  Outcome: Progressing     Problem: Hematologic - Adult  Goal: Maintains hematologic stability  Outcome: Progressing     Problem: Nutrition Deficit:  Goal: Optimize nutritional status  Outcome: Progressing     Problem: Safety - Medical Restraint  Goal: Remains free of injury from restraints (Restraint for Interference with Medical Device)  Description: INTERVENTIONS:  1. Determine that other, less restrictive measures have been tried or would not be effective before applying the restraint  2. Evaluate the patient's condition at the time of restraint application  3. Inform patient/family regarding the reason for restraint  4. Q2H: Monitor safety, psychosocial status, comfort, nutrition and hydration  Outcome: Progressing  Flowsheets  Taken 5/29/2024 1411 by Mikhail Ramires RN  Remains free of injury from restraints (restraint for interference with medical device): Determine that other, less restrictive measures have been tried or would not be effective before applying the restraint  Taken 5/29/2024 1400 by Mikhail Ramires RN  Remains free of injury from restraints (restraint for interference with medical device): Determine that other, less restrictive 
Progressing     Problem: Respiratory - Adult  Goal: Achieves optimal ventilation and oxygenation  5/30/2024 1935 by Oneyda Triana RN  Outcome: Progressing  5/30/2024 1255 by Kosta Melendez RN  Outcome: Progressing     Problem: Cardiovascular - Adult  Goal: Maintains optimal cardiac output and hemodynamic stability  5/30/2024 1934 by Oneyda Triana RN  Outcome: Progressing  5/30/2024 1255 by Kosta Melendez RN  Outcome: Progressing  Goal: Absence of cardiac dysrhythmias or at baseline  5/30/2024 1934 by Oneyda Triana RN  Outcome: Progressing  5/30/2024 1255 by Kosta Melendez RN  Outcome: Progressing     Problem: Skin/Tissue Integrity - Adult  Goal: Skin integrity remains intact  5/30/2024 1934 by Oneyda Triana RN  Outcome: Progressing  5/30/2024 1255 by Kosta Melendez RN  Outcome: Progressing  Goal: Incisions, wounds, or drain sites healing without S/S of infection  5/30/2024 1934 by Oneyda Triana RN  Outcome: Progressing  5/30/2024 1255 by Kosta Melendez RN  Outcome: Progressing     Problem: Musculoskeletal - Adult  Goal: Return mobility to safest level of function  5/30/2024 1934 by Oneyda Triana RN  Outcome: Progressing  5/30/2024 1255 by Kosta Melendez RN  Outcome: Progressing  Goal: Return ADL status to a safe level of function  5/30/2024 1934 by Oneyda Triana RN  Outcome: Progressing  5/30/2024 1255 by Kosta Melendez RN  Outcome: Progressing     Problem: Pain  Goal: Verbalizes/displays adequate comfort level or baseline comfort level  5/30/2024 1934 by Oneyda Triana RN  Outcome: Progressing  5/30/2024 1255 by Kosta Melendez RN  Outcome: Progressing     Problem: Chronic Conditions and Co-morbidities  Goal: Patient's chronic conditions and co-morbidity symptoms are monitored and maintained or improved  Outcome: Progressing     Problem: Genitourinary - Adult  Goal: Absence of urinary retention  5/30/2024 1934 by Oneyda Triana RN  Outcome: Progressing  5/30/2024 1255 by 
- Adult  Goal: Absence of urinary retention  6/11/2024 0947 by Jillian Jeffery, RN  Outcome: Not Progressing  6/10/2024 2124 by Edis Hammer, RN  Outcome: Progressing     Problem: Nutrition Deficit:  Goal: Optimize nutritional status  6/11/2024 0947 by Jillian Jeffery, RN  Outcome: Not Progressing  6/10/2024 2124 by Edis Hammer, RN  Outcome: Progressing  Flowsheets (Taken 6/10/2024 1513 by Deven Andujar, RD, LD)  Nutrient intake appropriate for improving, restoring, or maintaining nutritional needs: Recommend, monitor, and adjust tube feedings and TPN/PPN based on assessed needs     
retention  Outcome: Not Progressing  Flowsheets (Taken 5/23/2024 0800)  Absence of urinary retention:   Assess patient’s ability to void and empty bladder   Monitor intake/output and perform bladder scan as needed   Place urinary catheter per Licensed Independent Practitioner order if needed   Discuss with Licensed Independent Practitioner  medications to alleviate retention as needed   Discuss catheterization for long term situations as appropriate     Problem: Nutrition Deficit:  Goal: Optimize nutritional status  5/23/2024 0950 by Ann Samuel, RN  Outcome: Progressing  5/23/2024 0100 by Ro Donald, RN  Outcome: Not Progressing     
Progressing  Flowsheets (Taken 5/28/2024 1425 by Jyoti Hameed, RD, LD)  Nutrient intake appropriate for improving, restoring, or maintaining nutritional needs: Recommend appropriate diets, oral nutritional supplements, and vitamin/mineral supplements  5/28/2024 0842 by Timmy Oneill, RN  Outcome: Progressing

## 2024-06-11 NOTE — CONSULTS
[facility]  [facility]  [address], [address]  [place] [address]    Pulmonary Consultation      Reason for Consult:  pleural effusion    Requesting Physician:  Colt Goldman DO    CHIEF COMPLAINT:  weakness    History Obtained From:  patient    HISTORY OF PRESENT ILLNESS:                The patient is a 74 y.o. male with significant past medical history of COPD and Afib on anticoagulation who presents with increased frequency of weakness and near syncope for the past few weeks. When asked about respiratory function, initially claims no different from usual then later says may be gradually getting worse the past few weeks. Was just discharged from hospital on 3rd for anemia possible GIB, no endoscopy as remained stable and ETOH withdrawals early in admission, unclear if had been getting worse with breathing that time or not. Claims had been using lung medications regularly, using oxygen at night, has hx of right VATS/pleurx cath placement for trapped lung in 2018 and removed that year. Currently lying down in stretcher on 5 li NC, claims stable with respiratory function, not much cough/congestion.    Past Medical History:        Diagnosis Date    Anxiety     Atrial fibrillation and flutter (HCC)     Depression     GERD (gastroesophageal reflux disease)     GI bleed 05/2024    Heart palpitations     HTN (hypertension) 10/21/2010    Hyperlipidemia     Lightheadedness     OA (osteoarthritis)     Parathyroid adenoma     s/p surgery 2005    Pre-diabetes     Recurrent left pleural effusion 09/13/2018    Stenosis of right internal carotid artery with cerebral infarction (HCC)        Past Surgical History:        Procedure Laterality Date    BACK SURGERY  12/20/2016    revision cervical laminectomy    CAROTID ENDARTERECTOMY Right 8/25/2014    Delatore - bovine patch     CERVICAL FUSION  12/12/2016    C4-C5, C5-C6, C6-C7 ACF, C5-C6 Corpectomy    COLONOSCOPY N/A 
  Palliative Care Department  [phone]  Palliative Care Initial Consult  Provider HAYLEY Tripathi CNP      PATIENT: Catarino Bolanos  : 1950  MRN: [id number]  ADMISSION DATE: 2024  5:54 PM  Referring Provider: Marina Barrett APRN - CNP     Palliative Medicine was consulted on hospital day 25 for assistance with Goals of care    HPI:     Clinical Summary:Catarino Bolanos is a 74 y.o. y/o male with a history of COPD, atrial fibrillation, prior history of right VATS/Pleurx catheter placement for trapped lung in 2018, alcohol abuse, HFpEF who presented to [facility] on 2024 with weakness and difficulty breathing.  Chest x-ray concerning for pneumonia.  He was started on antibiotics and admitted to telemetry.  On  he had a fall, on CIWA for alcohol withdrawal, CT of the head was negative.  On  an RRT was called for altered mental status and possible seizure.  He was intubated and transferred to the ICU, he was extubated shortly after.  He remained confused and on Precedex drip.  MRI on  showed no acute findings.  EEG on  showed generalized slowing consistent with metabolic encephalopathy, no seizure activity.  Repeat MRI on  showed no acute findings, he was transferred out of the ICU on 6/3.  He was awaiting placement when on  and RRT was called for hypoxic respiratory failure.  He was intubated for respiratory distress and transferred back to the ICU.  An emergent bronchoscopy was performed which showed a large amount of aspirated food.  He remains admitted to the ICU for further medical management.    ASSESSMENT/PLAN:     Pertinent Hospital Diagnoses     Acute hypoxic respiratory failure  Altered mental status  Alcohol withdrawal  Acute metabolic encephalopathy    Palliative Care Encounter / Counseling Regarding Goals of Care  Please see detailed goals of care discussion as below  At this time, Catarino Bolanos, Does Not have capacity for medical decision-making.  
[facility]  Internal Medicine Residency Program  CONSULT NOTE  MICU    Patient:  Catarino Bolanos 74 y.o. male MRN: [id number]     Date of Service: 5/21/2024    Hospital Day: 6      Chief complaint: RRT from 4500 unit for AMS.   History of Present Illness   The patient is a 74 y.o. male initially presented to the ED on 5/16 with recurrent syncope and gait issues for ~few weeks. Also complained of SOB and weak legs. Wife stated pt had been having episodes of his eyes rolling up into his head and going limp at the bar, and will be eased to the ground by friends before falling. Denies hx of seizure-like activity. Per chart review, pt has been becoming more agitated and confused as of late.   Pt was being managed on the floor for acute hypoxic respiratory failure & suspected CAP. Pulmonology was consulted for pleural effusions; considered bedside thoracentesis not done 2/2 worsening mentation. Antibiotics empirically started, completed 3 days of rocephin and doxy.   Cardiology was consulted for HFpEF; orthostatic BP, arrhythmia and volume status monitoring.     Recent discharge for acute GI bleed 5/3/2024 unable to do colonoscopy 2/2 alcohol withdrawal. Pt discharged to [facility] but checked himself out after 1 day. Prior EGD negative 4/29/2024.     RRT called for AMS. Pt has had intermittent behavior disturbances and confusion but was responding and getting out of bed earlier in the evening after which he was placed in restraints for his safety.   Pt was given ativan 2 per CIWA scale around 4PM otherwise no sedating meds. . Does not respond to name or sternal rub. Using accessory muscles and belly breathing on 2L NC. Noted x1 desaturation, otherwise maintained saturations. Respiratory present, had reported to nursing & in chart pt had diminished breath sounds and upper airway stridor that dissipated with position change.   ABG [id number]. CXR w sm b/l pleural effusions and atelectasis, 
Consult was sent and has been read  
Department of Internal Medicine  Nephrology Attending Consult Note      Reason for Consult: Edema and need for diuresis  Requesting Physician:  Stefan Carver MD     CHIEF COMPLAINT: Shortness of breath    History Obtained From:  electronic medical record    HISTORY OF PRESENT ILLNESS:  Briefly Mr. 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, persistent AF/a flutter, syncope with positive TTT for vasopressor syncope, right ICA stenosis status post endarterectomy, COPD, hyperlipidemia, cervical fusion and laminectomy, thrombocytopenia, GERD, alcohol abuse recently admitted with GIB, EGD was negative, colonoscopy was canceled due to patient having alcohol withdrawal; who was readmitted on May 16, 2024 with shortness of breath,and syncopal episode, during his hospital stay he RRT due to altered mental status and possibly seizures and he was intubated and admitted to MICU on 5/21, he was treated as well for alcohol withdrawal, MRI of the brain showed old small infarct in the posterior superior division of the right MCA and old lacunar infarct in the midportion of the left putamen but there were no acute findings, eventually he was extubated and transferred out of the ICU on 6/3, he was treated as well for possible right-sided pneumonia.  Patient RRT on June 9 after he was found to be unresponsive with hypoxic respiratory failure and he was transferred back to MICU.  Patient was reintubated.  Patient has become oliguric, his chest x-ray showed trace bilateral pleural effusions and he has developed large edema, were consulted for oliguria and generalized edema.     Past Medical History:        Diagnosis Date    Anxiety     Atrial fibrillation and flutter (HCC)     Depression     GERD (gastroesophageal reflux disease)     GI bleed 05/2024    Heart palpitations     HTN (hypertension) 10/21/2010    Hyperlipidemia     Lightheadedness  
Department of Psychiatry  Behavioral Health Consult    REASON FOR CONSULT: behavioral issues     CONSULTING PHYSICIAN: Dr. Allen    History obtained from: patient and wife     HISTORY OF PRESENT ILLNESS:      The patient is a 74 y.o. male with significant past psychiatric history of depression, SANDRA and panic attacks, and alcohol use disorder who presents to the ED 5/16/24 for weakness and shortness of breath. He was put on CIWA protocol. 5/21 RRT was called for altered mental status, possible seizure. Patient was intubated and transferred to the ICU. Patient was later extubated. Wife claims ever since this episode patient's mental status declined. He has waxing and waining periods of times.  There is concern for Wernicke encephalopathy.  Patient is quite clearly altered on evaluation today. Patient was calm in bed today with restraints. He is alert to self and place but not date. Thinks it is january 24, 1950. He is confused agitated at times.         Psychiatric Review of Systems       Depression: history of depression prior to admission on paroxitine. Patient was off his medication 3 days prior to admission.       Hiral or Hypomania:  no     Panic Attacks:  yes - prior to admission      Phobias:  no     Obsessions and Compulsions:  no     PTSD : not specified      Hallucinations: patient nodded no      Delusions:  patient nodded no       Substance Abuse History:  ETOH: Patient's wife reports that he drinks several glasses of gin daily and has done so for greater than 20 years  Marijuana: no   Opiates: no  Other Drugs: no       Past Psychiatric History:  Prior Diagnosis: SANDRA     Past Medical History:        Diagnosis Date    Anxiety     Atrial fibrillation and flutter (HCC)     Depression     GERD (gastroesophageal reflux disease)     GI bleed 05/2024    Heart palpitations     HTN (hypertension) 10/21/2010    Hyperlipidemia     Lightheadedness     OA (osteoarthritis)     Parathyroid adenoma     s/p surgery 
L, Protonix, Toprol XL 50 mg, Ativan IV, DuoNeb, Apresoline 25 mg, Neurontin, folic acid, Vibramycin, Rocephin, Pulmicort, Lipitor, aspirin 81, Brovana, Eliquis placed on hold as per pulmonology  Pulmonology is consulted for Pleural Effusion   Patient seen and examined.  Recent vitals: /82 HR 90 SpO2 98% on 5 L nasal cannula weight 160 pounds BMI 26.7 afebrile    Patient tells me that he drinks daily ETOH 5 doubles of Gin & Tonic daily.  He is only drinking 1 glass of water per day, sometimes Iced tea.  Not eating much - yesterday, he only had a Yi before he went to bar.  Compliant with medications - says he has not missed any doses of Eliquis.      His wife is at the bedside.  They both report several syncopal episodes.  Wife reports that he will be sitting at a bar --only 1 drink in and his eyes will roll up into his head he will then go limp and almost passed out-- but everyone in the bar catches him first and gently eases him to the ground.  She denies noting any seizure type activity.  Denies loss of bowel or bladder.  Patient states that he does not remember having any chest discomfort or palpitations.  Wife reports this that this is happening at least once a day for the past 2 to 3 weeks.  Patient additionally reports dizziness with position changes and turning his head.  He states his ears feel full and he feels like he is not hearing as well like there is cotton in his ears.  He has had increasing shortness of breath both at rest and with activity lately.  A small amount of swelling in his both of his legs.  His weight has increased since being discharged from the hospital but he is unsure of the amount.  He is wearing his oxygen 3 L at bedtime only.  He denies any orthopnea he sleeps chronically on 2 pillows he denies PND denies coughing denies fevers or chills.  Denies abdominal swelling or bloating or testicular swelling.  Denies nausea vomiting diarrhea black bloody or tarry stools, bloody 
PHUR 6.0 12/20/2023 11:33 PM    WBCUA 0 TO 5 05/16/2024 06:22 PM    WBCUA NONE 09/12/2011 08:51 AM    RBCUA 0 TO 2 05/16/2024 06:22 PM    RBCUA NONE 09/12/2011 08:51 AM    BACTERIA NONE 01/03/2020 12:00 PM    CLARITYU Clear 06/18/2022 03:16 PM    LEUKOCYTESUR NEGATIVE 05/16/2024 06:22 PM    UROBILINOGEN 0.2 05/16/2024 06:22 PM    BILIRUBINUR NEGATIVE 05/16/2024 06:22 PM    BLOODU Negative 06/18/2022 03:16 PM    GLUCOSEU NEGATIVE 05/16/2024 06:22 PM    GLUCOSEU NEGATIVE 09/12/2011 08:51 AM     ABG:    Lab Results   Component Value Date/Time    PH 7.470 05/21/2024 06:55 AM    PCO2 33.9 05/21/2024 06:55 AM    PO2 237.6 05/21/2024 06:55 AM    HCO3 24.1 05/21/2024 06:55 AM    BE 0.6 05/21/2024 06:55 AM    O2SAT 99.7 05/21/2024 06:55 AM     HgBA1c:    Lab Results   Component Value Date/Time    LABA1C 4.8 04/28/2024 01:13 AM     FLP:    Lab Results   Component Value Date/Time    TRIG 72 04/28/2024 09:00 AM    HDL 45 04/28/2024 09:00 AM     TSH:    Lab Results   Component Value Date/Time    TSH 1.57 04/28/2024 09:00 AM       CT head wo contrast:  No acute intracranial abnormality.    Bilateral mastoid opacification correlate for mastoiditis    All pertinent labs and images were personally reviewed today, with the PA    Assessment:     The patient may have suffered a generalized tonic seizure during the RRT; this may have been from his hypoxia or from alcohol withdrawal.  His initial events of the bar are likely related to seizure activity.  Cardiac issues, especially arrhythmias, must be excluded.    He is otherwise stable neurologically and medically.  We must await improvement in his mental status over the next few days.    His severe alcoholism must be addressed    Plan:     -MRI brain wo contrast, EEG pending   -DVT prophylaxis  -continue [facility] protocol     Neurology to follow    I examined the patient in detail, discussing my findings with his wife, as well as reviewing all pertinent data with the PA.    Kaur Frank, 
is no evidence of pulmonary arterial embolization.      Stable bilateral pleural effusions, left greater than right with bibasilar   compressive atelectasis.         XR CHEST PORTABLE   Final Result   1. Cardiomegaly with increased perihilar lung markings stable to slightly   increased from prior   2. Decreased evidence of probable persistent but decreased small left pleural   effusion.         CT HEAD WO CONTRAST   Final Result   No acute intracranial abnormality.         CT Head W/O Contrast   Final Result   1. No acute intracranial abnormality.   2. Moderate atrophy with moderately severe periventricular white matter   changes.   3. Chronic microvascular ischemic changes.         CTA PULMONARY W CONTRAST   Final Result   1. No definitive pulmonary embolism.  Sub optimal evaluation of the distal   left lower lobe secondary to decreased opacification of the subsegmental   pulmonary arteries.  This is likely physiologic however small PE cannot be   fully excluded in this location due to unopacified arteries.   2. Left greater than right pleural effusions with prominent left lower lung   atelectasis.         XR CHEST (2 VW)   Final Result   New opacities are present in left lung base which could indicate pneumonia,   atelectasis, or pleural effusion.         XR CHEST PORTABLE    (Results Pending)   XR CHEST PORTABLE    (Results Pending)   XR CHEST PORTABLE    (Results Pending)   XR ABDOMEN FOR NG/OG/NE TUBE PLACEMENT    (Results Pending)         EKG: Rhythm Strip  :      APRN- CNP Assessment and PLan             Assessment:    Plan:  Acute respiratory failure: Multifactorial in the setting of COPD, tobacco abuse, and possible aspiration pneumonia.  Completed course of Zosyn and vancomycin for right-sided pneumonia on 6/7.  Continue mechanical ventilation.  Versed and Precedex for sedation. Start antibiotics for possible aspiration. Check blood cultures and procalcitonin.  Hypotension: Received 1 L NS bolus without 
atrophy with the moderately severe periventricular white matter changes. ORBITS: The visualized portion of the orbits demonstrate no acute abnormality. SINUSES: The visualized paranasal sinuses and mastoid air cells demonstrate no acute abnormality. SOFT TISSUES/SKULL: No acute abnormality of the visualized skull or soft tissues.     1. No acute intracranial abnormality. 2. Moderate atrophy with moderately severe periventricular white matter changes. 3. Chronic microvascular ischemic changes.     XR CHEST (2 VW)    Result Date: 5/16/2024  EXAMINATION: TWO XRAY VIEWS OF THE CHEST 5/16/2024 7:24 pm COMPARISON: April 27, 2024 HISTORY: ORDERING SYSTEM PROVIDED HISTORY: synopce TECHNOLOGIST PROVIDED HISTORY: Reason for exam:->synopce FINDINGS: There are opacities in left lower lung field silhouetting left hemidiaphragm. There is prominence of the pulmonary vasculature.  The heart appears normal size.  No pneumothorax.     New opacities are present in left lung base which could indicate pneumonia, atelectasis, or pleural effusion.       The patient's records from referring provider and available information in the EHR was reviewed.    I have personally reviewed the following studies: {[id number]}     Impression:  ***  ***    Principal Problem:    Acute respiratory failure with hypoxia (HCC)  Active Problems:    Primary hypertension    Acute hypoxic respiratory failure (HCC)    Chronic obstructive pulmonary disease (HCC)    Acute on chronic hypoxic respiratory failure (HCC)    Syncope and collapse    Hypoxia    Chronic diastolic (congestive) heart failure (HCC)    Mitral valve disease    Pure hypercholesterolemia    ETOH abuse    Anemia    Moderate protein-calorie malnutrition (HCC)    Seizure (HCC)    Hypernatremia  Resolved Problems:    * No resolved hospital problems. *      Recommendations:                                            ***  ***  Case was discussed with ***.  Plan of care and all questions and concerns

## 2024-06-14 LAB
MICROORGANISM SPEC CULT: NORMAL
MICROORGANISM SPEC CULT: NORMAL
SERVICE CMNT-IMP: NORMAL
SERVICE CMNT-IMP: NORMAL
SPECIMEN DESCRIPTION: NORMAL
SPECIMEN DESCRIPTION: NORMAL

## (undated) DEVICE — 3M(TM) MEDIPORE(TM) +PAD SOFT CLOTH ADHESIVE WOUND DRESSING 3569: Brand: 3M™ MEDIPORE™

## (undated) DEVICE — PACK,UNIV, II AURORA: Brand: MEDLINE

## (undated) DEVICE — INTENDED FOR TISSUE SEPARATION, AND OTHER PROCEDURES THAT REQUIRE A SHARP SURGICAL BLADE TO PUNCTURE OR CUT.: Brand: BARD-PARKER ® STAINLESS STEEL BLADES

## (undated) DEVICE — GAUZE,SPONGE,4"X4",8PLY,STRL,LF,10/TRAY: Brand: MEDLINE

## (undated) DEVICE — SURGICAL PROCEDURE PACK BASIC

## (undated) DEVICE — STANDARD HYPODERMIC NEEDLE,ALUMINUM HUB: Brand: MONOJECT

## (undated) DEVICE — CONNECTOR PERF W3 8XH3 8XL3 8IN BASE EQL Y SHP W O LUERLOCK

## (undated) DEVICE — FORCEPS BX L160CM JAW DIA2.4MM YEL L CAP W/ NDL DISP RAD

## (undated) DEVICE — SET INSTRUMENTS VATS THORACOSCOPY

## (undated) DEVICE — PATIENT RETURN ELECTRODE, SINGLE-USE, CONTACT QUALITY MONITORING, ADULT, WITH 9FT CORD, FOR PATIENTS WEIGING OVER 33LBS. (15KG): Brand: MEGADYNE

## (undated) DEVICE — SHEET,DRAPE,40X58,STERILE: Brand: MEDLINE

## (undated) DEVICE — AGENT HEMSTAT W4XL8IN OXIDIZED REGENERATED CELOS ABSRB

## (undated) DEVICE — CATHETER THORACENTESIS STR 28 FRX23 IN 6 EYELET TAPR TIP LF

## (undated) DEVICE — SET CARDIAC CHEST II

## (undated) DEVICE — VALVE SUCTION AIR H2O SET ORCA POD + DISP

## (undated) DEVICE — STERILE LATEX POWDER FREE SURGICAL GLOVES WITH HYDROGEL COATING: Brand: PROTEXIS

## (undated) DEVICE — PAD,NON-ADHERENT,2X3,STERILE,LF,1/PK: Brand: MEDLINE

## (undated) DEVICE — GRADUATE

## (undated) DEVICE — DRAIN SURG SGL COLL PT TB FOR ATS BG OASIS

## (undated) DEVICE — LABEL MED 4 IN SURG PANEL W/ PEN STRL

## (undated) DEVICE — DEFENDO AIR WATER SUCTION AND BIOPSY VALVE KIT FOR  OLYMPUS: Brand: DEFENDO AIR/WATER/SUCTION AND BIOPSY VALVE

## (undated) DEVICE — TRAP,MUCUS SPECIMEN,40CC: Brand: MEDLINE

## (undated) DEVICE — TRAP POLYP ETRAP

## (undated) DEVICE — BLADE CLIPPER GEN PURP NS

## (undated) DEVICE — CAMERA CARDIAC STRYKER 1488 HD

## (undated) DEVICE — KIT SURG W7XL11IN 2 PKT UNTREATED NA

## (undated) DEVICE — GOWN,SIRUS,FABRNF,XL,20/CS: Brand: MEDLINE

## (undated) DEVICE — GLOVE ORANGE PI 7 1/2   MSG9075

## (undated) DEVICE — SNARE ENDOSCP L240CM SHTH DIA24MM LOOP W10MM POLYP RND REINF

## (undated) DEVICE — CANNULA NSL ORAL AD FOR CAPNOFLEX CO2 O2 AIRLFE

## (undated) DEVICE — GARMENT,MEDLINE,DVT,INT,CALF,MED, GEN2: Brand: MEDLINE

## (undated) DEVICE — NEEDLE SPNL 20GA L3.5IN YEL HUB S STL REG WALL FIT STYL W/

## (undated) DEVICE — ELECTRODE BLDE L6.5IN CAUT EXT DISP

## (undated) DEVICE — MARKER,SKIN,PREP-RESISTANT,NON-STERILE: Brand: MEDLINE

## (undated) DEVICE — WARMER SCP LAP

## (undated) DEVICE — TOWEL,OR,DSP,ST,BLUE,STD,6/PK,12PK/CS: Brand: MEDLINE

## (undated) DEVICE — 3M™ IOBAN™ 2 ANTIMICROBIAL INCISE DRAPE 6650EZ: Brand: IOBAN™ 2

## (undated) DEVICE — SKIN AFFIX SURG ADHESIVE 72/CS 0.55ML: Brand: MEDLINE

## (undated) DEVICE — SYRINGE MED 20ML STD CLR PLAS LUERLOCK TIP N CTRL DISP

## (undated) DEVICE — FORMALIN  10%NBF 60ML PREFLL CONT

## (undated) DEVICE — BLADE ES L2.44IN S STL HEX LOK DISP VALLEYLAB

## (undated) DEVICE — BITEBLOCK 54FR W/ DENT RIM BLOX

## (undated) DEVICE — CONNECTOR TBNG AUX H2O JET DISP FOR OLY 160/180 SER

## (undated) DEVICE — CATHETER THOR 28FR L23CM DIA9.3MM POLYVI CHL TAPR CONN TIP

## (undated) DEVICE — 1.5L THIN WALL CAN: Brand: CRD

## (undated) DEVICE — GOWN,SIRUS,FABRNF,L,20/CS: Brand: MEDLINE

## (undated) DEVICE — TUBING, SUCTION, 3/16" X 12', STRAIGHT: Brand: MEDLINE

## (undated) DEVICE — FORCEPS BX L240CM JAW DIA2.4MM WRK CHN 2.8MM ORNG L CAP W/

## (undated) DEVICE — CONTAINER SPEC 60ML PH 7NEUTRAL BUFF FRMLN RDY TO USE

## (undated) DEVICE — KIT,ANTI FOG,W/SPONGE & FLUID,SOFT PACK: Brand: MEDLINE

## (undated) DEVICE — SET CARDIAC CHEST I

## (undated) DEVICE — SOLUTION IV IRRIG POUR BRL 0.9% SODIUM CHL 2F7124

## (undated) DEVICE — GAUZE,SPONGE,POST-OP,4X3,STRL,LF: Brand: MEDLINE

## (undated) DEVICE — CHLORAPREP 26ML ORANGE

## (undated) DEVICE — Z CONVERTED USE 2275207 CLOTH PREP W7.5XL7.5IN 2% CHG SKIN ALC AND RNS FREE

## (undated) DEVICE — GLOVE SURG SZ 65 THK91MIL LTX FREE SYN POLYISOPRENE

## (undated) DEVICE — GLOVE ORANGE PI 7   MSG9070

## (undated) DEVICE — CLEANER,CAUTERY TIP,2X2",STERILE: Brand: MEDLINE

## (undated) DEVICE — SOLUTION IV IRRIG WATER 1000ML POUR BRL 2F7114

## (undated) DEVICE — CATHETER URETH 16FR RED RUB INTMIT ALL PURP 12 PER CA

## (undated) DEVICE — Z INACTIVE USE 2641837 CLIP LIG M BLU TI HRT SHP WIRE HORZ 600 PER BX

## (undated) DEVICE — SYRINGE MED 50ML LUERLOCK TIP